# Patient Record
Sex: MALE | Race: OTHER | HISPANIC OR LATINO
[De-identification: names, ages, dates, MRNs, and addresses within clinical notes are randomized per-mention and may not be internally consistent; named-entity substitution may affect disease eponyms.]

---

## 2015-06-09 RX ORDER — LOSARTAN POTASSIUM 100 MG/1
2 TABLET, FILM COATED ORAL
Qty: 0 | Refills: 0 | COMMUNITY
Start: 2015-06-09

## 2017-12-08 ENCOUNTER — APPOINTMENT (OUTPATIENT)
Dept: UROLOGY | Facility: CLINIC | Age: 82
End: 2017-12-08
Payer: MEDICARE

## 2017-12-08 VITALS
HEIGHT: 66.5 IN | DIASTOLIC BLOOD PRESSURE: 84 MMHG | WEIGHT: 154 LBS | BODY MASS INDEX: 24.46 KG/M2 | SYSTOLIC BLOOD PRESSURE: 144 MMHG | HEART RATE: 80 BPM | RESPIRATION RATE: 16 BRPM

## 2017-12-08 DIAGNOSIS — Z86.79 PERSONAL HISTORY OF OTHER DISEASES OF THE CIRCULATORY SYSTEM: ICD-10-CM

## 2017-12-08 DIAGNOSIS — Z86.39 PERSONAL HISTORY OF OTHER ENDOCRINE, NUTRITIONAL AND METABOLIC DISEASE: ICD-10-CM

## 2017-12-08 DIAGNOSIS — Z63.4 DISAPPEARANCE AND DEATH OF FAMILY MEMBER: ICD-10-CM

## 2017-12-08 DIAGNOSIS — Z87.891 PERSONAL HISTORY OF NICOTINE DEPENDENCE: ICD-10-CM

## 2017-12-08 PROCEDURE — 51798 US URINE CAPACITY MEASURE: CPT

## 2017-12-08 PROCEDURE — 99213 OFFICE O/P EST LOW 20 MIN: CPT

## 2017-12-08 RX ORDER — TAMSULOSIN HYDROCHLORIDE 0.4 MG/1
0.4 CAPSULE ORAL
Qty: 90 | Refills: 3 | Status: ACTIVE | COMMUNITY
Start: 2017-12-08 | End: 1900-01-01

## 2017-12-08 SDOH — SOCIAL STABILITY - SOCIAL INSECURITY: DISSAPEARANCE AND DEATH OF FAMILY MEMBER: Z63.4

## 2017-12-11 PROBLEM — Z86.39 HISTORY OF HIGH CHOLESTEROL: Status: RESOLVED | Noted: 2017-12-08 | Resolved: 2017-12-11

## 2017-12-11 PROBLEM — Z63.4 WIDOWED: Status: ACTIVE | Noted: 2017-12-08

## 2017-12-11 PROBLEM — Z86.79 HISTORY OF HYPERTENSION: Status: RESOLVED | Noted: 2017-12-08 | Resolved: 2017-12-11

## 2017-12-11 PROBLEM — Z87.891 FORMER SMOKER: Status: ACTIVE | Noted: 2017-12-08

## 2018-06-06 ENCOUNTER — APPOINTMENT (OUTPATIENT)
Dept: UROLOGY | Facility: CLINIC | Age: 83
End: 2018-06-06

## 2018-06-25 ENCOUNTER — APPOINTMENT (OUTPATIENT)
Dept: ORTHOPEDIC SURGERY | Facility: CLINIC | Age: 83
End: 2018-06-25
Payer: MEDICARE

## 2018-06-25 VITALS
BODY MASS INDEX: 23.3 KG/M2 | HEART RATE: 74 BPM | HEIGHT: 66 IN | DIASTOLIC BLOOD PRESSURE: 100 MMHG | SYSTOLIC BLOOD PRESSURE: 176 MMHG | WEIGHT: 145 LBS

## 2018-06-25 DIAGNOSIS — Z95.0 PRESENCE OF CARDIAC PACEMAKER: ICD-10-CM

## 2018-06-25 DIAGNOSIS — Z86.79 PERSONAL HISTORY OF OTHER DISEASES OF THE CIRCULATORY SYSTEM: ICD-10-CM

## 2018-06-25 PROCEDURE — 99204 OFFICE O/P NEW MOD 45 MIN: CPT

## 2018-06-25 PROCEDURE — 73564 X-RAY EXAM KNEE 4 OR MORE: CPT | Mod: LT

## 2018-06-25 RX ORDER — LOSARTAN POTASSIUM 50 MG/1
50 TABLET, FILM COATED ORAL
Qty: 30 | Refills: 0 | Status: ACTIVE | COMMUNITY
Start: 2018-06-07

## 2018-06-25 RX ORDER — AZITHROMYCIN 250 MG/1
250 TABLET, FILM COATED ORAL
Qty: 6 | Refills: 0 | Status: ACTIVE | COMMUNITY
Start: 2018-02-08

## 2018-06-25 RX ORDER — AZELASTINE HYDROCHLORIDE 0.5 MG/ML
0.05 SOLUTION/ DROPS OPHTHALMIC
Qty: 6 | Refills: 0 | Status: ACTIVE | COMMUNITY
Start: 2018-03-16

## 2018-06-25 RX ORDER — DICLOFENAC SODIUM 20 MG/G
2 SOLUTION TOPICAL
Qty: 1 | Refills: 0 | Status: ACTIVE | COMMUNITY
Start: 2018-06-25 | End: 1900-01-01

## 2018-06-25 RX ORDER — AMOXICILLIN 500 MG/1
500 CAPSULE ORAL
Qty: 20 | Refills: 0 | Status: ACTIVE | COMMUNITY
Start: 2018-06-08

## 2018-06-25 RX ORDER — FUROSEMIDE 20 MG/1
20 TABLET ORAL
Qty: 30 | Refills: 0 | Status: ACTIVE | COMMUNITY
Start: 2018-06-07

## 2018-06-25 RX ORDER — ATORVASTATIN CALCIUM 20 MG/1
20 TABLET, FILM COATED ORAL
Qty: 90 | Refills: 0 | Status: ACTIVE | COMMUNITY
Start: 2018-01-02

## 2018-06-25 RX ORDER — BLOOD SUGAR DIAGNOSTIC
STRIP MISCELLANEOUS
Qty: 100 | Refills: 0 | Status: ACTIVE | COMMUNITY
Start: 2017-07-10

## 2018-06-25 RX ORDER — HYALURONATE SOD, CROSS-LINKED 30 MG/3 ML
30 SYRINGE (ML) INTRAARTICULAR
Qty: 2 | Refills: 0 | Status: ACTIVE | OUTPATIENT
Start: 2018-06-25

## 2018-06-25 RX ORDER — BENZONATATE 200 MG/1
200 CAPSULE ORAL
Qty: 20 | Refills: 0 | Status: ACTIVE | COMMUNITY
Start: 2018-06-08

## 2018-06-25 RX ORDER — FINASTERIDE 5 MG/1
5 TABLET, FILM COATED ORAL
Qty: 90 | Refills: 0 | Status: ACTIVE | COMMUNITY
Start: 2018-01-02

## 2018-06-25 RX ORDER — APIXABAN 5 MG/1
5 TABLET, FILM COATED ORAL
Qty: 60 | Refills: 0 | Status: ACTIVE | COMMUNITY
Start: 2018-04-06

## 2018-06-25 RX ORDER — LANCETS 28 GAUGE
EACH MISCELLANEOUS
Qty: 100 | Refills: 0 | Status: ACTIVE | COMMUNITY
Start: 2018-02-21

## 2018-06-25 RX ORDER — ERYTHROMYCIN 5 MG/G
5 OINTMENT OPHTHALMIC
Qty: 4 | Refills: 0 | Status: ACTIVE | COMMUNITY
Start: 2018-04-13

## 2018-06-25 RX ORDER — CARVEDILOL 12.5 MG/1
12.5 TABLET, FILM COATED ORAL
Qty: 180 | Refills: 0 | Status: ACTIVE | COMMUNITY
Start: 2018-05-09

## 2018-07-17 ENCOUNTER — APPOINTMENT (OUTPATIENT)
Dept: UROLOGY | Facility: CLINIC | Age: 83
End: 2018-07-17
Payer: MEDICARE

## 2018-07-17 ENCOUNTER — APPOINTMENT (OUTPATIENT)
Dept: UROLOGY | Facility: CLINIC | Age: 83
End: 2018-07-17

## 2018-07-17 ENCOUNTER — CLINICAL ADVICE (OUTPATIENT)
Age: 83
End: 2018-07-17

## 2018-07-17 DIAGNOSIS — R30.0 DYSURIA: ICD-10-CM

## 2018-07-17 DIAGNOSIS — N40.1 BENIGN PROSTATIC HYPERPLASIA WITH LOWER URINARY TRACT SYMPMS: ICD-10-CM

## 2018-07-17 DIAGNOSIS — N50.89 OTHER SPECIFIED DISORDERS OF THE MALE GENITAL ORGANS: ICD-10-CM

## 2018-07-17 DIAGNOSIS — N13.8 BENIGN PROSTATIC HYPERPLASIA WITH LOWER URINARY TRACT SYMPMS: ICD-10-CM

## 2018-07-17 DIAGNOSIS — R10.9 UNSPECIFIED ABDOMINAL PAIN: ICD-10-CM

## 2018-07-17 PROCEDURE — 99214 OFFICE O/P EST MOD 30 MIN: CPT

## 2018-07-19 LAB
APPEARANCE: CLEAR
BACTERIA UR CULT: NORMAL
BACTERIA: NEGATIVE
BILIRUBIN URINE: NEGATIVE
BLOOD URINE: NEGATIVE
CALCIUM OXALATE CRYSTALS: ABNORMAL
COLOR: YELLOW
GLUCOSE QUALITATIVE U: NEGATIVE MG/DL
KETONES URINE: NEGATIVE
LEUKOCYTE ESTERASE URINE: NEGATIVE
MICROSCOPIC-UA: NORMAL
NITRITE URINE: NEGATIVE
PH URINE: 6.5
PROTEIN URINE: ABNORMAL MG/DL
RED BLOOD CELLS URINE: 2 /HPF
SPECIFIC GRAVITY URINE: 1.01
SQUAMOUS EPITHELIAL CELLS: 0 /HPF
UROBILINOGEN URINE: NEGATIVE MG/DL
WHITE BLOOD CELLS URINE: 1 /HPF

## 2018-07-31 ENCOUNTER — INPATIENT (INPATIENT)
Facility: HOSPITAL | Age: 83
LOS: 4 days | Discharge: HOME | End: 2018-08-05
Attending: INTERNAL MEDICINE | Admitting: INTERNAL MEDICINE
Payer: MEDICARE

## 2018-07-31 VITALS
DIASTOLIC BLOOD PRESSURE: 82 MMHG | SYSTOLIC BLOOD PRESSURE: 150 MMHG | OXYGEN SATURATION: 98 % | HEART RATE: 59 BPM | RESPIRATION RATE: 16 BRPM | TEMPERATURE: 98 F

## 2018-07-31 DIAGNOSIS — Z95.0 PRESENCE OF CARDIAC PACEMAKER: Chronic | ICD-10-CM

## 2018-07-31 DIAGNOSIS — Z95.5 PRESENCE OF CORONARY ANGIOPLASTY IMPLANT AND GRAFT: Chronic | ICD-10-CM

## 2018-07-31 DIAGNOSIS — I63.9 CEREBRAL INFARCTION, UNSPECIFIED: ICD-10-CM

## 2018-07-31 DIAGNOSIS — Z95.0 PRESENCE OF CARDIAC PACEMAKER: ICD-10-CM

## 2018-07-31 DIAGNOSIS — E78.5 HYPERLIPIDEMIA, UNSPECIFIED: ICD-10-CM

## 2018-07-31 DIAGNOSIS — Z29.9 ENCOUNTER FOR PROPHYLACTIC MEASURES, UNSPECIFIED: ICD-10-CM

## 2018-07-31 DIAGNOSIS — I25.10 ATHEROSCLEROTIC HEART DISEASE OF NATIVE CORONARY ARTERY WITHOUT ANGINA PECTORIS: ICD-10-CM

## 2018-07-31 DIAGNOSIS — I10 ESSENTIAL (PRIMARY) HYPERTENSION: ICD-10-CM

## 2018-07-31 DIAGNOSIS — E87.70 FLUID OVERLOAD, UNSPECIFIED: ICD-10-CM

## 2018-07-31 DIAGNOSIS — I50.9 HEART FAILURE, UNSPECIFIED: ICD-10-CM

## 2018-07-31 DIAGNOSIS — I48.91 UNSPECIFIED ATRIAL FIBRILLATION: ICD-10-CM

## 2018-07-31 LAB
ALBUMIN SERPL ELPH-MCNC: 3 G/DL — LOW (ref 3.3–5)
ALP SERPL-CCNC: 110 U/L — SIGNIFICANT CHANGE UP (ref 40–120)
ALT FLD-CCNC: 17 U/L — SIGNIFICANT CHANGE UP (ref 4–41)
APTT BLD: 29.7 SEC — SIGNIFICANT CHANGE UP (ref 27.5–37.4)
AST SERPL-CCNC: 36 U/L — SIGNIFICANT CHANGE UP (ref 4–40)
BASOPHILS # BLD AUTO: 0.02 K/UL — SIGNIFICANT CHANGE UP (ref 0–0.2)
BASOPHILS NFR BLD AUTO: 0.4 % — SIGNIFICANT CHANGE UP (ref 0–2)
BILIRUB SERPL-MCNC: 0.6 MG/DL — SIGNIFICANT CHANGE UP (ref 0.2–1.2)
BUN SERPL-MCNC: 36 MG/DL — HIGH (ref 7–23)
CALCIUM SERPL-MCNC: 8.2 MG/DL — LOW (ref 8.4–10.5)
CHLORIDE SERPL-SCNC: 102 MMOL/L — SIGNIFICANT CHANGE UP (ref 98–107)
CK MB BLD-MCNC: 4.48 NG/ML — SIGNIFICANT CHANGE UP (ref 1–6.6)
CK MB BLD-MCNC: 4.79 NG/ML — SIGNIFICANT CHANGE UP (ref 1–6.6)
CK MB BLD-MCNC: SIGNIFICANT CHANGE UP (ref 0–2.5)
CK MB BLD-MCNC: SIGNIFICANT CHANGE UP (ref 0–2.5)
CK SERPL-CCNC: 103 U/L — SIGNIFICANT CHANGE UP (ref 30–200)
CK SERPL-CCNC: 104 U/L — SIGNIFICANT CHANGE UP (ref 30–200)
CO2 SERPL-SCNC: 25 MMOL/L — SIGNIFICANT CHANGE UP (ref 22–31)
CREAT SERPL-MCNC: 1.18 MG/DL — SIGNIFICANT CHANGE UP (ref 0.5–1.3)
EOSINOPHIL # BLD AUTO: 0.09 K/UL — SIGNIFICANT CHANGE UP (ref 0–0.5)
EOSINOPHIL NFR BLD AUTO: 1.6 % — SIGNIFICANT CHANGE UP (ref 0–6)
GLUCOSE SERPL-MCNC: 148 MG/DL — HIGH (ref 70–99)
HCT VFR BLD CALC: 39.1 % — SIGNIFICANT CHANGE UP (ref 39–50)
HGB BLD-MCNC: 12.6 G/DL — LOW (ref 13–17)
IMM GRANULOCYTES # BLD AUTO: 0.01 # — SIGNIFICANT CHANGE UP
IMM GRANULOCYTES NFR BLD AUTO: 0.2 % — SIGNIFICANT CHANGE UP (ref 0–1.5)
INR BLD: 1.44 — HIGH (ref 0.88–1.17)
LYMPHOCYTES # BLD AUTO: 0.67 K/UL — LOW (ref 1–3.3)
LYMPHOCYTES # BLD AUTO: 11.9 % — LOW (ref 13–44)
MCHC RBC-ENTMCNC: 26.4 PG — LOW (ref 27–34)
MCHC RBC-ENTMCNC: 32.2 % — SIGNIFICANT CHANGE UP (ref 32–36)
MCV RBC AUTO: 82 FL — SIGNIFICANT CHANGE UP (ref 80–100)
MONOCYTES # BLD AUTO: 0.64 K/UL — SIGNIFICANT CHANGE UP (ref 0–0.9)
MONOCYTES NFR BLD AUTO: 11.3 % — SIGNIFICANT CHANGE UP (ref 2–14)
NEUTROPHILS # BLD AUTO: 4.21 K/UL — SIGNIFICANT CHANGE UP (ref 1.8–7.4)
NEUTROPHILS NFR BLD AUTO: 74.6 % — SIGNIFICANT CHANGE UP (ref 43–77)
NRBC # FLD: 0 — SIGNIFICANT CHANGE UP
NT-PROBNP SERPL-SCNC: SIGNIFICANT CHANGE UP PG/ML
PLATELET # BLD AUTO: 140 K/UL — LOW (ref 150–400)
PMV BLD: SIGNIFICANT CHANGE UP FL (ref 7–13)
POTASSIUM SERPL-MCNC: 3.9 MMOL/L — SIGNIFICANT CHANGE UP (ref 3.5–5.3)
POTASSIUM SERPL-SCNC: 3.9 MMOL/L — SIGNIFICANT CHANGE UP (ref 3.5–5.3)
PROT SERPL-MCNC: 5.9 G/DL — LOW (ref 6–8.3)
PROTHROM AB SERPL-ACNC: 16.7 SEC — HIGH (ref 9.8–13.1)
RBC # BLD: 4.77 M/UL — SIGNIFICANT CHANGE UP (ref 4.2–5.8)
RBC # FLD: 18.1 % — HIGH (ref 10.3–14.5)
SODIUM SERPL-SCNC: 139 MMOL/L — SIGNIFICANT CHANGE UP (ref 135–145)
TROPONIN T, HIGH SENSITIVITY: 69 NG/L — CRITICAL HIGH (ref ?–14)
TROPONIN T, HIGH SENSITIVITY: 79 NG/L — CRITICAL HIGH (ref ?–14)
WBC # BLD: 5.64 K/UL — SIGNIFICANT CHANGE UP (ref 3.8–10.5)
WBC # FLD AUTO: 5.64 K/UL — SIGNIFICANT CHANGE UP (ref 3.8–10.5)

## 2018-07-31 PROCEDURE — 93970 EXTREMITY STUDY: CPT | Mod: 26

## 2018-07-31 PROCEDURE — 71046 X-RAY EXAM CHEST 2 VIEWS: CPT | Mod: 26

## 2018-07-31 RX ORDER — FUROSEMIDE 40 MG
40 TABLET ORAL ONCE
Qty: 0 | Refills: 0 | Status: COMPLETED | OUTPATIENT
Start: 2018-07-31 | End: 2018-07-31

## 2018-07-31 RX ORDER — CARVEDILOL PHOSPHATE 80 MG/1
25 CAPSULE, EXTENDED RELEASE ORAL EVERY 12 HOURS
Qty: 0 | Refills: 0 | Status: DISCONTINUED | OUTPATIENT
Start: 2018-07-31 | End: 2018-08-05

## 2018-07-31 RX ORDER — ATORVASTATIN CALCIUM 80 MG/1
20 TABLET, FILM COATED ORAL AT BEDTIME
Qty: 0 | Refills: 0 | Status: DISCONTINUED | OUTPATIENT
Start: 2018-07-31 | End: 2018-08-05

## 2018-07-31 RX ORDER — ASPIRIN/CALCIUM CARB/MAGNESIUM 324 MG
81 TABLET ORAL DAILY
Qty: 0 | Refills: 0 | Status: DISCONTINUED | OUTPATIENT
Start: 2018-07-31 | End: 2018-08-05

## 2018-07-31 RX ORDER — SODIUM CHLORIDE 9 MG/ML
3 INJECTION INTRAMUSCULAR; INTRAVENOUS; SUBCUTANEOUS EVERY 8 HOURS
Qty: 0 | Refills: 0 | Status: DISCONTINUED | OUTPATIENT
Start: 2018-07-31 | End: 2018-08-05

## 2018-07-31 RX ORDER — APIXABAN 2.5 MG/1
5 TABLET, FILM COATED ORAL EVERY 12 HOURS
Qty: 0 | Refills: 0 | Status: DISCONTINUED | OUTPATIENT
Start: 2018-07-31 | End: 2018-08-05

## 2018-07-31 RX ORDER — FINASTERIDE 5 MG/1
5 TABLET, FILM COATED ORAL DAILY
Qty: 0 | Refills: 0 | Status: DISCONTINUED | OUTPATIENT
Start: 2018-07-31 | End: 2018-08-05

## 2018-07-31 RX ORDER — LOSARTAN POTASSIUM 100 MG/1
50 TABLET, FILM COATED ORAL
Qty: 0 | Refills: 0 | Status: DISCONTINUED | OUTPATIENT
Start: 2018-07-31 | End: 2018-08-03

## 2018-07-31 RX ORDER — FUROSEMIDE 40 MG
40 TABLET ORAL DAILY
Qty: 0 | Refills: 0 | Status: DISCONTINUED | OUTPATIENT
Start: 2018-08-01 | End: 2018-08-05

## 2018-07-31 RX ADMIN — Medication 40 MILLIGRAM(S): at 13:08

## 2018-07-31 RX ADMIN — CARVEDILOL PHOSPHATE 25 MILLIGRAM(S): 80 CAPSULE, EXTENDED RELEASE ORAL at 21:17

## 2018-07-31 RX ADMIN — SODIUM CHLORIDE 3 MILLILITER(S): 9 INJECTION INTRAMUSCULAR; INTRAVENOUS; SUBCUTANEOUS at 21:16

## 2018-07-31 RX ADMIN — ATORVASTATIN CALCIUM 20 MILLIGRAM(S): 80 TABLET, FILM COATED ORAL at 21:17

## 2018-07-31 RX ADMIN — APIXABAN 5 MILLIGRAM(S): 2.5 TABLET, FILM COATED ORAL at 21:40

## 2018-07-31 NOTE — ED SUB INTERN NOTE - OBJECTIVE STATEMENT FT
88 y/o M c/o 3 mo worsening BL LE swelling. Patient endorses difficulty ambulating at baseline due to chronic OA, 1 mo worsening of ambulation due to discomfort in BL LE and scrotum. Patient PMH notable for AF, aMI w/ stenting, HTN, BPH, anemia. Patient denies SOB or CP, denies BROWN. Endorses that difficulty ambulating is not progressive and due to BL LE discomfort. Patient denies trauma to BL LE, denies falls, denies fevers or rash. Denies ROM limitation. Denies nocturia, dysuria, urinary urgency, hesitancy, or frequency. Endorses need to manually manipulate genitals to control urine stream. Patient accompanied by son who states patient non-compliant with doctors appointments, uncertain medication adherence.

## 2018-07-31 NOTE — H&P ADULT - PROBLEM SELECTOR PLAN 1
Admit to tele  check cbc, bmp, a1c, flp, tsh, trend CE  echo ordered   strict I&Os  Fluid restriction  daily weights  Lasix 40 IV daily  Discussed with Dr. Nani Denise MD note

## 2018-07-31 NOTE — H&P ADULT - ASSESSMENT
86 yo M with CAD s/p ROBERTO, Afib on Eliquis, HTN, HLD, PPM, VT, CVA, Velasquez's esophagus, s/p PPM/AICD presents with LE edema and scrotal edema x 1-2 weeks.  admitted for acute on chronic CHF

## 2018-07-31 NOTE — ED PROVIDER NOTE - OBJECTIVE STATEMENT
87M PMH AICD, CVA, HTN, afib p/w 1 month worsening b/l LE edema now with scrotal and penile edema. NO chest pain, SOB, orthopnea. +BROWN. No dysuria, but has to "reposition his genitalia" to urinate due to edema. No hematuria. Takes Lasix 20mg daily, took this morning.     Cards: [facility]

## 2018-07-31 NOTE — ED PROVIDER NOTE - MEDICAL DECISION MAKING DETAILS
ATTG NOTE DR. GRANT LE edema with now scrotal swelling, no improvement despite lasix 20mg daily - r/o pre renal vs volume overload vs worsening CHF (right sided HF)

## 2018-07-31 NOTE — PATIENT PROFILE ADULT. - PAIN CHRONIC, PROFILE
AUTHORIZATION FOR SURGICAL OPERATION OR OTHER PROCEDURE    1.  [facility] assigned to my case to perform the following operation and/or procedure at the [facility]:    _______________________ Patient Name:  _______VACCHIANO,CRISTINA______________________________________________  (please print)      Patient signature:  ___________________________________________________             Relationship to Patient:           []  Parent    Responsible yes/knee pain knee pain/no

## 2018-07-31 NOTE — H&P ADULT - HISTORY OF PRESENT ILLNESS
88 yo M with CAD s/p ROBERTO, Afib on Eliquis, HTN, HLD, PPM, VT, CVA, Velasquez's esophagus, s/p PPM/AICD presents with LE edema and scrotal edema x 1-2 weeks. Patient states he had a cough, and bronchitis symptoms few weeks ago. He was given 2 courses of antibiotics which resolved the symptoms. He then started to develop b/l LE edema and scrotal edema, which  improved slightly, but then worsened. Denies fever, chills, cough, falls, LOC, chest pain, abdominal pain, SOB, BROWN, orthopnea, nausea, vomiting, diarrhea, constipation, melena, hematochezia, calf tenderness, or dysuria. 88 yo M with CAD s/p ROBERTO, Afib on Eliquis, HTN, HLD, PPM, VT, CVA, Velasquez's esophagus, s/p PPM/AICD presents with LE edema and scrotal edema x 1-2 weeks. Patient states he had a cough, and bronchitis symptoms few weeks ago. He was given 2 courses of antibiotics which resolved the symptoms. He then started to develop b/l LE edema and scrotal edema, which  improved slightly, but then worsened. Denies fever, chills, cough, falls, LOC, chest pain, abdominal pain, SOB, BROWN, orthopnea, nausea, vomiting, diarrhea, constipation, melena, hematochezia, calf tenderness, or dysuria.     Patient had meds with him and states his coreg was recently increased from 12.5mg BID to 25mg BID

## 2018-07-31 NOTE — ED ADULT NURSE NOTE - OBJECTIVE STATEMENT
Received pt in bed A and O x 3 in NAD, pt reports  " My swelling is going up the leg into my groin and it is bothering me". pt reports scrotal swelling, denied pain or tenderness, denies fever or chills/ burning on urination. noted left leg selling no edema noted, lung sounds clear B/L. pt denies pain on extremity movement. fall precautions initiated call bell at bedside.

## 2018-07-31 NOTE — ED ADULT NURSE NOTE - NSIMPLEMENTINTERV_GEN_ALL_ED
Implemented All Fall with Harm Risk Interventions:  Ruby to call system. Call bell, personal items and telephone within reach. Instruct patient to call for assistance. Room bathroom lighting operational. Non-slip footwear when patient is off stretcher. Physically safe environment: no spills, clutter or unnecessary equipment. Stretcher in lowest position, wheels locked, appropriate side rails in place. Provide visual cue, wrist band, yellow gown, etc. Monitor gait and stability. Monitor for mental status changes and reorient to person, place, and time. Review medications for side effects contributing to fall risk. Reinforce activity limits and safety measures with patient and family. Provide visual clues: red socks.

## 2018-07-31 NOTE — PROVIDER CONTACT NOTE (CRITICAL VALUE NOTIFICATION) - BACKGROUND
86 yo M with CAD s/p ROBERTO, Afib on eliquis, HTN, HLD, PPM, VT, CVA, Velasquez's esophagus, s/p PPM/AICD presents with LE edema and scrotal edema. admitted for hypervolemia

## 2018-07-31 NOTE — ED PROVIDER NOTE - PHYSICAL EXAMINATION
ATTENDING PHYSICAL EXAM DR. GRANT ***GEN - NAD; well appearing; A+O x3 ***HEAD - NC/AT ***EYES/NOSE - PERRL, EOMI, mucous membranes moist, no discharge ***THROAT: Oral cavity and pharynx normal. No inflammation, swelling, exudate, or lesions.  ***NECK: Neck supple, non-tender without lymphadenopathy, no masses, no thyromegaly.   ***PULMONARY - CTA b/l, symmetric breath sounds. ***CARDIAC -s1s2, RRR, no M,G,R  ***ABDOMEN - +BS, ND, NT, soft, no guarding, no rebound, no masses SCROTAL swelling and no testicular tend, circumcized  ***BACK - no CVA tenderness, Normal  spine ***EXTREMITIES - symmetric pulses, 2+ dp, capillary refill < 2 seconds, no clubbing, no cyanosis, RLE > LLE edema ***SKIN - no rash or bruising   ***NEUROLOGIC - alert ATTENDING PHYSICAL EXAM DR. COCHRAN ***GEN - NAD; well appearing; A+O x3 ***HEAD - NC/AT ***EYES/NOSE - PERRL, EOMI, mucous membranes moist, no discharge ***THROAT: Oral cavity and pharynx normal. No inflammation, swelling, exudate, or lesions.  ***NECK: Neck supple, non-tender without lymphadenopathy, no masses, no thyromegaly.   ***PULMONARY - CTA b/l, symmetric breath sounds. ***CARDIAC -s1s2, RRR, no M,G,R  ***ABDOMEN - +BS, ND, NT, soft, no guarding, no rebound, no masses SCROTAL swelling and no testicular tend, circumcized  exam performed by Dr. PHIL Robb, chaperoned by Dr. Cochran.   ***BACK - no CVA tenderness, Normal  spine ***EXTREMITIES - symmetric pulses, 2+ dp, capillary refill < 2 seconds, no clubbing, no cyanosis, LLE > RLE edema ***SKIN - no rash or bruising   ***NEUROLOGIC - alert

## 2018-07-31 NOTE — H&P ADULT - ATTENDING COMMENTS
EKG - Afib V paced     Echo - 2015 - mild segmental LV dysfunction    a/p     1) Acute on chronic diastolic CHF - cont IV lasix 40mg daily , cont coreg and losartan    2) Afib - cont eliquis     3) s/p PPM - get interrogated

## 2018-07-31 NOTE — ED ADULT TRIAGE NOTE - CHIEF COMPLAINT QUOTE
c/o SOB, CP "from time to time", lower leg extremity swelling x 2 months with worsening recently with swelling now affecting thighs and scrotum.  PMH: PPM, anemia, BPH, afib, HTN, CVA, cardiac stents

## 2018-07-31 NOTE — ED PROVIDER NOTE - PROGRESS NOTE DETAILS
Discussed case with patient's cardiologist (Dr. Vanegas) would like pt admitted for workup as had never had abnormal echo (last year not recent) and feels this is new onset CHF, concerned cannot safely diurese in outpatient setting. Accepted for admission to tele doc. Given pt's cardiologist is [facility], d/w hospitalist who says admission to tele doc is appropriate given admission for CHF only. Tele PA textpage sent.

## 2018-07-31 NOTE — ED PROVIDER NOTE - PMH
Anemia    Atrial fibrillation    BPH (benign prostatic hypertrophy)    CVA (Cerebral Infarction)  x2 in the past with residual Rt handed numbness and a little wprd finding trouble  Hypertension    Other and Unspecified Hyperlipidemia    Pacemaker

## 2018-08-01 PROCEDURE — 93284 PRGRMG EVAL IMPLANTABLE DFB: CPT | Mod: 26

## 2018-08-01 RX ORDER — HYDRALAZINE HCL 50 MG
5 TABLET ORAL ONCE
Qty: 0 | Refills: 0 | Status: COMPLETED | OUTPATIENT
Start: 2018-08-01 | End: 2018-08-01

## 2018-08-01 RX ADMIN — CARVEDILOL PHOSPHATE 25 MILLIGRAM(S): 80 CAPSULE, EXTENDED RELEASE ORAL at 17:43

## 2018-08-01 RX ADMIN — SODIUM CHLORIDE 3 MILLILITER(S): 9 INJECTION INTRAMUSCULAR; INTRAVENOUS; SUBCUTANEOUS at 13:06

## 2018-08-01 RX ADMIN — LOSARTAN POTASSIUM 50 MILLIGRAM(S): 100 TABLET, FILM COATED ORAL at 13:08

## 2018-08-01 RX ADMIN — FINASTERIDE 5 MILLIGRAM(S): 5 TABLET, FILM COATED ORAL at 13:08

## 2018-08-01 RX ADMIN — Medication 5 MILLIGRAM(S): at 05:32

## 2018-08-01 RX ADMIN — ATORVASTATIN CALCIUM 20 MILLIGRAM(S): 80 TABLET, FILM COATED ORAL at 21:09

## 2018-08-01 RX ADMIN — SODIUM CHLORIDE 3 MILLILITER(S): 9 INJECTION INTRAMUSCULAR; INTRAVENOUS; SUBCUTANEOUS at 21:08

## 2018-08-01 RX ADMIN — APIXABAN 5 MILLIGRAM(S): 2.5 TABLET, FILM COATED ORAL at 17:43

## 2018-08-01 RX ADMIN — CARVEDILOL PHOSPHATE 25 MILLIGRAM(S): 80 CAPSULE, EXTENDED RELEASE ORAL at 05:23

## 2018-08-01 RX ADMIN — APIXABAN 5 MILLIGRAM(S): 2.5 TABLET, FILM COATED ORAL at 05:23

## 2018-08-01 RX ADMIN — Medication 81 MILLIGRAM(S): at 13:08

## 2018-08-01 RX ADMIN — Medication 40 MILLIGRAM(S): at 05:23

## 2018-08-01 RX ADMIN — SODIUM CHLORIDE 3 MILLILITER(S): 9 INJECTION INTRAMUSCULAR; INTRAVENOUS; SUBCUTANEOUS at 05:24

## 2018-08-01 NOTE — CONSULT NOTE ADULT - SUBJECTIVE AND OBJECTIVE BOX
Reason for Admission: LE edema	  History of Present Illness: 	  86 yo M with CAD s/p ROBERTO, Afib on Eliquis, HTN, HLD, PPM, VT, CVA, Velasquez's esophagus, s/p PPM/AICD presents with LE edema and scrotal edema x 1-2 weeks. Patient states he had a cough, and bronchitis symptoms few weeks ago. He was given 2 courses of antibiotics which resolved the symptoms. He then started to develop b/l LE edema and scrotal edema, which  improved slightly, but then worsened. Denies fever, chills, cough, falls, LOC, chest pain, abdominal pain, SOB, BROWN, orthopnea, nausea, vomiting, diarrhea, constipation, melena, hematochezia, calf tenderness, or dysuria.     Patient had meds with him and states his coreg was recently increased from 12.5mg BID to 25mg BID    Review of Systems:  · Negative General Symptoms	no fever; no chills; no sweating	  · Negative Skin Symptoms	no rash; no itching; no dryness	  · Negative Ophthalmologic Symptoms	no diplopia; no photophobia; no lacrimation L; no lacrimation R	  · Negative ENMT Symptoms	no hearing difficulty; no ear pain; no tinnitus	  · Negative Respiratory and Thorax Symptoms	no wheezing; no dyspnea; no cough; no hemoptysis	  · Negative Cardiovascular Symptoms	no chest pain; no palpitations; no dyspnea on exertion	  · Cardiovascular Symptoms	peripheral edema	  · Negative Gastrointestinal Symptoms	no nausea; no vomiting; no diarrhea; no constipation	  · Negative General Genitourinary Symptoms	no hematuria; no renal colic; no flank pain L; no flank pain R	  · Negative Musculoskeletal Symptoms	no arthralgia; no arthritis; no joint swelling	  · Negative Neurological Symptoms	no transient paralysis; no weakness; no paresthesias	  · Negative Psychiatric Symptoms	no suicidal ideation; no depression	  · Negative Hematology Symptoms	no gum bleeding; no nose bleeding	      Allergies and Intolerances:        Allergies:  	No Known Allergies:     Home Medications:   * Patient Currently Takes Medications as of 31-Jul-2018 20:52 documented in Structured Notes  · 	carvedilol 25 mg oral tablet: 1 tab(s) orally every 12 hours, Last Dose Taken:    · 	losartan 25 mg oral tablet: 2 tab(s) orally once a day, Last Dose Taken:    · 	finasteride 5 mg oral tablet: 1 tab(s) orally once a day, Last Dose Taken:    · 	aspirin 81 mg oral delayed release tablet: 1 tab(s) orally once a day, Last Dose Taken:    · 	Eliquis 5 mg oral tablet: 1 tab(s) orally 2 times a day, Last Dose Taken:    · 	Lipitor 20 mg oral tablet: 1 tab(s) orally once a day  · 	Lasix 20 mg oral tablet: 1 tab(s) orally once a day    Patient History:   Past Medical History:  Anemia    Atrial fibrillation    BPH (benign prostatic hypertrophy)    CVA (Cerebral Infarction)  x2 in the past with residual Rt handed numbness and a little wprd finding trouble  Hypertension    Other and Unspecified Hyperlipidemia    Pacemaker.    Past Surgical History:  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    Pacemaker    S/P coronary artery stent placement.    Family History:  Father  Still living? Unknown  Family history of heart disease, Age at diagnosis: Age Unknown.    Social History:  Social History (marital status, living situation, occupation, tobacco use, alcohol and drug use, and sexual history): Denies smoking, alcohol or drug use  Lives with son	    Tobacco Screening:  · Core Measure Site	No	    Risk Assessment:   Present on Admission:  Deep Venous Thrombosis	no	  Pulmonary Embolus	no	    Heart Failure:  Does this patient have a history of or has been diagnosed with heart failure? yes.     LV Function Assessment (LVS function was evaluated before arrival and/or during hospitalization) unknown.      Physical Exam:  · Constitutional	detailed exam	  · Constitutional Details	well-developed; well-groomed; well-nourished; no distress	  · Eyes	detailed exam	  · Eyes Details	EOMI; conjunctiva clear	  · ENMT	detailed exam	  · ENMT Details	mouth	  · Mouth	moist	  · Neck	detailed exam	  · Neck Details	supple; no JVD	  · Breasts	detailed exam	  · Respiratory	detailed exam	  · Respiratory Details	airway patent; breath sounds equal; good air movement; respirations non-labored	  · Cardiovascular	detailed exam	  · Cardiovascular Details	regular rate and rhythm  no rub	  · Gastrointestinal	detailed exam	  · GI Normal	soft; nontender	  · Extremities	detailed exam	  · Extremities Details	no clubbing; no cyanosis	  · Extremities Comments	2+ pitting edema on b/l LE	  · Vascular	detailed exam	  · Carotid Pulse	right normal; left normal	  · Radial Pulse	right normal; left normal	  · DP Pulse	right normal; left normal	  · PT Pulse	right normal; left normal	  · Neurological	detailed exam	  · Neurological Details	alert and oriented x 3; responds to verbal commands; sensation intact; cranial nerves intact	  · Skin	detailed exam	  · Skin Details	color normal	  · Musculoskeletal	detailed exam	  · Musculoskeletal Details	ROM intact	      Labs and Results:  Labs, Radiology, Cardiology, and Other Results: EKG: paced rhythm at 60 BPM                         12.6   5.64  )-----------( 140      ( 31 Jul 2018 11:30 )             39.1    07-31   139  |  102  |  36<H>  ----------------------------<  148<H>  3.9   |  25  |  1.18   Ca    8.2<L>      31 Jul 2018 11:30   TPro  5.9<L>  /  Alb  3.0<L>  /  TBili  0.6  /  DBili  x   /  AST  36  /  ALT  17  /  AlkPhos  110  07-31   < from: US Duplex Venous Lower Ext Complete, Bilateral (07.31.18 @ 12:43) >   IMPRESSION:    No evidence of bilateral lower extremity deep venous thrombosis.   < from: Xray Chest 2 Views PA/Lat (07.31.18 @ 13:30) >    IMPRESSION:  Bilateral small pleural effusions.    < end of copied text >	    Assessment and Plan:   Assessment:  · Assessment		  86 yo M with CAD s/p ROBERTO, Afib on Eliquis, HTN, HLD, PPM, VT, CVA, Velasquez's esophagus, s/p PPM/AICD presents with LE edema and scrotal edema x 1-2 weeks.  admitted for acute on chronic CHF    Problem/Plan - 1:  ·  Problem: Acute on chronic congestive heart failure.   diuresis, daily weighs, cards f/u , echo   Problem/Plan - 2:  ·  Problem: Pacemaker.  Plan: Ep in AM for device interrogation.     Problem/Plan - 3:  ·  Problem: Atrial fibrillation.  Plan: cont eliquis and coreg  [id number] score 7.     Problem/Plan - 4:  ·  Problem: Hypertension.  Plan: cont losartan, lasix and coreg.     Problem/Plan - 5:  ·  Problem: CAD (coronary artery disease).  Plan: cont asa, statin.     Problem/Plan - 6:  Problem: Other and Unspecified Hyperlipidemia. Plan: cont statin.    Problem/Plan - 7:  ·  Problem: Cerebrovascular accident (CVA).  Plan: asa, statin.

## 2018-08-01 NOTE — CHART NOTE - NSCHARTNOTEFT_GEN_A_CORE
ELECTROPHYSIOLOGY  Device Interrogation Performed                                  Date/Time:        :   [employer]                     Model:       Evia HF-T                             Mode:      VVI 60        Atrial Lead:  P wave amplitude:                          mv          Impedence:                   Ohms      Threshold:                V@             ms          Ventricular Lead(s):  RV Lead: R wave amplitude:                          mv          Impedence:    312               Ohms      Threshold:        0.9        V@   0.4          ms   LV Lead:  R wave amplitude:                          mv          Impedence:       507           Ohms      Threshold:      1.0          V@     0.4       ms         Battery Status:          x           Good                BRINDA                     EOL          Underlying Rhythm:   AF with completely dependent         Events/Observation: Last NSVT detected 3/8/17 @ 160 bpm          Impression/Plan:  Normal BiVICD function. Normal sensing and BIVpacing via iterative testing. Good battery status. Excellent threshold capture.  No reprogramming.  NSVT as described above.   NO events detected corelating to current admission.

## 2018-08-02 LAB
BUN SERPL-MCNC: 24 MG/DL — HIGH (ref 7–23)
CALCIUM SERPL-MCNC: 8.7 MG/DL — SIGNIFICANT CHANGE UP (ref 8.4–10.5)
CHLORIDE SERPL-SCNC: 103 MMOL/L — SIGNIFICANT CHANGE UP (ref 98–107)
CO2 SERPL-SCNC: 27 MMOL/L — SIGNIFICANT CHANGE UP (ref 22–31)
CREAT SERPL-MCNC: 0.98 MG/DL — SIGNIFICANT CHANGE UP (ref 0.5–1.3)
GLUCOSE SERPL-MCNC: 117 MG/DL — HIGH (ref 70–99)
HCT VFR BLD CALC: 47.7 % — SIGNIFICANT CHANGE UP (ref 39–50)
HGB BLD-MCNC: 15.1 G/DL — SIGNIFICANT CHANGE UP (ref 13–17)
MCHC RBC-ENTMCNC: 25.7 PG — LOW (ref 27–34)
MCHC RBC-ENTMCNC: 31.7 % — LOW (ref 32–36)
MCV RBC AUTO: 81.3 FL — SIGNIFICANT CHANGE UP (ref 80–100)
NRBC # FLD: 0 — SIGNIFICANT CHANGE UP
NT-PROBNP SERPL-SCNC: SIGNIFICANT CHANGE UP PG/ML
PLATELET # BLD AUTO: 133 K/UL — LOW (ref 150–400)
PMV BLD: SIGNIFICANT CHANGE UP FL (ref 7–13)
POTASSIUM SERPL-MCNC: 3.3 MMOL/L — LOW (ref 3.5–5.3)
POTASSIUM SERPL-SCNC: 3.3 MMOL/L — LOW (ref 3.5–5.3)
RBC # BLD: 5.87 M/UL — HIGH (ref 4.2–5.8)
RBC # FLD: 19.1 % — HIGH (ref 10.3–14.5)
SODIUM SERPL-SCNC: 142 MMOL/L — SIGNIFICANT CHANGE UP (ref 135–145)
WBC # BLD: 4.68 K/UL — SIGNIFICANT CHANGE UP (ref 3.8–10.5)
WBC # FLD AUTO: 4.68 K/UL — SIGNIFICANT CHANGE UP (ref 3.8–10.5)

## 2018-08-02 PROCEDURE — 93306 TTE W/DOPPLER COMPLETE: CPT | Mod: 26

## 2018-08-02 RX ORDER — POTASSIUM CHLORIDE 20 MEQ
40 PACKET (EA) ORAL EVERY 4 HOURS
Qty: 0 | Refills: 0 | Status: COMPLETED | OUTPATIENT
Start: 2018-08-02 | End: 2018-08-02

## 2018-08-02 RX ADMIN — FINASTERIDE 5 MILLIGRAM(S): 5 TABLET, FILM COATED ORAL at 11:18

## 2018-08-02 RX ADMIN — ATORVASTATIN CALCIUM 20 MILLIGRAM(S): 80 TABLET, FILM COATED ORAL at 22:01

## 2018-08-02 RX ADMIN — SODIUM CHLORIDE 3 MILLILITER(S): 9 INJECTION INTRAMUSCULAR; INTRAVENOUS; SUBCUTANEOUS at 06:05

## 2018-08-02 RX ADMIN — Medication 40 MILLIGRAM(S): at 06:04

## 2018-08-02 RX ADMIN — APIXABAN 5 MILLIGRAM(S): 2.5 TABLET, FILM COATED ORAL at 06:04

## 2018-08-02 RX ADMIN — APIXABAN 5 MILLIGRAM(S): 2.5 TABLET, FILM COATED ORAL at 17:48

## 2018-08-02 RX ADMIN — LOSARTAN POTASSIUM 50 MILLIGRAM(S): 100 TABLET, FILM COATED ORAL at 11:19

## 2018-08-02 RX ADMIN — SODIUM CHLORIDE 3 MILLILITER(S): 9 INJECTION INTRAMUSCULAR; INTRAVENOUS; SUBCUTANEOUS at 17:21

## 2018-08-02 RX ADMIN — Medication 40 MILLIEQUIVALENT(S): at 17:48

## 2018-08-02 RX ADMIN — SODIUM CHLORIDE 3 MILLILITER(S): 9 INJECTION INTRAMUSCULAR; INTRAVENOUS; SUBCUTANEOUS at 22:00

## 2018-08-02 RX ADMIN — Medication 40 MILLIEQUIVALENT(S): at 10:33

## 2018-08-02 RX ADMIN — CARVEDILOL PHOSPHATE 25 MILLIGRAM(S): 80 CAPSULE, EXTENDED RELEASE ORAL at 17:48

## 2018-08-02 RX ADMIN — CARVEDILOL PHOSPHATE 25 MILLIGRAM(S): 80 CAPSULE, EXTENDED RELEASE ORAL at 06:04

## 2018-08-02 RX ADMIN — Medication 81 MILLIGRAM(S): at 11:18

## 2018-08-02 NOTE — DIETITIAN INITIAL EVALUATION ADULT. - ORAL INTAKE PTA
Pt eats a diet high in vegetable and consumes a lot of spinach, squash, pumpkin, cassava, plantains, broccoli, cauliflower, sweet potato.  Pt also eats lean meats ( chicken, fish and turkey) several times per week.  Diet prior to admission is also low in sodium./good

## 2018-08-02 NOTE — DIETITIAN INITIAL EVALUATION ADULT. - PERTINENT LABORATORY DATA
08-02 Na142 mmol/L Glu 117 mg/dL<H> K+ 3.3 mmol/L<L> Cr  0.98 mg/dL BUN 24 mg/dL<H> 07-31 Alb 3.0 g/dL<L>

## 2018-08-02 NOTE — DIETITIAN INITIAL EVALUATION ADULT. - NS AS NUTRI INTERV STRATEGIES
1- Continue current diet order, which remains appropriate at this time. 2- Monitor weights, labs, BM's, skin integrity, p.o. intake. 3- .Encouraged 4- RD remains available, re-consult as needed. Keshawn Shea RD Pager #[phone]

## 2018-08-02 NOTE — DIETITIAN INITIAL EVALUATION ADULT. - NS AS NUTRI INTERV ED CONTENT
Priority modifications/Survival information/Recommended modifications/Nutrition relationship to health/disease/Purpose of the nutrition education

## 2018-08-02 NOTE — DIETITIAN INITIAL EVALUATION ADULT. - PT NOT SOURCE
other (specify)/Pt is Faroese-speaking, this RDN able to communicate with Pt in his native language.

## 2018-08-02 NOTE — DIETITIAN INITIAL EVALUATION ADULT. - OTHER INFO
Nutrition consult received for RD-heart failure linked order set. Pt is a 87 year old male with a past medical history inclusive of CAD, ROBERTO, Afib, HTN, HLD, PPM, CVA, Velasquez's esophagus, admitted with LE and scrotal edema. Patient reports excellent PO intake at present and prior to admission.  No GI distress (nausea/vomiting/diarrhea/constipation.) No reports of chewing or swallowing difficulties.  Additional review of the recommended diet modifications reviewed with the patient.

## 2018-08-02 NOTE — DIETITIAN INITIAL EVALUATION ADULT. - PHYSICAL APPEARANCE
1+ edema of the left and right ankles, + of the left and right foot, 3+ scrotal edema./well nourished

## 2018-08-03 LAB
BUN SERPL-MCNC: 28 MG/DL — HIGH (ref 7–23)
CALCIUM SERPL-MCNC: 8.6 MG/DL — SIGNIFICANT CHANGE UP (ref 8.4–10.5)
CHLORIDE SERPL-SCNC: 104 MMOL/L — SIGNIFICANT CHANGE UP (ref 98–107)
CO2 SERPL-SCNC: 26 MMOL/L — SIGNIFICANT CHANGE UP (ref 22–31)
CREAT SERPL-MCNC: 1.11 MG/DL — SIGNIFICANT CHANGE UP (ref 0.5–1.3)
GLUCOSE SERPL-MCNC: 145 MG/DL — HIGH (ref 70–99)
HCT VFR BLD CALC: 42.3 % — SIGNIFICANT CHANGE UP (ref 39–50)
HGB BLD-MCNC: 13.4 G/DL — SIGNIFICANT CHANGE UP (ref 13–17)
MCHC RBC-ENTMCNC: 25.8 PG — LOW (ref 27–34)
MCHC RBC-ENTMCNC: 31.7 % — LOW (ref 32–36)
MCV RBC AUTO: 81.5 FL — SIGNIFICANT CHANGE UP (ref 80–100)
NRBC # FLD: 0 — SIGNIFICANT CHANGE UP
PLATELET # BLD AUTO: 149 K/UL — LOW (ref 150–400)
PMV BLD: SIGNIFICANT CHANGE UP FL (ref 7–13)
POTASSIUM SERPL-MCNC: 3.5 MMOL/L — SIGNIFICANT CHANGE UP (ref 3.5–5.3)
POTASSIUM SERPL-SCNC: 3.5 MMOL/L — SIGNIFICANT CHANGE UP (ref 3.5–5.3)
RBC # BLD: 5.19 M/UL — SIGNIFICANT CHANGE UP (ref 4.2–5.8)
RBC # FLD: 18.2 % — HIGH (ref 10.3–14.5)
SODIUM SERPL-SCNC: 143 MMOL/L — SIGNIFICANT CHANGE UP (ref 135–145)
WBC # BLD: 4.7 K/UL — SIGNIFICANT CHANGE UP (ref 3.8–10.5)
WBC # FLD AUTO: 4.7 K/UL — SIGNIFICANT CHANGE UP (ref 3.8–10.5)

## 2018-08-03 RX ORDER — LOSARTAN POTASSIUM 100 MG/1
100 TABLET, FILM COATED ORAL DAILY
Qty: 0 | Refills: 0 | Status: DISCONTINUED | OUTPATIENT
Start: 2018-08-03 | End: 2018-08-05

## 2018-08-03 RX ORDER — FUROSEMIDE 40 MG
20 TABLET ORAL
Qty: 0 | Refills: 0 | Status: DISCONTINUED | OUTPATIENT
Start: 2018-08-03 | End: 2018-08-05

## 2018-08-03 RX ORDER — LOSARTAN POTASSIUM 100 MG/1
50 TABLET, FILM COATED ORAL ONCE
Qty: 0 | Refills: 0 | Status: COMPLETED | OUTPATIENT
Start: 2018-08-03 | End: 2018-08-03

## 2018-08-03 RX ADMIN — CARVEDILOL PHOSPHATE 25 MILLIGRAM(S): 80 CAPSULE, EXTENDED RELEASE ORAL at 17:10

## 2018-08-03 RX ADMIN — SODIUM CHLORIDE 3 MILLILITER(S): 9 INJECTION INTRAMUSCULAR; INTRAVENOUS; SUBCUTANEOUS at 05:47

## 2018-08-03 RX ADMIN — CARVEDILOL PHOSPHATE 25 MILLIGRAM(S): 80 CAPSULE, EXTENDED RELEASE ORAL at 05:42

## 2018-08-03 RX ADMIN — SODIUM CHLORIDE 3 MILLILITER(S): 9 INJECTION INTRAMUSCULAR; INTRAVENOUS; SUBCUTANEOUS at 12:39

## 2018-08-03 RX ADMIN — ATORVASTATIN CALCIUM 20 MILLIGRAM(S): 80 TABLET, FILM COATED ORAL at 22:01

## 2018-08-03 RX ADMIN — FINASTERIDE 5 MILLIGRAM(S): 5 TABLET, FILM COATED ORAL at 12:39

## 2018-08-03 RX ADMIN — Medication 81 MILLIGRAM(S): at 12:39

## 2018-08-03 RX ADMIN — SODIUM CHLORIDE 3 MILLILITER(S): 9 INJECTION INTRAMUSCULAR; INTRAVENOUS; SUBCUTANEOUS at 22:00

## 2018-08-03 RX ADMIN — Medication 20 MILLIGRAM(S): at 20:10

## 2018-08-03 RX ADMIN — LOSARTAN POTASSIUM 50 MILLIGRAM(S): 100 TABLET, FILM COATED ORAL at 17:10

## 2018-08-03 RX ADMIN — Medication 40 MILLIGRAM(S): at 05:42

## 2018-08-03 RX ADMIN — APIXABAN 5 MILLIGRAM(S): 2.5 TABLET, FILM COATED ORAL at 05:42

## 2018-08-03 RX ADMIN — LOSARTAN POTASSIUM 50 MILLIGRAM(S): 100 TABLET, FILM COATED ORAL at 12:39

## 2018-08-03 RX ADMIN — APIXABAN 5 MILLIGRAM(S): 2.5 TABLET, FILM COATED ORAL at 18:13

## 2018-08-03 NOTE — PROGRESS NOTE ADULT - PROBLEM SELECTOR PLAN 1
improving, cont IV lasix 40mg daily , cont coreg and losartan  echo shows mod LV dysfunction, cath in past shows non obstructive CAD,

## 2018-08-04 LAB
BUN SERPL-MCNC: 35 MG/DL — HIGH (ref 7–23)
CALCIUM SERPL-MCNC: 9 MG/DL — SIGNIFICANT CHANGE UP (ref 8.4–10.5)
CHLORIDE SERPL-SCNC: 102 MMOL/L — SIGNIFICANT CHANGE UP (ref 98–107)
CO2 SERPL-SCNC: 29 MMOL/L — SIGNIFICANT CHANGE UP (ref 22–31)
CREAT SERPL-MCNC: 1.23 MG/DL — SIGNIFICANT CHANGE UP (ref 0.5–1.3)
GLUCOSE SERPL-MCNC: 139 MG/DL — HIGH (ref 70–99)
HCT VFR BLD CALC: 43.1 % — SIGNIFICANT CHANGE UP (ref 39–50)
HGB BLD-MCNC: 13.9 G/DL — SIGNIFICANT CHANGE UP (ref 13–17)
MAGNESIUM SERPL-MCNC: 2.1 MG/DL — SIGNIFICANT CHANGE UP (ref 1.6–2.6)
MCHC RBC-ENTMCNC: 25.9 PG — LOW (ref 27–34)
MCHC RBC-ENTMCNC: 32.3 % — SIGNIFICANT CHANGE UP (ref 32–36)
MCV RBC AUTO: 80.3 FL — SIGNIFICANT CHANGE UP (ref 80–100)
NRBC # FLD: 0 — SIGNIFICANT CHANGE UP
PLATELET # BLD AUTO: 143 K/UL — LOW (ref 150–400)
PMV BLD: SIGNIFICANT CHANGE UP FL (ref 7–13)
POTASSIUM SERPL-MCNC: 3.2 MMOL/L — LOW (ref 3.5–5.3)
POTASSIUM SERPL-SCNC: 3.2 MMOL/L — LOW (ref 3.5–5.3)
RBC # BLD: 5.37 M/UL — SIGNIFICANT CHANGE UP (ref 4.2–5.8)
RBC # FLD: 18.9 % — HIGH (ref 10.3–14.5)
SODIUM SERPL-SCNC: 142 MMOL/L — SIGNIFICANT CHANGE UP (ref 135–145)
WBC # BLD: 4.89 K/UL — SIGNIFICANT CHANGE UP (ref 3.8–10.5)
WBC # FLD AUTO: 4.89 K/UL — SIGNIFICANT CHANGE UP (ref 3.8–10.5)

## 2018-08-04 RX ORDER — POTASSIUM CHLORIDE 20 MEQ
40 PACKET (EA) ORAL ONCE
Qty: 0 | Refills: 0 | Status: COMPLETED | OUTPATIENT
Start: 2018-08-04 | End: 2018-08-04

## 2018-08-04 RX ADMIN — SODIUM CHLORIDE 3 MILLILITER(S): 9 INJECTION INTRAMUSCULAR; INTRAVENOUS; SUBCUTANEOUS at 11:15

## 2018-08-04 RX ADMIN — Medication 20 MILLIGRAM(S): at 20:13

## 2018-08-04 RX ADMIN — Medication 40 MILLIGRAM(S): at 05:25

## 2018-08-04 RX ADMIN — LOSARTAN POTASSIUM 100 MILLIGRAM(S): 100 TABLET, FILM COATED ORAL at 05:25

## 2018-08-04 RX ADMIN — FINASTERIDE 5 MILLIGRAM(S): 5 TABLET, FILM COATED ORAL at 11:15

## 2018-08-04 RX ADMIN — APIXABAN 5 MILLIGRAM(S): 2.5 TABLET, FILM COATED ORAL at 19:08

## 2018-08-04 RX ADMIN — Medication 81 MILLIGRAM(S): at 11:15

## 2018-08-04 RX ADMIN — ATORVASTATIN CALCIUM 20 MILLIGRAM(S): 80 TABLET, FILM COATED ORAL at 21:35

## 2018-08-04 RX ADMIN — Medication 40 MILLIEQUIVALENT(S): at 11:15

## 2018-08-04 RX ADMIN — CARVEDILOL PHOSPHATE 25 MILLIGRAM(S): 80 CAPSULE, EXTENDED RELEASE ORAL at 19:08

## 2018-08-04 RX ADMIN — SODIUM CHLORIDE 3 MILLILITER(S): 9 INJECTION INTRAMUSCULAR; INTRAVENOUS; SUBCUTANEOUS at 05:24

## 2018-08-04 RX ADMIN — CARVEDILOL PHOSPHATE 25 MILLIGRAM(S): 80 CAPSULE, EXTENDED RELEASE ORAL at 05:25

## 2018-08-04 RX ADMIN — APIXABAN 5 MILLIGRAM(S): 2.5 TABLET, FILM COATED ORAL at 05:25

## 2018-08-05 ENCOUNTER — TRANSCRIPTION ENCOUNTER (OUTPATIENT)
Age: 83
End: 2018-08-05

## 2018-08-05 VITALS — DIASTOLIC BLOOD PRESSURE: 88 MMHG | SYSTOLIC BLOOD PRESSURE: 153 MMHG | HEART RATE: 78 BPM

## 2018-08-05 LAB
BUN SERPL-MCNC: 29 MG/DL — HIGH (ref 7–23)
CALCIUM SERPL-MCNC: 8.9 MG/DL — SIGNIFICANT CHANGE UP (ref 8.4–10.5)
CHLORIDE SERPL-SCNC: 102 MMOL/L — SIGNIFICANT CHANGE UP (ref 98–107)
CO2 SERPL-SCNC: 28 MMOL/L — SIGNIFICANT CHANGE UP (ref 22–31)
CREAT SERPL-MCNC: 1.01 MG/DL — SIGNIFICANT CHANGE UP (ref 0.5–1.3)
GLUCOSE SERPL-MCNC: 131 MG/DL — HIGH (ref 70–99)
HCT VFR BLD CALC: 45.4 % — SIGNIFICANT CHANGE UP (ref 39–50)
HGB BLD-MCNC: 14.4 G/DL — SIGNIFICANT CHANGE UP (ref 13–17)
MCHC RBC-ENTMCNC: 25.6 PG — LOW (ref 27–34)
MCHC RBC-ENTMCNC: 31.7 % — LOW (ref 32–36)
MCV RBC AUTO: 80.8 FL — SIGNIFICANT CHANGE UP (ref 80–100)
NRBC # FLD: 0 — SIGNIFICANT CHANGE UP
PLATELET # BLD AUTO: 143 K/UL — LOW (ref 150–400)
PMV BLD: SIGNIFICANT CHANGE UP FL (ref 7–13)
POTASSIUM SERPL-MCNC: 3.6 MMOL/L — SIGNIFICANT CHANGE UP (ref 3.5–5.3)
POTASSIUM SERPL-SCNC: 3.6 MMOL/L — SIGNIFICANT CHANGE UP (ref 3.5–5.3)
RBC # BLD: 5.62 M/UL — SIGNIFICANT CHANGE UP (ref 4.2–5.8)
RBC # FLD: 18.9 % — HIGH (ref 10.3–14.5)
SODIUM SERPL-SCNC: 143 MMOL/L — SIGNIFICANT CHANGE UP (ref 135–145)
WBC # BLD: 5.26 K/UL — SIGNIFICANT CHANGE UP (ref 3.8–10.5)
WBC # FLD AUTO: 5.26 K/UL — SIGNIFICANT CHANGE UP (ref 3.8–10.5)

## 2018-08-05 RX ORDER — LOSARTAN POTASSIUM 100 MG/1
1 TABLET, FILM COATED ORAL
Qty: 30 | Refills: 0
Start: 2018-08-05 | End: 2018-09-03

## 2018-08-05 RX ORDER — FUROSEMIDE 40 MG
40 TABLET ORAL DAILY
Qty: 0 | Refills: 0 | Status: DISCONTINUED | OUTPATIENT
Start: 2018-08-05 | End: 2018-08-05

## 2018-08-05 RX ORDER — POTASSIUM CHLORIDE 20 MEQ
40 PACKET (EA) ORAL ONCE
Qty: 0 | Refills: 0 | Status: COMPLETED | OUTPATIENT
Start: 2018-08-05 | End: 2018-08-05

## 2018-08-05 RX ORDER — AMLODIPINE BESYLATE 2.5 MG/1
5 TABLET ORAL DAILY
Qty: 0 | Refills: 0 | Status: DISCONTINUED | OUTPATIENT
Start: 2018-08-05 | End: 2018-08-05

## 2018-08-05 RX ORDER — FUROSEMIDE 40 MG
1 TABLET ORAL
Qty: 0 | Refills: 0 | COMMUNITY

## 2018-08-05 RX ORDER — AMLODIPINE BESYLATE 2.5 MG/1
1 TABLET ORAL
Qty: 30 | Refills: 0
Start: 2018-08-05 | End: 2018-09-03

## 2018-08-05 RX ADMIN — CARVEDILOL PHOSPHATE 25 MILLIGRAM(S): 80 CAPSULE, EXTENDED RELEASE ORAL at 17:03

## 2018-08-05 RX ADMIN — Medication 40 MILLIEQUIVALENT(S): at 12:03

## 2018-08-05 RX ADMIN — Medication 81 MILLIGRAM(S): at 12:03

## 2018-08-05 RX ADMIN — APIXABAN 5 MILLIGRAM(S): 2.5 TABLET, FILM COATED ORAL at 17:03

## 2018-08-05 RX ADMIN — SODIUM CHLORIDE 3 MILLILITER(S): 9 INJECTION INTRAMUSCULAR; INTRAVENOUS; SUBCUTANEOUS at 12:59

## 2018-08-05 RX ADMIN — SODIUM CHLORIDE 3 MILLILITER(S): 9 INJECTION INTRAMUSCULAR; INTRAVENOUS; SUBCUTANEOUS at 06:00

## 2018-08-05 RX ADMIN — FINASTERIDE 5 MILLIGRAM(S): 5 TABLET, FILM COATED ORAL at 12:03

## 2018-08-05 RX ADMIN — LOSARTAN POTASSIUM 100 MILLIGRAM(S): 100 TABLET, FILM COATED ORAL at 05:52

## 2018-08-05 RX ADMIN — CARVEDILOL PHOSPHATE 25 MILLIGRAM(S): 80 CAPSULE, EXTENDED RELEASE ORAL at 05:52

## 2018-08-05 RX ADMIN — Medication 40 MILLIGRAM(S): at 05:52

## 2018-08-05 RX ADMIN — APIXABAN 5 MILLIGRAM(S): 2.5 TABLET, FILM COATED ORAL at 05:52

## 2018-08-05 RX ADMIN — AMLODIPINE BESYLATE 5 MILLIGRAM(S): 2.5 TABLET ORAL at 12:03

## 2018-08-05 NOTE — DISCHARGE NOTE ADULT - CARE PLAN
Principal Discharge DX:	Acute on chronic congestive heart failure  Goal:	To relieve and prevent worsening symptoms associated with congestive heart failure, to improve quality of life, and to treat underlying conditions such as coronary heart disease, high blood pressure, or diabetes, and to maintain euvolemia.  Assessment and plan of treatment:	Low salt diet, fluid restriction to 1500 ml daily, monitor your fluid intake and weight daily, exercise as tolerated 30 minutes daily, and follow up with your physician/cardiologist within 1 to 2 weeks.  Secondary Diagnosis:	Hypertension  Goal:	To maintain a normal blood pressure to prevent heart attack, stroke and renal failure.  Assessment and plan of treatment:	Low sodium and fat diet, continue anti-hypertensive medications, and follow up with primary care physician.  Secondary Diagnosis:	Atrial fibrillation  Goal:	To restore or maintain a normal heart rate and rhythm, to prevent blood clots, and decrease the risks of stroke CVA/TIA.  Assessment and plan of treatment:	Please take your medications as prescribed.  Continue to take your blood thinner as prescribed and follow with your physician.  Low fat diet, reduce caffeine intake, and exercise at least 30 minutes daily.  Secondary Diagnosis:	CAD (coronary artery disease)  Goal:	To be asymptomatic, to reduce risks factors such as hypertension, diabetes and hyperlipidemia to lower the risk of blood clots formation; and to prevent complications of coronary artery disease such as worsening chest pain, heart attack and death.  Assessment and plan of treatment:	Continue aspirin, do not stop unless instructed by your physician.  Continue low salt, fat, cholesterol and carbohydrate diet. Follow up with cardiologist and primary care physician's routine appointment.

## 2018-08-05 NOTE — DISCHARGE NOTE ADULT - HOSPITAL COURSE
88 yo M with CAD s/p ROBERTO, Afib on Eliquis, HTN, HLD, PPM, VT, CVA, Velasquez's esophagus, s/p PPM/AICD presents with LE edema and scrotal edema x 1-2 weeks. Patient states he had a cough, and bronchitis symptoms few weeks ago. He was given 2 courses of antibiotics which resolved the symptoms. He then started to develop b/l LE edema and scrotal edema, which  improved slightly, but then worsened. Denies fever, chills, cough, falls, LOC, chest pain, abdominal pain, SOB, BROWN, orthopnea, nausea, vomiting, diarrhea, constipation, melena, hematochezia, calf tenderness, or dysuria.   Patient had meds with him and states his coreg was recently increased from 12.5mg BID to 25mg BID    Hospital course:  EKG: Paced rhythm @ 60 BPM  hsTrop: 69-->79                BNP: 12,552                Glucose: 148  7/31 CXR - Bilateral small pleural effusions.  7/31 B/L LE US - No evidence of bilateral lower extremity deep venous thrombosis.  8/1  AICD Interrogation ([employer]) - Normal BiVICD function. Normal sensing and BIVpacing via iterative testing. Good battery status. Excellent threshold capture.  No reprogramming. NO events detected corelating to current admission.  8/2 TTE: Normal left ventricular internal dimensions and wall thicknesses. Moderate segmental left ventricular systolic dysfunction. The inferior, inferolateral and lateral walls are hypokinetic. Normal right ventricular size and function. Normal tricuspid valve. Mild tricuspid regurgitation. Estimated pulmonary artery systolic pressure equals 35mm Hg, assuming right atrial pressure equals 10  mm Hg, 88 yo M with CAD s/p ROBERTO, Afib on Eliquis, HTN, HLD, PPM, VT, CVA, Velasquez's esophagus, s/p PPM/AICD presents with LE edema and scrotal edema x 1-2 weeks. Patient states he had a cough, and bronchitis symptoms few weeks ago. He was given 2 courses of antibiotics which resolved the symptoms. He then started to develop b/l LE edema and scrotal edema, which  improved slightly, but then worsened. Denies fever, chills, cough, falls, LOC, chest pain, abdominal pain, SOB, BROWN, orthopnea, nausea, vomiting, diarrhea, constipation, melena, hematochezia, calf tenderness, or dysuria.   Patient had meds with him and states his coreg was recently increased from 12.5mg BID to 25mg BID    Hospital course:  EKG: Paced rhythm @ 60 BPM  hsTrop: 69-->79                BNP: 12,552                Glucose: 148  7/31 CXR - Bilateral small pleural effusions.  7/31 B/L LE US - No evidence of bilateral lower extremity deep venous thrombosis.  8/1  AICD Interrogation ([employer]) - Normal BiVICD function. Normal sensing and BIVpacing via iterative testing. Good battery status. Excellent threshold capture.  No reprogramming. NO events detected corelating to current admission.  8/2 TTE: Normal left ventricular internal dimensions and wall thicknesses. Moderate segmental left ventricular systolic dysfunction. The inferior, inferolateral and lateral walls are hypokinetic. Normal right ventricular size and function. Normal tricuspid valve. Mild tricuspid regurgitation. Estimated pulmonary artery systolic pressure equals 35mm Hg, assuming right atrial pressure equals 10  mm Hg,    Pt with acute systolic CHF exacerbation started on IV lasix with improvement. ICD interrogated with no events. IV lasix transitioned to PO. As per Dr Cardoza pt cleared for discharge on 8/5

## 2018-08-05 NOTE — PROGRESS NOTE ADULT - SUBJECTIVE AND OBJECTIVE BOX
Pradeep Cardoza MD  Interventional Cardiology / Advance Heart Failure and Cardiac Transplant Specialist  [place] Office : [address] [place] [address]  Tel:   [place] Office : [address] [facility] [address] [address]  Tel: [phone]  Cell : [phone]    Subjective : Pt lying in bed comfortable, not in distress, denies any chest pain or SOB  	  MEDICATIONS:  apixaban 5 milliGRAM(s) Oral every 12 hours  aspirin enteric coated 81 milliGRAM(s) Oral daily  carvedilol 25 milliGRAM(s) Oral every 12 hours  furosemide   Injectable 40 milliGRAM(s) IV Push daily  losartan 50 milliGRAM(s) Oral <User Schedule>  atorvastatin 20 milliGRAM(s) Oral at bedtime  finasteride 5 milliGRAM(s) Oral daily  sodium chloride 0.9% lock flush 3 milliLiter(s) IV Push every 8 hours    PHYSICAL EXAM:  T(C): 36.4 (08-01-18 @ 21:07), Max: 36.6 (08-01-18 @ 05:16)  HR: 89 (08-01-18 @ 21:07) (59 - 89)  BP: 160/96 (08-01-18 @ 21:07) (138/76 - 180/99)  RR: 18 (08-01-18 @ 21:07) (18 - 18)  SpO2: 97% (08-01-18 @ 21:07) (97% - 100%)  Wt(kg): --  I&O's Summary    31 Jul 2018 07:01  -  01 Aug 2018 07:00  --------------------------------------------------------  IN: 600 mL / OUT: 1200 mL / NET: -600 mL          Appearance: Normal	  HEENT:   Normal oral mucosa, PERRL, EOMI	  Cardiovascular: Normal S1 S2, No JVD, No murmurs, No edema  Respiratory: Lungs clear to auscultation	  Gastrointestinal:  Soft, Non-tender, + BS	  Extremities: 2+ edema        CKMB: 4.79 ng/mL (07-31 @ 21:37)    CKMB Relative Index: Test not performed (07-31 @ 21:37)                            12.6   5.64  )-----------( 140      ( 31 Jul 2018 11:30 )             39.1     07-31    139  |  102  |  36<H>  ----------------------------<  148<H>  3.9   |  25  |  1.18    Ca    8.2<L>      31 Jul 2018 11:30    TPro  5.9<L>  /  Alb  3.0<L>  /  TBili  0.6  /  DBili  x   /  AST  36  /  ALT  17  /  AlkPhos  110  07-31    proBNP:   Lipid Profile:   HgA1c:   TSH:
Pradeep Cardoza MD  Interventional Cardiology / Advance Heart Failure and Cardiac Transplant Specialist  [place] Office : [address] [place] [address] [address]. [address]  Tel:   [place] Office : [address] [facility] [place] [address]  Tel: [phone]  Cell : [phone]    Subjective : Pt lying in bed comfortable, not in distress, denies any chest pain or SOB  	  MEDICATIONS:  amLODIPine   Tablet 5 milliGRAM(s) Oral daily  apixaban 5 milliGRAM(s) Oral every 12 hours  aspirin enteric coated 81 milliGRAM(s) Oral daily  carvedilol 25 milliGRAM(s) Oral every 12 hours  furosemide    Tablet 40 milliGRAM(s) Oral daily  losartan 100 milliGRAM(s) Oral daily            atorvastatin 20 milliGRAM(s) Oral at bedtime  finasteride 5 milliGRAM(s) Oral daily    sodium chloride 0.9% lock flush 3 milliLiter(s) IV Push every 8 hours      PHYSICAL EXAM:  T(C): 36.4 (08-05-18 @ 12:01), Max: 36.7 (08-05-18 @ 05:50)  HR: 78 (08-05-18 @ 17:02) (73 - 90)  BP: 153/88 (08-05-18 @ 17:02) (132/78 - 153/88)  RR: 18 (08-05-18 @ 12:01) (16 - 18)  SpO2: 97% (08-05-18 @ 12:01) (96% - 97%)  Wt(kg): --  I&O's Summary    04 Aug 2018 07:01  -  05 Aug 2018 07:00  --------------------------------------------------------  IN: 1020 mL / OUT: 2090 mL / NET: -1070 mL    05 Aug 2018 07:01  -  05 Aug 2018 18:39  --------------------------------------------------------  IN: 0 mL / OUT: 900 mL / NET: -900 mL          Appearance: Normal	  HEENT:   Normal oral mucosa, PERRL, EOMI	  Cardiovascular: Normal S1 S2, No JVD, holosystolic murmur, No edema  Respiratory: Lungs clear to auscultation	  Gastrointestinal:  Soft, Non-tender, + BS	  Extremities: Normal range of motion, No clubbing, cyanosis or edema                                    14.4   5.26  )-----------( 143      ( 05 Aug 2018 06:30 )             45.4     08-05    143  |  102  |  29<H>  ----------------------------<  131<H>  3.6   |  28  |  1.01    Ca    8.9      05 Aug 2018 06:30  Mg     2.1     08-04      proBNP:   Lipid Profile:   HgA1c:   TSH:
Pradeep Cardoza MD  Interventional Cardiology / Advance Heart Failure and Cardiac Transplant Specialist  [place] Office : [address] [place] [address] [address]  Tel:   [place] Office : [address] [facility] [place] [address]  Tel: [phone]  Cell : [phone]    Subjective : Pt lying in bed comfortable, not in distress, denies any chest pain or SOB  	  MEDICATIONS:  apixaban 5 milliGRAM(s) Oral every 12 hours  aspirin enteric coated 81 milliGRAM(s) Oral daily  carvedilol 25 milliGRAM(s) Oral every 12 hours  furosemide   Injectable 20 milliGRAM(s) IV Push <User Schedule>  furosemide   Injectable 40 milliGRAM(s) IV Push daily  losartan 100 milliGRAM(s) Oral daily  atorvastatin 20 milliGRAM(s) Oral at bedtime  finasteride 5 milliGRAM(s) Oral daily  sodium chloride 0.9% lock flush 3 milliLiter(s) IV Push every 8 hours    PHYSICAL EXAM:  T(C): 36.4 (08-04-18 @ 20:04), Max: 36.6 (08-04-18 @ 11:28)  HR: 84 (08-04-18 @ 20:04) (72 - 84)  BP: 132/78 (08-04-18 @ 20:04) (132/78 - 159/87)  RR: 16 (08-04-18 @ 20:04) (16 - 18)  SpO2: 96% (08-04-18 @ 20:04) (96% - 98%)  Wt(kg): --  I&O's Summary    03 Aug 2018 07:01  -  04 Aug 2018 07:00  --------------------------------------------------------  IN: 660 mL / OUT: 3440 mL / NET: -2780 mL    04 Aug 2018 07:01  -  04 Aug 2018 22:53  --------------------------------------------------------  IN: 1000 mL / OUT: 1150 mL / NET: -150 mL          Appearance: Normal	  HEENT:   Normal oral mucosa, PERRL, EOMI	  Cardiovascular: Normal S1 S2, No JVD, No murmurs, No edema  Respiratory: Lungs clear to auscultation	  Gastrointestinal:  Soft, Non-tender, + BS	  Extremities: Normal range of motion, No clubbing, cyanosis or edema                                    13.9   4.89  )-----------( 143      ( 04 Aug 2018 06:50 )             43.1     08-04    142  |  102  |  35<H>  ----------------------------<  139<H>  3.2<L>   |  29  |  1.23    Ca    9.0      04 Aug 2018 06:50  Mg     2.1     08-04      proBNP:   Lipid Profile:   HgA1c:   TSH:
Pradeep Cardoza MD  Interventional Cardiology / Advance Heart Failure and Cardiac Transplant Specialist  [place] Office : [address] [place] [address] [address] [address]  Tel:   [place] Office : [address] [facility] [place] [address]  Tel: [phone]  Cell : [phone]    Subjective : Pt lying in bed comfortable, not in distress, denies any chest pain or SOB  	  MEDICATIONS:  apixaban 5 milliGRAM(s) Oral every 12 hours  aspirin enteric coated 81 milliGRAM(s) Oral daily  carvedilol 25 milliGRAM(s) Oral every 12 hours  furosemide   Injectable 20 milliGRAM(s) IV Push <User Schedule>  furosemide   Injectable 40 milliGRAM(s) IV Push daily  losartan 50 milliGRAM(s) Oral <User Schedule>  atorvastatin 20 milliGRAM(s) Oral at bedtime  finasteride 5 milliGRAM(s) Oral daily    sodium chloride 0.9% lock flush 3 milliLiter(s) IV Push every 8 hours      PHYSICAL EXAM:  T(C): 37.1 (08-03-18 @ 12:39), Max: 37.1 (08-03-18 @ 12:39)  HR: 82 (08-03-18 @ 12:39) (75 - 82)  BP: 158/104 (08-03-18 @ 12:39) (157/98 - 163/96)  RR: 18 (08-03-18 @ 12:39) (18 - 18)  SpO2: 100% (08-03-18 @ 12:39) (98% - 100%)  Wt(kg): --  I&O's Summary    02 Aug 2018 07:01  -  03 Aug 2018 07:00  --------------------------------------------------------  IN: 760 mL / OUT: 1550 mL / NET: -790 mL    03 Aug 2018 07:01  -  03 Aug 2018 15:23  --------------------------------------------------------  IN: 0 mL / OUT: 1500 mL / NET: -1500 mL          Appearance: Normal	  HEENT:   Normal oral mucosa, PERRL, EOMI	  Cardiovascular: Normal S1 S2, No JVD, No murmurs, No edema  Respiratory: Lungs clear to auscultation	  Gastrointestinal:  Soft, Non-tender, + BS	  Extremities: Normal range of motion, No clubbing, cyanosis or edema                                    13.4   4.70  )-----------( 149      ( 03 Aug 2018 06:47 )             42.3     08-03    143  |  104  |  28<H>  ----------------------------<  145<H>  3.5   |  26  |  1.11    Ca    8.6      03 Aug 2018 06:47      proBNP:   Lipid Profile:   HgA1c:   TSH:
chief complaint : shortness of breath       SUBJECTIVE / OVERNIGHT EVENTS: pt denies chest pain, shortness of breath, nv,       MEDICATIONS  (STANDING):  apixaban 5 milliGRAM(s) Oral every 12 hours  aspirin enteric coated 81 milliGRAM(s) Oral daily  atorvastatin 20 milliGRAM(s) Oral at bedtime  carvedilol 25 milliGRAM(s) Oral every 12 hours  finasteride 5 milliGRAM(s) Oral daily  furosemide   Injectable 40 milliGRAM(s) IV Push daily  losartan 50 milliGRAM(s) Oral <User Schedule>  sodium chloride 0.9% lock flush 3 milliLiter(s) IV Push every 8 hours    MEDICATIONS  (PRN):    Vital Signs Last 24 Hrs  T(C): 36.5 (02 Aug 2018 21:11), Max: 36.8 (02 Aug 2018 11:17)  T(F): 97.7 (02 Aug 2018 21:11), Max: 98.2 (02 Aug 2018 11:17)  HR: 78 (02 Aug 2018 21:11) (70 - 87)  BP: 158/94 (02 Aug 2018 21:11) (158/94 - 178/102)  BP(mean): --  RR: 18 (02 Aug 2018 21:11) (16 - 18)  SpO2: 98% (02 Aug 2018 21:11) (98% - 100%)    CAPILLARY BLOOD GLUCOSE        I&O's Summary    01 Aug 2018 07:01  -  02 Aug 2018 07:00  --------------------------------------------------------  IN: 100 mL / OUT: 900 mL / NET: -800 mL    02 Aug 2018 07:01  -  02 Aug 2018 22:50  --------------------------------------------------------  IN: 640 mL / OUT: 700 mL / NET: -60 mL        Constitutional: No fever, fatigue  Skin: No rash.  Eyes: No recent vision problems or eye pain.  ENT: No congestion, ear pain, or sore throat.  Cardiovascular: No chest pain or palpation.  Respiratory: No cough, shortness of breath, congestion, or wheezing.  Gastrointestinal: No abdominal pain, nausea, vomiting, or diarrhea.  Genitourinary: No dysuria.  Musculoskeletal: No joint swelling.  Neurologic: No headache.    PHYSICAL EXAM:  GENERAL: NAD  EYES: EOMI, PERRLA  NECK: Supple, No JVD  CHEST/LUNG: dec breath sounds at bases   HEART:  S1 , S2 +  ABDOMEN: sof,t bs+  EXTREMITIES:  edema+  NEUROLOGY: alert awake calm cooperative      LABS:                        15.1   4.68  )-----------( 133      ( 02 Aug 2018 06:25 )             47.7     08-02    142  |  103  |  24<H>  ----------------------------<  117<H>  3.3<L>   |  27  |  0.98    Ca    8.7      02 Aug 2018 06:25                RADIOLOGY & ADDITIONAL TESTS:    Imaging Personally Reviewed:    Consultant(s) Notes Reviewed:      Care Discussed with Consultants/Other Providers:
chief complaint : shortness of breath       SUBJECTIVE / OVERNIGHT EVENTS: pt denies chest pain, shortness of breath, nv,     MEDICATIONS  (STANDING):  amLODIPine   Tablet 5 milliGRAM(s) Oral daily  apixaban 5 milliGRAM(s) Oral every 12 hours  aspirin enteric coated 81 milliGRAM(s) Oral daily  atorvastatin 20 milliGRAM(s) Oral at bedtime  carvedilol 25 milliGRAM(s) Oral every 12 hours  finasteride 5 milliGRAM(s) Oral daily  furosemide    Tablet 40 milliGRAM(s) Oral daily  losartan 100 milliGRAM(s) Oral daily  sodium chloride 0.9% lock flush 3 milliLiter(s) IV Push every 8 hours    MEDICATIONS  (PRN):    Vital Signs Last 24 Hrs  T(C): 36.4 (05 Aug 2018 12:01), Max: 36.7 (05 Aug 2018 05:50)  T(F): 97.6 (05 Aug 2018 12:01), Max: 98 (05 Aug 2018 05:50)  HR: 78 (05 Aug 2018 17:02) (73 - 90)  BP: 153/88 (05 Aug 2018 17:02) (136/78 - 153/88)  BP(mean): --  RR: 18 (05 Aug 2018 12:01) (16 - 18)  SpO2: 97% (05 Aug 2018 12:01) (97% - 97%)    Constitutional: No fever, fatigue  Skin: No rash.  Eyes: No recent vision problems or eye pain.  ENT: No congestion, ear pain, or sore throat.  Cardiovascular: No chest pain or palpation.  Respiratory: No cough, shortness of breath, congestion, or wheezing.  Gastrointestinal: No abdominal pain, nausea, vomiting, or diarrhea.  Genitourinary: No dysuria.  Musculoskeletal: No joint swelling.  Neurologic: No headache.    PHYSICAL EXAM:  GENERAL: NAD  EYES: EOMI, PERRLA  NECK: Supple, No JVD  CHEST/LUNG: dec breath sounds at bases   HEART:  S1 , S2 +  ABDOMEN: sof,t bs+  EXTREMITIES:  edema+  NEUROLOGY: alert awake calm cooperative    LABS:  08-05    143  |  102  |  29<H>  ----------------------------<  131<H>  3.6   |  28  |  1.01    Ca    8.9      05 Aug 2018 06:30  Mg     2.1     08-04      Creatinine Trend: 1.01 <--, 1.23 <--, 1.11 <--, 0.98 <--, 1.18 <--                        14.4   5.26  )-----------( 143      ( 05 Aug 2018 06:30 )             45.4     Urine Studies:
chief complaint : shortness of breath       SUBJECTIVE / OVERNIGHT EVENTS: pt denies chest pain, shortness of breath, nv,     MEDICATIONS  (STANDING):  apixaban 5 milliGRAM(s) Oral every 12 hours  aspirin enteric coated 81 milliGRAM(s) Oral daily  atorvastatin 20 milliGRAM(s) Oral at bedtime  carvedilol 25 milliGRAM(s) Oral every 12 hours  finasteride 5 milliGRAM(s) Oral daily  furosemide   Injectable 20 milliGRAM(s) IV Push <User Schedule>  furosemide   Injectable 40 milliGRAM(s) IV Push daily  losartan 100 milliGRAM(s) Oral daily  sodium chloride 0.9% lock flush 3 milliLiter(s) IV Push every 8 hours    MEDICATIONS  (PRN):    Vital Signs Last 24 Hrs  T(C): 36.4 (03 Aug 2018 21:57), Max: 37.1 (03 Aug 2018 12:39)  T(F): 97.5 (03 Aug 2018 21:57), Max: 98.7 (03 Aug 2018 12:39)  HR: 84 (03 Aug 2018 21:57) (77 - 90)  BP: 153/84 (03 Aug 2018 21:57) (148/88 - 160/96)  BP(mean): --  RR: 18 (03 Aug 2018 21:57) (18 - 18)  SpO2: 98% (03 Aug 2018 21:57) (97% - 100%)      Constitutional: No fever, fatigue  Skin: No rash.  Eyes: No recent vision problems or eye pain.  ENT: No congestion, ear pain, or sore throat.  Cardiovascular: No chest pain or palpation.  Respiratory: No cough, shortness of breath, congestion, or wheezing.  Gastrointestinal: No abdominal pain, nausea, vomiting, or diarrhea.  Genitourinary: No dysuria.  Musculoskeletal: No joint swelling.  Neurologic: No headache.    PHYSICAL EXAM:  GENERAL: NAD  EYES: EOMI, PERRLA  NECK: Supple, No JVD  CHEST/LUNG: dec breath sounds at bases   HEART:  S1 , S2 +  ABDOMEN: sof,t bs+  EXTREMITIES:  edema+  NEUROLOGY: alert awake calm cooperative    LABS:  08-03    143  |  104  |  28<H>  ----------------------------<  145<H>  3.5   |  26  |  1.11    Ca    8.6      03 Aug 2018 06:47      Creatinine Trend: 1.11 <--, 0.98 <--, 1.18 <--                        13.4   4.70  )-----------( 149      ( 03 Aug 2018 06:47 )             42.3     Urine Studies:
chief complaint : shortness of breath       SUBJECTIVE / OVERNIGHT EVENTS: pt denies chest pain, shortness of breath, nv,     MEDICATIONS  (STANDING):  apixaban 5 milliGRAM(s) Oral every 12 hours  aspirin enteric coated 81 milliGRAM(s) Oral daily  atorvastatin 20 milliGRAM(s) Oral at bedtime  carvedilol 25 milliGRAM(s) Oral every 12 hours  finasteride 5 milliGRAM(s) Oral daily  furosemide   Injectable 20 milliGRAM(s) IV Push <User Schedule>  furosemide   Injectable 40 milliGRAM(s) IV Push daily  losartan 100 milliGRAM(s) Oral daily  sodium chloride 0.9% lock flush 3 milliLiter(s) IV Push every 8 hours    MEDICATIONS  (PRN):    Vital Signs Last 24 Hrs  T(C): 36.4 (04 Aug 2018 20:04), Max: 36.6 (04 Aug 2018 11:28)  T(F): 97.5 (04 Aug 2018 20:04), Max: 97.8 (04 Aug 2018 11:28)  HR: 84 (04 Aug 2018 20:04) (72 - 84)  BP: 132/78 (04 Aug 2018 20:04) (132/78 - 159/87)  BP(mean): --  RR: 16 (04 Aug 2018 20:04) (16 - 18)  SpO2: 96% (04 Aug 2018 20:04) (96% - 98%)    Constitutional: No fever, fatigue  Skin: No rash.  Eyes: No recent vision problems or eye pain.  ENT: No congestion, ear pain, or sore throat.  Cardiovascular: No chest pain or palpation.  Respiratory: No cough, shortness of breath, congestion, or wheezing.  Gastrointestinal: No abdominal pain, nausea, vomiting, or diarrhea.  Genitourinary: No dysuria.  Musculoskeletal: No joint swelling.  Neurologic: No headache.    PHYSICAL EXAM:  GENERAL: NAD  EYES: EOMI, PERRLA  NECK: Supple, No JVD  CHEST/LUNG: dec breath sounds at bases   HEART:  S1 , S2 +  ABDOMEN: sof,t bs+  EXTREMITIES:  edema+  NEUROLOGY: alert awake calm cooperative    LABS:  08-04    142  |  102  |  35<H>  ----------------------------<  139<H>  3.2<L>   |  29  |  1.23    Ca    9.0      04 Aug 2018 06:50  Mg     2.1     08-04      Creatinine Trend: 1.23 <--, 1.11 <--, 0.98 <--, 1.18 <--                        13.9   4.89  )-----------( 143      ( 04 Aug 2018 06:50 )             43.1     Urine Studies:
Pradeep Cardoza MD  Interventional Cardiology / Advance Heart Failure and Cardiac Transplant Specialist  [place] Office : [address] [place] [address] [address]. [address]  Tel:   [place] Office : [address] [facility] [place] [address]  Tel: [phone]  Cell : [phone]    Subjective : Pt lying in bed comfortable, not in distress, denies any chest pain or SOB, LE ext and groin edema improving  	  MEDICATIONS:  apixaban 5 milliGRAM(s) Oral every 12 hours  aspirin enteric coated 81 milliGRAM(s) Oral daily  carvedilol 25 milliGRAM(s) Oral every 12 hours  furosemide   Injectable 40 milliGRAM(s) IV Push daily  losartan 50 milliGRAM(s) Oral <User Schedule>  atorvastatin 20 milliGRAM(s) Oral at bedtime  finasteride 5 milliGRAM(s) Oral daily  sodium chloride 0.9% lock flush 3 milliLiter(s) IV Push every 8 hours    PHYSICAL EXAM:  T(C): 36.8 (08-02-18 @ 11:17), Max: 36.8 (08-02-18 @ 11:17)  HR: 75 (08-02-18 @ 17:47) (70 - 89)  BP: 163/96 (08-02-18 @ 17:47) (160/96 - 178/102)  RR: 16 (08-02-18 @ 11:17) (16 - 18)  SpO2: 100% (08-02-18 @ 11:17) (97% - 100%)  Wt(kg): --  I&O's Summary    01 Aug 2018 07:01  -  02 Aug 2018 07:00  --------------------------------------------------------  IN: 100 mL / OUT: 900 mL / NET: -800 mL    02 Aug 2018 07:01  -  02 Aug 2018 17:50  --------------------------------------------------------  IN: 640 mL / OUT: 700 mL / NET: -60 mL          Appearance: Normal	  HEENT:   Normal oral mucosa, PERRL, EOMI	  Cardiovascular: Normal S1 S2, No JVD, No murmurs, No edema  Respiratory: Lungs clear to auscultation	  Gastrointestinal:  Soft, Non-tender, + BS	  Extremities: 1+ edema                                    15.1   4.68  )-----------( 133      ( 02 Aug 2018 06:25 )             47.7     08-02    142  |  103  |  24<H>  ----------------------------<  117<H>  3.3<L>   |  27  |  0.98    Ca    8.7      02 Aug 2018 06:25      proBNP: Serum Pro-Brain Natriuretic Peptide: 14812 pg/mL (08-02 @ 06:25)    Lipid Profile:   HgA1c:   TSH:

## 2018-08-05 NOTE — DISCHARGE NOTE ADULT - PLAN OF CARE
To be asymptomatic, to reduce risks factors such as hypertension, diabetes and hyperlipidemia to lower the risk of blood clots formation; and to prevent complications of coronary artery disease such as worsening chest pain, heart attack and death. Continue aspirin, do not stop unless instructed by your physician.  Continue low salt, fat, cholesterol and carbohydrate diet. Follow up with cardiologist and primary care physician's routine appointment. To relieve and prevent worsening symptoms associated with congestive heart failure, to improve quality of life, and to treat underlying conditions such as coronary heart disease, high blood pressure, or diabetes, and to maintain euvolemia. Low salt diet, fluid restriction to 1500 ml daily, monitor your fluid intake and weight daily, exercise as tolerated 30 minutes daily, and follow up with your physician/cardiologist within 1 to 2 weeks. To maintain a normal blood pressure to prevent heart attack, stroke and renal failure. Low sodium and fat diet, continue anti-hypertensive medications, and follow up with primary care physician. To restore or maintain a normal heart rate and rhythm, to prevent blood clots, and decrease the risks of stroke CVA/TIA. Please take your medications as prescribed.  Continue to take your blood thinner as prescribed and follow with your physician.  Low fat diet, reduce caffeine intake, and exercise at least 30 minutes daily.

## 2018-08-05 NOTE — DISCHARGE NOTE ADULT - MEDICATION SUMMARY - MEDICATIONS TO CHANGE
I will SWITCH the dose or number of times a day I take the medications listed below when I get home from the hospital:    losartan 25 mg oral tablet  -- 2 tab(s) by mouth once a day    Lasix 20 mg oral tablet  -- 1 tab(s) by mouth once a day

## 2018-08-05 NOTE — DISCHARGE NOTE ADULT - MEDICATION SUMMARY - MEDICATIONS TO TAKE
I will START or STAY ON the medications listed below when I get home from the hospital:    outpatient physical therapy  -- Outpatient physical therapy 3 times a week as needed  -- Indication: For Outpt PT    finasteride 5 mg oral tablet  -- 1 tab(s) by mouth once a day  -- Indication: For BPH    aspirin 81 mg oral delayed release tablet  -- 1 tab(s) by mouth once a day  -- Indication: For CAD (coronary artery disease)    losartan 100 mg oral tablet  -- 1 tab(s) by mouth once a day  -- Indication: For Htn    Eliquis 5 mg oral tablet  -- 1 tab(s) by mouth 2 times a day  -- Indication: For Afib    Lipitor 20 mg oral tablet  -- 1 tab(s) by mouth once a day  -- Indication: For Hld    carvedilol 25 mg oral tablet  -- 1 tab(s) by mouth every 12 hours  -- Indication: For Htn    amLODIPine 5 mg oral tablet  -- 1 tab(s) by mouth once a day  -- Indication: For Htn    furosemide 40 mg oral tablet  -- 1 tab(s) by mouth once a day  -- Indication: For Heart failure

## 2018-08-05 NOTE — PROGRESS NOTE ADULT - PROVIDER SPECIALTY LIST ADULT
Cardiology
Internal Medicine
Cardiology
Cardiology

## 2018-08-05 NOTE — PROGRESS NOTE ADULT - ASSESSMENT
EKG - Afib V paced     Echo - 2015 - mild segmental LV dysfunction    a/p     1) Acute on chronic diastolic CHF - improving, cont IV lasix 40mg daily , cont coreg and losartan    2) Afib - cont eliquis     3) s/p PPM - shows episode of NSVT, underlying afib completely dependent
EKG - Afib V paced     Echo - 2015 - mild segmental LV dysfunction    a/p     1) Acute on chronic diastolic CHF - cont IV lasix 40mg daily , cont coreg and losartan    2) Afib - cont eliquis     3) s/p PPM - shows episode of NSVT, underlying afib completely dependent
EKG - Afib V paced     Echo - 2018- mod segmental LV dysfunction    a/p     1) Acute on chronic diastolic CHF - currently compensated lasix 40mg po daily, cont coreg and losartan, echo shows mod LV dysfunction, cath in past shows non obstructive CAD, will do medical management as pt is 87, discussed plan with pt    2) Afib - cont eliquis     3) s/p PPM - shows episode of NSVT, underlying afib completely dependent    4) Afib - on eliquis    of for D/C f/u as outpatient
EKG - Afib V paced     Echo - 2018- mod segmental LV dysfunction    a/p     1) Acute on chronic diastolic CHF - improving, cont IV lasix 40mg , cont coreg and losartan increase to 100mg daily, echo shows mod LV dysfunction, cath in past shows non obstructive CAD, will do medical management as pt is 87, discussed plan with pt    2) Afib - cont eliquis     3) s/p PPM - shows episode of NSVT, underlying afib completely dependent    4) Afib - on eliquis
EKG - Afib V paced     Echo - 2018- mod segmental LV dysfunction    a/p     1) Acute on chronic diastolic CHF - improving, cont IV lasix 40mg daily add 20mg IV at night , cont coreg and losartan increase to 100mg daily, echo shows mod LV dysfunction, cath in past shows non obstructive CAD, will do medical management as pt is 87, discussed plan with pt    2) Afib - cont eliquis     3) s/p PPM - shows episode of NSVT, underlying afib completely dependent    4) Afib - on eliquis

## 2018-08-05 NOTE — DISCHARGE NOTE ADULT - CARE PROVIDER_API CALL
Pradeep Cardoza), Cardiovascular Disease; Internal Medicine; Nuclear Cardiology  [address] [place] [address]  Phone: [phone]  Fax: [phone]

## 2018-08-08 ENCOUNTER — APPOINTMENT (OUTPATIENT)
Dept: UROLOGY | Facility: CLINIC | Age: 83
End: 2018-08-08

## 2018-08-22 ENCOUNTER — APPOINTMENT (OUTPATIENT)
Dept: ORTHOPEDIC SURGERY | Facility: CLINIC | Age: 83
End: 2018-08-22
Payer: MEDICARE

## 2018-08-22 PROCEDURE — 20611 DRAIN/INJ JOINT/BURSA W/US: CPT | Mod: 50

## 2019-06-26 ENCOUNTER — APPOINTMENT (OUTPATIENT)
Dept: ORTHOPEDIC SURGERY | Facility: CLINIC | Age: 84
End: 2019-06-26
Payer: MEDICARE

## 2019-06-26 VITALS
HEART RATE: 75 BPM | BODY MASS INDEX: 23.89 KG/M2 | DIASTOLIC BLOOD PRESSURE: 75 MMHG | WEIGHT: 148 LBS | SYSTOLIC BLOOD PRESSURE: 164 MMHG

## 2019-06-26 PROCEDURE — 99214 OFFICE O/P EST MOD 30 MIN: CPT

## 2019-06-26 PROCEDURE — 73564 X-RAY EXAM KNEE 4 OR MORE: CPT | Mod: 50

## 2019-06-26 RX ORDER — HYALURONATE SOD, CROSS-LINKED 30 MG/3 ML
30 SYRINGE (ML) INTRAARTICULAR
Qty: 2 | Refills: 0 | Status: ACTIVE | COMMUNITY
Start: 2019-06-26 | End: 1900-01-01

## 2019-06-26 NOTE — PHYSICAL EXAM
[de-identified] : Physical Examination\par General: well nourished, in no acute distress, alert and oriented to person, place and time\par Psychiatric: normal mood and affect, no abnormal movements or speech patterns\par Eyes: vision intact Without glasses\par Throat: no thyromegaly\par Lymph: no enlarged nodes, no lymphedema in extremity\par Respiratory: no wheezing, no shortness of breath with ambulation\par Cardiac: no cardiac leg swelling, 2+ peripheral pulses\par Neurology: normal gross sensation in extremities to light touch\par Abdomen: soft, non-tender, tympanic, no masses\par \par Musculoskeletal Examination\par Ambulation	+ antalgic gait, - assistive devices\par \par Knee			Right			Left\par General\par      Swelling/Deformity	normal			normal	\par      Skin			normal			normal\par      Erythema		-			-\par      Standing Alignment	neutral			varus\par      Effusion		none			trace\par Range of Motion\par      Hip			full painless ROM		full painless ROM\par      Knee Flexion		100			100\par      Knee Extension	10			0\par Patella\par      J Sign		-			-\par      Quad Medial/Lateral	1/1 1/1\par      Apprehension		-			-\par      Vitaly's		-			+\par      Grind Sign		-			+\par      Crepitus		-			+\par Palpation\par      Medial Joint Line	+			+\par      Medial Fem Condyle	-			-\par      Lateral Joint Line	-			+\par      Quad Tendon		-			-\par      Patella Tendon	-			-\par      Medial Patella		-			-\par      Lateral Patella 	-			+\par      Posterior Knee	+			+\par Ligamentous\par      Varus @ 0° / 30°	-/-			-/-\par      Valgus @ 0° / 30°	-/-			-/-\par      Lachman		-			-\par      Pivot Shift		-			-\par      Anterior Drawer	-			-\par      Posterior Drawer	-			-\par Meniscus\par      Troy		-			-\par      Flexion Pinch		-			-\par Strength Examination/Atrophy\par      Hip Flexors 		5+			5+\par      Quadriceps		5+			5+\par      Hamstring		5+			5+\par      Tibialis Anterior	5+			5+\par      Achilles/Soleus	5+			5+\par Sensation\par      Deep Peroneal	normal			normal\par      Superficial Peroneal 	normal			normal\par      Sural  		normal			normal\par      Posterior Tibial 	normal			normal\par      Saphneous 		normal			normal\par Pulses\par      DP			2+			2+\par  [de-identified] : 4 views of the affected bilateral knee (standing AP, flexing standing AP, 30degree flexed lateral, 0degree lateral, sunrise view)\par 6-2018\par demonstrate:\par \par RIGHT\par There is moderate to severe medial weightbearing asymmetric narrowing\par Small osteophytic lipping\par Trace suprapatellar effusion\par Moderate medial patellofemoral joint space loss without evidence of tilt or subluxation on sunrise view\par Normal soft tissue density\par Otherwise normal osseous bone structure without fracture or dislocation\par \par LEFT\par There is severe medial flexed knee asymmetric narrowing\par Small to moderate osteophytic lipping\par Trace suprapatellar effusion\par Moderate to severe medial patellofemoral joint space loss without evidence of tilt or subluxation on sunrise view\par Normal soft tissue density\par Otherwise normal osseous bone structure without fracture or dislocation\par \par \par 4 views of the affected bilateral knee (standing AP, flexing standing AP, 30degree flexed lateral, 0degree lateral, sunrise view) \par were ordered, obtained and evaluated by myself today and \par demonstrate:\par \par RIGHT\par There is moderate to severe medial weightbearing asymmetric narrowing\par Small osteophytic lipping\par Trace suprapatellar effusion\par Moderate medial patellofemoral joint space loss without evidence of tilt or subluxation on sunrise view\par Normal soft tissue density\par Otherwise normal osseous bone structure without fracture or dislocation\par \par LEFT\par There is severe medial flexed knee asymmetric narrowing\par Small to moderate osteophytic lipping\par Trace suprapatellar effusion\par Moderate to severe medial patellofemoral joint space loss without evidence of tilt or subluxation on sunrise view\par Normal soft tissue density\par Otherwise normal osseous bone structure without fracture or dislocation

## 2019-06-26 NOTE — HISTORY OF PRESENT ILLNESS
[de-identified] : CC bilateral knee\par Consult by Dr. Richelle Martinez\par HPI 88 yo female right HD presents for visco FU of chronic onset of 5 years of activity-related pain in the left greater than right medial knee without injury. The pain is worse, and rated a 8 out of 10, described as grinding and aching, with radiation up the leg to the thigh. Sitting down makes the pain better and walking standing stair makes the pain worse. The patient reports associated symptoms of pop and click locking grinding swelling numbness. The patient and denies pain at night affecting sleep, and denies similar pain previously.\par \par The patient has tried the following treatments:\par Activity modification	+\par Ice/Compression  	+\par Braces    		-\par Nsaids    		+\par Physical Therapy 	+\par Cortisone Injection	no improvement\par Visco                      + aug 2018 with 7 months improvement\par Arthroscopy		-\par \par Review of Systems is positive for the above musculoskeletal symptoms and is otherwise non-contributory for general, constitutional, psychiatric, neurologic, HEENT, cardiac, respiratory, gastrointestinal, reproductive, lymphatic, and dermatologic complaints.\par \par \par

## 2019-06-26 NOTE — DISCUSSION/SUMMARY
[de-identified] : Bilateral knee osteoarthritis left greater than right\par \par The patient and I discussed the causes and progression of degenerative joint disease of the knee. Models, diagrams and drawings were used in the discussion. Treatment can include conservative non-operative management and surgical options. Conservative management includes weight loss, activity modification, physical therapy to improve motion and strength in the muscles around the knee and the body's core, PO and topical NSAIDs, corticosteroid and/or viscosupplementation intra-articular injections. If the patient fails to improve with non-operative management, surgical management is possible. Depending upon the patient's age, BMI, activity level, degree and location of arthrosis different surgical options are possible including arthroscopic debridement with chondroplasty, high-tibial osteotomy, unicondylar/partial arthroplasty, and total joint arthroplasty.\par \par Physical therapy was prescribed for knee ROM exercises, strengthening exercises, deep tissue massage, core strengthening, hip abductor strengthening, VMO strengthening, modalities PRN, and home exercises\par \par \par Given diabetic status a did not recommend corticosteroid injection and recommend proceeding to physical supplementation injection.\par We'll order bilateral knee gel one\par \par Follow up\par PRN\par \par

## 2019-07-29 ENCOUNTER — APPOINTMENT (OUTPATIENT)
Dept: ORTHOPEDIC SURGERY | Facility: CLINIC | Age: 84
End: 2019-07-29
Payer: MEDICARE

## 2019-07-29 DIAGNOSIS — M17.0 BILATERAL PRIMARY OSTEOARTHRITIS OF KNEE: ICD-10-CM

## 2019-07-29 PROCEDURE — 20611 DRAIN/INJ JOINT/BURSA W/US: CPT | Mod: RT

## 2019-07-29 NOTE — PROCEDURE
[de-identified] : Chief complaint:     Bilateral knee pain\par \par Diagnosis:              Bilateral knee osteoarthritis\par \par \par Injection:\par Gel one x2\par 19C21G. x-rays and 5-26-20\par \par \par Procedure Note:\par \par Risks and benefits of an arthrocentesis and intraarticular hyaluronic injection of the LEFT and RIGHT knee were discussed with the patient. Potential adverse effects were discussed including but not limited to bleeding, skin/ joint infection, local skin reactions including bleaching, bruising, stiffness, soreness, vasovagal episodes, transient hyperglycemia, avascular necrosis, pseudo-septic type reactions, post injection joint pain, allergic reaction to product or anesthetic and other rare but potential adverse effects along with benefits including decreased pain and improved stability prior to obtaining verbal informed consent. It was also discussed that for some patients the treatment is ineffective and there are no guarantees that the patient will experience improvement as the result of the injection. In rare occasions the injection can cause worsening of pain.\par \par The use of a Sonosite 15-6 MHz linear transducer with live ultrasound guidance of the knee was necessary given the patient's BMI and local body habitus overlying and obscuring the accurate identification of normal body bony anatomy used to identify the injection site and the depth of soft tissue envelope necessitating a longer than normal needle to reach the joint space, and to confirm the location of the needle tip and intra-articular delivery of the medication. Without the use of live ultrasound guidance the injection would have been more difficult and place the patient's neurovascular structures at risk from the longer needle needed to traverse the soft tissue envelope.\par \par A 6 inch bolster was placed underneath the knee to place the LEFT knee in a slightly flexed position. The superolateral injection site was identified using the ultrasound probe to identify the suprapatellar space and superior boarder of the patella first longitudinally and then transversely. The injection site was marked and prepped with a ChloraPrep swab and anesthetized with ethylchloride skin anesthesia. Under sterile technique a 20g 3 1/2 in spinal needle with a preloaded viscosupplementation syringe was passed through the injection site towards the suprapatellar space under live ultrasound guidance and noted to penetrate the joint capsule. The medication was injected without resistance under live ultrasound visualization and noted to flow into the suprapatellar joint space. The injection site was sterilely dressed, there was minimal blood loss.\par \par A 6 inch bolster was placed underneath the knee to place the RIGHT knee in a slightly flexed position. The superolateral injection site was identified using the ultrasound probe to identify the suprapatellar space and superior boarder of the patella first longitudinally and then transversely. The injection site was marked and prepped with a ChloraPrep swab and anesthetized with ethylchloride skin anesthesia. Under sterile technique a 20g 3 1/2 in spinal needle with a preloaded viscosupplementation syringe was passed through the injection site towards the suprapatellar space under live ultrasound guidance and noted to penetrate the joint capsule. The medication was injected without resistance under live ultrasound visualization and noted to flow into the suprapatellar joint space. The injection site was sterilely dressed, there was minimal blood loss.\par \par The patient tolerated this procedure without any complications done by myself. Images were recorded and saved.\par \par The patient has been advised that if they notice any worsening of symptoms or any problems to contact me and seek care from a qualified medical professional. The patient was instructed to ice the knee and take NSAID medication on an as needed basis if the patient feels discomfort.\par \par Followup injection [6 months]

## 2020-10-05 NOTE — ED SUB INTERN NOTE - NS MSEMN COMPLAINT FT
Addended by: ABRAHAM WEI on: 10/5/2020 08:10 AM     Modules accepted: Orders     Leg Swelling, Scrotal/Penile Swelling

## 2021-01-01 ENCOUNTER — NON-APPOINTMENT (OUTPATIENT)
Age: 86
End: 2021-01-01

## 2021-01-01 ENCOUNTER — TRANSCRIPTION ENCOUNTER (OUTPATIENT)
Age: 86
End: 2021-01-01

## 2021-01-01 ENCOUNTER — INPATIENT (INPATIENT)
Facility: HOSPITAL | Age: 86
LOS: 12 days | Discharge: HOME | DRG: 291 | End: 2021-10-26
Attending: INTERNAL MEDICINE | Admitting: INTERNAL MEDICINE
Payer: COMMERCIAL

## 2021-01-01 ENCOUNTER — INPATIENT (INPATIENT)
Facility: HOSPITAL | Age: 86
LOS: 1 days | Discharge: HOME | End: 2021-08-11
Attending: INTERNAL MEDICINE | Admitting: INTERNAL MEDICINE
Payer: MEDICARE

## 2021-01-01 ENCOUNTER — INPATIENT (INPATIENT)
Facility: HOSPITAL | Age: 86
LOS: 0 days | Discharge: HOME | DRG: 227 | End: 2021-09-01
Attending: INTERNAL MEDICINE | Admitting: INTERNAL MEDICINE
Payer: COMMERCIAL

## 2021-01-01 ENCOUNTER — INPATIENT (INPATIENT)
Facility: HOSPITAL | Age: 86
LOS: 16 days | Discharge: ANOTHER HOSPITAL | End: 2021-08-31
Attending: INTERNAL MEDICINE | Admitting: INTERNAL MEDICINE
Payer: MEDICARE

## 2021-01-01 VITALS
OXYGEN SATURATION: 100 % | DIASTOLIC BLOOD PRESSURE: 61 MMHG | RESPIRATION RATE: 18 BRPM | SYSTOLIC BLOOD PRESSURE: 107 MMHG | HEART RATE: 60 BPM | TEMPERATURE: 99 F

## 2021-01-01 VITALS
OXYGEN SATURATION: 100 % | RESPIRATION RATE: 18 BRPM | SYSTOLIC BLOOD PRESSURE: 129 MMHG | HEART RATE: 69 BPM | DIASTOLIC BLOOD PRESSURE: 65 MMHG | TEMPERATURE: 98 F

## 2021-01-01 VITALS
OXYGEN SATURATION: 100 % | HEART RATE: 68 BPM | HEIGHT: 66 IN | SYSTOLIC BLOOD PRESSURE: 95 MMHG | DIASTOLIC BLOOD PRESSURE: 50 MMHG | RESPIRATION RATE: 16 BRPM | TEMPERATURE: 97 F

## 2021-01-01 VITALS
HEIGHT: 64 IN | SYSTOLIC BLOOD PRESSURE: 146 MMHG | TEMPERATURE: 98 F | RESPIRATION RATE: 20 BRPM | DIASTOLIC BLOOD PRESSURE: 85 MMHG | WEIGHT: 160.06 LBS | OXYGEN SATURATION: 97 % | HEART RATE: 89 BPM

## 2021-01-01 VITALS
HEART RATE: 60 BPM | SYSTOLIC BLOOD PRESSURE: 118 MMHG | OXYGEN SATURATION: 93 % | RESPIRATION RATE: 18 BRPM | TEMPERATURE: 98 F | DIASTOLIC BLOOD PRESSURE: 52 MMHG

## 2021-01-01 VITALS
DIASTOLIC BLOOD PRESSURE: 76 MMHG | RESPIRATION RATE: 18 BRPM | OXYGEN SATURATION: 95 % | SYSTOLIC BLOOD PRESSURE: 142 MMHG | HEIGHT: 64 IN | TEMPERATURE: 98 F | HEART RATE: 75 BPM | WEIGHT: 134.04 LBS

## 2021-01-01 VITALS
TEMPERATURE: 98 F | RESPIRATION RATE: 18 BRPM | HEART RATE: 62 BPM | DIASTOLIC BLOOD PRESSURE: 61 MMHG | SYSTOLIC BLOOD PRESSURE: 111 MMHG | OXYGEN SATURATION: 100 %

## 2021-01-01 VITALS
RESPIRATION RATE: 20 BRPM | HEART RATE: 170 BPM | SYSTOLIC BLOOD PRESSURE: 68 MMHG | HEIGHT: 66 IN | DIASTOLIC BLOOD PRESSURE: 40 MMHG

## 2021-01-01 DIAGNOSIS — I47.2 VENTRICULAR TACHYCARDIA: ICD-10-CM

## 2021-01-01 DIAGNOSIS — I48.20 CHRONIC ATRIAL FIBRILLATION, UNSPECIFIED: ICD-10-CM

## 2021-01-01 DIAGNOSIS — I50.9 HEART FAILURE, UNSPECIFIED: ICD-10-CM

## 2021-01-01 DIAGNOSIS — Z95.0 PRESENCE OF CARDIAC PACEMAKER: Chronic | ICD-10-CM

## 2021-01-01 DIAGNOSIS — Z95.5 PRESENCE OF CORONARY ANGIOPLASTY IMPLANT AND GRAFT: Chronic | ICD-10-CM

## 2021-01-01 DIAGNOSIS — I21.4 NON-ST ELEVATION (NSTEMI) MYOCARDIAL INFARCTION: ICD-10-CM

## 2021-01-01 DIAGNOSIS — I25.10 ATHEROSCLEROTIC HEART DISEASE OF NATIVE CORONARY ARTERY WITHOUT ANGINA PECTORIS: ICD-10-CM

## 2021-01-01 DIAGNOSIS — I49.01 VENTRICULAR FIBRILLATION: ICD-10-CM

## 2021-01-01 DIAGNOSIS — R07.9 CHEST PAIN, UNSPECIFIED: ICD-10-CM

## 2021-01-01 DIAGNOSIS — I50.32 CHRONIC DIASTOLIC (CONGESTIVE) HEART FAILURE: ICD-10-CM

## 2021-01-01 DIAGNOSIS — N18.9 CHRONIC KIDNEY DISEASE, UNSPECIFIED: ICD-10-CM

## 2021-01-01 DIAGNOSIS — I48.0 PAROXYSMAL ATRIAL FIBRILLATION: ICD-10-CM

## 2021-01-01 DIAGNOSIS — Z87.438 PERSONAL HISTORY OF OTHER DISEASES OF MALE GENITAL ORGANS: ICD-10-CM

## 2021-01-01 DIAGNOSIS — I10 ESSENTIAL (PRIMARY) HYPERTENSION: ICD-10-CM

## 2021-01-01 DIAGNOSIS — Z86.79 PERSONAL HISTORY OF OTHER DISEASES OF THE CIRCULATORY SYSTEM: ICD-10-CM

## 2021-01-01 DIAGNOSIS — N18.30 CHRONIC KIDNEY DISEASE, STAGE 3 UNSPECIFIED: ICD-10-CM

## 2021-01-01 DIAGNOSIS — N40.0 BENIGN PROSTATIC HYPERPLASIA WITHOUT LOWER URINARY TRACT SYMPTOMS: ICD-10-CM

## 2021-01-01 DIAGNOSIS — N17.9 ACUTE KIDNEY FAILURE, UNSPECIFIED: ICD-10-CM

## 2021-01-01 DIAGNOSIS — Z29.9 ENCOUNTER FOR PROPHYLACTIC MEASURES, UNSPECIFIED: ICD-10-CM

## 2021-01-01 LAB
% ALBUMIN: 52.4 % — SIGNIFICANT CHANGE UP
% ALPHA 1: 4.4 % — SIGNIFICANT CHANGE UP
% ALPHA 2: 14.1 % — SIGNIFICANT CHANGE UP
% BETA: 12.1 % — SIGNIFICANT CHANGE UP
% GAMMA, URINE: 14.4 % — SIGNIFICANT CHANGE UP
% GAMMA: 17 % — SIGNIFICANT CHANGE UP
A1C WITH ESTIMATED AVERAGE GLUCOSE RESULT: 7.7 % — HIGH (ref 4–5.6)
ALBUMIN 24H MFR UR ELPH: 14.9 % — SIGNIFICANT CHANGE UP
ALBUMIN SERPL ELPH-MCNC: 2.7 G/DL — LOW (ref 3.3–5)
ALBUMIN SERPL ELPH-MCNC: 2.7 G/DL — LOW (ref 3.3–5)
ALBUMIN SERPL ELPH-MCNC: 2.8 G/DL — LOW (ref 3.3–5)
ALBUMIN SERPL ELPH-MCNC: 2.83 G/DL — LOW (ref 3.3–4.4)
ALBUMIN SERPL ELPH-MCNC: 2.9 G/DL — LOW (ref 3.3–5)
ALBUMIN SERPL ELPH-MCNC: 3 G/DL — LOW (ref 3.3–5)
ALBUMIN SERPL ELPH-MCNC: 3.1 G/DL — LOW (ref 3.3–5)
ALBUMIN SERPL ELPH-MCNC: 3.2 G/DL — LOW (ref 3.3–5)
ALBUMIN SERPL ELPH-MCNC: 3.2 G/DL — LOW (ref 3.3–5)
ALBUMIN SERPL ELPH-MCNC: 3.3 G/DL — SIGNIFICANT CHANGE UP (ref 3.3–5)
ALBUMIN SERPL ELPH-MCNC: 3.3 G/DL — SIGNIFICANT CHANGE UP (ref 3.3–5)
ALBUMIN SERPL ELPH-MCNC: 3.5 G/DL — SIGNIFICANT CHANGE UP (ref 3.3–5)
ALBUMIN SERPL ELPH-MCNC: 3.5 G/DL — SIGNIFICANT CHANGE UP (ref 3.3–5)
ALBUMIN SERPL ELPH-MCNC: 3.6 G/DL — SIGNIFICANT CHANGE UP (ref 3.3–5)
ALBUMIN SERPL ELPH-MCNC: 3.7 G/DL — SIGNIFICANT CHANGE UP (ref 3.3–5)
ALBUMIN/GLOB SERPL ELPH: 1.1 RATIO — SIGNIFICANT CHANGE UP
ALP SERPL-CCNC: 100 U/L — SIGNIFICANT CHANGE UP (ref 40–120)
ALP SERPL-CCNC: 108 U/L — SIGNIFICANT CHANGE UP (ref 40–120)
ALP SERPL-CCNC: 116 U/L — SIGNIFICANT CHANGE UP (ref 40–120)
ALP SERPL-CCNC: 127 U/L — HIGH (ref 40–120)
ALP SERPL-CCNC: 56 U/L — SIGNIFICANT CHANGE UP (ref 40–120)
ALP SERPL-CCNC: 62 U/L — SIGNIFICANT CHANGE UP (ref 40–120)
ALP SERPL-CCNC: 69 U/L — SIGNIFICANT CHANGE UP (ref 40–120)
ALP SERPL-CCNC: 71 U/L — SIGNIFICANT CHANGE UP (ref 40–120)
ALP SERPL-CCNC: 73 U/L — SIGNIFICANT CHANGE UP (ref 40–120)
ALP SERPL-CCNC: 76 U/L — SIGNIFICANT CHANGE UP (ref 40–120)
ALP SERPL-CCNC: 76 U/L — SIGNIFICANT CHANGE UP (ref 40–120)
ALP SERPL-CCNC: 79 U/L — SIGNIFICANT CHANGE UP (ref 40–120)
ALP SERPL-CCNC: 81 U/L — SIGNIFICANT CHANGE UP (ref 40–120)
ALP SERPL-CCNC: 86 U/L — SIGNIFICANT CHANGE UP (ref 40–120)
ALP SERPL-CCNC: 90 U/L — SIGNIFICANT CHANGE UP (ref 40–120)
ALP SERPL-CCNC: 93 U/L — SIGNIFICANT CHANGE UP (ref 40–120)
ALP SERPL-CCNC: 95 U/L — SIGNIFICANT CHANGE UP (ref 40–120)
ALP SERPL-CCNC: 96 U/L — SIGNIFICANT CHANGE UP (ref 40–120)
ALP SERPL-CCNC: 97 U/L — SIGNIFICANT CHANGE UP (ref 40–120)
ALPHA1 GLOB 24H MFR UR ELPH: 33.7 % — SIGNIFICANT CHANGE UP
ALPHA1 GLOB SERPL ELPH-MCNC: 0.24 G/DL — SIGNIFICANT CHANGE UP (ref 0.1–0.3)
ALPHA2 GLOB 24H MFR UR ELPH: 16.9 % — SIGNIFICANT CHANGE UP
ALPHA2 GLOB SERPL ELPH-MCNC: 0.8 G/DL — SIGNIFICANT CHANGE UP (ref 0.6–1)
ALT FLD-CCNC: 12 U/L — SIGNIFICANT CHANGE UP (ref 4–41)
ALT FLD-CCNC: 13 U/L — SIGNIFICANT CHANGE UP (ref 4–41)
ALT FLD-CCNC: 14 U/L — SIGNIFICANT CHANGE UP (ref 4–41)
ALT FLD-CCNC: 17 U/L — SIGNIFICANT CHANGE UP (ref 4–41)
ALT FLD-CCNC: 18 U/L — SIGNIFICANT CHANGE UP (ref 4–41)
ALT FLD-CCNC: 22 U/L — SIGNIFICANT CHANGE UP (ref 4–41)
ALT FLD-CCNC: 24 U/L — SIGNIFICANT CHANGE UP (ref 4–41)
ALT FLD-CCNC: 32 U/L — SIGNIFICANT CHANGE UP (ref 4–41)
ALT FLD-CCNC: 44 U/L — HIGH (ref 4–41)
ALT FLD-CCNC: 44 U/L — SIGNIFICANT CHANGE UP (ref 10–45)
ALT FLD-CCNC: 47 U/L — HIGH (ref 10–45)
ALT FLD-CCNC: 52 U/L — HIGH (ref 4–41)
ALT FLD-CCNC: 54 U/L — HIGH (ref 10–45)
ALT FLD-CCNC: 56 U/L — HIGH (ref 10–45)
ALT FLD-CCNC: 56 U/L — HIGH (ref 10–45)
ALT FLD-CCNC: 58 U/L — HIGH (ref 4–41)
ALT FLD-CCNC: 9 U/L — SIGNIFICANT CHANGE UP (ref 4–41)
ANION GAP SERPL CALC-SCNC: 10 MMOL/L — SIGNIFICANT CHANGE UP (ref 7–14)
ANION GAP SERPL CALC-SCNC: 11 MMOL/L — SIGNIFICANT CHANGE UP (ref 5–17)
ANION GAP SERPL CALC-SCNC: 11 MMOL/L — SIGNIFICANT CHANGE UP (ref 7–14)
ANION GAP SERPL CALC-SCNC: 11 MMOL/L — SIGNIFICANT CHANGE UP (ref 7–14)
ANION GAP SERPL CALC-SCNC: 12 MMOL/L — SIGNIFICANT CHANGE UP (ref 5–17)
ANION GAP SERPL CALC-SCNC: 12 MMOL/L — SIGNIFICANT CHANGE UP (ref 7–14)
ANION GAP SERPL CALC-SCNC: 13 MMOL/L — SIGNIFICANT CHANGE UP (ref 5–17)
ANION GAP SERPL CALC-SCNC: 13 MMOL/L — SIGNIFICANT CHANGE UP (ref 7–14)
ANION GAP SERPL CALC-SCNC: 14 MMOL/L — SIGNIFICANT CHANGE UP (ref 7–14)
ANION GAP SERPL CALC-SCNC: 15 MMOL/L — SIGNIFICANT CHANGE UP (ref 5–17)
ANION GAP SERPL CALC-SCNC: 15 MMOL/L — SIGNIFICANT CHANGE UP (ref 5–17)
ANION GAP SERPL CALC-SCNC: 22 MMOL/L — HIGH (ref 7–14)
ANION GAP SERPL CALC-SCNC: 5 MMOL/L — LOW (ref 7–14)
ANION GAP SERPL CALC-SCNC: 8 MMOL/L — SIGNIFICANT CHANGE UP (ref 7–14)
ANION GAP SERPL CALC-SCNC: 9 MMOL/L — SIGNIFICANT CHANGE UP (ref 7–14)
ANION GAP SERPL CALC-SCNC: 9 MMOL/L — SIGNIFICANT CHANGE UP (ref 7–14)
APPEARANCE UR: ABNORMAL
APPEARANCE UR: ABNORMAL
APPEARANCE UR: CLEAR — SIGNIFICANT CHANGE UP
APTT BLD: 106.1 SEC — HIGH (ref 27–36.3)
APTT BLD: 131.1 SEC — CRITICAL HIGH (ref 27–36.3)
APTT BLD: 134.9 SEC — CRITICAL HIGH (ref 27–36.3)
APTT BLD: 139 SEC — CRITICAL HIGH (ref 27–36.3)
APTT BLD: 192.2 SEC — CRITICAL HIGH (ref 27–36.3)
APTT BLD: 27 SEC — SIGNIFICANT CHANGE UP (ref 27–36.3)
APTT BLD: 27.7 SEC — SIGNIFICANT CHANGE UP (ref 27–36.3)
APTT BLD: 28.7 SEC — SIGNIFICANT CHANGE UP (ref 27–36.3)
APTT BLD: 30.2 SEC — SIGNIFICANT CHANGE UP (ref 27–36.3)
APTT BLD: 33.9 SEC — SIGNIFICANT CHANGE UP (ref 27.5–35.5)
APTT BLD: 59.6 SEC — HIGH (ref 27–36.3)
APTT BLD: 59.8 SEC — HIGH (ref 27–36.3)
APTT BLD: 64.2 SEC — HIGH (ref 27–36.3)
APTT BLD: 66.9 SEC — HIGH (ref 27–36.3)
APTT BLD: 71.9 SEC — HIGH (ref 27–36.3)
APTT BLD: 74.2 SEC — HIGH (ref 27–36.3)
APTT BLD: 75.2 SEC — HIGH (ref 27–36.3)
APTT BLD: 78.8 SEC — HIGH (ref 27–36.3)
APTT BLD: 82.1 SEC — HIGH (ref 27–36.3)
APTT BLD: 82.7 SEC — HIGH (ref 27–36.3)
APTT BLD: 89.4 SEC — HIGH (ref 27–36.3)
AST SERPL-CCNC: 17 U/L — SIGNIFICANT CHANGE UP (ref 4–40)
AST SERPL-CCNC: 18 U/L — SIGNIFICANT CHANGE UP (ref 4–40)
AST SERPL-CCNC: 20 U/L — SIGNIFICANT CHANGE UP (ref 4–40)
AST SERPL-CCNC: 20 U/L — SIGNIFICANT CHANGE UP (ref 4–40)
AST SERPL-CCNC: 21 U/L — SIGNIFICANT CHANGE UP (ref 4–40)
AST SERPL-CCNC: 21 U/L — SIGNIFICANT CHANGE UP (ref 4–40)
AST SERPL-CCNC: 22 U/L — SIGNIFICANT CHANGE UP (ref 4–40)
AST SERPL-CCNC: 22 U/L — SIGNIFICANT CHANGE UP (ref 4–40)
AST SERPL-CCNC: 23 U/L — SIGNIFICANT CHANGE UP (ref 4–40)
AST SERPL-CCNC: 23 U/L — SIGNIFICANT CHANGE UP (ref 4–40)
AST SERPL-CCNC: 32 U/L — SIGNIFICANT CHANGE UP (ref 4–40)
AST SERPL-CCNC: 44 U/L — HIGH (ref 4–40)
AST SERPL-CCNC: 57 U/L — HIGH (ref 10–40)
AST SERPL-CCNC: 62 U/L — HIGH (ref 10–40)
AST SERPL-CCNC: 69 U/L — HIGH (ref 10–40)
AST SERPL-CCNC: 70 U/L — HIGH (ref 4–40)
AST SERPL-CCNC: 73 U/L — HIGH (ref 10–40)
AST SERPL-CCNC: 73 U/L — HIGH (ref 10–40)
AST SERPL-CCNC: 73 U/L — HIGH (ref 4–40)
B PERT DNA SPEC QL NAA+PROBE: SIGNIFICANT CHANGE UP
B PERT DNA SPEC QL NAA+PROBE: SIGNIFICANT CHANGE UP
B PERT+PARAPERT DNA PNL SPEC NAA+PROBE: SIGNIFICANT CHANGE UP
B PERT+PARAPERT DNA PNL SPEC NAA+PROBE: SIGNIFICANT CHANGE UP
B-GLOBULIN 24H MFR UR ELPH: 20.1 % — SIGNIFICANT CHANGE UP
B-GLOBULIN SERPL ELPH-MCNC: 0.65 G/DL — SIGNIFICANT CHANGE UP (ref 0.6–1.1)
BACTERIA # UR AUTO: ABNORMAL
BACTERIA # UR AUTO: NEGATIVE — SIGNIFICANT CHANGE UP
BACTERIA # UR AUTO: NEGATIVE — SIGNIFICANT CHANGE UP
BASE EXCESS BLDV CALC-SCNC: -5.9 MMOL/L — LOW (ref -2–3)
BASOPHILS # BLD AUTO: 0.01 K/UL — SIGNIFICANT CHANGE UP (ref 0–0.2)
BASOPHILS # BLD AUTO: 0.02 K/UL — SIGNIFICANT CHANGE UP (ref 0–0.2)
BASOPHILS # BLD AUTO: 0.03 K/UL — SIGNIFICANT CHANGE UP (ref 0–0.2)
BASOPHILS # BLD AUTO: 0.04 K/UL — SIGNIFICANT CHANGE UP (ref 0–0.2)
BASOPHILS # BLD AUTO: 0.04 K/UL — SIGNIFICANT CHANGE UP (ref 0–0.2)
BASOPHILS NFR BLD AUTO: 0.1 % — SIGNIFICANT CHANGE UP (ref 0–2)
BASOPHILS NFR BLD AUTO: 0.3 % — SIGNIFICANT CHANGE UP (ref 0–2)
BASOPHILS NFR BLD AUTO: 0.4 % — SIGNIFICANT CHANGE UP (ref 0–2)
BASOPHILS NFR BLD AUTO: 0.5 % — SIGNIFICANT CHANGE UP (ref 0–2)
BASOPHILS NFR BLD AUTO: 0.7 % — SIGNIFICANT CHANGE UP (ref 0–2)
BILIRUB SERPL-MCNC: 0.3 MG/DL — SIGNIFICANT CHANGE UP (ref 0.2–1.2)
BILIRUB SERPL-MCNC: 0.4 MG/DL — SIGNIFICANT CHANGE UP (ref 0.2–1.2)
BILIRUB SERPL-MCNC: 0.5 MG/DL — SIGNIFICANT CHANGE UP (ref 0.2–1.2)
BILIRUB SERPL-MCNC: 0.7 MG/DL — SIGNIFICANT CHANGE UP (ref 0.2–1.2)
BILIRUB SERPL-MCNC: 0.7 MG/DL — SIGNIFICANT CHANGE UP (ref 0.2–1.2)
BILIRUB SERPL-MCNC: 0.8 MG/DL — SIGNIFICANT CHANGE UP (ref 0.2–1.2)
BILIRUB SERPL-MCNC: 0.9 MG/DL — SIGNIFICANT CHANGE UP (ref 0.2–1.2)
BILIRUB SERPL-MCNC: 0.9 MG/DL — SIGNIFICANT CHANGE UP (ref 0.2–1.2)
BILIRUB SERPL-MCNC: 1 MG/DL — SIGNIFICANT CHANGE UP (ref 0.2–1.2)
BILIRUB SERPL-MCNC: 1.1 MG/DL — SIGNIFICANT CHANGE UP (ref 0.2–1.2)
BILIRUB SERPL-MCNC: 1.2 MG/DL — SIGNIFICANT CHANGE UP (ref 0.2–1.2)
BILIRUB UR-MCNC: NEGATIVE — SIGNIFICANT CHANGE UP
BLD GP AB SCN SERPL QL: NEGATIVE — SIGNIFICANT CHANGE UP
BLOOD GAS VENOUS COMPREHENSIVE RESULT: SIGNIFICANT CHANGE UP
BORDETELLA PARAPERTUSSIS (RAPRVP): SIGNIFICANT CHANGE UP
BORDETELLA PARAPERTUSSIS (RAPRVP): SIGNIFICANT CHANGE UP
BUN SERPL-MCNC: 25 MG/DL — HIGH (ref 7–23)
BUN SERPL-MCNC: 27 MG/DL — HIGH (ref 7–23)
BUN SERPL-MCNC: 27 MG/DL — HIGH (ref 7–23)
BUN SERPL-MCNC: 28 MG/DL — HIGH (ref 7–23)
BUN SERPL-MCNC: 30 MG/DL — HIGH (ref 7–23)
BUN SERPL-MCNC: 32 MG/DL — HIGH (ref 7–23)
BUN SERPL-MCNC: 41 MG/DL — HIGH (ref 7–23)
BUN SERPL-MCNC: 43 MG/DL — HIGH (ref 7–23)
BUN SERPL-MCNC: 46 MG/DL — HIGH (ref 7–23)
BUN SERPL-MCNC: 46 MG/DL — HIGH (ref 7–23)
BUN SERPL-MCNC: 48 MG/DL — HIGH (ref 7–23)
BUN SERPL-MCNC: 48 MG/DL — HIGH (ref 7–23)
BUN SERPL-MCNC: 51 MG/DL — HIGH (ref 7–23)
BUN SERPL-MCNC: 52 MG/DL — HIGH (ref 7–23)
BUN SERPL-MCNC: 52 MG/DL — HIGH (ref 7–23)
BUN SERPL-MCNC: 56 MG/DL — HIGH (ref 7–23)
BUN SERPL-MCNC: 57 MG/DL — HIGH (ref 7–23)
BUN SERPL-MCNC: 58 MG/DL — HIGH (ref 7–23)
BUN SERPL-MCNC: 60 MG/DL — HIGH (ref 7–23)
BUN SERPL-MCNC: 60 MG/DL — HIGH (ref 7–23)
BUN SERPL-MCNC: 61 MG/DL — HIGH (ref 7–23)
BUN SERPL-MCNC: 62 MG/DL — HIGH (ref 7–23)
BUN SERPL-MCNC: 64 MG/DL — HIGH (ref 7–23)
BUN SERPL-MCNC: 65 MG/DL — HIGH (ref 7–23)
BUN SERPL-MCNC: 70 MG/DL — HIGH (ref 7–23)
BUN SERPL-MCNC: 74 MG/DL — HIGH (ref 7–23)
BUN SERPL-MCNC: 78 MG/DL — HIGH (ref 7–23)
BUN SERPL-MCNC: 84 MG/DL — HIGH (ref 7–23)
BUN SERPL-MCNC: 85 MG/DL — HIGH (ref 7–23)
BUN SERPL-MCNC: 86 MG/DL — HIGH (ref 7–23)
BUN SERPL-MCNC: 90 MG/DL — HIGH (ref 7–23)
C PNEUM DNA SPEC QL NAA+PROBE: SIGNIFICANT CHANGE UP
C PNEUM DNA SPEC QL NAA+PROBE: SIGNIFICANT CHANGE UP
CALCIUM SERPL-MCNC: 8.2 MG/DL — LOW (ref 8.4–10.5)
CALCIUM SERPL-MCNC: 8.3 MG/DL — LOW (ref 8.4–10.5)
CALCIUM SERPL-MCNC: 8.3 MG/DL — LOW (ref 8.4–10.5)
CALCIUM SERPL-MCNC: 8.4 MG/DL — SIGNIFICANT CHANGE UP (ref 8.4–10.5)
CALCIUM SERPL-MCNC: 8.4 MG/DL — SIGNIFICANT CHANGE UP (ref 8.4–10.5)
CALCIUM SERPL-MCNC: 8.5 MG/DL — SIGNIFICANT CHANGE UP (ref 8.4–10.5)
CALCIUM SERPL-MCNC: 8.6 MG/DL — SIGNIFICANT CHANGE UP (ref 8.4–10.5)
CALCIUM SERPL-MCNC: 8.7 MG/DL — SIGNIFICANT CHANGE UP (ref 8.4–10.5)
CALCIUM SERPL-MCNC: 8.8 MG/DL — SIGNIFICANT CHANGE UP (ref 8.4–10.5)
CALCIUM SERPL-MCNC: 8.9 MG/DL — SIGNIFICANT CHANGE UP (ref 8.4–10.5)
CALCIUM SERPL-MCNC: 9 MG/DL — SIGNIFICANT CHANGE UP (ref 8.4–10.5)
CALCIUM SERPL-MCNC: 9 MG/DL — SIGNIFICANT CHANGE UP (ref 8.4–10.5)
CALCIUM SERPL-MCNC: 9.1 MG/DL — SIGNIFICANT CHANGE UP (ref 8.4–10.5)
CALCIUM SERPL-MCNC: 9.2 MG/DL — SIGNIFICANT CHANGE UP (ref 8.4–10.5)
CALCIUM SERPL-MCNC: 9.4 MG/DL — SIGNIFICANT CHANGE UP (ref 8.4–10.5)
CHLORIDE BLDV-SCNC: 101 MMOL/L — SIGNIFICANT CHANGE UP (ref 96–108)
CHLORIDE SERPL-SCNC: 100 MMOL/L — SIGNIFICANT CHANGE UP (ref 96–108)
CHLORIDE SERPL-SCNC: 100 MMOL/L — SIGNIFICANT CHANGE UP (ref 98–107)
CHLORIDE SERPL-SCNC: 101 MMOL/L — SIGNIFICANT CHANGE UP (ref 96–108)
CHLORIDE SERPL-SCNC: 102 MMOL/L — SIGNIFICANT CHANGE UP (ref 96–108)
CHLORIDE SERPL-SCNC: 102 MMOL/L — SIGNIFICANT CHANGE UP (ref 98–107)
CHLORIDE SERPL-SCNC: 102 MMOL/L — SIGNIFICANT CHANGE UP (ref 98–107)
CHLORIDE SERPL-SCNC: 104 MMOL/L — SIGNIFICANT CHANGE UP (ref 98–107)
CHLORIDE SERPL-SCNC: 104 MMOL/L — SIGNIFICANT CHANGE UP (ref 98–107)
CHLORIDE SERPL-SCNC: 105 MMOL/L — SIGNIFICANT CHANGE UP (ref 98–107)
CHLORIDE SERPL-SCNC: 106 MMOL/L — SIGNIFICANT CHANGE UP (ref 98–107)
CHLORIDE SERPL-SCNC: 107 MMOL/L — SIGNIFICANT CHANGE UP (ref 98–107)
CHLORIDE SERPL-SCNC: 108 MMOL/L — HIGH (ref 98–107)
CHLORIDE SERPL-SCNC: 108 MMOL/L — SIGNIFICANT CHANGE UP (ref 96–108)
CHLORIDE SERPL-SCNC: 109 MMOL/L — HIGH (ref 98–107)
CHLORIDE SERPL-SCNC: 109 MMOL/L — HIGH (ref 98–107)
CHLORIDE SERPL-SCNC: 92 MMOL/L — LOW (ref 96–108)
CHLORIDE SERPL-SCNC: 94 MMOL/L — LOW (ref 96–108)
CHLORIDE SERPL-SCNC: 95 MMOL/L — LOW (ref 96–108)
CHLORIDE SERPL-SCNC: 95 MMOL/L — LOW (ref 96–108)
CHLORIDE SERPL-SCNC: 96 MMOL/L — SIGNIFICANT CHANGE UP (ref 96–108)
CHLORIDE SERPL-SCNC: 97 MMOL/L — LOW (ref 98–107)
CHLORIDE SERPL-SCNC: 98 MMOL/L — SIGNIFICANT CHANGE UP (ref 96–108)
CHLORIDE SERPL-SCNC: 98 MMOL/L — SIGNIFICANT CHANGE UP (ref 96–108)
CHLORIDE SERPL-SCNC: 98 MMOL/L — SIGNIFICANT CHANGE UP (ref 98–107)
CHLORIDE SERPL-SCNC: 98 MMOL/L — SIGNIFICANT CHANGE UP (ref 98–107)
CHLORIDE UR-SCNC: 31 MMOL/L — SIGNIFICANT CHANGE UP
CHOLEST SERPL-MCNC: 106 MG/DL — SIGNIFICANT CHANGE UP
CK MB BLD-MCNC: 6.6 % — HIGH (ref 0–2.5)
CK MB BLD-MCNC: 7.4 % — HIGH (ref 0–2.5)
CK MB BLD-MCNC: 7.4 % — HIGH (ref 0–2.5)
CK MB BLD-MCNC: 7.8 % — HIGH (ref 0–2.5)
CK MB CFR SERPL CALC: 10.1 NG/ML — HIGH
CK MB CFR SERPL CALC: 4.1 NG/ML — SIGNIFICANT CHANGE UP
CK MB CFR SERPL CALC: 7.6 NG/ML — HIGH
CK MB CFR SERPL CALC: 8.9 NG/ML — HIGH
CK SERPL-CCNC: 120 U/L — SIGNIFICANT CHANGE UP (ref 30–200)
CK SERPL-CCNC: 137 U/L — SIGNIFICANT CHANGE UP (ref 30–200)
CK SERPL-CCNC: 60 U/L — SIGNIFICANT CHANGE UP (ref 30–200)
CK SERPL-CCNC: 62 U/L — SIGNIFICANT CHANGE UP (ref 30–200)
CK SERPL-CCNC: 98 U/L — SIGNIFICANT CHANGE UP (ref 30–200)
CO2 BLDV-SCNC: 18.9 MMOL/L — LOW (ref 22–26)
CO2 SERPL-SCNC: 14 MMOL/L — LOW (ref 22–31)
CO2 SERPL-SCNC: 17 MMOL/L — LOW (ref 22–31)
CO2 SERPL-SCNC: 17 MMOL/L — LOW (ref 22–31)
CO2 SERPL-SCNC: 18 MMOL/L — LOW (ref 22–31)
CO2 SERPL-SCNC: 19 MMOL/L — LOW (ref 22–31)
CO2 SERPL-SCNC: 20 MMOL/L — LOW (ref 22–31)
CO2 SERPL-SCNC: 21 MMOL/L — LOW (ref 22–31)
CO2 SERPL-SCNC: 23 MMOL/L — SIGNIFICANT CHANGE UP (ref 22–31)
CO2 SERPL-SCNC: 23 MMOL/L — SIGNIFICANT CHANGE UP (ref 22–31)
CO2 SERPL-SCNC: 24 MMOL/L — SIGNIFICANT CHANGE UP (ref 22–31)
CO2 SERPL-SCNC: 24 MMOL/L — SIGNIFICANT CHANGE UP (ref 22–31)
CO2 SERPL-SCNC: 26 MMOL/L — SIGNIFICANT CHANGE UP (ref 22–31)
CO2 SERPL-SCNC: 26 MMOL/L — SIGNIFICANT CHANGE UP (ref 22–31)
CO2 SERPL-SCNC: 27 MMOL/L — SIGNIFICANT CHANGE UP (ref 22–31)
CO2 SERPL-SCNC: 28 MMOL/L — SIGNIFICANT CHANGE UP (ref 22–31)
CO2 SERPL-SCNC: 28 MMOL/L — SIGNIFICANT CHANGE UP (ref 22–31)
CO2 SERPL-SCNC: 31 MMOL/L — SIGNIFICANT CHANGE UP (ref 22–31)
CO2 SERPL-SCNC: 32 MMOL/L — HIGH (ref 22–31)
CO2 SERPL-SCNC: 33 MMOL/L — HIGH (ref 22–31)
COLLECT DURATION TIME UR: 24 HR — SIGNIFICANT CHANGE UP
COLOR SPEC: ABNORMAL
COLOR SPEC: SIGNIFICANT CHANGE UP
COLOR SPEC: SIGNIFICANT CHANGE UP
COVID-19 SPIKE DOMAIN AB INTERP: POSITIVE
COVID-19 SPIKE DOMAIN ANTIBODY RESULT: 192 U/ML — HIGH
COVID-19 SPIKE DOMAIN ANTIBODY RESULT: 223 U/ML — HIGH
COVID-19 SPIKE DOMAIN ANTIBODY RESULT: 238 U/ML — HIGH
COVID-19 SPIKE DOMAIN ANTIBODY RESULT: 249 U/ML — HIGH
CREAT ?TM UR-MCNC: 50 MG/DL — SIGNIFICANT CHANGE UP
CREAT SERPL-MCNC: 1.55 MG/DL — HIGH (ref 0.5–1.3)
CREAT SERPL-MCNC: 1.58 MG/DL — HIGH (ref 0.5–1.3)
CREAT SERPL-MCNC: 1.61 MG/DL — HIGH (ref 0.5–1.3)
CREAT SERPL-MCNC: 1.62 MG/DL — HIGH (ref 0.5–1.3)
CREAT SERPL-MCNC: 1.63 MG/DL — HIGH (ref 0.5–1.3)
CREAT SERPL-MCNC: 1.72 MG/DL — HIGH (ref 0.5–1.3)
CREAT SERPL-MCNC: 1.75 MG/DL — HIGH (ref 0.5–1.3)
CREAT SERPL-MCNC: 1.78 MG/DL — HIGH (ref 0.5–1.3)
CREAT SERPL-MCNC: 1.78 MG/DL — HIGH (ref 0.5–1.3)
CREAT SERPL-MCNC: 1.82 MG/DL — HIGH (ref 0.5–1.3)
CREAT SERPL-MCNC: 1.86 MG/DL — HIGH (ref 0.5–1.3)
CREAT SERPL-MCNC: 1.87 MG/DL — HIGH (ref 0.5–1.3)
CREAT SERPL-MCNC: 1.87 MG/DL — HIGH (ref 0.5–1.3)
CREAT SERPL-MCNC: 1.88 MG/DL — HIGH (ref 0.5–1.3)
CREAT SERPL-MCNC: 1.89 MG/DL — HIGH (ref 0.5–1.3)
CREAT SERPL-MCNC: 1.91 MG/DL — HIGH (ref 0.5–1.3)
CREAT SERPL-MCNC: 1.96 MG/DL — HIGH (ref 0.5–1.3)
CREAT SERPL-MCNC: 1.97 MG/DL — HIGH (ref 0.5–1.3)
CREAT SERPL-MCNC: 1.97 MG/DL — HIGH (ref 0.5–1.3)
CREAT SERPL-MCNC: 2.04 MG/DL — HIGH (ref 0.5–1.3)
CREAT SERPL-MCNC: 2.07 MG/DL — HIGH (ref 0.5–1.3)
CREAT SERPL-MCNC: 2.08 MG/DL — HIGH (ref 0.5–1.3)
CREAT SERPL-MCNC: 2.22 MG/DL — HIGH (ref 0.5–1.3)
CREAT SERPL-MCNC: 2.28 MG/DL — HIGH (ref 0.5–1.3)
CREAT SERPL-MCNC: 2.29 MG/DL — HIGH (ref 0.5–1.3)
CREAT SERPL-MCNC: 2.47 MG/DL — HIGH (ref 0.5–1.3)
CREAT SERPL-MCNC: 2.52 MG/DL — HIGH (ref 0.5–1.3)
CREAT SERPL-MCNC: 2.58 MG/DL — HIGH (ref 0.5–1.3)
CREAT SERPL-MCNC: 2.69 MG/DL — HIGH (ref 0.5–1.3)
CREAT SERPL-MCNC: 2.73 MG/DL — HIGH (ref 0.5–1.3)
CREAT SERPL-MCNC: 2.85 MG/DL — HIGH (ref 0.5–1.3)
CREAT SERPL-MCNC: 3.09 MG/DL — HIGH (ref 0.5–1.3)
CREAT SERPL-MCNC: 3.49 MG/DL — HIGH (ref 0.5–1.3)
CREATININE, URINE RESULT: 44 MG/DL — SIGNIFICANT CHANGE UP
CULTURE RESULTS: SIGNIFICANT CHANGE UP
DIFF PNL FLD: ABNORMAL
DIFF PNL FLD: ABNORMAL
DIFF PNL FLD: NEGATIVE — SIGNIFICANT CHANGE UP
EOSINOPHIL # BLD AUTO: 0.04 K/UL — SIGNIFICANT CHANGE UP (ref 0–0.5)
EOSINOPHIL # BLD AUTO: 0.11 K/UL — SIGNIFICANT CHANGE UP (ref 0–0.5)
EOSINOPHIL # BLD AUTO: 0.11 K/UL — SIGNIFICANT CHANGE UP (ref 0–0.5)
EOSINOPHIL # BLD AUTO: 0.16 K/UL — SIGNIFICANT CHANGE UP (ref 0–0.5)
EOSINOPHIL # BLD AUTO: 0.18 K/UL — SIGNIFICANT CHANGE UP (ref 0–0.5)
EOSINOPHIL # BLD AUTO: 0.22 K/UL — SIGNIFICANT CHANGE UP (ref 0–0.5)
EOSINOPHIL # BLD AUTO: 0.29 K/UL — SIGNIFICANT CHANGE UP (ref 0–0.5)
EOSINOPHIL NFR BLD AUTO: 0.4 % — SIGNIFICANT CHANGE UP (ref 0–6)
EOSINOPHIL NFR BLD AUTO: 1.3 % — SIGNIFICANT CHANGE UP (ref 0–6)
EOSINOPHIL NFR BLD AUTO: 1.5 % — SIGNIFICANT CHANGE UP (ref 0–6)
EOSINOPHIL NFR BLD AUTO: 2.8 % — SIGNIFICANT CHANGE UP (ref 0–6)
EOSINOPHIL NFR BLD AUTO: 3.3 % — SIGNIFICANT CHANGE UP (ref 0–6)
EOSINOPHIL NFR BLD AUTO: 3.9 % — SIGNIFICANT CHANGE UP (ref 0–6)
EOSINOPHIL NFR BLD AUTO: 4.7 % — SIGNIFICANT CHANGE UP (ref 0–6)
EPI CELLS # UR: 0 /HPF — SIGNIFICANT CHANGE UP
EPI CELLS # UR: 1 /HPF — SIGNIFICANT CHANGE UP (ref 0–5)
ESTIMATED AVERAGE GLUCOSE: 174 — SIGNIFICANT CHANGE UP
FERRITIN SERPL-MCNC: 277 NG/ML — SIGNIFICANT CHANGE UP (ref 30–400)
FLUAV SUBTYP SPEC NAA+PROBE: SIGNIFICANT CHANGE UP
FLUAV SUBTYP SPEC NAA+PROBE: SIGNIFICANT CHANGE UP
FLUBV RNA SPEC QL NAA+PROBE: SIGNIFICANT CHANGE UP
FLUBV RNA SPEC QL NAA+PROBE: SIGNIFICANT CHANGE UP
FOLATE SERPL-MCNC: 7.6 NG/ML — SIGNIFICANT CHANGE UP (ref 3.1–17.5)
GAMMA GLOBULIN: 0.92 G/DL — SIGNIFICANT CHANGE UP (ref 0.7–1.7)
GAS PNL BLDV: 130 MMOL/L — LOW (ref 136–145)
GLUCOSE BLDC GLUCOMTR-MCNC: 101 MG/DL — HIGH (ref 70–99)
GLUCOSE BLDC GLUCOMTR-MCNC: 107 MG/DL — HIGH (ref 70–99)
GLUCOSE BLDC GLUCOMTR-MCNC: 110 MG/DL — HIGH (ref 70–99)
GLUCOSE BLDC GLUCOMTR-MCNC: 111 MG/DL — HIGH (ref 70–99)
GLUCOSE BLDC GLUCOMTR-MCNC: 116 MG/DL — HIGH (ref 70–99)
GLUCOSE BLDC GLUCOMTR-MCNC: 117 MG/DL — HIGH (ref 70–99)
GLUCOSE BLDC GLUCOMTR-MCNC: 118 MG/DL — HIGH (ref 70–99)
GLUCOSE BLDC GLUCOMTR-MCNC: 119 MG/DL — HIGH (ref 70–99)
GLUCOSE BLDC GLUCOMTR-MCNC: 121 MG/DL — HIGH (ref 70–99)
GLUCOSE BLDC GLUCOMTR-MCNC: 123 MG/DL — HIGH (ref 70–99)
GLUCOSE BLDC GLUCOMTR-MCNC: 124 MG/DL — HIGH (ref 70–99)
GLUCOSE BLDC GLUCOMTR-MCNC: 124 MG/DL — HIGH (ref 70–99)
GLUCOSE BLDC GLUCOMTR-MCNC: 126 MG/DL — HIGH (ref 70–99)
GLUCOSE BLDC GLUCOMTR-MCNC: 127 MG/DL — HIGH (ref 70–99)
GLUCOSE BLDC GLUCOMTR-MCNC: 131 MG/DL — HIGH (ref 70–99)
GLUCOSE BLDC GLUCOMTR-MCNC: 132 MG/DL — HIGH (ref 70–99)
GLUCOSE BLDC GLUCOMTR-MCNC: 133 MG/DL — HIGH (ref 70–99)
GLUCOSE BLDC GLUCOMTR-MCNC: 133 MG/DL — HIGH (ref 70–99)
GLUCOSE BLDC GLUCOMTR-MCNC: 134 MG/DL — HIGH (ref 70–99)
GLUCOSE BLDC GLUCOMTR-MCNC: 135 MG/DL — HIGH (ref 70–99)
GLUCOSE BLDC GLUCOMTR-MCNC: 137 MG/DL — HIGH (ref 70–99)
GLUCOSE BLDC GLUCOMTR-MCNC: 138 MG/DL — HIGH (ref 70–99)
GLUCOSE BLDC GLUCOMTR-MCNC: 139 MG/DL — HIGH (ref 70–99)
GLUCOSE BLDC GLUCOMTR-MCNC: 141 MG/DL — HIGH (ref 70–99)
GLUCOSE BLDC GLUCOMTR-MCNC: 142 MG/DL — HIGH (ref 70–99)
GLUCOSE BLDC GLUCOMTR-MCNC: 142 MG/DL — HIGH (ref 70–99)
GLUCOSE BLDC GLUCOMTR-MCNC: 143 MG/DL — HIGH (ref 70–99)
GLUCOSE BLDC GLUCOMTR-MCNC: 146 MG/DL — HIGH (ref 70–99)
GLUCOSE BLDC GLUCOMTR-MCNC: 150 MG/DL — HIGH (ref 70–99)
GLUCOSE BLDC GLUCOMTR-MCNC: 151 MG/DL — HIGH (ref 70–99)
GLUCOSE BLDC GLUCOMTR-MCNC: 152 MG/DL — HIGH (ref 70–99)
GLUCOSE BLDC GLUCOMTR-MCNC: 153 MG/DL — HIGH (ref 70–99)
GLUCOSE BLDC GLUCOMTR-MCNC: 153 MG/DL — HIGH (ref 70–99)
GLUCOSE BLDC GLUCOMTR-MCNC: 157 MG/DL — HIGH (ref 70–99)
GLUCOSE BLDC GLUCOMTR-MCNC: 158 MG/DL — HIGH (ref 70–99)
GLUCOSE BLDC GLUCOMTR-MCNC: 159 MG/DL — HIGH (ref 70–99)
GLUCOSE BLDC GLUCOMTR-MCNC: 161 MG/DL — HIGH (ref 70–99)
GLUCOSE BLDC GLUCOMTR-MCNC: 164 MG/DL — HIGH (ref 70–99)
GLUCOSE BLDC GLUCOMTR-MCNC: 165 MG/DL — HIGH (ref 70–99)
GLUCOSE BLDC GLUCOMTR-MCNC: 166 MG/DL — HIGH (ref 70–99)
GLUCOSE BLDC GLUCOMTR-MCNC: 166 MG/DL — HIGH (ref 70–99)
GLUCOSE BLDC GLUCOMTR-MCNC: 167 MG/DL — HIGH (ref 70–99)
GLUCOSE BLDC GLUCOMTR-MCNC: 167 MG/DL — HIGH (ref 70–99)
GLUCOSE BLDC GLUCOMTR-MCNC: 168 MG/DL — HIGH (ref 70–99)
GLUCOSE BLDC GLUCOMTR-MCNC: 169 MG/DL — HIGH (ref 70–99)
GLUCOSE BLDC GLUCOMTR-MCNC: 173 MG/DL — HIGH (ref 70–99)
GLUCOSE BLDC GLUCOMTR-MCNC: 173 MG/DL — HIGH (ref 70–99)
GLUCOSE BLDC GLUCOMTR-MCNC: 174 MG/DL — HIGH (ref 70–99)
GLUCOSE BLDC GLUCOMTR-MCNC: 175 MG/DL — HIGH (ref 70–99)
GLUCOSE BLDC GLUCOMTR-MCNC: 176 MG/DL — HIGH (ref 70–99)
GLUCOSE BLDC GLUCOMTR-MCNC: 176 MG/DL — HIGH (ref 70–99)
GLUCOSE BLDC GLUCOMTR-MCNC: 177 MG/DL — HIGH (ref 70–99)
GLUCOSE BLDC GLUCOMTR-MCNC: 177 MG/DL — HIGH (ref 70–99)
GLUCOSE BLDC GLUCOMTR-MCNC: 181 MG/DL — HIGH (ref 70–99)
GLUCOSE BLDC GLUCOMTR-MCNC: 182 MG/DL — HIGH (ref 70–99)
GLUCOSE BLDC GLUCOMTR-MCNC: 182 MG/DL — HIGH (ref 70–99)
GLUCOSE BLDC GLUCOMTR-MCNC: 184 MG/DL — HIGH (ref 70–99)
GLUCOSE BLDC GLUCOMTR-MCNC: 185 MG/DL — HIGH (ref 70–99)
GLUCOSE BLDC GLUCOMTR-MCNC: 186 MG/DL — HIGH (ref 70–99)
GLUCOSE BLDC GLUCOMTR-MCNC: 187 MG/DL — HIGH (ref 70–99)
GLUCOSE BLDC GLUCOMTR-MCNC: 187 MG/DL — HIGH (ref 70–99)
GLUCOSE BLDC GLUCOMTR-MCNC: 188 MG/DL — HIGH (ref 70–99)
GLUCOSE BLDC GLUCOMTR-MCNC: 188 MG/DL — HIGH (ref 70–99)
GLUCOSE BLDC GLUCOMTR-MCNC: 189 MG/DL — HIGH (ref 70–99)
GLUCOSE BLDC GLUCOMTR-MCNC: 190 MG/DL — HIGH (ref 70–99)
GLUCOSE BLDC GLUCOMTR-MCNC: 191 MG/DL — HIGH (ref 70–99)
GLUCOSE BLDC GLUCOMTR-MCNC: 193 MG/DL — HIGH (ref 70–99)
GLUCOSE BLDC GLUCOMTR-MCNC: 194 MG/DL — HIGH (ref 70–99)
GLUCOSE BLDC GLUCOMTR-MCNC: 197 MG/DL — HIGH (ref 70–99)
GLUCOSE BLDC GLUCOMTR-MCNC: 197 MG/DL — HIGH (ref 70–99)
GLUCOSE BLDC GLUCOMTR-MCNC: 198 MG/DL — HIGH (ref 70–99)
GLUCOSE BLDC GLUCOMTR-MCNC: 199 MG/DL — HIGH (ref 70–99)
GLUCOSE BLDC GLUCOMTR-MCNC: 200 MG/DL — HIGH (ref 70–99)
GLUCOSE BLDC GLUCOMTR-MCNC: 200 MG/DL — HIGH (ref 70–99)
GLUCOSE BLDC GLUCOMTR-MCNC: 202 MG/DL — HIGH (ref 70–99)
GLUCOSE BLDC GLUCOMTR-MCNC: 202 MG/DL — HIGH (ref 70–99)
GLUCOSE BLDC GLUCOMTR-MCNC: 203 MG/DL — HIGH (ref 70–99)
GLUCOSE BLDC GLUCOMTR-MCNC: 204 MG/DL — HIGH (ref 70–99)
GLUCOSE BLDC GLUCOMTR-MCNC: 205 MG/DL — HIGH (ref 70–99)
GLUCOSE BLDC GLUCOMTR-MCNC: 206 MG/DL — HIGH (ref 70–99)
GLUCOSE BLDC GLUCOMTR-MCNC: 206 MG/DL — HIGH (ref 70–99)
GLUCOSE BLDC GLUCOMTR-MCNC: 207 MG/DL — HIGH (ref 70–99)
GLUCOSE BLDC GLUCOMTR-MCNC: 209 MG/DL — HIGH (ref 70–99)
GLUCOSE BLDC GLUCOMTR-MCNC: 210 MG/DL — HIGH (ref 70–99)
GLUCOSE BLDC GLUCOMTR-MCNC: 215 MG/DL — HIGH (ref 70–99)
GLUCOSE BLDC GLUCOMTR-MCNC: 215 MG/DL — HIGH (ref 70–99)
GLUCOSE BLDC GLUCOMTR-MCNC: 216 MG/DL — HIGH (ref 70–99)
GLUCOSE BLDC GLUCOMTR-MCNC: 224 MG/DL — HIGH (ref 70–99)
GLUCOSE BLDC GLUCOMTR-MCNC: 225 MG/DL — HIGH (ref 70–99)
GLUCOSE BLDC GLUCOMTR-MCNC: 226 MG/DL — HIGH (ref 70–99)
GLUCOSE BLDC GLUCOMTR-MCNC: 227 MG/DL — HIGH (ref 70–99)
GLUCOSE BLDC GLUCOMTR-MCNC: 228 MG/DL — HIGH (ref 70–99)
GLUCOSE BLDC GLUCOMTR-MCNC: 230 MG/DL — HIGH (ref 70–99)
GLUCOSE BLDC GLUCOMTR-MCNC: 230 MG/DL — HIGH (ref 70–99)
GLUCOSE BLDC GLUCOMTR-MCNC: 236 MG/DL — HIGH (ref 70–99)
GLUCOSE BLDC GLUCOMTR-MCNC: 247 MG/DL — HIGH (ref 70–99)
GLUCOSE BLDC GLUCOMTR-MCNC: 251 MG/DL — HIGH (ref 70–99)
GLUCOSE BLDC GLUCOMTR-MCNC: 252 MG/DL — HIGH (ref 70–99)
GLUCOSE BLDC GLUCOMTR-MCNC: 264 MG/DL — HIGH (ref 70–99)
GLUCOSE BLDC GLUCOMTR-MCNC: 98 MG/DL — SIGNIFICANT CHANGE UP (ref 70–99)
GLUCOSE BLDC GLUCOMTR-MCNC: 99 MG/DL — SIGNIFICANT CHANGE UP (ref 70–99)
GLUCOSE BLDV-MCNC: 230 MG/DL — HIGH (ref 70–99)
GLUCOSE SERPL-MCNC: 100 MG/DL — HIGH (ref 70–99)
GLUCOSE SERPL-MCNC: 106 MG/DL — HIGH (ref 70–99)
GLUCOSE SERPL-MCNC: 120 MG/DL — HIGH (ref 70–99)
GLUCOSE SERPL-MCNC: 121 MG/DL — HIGH (ref 70–99)
GLUCOSE SERPL-MCNC: 124 MG/DL — HIGH (ref 70–99)
GLUCOSE SERPL-MCNC: 129 MG/DL — HIGH (ref 70–99)
GLUCOSE SERPL-MCNC: 130 MG/DL — HIGH (ref 70–99)
GLUCOSE SERPL-MCNC: 134 MG/DL — HIGH (ref 70–99)
GLUCOSE SERPL-MCNC: 134 MG/DL — HIGH (ref 70–99)
GLUCOSE SERPL-MCNC: 136 MG/DL — HIGH (ref 70–99)
GLUCOSE SERPL-MCNC: 136 MG/DL — HIGH (ref 70–99)
GLUCOSE SERPL-MCNC: 139 MG/DL — HIGH (ref 70–99)
GLUCOSE SERPL-MCNC: 141 MG/DL — HIGH (ref 70–99)
GLUCOSE SERPL-MCNC: 144 MG/DL — HIGH (ref 70–99)
GLUCOSE SERPL-MCNC: 146 MG/DL — HIGH (ref 70–99)
GLUCOSE SERPL-MCNC: 146 MG/DL — HIGH (ref 70–99)
GLUCOSE SERPL-MCNC: 161 MG/DL — HIGH (ref 70–99)
GLUCOSE SERPL-MCNC: 162 MG/DL — HIGH (ref 70–99)
GLUCOSE SERPL-MCNC: 164 MG/DL — HIGH (ref 70–99)
GLUCOSE SERPL-MCNC: 164 MG/DL — HIGH (ref 70–99)
GLUCOSE SERPL-MCNC: 165 MG/DL — HIGH (ref 70–99)
GLUCOSE SERPL-MCNC: 171 MG/DL — HIGH (ref 70–99)
GLUCOSE SERPL-MCNC: 177 MG/DL — HIGH (ref 70–99)
GLUCOSE SERPL-MCNC: 184 MG/DL — HIGH (ref 70–99)
GLUCOSE SERPL-MCNC: 187 MG/DL — HIGH (ref 70–99)
GLUCOSE SERPL-MCNC: 196 MG/DL — HIGH (ref 70–99)
GLUCOSE SERPL-MCNC: 199 MG/DL — HIGH (ref 70–99)
GLUCOSE SERPL-MCNC: 243 MG/DL — HIGH (ref 70–99)
GLUCOSE SERPL-MCNC: 245 MG/DL — HIGH (ref 70–99)
GLUCOSE SERPL-MCNC: 82 MG/DL — SIGNIFICANT CHANGE UP (ref 70–99)
GLUCOSE SERPL-MCNC: 88 MG/DL — SIGNIFICANT CHANGE UP (ref 70–99)
GLUCOSE SERPL-MCNC: 89 MG/DL — SIGNIFICANT CHANGE UP (ref 70–99)
GLUCOSE SERPL-MCNC: 97 MG/DL — SIGNIFICANT CHANGE UP (ref 70–99)
GLUCOSE UR QL: NEGATIVE — SIGNIFICANT CHANGE UP
HADV DNA SPEC QL NAA+PROBE: SIGNIFICANT CHANGE UP
HADV DNA SPEC QL NAA+PROBE: SIGNIFICANT CHANGE UP
HAPTOGLOB SERPL-MCNC: <20 MG/DL — LOW (ref 34–200)
HCO3 BLDV-SCNC: 18 MMOL/L — LOW (ref 22–29)
HCOV 229E RNA SPEC QL NAA+PROBE: SIGNIFICANT CHANGE UP
HCOV 229E RNA SPEC QL NAA+PROBE: SIGNIFICANT CHANGE UP
HCOV HKU1 RNA SPEC QL NAA+PROBE: SIGNIFICANT CHANGE UP
HCOV HKU1 RNA SPEC QL NAA+PROBE: SIGNIFICANT CHANGE UP
HCOV NL63 RNA SPEC QL NAA+PROBE: SIGNIFICANT CHANGE UP
HCOV NL63 RNA SPEC QL NAA+PROBE: SIGNIFICANT CHANGE UP
HCOV OC43 RNA SPEC QL NAA+PROBE: SIGNIFICANT CHANGE UP
HCOV OC43 RNA SPEC QL NAA+PROBE: SIGNIFICANT CHANGE UP
HCT VFR BLD CALC: 20.9 % — CRITICAL LOW (ref 39–50)
HCT VFR BLD CALC: 21.5 % — LOW (ref 39–50)
HCT VFR BLD CALC: 22.9 % — LOW (ref 39–50)
HCT VFR BLD CALC: 23.1 % — LOW (ref 39–50)
HCT VFR BLD CALC: 23.4 % — LOW (ref 39–50)
HCT VFR BLD CALC: 23.5 % — LOW (ref 39–50)
HCT VFR BLD CALC: 23.6 % — LOW (ref 39–50)
HCT VFR BLD CALC: 23.8 % — LOW (ref 39–50)
HCT VFR BLD CALC: 23.9 % — LOW (ref 39–50)
HCT VFR BLD CALC: 24 % — LOW (ref 39–50)
HCT VFR BLD CALC: 24.5 % — LOW (ref 39–50)
HCT VFR BLD CALC: 24.7 % — LOW (ref 39–50)
HCT VFR BLD CALC: 24.7 % — LOW (ref 39–50)
HCT VFR BLD CALC: 24.8 % — LOW (ref 39–50)
HCT VFR BLD CALC: 25 % — LOW (ref 39–50)
HCT VFR BLD CALC: 25.1 % — LOW (ref 39–50)
HCT VFR BLD CALC: 25.6 % — LOW (ref 39–50)
HCT VFR BLD CALC: 25.7 % — LOW (ref 39–50)
HCT VFR BLD CALC: 25.9 % — LOW (ref 39–50)
HCT VFR BLD CALC: 26.1 % — LOW (ref 39–50)
HCT VFR BLD CALC: 26.1 % — LOW (ref 39–50)
HCT VFR BLD CALC: 26.2 % — LOW (ref 39–50)
HCT VFR BLD CALC: 26.3 % — LOW (ref 39–50)
HCT VFR BLD CALC: 26.6 % — LOW (ref 39–50)
HCT VFR BLD CALC: 26.6 % — LOW (ref 39–50)
HCT VFR BLD CALC: 26.7 % — LOW (ref 39–50)
HCT VFR BLD CALC: 27 % — LOW (ref 39–50)
HCT VFR BLD CALC: 27.7 % — LOW (ref 39–50)
HCT VFR BLD CALC: 27.8 % — LOW (ref 39–50)
HCT VFR BLD CALC: 27.8 % — LOW (ref 39–50)
HCT VFR BLD CALC: 28.2 % — LOW (ref 39–50)
HCT VFR BLD CALC: 28.3 % — LOW (ref 39–50)
HCT VFR BLD CALC: 28.4 % — LOW (ref 39–50)
HCT VFR BLD CALC: 28.8 % — LOW (ref 39–50)
HCT VFR BLD CALC: 28.9 % — LOW (ref 39–50)
HCT VFR BLD CALC: 29.6 % — LOW (ref 39–50)
HCT VFR BLD CALC: 30.4 % — LOW (ref 39–50)
HCT VFR BLD CALC: 31 % — LOW (ref 39–50)
HCT VFR BLDA CALC: 28 % — LOW (ref 39–51)
HDLC SERPL-MCNC: 48 MG/DL — SIGNIFICANT CHANGE UP
HGB BLD CALC-MCNC: 9.4 G/DL — LOW (ref 13–17)
HGB BLD-MCNC: 6.6 G/DL — CRITICAL LOW (ref 13–17)
HGB BLD-MCNC: 6.8 G/DL — CRITICAL LOW (ref 13–17)
HGB BLD-MCNC: 7.3 G/DL — LOW (ref 13–17)
HGB BLD-MCNC: 7.3 G/DL — LOW (ref 13–17)
HGB BLD-MCNC: 7.4 G/DL — LOW (ref 13–17)
HGB BLD-MCNC: 7.5 G/DL — LOW (ref 13–17)
HGB BLD-MCNC: 7.5 G/DL — LOW (ref 13–17)
HGB BLD-MCNC: 7.6 G/DL — LOW (ref 13–17)
HGB BLD-MCNC: 7.6 G/DL — LOW (ref 13–17)
HGB BLD-MCNC: 7.7 G/DL — LOW (ref 13–17)
HGB BLD-MCNC: 7.8 G/DL — LOW (ref 13–17)
HGB BLD-MCNC: 7.8 G/DL — LOW (ref 13–17)
HGB BLD-MCNC: 7.9 G/DL — LOW (ref 13–17)
HGB BLD-MCNC: 8 G/DL — LOW (ref 13–17)
HGB BLD-MCNC: 8.1 G/DL — LOW (ref 13–17)
HGB BLD-MCNC: 8.1 G/DL — LOW (ref 13–17)
HGB BLD-MCNC: 8.2 G/DL — LOW (ref 13–17)
HGB BLD-MCNC: 8.3 G/DL — LOW (ref 13–17)
HGB BLD-MCNC: 8.3 G/DL — LOW (ref 13–17)
HGB BLD-MCNC: 8.4 G/DL — LOW (ref 13–17)
HGB BLD-MCNC: 8.4 G/DL — LOW (ref 13–17)
HGB BLD-MCNC: 8.5 G/DL — LOW (ref 13–17)
HGB BLD-MCNC: 8.7 G/DL — LOW (ref 13–17)
HGB BLD-MCNC: 8.7 G/DL — LOW (ref 13–17)
HGB BLD-MCNC: 8.9 G/DL — LOW (ref 13–17)
HGB BLD-MCNC: 9 G/DL — LOW (ref 13–17)
HGB BLD-MCNC: 9.1 G/DL — LOW (ref 13–17)
HGB BLD-MCNC: 9.4 G/DL — LOW (ref 13–17)
HGB BLD-MCNC: 9.6 G/DL — LOW (ref 13–17)
HGB BLD-MCNC: 9.8 G/DL — LOW (ref 13–17)
HMPV RNA SPEC QL NAA+PROBE: SIGNIFICANT CHANGE UP
HMPV RNA SPEC QL NAA+PROBE: SIGNIFICANT CHANGE UP
HPIV1 RNA SPEC QL NAA+PROBE: SIGNIFICANT CHANGE UP
HPIV1 RNA SPEC QL NAA+PROBE: SIGNIFICANT CHANGE UP
HPIV2 RNA SPEC QL NAA+PROBE: SIGNIFICANT CHANGE UP
HPIV2 RNA SPEC QL NAA+PROBE: SIGNIFICANT CHANGE UP
HPIV3 RNA SPEC QL NAA+PROBE: SIGNIFICANT CHANGE UP
HPIV3 RNA SPEC QL NAA+PROBE: SIGNIFICANT CHANGE UP
HPIV4 RNA SPEC QL NAA+PROBE: SIGNIFICANT CHANGE UP
HPIV4 RNA SPEC QL NAA+PROBE: SIGNIFICANT CHANGE UP
HYALINE CASTS # UR AUTO: 1 /LPF — SIGNIFICANT CHANGE UP (ref 0–7)
HYALINE CASTS # UR AUTO: 1 /LPF — SIGNIFICANT CHANGE UP (ref 0–7)
IANC: 3.68 K/UL — SIGNIFICANT CHANGE UP (ref 1.5–8.5)
IANC: 3.76 K/UL — SIGNIFICANT CHANGE UP (ref 1.5–8.5)
IANC: 4.09 K/UL — SIGNIFICANT CHANGE UP (ref 1.5–8.5)
IANC: 4.11 K/UL — SIGNIFICANT CHANGE UP (ref 1.5–8.5)
IANC: 5.3 K/UL — SIGNIFICANT CHANGE UP (ref 1.5–8.5)
IANC: 6.49 K/UL — SIGNIFICANT CHANGE UP (ref 1.5–8.5)
IMM GRANULOCYTES NFR BLD AUTO: 0.5 % — SIGNIFICANT CHANGE UP (ref 0–1.5)
IMM GRANULOCYTES NFR BLD AUTO: 0.8 % — SIGNIFICANT CHANGE UP (ref 0–1.5)
IMM GRANULOCYTES NFR BLD AUTO: 0.9 % — SIGNIFICANT CHANGE UP (ref 0–1.5)
IMM GRANULOCYTES NFR BLD AUTO: 1 % — SIGNIFICANT CHANGE UP (ref 0–1.5)
IMM GRANULOCYTES NFR BLD AUTO: 1.1 % — SIGNIFICANT CHANGE UP (ref 0–1.5)
IMM GRANULOCYTES NFR BLD AUTO: 1.1 % — SIGNIFICANT CHANGE UP (ref 0–1.5)
IMM GRANULOCYTES NFR BLD AUTO: 1.3 % — SIGNIFICANT CHANGE UP (ref 0–1.5)
INR BLD: 1.35 RATIO — HIGH (ref 0.88–1.16)
INR BLD: 2.2 RATIO — HIGH (ref 0.88–1.16)
INR BLD: 3.19 RATIO — HIGH (ref 0.88–1.16)
INTERPRETATION 24H UR IFE-IMP: SIGNIFICANT CHANGE UP
INTERPRETATION 24H UR IFE-IMP: SIGNIFICANT CHANGE UP
INTERPRETATION SERPL IFE-IMP: SIGNIFICANT CHANGE UP
IRON SATN MFR SERPL: 22 % — SIGNIFICANT CHANGE UP (ref 14–50)
IRON SATN MFR SERPL: 42 UG/DL — LOW (ref 45–165)
KAPPA LC SER QL IFE: 5 MG/DL — HIGH (ref 0.33–1.94)
KAPPA/LAMBDA FREE LIGHT CHAIN RATIO, SERUM: 2.08 RATIO — HIGH (ref 0.26–1.65)
KETONES UR-MCNC: NEGATIVE — SIGNIFICANT CHANGE UP
LACTATE BLDV-MCNC: 1.9 MMOL/L — SIGNIFICANT CHANGE UP (ref 0.5–2)
LAMBDA LC SER QL IFE: 2.4 MG/DL — SIGNIFICANT CHANGE UP (ref 0.57–2.63)
LDH SERPL L TO P-CCNC: 183 U/L — SIGNIFICANT CHANGE UP (ref 135–225)
LEUKOCYTE ESTERASE UR-ACNC: ABNORMAL
LEUKOCYTE ESTERASE UR-ACNC: NEGATIVE — SIGNIFICANT CHANGE UP
LEUKOCYTE ESTERASE UR-ACNC: NEGATIVE — SIGNIFICANT CHANGE UP
LIPID PNL WITH DIRECT LDL SERPL: 45 MG/DL — SIGNIFICANT CHANGE UP
LYMPHOCYTES # BLD AUTO: 0.45 K/UL — LOW (ref 1–3.3)
LYMPHOCYTES # BLD AUTO: 0.73 K/UL — LOW (ref 1–3.3)
LYMPHOCYTES # BLD AUTO: 0.76 K/UL — LOW (ref 1–3.3)
LYMPHOCYTES # BLD AUTO: 0.77 K/UL — LOW (ref 1–3.3)
LYMPHOCYTES # BLD AUTO: 0.78 K/UL — LOW (ref 1–3.3)
LYMPHOCYTES # BLD AUTO: 0.92 K/UL — LOW (ref 1–3.3)
LYMPHOCYTES # BLD AUTO: 1.13 K/UL — SIGNIFICANT CHANGE UP (ref 1–3.3)
LYMPHOCYTES # BLD AUTO: 12.4 % — LOW (ref 13–44)
LYMPHOCYTES # BLD AUTO: 12.7 % — LOW (ref 13–44)
LYMPHOCYTES # BLD AUTO: 12.8 % — LOW (ref 13–44)
LYMPHOCYTES # BLD AUTO: 13.2 % — SIGNIFICANT CHANGE UP (ref 13–44)
LYMPHOCYTES # BLD AUTO: 13.7 % — SIGNIFICANT CHANGE UP (ref 13–44)
LYMPHOCYTES # BLD AUTO: 14.1 % — SIGNIFICANT CHANGE UP (ref 13–44)
LYMPHOCYTES # BLD AUTO: 4.1 % — LOW (ref 13–44)
M PNEUMO DNA SPEC QL NAA+PROBE: SIGNIFICANT CHANGE UP
M PNEUMO DNA SPEC QL NAA+PROBE: SIGNIFICANT CHANGE UP
M PROTEIN 24H UR ELPH-MRATE: 0 MG/24HR — SIGNIFICANT CHANGE UP (ref 0–0)
M PROTEIN 24H UR ELPH-MRATE: 0 MG/DL — SIGNIFICANT CHANGE UP
MAGNESIUM SERPL-MCNC: 2.1 MG/DL — SIGNIFICANT CHANGE UP (ref 1.6–2.6)
MAGNESIUM SERPL-MCNC: 2.1 MG/DL — SIGNIFICANT CHANGE UP (ref 1.6–2.6)
MAGNESIUM SERPL-MCNC: 2.2 MG/DL — SIGNIFICANT CHANGE UP (ref 1.6–2.6)
MAGNESIUM SERPL-MCNC: 2.2 MG/DL — SIGNIFICANT CHANGE UP (ref 1.6–2.6)
MAGNESIUM SERPL-MCNC: 2.3 MG/DL — SIGNIFICANT CHANGE UP (ref 1.6–2.6)
MAGNESIUM SERPL-MCNC: 2.4 MG/DL — SIGNIFICANT CHANGE UP (ref 1.6–2.6)
MAGNESIUM SERPL-MCNC: 2.5 MG/DL — SIGNIFICANT CHANGE UP (ref 1.6–2.6)
MAGNESIUM SERPL-MCNC: 2.6 MG/DL — SIGNIFICANT CHANGE UP (ref 1.6–2.6)
MAGNESIUM SERPL-MCNC: 2.6 MG/DL — SIGNIFICANT CHANGE UP (ref 1.6–2.6)
MAGNESIUM SERPL-MCNC: 2.7 MG/DL — HIGH (ref 1.6–2.6)
MAGNESIUM SERPL-MCNC: 2.7 MG/DL — HIGH (ref 1.6–2.6)
MCHC RBC-ENTMCNC: 25 PG — LOW (ref 27–34)
MCHC RBC-ENTMCNC: 25.1 PG — LOW (ref 27–34)
MCHC RBC-ENTMCNC: 25.3 PG — LOW (ref 27–34)
MCHC RBC-ENTMCNC: 25.7 PG — LOW (ref 27–34)
MCHC RBC-ENTMCNC: 25.8 PG — LOW (ref 27–34)
MCHC RBC-ENTMCNC: 26 PG — LOW (ref 27–34)
MCHC RBC-ENTMCNC: 26.1 PG — LOW (ref 27–34)
MCHC RBC-ENTMCNC: 26.2 PG — LOW (ref 27–34)
MCHC RBC-ENTMCNC: 26.6 PG — LOW (ref 27–34)
MCHC RBC-ENTMCNC: 27.7 PG — SIGNIFICANT CHANGE UP (ref 27–34)
MCHC RBC-ENTMCNC: 27.8 PG — SIGNIFICANT CHANGE UP (ref 27–34)
MCHC RBC-ENTMCNC: 28 PG — SIGNIFICANT CHANGE UP (ref 27–34)
MCHC RBC-ENTMCNC: 28 PG — SIGNIFICANT CHANGE UP (ref 27–34)
MCHC RBC-ENTMCNC: 28.1 PG — SIGNIFICANT CHANGE UP (ref 27–34)
MCHC RBC-ENTMCNC: 28.2 PG — SIGNIFICANT CHANGE UP (ref 27–34)
MCHC RBC-ENTMCNC: 28.2 PG — SIGNIFICANT CHANGE UP (ref 27–34)
MCHC RBC-ENTMCNC: 28.3 PG — SIGNIFICANT CHANGE UP (ref 27–34)
MCHC RBC-ENTMCNC: 28.4 PG — SIGNIFICANT CHANGE UP (ref 27–34)
MCHC RBC-ENTMCNC: 28.5 PG — SIGNIFICANT CHANGE UP (ref 27–34)
MCHC RBC-ENTMCNC: 28.5 PG — SIGNIFICANT CHANGE UP (ref 27–34)
MCHC RBC-ENTMCNC: 28.6 PG — SIGNIFICANT CHANGE UP (ref 27–34)
MCHC RBC-ENTMCNC: 28.6 PG — SIGNIFICANT CHANGE UP (ref 27–34)
MCHC RBC-ENTMCNC: 28.7 PG — SIGNIFICANT CHANGE UP (ref 27–34)
MCHC RBC-ENTMCNC: 28.8 PG — SIGNIFICANT CHANGE UP (ref 27–34)
MCHC RBC-ENTMCNC: 28.9 PG — SIGNIFICANT CHANGE UP (ref 27–34)
MCHC RBC-ENTMCNC: 29 PG — SIGNIFICANT CHANGE UP (ref 27–34)
MCHC RBC-ENTMCNC: 29.1 PG — SIGNIFICANT CHANGE UP (ref 27–34)
MCHC RBC-ENTMCNC: 29.2 PG — SIGNIFICANT CHANGE UP (ref 27–34)
MCHC RBC-ENTMCNC: 29.3 PG — SIGNIFICANT CHANGE UP (ref 27–34)
MCHC RBC-ENTMCNC: 30.3 GM/DL — LOW (ref 32–36)
MCHC RBC-ENTMCNC: 30.3 GM/DL — LOW (ref 32–36)
MCHC RBC-ENTMCNC: 30.4 GM/DL — LOW (ref 32–36)
MCHC RBC-ENTMCNC: 30.8 GM/DL — LOW (ref 32–36)
MCHC RBC-ENTMCNC: 30.9 GM/DL — LOW (ref 32–36)
MCHC RBC-ENTMCNC: 31.2 GM/DL — LOW (ref 32–36)
MCHC RBC-ENTMCNC: 31.2 GM/DL — LOW (ref 32–36)
MCHC RBC-ENTMCNC: 31.3 GM/DL — LOW (ref 32–36)
MCHC RBC-ENTMCNC: 31.4 GM/DL — LOW (ref 32–36)
MCHC RBC-ENTMCNC: 31.5 GM/DL — LOW (ref 32–36)
MCHC RBC-ENTMCNC: 31.5 GM/DL — LOW (ref 32–36)
MCHC RBC-ENTMCNC: 31.6 GM/DL — LOW (ref 32–36)
MCHC RBC-ENTMCNC: 31.8 GM/DL — LOW (ref 32–36)
MCHC RBC-ENTMCNC: 31.9 GM/DL — LOW (ref 32–36)
MCHC RBC-ENTMCNC: 31.9 GM/DL — LOW (ref 32–36)
MCHC RBC-ENTMCNC: 32 GM/DL — SIGNIFICANT CHANGE UP (ref 32–36)
MCHC RBC-ENTMCNC: 32.2 GM/DL — SIGNIFICANT CHANGE UP (ref 32–36)
MCHC RBC-ENTMCNC: 32.2 GM/DL — SIGNIFICANT CHANGE UP (ref 32–36)
MCHC RBC-ENTMCNC: 32.3 GM/DL — SIGNIFICANT CHANGE UP (ref 32–36)
MCHC RBC-ENTMCNC: 32.3 GM/DL — SIGNIFICANT CHANGE UP (ref 32–36)
MCHC RBC-ENTMCNC: 32.4 GM/DL — SIGNIFICANT CHANGE UP (ref 32–36)
MCHC RBC-ENTMCNC: 32.5 GM/DL — SIGNIFICANT CHANGE UP (ref 32–36)
MCHC RBC-ENTMCNC: 32.6 GM/DL — SIGNIFICANT CHANGE UP (ref 32–36)
MCHC RBC-ENTMCNC: 32.7 GM/DL — SIGNIFICANT CHANGE UP (ref 32–36)
MCHC RBC-ENTMCNC: 32.9 GM/DL — SIGNIFICANT CHANGE UP (ref 32–36)
MCHC RBC-ENTMCNC: 32.9 GM/DL — SIGNIFICANT CHANGE UP (ref 32–36)
MCHC RBC-ENTMCNC: 33.1 GM/DL — SIGNIFICANT CHANGE UP (ref 32–36)
MCHC RBC-ENTMCNC: 33.2 GM/DL — SIGNIFICANT CHANGE UP (ref 32–36)
MCV RBC AUTO: 80.1 FL — SIGNIFICANT CHANGE UP (ref 80–100)
MCV RBC AUTO: 80.5 FL — SIGNIFICANT CHANGE UP (ref 80–100)
MCV RBC AUTO: 80.9 FL — SIGNIFICANT CHANGE UP (ref 80–100)
MCV RBC AUTO: 81 FL — SIGNIFICANT CHANGE UP (ref 80–100)
MCV RBC AUTO: 81.3 FL — SIGNIFICANT CHANGE UP (ref 80–100)
MCV RBC AUTO: 82.1 FL — SIGNIFICANT CHANGE UP (ref 80–100)
MCV RBC AUTO: 82.3 FL — SIGNIFICANT CHANGE UP (ref 80–100)
MCV RBC AUTO: 82.4 FL — SIGNIFICANT CHANGE UP (ref 80–100)
MCV RBC AUTO: 82.9 FL — SIGNIFICANT CHANGE UP (ref 80–100)
MCV RBC AUTO: 84.7 FL — SIGNIFICANT CHANGE UP (ref 80–100)
MCV RBC AUTO: 84.8 FL — SIGNIFICANT CHANGE UP (ref 80–100)
MCV RBC AUTO: 85.8 FL — SIGNIFICANT CHANGE UP (ref 80–100)
MCV RBC AUTO: 86.7 FL — SIGNIFICANT CHANGE UP (ref 80–100)
MCV RBC AUTO: 87.3 FL — SIGNIFICANT CHANGE UP (ref 80–100)
MCV RBC AUTO: 87.8 FL — SIGNIFICANT CHANGE UP (ref 80–100)
MCV RBC AUTO: 87.8 FL — SIGNIFICANT CHANGE UP (ref 80–100)
MCV RBC AUTO: 88.1 FL — SIGNIFICANT CHANGE UP (ref 80–100)
MCV RBC AUTO: 88.1 FL — SIGNIFICANT CHANGE UP (ref 80–100)
MCV RBC AUTO: 89 FL — SIGNIFICANT CHANGE UP (ref 80–100)
MCV RBC AUTO: 89.1 FL — SIGNIFICANT CHANGE UP (ref 80–100)
MCV RBC AUTO: 89.3 FL — SIGNIFICANT CHANGE UP (ref 80–100)
MCV RBC AUTO: 89.4 FL — SIGNIFICANT CHANGE UP (ref 80–100)
MCV RBC AUTO: 89.4 FL — SIGNIFICANT CHANGE UP (ref 80–100)
MCV RBC AUTO: 89.5 FL — SIGNIFICANT CHANGE UP (ref 80–100)
MCV RBC AUTO: 89.5 FL — SIGNIFICANT CHANGE UP (ref 80–100)
MCV RBC AUTO: 89.7 FL — SIGNIFICANT CHANGE UP (ref 80–100)
MCV RBC AUTO: 90 FL — SIGNIFICANT CHANGE UP (ref 80–100)
MCV RBC AUTO: 90 FL — SIGNIFICANT CHANGE UP (ref 80–100)
MCV RBC AUTO: 90.2 FL — SIGNIFICANT CHANGE UP (ref 80–100)
MCV RBC AUTO: 90.5 FL — SIGNIFICANT CHANGE UP (ref 80–100)
MCV RBC AUTO: 90.5 FL — SIGNIFICANT CHANGE UP (ref 80–100)
MCV RBC AUTO: 90.9 FL — SIGNIFICANT CHANGE UP (ref 80–100)
MCV RBC AUTO: 91 FL — SIGNIFICANT CHANGE UP (ref 80–100)
MCV RBC AUTO: 91 FL — SIGNIFICANT CHANGE UP (ref 80–100)
MCV RBC AUTO: 91.1 FL — SIGNIFICANT CHANGE UP (ref 80–100)
MCV RBC AUTO: 92.2 FL — SIGNIFICANT CHANGE UP (ref 80–100)
MCV RBC AUTO: 92.5 FL — SIGNIFICANT CHANGE UP (ref 80–100)
MCV RBC AUTO: 93 FL — SIGNIFICANT CHANGE UP (ref 80–100)
MCV RBC AUTO: 93.2 FL — SIGNIFICANT CHANGE UP (ref 80–100)
MCV RBC AUTO: 94.4 FL — SIGNIFICANT CHANGE UP (ref 80–100)
MONOCYTES # BLD AUTO: 0.68 K/UL — SIGNIFICANT CHANGE UP (ref 0–0.9)
MONOCYTES # BLD AUTO: 0.7 K/UL — SIGNIFICANT CHANGE UP (ref 0–0.9)
MONOCYTES # BLD AUTO: 0.76 K/UL — SIGNIFICANT CHANGE UP (ref 0–0.9)
MONOCYTES # BLD AUTO: 0.8 K/UL — SIGNIFICANT CHANGE UP (ref 0–0.9)
MONOCYTES # BLD AUTO: 0.81 K/UL — SIGNIFICANT CHANGE UP (ref 0–0.9)
MONOCYTES # BLD AUTO: 0.89 K/UL — SIGNIFICANT CHANGE UP (ref 0–0.9)
MONOCYTES # BLD AUTO: 1.14 K/UL — HIGH (ref 0–0.9)
MONOCYTES NFR BLD AUTO: 10.3 % — SIGNIFICANT CHANGE UP (ref 2–14)
MONOCYTES NFR BLD AUTO: 10.6 % — SIGNIFICANT CHANGE UP (ref 2–14)
MONOCYTES NFR BLD AUTO: 11.9 % — SIGNIFICANT CHANGE UP (ref 2–14)
MONOCYTES NFR BLD AUTO: 14.4 % — HIGH (ref 2–14)
MONOCYTES NFR BLD AUTO: 14.5 % — HIGH (ref 2–14)
MONOCYTES NFR BLD AUTO: 14.5 % — HIGH (ref 2–14)
MONOCYTES NFR BLD AUTO: 8.2 % — SIGNIFICANT CHANGE UP (ref 2–14)
NEUTROPHILS # BLD AUTO: 3.68 K/UL — SIGNIFICANT CHANGE UP (ref 1.8–7.4)
NEUTROPHILS # BLD AUTO: 3.76 K/UL — SIGNIFICANT CHANGE UP (ref 1.8–7.4)
NEUTROPHILS # BLD AUTO: 4.09 K/UL — SIGNIFICANT CHANGE UP (ref 1.8–7.4)
NEUTROPHILS # BLD AUTO: 4.11 K/UL — SIGNIFICANT CHANGE UP (ref 1.8–7.4)
NEUTROPHILS # BLD AUTO: 5.3 K/UL — SIGNIFICANT CHANGE UP (ref 1.8–7.4)
NEUTROPHILS # BLD AUTO: 6.49 K/UL — SIGNIFICANT CHANGE UP (ref 1.8–7.4)
NEUTROPHILS # BLD AUTO: 9.29 K/UL — HIGH (ref 1.8–7.4)
NEUTROPHILS NFR BLD AUTO: 66.4 % — SIGNIFICANT CHANGE UP (ref 43–77)
NEUTROPHILS NFR BLD AUTO: 66.5 % — SIGNIFICANT CHANGE UP (ref 43–77)
NEUTROPHILS NFR BLD AUTO: 66.6 % — SIGNIFICANT CHANGE UP (ref 43–77)
NEUTROPHILS NFR BLD AUTO: 71.8 % — SIGNIFICANT CHANGE UP (ref 43–77)
NEUTROPHILS NFR BLD AUTO: 73.9 % — SIGNIFICANT CHANGE UP (ref 43–77)
NEUTROPHILS NFR BLD AUTO: 75.9 % — SIGNIFICANT CHANGE UP (ref 43–77)
NEUTROPHILS NFR BLD AUTO: 84.1 % — HIGH (ref 43–77)
NITRITE UR-MCNC: NEGATIVE — SIGNIFICANT CHANGE UP
NON HDL CHOLESTEROL: 58 MG/DL — SIGNIFICANT CHANGE UP
NRBC # BLD: 0 /100 WBCS — SIGNIFICANT CHANGE UP
NRBC # BLD: 0 /100 WBCS — SIGNIFICANT CHANGE UP (ref 0–0)
NRBC # FLD: 0 K/UL — SIGNIFICANT CHANGE UP
NRBC # FLD: 0.02 K/UL — HIGH
NRBC # FLD: 0.03 K/UL — HIGH
NT-PROBNP SERPL-SCNC: 6899 PG/ML — HIGH
NT-PROBNP SERPL-SCNC: HIGH PG/ML (ref 0–300)
OB PNL STL: NEGATIVE — SIGNIFICANT CHANGE UP
OB PNL STL: POSITIVE
OSMOLALITY UR: 348 MOS/KG — SIGNIFICANT CHANGE UP (ref 300–900)
PCO2 BLDV: 29 MMHG — LOW (ref 42–55)
PH BLDV: 7.4 — SIGNIFICANT CHANGE UP (ref 7.32–7.43)
PH UR: 6 — SIGNIFICANT CHANGE UP (ref 5–8)
PH UR: 6 — SIGNIFICANT CHANGE UP (ref 5–8)
PH UR: 6.5 — SIGNIFICANT CHANGE UP (ref 5–8)
PHOSPHATE SERPL-MCNC: 2.4 MG/DL — LOW (ref 2.5–4.5)
PHOSPHATE SERPL-MCNC: 2.7 MG/DL — SIGNIFICANT CHANGE UP (ref 2.5–4.5)
PHOSPHATE SERPL-MCNC: 2.8 MG/DL — SIGNIFICANT CHANGE UP (ref 2.5–4.5)
PHOSPHATE SERPL-MCNC: 3 MG/DL — SIGNIFICANT CHANGE UP (ref 2.5–4.5)
PHOSPHATE SERPL-MCNC: 3.1 MG/DL — SIGNIFICANT CHANGE UP (ref 2.5–4.5)
PHOSPHATE SERPL-MCNC: 3.1 MG/DL — SIGNIFICANT CHANGE UP (ref 2.5–4.5)
PHOSPHATE SERPL-MCNC: 3.2 MG/DL — SIGNIFICANT CHANGE UP (ref 2.5–4.5)
PHOSPHATE SERPL-MCNC: 3.2 MG/DL — SIGNIFICANT CHANGE UP (ref 2.5–4.5)
PHOSPHATE SERPL-MCNC: 3.3 MG/DL — SIGNIFICANT CHANGE UP (ref 2.5–4.5)
PHOSPHATE SERPL-MCNC: 3.3 MG/DL — SIGNIFICANT CHANGE UP (ref 2.5–4.5)
PHOSPHATE SERPL-MCNC: 3.4 MG/DL — SIGNIFICANT CHANGE UP (ref 2.5–4.5)
PHOSPHATE SERPL-MCNC: 4.3 MG/DL — SIGNIFICANT CHANGE UP (ref 2.5–4.5)
PHOSPHATE SERPL-MCNC: 4.4 MG/DL — SIGNIFICANT CHANGE UP (ref 2.5–4.5)
PHOSPHATE SERPL-MCNC: 4.5 MG/DL — SIGNIFICANT CHANGE UP (ref 2.5–4.5)
PHOSPHATE SERPL-MCNC: 4.8 MG/DL — HIGH (ref 2.5–4.5)
PLATELET # BLD AUTO: 120 K/UL — LOW (ref 150–400)
PLATELET # BLD AUTO: 121 K/UL — LOW (ref 150–400)
PLATELET # BLD AUTO: 121 K/UL — LOW (ref 150–400)
PLATELET # BLD AUTO: 123 K/UL — LOW (ref 150–400)
PLATELET # BLD AUTO: 124 K/UL — LOW (ref 150–400)
PLATELET # BLD AUTO: 125 K/UL — LOW (ref 150–400)
PLATELET # BLD AUTO: 126 K/UL — LOW (ref 150–400)
PLATELET # BLD AUTO: 126 K/UL — LOW (ref 150–400)
PLATELET # BLD AUTO: 127 K/UL — LOW (ref 150–400)
PLATELET # BLD AUTO: 129 K/UL — LOW (ref 150–400)
PLATELET # BLD AUTO: 132 K/UL — LOW (ref 150–400)
PLATELET # BLD AUTO: 135 K/UL — LOW (ref 150–400)
PLATELET # BLD AUTO: 140 K/UL — LOW (ref 150–400)
PLATELET # BLD AUTO: 141 K/UL — LOW (ref 150–400)
PLATELET # BLD AUTO: 141 K/UL — LOW (ref 150–400)
PLATELET # BLD AUTO: 142 K/UL — LOW (ref 150–400)
PLATELET # BLD AUTO: 145 K/UL — LOW (ref 150–400)
PLATELET # BLD AUTO: 147 K/UL — LOW (ref 150–400)
PLATELET # BLD AUTO: 167 K/UL — SIGNIFICANT CHANGE UP (ref 150–400)
PLATELET # BLD AUTO: 170 K/UL — SIGNIFICANT CHANGE UP (ref 150–400)
PLATELET # BLD AUTO: 184 K/UL — SIGNIFICANT CHANGE UP (ref 150–400)
PLATELET # BLD AUTO: 185 K/UL — SIGNIFICANT CHANGE UP (ref 150–400)
PLATELET # BLD AUTO: 192 K/UL — SIGNIFICANT CHANGE UP (ref 150–400)
PLATELET # BLD AUTO: 194 K/UL — SIGNIFICANT CHANGE UP (ref 150–400)
PLATELET # BLD AUTO: 194 K/UL — SIGNIFICANT CHANGE UP (ref 150–400)
PLATELET # BLD AUTO: 196 K/UL — SIGNIFICANT CHANGE UP (ref 150–400)
PLATELET # BLD AUTO: 205 K/UL — SIGNIFICANT CHANGE UP (ref 150–400)
PLATELET # BLD AUTO: 211 K/UL — SIGNIFICANT CHANGE UP (ref 150–400)
PLATELET # BLD AUTO: 212 K/UL — SIGNIFICANT CHANGE UP (ref 150–400)
PLATELET # BLD AUTO: 213 K/UL — SIGNIFICANT CHANGE UP (ref 150–400)
PLATELET # BLD AUTO: 219 K/UL — SIGNIFICANT CHANGE UP (ref 150–400)
PLATELET # BLD AUTO: 249 K/UL — SIGNIFICANT CHANGE UP (ref 150–400)
PLATELET # BLD AUTO: 267 K/UL — SIGNIFICANT CHANGE UP (ref 150–400)
PLATELET # BLD AUTO: 269 K/UL — SIGNIFICANT CHANGE UP (ref 150–400)
PLATELET # BLD AUTO: 271 K/UL — SIGNIFICANT CHANGE UP (ref 150–400)
PLATELET # BLD AUTO: 283 K/UL — SIGNIFICANT CHANGE UP (ref 150–400)
PLATELET # BLD AUTO: 303 K/UL — SIGNIFICANT CHANGE UP (ref 150–400)
PLATELET # BLD AUTO: 307 K/UL — SIGNIFICANT CHANGE UP (ref 150–400)
PLATELET # BLD AUTO: 314 K/UL — SIGNIFICANT CHANGE UP (ref 150–400)
PLATELET # BLD AUTO: 314 K/UL — SIGNIFICANT CHANGE UP (ref 150–400)
PO2 BLDV: 44 MMHG — SIGNIFICANT CHANGE UP
POTASSIUM BLDV-SCNC: 4.9 MMOL/L — SIGNIFICANT CHANGE UP (ref 3.5–5.1)
POTASSIUM SERPL-MCNC: 3.1 MMOL/L — LOW (ref 3.5–5.3)
POTASSIUM SERPL-MCNC: 3.1 MMOL/L — LOW (ref 3.5–5.3)
POTASSIUM SERPL-MCNC: 3.3 MMOL/L — LOW (ref 3.5–5.3)
POTASSIUM SERPL-MCNC: 3.4 MMOL/L — LOW (ref 3.5–5.3)
POTASSIUM SERPL-MCNC: 3.5 MMOL/L — SIGNIFICANT CHANGE UP (ref 3.5–5.3)
POTASSIUM SERPL-MCNC: 3.5 MMOL/L — SIGNIFICANT CHANGE UP (ref 3.5–5.3)
POTASSIUM SERPL-MCNC: 3.6 MMOL/L — SIGNIFICANT CHANGE UP (ref 3.5–5.3)
POTASSIUM SERPL-MCNC: 3.7 MMOL/L — SIGNIFICANT CHANGE UP (ref 3.5–5.3)
POTASSIUM SERPL-MCNC: 3.7 MMOL/L — SIGNIFICANT CHANGE UP (ref 3.5–5.3)
POTASSIUM SERPL-MCNC: 3.8 MMOL/L — SIGNIFICANT CHANGE UP (ref 3.5–5.3)
POTASSIUM SERPL-MCNC: 3.8 MMOL/L — SIGNIFICANT CHANGE UP (ref 3.5–5.3)
POTASSIUM SERPL-MCNC: 4 MMOL/L — SIGNIFICANT CHANGE UP (ref 3.5–5.3)
POTASSIUM SERPL-MCNC: 4 MMOL/L — SIGNIFICANT CHANGE UP (ref 3.5–5.3)
POTASSIUM SERPL-MCNC: 4.2 MMOL/L — SIGNIFICANT CHANGE UP (ref 3.5–5.3)
POTASSIUM SERPL-MCNC: 4.3 MMOL/L — SIGNIFICANT CHANGE UP (ref 3.5–5.3)
POTASSIUM SERPL-MCNC: 4.3 MMOL/L — SIGNIFICANT CHANGE UP (ref 3.5–5.3)
POTASSIUM SERPL-MCNC: 4.4 MMOL/L — SIGNIFICANT CHANGE UP (ref 3.5–5.3)
POTASSIUM SERPL-MCNC: 4.6 MMOL/L — SIGNIFICANT CHANGE UP (ref 3.5–5.3)
POTASSIUM SERPL-MCNC: 4.7 MMOL/L — SIGNIFICANT CHANGE UP (ref 3.5–5.3)
POTASSIUM SERPL-MCNC: 5 MMOL/L — SIGNIFICANT CHANGE UP (ref 3.5–5.3)
POTASSIUM SERPL-MCNC: 5.1 MMOL/L — SIGNIFICANT CHANGE UP (ref 3.5–5.3)
POTASSIUM SERPL-MCNC: 5.2 MMOL/L — SIGNIFICANT CHANGE UP (ref 3.5–5.3)
POTASSIUM SERPL-MCNC: 5.3 MMOL/L — SIGNIFICANT CHANGE UP (ref 3.5–5.3)
POTASSIUM SERPL-SCNC: 3.1 MMOL/L — LOW (ref 3.5–5.3)
POTASSIUM SERPL-SCNC: 3.1 MMOL/L — LOW (ref 3.5–5.3)
POTASSIUM SERPL-SCNC: 3.3 MMOL/L — LOW (ref 3.5–5.3)
POTASSIUM SERPL-SCNC: 3.4 MMOL/L — LOW (ref 3.5–5.3)
POTASSIUM SERPL-SCNC: 3.5 MMOL/L — SIGNIFICANT CHANGE UP (ref 3.5–5.3)
POTASSIUM SERPL-SCNC: 3.5 MMOL/L — SIGNIFICANT CHANGE UP (ref 3.5–5.3)
POTASSIUM SERPL-SCNC: 3.6 MMOL/L — SIGNIFICANT CHANGE UP (ref 3.5–5.3)
POTASSIUM SERPL-SCNC: 3.7 MMOL/L — SIGNIFICANT CHANGE UP (ref 3.5–5.3)
POTASSIUM SERPL-SCNC: 3.7 MMOL/L — SIGNIFICANT CHANGE UP (ref 3.5–5.3)
POTASSIUM SERPL-SCNC: 3.8 MMOL/L — SIGNIFICANT CHANGE UP (ref 3.5–5.3)
POTASSIUM SERPL-SCNC: 3.8 MMOL/L — SIGNIFICANT CHANGE UP (ref 3.5–5.3)
POTASSIUM SERPL-SCNC: 4 MMOL/L — SIGNIFICANT CHANGE UP (ref 3.5–5.3)
POTASSIUM SERPL-SCNC: 4 MMOL/L — SIGNIFICANT CHANGE UP (ref 3.5–5.3)
POTASSIUM SERPL-SCNC: 4.2 MMOL/L — SIGNIFICANT CHANGE UP (ref 3.5–5.3)
POTASSIUM SERPL-SCNC: 4.3 MMOL/L — SIGNIFICANT CHANGE UP (ref 3.5–5.3)
POTASSIUM SERPL-SCNC: 4.3 MMOL/L — SIGNIFICANT CHANGE UP (ref 3.5–5.3)
POTASSIUM SERPL-SCNC: 4.4 MMOL/L — SIGNIFICANT CHANGE UP (ref 3.5–5.3)
POTASSIUM SERPL-SCNC: 4.6 MMOL/L — SIGNIFICANT CHANGE UP (ref 3.5–5.3)
POTASSIUM SERPL-SCNC: 4.7 MMOL/L — SIGNIFICANT CHANGE UP (ref 3.5–5.3)
POTASSIUM SERPL-SCNC: 5 MMOL/L — SIGNIFICANT CHANGE UP (ref 3.5–5.3)
POTASSIUM SERPL-SCNC: 5.1 MMOL/L — SIGNIFICANT CHANGE UP (ref 3.5–5.3)
POTASSIUM SERPL-SCNC: 5.2 MMOL/L — SIGNIFICANT CHANGE UP (ref 3.5–5.3)
POTASSIUM SERPL-SCNC: 5.3 MMOL/L — SIGNIFICANT CHANGE UP (ref 3.5–5.3)
POTASSIUM UR-SCNC: 49 MMOL/L — SIGNIFICANT CHANGE UP
PROT ?TM UR-MCNC: 8 MG/DL — SIGNIFICANT CHANGE UP (ref 0–12)
PROT ?TM UR-MCNC: 8 MG/DL — SIGNIFICANT CHANGE UP (ref 0–12)
PROT PATTERN 24H UR ELPH-IMP: SIGNIFICANT CHANGE UP
PROT PATTERN SERPL ELPH-IMP: SIGNIFICANT CHANGE UP
PROT PATTERN SERPL ELPH-IMP: SIGNIFICANT CHANGE UP
PROT SERPL-MCNC: 5 G/DL — LOW (ref 6–8.3)
PROT SERPL-MCNC: 5.2 G/DL — LOW (ref 6–8.3)
PROT SERPL-MCNC: 5.3 G/DL — LOW (ref 6–8.3)
PROT SERPL-MCNC: 5.4 G/DL — LOW (ref 6–8.3)
PROT SERPL-MCNC: 5.4 G/DL — SIGNIFICANT CHANGE UP
PROT SERPL-MCNC: 5.5 G/DL — LOW (ref 6–8.3)
PROT SERPL-MCNC: 5.6 G/DL — LOW (ref 6–8.3)
PROT SERPL-MCNC: 5.6 G/DL — LOW (ref 6–8.3)
PROT SERPL-MCNC: 5.7 G/DL — LOW (ref 6–8.3)
PROT SERPL-MCNC: 5.8 G/DL — LOW (ref 6–8.3)
PROT SERPL-MCNC: 5.8 G/DL — LOW (ref 6–8.3)
PROT SERPL-MCNC: 5.9 G/DL — LOW (ref 6–8.3)
PROT SERPL-MCNC: 6.2 G/DL — SIGNIFICANT CHANGE UP (ref 6–8.3)
PROT SERPL-MCNC: 6.6 G/DL — SIGNIFICANT CHANGE UP (ref 6–8.3)
PROT UR-MCNC: ABNORMAL
PROT UR-MCNC: ABNORMAL
PROT UR-MCNC: NEGATIVE — SIGNIFICANT CHANGE UP
PROTEIN QUANT CALC, URINE: 68 MG/24 H — SIGNIFICANT CHANGE UP (ref 50–100)
PROTHROM AB SERPL-ACNC: 15.3 SEC — HIGH (ref 10.6–13.6)
PROTHROM AB SERPL-ACNC: 24.2 SEC — HIGH (ref 10.6–13.6)
PROTHROM AB SERPL-ACNC: 36.2 SEC — HIGH (ref 10.6–13.6)
RAPID RVP RESULT: SIGNIFICANT CHANGE UP
RBC # BLD: 2.34 M/UL — LOW (ref 4.2–5.8)
RBC # BLD: 2.34 M/UL — LOW (ref 4.2–5.8)
RBC # BLD: 2.39 M/UL — LOW (ref 4.2–5.8)
RBC # BLD: 2.53 M/UL — LOW (ref 4.2–5.8)
RBC # BLD: 2.54 M/UL — LOW (ref 4.2–5.8)
RBC # BLD: 2.56 M/UL — LOW (ref 4.2–5.8)
RBC # BLD: 2.58 M/UL — LOW (ref 4.2–5.8)
RBC # BLD: 2.58 M/UL — LOW (ref 4.2–5.8)
RBC # BLD: 2.63 M/UL — LOW (ref 4.2–5.8)
RBC # BLD: 2.63 M/UL — LOW (ref 4.2–5.8)
RBC # BLD: 2.64 M/UL — LOW (ref 4.2–5.8)
RBC # BLD: 2.64 M/UL — LOW (ref 4.2–5.8)
RBC # BLD: 2.65 M/UL — LOW (ref 4.2–5.8)
RBC # BLD: 2.66 M/UL — LOW (ref 4.2–5.8)
RBC # BLD: 2.74 M/UL — LOW (ref 4.2–5.8)
RBC # BLD: 2.89 M/UL — LOW (ref 4.2–5.8)
RBC # BLD: 2.89 M/UL — LOW (ref 4.2–5.8)
RBC # BLD: 2.92 M/UL — LOW (ref 4.2–5.8)
RBC # BLD: 2.94 M/UL — LOW (ref 4.2–5.8)
RBC # BLD: 2.94 M/UL — LOW (ref 4.2–5.8)
RBC # BLD: 3 M/UL — LOW (ref 4.2–5.8)
RBC # BLD: 3 M/UL — LOW (ref 4.2–5.8)
RBC # BLD: 3.07 M/UL — LOW (ref 4.2–5.8)
RBC # BLD: 3.07 M/UL — LOW (ref 4.2–5.8)
RBC # BLD: 3.08 M/UL — LOW (ref 4.2–5.8)
RBC # BLD: 3.09 M/UL — LOW (ref 4.2–5.8)
RBC # BLD: 3.1 M/UL — LOW (ref 4.2–5.8)
RBC # BLD: 3.1 M/UL — LOW (ref 4.2–5.8)
RBC # BLD: 3.12 M/UL — LOW (ref 4.2–5.8)
RBC # BLD: 3.12 M/UL — LOW (ref 4.2–5.8)
RBC # BLD: 3.15 M/UL — LOW (ref 4.2–5.8)
RBC # BLD: 3.15 M/UL — LOW (ref 4.2–5.8)
RBC # BLD: 3.18 M/UL — LOW (ref 4.2–5.8)
RBC # BLD: 3.2 M/UL — LOW (ref 4.2–5.8)
RBC # BLD: 3.27 M/UL — LOW (ref 4.2–5.8)
RBC # BLD: 3.31 M/UL — LOW (ref 4.2–5.8)
RBC # BLD: 3.35 M/UL — LOW (ref 4.2–5.8)
RBC # BLD: 3.36 M/UL — LOW (ref 4.2–5.8)
RBC # BLD: 3.43 M/UL — LOW (ref 4.2–5.8)
RBC # BLD: 3.45 M/UL — LOW (ref 4.2–5.8)
RBC # BLD: 3.53 M/UL — LOW (ref 4.2–5.8)
RBC # FLD: 13.9 % — SIGNIFICANT CHANGE UP (ref 10.3–14.5)
RBC # FLD: 14 % — SIGNIFICANT CHANGE UP (ref 10.3–14.5)
RBC # FLD: 14.1 % — SIGNIFICANT CHANGE UP (ref 10.3–14.5)
RBC # FLD: 14.1 % — SIGNIFICANT CHANGE UP (ref 10.3–14.5)
RBC # FLD: 14.2 % — SIGNIFICANT CHANGE UP (ref 10.3–14.5)
RBC # FLD: 14.2 % — SIGNIFICANT CHANGE UP (ref 10.3–14.5)
RBC # FLD: 14.3 % — SIGNIFICANT CHANGE UP (ref 10.3–14.5)
RBC # FLD: 14.5 % — SIGNIFICANT CHANGE UP (ref 10.3–14.5)
RBC # FLD: 14.6 % — HIGH (ref 10.3–14.5)
RBC # FLD: 14.6 % — HIGH (ref 10.3–14.5)
RBC # FLD: 15 % — HIGH (ref 10.3–14.5)
RBC # FLD: 15.1 % — HIGH (ref 10.3–14.5)
RBC # FLD: 15.3 % — HIGH (ref 10.3–14.5)
RBC # FLD: 15.4 % — HIGH (ref 10.3–14.5)
RBC # FLD: 15.6 % — HIGH (ref 10.3–14.5)
RBC # FLD: 15.7 % — HIGH (ref 10.3–14.5)
RBC # FLD: 15.8 % — HIGH (ref 10.3–14.5)
RBC # FLD: 16 % — HIGH (ref 10.3–14.5)
RBC # FLD: 16.1 % — HIGH (ref 10.3–14.5)
RBC # FLD: 16.2 % — HIGH (ref 10.3–14.5)
RBC # FLD: 16.2 % — HIGH (ref 10.3–14.5)
RBC # FLD: 16.3 % — HIGH (ref 10.3–14.5)
RBC # FLD: 16.4 % — HIGH (ref 10.3–14.5)
RBC # FLD: 16.5 % — HIGH (ref 10.3–14.5)
RBC # FLD: 17.2 % — HIGH (ref 10.3–14.5)
RBC # FLD: 17.2 % — HIGH (ref 10.3–14.5)
RBC # FLD: 17.3 % — HIGH (ref 10.3–14.5)
RBC # FLD: 17.3 % — HIGH (ref 10.3–14.5)
RBC # FLD: 17.4 % — HIGH (ref 10.3–14.5)
RBC # FLD: 17.6 % — HIGH (ref 10.3–14.5)
RBC # FLD: 17.7 % — HIGH (ref 10.3–14.5)
RBC # FLD: 18.5 % — HIGH (ref 10.3–14.5)
RBC # FLD: 18.7 % — HIGH (ref 10.3–14.5)
RBC CASTS # UR COMP ASSIST: 1 /HPF — SIGNIFICANT CHANGE UP (ref 0–4)
RBC CASTS # UR COMP ASSIST: 1815 /HPF — HIGH (ref 0–4)
RBC CASTS # UR COMP ASSIST: 74 /HPF — HIGH (ref 0–4)
RETICS #: 73.7 K/UL — SIGNIFICANT CHANGE UP (ref 25–125)
RETICS #: 79.5 K/UL — SIGNIFICANT CHANGE UP (ref 25–125)
RETICS/RBC NFR: 3.2 % — HIGH (ref 0.5–2.5)
RETICS/RBC NFR: 3.3 % — HIGH (ref 0.5–2.5)
RH IG SCN BLD-IMP: POSITIVE — SIGNIFICANT CHANGE UP
RSV RNA SPEC QL NAA+PROBE: SIGNIFICANT CHANGE UP
RSV RNA SPEC QL NAA+PROBE: SIGNIFICANT CHANGE UP
RV+EV RNA SPEC QL NAA+PROBE: SIGNIFICANT CHANGE UP
RV+EV RNA SPEC QL NAA+PROBE: SIGNIFICANT CHANGE UP
SAO2 % BLDV: 77.7 % — SIGNIFICANT CHANGE UP
SARS-COV-2 IGG+IGM SERPL QL IA: 192 U/ML — HIGH
SARS-COV-2 IGG+IGM SERPL QL IA: 223 U/ML — HIGH
SARS-COV-2 IGG+IGM SERPL QL IA: 238 U/ML — HIGH
SARS-COV-2 IGG+IGM SERPL QL IA: 249 U/ML — HIGH
SARS-COV-2 IGG+IGM SERPL QL IA: POSITIVE
SARS-COV-2 RNA SPEC QL NAA+PROBE: SIGNIFICANT CHANGE UP
SODIUM SERPL-SCNC: 129 MMOL/L — LOW (ref 135–145)
SODIUM SERPL-SCNC: 129 MMOL/L — LOW (ref 135–145)
SODIUM SERPL-SCNC: 130 MMOL/L — LOW (ref 135–145)
SODIUM SERPL-SCNC: 131 MMOL/L — LOW (ref 135–145)
SODIUM SERPL-SCNC: 132 MMOL/L — LOW (ref 135–145)
SODIUM SERPL-SCNC: 133 MMOL/L — LOW (ref 135–145)
SODIUM SERPL-SCNC: 135 MMOL/L — SIGNIFICANT CHANGE UP (ref 135–145)
SODIUM SERPL-SCNC: 136 MMOL/L — SIGNIFICANT CHANGE UP (ref 135–145)
SODIUM SERPL-SCNC: 137 MMOL/L — SIGNIFICANT CHANGE UP (ref 135–145)
SODIUM SERPL-SCNC: 138 MMOL/L — SIGNIFICANT CHANGE UP (ref 135–145)
SODIUM SERPL-SCNC: 139 MMOL/L — SIGNIFICANT CHANGE UP (ref 135–145)
SODIUM SERPL-SCNC: 140 MMOL/L — SIGNIFICANT CHANGE UP (ref 135–145)
SODIUM SERPL-SCNC: 141 MMOL/L — SIGNIFICANT CHANGE UP (ref 135–145)
SODIUM SERPL-SCNC: 141 MMOL/L — SIGNIFICANT CHANGE UP (ref 135–145)
SODIUM UR-SCNC: 17 MMOL/L — SIGNIFICANT CHANGE UP
SP GR SPEC: 1.01 — SIGNIFICANT CHANGE UP (ref 1.01–1.02)
SPECIMEN SOURCE: SIGNIFICANT CHANGE UP
T3 SERPL-MCNC: 50 NG/DL — LOW (ref 80–200)
T4 AB SER-ACNC: 5.4 UG/DL — SIGNIFICANT CHANGE UP (ref 5.1–13)
TIBC SERPL-MCNC: 187 UG/DL — LOW (ref 220–430)
TOTAL VOLUME - 24 HOUR: 850 ML — SIGNIFICANT CHANGE UP
TRIGL SERPL-MCNC: 67 MG/DL — SIGNIFICANT CHANGE UP
TROPONIN T, HIGH SENSITIVITY RESULT: 1003 NG/L — CRITICAL HIGH
TROPONIN T, HIGH SENSITIVITY RESULT: 163 NG/L — HIGH (ref 0–51)
TROPONIN T, HIGH SENSITIVITY RESULT: 187 NG/L — HIGH (ref 0–51)
TROPONIN T, HIGH SENSITIVITY RESULT: 196 NG/L — CRITICAL HIGH
TROPONIN T, HIGH SENSITIVITY RESULT: 557 NG/L — CRITICAL HIGH
TROPONIN T, HIGH SENSITIVITY RESULT: 819 NG/L — CRITICAL HIGH
TROPONIN T, HIGH SENSITIVITY RESULT: 872 NG/L — CRITICAL HIGH
TSH SERPL-MCNC: 0.74 UIU/ML — SIGNIFICANT CHANGE UP (ref 0.27–4.2)
UIBC SERPL-MCNC: 145 UG/DL — SIGNIFICANT CHANGE UP (ref 110–370)
URINE CREATININE CALCULATION: 0.4 G/24 H — LOW (ref 1–2)
UROBILINOGEN FLD QL: NEGATIVE — SIGNIFICANT CHANGE UP
UROBILINOGEN FLD QL: SIGNIFICANT CHANGE UP
UROBILINOGEN FLD QL: SIGNIFICANT CHANGE UP
UUN UR-MCNC: 526 MG/DL — SIGNIFICANT CHANGE UP
VIT B12 SERPL-MCNC: 1222 PG/ML — HIGH (ref 200–900)
WBC # BLD: 10.24 K/UL — SIGNIFICANT CHANGE UP (ref 3.8–10.5)
WBC # BLD: 11.04 K/UL — HIGH (ref 3.8–10.5)
WBC # BLD: 4.74 K/UL — SIGNIFICANT CHANGE UP (ref 3.8–10.5)
WBC # BLD: 4.95 K/UL — SIGNIFICANT CHANGE UP (ref 3.8–10.5)
WBC # BLD: 5 K/UL — SIGNIFICANT CHANGE UP (ref 3.8–10.5)
WBC # BLD: 5.09 K/UL — SIGNIFICANT CHANGE UP (ref 3.8–10.5)
WBC # BLD: 5.33 K/UL — SIGNIFICANT CHANGE UP (ref 3.8–10.5)
WBC # BLD: 5.37 K/UL — SIGNIFICANT CHANGE UP (ref 3.8–10.5)
WBC # BLD: 5.53 K/UL — SIGNIFICANT CHANGE UP (ref 3.8–10.5)
WBC # BLD: 5.64 K/UL — SIGNIFICANT CHANGE UP (ref 3.8–10.5)
WBC # BLD: 5.73 K/UL — SIGNIFICANT CHANGE UP (ref 3.8–10.5)
WBC # BLD: 5.73 K/UL — SIGNIFICANT CHANGE UP (ref 3.8–10.5)
WBC # BLD: 5.77 K/UL — SIGNIFICANT CHANGE UP (ref 3.8–10.5)
WBC # BLD: 5.88 K/UL — SIGNIFICANT CHANGE UP (ref 3.8–10.5)
WBC # BLD: 5.93 K/UL — SIGNIFICANT CHANGE UP (ref 3.8–10.5)
WBC # BLD: 5.93 K/UL — SIGNIFICANT CHANGE UP (ref 3.8–10.5)
WBC # BLD: 5.94 K/UL — SIGNIFICANT CHANGE UP (ref 3.8–10.5)
WBC # BLD: 6.02 K/UL — SIGNIFICANT CHANGE UP (ref 3.8–10.5)
WBC # BLD: 6.14 K/UL — SIGNIFICANT CHANGE UP (ref 3.8–10.5)
WBC # BLD: 6.28 K/UL — SIGNIFICANT CHANGE UP (ref 3.8–10.5)
WBC # BLD: 6.39 K/UL — SIGNIFICANT CHANGE UP (ref 3.8–10.5)
WBC # BLD: 6.44 K/UL — SIGNIFICANT CHANGE UP (ref 3.8–10.5)
WBC # BLD: 6.52 K/UL — SIGNIFICANT CHANGE UP (ref 3.8–10.5)
WBC # BLD: 6.61 K/UL — SIGNIFICANT CHANGE UP (ref 3.8–10.5)
WBC # BLD: 6.7 K/UL — SIGNIFICANT CHANGE UP (ref 3.8–10.5)
WBC # BLD: 6.72 K/UL — SIGNIFICANT CHANGE UP (ref 3.8–10.5)
WBC # BLD: 6.74 K/UL — SIGNIFICANT CHANGE UP (ref 3.8–10.5)
WBC # BLD: 6.89 K/UL — SIGNIFICANT CHANGE UP (ref 3.8–10.5)
WBC # BLD: 7.02 K/UL — SIGNIFICANT CHANGE UP (ref 3.8–10.5)
WBC # BLD: 7.14 K/UL — SIGNIFICANT CHANGE UP (ref 3.8–10.5)
WBC # BLD: 7.18 K/UL — SIGNIFICANT CHANGE UP (ref 3.8–10.5)
WBC # BLD: 7.34 K/UL — SIGNIFICANT CHANGE UP (ref 3.8–10.5)
WBC # BLD: 7.47 K/UL — SIGNIFICANT CHANGE UP (ref 3.8–10.5)
WBC # BLD: 8.13 K/UL — SIGNIFICANT CHANGE UP (ref 3.8–10.5)
WBC # BLD: 8.16 K/UL — SIGNIFICANT CHANGE UP (ref 3.8–10.5)
WBC # BLD: 8.2 K/UL — SIGNIFICANT CHANGE UP (ref 3.8–10.5)
WBC # BLD: 8.55 K/UL — SIGNIFICANT CHANGE UP (ref 3.8–10.5)
WBC # BLD: 8.68 K/UL — SIGNIFICANT CHANGE UP (ref 3.8–10.5)
WBC # BLD: 9.5 K/UL — SIGNIFICANT CHANGE UP (ref 3.8–10.5)
WBC # BLD: 9.64 K/UL — SIGNIFICANT CHANGE UP (ref 3.8–10.5)
WBC # FLD AUTO: 10.24 K/UL — SIGNIFICANT CHANGE UP (ref 3.8–10.5)
WBC # FLD AUTO: 11.04 K/UL — HIGH (ref 3.8–10.5)
WBC # FLD AUTO: 4.74 K/UL — SIGNIFICANT CHANGE UP (ref 3.8–10.5)
WBC # FLD AUTO: 4.95 K/UL — SIGNIFICANT CHANGE UP (ref 3.8–10.5)
WBC # FLD AUTO: 5 K/UL — SIGNIFICANT CHANGE UP (ref 3.8–10.5)
WBC # FLD AUTO: 5.09 K/UL — SIGNIFICANT CHANGE UP (ref 3.8–10.5)
WBC # FLD AUTO: 5.33 K/UL — SIGNIFICANT CHANGE UP (ref 3.8–10.5)
WBC # FLD AUTO: 5.37 K/UL — SIGNIFICANT CHANGE UP (ref 3.8–10.5)
WBC # FLD AUTO: 5.53 K/UL — SIGNIFICANT CHANGE UP (ref 3.8–10.5)
WBC # FLD AUTO: 5.64 K/UL — SIGNIFICANT CHANGE UP (ref 3.8–10.5)
WBC # FLD AUTO: 5.73 K/UL — SIGNIFICANT CHANGE UP (ref 3.8–10.5)
WBC # FLD AUTO: 5.73 K/UL — SIGNIFICANT CHANGE UP (ref 3.8–10.5)
WBC # FLD AUTO: 5.77 K/UL — SIGNIFICANT CHANGE UP (ref 3.8–10.5)
WBC # FLD AUTO: 5.88 K/UL — SIGNIFICANT CHANGE UP (ref 3.8–10.5)
WBC # FLD AUTO: 5.93 K/UL — SIGNIFICANT CHANGE UP (ref 3.8–10.5)
WBC # FLD AUTO: 5.93 K/UL — SIGNIFICANT CHANGE UP (ref 3.8–10.5)
WBC # FLD AUTO: 5.94 K/UL — SIGNIFICANT CHANGE UP (ref 3.8–10.5)
WBC # FLD AUTO: 6.02 K/UL — SIGNIFICANT CHANGE UP (ref 3.8–10.5)
WBC # FLD AUTO: 6.14 K/UL — SIGNIFICANT CHANGE UP (ref 3.8–10.5)
WBC # FLD AUTO: 6.28 K/UL — SIGNIFICANT CHANGE UP (ref 3.8–10.5)
WBC # FLD AUTO: 6.39 K/UL — SIGNIFICANT CHANGE UP (ref 3.8–10.5)
WBC # FLD AUTO: 6.44 K/UL — SIGNIFICANT CHANGE UP (ref 3.8–10.5)
WBC # FLD AUTO: 6.52 K/UL — SIGNIFICANT CHANGE UP (ref 3.8–10.5)
WBC # FLD AUTO: 6.61 K/UL — SIGNIFICANT CHANGE UP (ref 3.8–10.5)
WBC # FLD AUTO: 6.7 K/UL — SIGNIFICANT CHANGE UP (ref 3.8–10.5)
WBC # FLD AUTO: 6.72 K/UL — SIGNIFICANT CHANGE UP (ref 3.8–10.5)
WBC # FLD AUTO: 6.74 K/UL — SIGNIFICANT CHANGE UP (ref 3.8–10.5)
WBC # FLD AUTO: 6.89 K/UL — SIGNIFICANT CHANGE UP (ref 3.8–10.5)
WBC # FLD AUTO: 7.02 K/UL — SIGNIFICANT CHANGE UP (ref 3.8–10.5)
WBC # FLD AUTO: 7.14 K/UL — SIGNIFICANT CHANGE UP (ref 3.8–10.5)
WBC # FLD AUTO: 7.18 K/UL — SIGNIFICANT CHANGE UP (ref 3.8–10.5)
WBC # FLD AUTO: 7.34 K/UL — SIGNIFICANT CHANGE UP (ref 3.8–10.5)
WBC # FLD AUTO: 7.47 K/UL — SIGNIFICANT CHANGE UP (ref 3.8–10.5)
WBC # FLD AUTO: 8.13 K/UL — SIGNIFICANT CHANGE UP (ref 3.8–10.5)
WBC # FLD AUTO: 8.16 K/UL — SIGNIFICANT CHANGE UP (ref 3.8–10.5)
WBC # FLD AUTO: 8.2 K/UL — SIGNIFICANT CHANGE UP (ref 3.8–10.5)
WBC # FLD AUTO: 8.55 K/UL — SIGNIFICANT CHANGE UP (ref 3.8–10.5)
WBC # FLD AUTO: 8.68 K/UL — SIGNIFICANT CHANGE UP (ref 3.8–10.5)
WBC # FLD AUTO: 9.5 K/UL — SIGNIFICANT CHANGE UP (ref 3.8–10.5)
WBC # FLD AUTO: 9.64 K/UL — SIGNIFICANT CHANGE UP (ref 3.8–10.5)
WBC UR QL: 1 /HPF — SIGNIFICANT CHANGE UP (ref 0–5)
WBC UR QL: 7 /HPF — HIGH (ref 0–5)
WBC UR QL: 86 /HPF — HIGH (ref 0–5)

## 2021-01-01 PROCEDURE — 86901 BLOOD TYPING SEROLOGIC RH(D): CPT

## 2021-01-01 PROCEDURE — 71045 X-RAY EXAM CHEST 1 VIEW: CPT | Mod: 26

## 2021-01-01 PROCEDURE — 93306 TTE W/DOPPLER COMPLETE: CPT | Mod: 26

## 2021-01-01 PROCEDURE — 93010 ELECTROCARDIOGRAM REPORT: CPT | Mod: 76

## 2021-01-01 PROCEDURE — 85025 COMPLETE CBC W/AUTO DIFF WBC: CPT

## 2021-01-01 PROCEDURE — 71045 X-RAY EXAM CHEST 1 VIEW: CPT

## 2021-01-01 PROCEDURE — 84133 ASSAY OF URINE POTASSIUM: CPT

## 2021-01-01 PROCEDURE — 93010 ELECTROCARDIOGRAM REPORT: CPT

## 2021-01-01 PROCEDURE — 97116 GAIT TRAINING THERAPY: CPT

## 2021-01-01 PROCEDURE — 83880 ASSAY OF NATRIURETIC PEPTIDE: CPT

## 2021-01-01 PROCEDURE — 74018 RADEX ABDOMEN 1 VIEW: CPT | Mod: 26

## 2021-01-01 PROCEDURE — 99291 CRITICAL CARE FIRST HOUR: CPT | Mod: 25

## 2021-01-01 PROCEDURE — 93308 TTE F-UP OR LMTD: CPT | Mod: 26

## 2021-01-01 PROCEDURE — 85027 COMPLETE CBC AUTOMATED: CPT

## 2021-01-01 PROCEDURE — 80048 BASIC METABOLIC PNL TOTAL CA: CPT

## 2021-01-01 PROCEDURE — 93971 EXTREMITY STUDY: CPT | Mod: 26

## 2021-01-01 PROCEDURE — 93306 TTE W/DOPPLER COMPLETE: CPT

## 2021-01-01 PROCEDURE — 82962 GLUCOSE BLOOD TEST: CPT

## 2021-01-01 PROCEDURE — 97162 PT EVAL MOD COMPLEX 30 MIN: CPT

## 2021-01-01 PROCEDURE — 86900 BLOOD TYPING SEROLOGIC ABO: CPT

## 2021-01-01 PROCEDURE — 86769 SARS-COV-2 COVID-19 ANTIBODY: CPT

## 2021-01-01 PROCEDURE — U0003: CPT

## 2021-01-01 PROCEDURE — 0225U NFCT DS DNA&RNA 21 SARSCOV2: CPT

## 2021-01-01 PROCEDURE — U0005: CPT

## 2021-01-01 PROCEDURE — 97110 THERAPEUTIC EXERCISES: CPT

## 2021-01-01 PROCEDURE — 82570 ASSAY OF URINE CREATININE: CPT

## 2021-01-01 PROCEDURE — 84540 ASSAY OF URINE/UREA-N: CPT

## 2021-01-01 PROCEDURE — 76770 US EXAM ABDO BACK WALL COMP: CPT

## 2021-01-01 PROCEDURE — 36415 COLL VENOUS BLD VENIPUNCTURE: CPT

## 2021-01-01 PROCEDURE — 99222 1ST HOSP IP/OBS MODERATE 55: CPT

## 2021-01-01 PROCEDURE — 96374 THER/PROPH/DIAG INJ IV PUSH: CPT

## 2021-01-01 PROCEDURE — 99285 EMERGENCY DEPT VISIT HI MDM: CPT | Mod: 25

## 2021-01-01 PROCEDURE — 71046 X-RAY EXAM CHEST 2 VIEWS: CPT | Mod: 26

## 2021-01-01 PROCEDURE — 99223 1ST HOSP IP/OBS HIGH 75: CPT

## 2021-01-01 PROCEDURE — 83935 ASSAY OF URINE OSMOLALITY: CPT

## 2021-01-01 PROCEDURE — 81001 URINALYSIS AUTO W/SCOPE: CPT

## 2021-01-01 PROCEDURE — 93005 ELECTROCARDIOGRAM TRACING: CPT

## 2021-01-01 PROCEDURE — 85730 THROMBOPLASTIN TIME PARTIAL: CPT

## 2021-01-01 PROCEDURE — 99284 EMERGENCY DEPT VISIT MOD MDM: CPT | Mod: 25

## 2021-01-01 PROCEDURE — 86334 IMMUNOFIX E-PHORESIS SERUM: CPT | Mod: 26

## 2021-01-01 PROCEDURE — 76770 US EXAM ABDO BACK WALL COMP: CPT | Mod: 26

## 2021-01-01 PROCEDURE — 86850 RBC ANTIBODY SCREEN: CPT

## 2021-01-01 PROCEDURE — 84300 ASSAY OF URINE SODIUM: CPT

## 2021-01-01 PROCEDURE — 85610 PROTHROMBIN TIME: CPT

## 2021-01-01 PROCEDURE — 76376 3D RENDER W/INTRP POSTPROCES: CPT

## 2021-01-01 PROCEDURE — 74018 RADEX ABDOMEN 1 VIEW: CPT

## 2021-01-01 PROCEDURE — 80053 COMPREHEN METABOLIC PANEL: CPT

## 2021-01-01 PROCEDURE — 83735 ASSAY OF MAGNESIUM: CPT

## 2021-01-01 PROCEDURE — 84166 PROTEIN E-PHORESIS/URINE/CSF: CPT

## 2021-01-01 PROCEDURE — 76376 3D RENDER W/INTRP POSTPROCES: CPT | Mod: 26

## 2021-01-01 PROCEDURE — 93931 UPPER EXTREMITY STUDY: CPT | Mod: 26,LT

## 2021-01-01 PROCEDURE — 82436 ASSAY OF URINE CHLORIDE: CPT

## 2021-01-01 PROCEDURE — 73200 CT UPPER EXTREMITY W/O DYE: CPT | Mod: 26,LT

## 2021-01-01 PROCEDURE — 99285 EMERGENCY DEPT VISIT HI MDM: CPT

## 2021-01-01 PROCEDURE — 84165 PROTEIN E-PHORESIS SERUM: CPT | Mod: 26

## 2021-01-01 PROCEDURE — 84484 ASSAY OF TROPONIN QUANT: CPT

## 2021-01-01 RX ORDER — POLYETHYLENE GLYCOL 3350 17 G/17G
17 POWDER, FOR SOLUTION ORAL
Qty: 0 | Refills: 0 | DISCHARGE
Start: 2021-01-01

## 2021-01-01 RX ORDER — POTASSIUM CHLORIDE 20 MEQ
40 PACKET (EA) ORAL ONCE
Refills: 0 | Status: COMPLETED | OUTPATIENT
Start: 2021-01-01 | End: 2021-01-01

## 2021-01-01 RX ORDER — CLOPIDOGREL BISULFATE 75 MG/1
600 TABLET, FILM COATED ORAL ONCE
Refills: 0 | Status: COMPLETED | OUTPATIENT
Start: 2021-01-01 | End: 2021-01-01

## 2021-01-01 RX ORDER — FINASTERIDE 5 MG/1
5 TABLET, FILM COATED ORAL DAILY
Refills: 0 | Status: DISCONTINUED | OUTPATIENT
Start: 2021-01-01 | End: 2021-01-01

## 2021-01-01 RX ORDER — POTASSIUM CHLORIDE 20 MEQ
40 PACKET (EA) ORAL ONCE
Refills: 0 | Status: DISCONTINUED | OUTPATIENT
Start: 2021-01-01 | End: 2021-01-01

## 2021-01-01 RX ORDER — FUROSEMIDE 40 MG
80 TABLET ORAL ONCE
Refills: 0 | Status: COMPLETED | OUTPATIENT
Start: 2021-01-01 | End: 2021-01-01

## 2021-01-01 RX ORDER — SODIUM CHLORIDE 9 MG/ML
1000 INJECTION INTRAMUSCULAR; INTRAVENOUS; SUBCUTANEOUS ONCE
Refills: 0 | Status: DISCONTINUED | OUTPATIENT
Start: 2021-01-01 | End: 2021-01-01

## 2021-01-01 RX ORDER — AMLODIPINE BESYLATE 2.5 MG/1
1 TABLET ORAL
Qty: 30 | Refills: 0
Start: 2021-01-01 | End: 2021-01-01

## 2021-01-01 RX ORDER — SODIUM BICARBONATE 1 MEQ/ML
650 SYRINGE (ML) INTRAVENOUS
Refills: 0 | Status: DISCONTINUED | OUTPATIENT
Start: 2021-01-01 | End: 2021-01-01

## 2021-01-01 RX ORDER — MIDAZOLAM HYDROCHLORIDE 1 MG/ML
2 INJECTION, SOLUTION INTRAMUSCULAR; INTRAVENOUS ONCE
Refills: 0 | Status: DISCONTINUED | OUTPATIENT
Start: 2021-01-01 | End: 2021-01-01

## 2021-01-01 RX ORDER — AMIODARONE HYDROCHLORIDE 400 MG/1
200 TABLET ORAL
Qty: 0 | Refills: 0 | DISCHARGE
Start: 2021-01-01

## 2021-01-01 RX ORDER — POLYETHYLENE GLYCOL 3350 17 G/17G
17 POWDER, FOR SOLUTION ORAL DAILY
Refills: 0 | Status: DISCONTINUED | OUTPATIENT
Start: 2021-01-01 | End: 2021-01-01

## 2021-01-01 RX ORDER — CEFAZOLIN SODIUM 1 G
1000 VIAL (EA) INJECTION EVERY 8 HOURS
Refills: 0 | Status: COMPLETED | OUTPATIENT
Start: 2021-01-01 | End: 2021-01-01

## 2021-01-01 RX ORDER — ATORVASTATIN CALCIUM 80 MG/1
1 TABLET, FILM COATED ORAL
Qty: 0 | Refills: 0 | DISCHARGE
Start: 2021-01-01

## 2021-01-01 RX ORDER — AMIODARONE HYDROCHLORIDE 400 MG/1
1 TABLET ORAL
Qty: 450 | Refills: 0 | Status: DISCONTINUED | OUTPATIENT
Start: 2021-01-01 | End: 2021-01-01

## 2021-01-01 RX ORDER — MORPHINE SULFATE 50 MG/1
2 CAPSULE, EXTENDED RELEASE ORAL ONCE
Refills: 0 | Status: DISCONTINUED | OUTPATIENT
Start: 2021-01-01 | End: 2021-01-01

## 2021-01-01 RX ORDER — PANTOPRAZOLE SODIUM 20 MG/1
1 TABLET, DELAYED RELEASE ORAL
Qty: 30 | Refills: 0
Start: 2021-01-01 | End: 2021-01-01

## 2021-01-01 RX ORDER — AMLODIPINE BESYLATE 2.5 MG/1
5 TABLET ORAL DAILY
Refills: 0 | Status: DISCONTINUED | OUTPATIENT
Start: 2021-01-01 | End: 2021-01-01

## 2021-01-01 RX ORDER — AZELASTINE HCL 0.05 %
1 DROPS OPHTHALMIC (EYE)
Qty: 0 | Refills: 0 | DISCHARGE

## 2021-01-01 RX ORDER — DEXTROSE 50 % IN WATER 50 %
15 SYRINGE (ML) INTRAVENOUS ONCE
Refills: 0 | Status: DISCONTINUED | OUTPATIENT
Start: 2021-01-01 | End: 2021-01-01

## 2021-01-01 RX ORDER — DEXTROSE 50 % IN WATER 50 %
25 SYRINGE (ML) INTRAVENOUS ONCE
Refills: 0 | Status: DISCONTINUED | OUTPATIENT
Start: 2021-01-01 | End: 2021-01-01

## 2021-01-01 RX ORDER — SENNA PLUS 8.6 MG/1
2 TABLET ORAL
Qty: 0 | Refills: 0 | DISCHARGE
Start: 2021-01-01

## 2021-01-01 RX ORDER — ACETAMINOPHEN 500 MG
650 TABLET ORAL EVERY 6 HOURS
Refills: 0 | Status: DISCONTINUED | OUTPATIENT
Start: 2021-01-01 | End: 2021-01-01

## 2021-01-01 RX ORDER — SPIRONOLACTONE 25 MG/1
25 TABLET, FILM COATED ORAL DAILY
Refills: 0 | Status: DISCONTINUED | OUTPATIENT
Start: 2021-01-01 | End: 2021-01-01

## 2021-01-01 RX ORDER — SODIUM BICARBONATE 1 MEQ/ML
1 SYRINGE (ML) INTRAVENOUS
Qty: 0 | Refills: 0 | DISCHARGE
Start: 2021-01-01

## 2021-01-01 RX ORDER — INSULIN LISPRO 100/ML
VIAL (ML) SUBCUTANEOUS
Refills: 0 | Status: DISCONTINUED | OUTPATIENT
Start: 2021-01-01 | End: 2021-01-01

## 2021-01-01 RX ORDER — PANTOPRAZOLE SODIUM 20 MG/1
1 TABLET, DELAYED RELEASE ORAL
Qty: 0 | Refills: 0 | DISCHARGE
Start: 2021-01-01

## 2021-01-01 RX ORDER — POLYETHYLENE GLYCOL 3350 17 G/17G
17 POWDER, FOR SOLUTION ORAL ONCE
Refills: 0 | Status: COMPLETED | OUTPATIENT
Start: 2021-01-01 | End: 2021-01-01

## 2021-01-01 RX ORDER — GLUCAGON INJECTION, SOLUTION 0.5 MG/.1ML
1 INJECTION, SOLUTION SUBCUTANEOUS ONCE
Refills: 0 | Status: DISCONTINUED | OUTPATIENT
Start: 2021-01-01 | End: 2021-01-01

## 2021-01-01 RX ORDER — FUROSEMIDE 40 MG
40 TABLET ORAL
Refills: 0 | Status: DISCONTINUED | OUTPATIENT
Start: 2021-01-01 | End: 2021-01-01

## 2021-01-01 RX ORDER — LIDOCAINE 4 G/100G
1 CREAM TOPICAL ONCE
Refills: 0 | Status: DISCONTINUED | OUTPATIENT
Start: 2021-01-01 | End: 2021-01-01

## 2021-01-01 RX ORDER — SODIUM CHLORIDE 9 MG/ML
1000 INJECTION, SOLUTION INTRAVENOUS
Refills: 0 | Status: DISCONTINUED | OUTPATIENT
Start: 2021-01-01 | End: 2021-01-01

## 2021-01-01 RX ORDER — SUCRALFATE 1 G
1 TABLET ORAL
Qty: 0 | Refills: 0 | DISCHARGE

## 2021-01-01 RX ORDER — FUROSEMIDE 40 MG
1 TABLET ORAL
Qty: 30 | Refills: 0

## 2021-01-01 RX ORDER — SPIRONOLACTONE 25 MG/1
1 TABLET, FILM COATED ORAL
Qty: 0 | Refills: 0 | DISCHARGE

## 2021-01-01 RX ORDER — APIXABAN 2.5 MG/1
2.5 TABLET, FILM COATED ORAL
Refills: 0 | Status: DISCONTINUED | OUTPATIENT
Start: 2021-01-01 | End: 2021-01-01

## 2021-01-01 RX ORDER — AMIODARONE HYDROCHLORIDE 400 MG/1
400 TABLET ORAL DAILY
Refills: 0 | Status: DISCONTINUED | OUTPATIENT
Start: 2021-01-01 | End: 2021-01-01

## 2021-01-01 RX ORDER — HEPARIN SODIUM 5000 [USP'U]/ML
4500 INJECTION INTRAVENOUS; SUBCUTANEOUS ONCE
Refills: 0 | Status: COMPLETED | OUTPATIENT
Start: 2021-01-01 | End: 2021-01-01

## 2021-01-01 RX ORDER — AMIODARONE HYDROCHLORIDE 400 MG/1
200 TABLET ORAL DAILY
Refills: 0 | Status: DISCONTINUED | OUTPATIENT
Start: 2021-01-01 | End: 2021-01-01

## 2021-01-01 RX ORDER — SENNA PLUS 8.6 MG/1
2 TABLET ORAL AT BEDTIME
Refills: 0 | Status: DISCONTINUED | OUTPATIENT
Start: 2021-01-01 | End: 2021-01-01

## 2021-01-01 RX ORDER — AMIODARONE HYDROCHLORIDE 400 MG/1
TABLET ORAL
Refills: 0 | Status: DISCONTINUED | OUTPATIENT
Start: 2021-01-01 | End: 2021-01-01

## 2021-01-01 RX ORDER — SODIUM CHLORIDE 9 MG/ML
500 INJECTION INTRAMUSCULAR; INTRAVENOUS; SUBCUTANEOUS ONCE
Refills: 0 | Status: COMPLETED | OUTPATIENT
Start: 2021-01-01 | End: 2021-01-01

## 2021-01-01 RX ORDER — ATORVASTATIN CALCIUM 80 MG/1
40 TABLET, FILM COATED ORAL AT BEDTIME
Refills: 0 | Status: DISCONTINUED | OUTPATIENT
Start: 2021-01-01 | End: 2021-01-01

## 2021-01-01 RX ORDER — LOSARTAN POTASSIUM 100 MG/1
1 TABLET, FILM COATED ORAL
Qty: 0 | Refills: 0 | DISCHARGE
Start: 2021-01-01

## 2021-01-01 RX ORDER — ASPIRIN/CALCIUM CARB/MAGNESIUM 324 MG
325 TABLET ORAL ONCE
Refills: 0 | Status: COMPLETED | OUTPATIENT
Start: 2021-01-01 | End: 2021-01-01

## 2021-01-01 RX ORDER — ASPIRIN/CALCIUM CARB/MAGNESIUM 324 MG
81 TABLET ORAL DAILY
Refills: 0 | Status: DISCONTINUED | OUTPATIENT
Start: 2021-01-01 | End: 2021-01-01

## 2021-01-01 RX ORDER — FUROSEMIDE 40 MG
60 TABLET ORAL
Refills: 0 | Status: DISCONTINUED | OUTPATIENT
Start: 2021-01-01 | End: 2021-01-01

## 2021-01-01 RX ORDER — DEXTROSE 50 % IN WATER 50 %
12.5 SYRINGE (ML) INTRAVENOUS ONCE
Refills: 0 | Status: DISCONTINUED | OUTPATIENT
Start: 2021-01-01 | End: 2021-01-01

## 2021-01-01 RX ORDER — LOSARTAN POTASSIUM 100 MG/1
100 TABLET, FILM COATED ORAL DAILY
Refills: 0 | Status: DISCONTINUED | OUTPATIENT
Start: 2021-01-01 | End: 2021-01-01

## 2021-01-01 RX ORDER — HEPARIN SODIUM 5000 [USP'U]/ML
INJECTION INTRAVENOUS; SUBCUTANEOUS
Qty: 25000 | Refills: 0 | Status: DISCONTINUED | OUTPATIENT
Start: 2021-01-01 | End: 2021-01-01

## 2021-01-01 RX ORDER — INSULIN LISPRO 100/ML
VIAL (ML) SUBCUTANEOUS AT BEDTIME
Refills: 0 | Status: DISCONTINUED | OUTPATIENT
Start: 2021-01-01 | End: 2021-01-01

## 2021-01-01 RX ORDER — TAMSULOSIN HYDROCHLORIDE 0.4 MG/1
0.4 CAPSULE ORAL AT BEDTIME
Refills: 0 | Status: DISCONTINUED | OUTPATIENT
Start: 2021-01-01 | End: 2021-01-01

## 2021-01-01 RX ORDER — FUROSEMIDE 40 MG
1 TABLET ORAL
Qty: 0 | Refills: 0 | DISCHARGE
Start: 2021-01-01

## 2021-01-01 RX ORDER — FINASTERIDE 5 MG/1
1 TABLET, FILM COATED ORAL
Qty: 0 | Refills: 0 | DISCHARGE
Start: 2021-01-01

## 2021-01-01 RX ORDER — PANTOPRAZOLE SODIUM 20 MG/1
40 TABLET, DELAYED RELEASE ORAL
Refills: 0 | Status: DISCONTINUED | OUTPATIENT
Start: 2021-01-01 | End: 2021-01-01

## 2021-01-01 RX ORDER — HYDRALAZINE HCL 50 MG
1 TABLET ORAL
Qty: 0 | Refills: 0 | DISCHARGE

## 2021-01-01 RX ORDER — LIDOCAINE 4 G/100G
2 CREAM TOPICAL ONCE
Refills: 0 | Status: COMPLETED | OUTPATIENT
Start: 2021-01-01 | End: 2021-01-01

## 2021-01-01 RX ORDER — CARVEDILOL PHOSPHATE 80 MG/1
25 CAPSULE, EXTENDED RELEASE ORAL EVERY 12 HOURS
Refills: 0 | Status: DISCONTINUED | OUTPATIENT
Start: 2021-01-01 | End: 2021-01-01

## 2021-01-01 RX ORDER — APIXABAN 2.5 MG/1
1 TABLET, FILM COATED ORAL
Qty: 0 | Refills: 0 | DISCHARGE
Start: 2021-01-01

## 2021-01-01 RX ORDER — CARVEDILOL PHOSPHATE 80 MG/1
6.25 CAPSULE, EXTENDED RELEASE ORAL EVERY 12 HOURS
Refills: 0 | Status: DISCONTINUED | OUTPATIENT
Start: 2021-01-01 | End: 2021-01-01

## 2021-01-01 RX ORDER — HEPARIN SODIUM 5000 [USP'U]/ML
5000 INJECTION INTRAVENOUS; SUBCUTANEOUS ONCE
Refills: 0 | Status: DISCONTINUED | OUTPATIENT
Start: 2021-01-01 | End: 2021-01-01

## 2021-01-01 RX ORDER — APIXABAN 2.5 MG/1
1 TABLET, FILM COATED ORAL
Qty: 0 | Refills: 0 | DISCHARGE

## 2021-01-01 RX ORDER — TAMSULOSIN HYDROCHLORIDE 0.4 MG/1
1 CAPSULE ORAL
Qty: 0 | Refills: 0 | DISCHARGE

## 2021-01-01 RX ORDER — POLYETHYLENE GLYCOL 3350 17 G/17G
17 POWDER, FOR SOLUTION ORAL
Qty: 510 | Refills: 0
Start: 2021-01-01 | End: 2021-01-01

## 2021-01-01 RX ORDER — FUROSEMIDE 40 MG
1 TABLET ORAL
Qty: 0 | Refills: 0 | DISCHARGE

## 2021-01-01 RX ORDER — HEPARIN SODIUM 5000 [USP'U]/ML
2000 INJECTION INTRAVENOUS; SUBCUTANEOUS EVERY 6 HOURS
Refills: 0 | Status: DISCONTINUED | OUTPATIENT
Start: 2021-01-01 | End: 2021-01-01

## 2021-01-01 RX ORDER — FUROSEMIDE 40 MG
40 TABLET ORAL DAILY
Refills: 0 | Status: DISCONTINUED | OUTPATIENT
Start: 2021-01-01 | End: 2021-01-01

## 2021-01-01 RX ORDER — INSULIN LISPRO 100/ML
1 VIAL (ML) SUBCUTANEOUS
Qty: 0 | Refills: 0 | DISCHARGE
Start: 2021-01-01

## 2021-01-01 RX ORDER — POTASSIUM CHLORIDE 20 MEQ
20 PACKET (EA) ORAL ONCE
Refills: 0 | Status: COMPLETED | OUTPATIENT
Start: 2021-01-01 | End: 2021-01-01

## 2021-01-01 RX ORDER — ATORVASTATIN CALCIUM 80 MG/1
1 TABLET, FILM COATED ORAL
Qty: 0 | Refills: 0 | DISCHARGE

## 2021-01-01 RX ORDER — CARVEDILOL PHOSPHATE 80 MG/1
1 CAPSULE, EXTENDED RELEASE ORAL
Qty: 0 | Refills: 0 | DISCHARGE

## 2021-01-01 RX ORDER — APIXABAN 2.5 MG/1
5 TABLET, FILM COATED ORAL
Refills: 0 | Status: DISCONTINUED | OUTPATIENT
Start: 2021-01-01 | End: 2021-01-01

## 2021-01-01 RX ORDER — HEPARIN SODIUM 5000 [USP'U]/ML
700 INJECTION INTRAVENOUS; SUBCUTANEOUS
Qty: 25000 | Refills: 0 | Status: DISCONTINUED | OUTPATIENT
Start: 2021-01-01 | End: 2021-01-01

## 2021-01-01 RX ORDER — ASPIRIN/CALCIUM CARB/MAGNESIUM 324 MG
1 TABLET ORAL
Qty: 0 | Refills: 0 | DISCHARGE
Start: 2021-01-01

## 2021-01-01 RX ORDER — APIXABAN 2.5 MG/1
0.5 TABLET, FILM COATED ORAL
Qty: 0 | Refills: 0 | DISCHARGE
Start: 2021-01-01

## 2021-01-01 RX ORDER — ASPIRIN/CALCIUM CARB/MAGNESIUM 324 MG
1 TABLET ORAL
Qty: 30 | Refills: 0
Start: 2021-01-01 | End: 2021-01-01

## 2021-01-01 RX ORDER — CLOPIDOGREL BISULFATE 75 MG/1
75 TABLET, FILM COATED ORAL DAILY
Refills: 0 | Status: DISCONTINUED | OUTPATIENT
Start: 2021-01-01 | End: 2021-01-01

## 2021-01-01 RX ORDER — CEFTRIAXONE 500 MG/1
1000 INJECTION, POWDER, FOR SOLUTION INTRAMUSCULAR; INTRAVENOUS EVERY 24 HOURS
Refills: 0 | Status: COMPLETED | OUTPATIENT
Start: 2021-01-01 | End: 2021-01-01

## 2021-01-01 RX ORDER — ATORVASTATIN CALCIUM 80 MG/1
20 TABLET, FILM COATED ORAL AT BEDTIME
Refills: 0 | Status: DISCONTINUED | OUTPATIENT
Start: 2021-01-01 | End: 2021-01-01

## 2021-01-01 RX ORDER — POTASSIUM CHLORIDE 20 MEQ
20 PACKET (EA) ORAL DAILY
Refills: 0 | Status: DISCONTINUED | OUTPATIENT
Start: 2021-01-01 | End: 2021-01-01

## 2021-01-01 RX ORDER — LORATADINE 10 MG/1
1 TABLET ORAL
Qty: 0 | Refills: 0 | DISCHARGE
Start: 2021-01-01

## 2021-01-01 RX ORDER — AMIODARONE HYDROCHLORIDE 400 MG/1
2 TABLET ORAL
Qty: 0 | Refills: 0 | DISCHARGE
Start: 2021-01-01

## 2021-01-01 RX ORDER — CARVEDILOL PHOSPHATE 80 MG/1
1 CAPSULE, EXTENDED RELEASE ORAL
Qty: 60 | Refills: 0
Start: 2021-01-01 | End: 2021-01-01

## 2021-01-01 RX ORDER — AMLODIPINE BESYLATE 2.5 MG/1
1 TABLET ORAL
Qty: 0 | Refills: 0 | DISCHARGE

## 2021-01-01 RX ORDER — ACETAMINOPHEN 500 MG
2 TABLET ORAL
Qty: 0 | Refills: 0 | DISCHARGE
Start: 2021-01-01

## 2021-01-01 RX ORDER — AMIODARONE HYDROCHLORIDE 400 MG/1
1 TABLET ORAL
Qty: 30 | Refills: 0
Start: 2021-01-01 | End: 2021-01-01

## 2021-01-01 RX ORDER — FUROSEMIDE 40 MG
80 TABLET ORAL
Refills: 0 | Status: DISCONTINUED | OUTPATIENT
Start: 2021-01-01 | End: 2021-01-01

## 2021-01-01 RX ORDER — INFLUENZA VIRUS VACCINE 15; 15; 15; 15 UG/.5ML; UG/.5ML; UG/.5ML; UG/.5ML
0.5 SUSPENSION INTRAMUSCULAR ONCE
Refills: 0 | Status: DISCONTINUED | OUTPATIENT
Start: 2021-01-01 | End: 2021-01-01

## 2021-01-01 RX ORDER — AMIODARONE HYDROCHLORIDE 400 MG/1
0.5 TABLET ORAL
Qty: 450 | Refills: 0 | Status: DISCONTINUED | OUTPATIENT
Start: 2021-01-01 | End: 2021-01-01

## 2021-01-01 RX ORDER — AMIODARONE HYDROCHLORIDE 400 MG/1
150 TABLET ORAL ONCE
Refills: 0 | Status: COMPLETED | OUTPATIENT
Start: 2021-01-01 | End: 2021-01-01

## 2021-01-01 RX ORDER — HEPARIN SODIUM 5000 [USP'U]/ML
5000 INJECTION INTRAVENOUS; SUBCUTANEOUS EVERY 12 HOURS
Refills: 0 | Status: DISCONTINUED | OUTPATIENT
Start: 2021-01-01 | End: 2021-01-01

## 2021-01-01 RX ORDER — ERYTHROPOIETIN 10000 [IU]/ML
10000 INJECTION, SOLUTION INTRAVENOUS; SUBCUTANEOUS ONCE
Refills: 0 | Status: DISCONTINUED | OUTPATIENT
Start: 2021-01-01 | End: 2021-01-01

## 2021-01-01 RX ORDER — CARVEDILOL PHOSPHATE 80 MG/1
12.5 CAPSULE, EXTENDED RELEASE ORAL EVERY 12 HOURS
Refills: 0 | Status: DISCONTINUED | OUTPATIENT
Start: 2021-01-01 | End: 2021-01-01

## 2021-01-01 RX ORDER — LOSARTAN POTASSIUM 100 MG/1
1 TABLET, FILM COATED ORAL
Qty: 0 | Refills: 0 | DISCHARGE

## 2021-01-01 RX ORDER — HEPARIN SODIUM 5000 [USP'U]/ML
1100 INJECTION INTRAVENOUS; SUBCUTANEOUS
Qty: 25000 | Refills: 0 | Status: DISCONTINUED | OUTPATIENT
Start: 2021-01-01 | End: 2021-01-01

## 2021-01-01 RX ORDER — AMIODARONE HYDROCHLORIDE 400 MG/1
400 TABLET ORAL EVERY 8 HOURS
Refills: 0 | Status: COMPLETED | OUTPATIENT
Start: 2021-01-01 | End: 2021-01-01

## 2021-01-01 RX ORDER — POTASSIUM CHLORIDE 20 MEQ
40 PACKET (EA) ORAL EVERY 4 HOURS
Refills: 0 | Status: DISCONTINUED | OUTPATIENT
Start: 2021-01-01 | End: 2021-01-01

## 2021-01-01 RX ORDER — FUROSEMIDE 40 MG
20 TABLET ORAL ONCE
Refills: 0 | Status: COMPLETED | OUTPATIENT
Start: 2021-01-01 | End: 2021-01-01

## 2021-01-01 RX ORDER — AMIODARONE HYDROCHLORIDE 400 MG/1
1 TABLET ORAL
Qty: 900 | Refills: 0 | Status: DISCONTINUED | OUTPATIENT
Start: 2021-01-01 | End: 2021-01-01

## 2021-01-01 RX ORDER — KETOTIFEN FUMARATE 0.34 MG/ML
1 SOLUTION OPHTHALMIC
Refills: 0 | Status: DISCONTINUED | OUTPATIENT
Start: 2021-01-01 | End: 2021-01-01

## 2021-01-01 RX ORDER — POLYETHYLENE GLYCOL 3350 17 G/17G
17 POWDER, FOR SOLUTION ORAL
Refills: 0 | Status: DISCONTINUED | OUTPATIENT
Start: 2021-01-01 | End: 2021-01-01

## 2021-01-01 RX ORDER — SODIUM CHLORIDE 0.65 %
1 AEROSOL, SPRAY (ML) NASAL ONCE
Refills: 0 | Status: DISCONTINUED | OUTPATIENT
Start: 2021-01-01 | End: 2021-01-01

## 2021-01-01 RX ORDER — LIDOCAINE HCL 20 MG/ML
1 VIAL (ML) INJECTION
Qty: 2 | Refills: 0 | Status: DISCONTINUED | OUTPATIENT
Start: 2021-01-01 | End: 2021-01-01

## 2021-01-01 RX ORDER — CHLORHEXIDINE GLUCONATE 213 G/1000ML
1 SOLUTION TOPICAL
Refills: 0 | Status: DISCONTINUED | OUTPATIENT
Start: 2021-01-01 | End: 2021-01-01

## 2021-01-01 RX ORDER — LORATADINE 10 MG/1
10 TABLET ORAL DAILY
Refills: 0 | Status: DISCONTINUED | OUTPATIENT
Start: 2021-01-01 | End: 2021-01-01

## 2021-01-01 RX ORDER — HEPARIN SODIUM 5000 [USP'U]/ML
4500 INJECTION INTRAVENOUS; SUBCUTANEOUS EVERY 6 HOURS
Refills: 0 | Status: DISCONTINUED | OUTPATIENT
Start: 2021-01-01 | End: 2021-01-01

## 2021-01-01 RX ORDER — CALCIUM GLUCONATE 100 MG/ML
2 VIAL (ML) INTRAVENOUS ONCE
Refills: 0 | Status: COMPLETED | OUTPATIENT
Start: 2021-01-01 | End: 2021-01-01

## 2021-01-01 RX ORDER — TAMSULOSIN HYDROCHLORIDE 0.4 MG/1
1 CAPSULE ORAL
Qty: 0 | Refills: 0 | DISCHARGE
Start: 2021-01-01

## 2021-01-01 RX ADMIN — AMIODARONE HYDROCHLORIDE 400 MILLIGRAM(S): 400 TABLET ORAL at 05:17

## 2021-01-01 RX ADMIN — FINASTERIDE 5 MILLIGRAM(S): 5 TABLET, FILM COATED ORAL at 14:23

## 2021-01-01 RX ADMIN — Medication 20 MILLIEQUIVALENT(S): at 12:39

## 2021-01-01 RX ADMIN — HEPARIN SODIUM 5 UNIT(S)/HR: 5000 INJECTION INTRAVENOUS; SUBCUTANEOUS at 21:51

## 2021-01-01 RX ADMIN — Medication 10 MILLIGRAM(S): at 22:01

## 2021-01-01 RX ADMIN — AMIODARONE HYDROCHLORIDE 200 MILLIGRAM(S): 400 TABLET ORAL at 07:06

## 2021-01-01 RX ADMIN — Medication 10 MILLIGRAM(S): at 06:03

## 2021-01-01 RX ADMIN — PANTOPRAZOLE SODIUM 40 MILLIGRAM(S): 20 TABLET, DELAYED RELEASE ORAL at 17:45

## 2021-01-01 RX ADMIN — Medication 1: at 12:34

## 2021-01-01 RX ADMIN — Medication 3: at 12:46

## 2021-01-01 RX ADMIN — SENNA PLUS 2 TABLET(S): 8.6 TABLET ORAL at 22:42

## 2021-01-01 RX ADMIN — ATORVASTATIN CALCIUM 40 MILLIGRAM(S): 80 TABLET, FILM COATED ORAL at 22:42

## 2021-01-01 RX ADMIN — Medication 81 MILLIGRAM(S): at 12:35

## 2021-01-01 RX ADMIN — SODIUM CHLORIDE 500 MILLILITER(S): 9 INJECTION INTRAMUSCULAR; INTRAVENOUS; SUBCUTANEOUS at 14:54

## 2021-01-01 RX ADMIN — PANTOPRAZOLE SODIUM 40 MILLIGRAM(S): 20 TABLET, DELAYED RELEASE ORAL at 18:38

## 2021-01-01 RX ADMIN — Medication 80 MILLIGRAM(S): at 18:25

## 2021-01-01 RX ADMIN — APIXABAN 2.5 MILLIGRAM(S): 2.5 TABLET, FILM COATED ORAL at 05:23

## 2021-01-01 RX ADMIN — Medication 40 MILLIGRAM(S): at 05:50

## 2021-01-01 RX ADMIN — Medication 1: at 09:47

## 2021-01-01 RX ADMIN — Medication 650 MILLIGRAM(S): at 18:38

## 2021-01-01 RX ADMIN — Medication 1: at 07:43

## 2021-01-01 RX ADMIN — TAMSULOSIN HYDROCHLORIDE 0.4 MILLIGRAM(S): 0.4 CAPSULE ORAL at 21:12

## 2021-01-01 RX ADMIN — FINASTERIDE 5 MILLIGRAM(S): 5 TABLET, FILM COATED ORAL at 12:39

## 2021-01-01 RX ADMIN — Medication 2: at 12:24

## 2021-01-01 RX ADMIN — Medication 650 MILLIGRAM(S): at 17:56

## 2021-01-01 RX ADMIN — PANTOPRAZOLE SODIUM 40 MILLIGRAM(S): 20 TABLET, DELAYED RELEASE ORAL at 05:38

## 2021-01-01 RX ADMIN — Medication 2: at 18:09

## 2021-01-01 RX ADMIN — Medication 1: at 12:39

## 2021-01-01 RX ADMIN — APIXABAN 2.5 MILLIGRAM(S): 2.5 TABLET, FILM COATED ORAL at 18:07

## 2021-01-01 RX ADMIN — ATORVASTATIN CALCIUM 40 MILLIGRAM(S): 80 TABLET, FILM COATED ORAL at 21:29

## 2021-01-01 RX ADMIN — FINASTERIDE 5 MILLIGRAM(S): 5 TABLET, FILM COATED ORAL at 12:34

## 2021-01-01 RX ADMIN — TAMSULOSIN HYDROCHLORIDE 0.4 MILLIGRAM(S): 0.4 CAPSULE ORAL at 23:23

## 2021-01-01 RX ADMIN — Medication 81 MILLIGRAM(S): at 11:52

## 2021-01-01 RX ADMIN — HEPARIN SODIUM 7 UNIT(S)/HR: 5000 INJECTION INTRAVENOUS; SUBCUTANEOUS at 22:19

## 2021-01-01 RX ADMIN — Medication 650 MILLIGRAM(S): at 18:35

## 2021-01-01 RX ADMIN — HEPARIN SODIUM 700 UNIT(S)/HR: 5000 INJECTION INTRAVENOUS; SUBCUTANEOUS at 15:28

## 2021-01-01 RX ADMIN — Medication 2: at 16:42

## 2021-01-01 RX ADMIN — POLYETHYLENE GLYCOL 3350 17 GRAM(S): 17 POWDER, FOR SOLUTION ORAL at 18:38

## 2021-01-01 RX ADMIN — Medication 650 MILLIGRAM(S): at 18:17

## 2021-01-01 RX ADMIN — CHLORHEXIDINE GLUCONATE 1 APPLICATION(S): 213 SOLUTION TOPICAL at 12:05

## 2021-01-01 RX ADMIN — Medication 650 MILLIGRAM(S): at 17:14

## 2021-01-01 RX ADMIN — TAMSULOSIN HYDROCHLORIDE 0.4 MILLIGRAM(S): 0.4 CAPSULE ORAL at 22:03

## 2021-01-01 RX ADMIN — AMIODARONE HYDROCHLORIDE 400 MILLIGRAM(S): 400 TABLET ORAL at 21:42

## 2021-01-01 RX ADMIN — Medication 81 MILLIGRAM(S): at 12:05

## 2021-01-01 RX ADMIN — FINASTERIDE 5 MILLIGRAM(S): 5 TABLET, FILM COATED ORAL at 12:29

## 2021-01-01 RX ADMIN — APIXABAN 2.5 MILLIGRAM(S): 2.5 TABLET, FILM COATED ORAL at 18:01

## 2021-01-01 RX ADMIN — FINASTERIDE 5 MILLIGRAM(S): 5 TABLET, FILM COATED ORAL at 11:36

## 2021-01-01 RX ADMIN — HEPARIN SODIUM 5 UNIT(S)/HR: 5000 INJECTION INTRAVENOUS; SUBCUTANEOUS at 05:54

## 2021-01-01 RX ADMIN — Medication 40 MILLIGRAM(S): at 17:54

## 2021-01-01 RX ADMIN — Medication 81 MILLIGRAM(S): at 12:29

## 2021-01-01 RX ADMIN — HEPARIN SODIUM 900 UNIT(S)/HR: 5000 INJECTION INTRAVENOUS; SUBCUTANEOUS at 00:34

## 2021-01-01 RX ADMIN — Medication 81 MILLIGRAM(S): at 12:19

## 2021-01-01 RX ADMIN — Medication 81 MILLIGRAM(S): at 12:20

## 2021-01-01 RX ADMIN — Medication 81 MILLIGRAM(S): at 11:36

## 2021-01-01 RX ADMIN — CARVEDILOL PHOSPHATE 12.5 MILLIGRAM(S): 80 CAPSULE, EXTENDED RELEASE ORAL at 05:49

## 2021-01-01 RX ADMIN — AMIODARONE HYDROCHLORIDE 400 MILLIGRAM(S): 400 TABLET ORAL at 05:52

## 2021-01-01 RX ADMIN — Medication 80 MILLIGRAM(S): at 19:12

## 2021-01-01 RX ADMIN — ATORVASTATIN CALCIUM 40 MILLIGRAM(S): 80 TABLET, FILM COATED ORAL at 22:41

## 2021-01-01 RX ADMIN — CLOPIDOGREL BISULFATE 75 MILLIGRAM(S): 75 TABLET, FILM COATED ORAL at 11:57

## 2021-01-01 RX ADMIN — POLYETHYLENE GLYCOL 3350 17 GRAM(S): 17 POWDER, FOR SOLUTION ORAL at 06:04

## 2021-01-01 RX ADMIN — Medication 81 MILLIGRAM(S): at 11:57

## 2021-01-01 RX ADMIN — Medication 81 MILLIGRAM(S): at 11:17

## 2021-01-01 RX ADMIN — FINASTERIDE 5 MILLIGRAM(S): 5 TABLET, FILM COATED ORAL at 11:04

## 2021-01-01 RX ADMIN — KETOTIFEN FUMARATE 1 DROP(S): 0.34 SOLUTION OPHTHALMIC at 18:04

## 2021-01-01 RX ADMIN — Medication 40 MILLIGRAM(S): at 05:24

## 2021-01-01 RX ADMIN — Medication 81 MILLIGRAM(S): at 12:48

## 2021-01-01 RX ADMIN — FINASTERIDE 5 MILLIGRAM(S): 5 TABLET, FILM COATED ORAL at 11:52

## 2021-01-01 RX ADMIN — Medication 650 MILLIGRAM(S): at 05:08

## 2021-01-01 RX ADMIN — Medication 650 MILLIGRAM(S): at 07:44

## 2021-01-01 RX ADMIN — TAMSULOSIN HYDROCHLORIDE 0.4 MILLIGRAM(S): 0.4 CAPSULE ORAL at 22:02

## 2021-01-01 RX ADMIN — APIXABAN 2.5 MILLIGRAM(S): 2.5 TABLET, FILM COATED ORAL at 18:23

## 2021-01-01 RX ADMIN — APIXABAN 2.5 MILLIGRAM(S): 2.5 TABLET, FILM COATED ORAL at 18:21

## 2021-01-01 RX ADMIN — Medication 1: at 13:05

## 2021-01-01 RX ADMIN — APIXABAN 2.5 MILLIGRAM(S): 2.5 TABLET, FILM COATED ORAL at 23:00

## 2021-01-01 RX ADMIN — Medication 60 MILLIGRAM(S): at 06:03

## 2021-01-01 RX ADMIN — AMIODARONE HYDROCHLORIDE 33.3 MG/MIN: 400 TABLET ORAL at 08:50

## 2021-01-01 RX ADMIN — ATORVASTATIN CALCIUM 40 MILLIGRAM(S): 80 TABLET, FILM COATED ORAL at 22:19

## 2021-01-01 RX ADMIN — Medication 81 MILLIGRAM(S): at 11:50

## 2021-01-01 RX ADMIN — SENNA PLUS 2 TABLET(S): 8.6 TABLET ORAL at 21:55

## 2021-01-01 RX ADMIN — AMIODARONE HYDROCHLORIDE 16.7 MG/MIN: 400 TABLET ORAL at 07:42

## 2021-01-01 RX ADMIN — TAMSULOSIN HYDROCHLORIDE 0.4 MILLIGRAM(S): 0.4 CAPSULE ORAL at 21:31

## 2021-01-01 RX ADMIN — Medication 81 MILLIGRAM(S): at 11:08

## 2021-01-01 RX ADMIN — LOSARTAN POTASSIUM 100 MILLIGRAM(S): 100 TABLET, FILM COATED ORAL at 05:48

## 2021-01-01 RX ADMIN — POLYETHYLENE GLYCOL 3350 17 GRAM(S): 17 POWDER, FOR SOLUTION ORAL at 05:08

## 2021-01-01 RX ADMIN — LORATADINE 10 MILLIGRAM(S): 10 TABLET ORAL at 11:56

## 2021-01-01 RX ADMIN — PANTOPRAZOLE SODIUM 40 MILLIGRAM(S): 20 TABLET, DELAYED RELEASE ORAL at 06:57

## 2021-01-01 RX ADMIN — Medication 40 MILLIGRAM(S): at 05:48

## 2021-01-01 RX ADMIN — ATORVASTATIN CALCIUM 40 MILLIGRAM(S): 80 TABLET, FILM COATED ORAL at 21:33

## 2021-01-01 RX ADMIN — CEFTRIAXONE 100 MILLIGRAM(S): 500 INJECTION, POWDER, FOR SOLUTION INTRAMUSCULAR; INTRAVENOUS at 15:07

## 2021-01-01 RX ADMIN — PANTOPRAZOLE SODIUM 40 MILLIGRAM(S): 20 TABLET, DELAYED RELEASE ORAL at 05:36

## 2021-01-01 RX ADMIN — FINASTERIDE 5 MILLIGRAM(S): 5 TABLET, FILM COATED ORAL at 12:19

## 2021-01-01 RX ADMIN — HEPARIN SODIUM 5 UNIT(S)/HR: 5000 INJECTION INTRAVENOUS; SUBCUTANEOUS at 06:50

## 2021-01-01 RX ADMIN — Medication 81 MILLIGRAM(S): at 12:12

## 2021-01-01 RX ADMIN — ATORVASTATIN CALCIUM 40 MILLIGRAM(S): 80 TABLET, FILM COATED ORAL at 23:23

## 2021-01-01 RX ADMIN — KETOTIFEN FUMARATE 1 DROP(S): 0.34 SOLUTION OPHTHALMIC at 17:38

## 2021-01-01 RX ADMIN — HEPARIN SODIUM 0 UNIT(S)/HR: 5000 INJECTION INTRAVENOUS; SUBCUTANEOUS at 07:52

## 2021-01-01 RX ADMIN — FINASTERIDE 5 MILLIGRAM(S): 5 TABLET, FILM COATED ORAL at 11:56

## 2021-01-01 RX ADMIN — Medication 10 MILLIGRAM(S): at 09:40

## 2021-01-01 RX ADMIN — Medication 1: at 08:37

## 2021-01-01 RX ADMIN — POLYETHYLENE GLYCOL 3350 17 GRAM(S): 17 POWDER, FOR SOLUTION ORAL at 11:51

## 2021-01-01 RX ADMIN — Medication 40 MILLIGRAM(S): at 17:11

## 2021-01-01 RX ADMIN — LORATADINE 10 MILLIGRAM(S): 10 TABLET ORAL at 11:54

## 2021-01-01 RX ADMIN — Medication 1: at 19:26

## 2021-01-01 RX ADMIN — FINASTERIDE 5 MILLIGRAM(S): 5 TABLET, FILM COATED ORAL at 11:21

## 2021-01-01 RX ADMIN — APIXABAN 2.5 MILLIGRAM(S): 2.5 TABLET, FILM COATED ORAL at 05:51

## 2021-01-01 RX ADMIN — Medication 650 MILLIGRAM(S): at 17:16

## 2021-01-01 RX ADMIN — Medication 650 MILLIGRAM(S): at 17:10

## 2021-01-01 RX ADMIN — Medication 650 MILLIGRAM(S): at 06:03

## 2021-01-01 RX ADMIN — AMIODARONE HYDROCHLORIDE 400 MILLIGRAM(S): 400 TABLET ORAL at 05:54

## 2021-01-01 RX ADMIN — Medication 3: at 17:52

## 2021-01-01 RX ADMIN — HEPARIN SODIUM 0 UNIT(S)/HR: 5000 INJECTION INTRAVENOUS; SUBCUTANEOUS at 23:31

## 2021-01-01 RX ADMIN — HEPARIN SODIUM 700 UNIT(S)/HR: 5000 INJECTION INTRAVENOUS; SUBCUTANEOUS at 22:26

## 2021-01-01 RX ADMIN — PANTOPRAZOLE SODIUM 40 MILLIGRAM(S): 20 TABLET, DELAYED RELEASE ORAL at 06:03

## 2021-01-01 RX ADMIN — FINASTERIDE 5 MILLIGRAM(S): 5 TABLET, FILM COATED ORAL at 11:51

## 2021-01-01 RX ADMIN — PANTOPRAZOLE SODIUM 40 MILLIGRAM(S): 20 TABLET, DELAYED RELEASE ORAL at 17:10

## 2021-01-01 RX ADMIN — PANTOPRAZOLE SODIUM 40 MILLIGRAM(S): 20 TABLET, DELAYED RELEASE ORAL at 05:53

## 2021-01-01 RX ADMIN — APIXABAN 2.5 MILLIGRAM(S): 2.5 TABLET, FILM COATED ORAL at 17:38

## 2021-01-01 RX ADMIN — CLOPIDOGREL BISULFATE 75 MILLIGRAM(S): 75 TABLET, FILM COATED ORAL at 11:17

## 2021-01-01 RX ADMIN — ATORVASTATIN CALCIUM 20 MILLIGRAM(S): 80 TABLET, FILM COATED ORAL at 23:00

## 2021-01-01 RX ADMIN — Medication 60 MILLIGRAM(S): at 17:38

## 2021-01-01 RX ADMIN — AMIODARONE HYDROCHLORIDE 400 MILLIGRAM(S): 400 TABLET ORAL at 05:22

## 2021-01-01 RX ADMIN — Medication 650 MILLIGRAM(S): at 06:57

## 2021-01-01 RX ADMIN — FINASTERIDE 5 MILLIGRAM(S): 5 TABLET, FILM COATED ORAL at 12:36

## 2021-01-01 RX ADMIN — FINASTERIDE 5 MILLIGRAM(S): 5 TABLET, FILM COATED ORAL at 11:17

## 2021-01-01 RX ADMIN — HEPARIN SODIUM 7 UNIT(S)/HR: 5000 INJECTION INTRAVENOUS; SUBCUTANEOUS at 12:00

## 2021-01-01 RX ADMIN — AMIODARONE HYDROCHLORIDE 400 MILLIGRAM(S): 400 TABLET ORAL at 05:36

## 2021-01-01 RX ADMIN — Medication 2: at 12:40

## 2021-01-01 RX ADMIN — ATORVASTATIN CALCIUM 40 MILLIGRAM(S): 80 TABLET, FILM COATED ORAL at 21:53

## 2021-01-01 RX ADMIN — Medication 80 MILLIGRAM(S): at 05:19

## 2021-01-01 RX ADMIN — KETOTIFEN FUMARATE 1 DROP(S): 0.34 SOLUTION OPHTHALMIC at 18:07

## 2021-01-01 RX ADMIN — ATORVASTATIN CALCIUM 40 MILLIGRAM(S): 80 TABLET, FILM COATED ORAL at 21:48

## 2021-01-01 RX ADMIN — TAMSULOSIN HYDROCHLORIDE 0.4 MILLIGRAM(S): 0.4 CAPSULE ORAL at 21:55

## 2021-01-01 RX ADMIN — Medication 3: at 11:48

## 2021-01-01 RX ADMIN — AMIODARONE HYDROCHLORIDE 400 MILLIGRAM(S): 400 TABLET ORAL at 05:57

## 2021-01-01 RX ADMIN — Medication 10 MILLIGRAM(S): at 23:24

## 2021-01-01 RX ADMIN — Medication 20 MILLIGRAM(S): at 12:26

## 2021-01-01 RX ADMIN — ATORVASTATIN CALCIUM 40 MILLIGRAM(S): 80 TABLET, FILM COATED ORAL at 21:54

## 2021-01-01 RX ADMIN — AMIODARONE HYDROCHLORIDE 16.7 MG/MIN: 400 TABLET ORAL at 22:19

## 2021-01-01 RX ADMIN — CARVEDILOL PHOSPHATE 25 MILLIGRAM(S): 80 CAPSULE, EXTENDED RELEASE ORAL at 05:25

## 2021-01-01 RX ADMIN — TAMSULOSIN HYDROCHLORIDE 0.4 MILLIGRAM(S): 0.4 CAPSULE ORAL at 21:29

## 2021-01-01 RX ADMIN — KETOTIFEN FUMARATE 1 DROP(S): 0.34 SOLUTION OPHTHALMIC at 21:45

## 2021-01-01 RX ADMIN — FINASTERIDE 5 MILLIGRAM(S): 5 TABLET, FILM COATED ORAL at 12:06

## 2021-01-01 RX ADMIN — Medication 650 MILLIGRAM(S): at 05:12

## 2021-01-01 RX ADMIN — Medication 650 MILLIGRAM(S): at 05:34

## 2021-01-01 RX ADMIN — SENNA PLUS 2 TABLET(S): 8.6 TABLET ORAL at 22:17

## 2021-01-01 RX ADMIN — Medication 81 MILLIGRAM(S): at 11:21

## 2021-01-01 RX ADMIN — Medication 40 MILLIEQUIVALENT(S): at 16:59

## 2021-01-01 RX ADMIN — Medication 2: at 18:14

## 2021-01-01 RX ADMIN — APIXABAN 2.5 MILLIGRAM(S): 2.5 TABLET, FILM COATED ORAL at 19:11

## 2021-01-01 RX ADMIN — FINASTERIDE 5 MILLIGRAM(S): 5 TABLET, FILM COATED ORAL at 11:49

## 2021-01-01 RX ADMIN — KETOTIFEN FUMARATE 1 DROP(S): 0.34 SOLUTION OPHTHALMIC at 05:38

## 2021-01-01 RX ADMIN — HEPARIN SODIUM 5000 UNIT(S): 5000 INJECTION INTRAVENOUS; SUBCUTANEOUS at 17:15

## 2021-01-01 RX ADMIN — ATORVASTATIN CALCIUM 40 MILLIGRAM(S): 80 TABLET, FILM COATED ORAL at 21:38

## 2021-01-01 RX ADMIN — Medication 1: at 17:40

## 2021-01-01 RX ADMIN — APIXABAN 2.5 MILLIGRAM(S): 2.5 TABLET, FILM COATED ORAL at 05:37

## 2021-01-01 RX ADMIN — AMIODARONE HYDROCHLORIDE 400 MILLIGRAM(S): 400 TABLET ORAL at 06:02

## 2021-01-01 RX ADMIN — Medication 650 MILLIGRAM(S): at 05:07

## 2021-01-01 RX ADMIN — AMIODARONE HYDROCHLORIDE 400 MILLIGRAM(S): 400 TABLET ORAL at 05:50

## 2021-01-01 RX ADMIN — ATORVASTATIN CALCIUM 40 MILLIGRAM(S): 80 TABLET, FILM COATED ORAL at 21:12

## 2021-01-01 RX ADMIN — TAMSULOSIN HYDROCHLORIDE 0.4 MILLIGRAM(S): 0.4 CAPSULE ORAL at 22:11

## 2021-01-01 RX ADMIN — Medication 1: at 13:38

## 2021-01-01 RX ADMIN — PANTOPRAZOLE SODIUM 40 MILLIGRAM(S): 20 TABLET, DELAYED RELEASE ORAL at 05:42

## 2021-01-01 RX ADMIN — Medication 81 MILLIGRAM(S): at 11:04

## 2021-01-01 RX ADMIN — TAMSULOSIN HYDROCHLORIDE 0.4 MILLIGRAM(S): 0.4 CAPSULE ORAL at 21:53

## 2021-01-01 RX ADMIN — Medication 10 MILLIGRAM(S): at 10:40

## 2021-01-01 RX ADMIN — PANTOPRAZOLE SODIUM 40 MILLIGRAM(S): 20 TABLET, DELAYED RELEASE ORAL at 17:11

## 2021-01-01 RX ADMIN — Medication 40 MILLIGRAM(S): at 05:36

## 2021-01-01 RX ADMIN — AMIODARONE HYDROCHLORIDE 400 MILLIGRAM(S): 400 TABLET ORAL at 15:14

## 2021-01-01 RX ADMIN — POLYETHYLENE GLYCOL 3350 17 GRAM(S): 17 POWDER, FOR SOLUTION ORAL at 11:49

## 2021-01-01 RX ADMIN — ATORVASTATIN CALCIUM 40 MILLIGRAM(S): 80 TABLET, FILM COATED ORAL at 21:16

## 2021-01-01 RX ADMIN — AMIODARONE HYDROCHLORIDE 150 MILLIGRAM(S): 400 TABLET ORAL at 08:10

## 2021-01-01 RX ADMIN — POLYETHYLENE GLYCOL 3350 17 GRAM(S): 17 POWDER, FOR SOLUTION ORAL at 05:11

## 2021-01-01 RX ADMIN — Medication 40 MILLIGRAM(S): at 19:12

## 2021-01-01 RX ADMIN — Medication 81 MILLIGRAM(S): at 10:53

## 2021-01-01 RX ADMIN — SENNA PLUS 2 TABLET(S): 8.6 TABLET ORAL at 21:54

## 2021-01-01 RX ADMIN — Medication 20 MILLIEQUIVALENT(S): at 12:12

## 2021-01-01 RX ADMIN — KETOTIFEN FUMARATE 1 DROP(S): 0.34 SOLUTION OPHTHALMIC at 06:30

## 2021-01-01 RX ADMIN — FINASTERIDE 5 MILLIGRAM(S): 5 TABLET, FILM COATED ORAL at 11:37

## 2021-01-01 RX ADMIN — Medication 1: at 12:42

## 2021-01-01 RX ADMIN — Medication 1: at 18:14

## 2021-01-01 RX ADMIN — SENNA PLUS 2 TABLET(S): 8.6 TABLET ORAL at 21:31

## 2021-01-01 RX ADMIN — PANTOPRAZOLE SODIUM 40 MILLIGRAM(S): 20 TABLET, DELAYED RELEASE ORAL at 08:45

## 2021-01-01 RX ADMIN — APIXABAN 2.5 MILLIGRAM(S): 2.5 TABLET, FILM COATED ORAL at 05:24

## 2021-01-01 RX ADMIN — TAMSULOSIN HYDROCHLORIDE 0.4 MILLIGRAM(S): 0.4 CAPSULE ORAL at 21:38

## 2021-01-01 RX ADMIN — Medication 650 MILLIGRAM(S): at 17:24

## 2021-01-01 RX ADMIN — LORATADINE 10 MILLIGRAM(S): 10 TABLET ORAL at 11:51

## 2021-01-01 RX ADMIN — PANTOPRAZOLE SODIUM 40 MILLIGRAM(S): 20 TABLET, DELAYED RELEASE ORAL at 08:18

## 2021-01-01 RX ADMIN — ATORVASTATIN CALCIUM 40 MILLIGRAM(S): 80 TABLET, FILM COATED ORAL at 21:42

## 2021-01-01 RX ADMIN — CLOPIDOGREL BISULFATE 75 MILLIGRAM(S): 75 TABLET, FILM COATED ORAL at 11:04

## 2021-01-01 RX ADMIN — Medication 1: at 18:05

## 2021-01-01 RX ADMIN — Medication 40 MILLIGRAM(S): at 18:23

## 2021-01-01 RX ADMIN — APIXABAN 2.5 MILLIGRAM(S): 2.5 TABLET, FILM COATED ORAL at 05:49

## 2021-01-01 RX ADMIN — ATORVASTATIN CALCIUM 20 MILLIGRAM(S): 80 TABLET, FILM COATED ORAL at 21:07

## 2021-01-01 RX ADMIN — PANTOPRAZOLE SODIUM 40 MILLIGRAM(S): 20 TABLET, DELAYED RELEASE ORAL at 05:20

## 2021-01-01 RX ADMIN — Medication 10 MILLIGRAM(S): at 12:19

## 2021-01-01 RX ADMIN — FINASTERIDE 5 MILLIGRAM(S): 5 TABLET, FILM COATED ORAL at 12:57

## 2021-01-01 RX ADMIN — SENNA PLUS 2 TABLET(S): 8.6 TABLET ORAL at 22:41

## 2021-01-01 RX ADMIN — AMIODARONE HYDROCHLORIDE 400 MILLIGRAM(S): 400 TABLET ORAL at 21:48

## 2021-01-01 RX ADMIN — APIXABAN 2.5 MILLIGRAM(S): 2.5 TABLET, FILM COATED ORAL at 05:50

## 2021-01-01 RX ADMIN — HEPARIN SODIUM 1100 UNIT(S)/HR: 5000 INJECTION INTRAVENOUS; SUBCUTANEOUS at 16:10

## 2021-01-01 RX ADMIN — Medication 81 MILLIGRAM(S): at 12:11

## 2021-01-01 RX ADMIN — KETOTIFEN FUMARATE 1 DROP(S): 0.34 SOLUTION OPHTHALMIC at 06:04

## 2021-01-01 RX ADMIN — Medication 10 MILLIGRAM(S): at 10:49

## 2021-01-01 RX ADMIN — ATORVASTATIN CALCIUM 40 MILLIGRAM(S): 80 TABLET, FILM COATED ORAL at 21:49

## 2021-01-01 RX ADMIN — Medication 2: at 12:36

## 2021-01-01 RX ADMIN — HEPARIN SODIUM 5 UNIT(S)/HR: 5000 INJECTION INTRAVENOUS; SUBCUTANEOUS at 08:10

## 2021-01-01 RX ADMIN — TAMSULOSIN HYDROCHLORIDE 0.4 MILLIGRAM(S): 0.4 CAPSULE ORAL at 21:33

## 2021-01-01 RX ADMIN — APIXABAN 2.5 MILLIGRAM(S): 2.5 TABLET, FILM COATED ORAL at 18:16

## 2021-01-01 RX ADMIN — CLOPIDOGREL BISULFATE 75 MILLIGRAM(S): 75 TABLET, FILM COATED ORAL at 11:53

## 2021-01-01 RX ADMIN — FINASTERIDE 5 MILLIGRAM(S): 5 TABLET, FILM COATED ORAL at 12:33

## 2021-01-01 RX ADMIN — Medication 650 MILLIGRAM(S): at 17:52

## 2021-01-01 RX ADMIN — AMIODARONE HYDROCHLORIDE 400 MILLIGRAM(S): 400 TABLET ORAL at 05:23

## 2021-01-01 RX ADMIN — CHLORHEXIDINE GLUCONATE 1 APPLICATION(S): 213 SOLUTION TOPICAL at 07:57

## 2021-01-01 RX ADMIN — FINASTERIDE 5 MILLIGRAM(S): 5 TABLET, FILM COATED ORAL at 11:08

## 2021-01-01 RX ADMIN — CARVEDILOL PHOSPHATE 6.25 MILLIGRAM(S): 80 CAPSULE, EXTENDED RELEASE ORAL at 17:52

## 2021-01-01 RX ADMIN — Medication 650 MILLIGRAM(S): at 05:35

## 2021-01-01 RX ADMIN — SENNA PLUS 2 TABLET(S): 8.6 TABLET ORAL at 21:27

## 2021-01-01 RX ADMIN — Medication 2: at 15:06

## 2021-01-01 RX ADMIN — CLOPIDOGREL BISULFATE 75 MILLIGRAM(S): 75 TABLET, FILM COATED ORAL at 12:05

## 2021-01-01 RX ADMIN — Medication 1: at 17:54

## 2021-01-01 RX ADMIN — Medication 1: at 08:33

## 2021-01-01 RX ADMIN — Medication 650 MILLIGRAM(S): at 05:56

## 2021-01-01 RX ADMIN — Medication 81 MILLIGRAM(S): at 13:15

## 2021-01-01 RX ADMIN — LIDOCAINE 2 PATCH: 4 CREAM TOPICAL at 05:25

## 2021-01-01 RX ADMIN — AMIODARONE HYDROCHLORIDE 400 MILLIGRAM(S): 400 TABLET ORAL at 15:08

## 2021-01-01 RX ADMIN — AMIODARONE HYDROCHLORIDE 33.3 MG/MIN: 400 TABLET ORAL at 11:54

## 2021-01-01 RX ADMIN — CEFTRIAXONE 100 MILLIGRAM(S): 500 INJECTION, POWDER, FOR SOLUTION INTRAMUSCULAR; INTRAVENOUS at 15:20

## 2021-01-01 RX ADMIN — Medication 1: at 14:08

## 2021-01-01 RX ADMIN — KETOTIFEN FUMARATE 1 DROP(S): 0.34 SOLUTION OPHTHALMIC at 06:03

## 2021-01-01 RX ADMIN — HEPARIN SODIUM 700 UNIT(S)/HR: 5000 INJECTION INTRAVENOUS; SUBCUTANEOUS at 07:17

## 2021-01-01 RX ADMIN — FINASTERIDE 5 MILLIGRAM(S): 5 TABLET, FILM COATED ORAL at 11:57

## 2021-01-01 RX ADMIN — ATORVASTATIN CALCIUM 40 MILLIGRAM(S): 80 TABLET, FILM COATED ORAL at 22:11

## 2021-01-01 RX ADMIN — KETOTIFEN FUMARATE 1 DROP(S): 0.34 SOLUTION OPHTHALMIC at 06:00

## 2021-01-01 RX ADMIN — Medication 81 MILLIGRAM(S): at 17:56

## 2021-01-01 RX ADMIN — HEPARIN SODIUM 500 UNIT(S)/HR: 5000 INJECTION INTRAVENOUS; SUBCUTANEOUS at 19:22

## 2021-01-01 RX ADMIN — AMIODARONE HYDROCHLORIDE 200 MILLIGRAM(S): 400 TABLET ORAL at 06:03

## 2021-01-01 RX ADMIN — POLYETHYLENE GLYCOL 3350 17 GRAM(S): 17 POWDER, FOR SOLUTION ORAL at 07:51

## 2021-01-01 RX ADMIN — ATORVASTATIN CALCIUM 40 MILLIGRAM(S): 80 TABLET, FILM COATED ORAL at 22:03

## 2021-01-01 RX ADMIN — FINASTERIDE 5 MILLIGRAM(S): 5 TABLET, FILM COATED ORAL at 10:53

## 2021-01-01 RX ADMIN — LORATADINE 10 MILLIGRAM(S): 10 TABLET ORAL at 11:17

## 2021-01-01 RX ADMIN — ATORVASTATIN CALCIUM 40 MILLIGRAM(S): 80 TABLET, FILM COATED ORAL at 21:31

## 2021-01-01 RX ADMIN — Medication 10 MILLIGRAM(S): at 22:13

## 2021-01-01 RX ADMIN — Medication 1: at 08:18

## 2021-01-01 RX ADMIN — Medication 10 MILLIGRAM(S): at 17:01

## 2021-01-01 RX ADMIN — ATORVASTATIN CALCIUM 40 MILLIGRAM(S): 80 TABLET, FILM COATED ORAL at 22:18

## 2021-01-01 RX ADMIN — CLOPIDOGREL BISULFATE 75 MILLIGRAM(S): 75 TABLET, FILM COATED ORAL at 11:56

## 2021-01-01 RX ADMIN — TAMSULOSIN HYDROCHLORIDE 0.4 MILLIGRAM(S): 0.4 CAPSULE ORAL at 22:41

## 2021-01-01 RX ADMIN — AMIODARONE HYDROCHLORIDE 400 MILLIGRAM(S): 400 TABLET ORAL at 14:22

## 2021-01-01 RX ADMIN — Medication 650 MILLIGRAM(S): at 05:42

## 2021-01-01 RX ADMIN — Medication 40 MILLIGRAM(S): at 05:49

## 2021-01-01 RX ADMIN — APIXABAN 2.5 MILLIGRAM(S): 2.5 TABLET, FILM COATED ORAL at 06:00

## 2021-01-01 RX ADMIN — FINASTERIDE 5 MILLIGRAM(S): 5 TABLET, FILM COATED ORAL at 11:45

## 2021-01-01 RX ADMIN — Medication 40 MILLIEQUIVALENT(S): at 09:39

## 2021-01-01 RX ADMIN — KETOTIFEN FUMARATE 1 DROP(S): 0.34 SOLUTION OPHTHALMIC at 18:25

## 2021-01-01 RX ADMIN — Medication 80 MILLIGRAM(S): at 03:12

## 2021-01-01 RX ADMIN — CHLORHEXIDINE GLUCONATE 1 APPLICATION(S): 213 SOLUTION TOPICAL at 08:10

## 2021-01-01 RX ADMIN — ATORVASTATIN CALCIUM 40 MILLIGRAM(S): 80 TABLET, FILM COATED ORAL at 21:27

## 2021-01-01 RX ADMIN — Medication 1: at 18:18

## 2021-01-01 RX ADMIN — Medication 1: at 17:37

## 2021-01-01 RX ADMIN — Medication 1: at 08:02

## 2021-01-01 RX ADMIN — Medication 1: at 18:12

## 2021-01-01 RX ADMIN — Medication 650 MILLIGRAM(S): at 18:26

## 2021-01-01 RX ADMIN — Medication 2: at 11:52

## 2021-01-01 RX ADMIN — CARVEDILOL PHOSPHATE 12.5 MILLIGRAM(S): 80 CAPSULE, EXTENDED RELEASE ORAL at 18:03

## 2021-01-01 RX ADMIN — POLYETHYLENE GLYCOL 3350 17 GRAM(S): 17 POWDER, FOR SOLUTION ORAL at 11:56

## 2021-01-01 RX ADMIN — AMIODARONE HYDROCHLORIDE 400 MILLIGRAM(S): 400 TABLET ORAL at 21:38

## 2021-01-01 RX ADMIN — Medication 650 MILLIGRAM(S): at 17:03

## 2021-01-01 RX ADMIN — APIXABAN 2.5 MILLIGRAM(S): 2.5 TABLET, FILM COATED ORAL at 05:36

## 2021-01-01 RX ADMIN — Medication 100 MILLIGRAM(S): at 17:52

## 2021-01-01 RX ADMIN — Medication 81 MILLIGRAM(S): at 11:49

## 2021-01-01 RX ADMIN — Medication 1: at 07:57

## 2021-01-01 RX ADMIN — HEPARIN SODIUM 5 UNIT(S)/HR: 5000 INJECTION INTRAVENOUS; SUBCUTANEOUS at 07:42

## 2021-01-01 RX ADMIN — CARVEDILOL PHOSPHATE 6.25 MILLIGRAM(S): 80 CAPSULE, EXTENDED RELEASE ORAL at 05:52

## 2021-01-01 RX ADMIN — SENNA PLUS 2 TABLET(S): 8.6 TABLET ORAL at 21:15

## 2021-01-01 RX ADMIN — Medication 2: at 13:13

## 2021-01-01 RX ADMIN — Medication 100 MILLIGRAM(S): at 10:31

## 2021-01-01 RX ADMIN — Medication 20 MILLIEQUIVALENT(S): at 11:36

## 2021-01-01 RX ADMIN — PANTOPRAZOLE SODIUM 40 MILLIGRAM(S): 20 TABLET, DELAYED RELEASE ORAL at 07:51

## 2021-01-01 RX ADMIN — TAMSULOSIN HYDROCHLORIDE 0.4 MILLIGRAM(S): 0.4 CAPSULE ORAL at 21:56

## 2021-01-01 RX ADMIN — Medication 81 MILLIGRAM(S): at 12:39

## 2021-01-01 RX ADMIN — TAMSULOSIN HYDROCHLORIDE 0.4 MILLIGRAM(S): 0.4 CAPSULE ORAL at 21:42

## 2021-01-01 RX ADMIN — Medication 40 MILLIGRAM(S): at 05:55

## 2021-01-01 RX ADMIN — Medication 650 MILLIGRAM(S): at 17:51

## 2021-01-01 RX ADMIN — Medication 2: at 13:42

## 2021-01-01 RX ADMIN — APIXABAN 2.5 MILLIGRAM(S): 2.5 TABLET, FILM COATED ORAL at 17:53

## 2021-01-01 RX ADMIN — FINASTERIDE 5 MILLIGRAM(S): 5 TABLET, FILM COATED ORAL at 17:56

## 2021-01-01 RX ADMIN — AMIODARONE HYDROCHLORIDE 200 MILLIGRAM(S): 400 TABLET ORAL at 06:06

## 2021-01-01 RX ADMIN — CLOPIDOGREL BISULFATE 75 MILLIGRAM(S): 75 TABLET, FILM COATED ORAL at 11:51

## 2021-01-01 RX ADMIN — APIXABAN 2.5 MILLIGRAM(S): 2.5 TABLET, FILM COATED ORAL at 18:10

## 2021-01-01 RX ADMIN — Medication 81 MILLIGRAM(S): at 11:58

## 2021-01-01 RX ADMIN — APIXABAN 2.5 MILLIGRAM(S): 2.5 TABLET, FILM COATED ORAL at 05:55

## 2021-01-01 RX ADMIN — Medication 81 MILLIGRAM(S): at 11:37

## 2021-01-01 RX ADMIN — ATORVASTATIN CALCIUM 40 MILLIGRAM(S): 80 TABLET, FILM COATED ORAL at 23:33

## 2021-01-01 RX ADMIN — Medication 650 MILLIGRAM(S): at 17:32

## 2021-01-01 RX ADMIN — Medication 1: at 08:52

## 2021-01-01 RX ADMIN — KETOTIFEN FUMARATE 1 DROP(S): 0.34 SOLUTION OPHTHALMIC at 18:21

## 2021-01-01 RX ADMIN — AMIODARONE HYDROCHLORIDE 200 MILLIGRAM(S): 400 TABLET ORAL at 05:42

## 2021-01-01 RX ADMIN — Medication 80 MILLIGRAM(S): at 06:42

## 2021-01-01 RX ADMIN — PANTOPRAZOLE SODIUM 40 MILLIGRAM(S): 20 TABLET, DELAYED RELEASE ORAL at 17:20

## 2021-01-01 RX ADMIN — ATORVASTATIN CALCIUM 40 MILLIGRAM(S): 80 TABLET, FILM COATED ORAL at 21:23

## 2021-01-01 RX ADMIN — ATORVASTATIN CALCIUM 40 MILLIGRAM(S): 80 TABLET, FILM COATED ORAL at 21:32

## 2021-01-01 RX ADMIN — Medication 1: at 12:59

## 2021-01-01 RX ADMIN — ATORVASTATIN CALCIUM 40 MILLIGRAM(S): 80 TABLET, FILM COATED ORAL at 21:55

## 2021-01-01 RX ADMIN — CLOPIDOGREL BISULFATE 600 MILLIGRAM(S): 75 TABLET, FILM COATED ORAL at 10:59

## 2021-01-01 RX ADMIN — TAMSULOSIN HYDROCHLORIDE 0.4 MILLIGRAM(S): 0.4 CAPSULE ORAL at 21:15

## 2021-01-01 RX ADMIN — PANTOPRAZOLE SODIUM 40 MILLIGRAM(S): 20 TABLET, DELAYED RELEASE ORAL at 17:24

## 2021-01-01 RX ADMIN — LORATADINE 10 MILLIGRAM(S): 10 TABLET ORAL at 11:57

## 2021-01-01 RX ADMIN — AMIODARONE HYDROCHLORIDE 200 MILLIGRAM(S): 400 TABLET ORAL at 05:07

## 2021-01-01 RX ADMIN — Medication 81 MILLIGRAM(S): at 11:56

## 2021-01-01 RX ADMIN — FINASTERIDE 5 MILLIGRAM(S): 5 TABLET, FILM COATED ORAL at 11:58

## 2021-01-01 RX ADMIN — AMIODARONE HYDROCHLORIDE 400 MILLIGRAM(S): 400 TABLET ORAL at 05:43

## 2021-01-01 RX ADMIN — KETOTIFEN FUMARATE 1 DROP(S): 0.34 SOLUTION OPHTHALMIC at 05:17

## 2021-01-01 RX ADMIN — KETOTIFEN FUMARATE 1 DROP(S): 0.34 SOLUTION OPHTHALMIC at 06:46

## 2021-01-01 RX ADMIN — AMIODARONE HYDROCHLORIDE 400 MILLIGRAM(S): 400 TABLET ORAL at 05:49

## 2021-01-01 RX ADMIN — Medication 1: at 08:59

## 2021-01-01 RX ADMIN — KETOTIFEN FUMARATE 1 DROP(S): 0.34 SOLUTION OPHTHALMIC at 17:45

## 2021-01-01 RX ADMIN — KETOTIFEN FUMARATE 1 DROP(S): 0.34 SOLUTION OPHTHALMIC at 05:53

## 2021-01-01 RX ADMIN — Medication 10 MILLIGRAM(S): at 23:01

## 2021-01-01 RX ADMIN — AMIODARONE HYDROCHLORIDE 16.7 MG/MIN: 400 TABLET ORAL at 17:31

## 2021-01-01 RX ADMIN — APIXABAN 2.5 MILLIGRAM(S): 2.5 TABLET, FILM COATED ORAL at 17:39

## 2021-01-01 RX ADMIN — KETOTIFEN FUMARATE 1 DROP(S): 0.34 SOLUTION OPHTHALMIC at 06:08

## 2021-01-01 RX ADMIN — Medication 2: at 17:32

## 2021-01-01 RX ADMIN — Medication 1: at 17:22

## 2021-01-01 RX ADMIN — Medication 1: at 17:18

## 2021-01-01 RX ADMIN — SENNA PLUS 2 TABLET(S): 8.6 TABLET ORAL at 21:48

## 2021-01-01 RX ADMIN — PANTOPRAZOLE SODIUM 40 MILLIGRAM(S): 20 TABLET, DELAYED RELEASE ORAL at 05:17

## 2021-01-01 RX ADMIN — ATORVASTATIN CALCIUM 40 MILLIGRAM(S): 80 TABLET, FILM COATED ORAL at 21:25

## 2021-01-01 RX ADMIN — Medication 650 MILLIGRAM(S): at 05:06

## 2021-01-01 RX ADMIN — Medication 1: at 08:49

## 2021-01-01 RX ADMIN — PANTOPRAZOLE SODIUM 40 MILLIGRAM(S): 20 TABLET, DELAYED RELEASE ORAL at 05:50

## 2021-01-01 RX ADMIN — Medication 325 MILLIGRAM(S): at 10:57

## 2021-01-01 RX ADMIN — CEFTRIAXONE 100 MILLIGRAM(S): 500 INJECTION, POWDER, FOR SOLUTION INTRAMUSCULAR; INTRAVENOUS at 15:02

## 2021-01-01 RX ADMIN — POLYETHYLENE GLYCOL 3350 17 GRAM(S): 17 POWDER, FOR SOLUTION ORAL at 17:10

## 2021-01-01 RX ADMIN — Medication 20 MILLIEQUIVALENT(S): at 12:33

## 2021-01-01 RX ADMIN — Medication 80 MILLIGRAM(S): at 07:05

## 2021-01-01 RX ADMIN — AMIODARONE HYDROCHLORIDE 400 MILLIGRAM(S): 400 TABLET ORAL at 14:09

## 2021-01-01 RX ADMIN — Medication 650 MILLIGRAM(S): at 17:19

## 2021-01-01 RX ADMIN — LORATADINE 10 MILLIGRAM(S): 10 TABLET ORAL at 12:05

## 2021-01-01 RX ADMIN — FINASTERIDE 5 MILLIGRAM(S): 5 TABLET, FILM COATED ORAL at 13:15

## 2021-01-01 RX ADMIN — AMIODARONE HYDROCHLORIDE 200 MILLIGRAM(S): 400 TABLET ORAL at 05:09

## 2021-01-01 RX ADMIN — FINASTERIDE 5 MILLIGRAM(S): 5 TABLET, FILM COATED ORAL at 12:20

## 2021-01-01 RX ADMIN — Medication 80 MILLIGRAM(S): at 18:07

## 2021-01-01 RX ADMIN — Medication 1: at 07:45

## 2021-01-01 RX ADMIN — TAMSULOSIN HYDROCHLORIDE 0.4 MILLIGRAM(S): 0.4 CAPSULE ORAL at 21:48

## 2021-01-01 RX ADMIN — TAMSULOSIN HYDROCHLORIDE 0.4 MILLIGRAM(S): 0.4 CAPSULE ORAL at 21:32

## 2021-01-01 RX ADMIN — Medication 40 MILLIGRAM(S): at 18:26

## 2021-01-01 RX ADMIN — Medication 1: at 11:50

## 2021-01-01 RX ADMIN — Medication 2: at 18:22

## 2021-01-01 RX ADMIN — TAMSULOSIN HYDROCHLORIDE 0.4 MILLIGRAM(S): 0.4 CAPSULE ORAL at 22:19

## 2021-01-01 RX ADMIN — KETOTIFEN FUMARATE 1 DROP(S): 0.34 SOLUTION OPHTHALMIC at 05:47

## 2021-01-01 RX ADMIN — KETOTIFEN FUMARATE 1 DROP(S): 0.34 SOLUTION OPHTHALMIC at 18:17

## 2021-01-01 RX ADMIN — TAMSULOSIN HYDROCHLORIDE 0.4 MILLIGRAM(S): 0.4 CAPSULE ORAL at 21:25

## 2021-01-01 RX ADMIN — POLYETHYLENE GLYCOL 3350 17 GRAM(S): 17 POWDER, FOR SOLUTION ORAL at 12:39

## 2021-01-01 RX ADMIN — Medication 1: at 11:43

## 2021-01-01 RX ADMIN — AMIODARONE HYDROCHLORIDE 400 MILLIGRAM(S): 400 TABLET ORAL at 22:03

## 2021-01-01 RX ADMIN — Medication 40 MILLIGRAM(S): at 05:17

## 2021-01-01 RX ADMIN — APIXABAN 2.5 MILLIGRAM(S): 2.5 TABLET, FILM COATED ORAL at 05:53

## 2021-01-01 RX ADMIN — FINASTERIDE 5 MILLIGRAM(S): 5 TABLET, FILM COATED ORAL at 12:12

## 2021-01-01 RX ADMIN — POLYETHYLENE GLYCOL 3350 17 GRAM(S): 17 POWDER, FOR SOLUTION ORAL at 11:04

## 2021-01-01 RX ADMIN — POLYETHYLENE GLYCOL 3350 17 GRAM(S): 17 POWDER, FOR SOLUTION ORAL at 17:23

## 2021-01-01 RX ADMIN — Medication 80 MILLIGRAM(S): at 18:11

## 2021-01-01 RX ADMIN — Medication 2: at 11:56

## 2021-01-01 RX ADMIN — TAMSULOSIN HYDROCHLORIDE 0.4 MILLIGRAM(S): 0.4 CAPSULE ORAL at 22:44

## 2021-01-01 RX ADMIN — LORATADINE 10 MILLIGRAM(S): 10 TABLET ORAL at 11:04

## 2021-01-01 RX ADMIN — CARVEDILOL PHOSPHATE 12.5 MILLIGRAM(S): 80 CAPSULE, EXTENDED RELEASE ORAL at 18:16

## 2021-01-01 RX ADMIN — APIXABAN 2.5 MILLIGRAM(S): 2.5 TABLET, FILM COATED ORAL at 06:03

## 2021-01-01 RX ADMIN — Medication 81 MILLIGRAM(S): at 11:51

## 2021-01-01 RX ADMIN — PANTOPRAZOLE SODIUM 40 MILLIGRAM(S): 20 TABLET, DELAYED RELEASE ORAL at 05:49

## 2021-01-01 RX ADMIN — AMIODARONE HYDROCHLORIDE 200 MILLIGRAM(S): 400 TABLET ORAL at 05:54

## 2021-01-01 RX ADMIN — Medication 1: at 17:14

## 2021-01-01 RX ADMIN — Medication 81 MILLIGRAM(S): at 11:45

## 2021-01-01 RX ADMIN — Medication 10 MILLIGRAM(S): at 21:25

## 2021-01-01 RX ADMIN — CARVEDILOL PHOSPHATE 25 MILLIGRAM(S): 80 CAPSULE, EXTENDED RELEASE ORAL at 17:56

## 2021-01-01 RX ADMIN — Medication 10 MILLIGRAM(S): at 10:53

## 2021-01-01 RX ADMIN — HEPARIN SODIUM 700 UNIT(S)/HR: 5000 INJECTION INTRAVENOUS; SUBCUTANEOUS at 08:59

## 2021-01-01 RX ADMIN — Medication 1: at 08:42

## 2021-01-01 RX ADMIN — POLYETHYLENE GLYCOL 3350 17 GRAM(S): 17 POWDER, FOR SOLUTION ORAL at 11:52

## 2021-01-01 RX ADMIN — Medication 20 MILLIEQUIVALENT(S): at 14:27

## 2021-01-01 RX ADMIN — POLYETHYLENE GLYCOL 3350 17 GRAM(S): 17 POWDER, FOR SOLUTION ORAL at 17:03

## 2021-01-01 RX ADMIN — AMIODARONE HYDROCHLORIDE 200 MILLIGRAM(S): 400 TABLET ORAL at 05:34

## 2021-01-01 RX ADMIN — KETOTIFEN FUMARATE 1 DROP(S): 0.34 SOLUTION OPHTHALMIC at 06:01

## 2021-01-01 RX ADMIN — APIXABAN 2.5 MILLIGRAM(S): 2.5 TABLET, FILM COATED ORAL at 05:38

## 2021-01-01 RX ADMIN — CLOPIDOGREL BISULFATE 75 MILLIGRAM(S): 75 TABLET, FILM COATED ORAL at 11:49

## 2021-01-01 RX ADMIN — Medication 100 MILLIGRAM(S): at 01:57

## 2021-01-01 RX ADMIN — Medication 1: at 16:15

## 2021-01-01 RX ADMIN — Medication 650 MILLIGRAM(S): at 05:51

## 2021-01-01 RX ADMIN — TAMSULOSIN HYDROCHLORIDE 0.4 MILLIGRAM(S): 0.4 CAPSULE ORAL at 21:41

## 2021-01-01 RX ADMIN — CHLORHEXIDINE GLUCONATE 1 APPLICATION(S): 213 SOLUTION TOPICAL at 11:50

## 2021-01-01 RX ADMIN — Medication 1: at 08:10

## 2021-01-01 RX ADMIN — MIDAZOLAM HYDROCHLORIDE 2 MILLIGRAM(S): 1 INJECTION, SOLUTION INTRAMUSCULAR; INTRAVENOUS at 08:15

## 2021-01-01 RX ADMIN — AMLODIPINE BESYLATE 5 MILLIGRAM(S): 2.5 TABLET ORAL at 05:49

## 2021-01-01 RX ADMIN — Medication 650 MILLIGRAM(S): at 07:53

## 2021-01-01 RX ADMIN — PANTOPRAZOLE SODIUM 40 MILLIGRAM(S): 20 TABLET, DELAYED RELEASE ORAL at 05:52

## 2021-01-01 RX ADMIN — ATORVASTATIN CALCIUM 40 MILLIGRAM(S): 80 TABLET, FILM COATED ORAL at 21:45

## 2021-01-01 RX ADMIN — PANTOPRAZOLE SODIUM 40 MILLIGRAM(S): 20 TABLET, DELAYED RELEASE ORAL at 05:55

## 2021-01-01 RX ADMIN — KETOTIFEN FUMARATE 1 DROP(S): 0.34 SOLUTION OPHTHALMIC at 18:13

## 2021-01-01 RX ADMIN — Medication 1: at 12:04

## 2021-01-01 RX ADMIN — AMIODARONE HYDROCHLORIDE 200 MILLIGRAM(S): 400 TABLET ORAL at 06:57

## 2021-01-01 RX ADMIN — Medication 81 MILLIGRAM(S): at 05:51

## 2021-01-01 RX ADMIN — FINASTERIDE 5 MILLIGRAM(S): 5 TABLET, FILM COATED ORAL at 12:48

## 2021-01-01 RX ADMIN — CHLORHEXIDINE GLUCONATE 1 APPLICATION(S): 213 SOLUTION TOPICAL at 08:01

## 2021-01-01 RX ADMIN — HEPARIN SODIUM 500 UNIT(S)/HR: 5000 INJECTION INTRAVENOUS; SUBCUTANEOUS at 02:32

## 2021-01-01 RX ADMIN — FINASTERIDE 5 MILLIGRAM(S): 5 TABLET, FILM COATED ORAL at 13:44

## 2021-01-01 RX ADMIN — SENNA PLUS 2 TABLET(S): 8.6 TABLET ORAL at 23:22

## 2021-01-01 RX ADMIN — Medication 1: at 17:52

## 2021-01-01 RX ADMIN — Medication 1: at 11:35

## 2021-01-01 RX ADMIN — APIXABAN 2.5 MILLIGRAM(S): 2.5 TABLET, FILM COATED ORAL at 17:11

## 2021-01-01 RX ADMIN — KETOTIFEN FUMARATE 1 DROP(S): 0.34 SOLUTION OPHTHALMIC at 05:50

## 2021-01-01 RX ADMIN — Medication 650 MILLIGRAM(S): at 06:06

## 2021-01-01 RX ADMIN — Medication 200 GRAM(S): at 08:37

## 2021-01-01 RX ADMIN — TAMSULOSIN HYDROCHLORIDE 0.4 MILLIGRAM(S): 0.4 CAPSULE ORAL at 21:23

## 2021-01-01 RX ADMIN — PANTOPRAZOLE SODIUM 40 MILLIGRAM(S): 20 TABLET, DELAYED RELEASE ORAL at 05:24

## 2021-01-01 RX ADMIN — SODIUM CHLORIDE 500 MILLILITER(S): 9 INJECTION INTRAMUSCULAR; INTRAVENOUS; SUBCUTANEOUS at 08:26

## 2021-01-01 RX ADMIN — Medication 650 MILLIGRAM(S): at 17:44

## 2021-01-01 RX ADMIN — Medication 60 MILLIGRAM(S): at 05:35

## 2021-01-01 RX ADMIN — POLYETHYLENE GLYCOL 3350 17 GRAM(S): 17 POWDER, FOR SOLUTION ORAL at 17:11

## 2021-01-01 RX ADMIN — PANTOPRAZOLE SODIUM 40 MILLIGRAM(S): 20 TABLET, DELAYED RELEASE ORAL at 05:54

## 2021-01-01 RX ADMIN — Medication 2: at 12:55

## 2021-01-01 RX ADMIN — AMLODIPINE BESYLATE 5 MILLIGRAM(S): 2.5 TABLET ORAL at 12:21

## 2021-01-01 RX ADMIN — Medication 2: at 12:19

## 2021-01-01 RX ADMIN — AMIODARONE HYDROCHLORIDE 400 MILLIGRAM(S): 400 TABLET ORAL at 05:38

## 2021-01-01 RX ADMIN — HEPARIN SODIUM 0 UNIT(S)/HR: 5000 INJECTION INTRAVENOUS; SUBCUTANEOUS at 10:42

## 2021-01-01 RX ADMIN — Medication 81 MILLIGRAM(S): at 12:36

## 2021-01-01 RX ADMIN — AMIODARONE HYDROCHLORIDE 200 MILLIGRAM(S): 400 TABLET ORAL at 05:11

## 2021-01-01 RX ADMIN — Medication 650 MILLIGRAM(S): at 17:15

## 2021-01-01 RX ADMIN — TAMSULOSIN HYDROCHLORIDE 0.4 MILLIGRAM(S): 0.4 CAPSULE ORAL at 21:27

## 2021-01-01 RX ADMIN — HEPARIN SODIUM 4500 UNIT(S): 5000 INJECTION INTRAVENOUS; SUBCUTANEOUS at 15:57

## 2021-01-01 RX ADMIN — CARVEDILOL PHOSPHATE 25 MILLIGRAM(S): 80 CAPSULE, EXTENDED RELEASE ORAL at 05:48

## 2021-01-01 RX ADMIN — Medication 81 MILLIGRAM(S): at 12:34

## 2021-01-01 RX ADMIN — APIXABAN 2.5 MILLIGRAM(S): 2.5 TABLET, FILM COATED ORAL at 05:17

## 2021-01-01 RX ADMIN — APIXABAN 2.5 MILLIGRAM(S): 2.5 TABLET, FILM COATED ORAL at 18:26

## 2021-01-01 RX ADMIN — KETOTIFEN FUMARATE 1 DROP(S): 0.34 SOLUTION OPHTHALMIC at 18:11

## 2021-01-01 RX ADMIN — APIXABAN 2.5 MILLIGRAM(S): 2.5 TABLET, FILM COATED ORAL at 17:56

## 2021-01-01 RX ADMIN — Medication 20 MILLIEQUIVALENT(S): at 12:35

## 2021-01-01 RX ADMIN — Medication 81 MILLIGRAM(S): at 12:57

## 2021-01-01 RX ADMIN — LORATADINE 10 MILLIGRAM(S): 10 TABLET ORAL at 12:33

## 2021-01-01 RX ADMIN — Medication 650 MILLIGRAM(S): at 17:23

## 2021-01-01 RX ADMIN — TAMSULOSIN HYDROCHLORIDE 0.4 MILLIGRAM(S): 0.4 CAPSULE ORAL at 21:45

## 2021-01-01 RX ADMIN — KETOTIFEN FUMARATE 1 DROP(S): 0.34 SOLUTION OPHTHALMIC at 17:43

## 2021-01-01 RX ADMIN — APIXABAN 2.5 MILLIGRAM(S): 2.5 TABLET, FILM COATED ORAL at 17:21

## 2021-01-01 RX ADMIN — TAMSULOSIN HYDROCHLORIDE 0.4 MILLIGRAM(S): 0.4 CAPSULE ORAL at 22:42

## 2021-01-01 RX ADMIN — HEPARIN SODIUM 5 UNIT(S)/HR: 5000 INJECTION INTRAVENOUS; SUBCUTANEOUS at 06:27

## 2021-01-01 RX ADMIN — HEPARIN SODIUM 5000 UNIT(S): 5000 INJECTION INTRAVENOUS; SUBCUTANEOUS at 05:42

## 2021-01-01 RX ADMIN — Medication 40 MILLIGRAM(S): at 17:23

## 2021-01-01 RX ADMIN — FINASTERIDE 5 MILLIGRAM(S): 5 TABLET, FILM COATED ORAL at 11:50

## 2021-01-01 RX ADMIN — Medication 1: at 09:10

## 2021-01-01 RX ADMIN — HEPARIN SODIUM 500 UNIT(S)/HR: 5000 INJECTION INTRAVENOUS; SUBCUTANEOUS at 11:46

## 2021-01-01 RX ADMIN — TAMSULOSIN HYDROCHLORIDE 0.4 MILLIGRAM(S): 0.4 CAPSULE ORAL at 22:18

## 2021-01-01 RX ADMIN — Medication 2: at 11:57

## 2021-01-01 RX ADMIN — LORATADINE 10 MILLIGRAM(S): 10 TABLET ORAL at 11:49

## 2021-01-01 RX ADMIN — Medication 60 MILLIGRAM(S): at 17:39

## 2021-01-01 RX ADMIN — Medication 1: at 12:35

## 2021-01-01 RX ADMIN — Medication 40 MILLIGRAM(S): at 18:01

## 2021-01-01 RX ADMIN — Medication 650 MILLIGRAM(S): at 05:54

## 2021-06-15 NOTE — PHYSICAL THERAPY INITIAL EVALUATION ADULT - PLANNED THERAPY INTERVENTIONS, PT EVAL
Called [employer] none emegency line: [phone]. Made a report with officer bryant Shultz num [id number] star number [id number] report number [id number]. Per officer Lexii an  [place] [facility] [place].    transfer training/Stair Negotiation/gait training/bed mobility training

## 2021-08-09 NOTE — H&P ADULT - NSICDXPASTSURGICALHX_GEN_ALL_CORE_FT
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

## 2021-08-09 NOTE — CONSULT NOTE ADULT - SUBJECTIVE AND OBJECTIVE BOX
[facility] - S López / Aroldo S /D Jose/ S Amina/ JUAN DAVID Smith/ Adrian Vega / M Artieu/ O Alejandra  service -[phone], office [phone]  ---------------------------------------------------------------------------------------------------------------    Patient is a 89y Male whom presented to the hospital with   Denied recent NSAID use/Abx use/iv contrast studies.    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  GASTROINTESTINAL: No abdominal or epigastric pain. No nausea, vomiting, or hematemesis; No diarrhea or constipation. No melena or hematochezia.  GENITOURINARY: No dysuria, frequency, foamy urine, urinary urgency, incontinence or hematuria  NEUROLOGICAL: No numbness or weakness  SKIN: No itching, burning, rashes, or lesions   VASCULAR: No bilateral lower extremity edema.   All other review of systems is negative unless indicated above.    VITALS:  T(F): 97 (08-09-21 @ 12:44), Max: 97 (08-09-21 @ 12:44)  HR: 69 (08-09-21 @ 14:24)  BP: 118/59 (08-09-21 @ 14:24)  RR: 17 (08-09-21 @ 14:24)  SpO2: 100% (08-09-21 @ 14:24)  Wt(kg): --    Height (cm): 167.6 (08-09 @ 12:44)    PHYSICAL EXAM:  Constitutional: NAD  HEENT: anicteric sclera, oropharynx clear, MMM  Neck: No JVD  Respiratory: CTAB, no wheezes, rales or rhonchi  Cardiovascular: S1, S2, RRR  Gastrointestinal: BS+, soft, NT/ND  Extremities: No cyanosis or clubbing. No peripheral edema  Neurological: A/O x 3, no focal deficits  Psychiatric: Normal mood, normal affect  : No CVA tenderness. No river.   Skin: No rashes  Vascular Access:    LABS:  08-09    135  |  102  |  61<H>  ----------------------------<  177<H>  5.3   |  20<L>  |  2.69<H>    Ca    9.1      09 Aug 2021 14:37  Phos  3.2     08-09  Mg     2.70     08-09    TPro  6.2  /  Alb  3.7  /  TBili  0.4  /  DBili      /  AST  17  /  ALT  9   /  AlkPhos  71  08-09    Creatinine Trend: 2.69 <--                        9.4    5.73  )-----------( 127      ( 09 Aug 2021 14:37 )             28.9     Urine Studies:        RADIOLOGY & ADDITIONAL STUDIES:                 [facility] - S López / Aroldo S /GARCIA Garcia/ JUAN DAVID Huynh/ JUAN DAVID Smith/ Adrian Vega / LUBA Alvau/ O Roberts  service -[phone], office [phone]  ---------------------------------------------------------------------------------------------------------------  Patient seen and examined bedside in ER    89 y.o male with a PMhx of HTN, HLD, DM, CKD3 presented to the ED complaining of having elevated BUN and creatinine. pt was sent in by his primary Nephrologist/my partner Dr Garcia for creatinine elevation 3.1 and sr potassium level 6, drawn week before by PCP, patient was rx Kayexelate 15gm, took last Monday. Patient was seen by Dr Garcia earlier today in office and was referred to ER for evaluation. Pt has been feeling fatigue and weakness over the past several weeks as well. patient denies having any CP, SOB, BROWN, abdominal pain, nausea, vomiting, diarrhea. has h/o BPH, on meds, denied any urinary sxs. reports for past 1 month he has been taking lasix 20mg 3days a week, rest 4 days 40mg (was on 40mg po daily, has pill bottle for it) b'se he got a letter from pharmacist to reduce dose. son reports pt been eating high K diet. Denied recent NSAID use/Abx use/iv contrast studies.    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    S/P coronary artery stent placement    Pacemaker      Allergies: No Known Allergies    Home Medications Reviewed  Home Medications:  aspirin 81 mg oral delayed release tablet: 1 tab(s) orally once a day (31 Jul 2018 20:52)  carvedilol 25 mg oral tablet: 1 tab(s) orally every 12 hours (31 Jul 2018 20:52)  Eliquis 5 mg oral tablet: 1 tab(s) orally 2 times a day (31 Jul 2018 20:52)  finasteride 5 mg oral tablet: 1 tab(s) orally once a day (31 Jul 2018 20:52)  Lipitor 20 mg oral tablet: 1 tab(s) orally once a day (31 Jul 2018 20:52)  lasix 40mg qd  Losartan 100mg daily  Spironolactone    Hospital Medications:   MEDICATIONS  (STANDING):    SOCIAL HISTORY:  Denies ETOh,Smoking, illicit drug use  FAMILY HISTORY:  Family history of heart disease (Father)    REVIEW OF SYSTEMS:  CONSTITUTIONAL: No weakness, fevers or chills. +body pains  EYES/ENT: No visual changes;  No vertigo or throat pain   NECK: No pain or stiffness  RESPIRATORY: No cough, wheezing, hemoptysis; No shortness of breath  CARDIOVASCULAR: No chest pain or palpitations.  GASTROINTESTINAL: No abdominal or epigastric pain. No nausea, vomiting, or hematemesis; No diarrhea or constipation. No melena or hematochezia.  GENITOURINARY: No dysuria, frequency, foamy urine, urinary urgency, incontinence or hematuria  NEUROLOGICAL: No numbness or weakness  SKIN: No itching, burning, rashes, or lesions   VASCULAR: No bilateral lower extremity edema.   All other review of systems is negative unless indicated above.    VITALS:  T(F): 97 (08-09-21 @ 12:44), Max: 97 (08-09-21 @ 12:44)  HR: 69 (08-09-21 @ 14:24)  BP: 118/59 (08-09-21 @ 14:24)  RR: 17 (08-09-21 @ 14:24)  SpO2: 100% (08-09-21 @ 14:24)  Wt(kg): --    Height (cm): 167.6 (08-09 @ 12:44)    PHYSICAL EXAM:  Constitutional: NAD  HEENT: anicteric sclera  Neck: No JVD  Respiratory: CTAB, no wheezes, rales or rhonchi  Cardiovascular: S1, S2, RRR  Gastrointestinal: BS+, soft, NT,   Extremities:  No peripheral edema b/l   Neurological: A/O x 3  Psychiatric: Normal mood, normal affect  : No river.     LABS:  08-09    135  |  102  |  61<H>  ----------------------------<  177<H>  5.3   |  20<L>  |  2.69<H>    Ca    9.1      09 Aug 2021 14:37  Phos  3.2     08-09  Mg     2.70     08-09    TPro  6.2  /  Alb  3.7  /  TBili  0.4  /  DBili      /  AST  17  /  ALT  9   /  AlkPhos  71  08-09    Creatinine Trend: 2.69 <--                        9.4    5.73  )-----------( 127      ( 09 Aug 2021 14:37 )             28.9     Urine Studies:        RADIOLOGY & ADDITIONAL STUDIES:

## 2021-08-09 NOTE — ED PROVIDER NOTE - ATTENDING CONTRIBUTION TO CARE
Dr Bloch, ATTENDING MD-  I performed a face to face bedside interview with patient regarding history of present illness, review of symptoms and past medical history. I completed an independent physical exam.  I have discussed patient's plan of care with PA.   Documentation as above in the note.  Patient well appearing NAD oriented, ( via ) neck no JVD, mucous membranes moist, heart s1s2, lungs clear, abd soft no hepatosplenomegaly, no CVA tenderness, extrem no edema, pulses intact, skin, no rashes, neuro nonfocal

## 2021-08-09 NOTE — ED ADULT NURSE NOTE - CHIEF COMPLAINT QUOTE
pt sent in by nephrologist MD Escobedo for elevated creatinine and BUN. pt endorsing generalized weakness and fatigue. Hypotensive in triage. PMH HTN, DM, CKD not on dialysis. Ronal Vázquez [phone]

## 2021-08-09 NOTE — ED ADULT TRIAGE NOTE - CHIEF COMPLAINT QUOTE
pt sent in by nephrologist MD Escobedo for elevated creatinine and BUN. pt endorsing generalized weakness and fatigue. Hypotensive in triage. PMH HTN, DM, CKD not on dialysis. Ronal Vázquez [phone]

## 2021-08-09 NOTE — ED ADULT NURSE REASSESSMENT NOTE - NS ED NURSE REASSESS COMMENT FT1
Report given to MADI Umanzor in [facility]. Pt resting comfortably, resp. even and unlabored. Denies any complaints. VS as noted. Paced rhythm on the CM. NAD. Transported to [facility] 2.

## 2021-08-09 NOTE — H&P ADULT - NSICDXPASTMEDICALHX_GEN_ALL_CORE_FT
PAST MEDICAL HISTORY:  Anemia     Atrial fibrillation     BPH (benign prostatic hypertrophy)     CVA (Cerebral Infarction) x2 in the past with residual Rt handed numbness and a little wprd finding trouble    Hypertension     Other and Unspecified Hyperlipidemia     Pacemaker

## 2021-08-09 NOTE — H&P ADULT - ASSESSMENT
The patient is a 89y Male was sent by a nephrologist because of abnormal BUN/Cr.    FLOYD on CKD III:    Nephro eval appreciated  Trend BMP    HTN:    Cont with current BP meds

## 2021-08-09 NOTE — CONSULT NOTE ADULT - ASSESSMENT
89 y.o male with a PMhx of HTN, HLD, DM, CKD3 presented to the ED complaining of having elevated BUN and creatinine. pt was sent in by his primary Nephrologist/my partner Dr Garcia for creatinine elevation 3.1 and sr potassium level 6. Renal consulted for FLOYD on CKD Mx.    FLOYD on CKD3 (chronic kidney disease).   baseline Cr 1.2 2019   FLOYD   clincially euvolemic  Cr 3.1 outpt > now 2.69   Hyperkalemia -K 6 outpt >now 5.3 better  bp stable.   avoid nephrotoxins/NSAIDs  monitor daily BMP  unlikely Urinary retention. check bladder scan for PVR  hold diuretics -lasix, aldactone and ARB Losartan for now  s/p 500ml NS bolus in ER. no indication for further ivf. would avoid given h/o CMP/low EF  will f/u UA  low Na/low K in diet.   dose all meds for eGFR<15ml/min   no indication for RRT/dialysis at this time  Essential hypertension. bp controlled. c/w BB   CAD s/p CABG, s/p PPM. h/o CMP/CHF EF 40% 8/2018. consider cardiology eval   DM- Mx per medicine    labs, chart reviewed  poc d/w ER team, pt, son bedside  Thanks for consulting. will closely follow up   For any questions, please call:  Cell # [phone]  service# [phone]  office# [phone]

## 2021-08-09 NOTE — ED ADULT NURSE NOTE - OBJECTIVE STATEMENT
Break coverage RN- Received Pt in room 25. pt A&OX3, ambulatory with cane, son at bedside. pt with hx of anemia, pacemaker, Afib, HTN, CVA. pt sent in by nephrologist for elevated creatinine and BUN. Pt c/o generalized weakness/ body pain. pt appears to be in no distress. vitals stable as noted. respirations are equal and nonlabored, no respiratory distress noted.  NSR on monitor. denies any chest pain, sob, cough, fever/chills, headache, dizziness, n/v. 20 gauge iv placed in the left ac, labs sent. pt stable, safety maintained. awaiting further plan, will reassess and monitor.

## 2021-08-09 NOTE — H&P ADULT - HISTORY OF PRESENT ILLNESS
89 y.o male with a PMhx of HTN, HLD, DM, CKD presented to the ED complaining of having elevated BUN and creatinine, pt was sent in by his nephrologist after having BUN and creatinine elevation, patient was also found to have a potassium of 6 for which he was given a laxative for. Patient also has been feeling fatigue and weakness over the past several weeks as well, as per son patient hasn't been acting like himself, patient denies having any CP, SOB, BROWN, abdominal pain, headaches, dizziness, nausea, vomiting, diarrhea constipation.

## 2021-08-09 NOTE — ED ADULT NURSE REASSESSMENT NOTE - NS ED NURSE REASSESS COMMENT FT1
Received report from MADI Rebollar. Pt resting comfortably, resp. even and unlabored. Denies any complaints at this time. VS as noted. NAD.

## 2021-08-09 NOTE — H&P ADULT - NSICDXFAMILYHX_GEN_ALL_CORE_FT
FAMILY HISTORY:  Father  Still living? Unknown  Family history of heart disease, Age at diagnosis: Age Unknown

## 2021-08-09 NOTE — CHART NOTE - NSCHARTNOTEFT_GEN_A_CORE
Eliquis dosing decreased as advised by Pharmacist.  Pt on Eliquis for Non-valvular Afib with elevated creatinine at 2.69 and is 89yo. After confirmation with Dr. Amador, Eliquis dosing changed to 2.5mg BID to start now.

## 2021-08-09 NOTE — ED PROVIDER NOTE - CLINICAL SUMMARY MEDICAL DECISION MAKING FREE TEXT BOX
his is a 89 y.o male with a PMhx of HTN, HLD, DM, CKD presented to the ED complaining of having elevated BUN and creatinine, Fatigue and weakness-labs, fluids, admit

## 2021-08-09 NOTE — ED PROVIDER NOTE - OBJECTIVE STATEMENT
this is a 89 y.o male with a PMhx of HTN, HLD, DM, CKD presented to the ED complaining of having elevated BUN and creatinine, pt was sent in by his nephrologist after having BUN and creatinine elevation, patient was also found to have a potassium of 6 for which he was given a laxative for. Patient also has been feeling fatigue and weakness over the past several weeks as well, as per son patient hasn't been acting like himself, patient denies having any CP, SOB, BROWN, abdominal pain, headaches, dizziness, nausea, vomiting, diarrhea constipation.

## 2021-08-10 NOTE — PROGRESS NOTE ADULT - SUBJECTIVE AND OBJECTIVE BOX
Patient is a 89y old  Male who presents with a chief complaint of The patient is a 89y Male was sent by a nephrologist because of abnormal BUN/Cr. (10 Aug 2021 10:43)      SUBJECTIVE / OVERNIGHT EVENTS:    Events noted.  Awake  No N/V    MEDICATIONS  (STANDING):  amLODIPine   Tablet 5 milliGRAM(s) Oral daily  apixaban 2.5 milliGRAM(s) Oral two times a day  aspirin enteric coated 81 milliGRAM(s) Oral daily  atorvastatin 20 milliGRAM(s) Oral at bedtime  carvedilol 25 milliGRAM(s) Oral every 12 hours  finasteride 5 milliGRAM(s) Oral daily  furosemide    Tablet 40 milliGRAM(s) Oral daily  lidocaine   5% Patch 2 Patch Transdermal once    MEDICATIONS  (PRN):  acetaminophen   Tablet .. 650 milliGRAM(s) Oral every 6 hours PRN Temp greater or equal to 38C (100.4F), Mild Pain (1 - 3), Moderate Pain (4 - 6)        CAPILLARY BLOOD GLUCOSE        I&O's Summary    10 Aug 2021 07:01  -  11 Aug 2021 01:00  --------------------------------------------------------  IN: 0 mL / OUT: 250 mL / NET: -250 mL        T(C): 36.7 (08-10-21 @ 20:55), Max: 36.9 (08-10-21 @ 17:20)  HR: 66 (08-10-21 @ 20:55) (66 - 77)  BP: 100/60 (08-10-21 @ 20:55) (100/60 - 123/66)  RR: 18 (08-10-21 @ 20:55) (18 - 20)  SpO2: 98% (08-10-21 @ 20:55) (97% - 100%)    PHYSICAL EXAM:  GENERAL: NAD  NECK: Supple, No JVD  CHEST/LUNG: Clear to auscultation bilaterally; No wheezing.  HEART: Regular rate and rhythm; No murmurs, rubs, or gallops  ABDOMEN: Soft, Nontender, Nondistended; Bowel sounds present  EXTREMITIES:   No edema  NEUROLOGY: AA      LABS:                        9.6    4.74  )-----------( 121      ( 10 Aug 2021 09:30 )             30.4     08-10    139  |  108<H>  |  48<H>  ----------------------------<  129<H>  5.0   |  19<L>  |  1.96<H>    Ca    9.1      10 Aug 2021 09:30  Phos  3.1     08-10  Mg     2.40     08-10    TPro  6.2  /  Alb  3.7  /  TBili  0.4  /  DBili  x   /  AST  17  /  ALT  9   /  AlkPhos  71  08-09          Urinalysis Basic - ( 09 Aug 2021 17:27 )    Color: Light Yellow / Appearance: Clear / S.012 / pH: x  Gluc: x / Ketone: Negative  / Bili: Negative / Urobili: <2 mg/dL   Blood: x / Protein: Negative / Nitrite: Negative   Leuk Esterase: Negative / RBC: 1 /HPF / WBC 1 /HPF   Sq Epi: x / Non Sq Epi: x / Bacteria: Negative      CAPILLARY BLOOD GLUCOSE            RADIOLOGY & ADDITIONAL TESTS:    Imaging Personally Reviewed:    Consultant(s) Notes Reviewed:      Care Discussed with Consultants/Other Providers:    Dileep Amador MD, CMD, FACP    [id number] [id number]  Office Tel: [phone]  Cell: [phone]  
[facility] : [facility] [phone], Office [phone]  Dr Garcia/Dr Dawn  /Dr David harris /Dr JUAN DAVID Huynh/Dr Mike Smith/Dr Adrian Vega /Dr LUBA Herman  _______________________________________________________________________________________________    seen and examined today for renal failure and hyperkalemia  Interval : serum cr level improving  VITALS:  T(F): 97.7 (08-10-21 @ 05:31), Max: 98 (08-09-21 @ 23:19)  HR: 73 (08-10-21 @ 05:31)  BP: 123/66 (08-10-21 @ 05:31)  RR: 18 (08-10-21 @ 05:31)  SpO2: 100% (08-10-21 @ 05:31)    Physical Exam :-  Constitutional: NAD  Neck: Supple.  Respiratory: Bilateral equal breath sounds, few basal Crackles present.  Cardiovascular: S1, S2 normal, positive Murmur  Gastrointestinal: Bowel Sounds present, soft, non tender.  Extremities: no Edema Feet  Neurological: Alert and Oriented x 3  Psychiatric: Normal mood, normal affect  Data:-  Allergies :   No Known Allergies    Hospital Medications:   MEDICATIONS  (STANDING):  amLODIPine   Tablet 5 milliGRAM(s) Oral daily  apixaban 2.5 milliGRAM(s) Oral two times a day  aspirin enteric coated 81 milliGRAM(s) Oral daily  atorvastatin 20 milliGRAM(s) Oral at bedtime  carvedilol 25 milliGRAM(s) Oral every 12 hours  finasteride 5 milliGRAM(s) Oral daily  furosemide    Tablet 40 milliGRAM(s) Oral daily  losartan 100 milliGRAM(s) Oral daily    08-10    139  |  108<H>  |  48<H>  ----------------------------<  129<H>  5.0   |  19<L>  |  1.96<H>    Ca    9.1      10 Aug 2021 09:30  Phos  3.1     08-10  Mg     2.40     08-10    TPro  6.2  /  Alb  3.7  /  TBili  0.4  /  DBili      /  AST  17  /  ALT  9   /  AlkPhos  71  08-09    Creatinine Trend: 1.96 <--, 2.69 <--                        9.6    4.74  )-----------( 121      ( 10 Aug 2021 09:30 )             30.4

## 2021-08-10 NOTE — PHARMACOTHERAPY INTERVENTION NOTE - COMMENTS
M[facility] verified with [facility]  *per [facility], Eliquis 5mg -- take one tablet 2 times per day (last filled 8/17/2020 about a year ago)  pharmacy unable to confirm directions for furosemide 40mg though recently filled

## 2021-08-10 NOTE — PROGRESS NOTE ADULT - ASSESSMENT
89 y.o male with a PMhx of HTN, HLD, DM, CKD3 presented to the ED complaining of having elevated BUN and creatinine. pt was sent in by his primary Nephrologist/my partner Dr Garcia for creatinine elevation 3.1 and sr potassium level 6. Renal consulted for FLOYD on CKD Mx.    FLOYD on CKD3 (chronic kidney disease).   baseline Cr 1.2 2019   FLOYD   clincially euvolemic  Cr 3.1 outpt > now 2.69 -->1.9  Hyperkalemia -K 6 outpt >now 5.3 better-->improving  bp stable.     Plan : continue monitor Serum Creatinine daily  avoid nephrotoxins/NSAIDs  monitor daily BMP  renal us ordered for rule out hydro  hold diuretics -lasix, aldactone and ARB Losartan for now      Essential hypertension. bp controlled. c/w BB -- low blood pressure improved  CAD s/p CABG, s/p PPM. h/o CMP/CHF EF 40% 8/2018. consider cardiology eval   DM- Mx per medicine    dc plan if renal us negative and Serum Creatinine improving with k stable, could be followed up outpatient,     
The patient is a 89y Male was sent by a nephrologist because of abnormal BUN/Cr.    FLOYD on CKD III:    Nephro f/up appreciated  Trend BMP    HTN:    Cont with current BP meds

## 2021-08-10 NOTE — PROGRESS NOTE ADULT - REASON FOR ADMISSION
The patient is a 89y Male was sent by a nephrologist because of abnormal BUN/Cr.
The patient is a 89y Male was sent by a nephrologist because of abnormal BUN/Cr.

## 2021-08-11 NOTE — DISCHARGE NOTE PROVIDER - HOSPITAL COURSE
89 y.o male with a PMhx of HTN, HLD, DM, CKD presented to the ED complaining of having elevated BUN and creatinine, pt was sent in by his nephrologist after having BUN and creatinine elevation, patient was also found to have a potassium of 6 for which he was given a laxative for. Patient also has been feeling fatigue and weakness over the past several weeks as well, as per son patient hasn't been acting like himself, patient denies having any CP, SOB, BROWN, abdominal pain, headaches, dizziness, nausea, vomiting, diarrhea constipation.    FLOYD on CKD3 (chronic kidney disease).  -baseline Cr 1.2 2019   -clinically euvolemic  -Cr 3.1 outpatient, now 1.9  -Renal US negative for hydronephrosis  -Hold diuretics- lasix, aldactone, and losartan.    Hyperkalemia  -K 6 outpatient, now improving 4.7    Essential hypertension  -BP controlled  -Continue current medications.    CABG  -s/p CABG, s/p PPM, EF 40% in 8/2018    DM  -Monitor FS    Patient is medically stable for discharge per attending Dr. Amador.         89 y.o male with a PMhx of HTN, HLD, DM, CKD presented to the ED complaining of having elevated BUN and creatinine, pt was sent in by his nephrologist after having BUN and creatinine elevation, patient was also found to have a potassium of 6 for which he was given a laxative for. Patient also has been feeling fatigue and weakness over the past several weeks as well, as per son patient hasn't been acting like himself, patient denies having any CP, SOB, BROWN, abdominal pain, headaches, dizziness, nausea, vomiting, diarrhea constipation.    FLOYD on CKD3 (chronic kidney disease).  -baseline Cr 1.2 2019   -clinically euvolemic  -Cr 3.1 outpatient, now 1.9  -Renal US negative for hydronephrosis  -Hold diuretics- lasix, aldactone, and losartan.    Hyperkalemia  -K 6 outpatient, now improving 4.7    Essential hypertension  -BP controlled  -Continue current medications.    CABG  -s/p CABG, s/p PPM, EF 40% in 8/2018    DM  -Monitor FS    Patient is medically stable for discharge on 8/11/2021 per attending Dr. Amador.         89 y.o male with a PMhx of HTN, HLD, DM, CKD presented to the ED complaining of having elevated BUN and creatinine, pt was sent in by his nephrologist after having BUN and creatinine elevation, patient was also found to have a potassium of 6 for which he was given a laxative for. Patient also has been feeling fatigue and weakness over the past several weeks as well, as per son patient hasn't been acting like himself, patient denies having any CP, SOB, BROWN, abdominal pain, headaches, dizziness, nausea, vomiting, diarrhea constipation.    FLOYD on CKD3 (chronic kidney disease).  -baseline Cr 1.2 2019   -clinically euvolemic  -Cr 3.1 outpatient, now 1.9  -Renal US negative for hydronephrosis  -Hold diuretics- aldactone    Hyperkalemia  -K 6 outpatient, now improving 4.7    Essential hypertension  -BP controlled  -Continue current medications.    CABG  -s/p CABG, s/p PPM, EF 40% in 8/2018    DM  -Monitor FS    Patient is medically stable for discharge on 8/11/2021 per attending Dr. Amador.

## 2021-08-11 NOTE — DISCHARGE NOTE NURSING/CASE MANAGEMENT/SOCIAL WORK - PATIENT PORTAL LINK FT
You can access the FollowMyHealth Patient Portal offered by [facility] by registering at the following website: [email or web address]. By joining [facility]’s FollowMyHealth portal, you will also be able to view your health information using other applications (apps) compatible with our system.

## 2021-08-11 NOTE — DISCHARGE NOTE PROVIDER - NSDCMRMEDTOKEN_GEN_ALL_CORE_FT
carvedilol 25 mg oral tablet: 1 tab(s) orally every 12 hours  finasteride 5 mg oral tablet: 1 tab(s) orally once a day  furosemide 40 mg oral tablet:   hydrALAZINE 100 mg oral tablet: 1 tab(s) orally 3 times a day  Lipitor 20 mg oral tablet: 1 tab(s) orally once a day  losartan 100 mg oral tablet: 1 tab(s) orally once a day  spironolactone 25 mg oral tablet: 1 tab(s) orally 2 times a day  tamsulosin 0.4 mg oral capsule: 1 cap(s) orally once a day   amLODIPine 5 mg oral tablet: 1 tab(s) orally once a day  apixaban 2.5 mg oral tablet: 1 tab(s) orally 2 times a day  aspirin 81 mg oral delayed release tablet: 1 tab(s) orally once a day  carvedilol 25 mg oral tablet: 1 tab(s) orally every 12 hours  finasteride 5 mg oral tablet: 1 tab(s) orally once a day  furosemide 40 mg oral tablet: 1 tab(s) orally once a day  Lipitor 20 mg oral tablet: 1 tab(s) orally once a day  losartan 100 mg oral tablet: 1 tab(s) orally once a day  tamsulosin 0.4 mg oral capsule: 1 cap(s) orally once a day   amLODIPine 5 mg oral tablet: 1 tab(s) orally once a day  apixaban 2.5 mg oral tablet: 1 tab(s) orally 2 times a day  aspirin 81 mg oral delayed release tablet: 1 tab(s) orally once a day  carvedilol 25 mg oral tablet: 1 tab(s) orally every 12 hours  finasteride 5 mg oral tablet: 1 tab(s) orally once a day  furosemide 40 mg oral tablet: 1 tab(s) orally once a day  Lipitor 20 mg oral tablet: 1 tab(s) orally once a day  losartan 100 mg oral tablet: 1 tab(s) orally once a day  MiraLax oral powder for reconstitution: 17 gram(s) orally once a day, As Needed -for constipation   tamsulosin 0.4 mg oral capsule: 1 cap(s) orally once a day

## 2021-08-11 NOTE — DISCHARGE NOTE PROVIDER - NSDCCPCAREPLAN_GEN_ALL_CORE_FT
PRINCIPAL DISCHARGE DIAGNOSIS  Diagnosis: Chronic kidney disease, unspecified CKD stage  Assessment and Plan of Treatment: You were seen by a nephrologist during your inpatient stay. Your kidney ultrasound was normal. Your kidney function has improved upon discharge. Follow up with your primary care physician outpatient. Continue taking furosemide and losartan. Stop taking spironolactone.         SECONDARY DISCHARGE DIAGNOSES  Diagnosis: Hypertension  Assessment and Plan of Treatment: Low sodium and fat diet, continue anti-hypertensive medications, and follow up with primary care physician. Continue taking losartan. Stop taking hydralazine and follow up with your primary care physician outpatient within one week to possibly restart medication. Start taking amlodipine upon discharge.    Diagnosis: Hyperlipidemia  Assessment and Plan of Treatment: Low fat diet, exercise daily and continue current medications. Follow up with primary care physician and cardiologist for management.    Diagnosis: Diabetes  Assessment and Plan of Treatment: Continue current medications as instructed. Follow up with your primary care physician and/or endocrinologist outpatient.

## 2021-08-14 NOTE — DISCHARGE NOTE PROVIDER - HOSPITAL COURSE
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 Sync cardioverted X 1 to paced rhythm and started on amiodarone drip. Patient pain free.    CCU course:  Patient pain free but underlying rhythm/EKG with pacer off concerning for new TWI in inferior leads and V5V6 not noted on previous intrinsic EKGs from 2014 (other EKGs are paced). Troponin also mildly elevated but in setting of defibrillation and elevated Scr. Lactate initially 4.4 which trended down. ACS protocol initiated with ASA and Plavix load and Heparin drip, trending cardiac enzymes. Awaiting ECHO. Continuing amiodarone drip at present and will complete at 1030 on 8/15/21. 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 Sync cardioverted X 1 to paced rhythm and started on amiodarone drip. Patient pain free.    CCU course:  Patient pain free but underlying rhythm/EKG with pacer off concerning for new TWI in inferior leads and V5V6 not noted on previous intrinsic EKGs from 2014 (other EKGs are paced). Troponin also mildly elevated but in setting of defibrillation and elevated Scr. Lactate initially 4.4 which trended down. ACS protocol initiated with ASA and Plavix load and Heparin drip, trending cardiac enzymes. Awaiting ECHO. Continuing amiodarone drip at present and will complete at 1030 on 8/15/21.     Plan as noted with addition of transfer to [facility] on Sunday for eventual LHC and upgrade to BiV ICD per fellow and private cardiology. 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 Sync cardioverted X 1 to paced rhythm and started on amiodarone drip. Patient pain free.    CCU course:  Patient pain free but underlying rhythm/EKG with pacer off concerning for new TWI in inferior leads and V5V6 not noted on previous intrinsic EKGs from 2014 (other EKGs are paced). Troponin also mildly elevated but in setting of defibrillation and elevated Scr. Lactate initially 4.4 which trended down. ACS protocol initiated with ASA and Plavix load and Heparin drip, trending cardiac enzymes. Awaiting ECHO. Continuing amiodarone drip at present and will complete at 1030 on 8/15/21.     Plan as noted with addition of transfer to [facility] on Sunday for eventual LHC and upgrade to BiV ICD per fellow and private cardiology. 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 Sync cardioverted X 1 to paced rhythm and started on amiodarone drip. Patient pain free.    CCU course:  Patient pain free but underlying rhythm/EKG with pacer off concerning for new TWI in inferior leads and V5V6 not noted on previous intrinsic EKGs from 2014 (other EKGs are paced). Troponin also mildly elevated but in setting of defibrillation and elevated Scr. Lactate initially 4.4 which trended down to 1.9.  ACS protocol initiated with ASA and Plavix load and Heparin drip, trending cardiac enzymes. Awaiting ECHO. Continuing amiodarone drip at present and will complete at 1030 on 8/15/21.     Plan as noted with addition of transfer to [facility] on Sunday for eventual LHC and upgrade to BiV ICD per fellow and private cardiology. 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 Sync cardioverted X 1 to paced rhythm and started on amiodarone drip. Patient pain free.    CCU course:  Patient pain free but underlying rhythm/EKG with pacer off concerning for new TWI in inferior leads and V5V6 not noted on previous intrinsic EKGs from 2014 (other EKGs are paced). Troponin also mildly elevated but in setting of defibrillation and elevated Scr. Lactate initially 4.4 which trended down to 1.9.  ACS protocol initiated with ASA and Plavix load and Heparin drip, trending cardiac enzymes. Awaiting ECHO. Continuing amiodarone drip at present and will complete at 1030 on 8/15/21.     8/15-8/16: Patient was switched to PO Amiodarone load. On ECHO, EF=60%, severely dilated LA, mild concenctric LV hypertrophy, normal LV systolic function, and no segmental wall motion abnormalities.  Troponin continues to trend down. FLOYD Cr trends down to 2.2 from 2.7. Pending LHC per FLOYD improval.     Plan as noted with addition of transfer to [facility] this week for LHC and upgrade to BiV ICD per fellow and private cardiology. 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 Sync cardioverted X 1 to paced rhythm and started on amiodarone drip. Patient pain free.    CCU course:  Patient pain free but underlying rhythm/EKG with pacer off concerning for new TWI in inferior leads and V5V6 not noted on previous intrinsic EKGs from 2014 (other EKGs are paced). Troponin also mildly elevated but in setting of defibrillation and elevated Scr. Lactate initially 4.4 which trended down to 1.9.  ACS protocol initiated with ASA and Plavix load and Heparin drip, trending cardiac enzymes. Awaiting ECHO. Continuing amiodarone drip at present and will complete at 1030 on 8/15/21.     8/15-8/16: Patient was switched to PO Amiodarone load. On ECHO, EF=60%, severely dilated LA, mild concenctric LV hypertrophy, normal LV systolic function, and no segmental wall motion abnormalities.  Troponin continues to trend down. FLOYD Cr trends down to 2.2 from 2.7. Pending LHC per FLOYD improval.     8-17:  Heparin drip was D/C'd.    Plan as noted with addition of transfer to [facility] this week for LHC and upgrade to BiV ICD per fellow and private cardiology. 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 Sync cardioverted X 1 to paced rhythm and started on amiodarone drip. Patient pain free.    CCU course  Patient pain free but underlying rhythm/EKG with pacer off concerning for new TWI in inferior leads and V5V6 not noted on previous intrinsic EKGs from 2014 (other EKGs are paced). Troponin also mildly elevated but in setting of defibrillation and elevated Scr. Lactate initially 4.4 which trended down to 1.9.  ACS protocol initiated with ASA and Plavix load and Heparin drip, trending cardiac enzymes. Awaiting ECHO. Continuing amiodarone drip at present and will complete at 1030 on 8/15/21.     8/15-8/16: Patient was switched to PO Amiodarone load. On ECHO, EF=60%, severely dilated LA, mild concenctric LV hypertrophy, normal LV systolic function, and no segmental wall motion abnormalities.  Troponin continues to trend down. FLOYD Cr trends down to 2.2 from 2.7. Pending [facility] per FLOYD improval. (8-17) Heparin drip was D/C'd due to left arm Triceps hematoma, arterial duplex neg. No indication for vascular surgical intervention at this time. Hemoglobin remains stable. Might consider resuming Apixaban 2.5mg BID in the office.     Plan as noted with addition of transfer to [facility] tomorrow morning 8/31. NPO tonight after midnight for venogram + PM to ICD upgrade tomorrow, to be done at [place].    - Patient requests consideration of right-side tunneled ICD lead to left pocket before extraction referral.  He may or may not require referral to Dr Jaimes for RV pacing lead removal.     As per EP, patient is hemodynamically stable to be transferred to [facility].

## 2021-08-14 NOTE — H&P ADULT - PROBLEM SELECTOR PLAN 4
h/o pAfib on eliquis  CHAD2 score: 8  currently on heparin drip for ACS-will hold eliquis till pt on heparin drip  Continuous tele monitoring  Rate control: will introduce low dose coreg if Bp tolerates    Follow PT/INR/PTT.   Monitor lytes and replete as needed. h/o pAfib on Eliquis  CHAD2 score: 8  Currently on heparin drip for ACS-will hold Eliquis till pt on heparin drip  Continuous tele monitoring  Rate control: will introduce low dose coreg if Bp tolerates   Follow PT/INR/PTT.   Monitor lytes and replete as needed.

## 2021-08-14 NOTE — H&P ADULT - PROBLEM SELECTOR PLAN 1
Patient with p/w chest pain and ventricular tachycardia s/p shockedx1  EKG showed  Sinus perla with STD in inferior and lateral lead  Load with  mg now, Plavix 600 mg PO and start heparin gtt as per ACS protocol  Assess for resolution of chest pain and recurrence in chest pain. Serial EKG PRN chest pain to assess for ST changes  Continuous cardiac monitoring to monitor for arrhythmias  Admission labs include serial cardiac enzymes, risk factor profile to include TSH, HGBA1c, Lipid profile, CMP, Mag, Phos, CBC, pBNP. Trend cardiac enzymes  Aspirin 81 PO daily, Clopidogrel 75 PO daily, Lipitor 40 mg PO daily  hold LHC given elevated creatinine and currently chest pain free  Low fat DASH diet  ECHO: 2D ECHO to evaluate LV function and wall motion abnormalities Patient with p/w chest pain and ventricular tachycardia s/p shockedx1  EKG showed  Sinus perla with STD in inferior and lateral lead  Load with  mg now, Plavix 600 mg PO and start heparin gtt as per ACS protocol  Assess for resolution of chest pain and recurrence in chest pain. Serial EKG PRN chest pain to assess for ST changes  Continuous cardiac monitoring to monitor for arrhythmias  Admission labs include serial cardiac enzymes, risk factor profile to include TSH, HGBA1c, Lipid profile, CMP, Mag, Phos, CBC, pBNP. Trend cardiac enzymes  Aspirin 81 PO daily, Clopidogrel 75 PO daily, Lipitor 40 mg PO daily  hold  urgent LHC given elevated creatinine and currently chest pain free  Low fat DASH diet  ECHO: 2D ECHO to evaluate LV function and wall motion abnormalities Patient with p/w chest pain and ventricular tachycardia s/p shockedx1  EKG showed  Sinus perla with STD in inferior and lateral lead  Load with  mg now, Plavix 600 mg PO and start heparin gtt as per ACS protocol  Assess for resolution of chest pain and recurrence in chest pain. Serial EKG PRN chest pain to assess for ST changes  Continuous cardiac monitoring to monitor for arrhythmias  Admission labs include serial cardiac enzymes, risk factor profile to include TSH, HGBA1c, Lipid profile, CMP, Mag, Phos, CBC. Trend cardiac enzymes  Aspirin 81 PO daily, Clopidogrel 75 PO daily, Lipitor 40 mg PO daily  Hold  urgent LHC given elevated creatinine and currently chest pain free  Low fat DASH diet  ECHO: 2D ECHO to evaluate LV function and wall motion abnormalities

## 2021-08-14 NOTE — H&P ADULT - NSHPPHYSICALEXAM_GEN_ALL_CORE
Appearance: Normal	  HEENT:   Normal oral mucosa, PERRL, EOMI	  Lymphatic: No lymphadenopathy  Cardiovascular: Normal S1 S2, No JVD, No murmurs, No edema  Respiratory: Lungs clear to auscultation	  Psychiatry: A & O x 3, Mood & affect appropriate  Gastrointestinal:  Soft, Non-tender, + BS	  Skin: No rashes, No ecchymoses, No cyanosis	  Neurologic: Non-focal  Extremities: Normal range of motion, No clubbing, cyanosis or edema  Vascular: Peripheral pulses palpable 2+ bilaterally

## 2021-08-14 NOTE — ED ADULT NURSE REASSESSMENT NOTE - NS ED NURSE REASSESS COMMENT FT1
received report from MADI Rich. Pt post shock from Vtach back to ventricular paced with HR 66 on cardiac monitor and on zoll. Pt verbalizing full relief in chest pain. Pt AxOx4, resting comfortably with RR even and unlabored, moving all extremities. Palor/diaphoresis not noted. BP soft with MD Chacko aware. Pt receiving fluids as per MD orders. Temp 96.8- warm blankets applied with no further intervention as per MD. Skin noted to be dry and intact. Pt admitted at this time, awaiting bed assignment.

## 2021-08-14 NOTE — H&P ADULT - PROBLEM SELECTOR PLAN 6
Recent admission for CRI with hyperkalemia   S. creat on discharge on 8/12/21 was 1.89,currently 3.49  -Renal US negative for hydronephrosis  FLOYD likely due to NSTEMI/VT  Trend BUN/Cr.  Strict I/O   Hold ACE-I in setting of FLOYD . Avoid Nephrotoxic Meds/ Agents such as (NSAIDs, IV contrast, Aminoglycosides such as gentamicin, -Gadolinium contrast, Phosphate containing enemas, etc..)  Adjust Medications according to eGFR  Renal consult called Recent admission for CRI with hyperkalemia   S. creat on discharge on 8/12/21 was 1.89,currently 3.49  Renal US negative for hydronephrosis  FLOYD likely due to NSTEMI/VT  Trend BUN/Cr.  Strict I/O  Avoid Nephrotoxic Meds/ Agents such as (NSAIDs, IV contrast, Aminoglycosides such as gentamicin, -Gadolinium contrast, Phosphate containing enemas, etc..)  Adjust Medications according to eGFR  Renal consult called

## 2021-08-14 NOTE — H&P ADULT - NSICDXPASTMEDICALHX_GEN_ALL_CORE_FT
PAST MEDICAL HISTORY:  Anemia     Atrial fibrillation     BPH (benign prostatic hypertrophy)     CVA (Cerebral Infarction) x2 in the past with residual Rt handed numbness and a little wprd finding trouble    Hypertension     Other and Unspecified Hyperlipidemia     Pacemaker      PAST MEDICAL HISTORY:  Anemia     Atrial fibrillation     BPH (benign prostatic hypertrophy)     CAD (coronary artery disease)     CVA (Cerebral Infarction) x2 in the past with residual Rt handed numbness and a little word finding trouble    History of ischemic cardiomyopathy     Hypertension     Other and Unspecified Hyperlipidemia

## 2021-08-14 NOTE — CHART NOTE - NSCHARTNOTEFT_GEN_A_CORE
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 Villatoro, and CKD who presented to the ED with intermittent anginal symptoms at rest since midnight.    In the ED, with regular wide complex QRS tachycardia concerning for VT with hypotension to SBP 70s, s/p shock x 1. Was given amio IVP bolus in the ED.    Was brought to the CICU. At the bedside, ROS negative including CP. HR 60 V-paced, /70, RR 18, and SpO2 100% on room air. Unremarkable exam. Labs concerning for FLOYD on CKD w/ Cr 3.5 from baseline 1.9, lactic acidosis, and elevated hsTrop to 196.     Patient's BiV PPM was interrogated, revealing VT x 2. Native rhythm was revealed, with V-pacing temporarily disabeled, with the 12 lead EKG capturing showing sinus bradycardia with STD w/ TWI on inferior leads and V5-6.     Discussed the case with Dr. Rodriguez, Dr. Ayala, and Dr. Montague    Plan:  - NSTEMI: give ASA 324mg x 1, plavix 300mg x 1, and start heparin gtt. 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 Villatoro, and CKD who presented to the ED with intermittent anginal symptoms at rest since midnight.    In the ED, with regular wide complex QRS tachycardia concerning for VT with hypotension to SBP 70s, s/p shock x 1. Was given amio IVP bolus in the ED.    Was brought to the CICU. At the bedside, ROS negative including CP. HR 60 V-paced, /70, RR 18, and SpO2 100% on room air. Unremarkable exam. Labs concerning for FLOYD on CKD w/ Cr 3.5 from baseline 1.9, lactic acidosis, and elevated hsTrop to 196.     Patient's BiV PPM was interrogated, revealing VT x 2. Native rhythm was revealed, with V-pacing temporarily disabled, with the 12 lead EKG showing sinus bradycardia with STD w/ TWI on inferior leads and V5-6.     Discussed the case with Dr. Rodriguez, Dr. Ayala, and Dr. Montague    Plan:  - NSTEMI: give ASA 324mg x 1, plavix 300mg x 1, and start heparin gtt. Given the absence of CP & hemodynamic stability with no further unstable rhythm, will defer emergent LHC for now  - Low threshold to activate the cath lab with any further CP or VT  - Will obtain TTE  - Will obtain serial cardiac biomarkers and trend lactate  - Will continue amio gtt     Signed by  Sanjuana Stafford MD  PGY5 Cardiology

## 2021-08-14 NOTE — DISCHARGE NOTE PROVIDER - CARE PROVIDER_API CALL
Estelle Ayala (MD)  Cardiovascular Disease; Internal Medicine; Interventional Cardiology  [address] [place] [id number]  Phone: [phone]  Fax: [phone]  Follow Up Time:

## 2021-08-14 NOTE — DISCHARGE NOTE PROVIDER - NSDCQMPLAVIXOTHER_CARD_A_CORE_FT
as recommended by Card EP - Plan for Venogram + PM to ICD upgrade tomorrow morning at [facility]

## 2021-08-14 NOTE — H&P ADULT - PROBLEM SELECTOR PLAN 2
Patient presented to ER with c/o  chest pain found in ventricular tachycardia s/p defibrillatorx1  Amiodarone gtt 1 mg/min x 6 hours and 0.5 mg/min for 18 hours.   Monitor for further VT episodes  Zoll pads at bedside with pacer pads on patient.   Keep K > 4.2 and magnesium > 2.2  Cardioversion/defibrillation if unstable.   BIV PPM interrogation showed ventricular tachycardia at 150-160  EP consulated         setting pressors and inotrope, BB would be counterproductive and therefore not effective at suppressing arrythmia Patient presented to ER with c/o  chest pain found in ventricular tachycardia s/p defibrillatorx1  Amiodarone gtt 1 mg/min x 6 hours and 0.5 mg/min for 18 hours followed PO load    Monitor for further VT episodes  Zoll pads at bedside with pacer pads on patient.   Keep K > 4.2 and magnesium > 2.2  Cardioversion/defibrillation if unstable.   BIV PPM interrogation showed  monomorphic ventricular tachycardia at 150-160  EP consulted Patient presented to ER with c/o  chest pain found in ventricular tachycardia s/p defibrillatorx1  Amiodarone gtt 1 mg/min x 6 hours and 0.5 mg/min for 18 hours followed PO load    Monitor for further VT episodes  Zoll pads at bedside with pacer pads on patient.   Keep K > 4.2 and magnesium > 2.2  Cardioversion/defibrillation if unstable.   BIV PPM interrogation showed  monomorphic ventricular tachycardia at 150-160  EP consulted  plan to transfer to [facility] for upgrade BiVICD

## 2021-08-14 NOTE — ED PROVIDER NOTE - PHYSICAL EXAMINATION
GEN - pale, diaphoretic  HEAD - NC/AT   EYES- PERRL, EOMI  ENT: Airway patent, mmm  NECK: Neck supple  PULMONARY - grossly clear b/l  CARDIAC -s1s2, RRR, no M,G,R  ABDOMEN - +BS, ND, NT, soft, no guarding, no rebound, no masses   BACK - no CVA tenderness, Normal  spine   EXTREMITIES - FROM, symmetric pulses  SKIN - no rash or bruising   NEUROLOGIC - alert, speech clear, moving all four extremities spontaneously   PSYCH -nl mood/affect, nl insight.

## 2021-08-14 NOTE — H&P ADULT - HISTORY OF PRESENT ILLNESS
89 y.o Frisian  male with a PMhx of HTN, HLD, DM, CAD s/p CABG, diastolic HF EF 40% in 2018 with BiV PPM( Biotronik) CKD, afib on Eliquis, CVA, Velasquez's esophagus recently hospitalized here for CRI and hyperkalemia and was  discharge  on 8/12 presents with  compliant of chest burning nausea, dizziness, diaphoresis lightheadedness with unsteady gait and bladder and bowel incontinence started early morning. Patient also compliant of b/l knee pain  and weakness started last night. In ED he was found in ventricular tachycardia and and hypotensive to 70s/50s He received  150mg push amiodarone with no improvement.Pateint  became less responsive and hypotensive so he was defibrillated x1 with good response. EKG post-defib showed paced rhythm with improved Blood Pressure and mental status. He was started on Amiodarone drip and admitted to CCU.    EKG:    Labs: Na 133, Screat 3.49,glucose 245,Alk 122, AST 70,ALT 52,proBNP 6899,trop 196,lactate 4.4  89 y.o Welsh  male with a PMhx of HTN, HLD, DM, CAD s/p stent to D1 in 2014, Cx 60%(no stent) in 2015, diastolic HF EF 40% in 2018 with BiV PPM( Biotronik) CKD, afib on Eliquis, CVA, Velasquez's esophagus recently hospitalized here for CRI and hyperkalemia and was  discharge  on 8/12 presents with  compliant of chest burning nausea, dizziness, diaphoresis lightheadedness with unsteady gait and bladder and bowel incontinence started early morning. Patient also compliant of b/l knee pain  and weakness started last night. In ED he was found in ventricular tachycardia and and hypotensive to 70s/50s He received  150mg push amiodarone with no improvement. Patient  became less responsive and hypotensive so he was defibrillated x1 with good response. EKG post- defib showed paced rhythm with improved Blood Pressure and mental status. He was started on Amiodarone drip and admitted to CCU.    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 Patient also compliant of b/l knee pain  and weakness started last night. In ED he was found in ventricular tachycardia and hypotensive to 70s/50s. He received  150mg push amiodarone with no improvement. Patient  became less responsive and hypotensive so he was defibrillated x1 with good response. EKG post- defib showed paced rhythm with improved Blood Pressure and mental status. He was started on Amiodarone drip and admitted to CCU.    EKG: NSB with STD in II,III,aVF,V4-V6    Labs: Na 133, S creat 3.49,glucose 245,Alk 122, AST 70,ALT 52,proBNP 6899,trop 196,lactate 4.4

## 2021-08-14 NOTE — ED PROVIDER NOTE - OBJECTIVE STATEMENT
89M h/o afib on eliquis, CAD with PPM, CKD, CHF, HTN, DM p/w chest pain. On ED arrival, pt found to be in v-tach and hypotensive to 70s/50s though mentating. Pt complaining of chest pain, no sob, f/c/s, abd pain, n/v. Pt recently admitted for worsening CKD, improved on admission and discharged home. Per son, pt has not been eating much at home and then this morning had chest pain which prompted ED visit.

## 2021-08-14 NOTE — ED PROVIDER NOTE - ATTENDING CONTRIBUTION TO CARE
Upon my evaluation, this patient had a high probability of imminent or life-threatening deterioration due to concern for v-tach, which required my direct attention, intervention, and personal management.  The patient has a  medical condition that impairs one or more vital organ systems.  Frequent personal assessment and adjustment of medical interventions was performed.       I have personally provided 45 minutes of critical care time exclusive of time spent on separately billable procedures. Time includes review of laboratory data, radiology results, discussion with consultants, patient and family; monitoring for potential decompensation, as well as time spent retrieving data and reviewing the chart and documenting the visit. Interventions were performed as documented above.    *I wrote H&P, MDM

## 2021-08-14 NOTE — ED ADULT NURSE NOTE - CHIEF COMPLAINT QUOTE
"Progress Note  [facility] Medicine - [facility]      Admit Date: 12/14/2017    SUBJECTIVE:     Follow-up For:  Acute on chronic systolic heart failure, severe AS    HPI:   Ms. Quick is a 64yo lady with HTN, DM-2 (controlled), h/o recently diagnosed CHF, Aortic stenosis, presenting with shortness of breath x 3 weeks, progressive, worse with exertion, now exercise limitation to < 1 block without exercise limitations previously. She has orthopnea, PND.  She denies chest pain and LE swelling. Reports has been increasing fluid intake recently. Had recent admission to [facility], at which time she underwent cardiac cath (no reported blockages) was told had aortic valve stenosis that would require surgery. She presented here for evaluation because she had previously been told that she would need to go to [facility] for further care. She denies recent illness, no F/C/N/V. Reports dyspnea is mild currently.      Hospital Course:   Pt was admitted to [facility] for treatment of acute on chronic systolic heart failure.  Records were obtained from , and Interventional Cardiology (Juliet) and CTS (Nikkie) both consulted for further recs.  She feels better, but feels shortness of breath "comes and goes".  O2 sat wnl on RA. On 12/19, doing well in the morning, went to TAVR CTA, and upon arrival back to her room she complained of abrupt onset of severe SOB and atypical chest pain (sharp, L substernal), charge nurse called a rapid response.  VS were stable throughout, and O2 sat was wnl (>92% on RA) albeit difficult to achieve appropriate reading so checked ABG.  EKG/CXR/trop/BNP done, trop decreased from prior (0.511 <-- 2.935), BNP decreased from prior (25402 <--2414).  discussed with Interventional Cardiology and reviewed CT results, revealing for large B pleural effusions and pulmonary edema, so re-started Lasix IV. Her subjective SOB was out of proportion to hypoxemia, consistent with panic attack, so gave low dose ativan 0.25 IV.  After both Lasix " Pt arrives with Son, Non English/Armenian speaking. Asst from Car  As per Son " c/o Chest pain since this am. Pt Pale, diaphoretic. Hypotensive, Sbp 68,  , Pt to TB . HX of Pacemaker/Renal,  Placed on cm= VT and Ativan, she was much improved clinically. Move to CSU 3rd floor (stepdown). Much improved the next day, net -2L, anxiety resolved,  at bedside reports that she had some gurgling noises while sleeping and he felt that he had to monitor closely. Much improved on 12/21, SOB resolved, no anxiety, continue current Lasix IV and possible switch to PO tomorrow.  Doing well, continue current IV diuretic, possible transition to PO tomorrow then d/c home Sunday. Seen by JOVITA Perez and scheduled for clinic with Dr Lundy for further discussion of SAVR. Mild nausea around 4pm - no abd pain, constipation, chest pain, other - resolved after zofran and did not recur. She attributes the nausea to fish that she ate for lunch. The next day, 12/23, only net -720 so increase Lasix to 40 IV TID (from BID), possible transition to oral lasix tomorrow then d/c home early Monday morning. On 12/24, net -1760, weight 160 (admit weight), change to lasix PO and monitor closely     Interval History: Net -2130 on Lasix PO    Review of Systems:  Pain Scale: 0 /10   Constitutional: no fever or chills  Respiratory: no cough or shortness of breath  Cardiovascular: no chest pain or palpitations  Gastrointestinal: no nausea or vomiting, no abdominal pain or change in bowel habits  Genitourinary: no hematuria or dysuria  Integument/Breast: no rash or pruritis  Hematologic/Lymphatic: no easy bruising or lymphadenopathy  Musculoskeletal: no arthralgias or myalgias  Neurological: no seizures or tremors  Behavioral/Psych: no depression or anxiety    OBJECTIVE:     Vital Signs Range (Last 24H):  Temp:  [96.2 °F (35.7 °C)-98.2 °F (36.8 °C)]   Pulse:  [67-95]   Resp:  [18-19]   BP: (101-122)/(59-71)   SpO2:  [92 %-98 %]     I & O (Last 24H):    Intake/Output Summary (Last 24 hours) at 12/25/17 0901  Last data filed at 12/25/17 0400   Gross per 24 hour   Intake             1320 ml   Output             3450 ml   Net            -2130 ml        Physical Exam:  General appearance: no distress  Mental status: Alert and oriented x 3  HEENT:  conjunctivae/corneas clear, PERRL  Neck: supple, thyroid not enlarged  Pulm:   normal respiratory effort, no crackles  Card: RRR, S1, S2 normal, systolic murmur  Abd: soft, NT, ND, BS present; no masses, no organomegaly  Ext: no c/c/e  Pulses: 2+, symmetric  Skin: color, texture, turgor normal. No rashes or lesions  Neuro: CN II-XII grossly intact, no focal numbness or weakness, normal strength and tone     Diagnostic Results:  Labs reviewed    Recent Results (from the past 24 hour(s))   POCT glucose    Collection Time: 12/24/17 12:25 PM   Result Value Ref Range    POCT Glucose 115 (H) 70 - 110 mg/dL   POCT glucose    Collection Time: 12/24/17  5:57 PM   Result Value Ref Range    POCT Glucose 99 70 - 110 mg/dL   POCT glucose    Collection Time: 12/24/17  8:23 PM   Result Value Ref Range    POCT Glucose 125 (H) 70 - 110 mg/dL   CBC auto differential    Collection Time: 12/25/17  4:50 AM   Result Value Ref Range    WBC 5.52 3.90 - 12.70 K/uL    RBC 3.78 (L) 4.00 - 5.40 M/uL    Hemoglobin 11.4 (L) 12.0 - 16.0 g/dL    Hematocrit 36.2 (L) 37.0 - 48.5 %    MCV 96 82 - 98 fL    MCH 30.2 27.0 - 31.0 pg    MCHC 31.5 (L) 32.0 - 36.0 g/dL    RDW 15.1 (H) 11.5 - 14.5 %    Platelets 492 (H) 150 - 350 K/uL    MPV 9.9 9.2 - 12.9 fL    Immature Granulocytes 0.2 0.0 - 0.5 %    Gran # 1.8 1.8 - 7.7 K/uL    Immature Grans (Abs) 0.01 0.00 - 0.04 K/uL    Lymph # 2.8 1.0 - 4.8 K/uL    Mono # 0.5 0.3 - 1.0 K/uL    Eos # 0.3 0.0 - 0.5 K/uL    Baso # 0.08 0.00 - 0.20 K/uL    nRBC 0 0 /100 WBC    Gran% 32.3 (L) 38.0 - 73.0 %    Lymph% 51.4 (H) 18.0 - 48.0 %    Mono% 9.6 4.0 - 15.0 %    Eosinophil% 5.1 0.0 - 8.0 %    Basophil% 1.4 0.0 - 1.9 %    Differential Method Automated    Basic metabolic panel    Collection Time: 12/25/17  4:50 AM   Result Value Ref Range    Sodium 142 136 - 145 mmol/L    Potassium 3.3 (L) 3.5 - 5.1 mmol/L    Chloride  104 95 - 110 mmol/L    CO2 27 23 - 29 mmol/L    Glucose 108 70 - 110 mg/dL    BUN, Bld 37 (H) 8 - 23 mg/dL    Creatinine 1.6 (H) 0.5 - 1.4 mg/dL    Calcium 9.7 8.7 - 10.5 mg/dL    Anion Gap 11 8 - 16 mmol/L    eGFR if African American 39.3 (A) >60 mL/min/1.73 m^2    eGFR if non  34.0 (A) >60 mL/min/1.73 m^2   POCT glucose    Collection Time: 12/25/17  8:25 AM   Result Value Ref Range    POCT Glucose 97 70 - 110 mg/dL           ASSESSMENT/PLAN:     Acute on Chronic Systolic Heart Failure and severe AS  -ECHO 12/15:   1 - Moderately depressed left ventricular systolic function (EF 30-35%). Akinetic LV apex. There is no thrombus in LV apex.    2 - Indeterminate LV diastolic function.     3 - Mildly to moderately depressed right ventricular systolic function     4 - Pulmonary hypertension. The estimated PA systolic pressure is 63 mmHg.     5 - Severe low flow aortic valve stenosis (JAMES 0.49 cm2, AVAi 0.27 cm2/m2, peak aortic jet velocity 4.0 m/s,MG 48 mmHg).     6 - Mild to moderate mitral regurgitation.     7 - Mild tricuspid regurgitation.     8 - Severe left atrial enlargement.   -volume status complicated by low-flow severe AS  - diuresed with Lasix 40 IV BID 12/14 - 12/16  - held home Lasix and Lisinopril on 12/17-12/18 for HARISH  - increased Lasix to 40 IV TID (from BID)   - transitioned to oral lasix 40 PO BID on 12/24, and she did great  - pt instructions (printed):  Lasix sliding scale:  Weigh yourself daily  If weight gain 2-3 lbs over 2-5 days, increase Lasix to 80mg by mouth 2x/day temporarily. If no improvement, call MD     Severe aortic valve stenosis  - JAMES 0.49 cm2, AVAi 0.27 cm2/m2, peak aortic jet velocity 4.0 m/s,MG 48 mmHg  - TAVR eval in process, CT chest done 12/19  - Appreciate Interventional Cardiology and CTS recs  - plan for outpatient SAVR by Dr Lundy, to be scheduled  - unclear why the outside hospital told her that she needed to go to [place]    HARISH - likely cardiorenal 2/2  "volume overload  - diuresis as above  - hold ACE-I Lisinopril while renal fxn dynamic    Nausea  - see HPI    NSTEMI- unsure if acute Type I or II 2/2 CHF and AS. Per [facility] at , "noncritical CAD". ECHO results as above  - medical management  - ASA  - Ticagrelor 90 BID - will d/w cardiology re: switch to plavix  - Metoprolol  - statin  - nitro PRN   - home metop 50 BID  - hold Lisinopril while renal fxn dynamic    Atrial fibrillation. CHADS 3. At , was started on Amiodarone and Xarelto  - Xarelto resumed here (dose decreased for renal function)  - home Amio  - home Metoprolol  - note severe atrial enlargement, unclear if DCCV would be beneficial     Essential hypertension  - controlled  - continue Metoprolol  - hold Lisinopril while renal fxn dynamic     DM-2, controlled. A1C 5.4. Home med: metformin 500 daily   - low-dose SSI  - hold home metformin while inpt     HLD   - home fenofibrate 145  - Home atorva 80     Neuropathy, chronic  - home gabapentin 100 TID    Hypok - replace PO     Prophylaxis- already on home xarelto    Advance directives - full code    Post-acute care- to home with  and lasix sliding scale today, close f/u in [facility] and CTS clinic with Dr Lundy (scheduled)    Time spent in care of patient: 35 mins    Poonam Ybarra MD  Hospital Medicine Staff      "

## 2021-08-14 NOTE — CONSULT NOTE ADULT - SUBJECTIVE AND OBJECTIVE BOX
[facility]   MD ANTONY BAUM MD RUORU WONG, PA    TEL:  OFFICE: [phone]  DR HALEY CELL: [phone]  ANDREW WONG CELL: [phone]  DR. LAZCANO CELL: [phone]      FROM 5 PM- 7 AM PLEASE CALL ANSWERING SERVICE AT [phone]    -- INITIAL RENAL CONSULT NOTE --- Date Of service 08-14-21 @ 10:05  --------------------------------------------------------------------------------  HPI:  HPI:        PAST HISTORY  --------------------------------------------------------------------------------  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    S/P coronary artery stent placement    Pacemaker      FAMILY HISTORY:  Family history of heart disease (Father)      PAST SOCIAL HISTORY:    ALLERGIES & MEDICATIONS  --------------------------------------------------------------------------------  Allergies    No Known Allergies    Intolerances      Standing Inpatient Medications  chlorhexidine 4% Liquid 1 Application(s) Topical <User Schedule>  lidocaine   Infusion 1 mG/Min IV Continuous <Continuous>    PRN Inpatient Medications      REVIEW OF SYSTEMS  --------------------------------------------------------------------------------  Gen: No fevers/chills  Skin: No rashes  Head/Eyes/Ears: Normal hearing,  Normal vision   Respiratory: No dyspnea, cough  CV: No chest pain  GI: No abdominal pain, diarrhea, constipation, nausea, vomiting  : No dysuria, hematuria  MSK: No  edema  Heme: No easy bruising or bleeding  Psych: No significant depression    All other systems were reviewed and are negative, except as noted.    VITALS/PHYSICAL EXAM  --------------------------------------------------------------------------------  T(C): 36 (08-14-21 @ 08:30), Max: 36 (08-14-21 @ 08:30)  HR: 68 (08-14-21 @ 08:59) (66 - 170)  BP: 91/57 (08-14-21 @ 08:59) (68/40 - 91/57)  RR: 14 (08-14-21 @ 08:59) (12 - 20)  SpO2: 100% (08-14-21 @ 08:59) (100% - 100%)  Wt(kg): --  Height (cm): 167.6 (08-14-21 @ 08:08)      Physical Exam:  	Gen: NAD  	HEENT: MMM  	Pulm: CTA B/L  	CV: S1S2  	Abd: Soft, +BS   	Ext: No LE edema B/L  	Neuro: Awake, alert  	Skin: Warm and dry  	Vascular access: No HD catheter           : perico  LABS/STUDIES  --------------------------------------------------------------------------------              9.4    8.55  >-----------<  140      [08-14-21 @ 08:26]              29.6     133  |  97  |  90  ----------------------------<  245      [08-14-21 @ 08:26]  5.1   |  14  |  3.49        Ca     9.2     [08-14-21 @ 08:26]      Mg     2.50     [08-14-21 @ 08:26]      Phos  4.4     [08-14-21 @ 08:26]    TPro  6.2  /  Alb  3.6  /  TBili  0.7  /  DBili  x   /  AST  70  /  ALT  52  /  AlkPhos  127  [08-14-21 @ 08:26]    PT/INR: PT 24.2 , INR 2.20       [08-14-21 @ 08:26]  PTT: 28.7       [08-14-21 @ 08:26]      Creatinine Trend:  SCr 3.49 [08-14 @ 08:26]  SCr 1.89 [08-11 @ 07:54]  SCr 1.96 [08-10 @ 09:30]  SCr 2.69 [08-09 @ 14:37]    Urinalysis - [08-09-21 @ 17:27]      Color Light Yellow / Appearance Clear / SG 1.012 / pH 6.5      Gluc Negative / Ketone Negative  / Bili Negative / Urobili <2 mg/dL       Blood Negative / Protein Negative / Leuk Est Negative / Nitrite Negative      RBC 1 / WBC 1 / Hyaline  / Gran  / Sq Epi  / Non Sq Epi  / Bacteria Negative

## 2021-08-14 NOTE — H&P ADULT - NSHPREVIEWOFSYSTEMS_GEN_ALL_CORE
REVIEW OF SYSTEMS    General: +generalized weakness.  Skin: no rashes.  Ophthalmologic: no blurred vision, no loss of vision. 	  ENT: no sore throat, rhinorrhea, sinus congestion.  Cardiovascular:+ chest pain ,no palpitation + dizziness, + diaphoresis, no edema  Respiratory: no SOB, cough or wheeze.  Gastrointestinal:  + nausea, no V/D, no melena/hematemesis/hematochezia.  Genitourinary: no dysuria/hesitancy or hematuria.  Musculoskeletal: no myalgias,+b/l knee pain.  Neurological: no changes in vision or hearing, no lightheadedness/dizziness, no syncope/near syncope	  Psychiatric: no unusual stress/anxiety

## 2021-08-14 NOTE — ED PROVIDER NOTE - CLINICAL SUMMARY MEDICAL DECISION MAKING FREE TEXT BOX
Pt with multiple comorbidities as outlined above p/w chest pain in vtach. Initially hypotensive to 70s/50s but mentating appropriately, given 150mg push amiodarone with no improvement, pt became less responsive and hypotensive so was defibrillated x1 with good response. EKG post-defib showing paced rhythm, BP improved, pt feeling better. Labs sent, pt on amio drip, also given calcium gluconate empirically as pt had recent admission with worsening CKD and hyperK. Plan for CCU admission, case discussed with Dr. Cardoza.

## 2021-08-14 NOTE — DISCHARGE NOTE PROVIDER - NSDCMRMEDTOKEN_GEN_ALL_CORE_FT
amLODIPine 5 mg oral tablet: 1 tab(s) orally once a day  apixaban 2.5 mg oral tablet: 1 tab(s) orally 2 times a day  aspirin 81 mg oral delayed release tablet: 1 tab(s) orally once a day  carvedilol 25 mg oral tablet: 1 tab(s) orally every 12 hours  finasteride 5 mg oral tablet: 1 tab(s) orally once a day  furosemide 40 mg oral tablet: 1 tab(s) orally once a day  Lipitor 20 mg oral tablet: 1 tab(s) orally once a day  losartan 100 mg oral tablet: 1 tab(s) orally once a day  MiraLax oral powder for reconstitution: 17 gram(s) orally once a day, As Needed -for constipation   tamsulosin 0.4 mg oral capsule: 1 cap(s) orally once a day   amLODIPine 5 mg oral tablet: 1 tab(s) orally once a day  aspirin 81 mg oral delayed release tablet: 1 tab(s) orally once a day  atorvastatin 40 mg oral tablet: 1 tab(s) orally once a day (at bedtime)  bisacodyl 10 mg rectal suppository: 1 suppository(ies) rectal every 24 hours  carvedilol 25 mg oral tablet: 1 tab(s) orally every 12 hours  finasteride 5 mg oral tablet: 1 tab(s) orally once a day  furosemide 40 mg oral tablet: 1 tab(s) orally once a day  insulin lispro 100 units/mL injectable solution: sliding scale 3 times a day (before meals) and at bedtime   loratadine 10 mg oral tablet: 1 tab(s) orally once a day  pantoprazole 40 mg oral delayed release tablet: 1 tab(s) orally 2 times a day  polyethylene glycol 3350 oral powder for reconstitution: 17 gram(s) orally 2 times a day  senna oral tablet: 2 tab(s) orally once a day (at bedtime)  sodium bicarbonate 650 mg oral tablet: 1 tab(s) orally 2 times a day  tamsulosin 0.4 mg oral capsule: 1 cap(s) orally once a day   amiodarone 200 mg oral tablet: 200 milligram(s) orally once a day  aspirin 81 mg oral delayed release tablet: 1 tab(s) orally once a day  atorvastatin 40 mg oral tablet: 1 tab(s) orally once a day (at bedtime)  bisacodyl 10 mg rectal suppository: 1 suppository(ies) rectal every 24 hours  finasteride 5 mg oral tablet: 1 tab(s) orally once a day  insulin lispro 100 units/mL injectable solution: sliding scale 3 times a day (before meals) and at bedtime   loratadine 10 mg oral tablet: 1 tab(s) orally once a day  pantoprazole 40 mg oral delayed release tablet: 1 tab(s) orally 2 times a day  polyethylene glycol 3350 oral powder for reconstitution: 17 gram(s) orally 2 times a day  senna oral tablet: 2 tab(s) orally once a day (at bedtime)  sodium bicarbonate 650 mg oral tablet: 1 tab(s) orally 2 times a day  tamsulosin 0.4 mg oral capsule: 1 cap(s) orally once a day

## 2021-08-14 NOTE — H&P ADULT - ASSESSMENT
89 y.o Greek  male with a PMhx of HTN, HLD, DM, CAD s/p CABG, diastolic HF EF 40% in 2018 with BiV PPM( Biotronik) CKD, afib on Eliquis, CVA, Velasquez's esophagus  presents with chest burning, dizziness/ lighheadedness, nausea, weakness with unsteady gait found in ventricular tachycardia and hypotension which did not improved with amiodarone 150mg IVP x1 received shocked x1 by external defibrillator with improve in Blood Pressure and mental status. Started on amiodarone drip.  EKG showed He was admitted to CCU 89 y.o Telugu  male with a PMhx of HTN, HLD, DM, CAD s/p stent to D1 in 2014 , diastolic HF EF 40% in 2018 with BiV PPM( Biotronik) CKD, afib on Eliquis, CVA, Velasquez's esophagus  presents with chest burning, dizziness/ lighheadedness, nausea, weakness with unsteady gait found in ventricular tachycardia and hypotension which did not improved with amiodarone 150mg IVP x1 received shocked x1 by external defibrillator with improve in Blood Pressure and mental status. Started on amiodarone drip.  EKG showed He was admitted to CCU. Labs remarkable for FLOYD on CKD and elevated lactate  post ventricular tachycardia and shock. EKG showed STD in inferior lateral leads. 89 y.o Persian  male with a PMhx of HTN, HLD, DM, CAD s/p stent to 80% D1 in 2014 , ICMrEF 40% in 2018,Afib on eliquis c/b tachy/perla with BiV PPM( Biotronik) CKD, CVA, Velasquez's esophagus  presents with chest burning, dizziness/ lightheadedness, nausea, weakness with unsteady gait found in ventricular tachycardia and hypotension which did not improved with amiodarone 150mg IVP x1 received shocked x1 by external defibrillator with improve in Blood Pressure and mental status. Started on amiodarone drip.  EKG showed He was admitted to CCU. Labs remarkable for FLOYD on CKD and elevated lactate  post ventricular tachycardia and shock. EKG showed STD in inferior lateral leads.

## 2021-08-14 NOTE — ED ADULT NURSE NOTE - OBJECTIVE STATEMENT
Pt received in trauma B, had to be pulled from car in triage. Pt pale, diaphoretic, c/o chest pain, SOB.  Pt tachy to 160's in triage, hypotensive.  Pt brought directly to trauma B, placed on CM, noted to be VTACH.  18g IVL placed to L wrist and 18g IVL placed to R AC.  Pt received meds as per MD orders, placed on zoll.  Received one shock, rhythm back to paed rhythm in the 80's. States feels better after interventions.  Labs sent.  Pt recently admitted here for acute renal failure, discharged two days ago.

## 2021-08-14 NOTE — H&P ADULT - PROBLEM SELECTOR PLAN 3
h/od CHF    CXR showed; clear  Continuous cardiac monitoring to r/o arrhythmias.   Check pro BNP 6899   hold  home dose lasix and losartan  2/2 FLOYD on CKD and hypotension  Daily weight monitoring, Strict I/O, Low sodium DASH diet  Check ECHO to evaluate LV/RV function and valvular abnormalities h/o CHF    CXR showed; clear-appear euvolemic  Continuous cardiac monitoring to r/o arrhythmias.   Check pro BNP 6899   Hold  home dose lasix and losartan  2/2 FLOYD on CKD and hypotension  Daily weight monitoring, Strict I/O, Low sodium DASH diet  Check ECHO to evaluate LV/RV function and valvular abnormalities

## 2021-08-14 NOTE — CONSULT NOTE ADULT - ASSESSMENT
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 Started on amiodarone drip.  EKG showed He was admitted to CCU. Labs remarkable for FLOYD on CKD and elevated lactate  post ventricular tachycardia and shock. EKG showed STD in inferior lateral leads. Renal consulted for FLOYD/CKD Mx.    FLOYD on CKD3:   Recent admission for CRI with hyperkalemia   S. creat on discharge on 8/12/21 was 1.89, currently 3.49  FLOYD likely 2/2 hemodynamic changes/NSTEMI/arrhythmia  K ok   UA bland  M acidosis 2/2 FLOYD/ckd- add po Na bicarb 650mg bid and watch for s/o chf due to Na load  monitor BMP daily   dose all meds for eGFR<15ml/min.   avoid ACEi/ARB/NSAIDs/Nephrotoxics if able.   Intermediate to high risk for GRACE, explained to pt and son bedside in detail including the possibility of worsening RFT post cath and if no recovery of RF, may need RRT/dialysis. Both verbalized understanding. no plan for urgent LHC given elevated creatinine and currently chest pain free. recommend mucomyst 1200mg po bid -2doses pre and 2 doses post cath and slow bicarb drip if no s/o chf    NSTEMI (non-ST elevation myocardial infarction).  Patient with p/w chest pain and ventricular tachycardia s/p shockedx1  on heparin gtt   cardiac monitoring to monitor for arrhythmias  serial cardiac enzymes  Aspirin 81 PO daily, Clopidogrel 75 PO daily, Lipitor 40 mg PO daily  Low fat DASH diet  agree w/getting ECHO  Ventricular tachycardia.  Patient presented to ER with c/o  chest pain found in ventricular tachycardia s/p defibrillatorx1  on Amiodarone gtt   Monitor for further VT episodes  Keep K > 4.2 and magnesium > 2.2  Cardioversion/defibrillation if unstable.   BIV PPM interrogation showed  monomorphic ventricular tachycardia at 150-160 as per chart  f/u EP consult  plan to transfer to [facility] for upgrade BiVICD   Chronic diastolic congestive heart failure. h/o CHF    CXR; clear-appear euvolemic  Check pro BNP 6899   Holding home dose lasix and losartan 2/2 FLOYD on CKD and hypotension  Daily weight monitoring, Strict I/O, Low sodium DASH diet  Check ECHO to evaluate LV/RV function and valvular abnormalities.  Paroxysmal atrial fibrillation.   Currently on heparin drip for ACS-holding Eliquis till pt on heparin drip  Continuous tele monitoring  Rate control per ccu team  Thanks for consulting. will closely follow up.  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 Started on amiodarone drip.  EKG showed He was admitted to CCU. Labs remarkable for FLOYD on CKD and elevated lactate  post ventricular tachycardia and shock. EKG showed STD in inferior lateral leads. Renal consulted for FLOYD/CKD Mx.    FLOYD on CKD3:   Recent admission for FLOYD on CKD with hyperkalemia S. creat 3.1 > on discharge on 8/12/21 was 1.89,   Cr currently 3.49  FLOYD likely 2/2 hemodynamic changes/NSTEMI/arrhythmia  K, vol- acceptable  UA bland  M acidosis 2/2 FLOYD/ckd- add po Na bicarb 650mg bid and watch for s/o chf due to Na load  monitor BMP daily   dose all meds for eGFR<15ml/min.   avoid ACEi/ARB/NSAIDs/Nephrotoxics if able.   Intermediate to high risk for GRACE, explained to pt and son bedside in detail including the possibility of worsening RFT post cath and if no recovery of RF, may need RRT/dialysis. Both verbalized understanding. no plan for urgent LHC given elevated creatinine and currently chest pain free. recommend mucomyst 1200mg po bid -2doses pre and 2 doses post cath and slow bicarb drip if no s/o chf    NSTEMI (non-ST elevation myocardial infarction).  Patient with p/w chest pain and ventricular tachycardia s/p shockedx1  on heparin gtt   cardiac monitoring to monitor for arrhythmias  serial cardiac enzymes  Aspirin 81 PO daily, Clopidogrel 75 PO daily, Lipitor 40 mg PO daily  Low fat DASH diet  agree w/getting ECHO  Ventricular tachycardia.  Patient presented to ER with c/o  chest pain found in ventricular tachycardia s/p defibrillatorx1  on Amiodarone gtt   Monitor for further VT episodes  Keep K > 4.2 and magnesium > 2.2  Cardioversion/defibrillation if unstable.   BIV PPM interrogation showed  monomorphic ventricular tachycardia at 150-160 as per chart  f/u EP consult  plan to transfer to [facility] for upgrade BiVICD   Chronic diastolic congestive heart failure. h/o CHF    CXR; clear-appear euvolemic  Check pro BNP 6899   Holding home dose lasix and losartan 2/2 FLOYD on CKD and hypotension  Daily weight monitoring, Strict I/O, Low sodium DASH diet  Check ECHO to evaluate LV/RV function and valvular abnormalities.  Paroxysmal atrial fibrillation.   Currently on heparin drip for ACS-holding Eliquis till pt on heparin drip  Continuous tele monitoring  Rate control per ccu team    Thanks for consulting. will closely follow up.  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 Started on amiodarone drip.  EKG showed He was admitted to CCU. Labs remarkable for FLOYD on CKD and elevated lactate  post ventricular tachycardia and shock. EKG showed STD in inferior lateral leads. Renal consulted for FLOYD/CKD Mx.    FLOYD on CKD3:   Recent admission for FLOYD on CKD with hyperkalemia S. creat 3.1 > on discharge on 8/12/21 was 1.89,   Cr currently 3.49  FLOYD likely 2/2 hemodynamic changes/NSTEMI/arrhythmia  K, vol- acceptable  UA bland  no obstructive uropathy on recent renal sono   lasix and losartan on hold-agree. yeimy use prn lasix for s/o chf  M acidosis 2/2 FLOYD/ckd- add po Na bicarb 650mg bid and watch for s/o chf due to Na load  monitor BMP daily   dose all meds for eGFR<15ml/min.   avoid ACEi/ARB/NSAIDs/Nephrotoxics if able.   Intermediate to high risk for GRACE, explained to pt and son bedside in detail including the possibility of worsening RFT post cath and if no recovery of RF, may need RRT/dialysis. Both verbalized understanding. no plan for urgent LHC given elevated creatinine and currently chest pain free. recommend mucomyst 1200mg po bid -2doses pre and 2 doses post cath and slow bicarb drip if no s/o chf    NSTEMI (non-ST elevation myocardial infarction).  Patient p/w chest pain and ventricular tachycardia s/p shockedx1  on heparin gtt   cardiac monitoring to monitor for arrhythmias  serial cardiac enzymes  c/w Aspirin 81 PO daily, Clopidogrel 75 PO daily, Lipitor 40 mg PO daily  Low fat DASH diet  agree w/getting ECHO    Ventricular tachycardia s/p defibrillatorx1  on Amiodarone gtt   Monitor for further VT episodes  Keep K > 4.2 and magnesium > 2.2  Cardioversion/defibrillation if unstable.   BIV PPM interrogation showed  monomorphic ventricular tachycardia at 150-160 as per chart  f/u EP consult. plan to transfer to [facility] for upgrade BiVICD     Chronic diastolic congestive heart failure. h/o CHF    CXR; clear-appear euvolemic  pro BNP 6899   Holding home dose lasix and losartan 2/2 FLOYD on CKD and hypotension  Daily weight monitoring, Strict I/O, Low sodium DASH diet  f/u ECHO to evaluate LV/RV function and valvular abnormalities.    Paroxysmal atrial fibrillation.   Currently on heparin drip for ACS-holding Eliquis till pt on heparin drip  Continuous tele monitoring  Rate control per ccu team    Thanks for consulting. will closely follow up.  poc d/w pt, son bedside and CCU team  labs, rad, chart reviewed  For any question, call:  Cell # [phone]  service# [phone]  office# [phone]

## 2021-08-14 NOTE — CONSULT NOTE ADULT - SUBJECTIVE AND OBJECTIVE BOX
[facility] - S López / Aroldo S /D Jose/ S Amina/ JUAN DAVID Smith/ Adrian Vega / LUBA Alvau/ O Alejandra  service -[phone], office [phone]  ---------------------------------------------------------------------------------------------------------------    Patient is a 89y Male whom presented to the hospital with   Denied recent NSAID use/Abx use/iv contrast studies.    Patient seen and examined    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    S/P coronary artery stent placement  D1    Pacemaker  BiV PPM        Allergies: No Known Allergies    Home Medications Reviewed  Hospital Medications:   MEDICATIONS  (STANDING):  aMIOdarone Infusion 1 mG/Min (33.3 mL/Hr) IV Continuous <Continuous>  aMIOdarone Infusion 0.5 mG/Min (16.7 mL/Hr) IV Continuous <Continuous>  atorvastatin 40 milliGRAM(s) Oral at bedtime  chlorhexidine 4% Liquid 1 Application(s) Topical <User Schedule>  dextrose 40% Gel 15 Gram(s) Oral once  dextrose 5%. 1000 milliLiter(s) (50 mL/Hr) IV Continuous <Continuous>  dextrose 5%. 1000 milliLiter(s) (100 mL/Hr) IV Continuous <Continuous>  dextrose 50% Injectable 25 Gram(s) IV Push once  dextrose 50% Injectable 12.5 Gram(s) IV Push once  dextrose 50% Injectable 25 Gram(s) IV Push once  finasteride 5 milliGRAM(s) Oral daily  glucagon  Injectable 1 milliGRAM(s) IntraMuscular once  heparin  Infusion 700 Unit(s)/Hr (7 mL/Hr) IV Continuous <Continuous>  insulin lispro (ADMELOG) corrective regimen sliding scale   SubCutaneous three times a day before meals  insulin lispro (ADMELOG) corrective regimen sliding scale   SubCutaneous at bedtime  polyethylene glycol 3350 17 Gram(s) Oral daily  tamsulosin 0.4 milliGRAM(s) Oral at bedtime    SOCIAL HISTORY:  Denies ETOh,Smoking, illicit drug use  FAMILY HISTORY:  Family history of heart disease (Father)        REVIEW OF SYSTEMS:  CONSTITUTIONAL: No weakness, fevers or chills  EYES/ENT: No visual changes;  No vertigo or throat pain   NECK: No pain or stiffness  RESPIRATORY: No cough, wheezing, hemoptysis; No shortness of breath  CARDIOVASCULAR: No chest pain or palpitations.  GASTROINTESTINAL: No abdominal or epigastric pain. No nausea, vomiting, or hematemesis; No diarrhea or constipation. No melena or hematochezia.  GENITOURINARY: No dysuria, frequency, foamy urine, urinary urgency, incontinence or hematuria  NEUROLOGICAL: No numbness or weakness  SKIN: No itching, burning, rashes, or lesions   VASCULAR: No bilateral lower extremity edema.   All other review of systems is negative unless indicated above.    VITALS:  T(F): 97.5 (21 @ 12:00), Max: 97.5 (21 @ 10:00)  HR: 66 (21 @ 14:00)  BP: 105/60 (21 @ 14:00)  RR: 18 (21 @ 14:00)  SpO2: 99% (21 @ 14:00)  Wt(kg): --     @ 07:01  -   @ 15:16  --------------------------------------------------------  IN: 487.9 mL / OUT: 0 mL / NET: 487.9 mL      Height (cm): 167.6 ( @ 10:00)  Weight (kg): 58.7 ( @ 10:00)  BMI (kg/m2): 20.9 ( 10:00)  BSA (m2): 1.66 (08-14 @ 10:00)    PHYSICAL EXAM:  Constitutional: NAD  HEENT: anicteric sclera, oropharynx clear, MMM  Neck: No JVD  Respiratory: CTAB, no wheezes, rales or rhonchi  Cardiovascular: S1, S2, RRR  Gastrointestinal: BS+, soft, NT/ND  Extremities: No cyanosis or clubbing. No peripheral edema  Neurological: A/O x 3, no focal deficits  Psychiatric: Normal mood, normal affect  : No CVA tenderness. No river.   Skin: No rashes  Vascular Access:    LABS:      133<L>  |  97<L>  |  90<H>  ----------------------------<  245<H>  5.1   |  14<L>  |  3.49<H>    Ca    9.2      14 Aug 2021 08:26  Phos  4.4       Mg     2.50         TPro  6.2  /  Alb  3.6  /  TBili  0.7  /  DBili      /  AST  70<H>  /  ALT  52<H>  /  AlkPhos  127<H>      Creatinine Trend: 3.49 <--, 1.89 <--, 1.96 <--, 2.69 <--                        9.4    8.55  )-----------( 140      ( 14 Aug 2021 08:26 )             29.6     Urine Studies:  Urinalysis Basic - ( 09 Aug 2021 17:27 )    Color: Light Yellow / Appearance: Clear / S.012 / pH:   Gluc:  / Ketone: Negative  / Bili: Negative / Urobili: <2 mg/dL   Blood:  / Protein: Negative / Nitrite: Negative   Leuk Esterase: Negative / RBC: 1 /HPF / WBC 1 /HPF   Sq Epi:  / Non Sq Epi:  / Bacteria: Negative          RADIOLOGY & ADDITIONAL STUDIES:                 [facility] - S López / Aroldo S /GARCIA Garcia/ JUAN DAVID Huynh/ JUAN DAVID Smith/ Adrian Vega / LUBA Alvau/ O Roberts  service -[phone], office [phone]  ---------------------------------------------------------------------------------------------------------------  Patient seen and examined bedside in CCU    89 y.o Yi  male with a PMhx of HTN, HLD, DM, CAD s/p stent to  80% D1 in , Cx 60%(no stent) in , 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 Patient also compliant of b/l knee pain  and weakness started last night. In ED he was found in ventricular tachycardia and hypotensive to 70s/50s. He received  150mg push amiodarone with no improvement. Patient  became less responsive and hypotensive so he was defibrillated x1 with good response. EKG post- defib showed paced rhythm with improved Blood Pressure and mental status. He was started on Amiodarone drip and admitted to CCU. Renal consulted for FLOYD/CKD Mx. Pt is my partner Dr Garcia's office pt, was seen 5days ago in office. Pt currently feeling better. son bedside. Denied recent NSAID use/Abx use/iv contrast studies.    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    S/P coronary artery stent placement  D1    Pacemaker  BiV PPM    Allergies: No Known Allergies    Home Medications Reviewed  Hospital Medications:   MEDICATIONS  (STANDING):  aMIOdarone Infusion 1 mG/Min (33.3 mL/Hr) IV Continuous <Continuous>  aMIOdarone Infusion 0.5 mG/Min (16.7 mL/Hr) IV Continuous <Continuous>  atorvastatin 40 milliGRAM(s) Oral at bedtime  chlorhexidine 4% Liquid 1 Application(s) Topical <User Schedule>  dextrose 40% Gel 15 Gram(s) Oral once  dextrose 5%. 1000 milliLiter(s) (50 mL/Hr) IV Continuous <Continuous>  dextrose 5%. 1000 milliLiter(s) (100 mL/Hr) IV Continuous <Continuous>  dextrose 50% Injectable 25 Gram(s) IV Push once  dextrose 50% Injectable 12.5 Gram(s) IV Push once  dextrose 50% Injectable 25 Gram(s) IV Push once  finasteride 5 milliGRAM(s) Oral daily  glucagon  Injectable 1 milliGRAM(s) IntraMuscular once  heparin  Infusion 700 Unit(s)/Hr (7 mL/Hr) IV Continuous <Continuous>  insulin lispro (ADMELOG) corrective regimen sliding scale   SubCutaneous three times a day before meals  insulin lispro (ADMELOG) corrective regimen sliding scale   SubCutaneous at bedtime  polyethylene glycol 3350 17 Gram(s) Oral daily  tamsulosin 0.4 milliGRAM(s) Oral at bedtime    SOCIAL HISTORY:  Denies ETOh,Smoking, illicit drug use  FAMILY HISTORY:  Family history of heart disease (Father)    REVIEW OF SYSTEMS:  CONSTITUTIONAL: No weakness, fevers or chills  EYES/ENT: No visual changes;  No vertigo or throat pain, +dizzy  NECK: No pain or stiffness  RESPIRATORY: No cough, wheezing, hemoptysis; No shortness of breath  CARDIOVASCULAR: No chest pain or palpitations. had "burning in chest" +diaphoresis  GASTROINTESTINAL: No abdominal or epigastric pain. + nausea, no vomiting, or hematemesis; No diarrhea or constipation. No melena or hematochezia.  GENITOURINARY: No dysuria, frequency, foamy urine, urinary urgency, incontinence or hematuria  NEUROLOGICAL: No numbness or weakness  SKIN: No itching, burning, rashes, or lesions   VASCULAR: No bilateral lower extremity edema.   All other review of systems is negative unless indicated above.    VITALS:  T(F): 97.5 (21 @ 12:00), Max: 97.5 (21 @ 10:00)  HR: 66 (21 @ 14:00)  BP: 105/60 (21 @ 14:00)  RR: 18 (21 @ 14:00)  SpO2: 99% (21 @ 14:00)  Wt(kg): --     @ 07:01  -   @ 15:16  --------------------------------------------------------  IN: 487.9 mL / OUT: 0 mL / NET: 487.9 mL      Height (cm): 167.6 ( @ 10:00)  Weight (kg): 58.7 ( @ 10:00)  BMI (kg/m2): 20.9 ( @ 10:00)  BSA (m2): 1.66 ( @ 10:00)    PHYSICAL EXAM:  Constitutional: NAD, frail   HEENT: anicteric sclera  Neck: No JVD  Respiratory: CTAB, no wheezes, rales or rhonchi  Cardiovascular: S1, S2, RRR  Gastrointestinal: BS+, soft, NT/ND  Extremities: trace pedal edema b/l  Neurological: A/O x 3, no focal deficits  Psychiatric: Normal mood, normal affect  : No river.     LABS:      133<L>  |  97<L>  |  90<H>  ----------------------------<  245<H>  5.1   |  14<L>  |  3.49<H>    Ca    9.2      14 Aug 2021 08:26  Phos  4.4       Mg     2.50         TPro  6.2  /  Alb  3.6  /  TBili  0.7  /  DBili      /  AST  70<H>  /  ALT  52<H>  /  AlkPhos  127<H>      Creatinine Trend: 3.49 <--, 1.89 <--, 1.96 <--, 2.69 <--                        9.4    8.55  )-----------( 140      ( 14 Aug 2021 08:26 )             29.6     Urine Studies:  Urinalysis Basic - ( 09 Aug 2021 17:27 )    Color: Light Yellow / Appearance: Clear / S.012 / pH:   Gluc:  / Ketone: Negative  / Bili: Negative / Urobili: <2 mg/dL   Blood:  / Protein: Negative / Nitrite: Negative   Leuk Esterase: Negative / RBC: 1 /HPF / WBC 1 /HPF   Sq Epi:  / Non Sq Epi:  / Bacteria: Negative      RADIOLOGY & ADDITIONAL STUDIES:    < from: Xray Chest 1 View-PORTABLE IMMEDIATE (Xray Chest 1 View-PORTABLE IMMEDIATE .) (21 @ 08:30) >  IMPRESSION:  Clear lungs.    < end of copied text >        < from: US Kidney and Bladder (08.10.21 @ 11:07) >  FINDINGS:    Right kidney: 9.4 cm. No renal mass, hydronephrosis or calculi.    Left kidney: 9.5 cm. No renal mass, hydronephrosis or calculi.    Urinary bladder: Within normal limits.    Prostate: Enlarged heterogeneous prostate.    IMPRESSION:    1.  No hydronephrosis.  2.  Enlarged prostate.    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 Patient also compliant of b/l knee pain and weakness started last night. In ED he was found in ventricular tachycardia and hypotensive to 70s/50s. He received 150mg push amiodarone with no improvement. Patient became less responsive and hypotensive so he was defibrillated x1 with good response. EKG post- defib showed paced rhythm with improved Blood Pressure and mental status. He was started on Amiodarone drip and admitted to CCU. Renal consulted for FLOYD/CKD Mx. Pt known to me from recent hospitalization, is my partner Dr Garcia's office pt. Pt currently feeling better. son bedside. Denied recent NSAID use/Abx use/iv contrast studies. son reports poor appetite and poor po intake of solids since recent hospital d/c.     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    S/P coronary artery stent placement  D1    Pacemaker  BiV PPM    Allergies: No Known Allergies    Home Medications Reviewed  Hospital Medications:   MEDICATIONS  (STANDING):  aMIOdarone Infusion 1 mG/Min (33.3 mL/Hr) IV Continuous <Continuous>  aMIOdarone Infusion 0.5 mG/Min (16.7 mL/Hr) IV Continuous <Continuous>  atorvastatin 40 milliGRAM(s) Oral at bedtime  chlorhexidine 4% Liquid 1 Application(s) Topical <User Schedule>  dextrose 40% Gel 15 Gram(s) Oral once  dextrose 5%. 1000 milliLiter(s) (50 mL/Hr) IV Continuous <Continuous>  dextrose 5%. 1000 milliLiter(s) (100 mL/Hr) IV Continuous <Continuous>  dextrose 50% Injectable 25 Gram(s) IV Push once  dextrose 50% Injectable 12.5 Gram(s) IV Push once  dextrose 50% Injectable 25 Gram(s) IV Push once  finasteride 5 milliGRAM(s) Oral daily  glucagon  Injectable 1 milliGRAM(s) IntraMuscular once  heparin  Infusion 700 Unit(s)/Hr (7 mL/Hr) IV Continuous <Continuous>  insulin lispro (ADMELOG) corrective regimen sliding scale   SubCutaneous three times a day before meals  insulin lispro (ADMELOG) corrective regimen sliding scale   SubCutaneous at bedtime  polyethylene glycol 3350 17 Gram(s) Oral daily  tamsulosin 0.4 milliGRAM(s) Oral at bedtime    SOCIAL HISTORY:  Denies ETOh,Smoking, illicit drug use  FAMILY HISTORY:  Family history of heart disease (Father)    REVIEW OF SYSTEMS:  CONSTITUTIONAL: No weakness, fevers or chills  EYES/ENT: No visual changes;  No vertigo or throat pain, +dizzy  NECK: No pain or stiffness  RESPIRATORY: No cough, wheezing, hemoptysis; No shortness of breath  CARDIOVASCULAR: No chest pain or palpitations. had "burning in chest" +diaphoresis  GASTROINTESTINAL: No abdominal or epigastric pain. + nausea, no vomiting, or hematemesis; No diarrhea or constipation. No melena or hematochezia.  GENITOURINARY: No dysuria, frequency, foamy urine, urinary urgency, incontinence or hematuria  NEUROLOGICAL: No numbness or weakness  SKIN: No itching, burning, rashes, or lesions   VASCULAR: No bilateral lower extremity edema.   All other review of systems is negative unless indicated above.    VITALS:  T(F): 97.5 (21 @ 12:00), Max: 97.5 (21 @ 10:00)  HR: 66 (21 @ 14:00)  BP: 105/60 (21 @ 14:00)  RR: 18 (21 @ 14:00)  SpO2: 99% (21 @ 14:00)  Wt(kg): --     @ 07:01  -   @ 15:16  --------------------------------------------------------  IN: 487.9 mL / OUT: 0 mL / NET: 487.9 mL      Height (cm): 167.6 ( @ 10:00)  Weight (kg): 58.7 ( @ 10:00)  BMI (kg/m2): 20.9 ( @ 10:00)  BSA (m2): 1.66 ( 10:00)    PHYSICAL EXAM:  Constitutional: NAD, frail   HEENT: anicteric sclera  Neck: No JVD  Respiratory: CTAB, no wheezes, rales or rhonchi  Cardiovascular: S1, S2, RRR  Gastrointestinal: BS+, soft, NT/ND  Extremities: trace pedal edema b/l  Neurological: A/O x 3, no focal deficits  Psychiatric: Normal mood, normal affect  : No river.     LABS:      133<L>  |  97<L>  |  90<H>  ----------------------------<  245<H>  5.1   |  14<L>  |  3.49<H>    Ca    9.2      14 Aug 2021 08:26  Phos  4.4       Mg     2.50         TPro  6.2  /  Alb  3.6  /  TBili  0.7  /  DBili      /  AST  70<H>  /  ALT  52<H>  /  AlkPhos  127<H>      Creatinine Trend: 3.49 <--, 1.89 <--, 1.96 <--, 2.69 <--                        9.4    8.55  )-----------( 140      ( 14 Aug 2021 08:26 )             29.6     Urine Studies:  Urinalysis Basic - ( 09 Aug 2021 17:27 )    Color: Light Yellow / Appearance: Clear / S.012 / pH:   Gluc:  / Ketone: Negative  / Bili: Negative / Urobili: <2 mg/dL   Blood:  / Protein: Negative / Nitrite: Negative   Leuk Esterase: Negative / RBC: 1 /HPF / WBC 1 /HPF   Sq Epi:  / Non Sq Epi:  / Bacteria: Negative      RADIOLOGY & ADDITIONAL STUDIES:    < from: Xray Chest 1 View-PORTABLE IMMEDIATE (Xray Chest 1 View-PORTABLE IMMEDIATE .) (21 @ 08:30) >  IMPRESSION:  Clear lungs.    < end of copied text >        < from: US Kidney and Bladder (08.10.21 @ 11:07) >  FINDINGS:    Right kidney: 9.4 cm. No renal mass, hydronephrosis or calculi.    Left kidney: 9.5 cm. No renal mass, hydronephrosis or calculi.    Urinary bladder: Within normal limits.    Prostate: Enlarged heterogeneous prostate.    IMPRESSION:    1.  No hydronephrosis.  2.  Enlarged prostate.    < end of copied text >

## 2021-08-14 NOTE — CONSULT NOTE ADULT - SUBJECTIVE AND OBJECTIVE BOX
Requesting Physician : Dr. 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  He was evaluated in the ED and found to have HD unstable VT requiring external defibrillation.  He was subsequently admitted to the CCU for further care.  The pt. was recently hospitalized here for CRI and hyperkalemia and was discharge  on 8/12.  The patient was chest pain free upon evaluation.            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    S/P coronary artery stent placement  D1    Pacemaker  BiV PPM            MEDICATIONS:  MEDICATIONS  (STANDING):  aMIOdarone Infusion 1 mG/Min (33.3 mL/Hr) IV Continuous <Continuous>  aMIOdarone Infusion 0.5 mG/Min (16.7 mL/Hr) IV Continuous <Continuous>  atorvastatin 40 milliGRAM(s) Oral at bedtime  chlorhexidine 4% Liquid 1 Application(s) Topical <User Schedule>  dextrose 40% Gel 15 Gram(s) Oral once  dextrose 5%. 1000 milliLiter(s) (50 mL/Hr) IV Continuous <Continuous>  dextrose 5%. 1000 milliLiter(s) (100 mL/Hr) IV Continuous <Continuous>  dextrose 50% Injectable 25 Gram(s) IV Push once  dextrose 50% Injectable 12.5 Gram(s) IV Push once  dextrose 50% Injectable 25 Gram(s) IV Push once  finasteride 5 milliGRAM(s) Oral daily  glucagon  Injectable 1 milliGRAM(s) IntraMuscular once  heparin  Infusion 700 Unit(s)/Hr (7 mL/Hr) IV Continuous <Continuous>  insulin lispro (ADMELOG) corrective regimen sliding scale   SubCutaneous three times a day before meals  insulin lispro (ADMELOG) corrective regimen sliding scale   SubCutaneous at bedtime  polyethylene glycol 3350 17 Gram(s) Oral daily  sodium bicarbonate 650 milliGRAM(s) Oral two times a day  tamsulosin 0.4 milliGRAM(s) Oral at bedtime      Allergies    No Known Allergies    Intolerances        FAMILY HISTORY:  Family history of heart disease (Father)      Non-contributary for premature coronary disease or sudden cardiac death    SOCIAL HISTORY:    [x ] Non-smoker  [ ] Smoker  [ ] Alcohol      REVIEW OF SYSTEMS:  [x ]chest pain  [  ]shortness of breath  [  ]palpitations  [  ]syncope  [ ]near syncope [ ]upper extremity weakness   [ ] lower extremity weakness  [  ]diplopia  [  ]altered mental status   [  ]fevers  [ ]chills [ ]nausea  [ ]vomitting  [  ]dysphagia    [ ]abdominal pain  [ ]melena  [ ]BRBPR    [  ]epistaxis  [  ]rash    [ ]lower extremity edema        [x ] All others negative	  [ ] Unable to obtain    PHYSICAL EXAM:  T(C): 36.4 (08-14-21 @ 16:00), Max: 36.4 (08-14-21 @ 10:00)  HR: 73 (08-14-21 @ 19:00) (63 - 170)  BP: 108/64 (08-14-21 @ 19:00) (68/40 - 118/66)  RR: 20 (08-14-21 @ 19:00) (12 - 34)  SpO2: 98% (08-14-21 @ 19:00) (97% - 100%)  Wt(kg): --  I&O's Summary    14 Aug 2021 07:01  -  14 Aug 2021 19:39  --------------------------------------------------------  IN: 872.6 mL / OUT: 0 mL / NET: 872.6 mL          HEENT:   Normal oral mucosa, PERRL, EOMI	  Lymphatic: No lymphadenopathy , no edema  Cardiovascular: Normal S1 S2, No JVD, No murmurs , Peripheral pulses palpable 2+ bilaterally  Respiratory: Lungs clear to auscultation, normal effort 	  Gastrointestinal:  Soft, Non-tender, + BS	  Skin: No rashes, No ecchymoses, No cyanosis, warm to touch  Musculoskeletal: Normal range of motion, normal strength  Psychiatry:  Mood & affect appropriate      TELEMETRY: 	    ECG: BiV paced 	  RADIOLOGY:  OTHER:     DIAGNOSTIC TESTING:  [ ] Echocardiogram: < from: Transthoracic Echocardiogram (08.14.21 @ 14:06) >  Normal left ventricular systolic function. No segmental  wall motion abnormalities.  Mild-moderate pulmonary hypertension.  A device wire is noted in the right heart.    < end of copied text >    [ ]  Catheterization: < from: Cardiac Cath Lab - Adult (06.03.15 @ 13:16) >  CORONARY VESSELS: The coronary circulation is right dominant.  LM:   --  LM: Angiography showed mild atherosclerosis with no flow limiting  lesions.  LAD:   --  LAD: Angiography showed mild atherosclerosis with no flow  limiting lesions.  --  D1: There was no significant restenosis in D1 stent.  CX:   --  Circumflex: The vessel was small sized.  --  Proximal circumflex: Angiography showed minor luminal irregularities  with no flow limiting lesions.  --  Mid circumflex: Angiography showed minor luminal irregularities with no  flow limiting lesions.  --  Distal circumflex: There was a discrete 60 % stenosis in a small sized  vessel.  RI:   --  Ramus intermedius: Angiography showed minor luminal  irregularities with no flow limiting lesions.  RCA:   --  Proximal RCA: Angiography showed minor luminal irregularities  with no flow limiting lesions.  --  Mid RCA: There was a discrete 20 % stenosis.  --  Distal RCA: Angiography showed mild atherosclerosis with no flow  limiting lesions.  COMPLICATIONS: No complications occurred during the cath lab visit.  DIAGNOSTIC RECOMMENDATIONS: Medical management of nonobstructive CAD and  aggressive risk factor modification is recommended.  Prepared and signed by  Estelle Ayala M.D    < end of copied text >    [ ] Stress Test:    	  	  LABS:	 	    CARDIAC MARKERS:  CARDIAC MARKERS ( 14 Aug 2021 16:53 )  x     / x     / 137 U/L / x     / 10.1 ng/mL  CARDIAC MARKERS ( 14 Aug 2021 08:32 )  x     / x     / 95 U/L / x     / 3.8 ng/mL                              9.1    5.73  )-----------( 121      ( 14 Aug 2021 16:53 )             27.7     08-14    129<L>  |  98  |  85<H>  ----------------------------<  243<H>  4.6   |  17<L>  |  3.09<H>    Ca    9.4      14 Aug 2021 16:53  Phos  4.5     08-14  Mg     2.40     08-14    TPro  6.2  /  Alb  3.5  /  TBili  0.5  /  DBili  x   /  AST  73<H>  /  ALT  58<H>  /  AlkPhos  116  08-14    proBNP: Serum Pro-Brain Natriuretic Peptide: 6899 pg/mL (08-14 @ 08:26)    Lipid Profile:   HgA1c:   TSH:     ASSESSMENT/PLAN:  89 y.o male with a PMhx of HTN, HLD, DM, CAD s/p stent to  80% D1 in 2014, with cath in 2015 demonstrating patent stents with mild to moderate non-obstructive cad, 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     -admit to CCU  -continue with amiodarone for VT  -keep K > 4, Mg > 2  -TTE with normal LV function   -EP eval with Dr. Villatoro for AICD upgrade  -will eventually plan for cardiac cath when renal function allows  -renal eval for FLOYD  -further workup pending above    Estelle Ayala MD

## 2021-08-14 NOTE — H&P ADULT - NSICDXPASTSURGICALHX_GEN_ALL_CORE_FT
PAST SURGICAL HISTORY:  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     Pacemaker     S/P coronary artery stent placement      PAST SURGICAL HISTORY:  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     Pacemaker BiV PPM    S/P coronary artery stent placement D1

## 2021-08-14 NOTE — ED ADULT TRIAGE NOTE - CHIEF COMPLAINT QUOTE
Pt arrives with Son, Non English/Kazakh speaking. Asst from [employer]  As per Son " c/o Chest pain since this am. Pt Pale, diaphoretic. Hypotensive, Sbp 68,  , Pt to TB . HX of Pacemaker/Renal,  Placed on cm= VT

## 2021-08-14 NOTE — DISCHARGE NOTE PROVIDER - NSDCCPCAREPLAN_GEN_ALL_CORE_FT
PRINCIPAL DISCHARGE DIAGNOSIS  Diagnosis: NSTEMI (non-ST elevation myocardial infarction)  Assessment and Plan of Treatment: You were admitted at [facility] for chest pain associated with nausea, dizziness. You were found to be in an abnormal heart rhtym, and was admitted in the Cardiac Care Unit where you were medically managed. You have been followed by the Electrophysiology doctor, and has been stable. Plan is to be transferred to [facility] for further management of your care.  - Please remain NPO tonight, for an early transfer tomorrow morning to [facility].  - Please continue with your medications as prescribed on your current discharge documentations.      SECONDARY DISCHARGE DIAGNOSES  Diagnosis: Stage 3 chronic kidney disease  Assessment and Plan of Treatment: You were seen and evaluated by [facility]. They have made some arrangements in your medications.   - Please take your medications as prescribed on your discharge paper.    Diagnosis: History of BPH  Assessment and Plan of Treatment: - Please continue with Tamsulosin as prescribed.

## 2021-08-14 NOTE — DISCHARGE NOTE PROVIDER - NSDCQMBETAOTHER_CARD_A_CORE_FT
as recommended by Card - Plan for Venogram + PM to ICD upgrade tomorrow morning at [facility]

## 2021-08-15 NOTE — PROGRESS NOTE ADULT - SUBJECTIVE AND OBJECTIVE BOX
Subjective/Objective: compliant of sneezing, denies cough and chest pain    Overnight event: no issue  Tele event: V paced with PVC    MEDICATIONS    aMIOdarone Infusion 1 mG/Min IV Continuous <Continuous>  aMIOdarone Infusion 0.5 mG/Min IV Continuous <Continuous>  aspirin enteric coated 81 milliGRAM(s) Oral daily  clopidogrel Tablet 75 milliGRAM(s) Oral daily  heparin  Infusion 700 Unit(s)/Hr IV Continuous <Continuous>  tamsulosin 0.4 milliGRAM(s) Oral at bedtime  polyethylene glycol 3350 17 Gram(s) Oral daily  atorvastatin 40 milliGRAM(s) Oral at bedtime  finasteride 5 milliGRAM(s) Oral daily  glucagon  Injectable 1 milliGRAM(s) IntraMuscular once  insulin lispro (ADMELOG) corrective regimen sliding scale   SubCutaneous three times a day before meals  insulin lispro (ADMELOG) corrective regimen sliding scale   SubCutaneous at bedtime  chlorhexidine 4% Liquid 1 Application(s) Topical <User Schedule>  sodium bicarbonate 650 milliGRAM(s) Oral two times a day          REVIEW OF SYSTEMS    General: no fatigue/malaise, weight loss/gain.  Skin: no rashes.  Ophthalmologic: no blurred vision, no loss of vision. 	  ENT: no sore throat, rhinorrhea, sinus congestion.  Cardiovascular:no chest pain ,no palpitation,no dizziness,no diaphoresis,no edema  Respiratory: no SOB, cough or wheeze.  Gastrointestinal:  no N/V/D, no melena/hematemesis/hematochezia.  Genitourinary: no dysuria/hesitancy or hematuria.  Musculoskeletal: no myalgias or arthralgias.  Neurological: no changes in vision or hearing, no lightheadedness/dizziness, no syncope/near syncope	  Psychiatric: no unusual stress/anxiety      	    ICU Vital Signs Last 24 Hrs  T(C): 36.7 (15 Aug 2021 04:00), Max: 36.8 (15 Aug 2021 00:00)  T(F): 98.1 (15 Aug 2021 04:00), Max: 98.2 (15 Aug 2021 00:00)  HR: 66 (15 Aug 2021 06:00) (63 - 170)  BP: 112/61 (15 Aug 2021 06:00) (68/40 - 118/66)  BP(mean): 78 (15 Aug 2021 06:00) (55 - 89)  RR: 15 (15 Aug 2021 06:00) (12 - 34)  SpO2: 100% (15 Aug 2021 06:00) (97% - 100%)      PHYSICAL EXAMINATION  Appearance: NAD, no distress  HEENT: Moist Mucous Membranes, Anicteric, PERRL, EOMI  Cardiovascular: Regular rate and rhythm, Normal S1 S2, No JVD, No murmurs  Respiratory: Lungs clear to auscultation. No rales, No rhonchi, No wheezing. No tenderness to palpation  Gastrointestinal:  Soft, Non-tender, + BS  Neurologic: Non-focal, A&Ox3  Skin: Warm and dry, No rashes, No ecchymosis, No cyanosis  Musculoskeletal: No clubbing, No cyanosis, No edema  Vascular: Peripheral pulses palpable 2+ bilaterally  Psychiatry: Mood & affect appropriate      	    		      I&O's Summary    14 Aug 2021 07:01  -  15 Aug 2021 07:00  --------------------------------------------------------  IN: 1343 mL / OUT: 350 mL / NET: 993 mL    	 	  LABORATORY VALUES	 	                          8.7    6.52  )-----------( 123      ( 15 Aug 2021 06:26 )             26.7       08-15    131<L>  |  100  |  84<H>  ----------------------------<  164<H>  4.2   |  18<L>  |  2.73<H>  08-14    129<L>  |  98  |  86<H>  ----------------------------<  184<H>  4.3   |  17<L>  |  2.85<H>    Ca    9.2      15 Aug 2021 06:26  Ca    9.2      14 Aug 2021 23:27  Phos  4.3     08-15  Phos  4.8     08-14  Mg     2.60     08-15  Mg     2.50     08-14    TPro  5.8<L>  /  Alb  3.5  /  TBili  0.4  /  DBili  x   /  AST  44<H>  /  ALT  44<H>  /  AlkPhos  100  08-15  TPro  6.2  /  Alb  3.5  /  TBili  0.5  /  DBili  x   /  AST  73<H>  /  ALT  58<H>  /  AlkPhos  116      LIVER FUNCTIONS - ( 15 Aug 2021 06:26 )  Alb: 3.5 g/dL / Pro: 5.8 g/dL / ALK PHOS: 100 U/L / ALT: 44 U/L / AST: 44 U/L / GGT: x           Activated Partial Thromboplastin Time: 78.8 sec (08-15 @ 06:26)      CARDIAC MARKERS:  Creatine Kinase, Serum: 98 U/L (08-15 @ 06:26)  Creatine Kinase, Serum: 120 U/L ( @ 23:27)  Creatine Kinase, Serum: 137 U/L ( @ 16:53)          Serum Pro-Brain Natriuretic Peptide: 6899 pg/mL ( @ 08:26)    Blood Gas Venous - Lactate: 1.9 mmol/L ( @ 10:52)  Blood Gas Venous - Lactate: 4.4 mmol/L ( @ 08:48)    08-15 @ 06:26  Cholesterol, Serum - 106  Direct LDL- --  HDL Cholesterol, Serum- 48  Triglycerides, Serum- 67      T4, Serum: 5.40 ug/dL (08-15 @ 06:26)        CAPILLARY BLOOD GLUCOSE      POCT Blood Glucose.: 158 mg/dL (15 Aug 2021 07:38)       @ 09:05  229E Corona Virus --  Adenovirus NotDetec  Bordetella pertussis NotDete  Chlamydia pneumoniae NotDete  Entero/Rhino Virus NotDete  HKU1 Coronavirus --  hMPV NotDete  Influenza A NotDete  Influenza AH1 --  Influenza AH1 2009 --  Influenza AH3 --  Influenza B NotDete  Mycoplasma pneumoniae NotDete  NL63 Coronavirus --  OC43 Corornavirus --  Parainfluenza 1 NotDete  Parainfluenza 2 NotDete            EKG:  Diagnostic testing:  cath:< from: Cardiac Cath Lab - Adult (06.03.15 @ 13:16) >  CORONARY VESSELS: The coronary circulation is right dominant.  LM:   --  LM: Angiography showed mild atherosclerosis with no flow limitinglesions.  LAD:   --  LAD: Angiography showed mild atherosclerosis with no flowlimiting lesions.  --  D1: There was no significant restenosis in D1 stent.  CX:   --  Circumflex: The vessel was small sized.  --  Proximal circumflex: Angiography showed minor luminal irregularitieswith no flow limiting lesions.  --  Mid circumflex: Angiography showed minor luminal irregularities with noflow limiting lesions.  --  Distal circumflex: There was a discrete 60 % stenosis in a small sizedvessel.  RI:   --  Ramus intermedius: Angiography showed minor luminalirregularities with no flow limiting lesions.  RCA:   --  Proximal RCA: Angiography showed minor luminal irregularitieswith no flow limiting lesions.  --  Mid RCA: There was a discrete 20 % stenosis.  --  Distal RCA: Angiography showed mild atherosclerosis with no flowlimiting lesions.  COMPLICATIONS: No complications occurred during the cath lab visit.  DIAGNOSTIC RECOMMENDATIONS: Medical management of nonobstructive CAD andaggressive risk factor modification is recommended.  Prepared and signed by  Estelle Ayala M.D.  Signed 2015 14:20:04  HEMODYNAMIC TABLES  Pressures:  Baseline  Pressures:  - HR: 72  Pressures:  - Rhythm:  Pressures:  -- Left Ventricle (s/edp): 171/12/--  Pressures:  Post Angio  Pressures:  - HR: 71  Pressures:  - Rhythm:  Pressures:  -- Aortic Pressure (S/D/M): 166/81/114  Pressures:  -- Left Ventricle (s/edp): 173/13/--  Outputs:  Baseline  Outputs:  -- CALCULATIONS[age]  Outputs:  -- CALCULATIONS: Body Surface Area: 1.74  Outputs:  -- CALCULATIONS: Height in cm: 167.00  Outputs:  -- CALCULATIONS: Sex: Male  Outputs:  -- CALCULATIONS: Weight in k.80  Outputs:  -- OUTPUTS: O2 consumption: 217.52  Outputs:  -- OUTPUTS: Vo2 Indexed: 125.00  Outputs:  Post Angio  Outputs:  -- OUTPUTS: O2 consumption: 217.52  Outputs:  -- OUTPUTS: Vo2 Indexed: 125.00    < end of copied text >    echo:< from: Transthoracic Echocardiogram (21 @ 14:06) >  Ejection Fraction (Visual Estimate): 60 %    Mitral Valve: Mitral annular and leaflet calcification.Minimal mitral regurgitation.  Aortic Root: Normal aortic root.  Aortic Valve: Calcified aortic valve with normal opening.Left Atrium: Severely dilated left atrium.  Left Ventricle: Normal left ventricular internaldimensions. Mild concentric left ventricular hypertrophy.  Normal left ventricular systolic function. No segmentalwall motion abnormalities. Mild diastolic dysfunction(Stage I).  Right Heart: Mild right atrial enlargement.  Normal rightventricular size and function.A device wire is noted in the right heart.  Normal appearing tricuspid valve leaflets.  Moderatetricuspid regurgitation.  Normal pulmonic valve. Minimal pulmonic regurgitation.  Pericardium/PleuraNormal pericardium with no pericardialeffusion.  Hemodynamic: Estimated right atrial pressure is moderately  elevated.Mild-moderate pulmonary hypertension. Estimated PASP 45mmHg.          Assessment and Plan:89 y.o [place]  male with a PMhx of HTN, HLD, DM, CAD s/p stent to 80% D1 in  ,dHF EF 60% in 2018,Afib on eliquis c/b tachy/perla with BiV PPM( Biotronik) CKD, CVA, Velasquez's esophagus  presents with chest burning, dizziness/ lightheadedness, nausea, weakness with unsteady gait found in ventricular tachycardia and hypotension which did not improved with amiodarone 150mg IVP x1 received shocked x1 by external defibrillator with improve in Blood Pressure and mental status. Started on amiodarone drip.  EKG showed He was admitted to CCU. Labs remarkable for FLOYD on CKD and elevated lactate  post ventricular tachycardia and shock. EKG showed STD in inferior lateral leads.      Plan  Neuro  AxOx4  no active issues    Resp  lungs clear  pulse Ox 100% on RA  +sneezing     #NSTEMI (non-ST elevation myocardial infarction).     Patient with p/w chest pain and ventricular tachycardia s/p shockedx1  EKG showed  Sinus perla with STD in inferior and lateral lead  Load with  mg now, Plavix 600 mg PO and start heparin gtt as per ACS protocol  Assess for resolution of chest pain and recurrence in chest pain. Serial EKG PRN chest pain to assess for ST changes  Continuous cardiac monitoring to monitor for arrhythmias  Aspirin 81 PO daily, Clopidogrel 75 PO daily, Lipitor 40 mg PO daily  Hold  urgent LHC given elevated creatinine and currently chest pain free  Low fat DASH diet  ECHO: 2D ECHO showed EF 60%, severely dilated left atrium. Mild concentric left ventricular hypertrophy. Normal left ventricular systolic function. No segmental wall motion abnormalities. Mild diastolic dysfunction(Stage I)    # Ventricular tachycardia.    Patient presented to ER with c/o  chest pain found in ventricular tachycardia s/p defibrillatorx1  Amiodarone gtt 1 mg/min x 6 hours and 0.5 mg/min for 18 hours followed PO load    Monitor for further VT episodes  Zoll pads at bedside with pacer pads on patient.   Keep K > 4.2 and magnesium > 2.2  Cardioversion/defibrillation if unstable.   BIV PPM interrogation showed  monomorphic ventricular tachycardia at 150-160  EP consulted  plan to transfer to [facility] for upgrade BiVICD.     #Chronic diastolic congestive heart failure.   CXR showed; clear-appear euvolemic  Continuous cardiac monitoring to r/o arrhythmias.   Check pro BNP 6899   Hold  home dose lasix and losartan  2/2 FLOYD on CKD and hypotension  Daily weight monitoring, Strict I/O, Low sodium DASH diet  echo showed Ef 60% with normal LV,stageI diastolic dysfunction    # Paroxysmal atrial fibrillation.   pAfib on Eliquis  CHAD2 score: 8  Currently on heparin drip for ACS-will hold Eliquis till pt on heparin drip  Continuous tele monitoring  Rate control: will introduce low dose coreg if Bp tolerates   Follow PT/INR/PTT.   Monitor lytes and replete as needed.       GI  #poor appetite  on glucerna  nutrition consult        # BPH    no issue  c/w flomax 0.4mg Po daily.     # Stage 3 chronic kidney disease.   Recent admission for CRI with hyperkalemia   S. creat on discharge on 21 was 1.89,currently 3.49  Renal US negative for hydronephrosis  FLOYD likely due to NSTEMI/VT  Trend BUN/Cr.  Strict I/O  Avoid Nephrotoxic Meds/ Agents such as (NSAIDs, IV contrast, Aminoglycosides such as gentamicin, -Gadolinium contrast, Phosphate containing enemas, etc..)  Adjust Medications according to eGFR  Renal consult called.    Endo  # diabetes mellitus  A1c  ISS         Subjective/Objective: compliant of sneezing, denies cough and chest pain    Overnight event: no issue  Tele event: V paced with PVC    MEDICATIONS    aMIOdarone Infusion 1 mG/Min IV Continuous <Continuous>  aMIOdarone Infusion 0.5 mG/Min IV Continuous <Continuous>  aspirin enteric coated 81 milliGRAM(s) Oral daily  clopidogrel Tablet 75 milliGRAM(s) Oral daily  heparin  Infusion 700 Unit(s)/Hr IV Continuous <Continuous>  tamsulosin 0.4 milliGRAM(s) Oral at bedtime  polyethylene glycol 3350 17 Gram(s) Oral daily  atorvastatin 40 milliGRAM(s) Oral at bedtime  finasteride 5 milliGRAM(s) Oral daily  glucagon  Injectable 1 milliGRAM(s) IntraMuscular once  insulin lispro (ADMELOG) corrective regimen sliding scale   SubCutaneous three times a day before meals  insulin lispro (ADMELOG) corrective regimen sliding scale   SubCutaneous at bedtime  chlorhexidine 4% Liquid 1 Application(s) Topical <User Schedule>  sodium bicarbonate 650 milliGRAM(s) Oral two times a day          REVIEW OF SYSTEMS    General: no fatigue/malaise, weight loss/gain.  Skin: no rashes.  Ophthalmologic: no blurred vision, no loss of vision. 	  ENT: no sore throat, rhinorrhea, sinus congestion.  Cardiovascular:no chest pain ,no palpitation,no dizziness,no diaphoresis,no edema  Respiratory: no SOB, cough or wheeze.  Gastrointestinal:  no N/V/D, no melena/hematemesis/hematochezia.  Genitourinary: no dysuria/hesitancy or hematuria.  Musculoskeletal: no myalgias or arthralgias.  Neurological: no changes in vision or hearing, no lightheadedness/dizziness, no syncope/near syncope	  Psychiatric: no unusual stress/anxiety      	    ICU Vital Signs Last 24 Hrs  T(C): 36.7 (15 Aug 2021 04:00), Max: 36.8 (15 Aug 2021 00:00)  T(F): 98.1 (15 Aug 2021 04:00), Max: 98.2 (15 Aug 2021 00:00)  HR: 66 (15 Aug 2021 06:00) (63 - 170)  BP: 112/61 (15 Aug 2021 06:00) (68/40 - 118/66)  BP(mean): 78 (15 Aug 2021 06:00) (55 - 89)  RR: 15 (15 Aug 2021 06:00) (12 - 34)  SpO2: 100% (15 Aug 2021 06:00) (97% - 100%)      PHYSICAL EXAMINATION  Appearance: NAD, no distress  HEENT: Moist Mucous Membranes, Anicteric, PERRL, EOMI  Cardiovascular: Regular rate and rhythm, Normal S1 S2, No JVD, No murmurs  Respiratory: Lungs clear to auscultation. No rales, No rhonchi, No wheezing. No tenderness to palpation  Gastrointestinal:  Soft, Non-tender, + BS  Neurologic: Non-focal, A&Ox3  Skin: Warm and dry, No rashes, No ecchymosis, No cyanosis  Musculoskeletal: No clubbing, No cyanosis, No edema  Vascular: Peripheral pulses palpable 2+ bilaterally  Psychiatry: Mood & affect appropriate      	    		      I&O's Summary    14 Aug 2021 07:01  -  15 Aug 2021 07:00  --------------------------------------------------------  IN: 1343 mL / OUT: 350 mL / NET: 993 mL    	 	  LABORATORY VALUES	 	                          8.7    6.52  )-----------( 123      ( 15 Aug 2021 06:26 )             26.7       08-15    131<L>  |  100  |  84<H>  ----------------------------<  164<H>  4.2   |  18<L>  |  2.73<H>  08-14    129<L>  |  98  |  86<H>  ----------------------------<  184<H>  4.3   |  17<L>  |  2.85<H>    Ca    9.2      15 Aug 2021 06:26  Ca    9.2      14 Aug 2021 23:27  Phos  4.3     08-15  Phos  4.8     08-14  Mg     2.60     08-15  Mg     2.50     08-14    TPro  5.8<L>  /  Alb  3.5  /  TBili  0.4  /  DBili  x   /  AST  44<H>  /  ALT  44<H>  /  AlkPhos  100  08-15  TPro  6.2  /  Alb  3.5  /  TBili  0.5  /  DBili  x   /  AST  73<H>  /  ALT  58<H>  /  AlkPhos  116      LIVER FUNCTIONS - ( 15 Aug 2021 06:26 )  Alb: 3.5 g/dL / Pro: 5.8 g/dL / ALK PHOS: 100 U/L / ALT: 44 U/L / AST: 44 U/L / GGT: x           Activated Partial Thromboplastin Time: 78.8 sec (08-15 @ 06:26)      CARDIAC MARKERS:  Creatine Kinase, Serum: 98 U/L (08-15 @ 06:26)  Creatine Kinase, Serum: 120 U/L ( @ 23:27)  Creatine Kinase, Serum: 137 U/L ( @ 16:53)          Serum Pro-Brain Natriuretic Peptide: 6899 pg/mL ( @ 08:26)    Blood Gas Venous - Lactate: 1.9 mmol/L ( @ 10:52)  Blood Gas Venous - Lactate: 4.4 mmol/L ( @ 08:48)    08-15 @ 06:26  Cholesterol, Serum - 106  Direct LDL- --  HDL Cholesterol, Serum- 48  Triglycerides, Serum- 67      T4, Serum: 5.40 ug/dL (08-15 @ 06:26)        CAPILLARY BLOOD GLUCOSE      POCT Blood Glucose.: 158 mg/dL (15 Aug 2021 07:38)       @ 09:05  229E Corona Virus --  Adenovirus NotDetec  Bordetella pertussis NotDete  Chlamydia pneumoniae NotDete  Entero/Rhino Virus NotDete  HKU1 Coronavirus --  hMPV NotDete  Influenza A NotDete  Influenza AH1 --  Influenza AH1 2009 --  Influenza AH3 --  Influenza B NotDete  Mycoplasma pneumoniae NotDete  NL63 Coronavirus --  OC43 Corornavirus --  Parainfluenza 1 NotDete  Parainfluenza 2 NotDete            EKG:  Diagnostic testing:  cath:< from: Cardiac Cath Lab - Adult (06.03.15 @ 13:16) >  CORONARY VESSELS: The coronary circulation is right dominant.  LM:   --  LM: Angiography showed mild atherosclerosis with no flow limitinglesions.  LAD:   --  LAD: Angiography showed mild atherosclerosis with no flowlimiting lesions.  --  D1: There was no significant restenosis in D1 stent.  CX:   --  Circumflex: The vessel was small sized.  --  Proximal circumflex: Angiography showed minor luminal irregularitieswith no flow limiting lesions.  --  Mid circumflex: Angiography showed minor luminal irregularities with noflow limiting lesions.  --  Distal circumflex: There was a discrete 60 % stenosis in a small sizedvessel.  RI:   --  Ramus intermedius: Angiography showed minor luminalirregularities with no flow limiting lesions.  RCA:   --  Proximal RCA: Angiography showed minor luminal irregularitieswith no flow limiting lesions.  --  Mid RCA: There was a discrete 20 % stenosis.  --  Distal RCA: Angiography showed mild atherosclerosis with no flowlimiting lesions.  COMPLICATIONS: No complications occurred during the cath lab visit.  DIAGNOSTIC RECOMMENDATIONS: Medical management of nonobstructive CAD andaggressive risk factor modification is recommended.  Prepared and signed by  Estelle Ayala M.D.  Signed 2015 14:20:04  HEMODYNAMIC TABLES  Pressures:  Baseline  Pressures:  - HR: 72  Pressures:  - Rhythm:  Pressures:  -- Left Ventricle (s/edp): 171/12/--  Pressures:  Post Angio  Pressures:  - HR: 71  Pressures:  - Rhythm:  Pressures:  -- Aortic Pressure (S/D/M): 166/81/114  Pressures:  -- Left Ventricle (s/edp): 173/13/--  Outputs:  Baseline  Outputs:  -- CALCULATIONS[age]  Outputs:  -- CALCULATIONS: Body Surface Area: 1.74  Outputs:  -- CALCULATIONS: Height in cm: 167.00  Outputs:  -- CALCULATIONS: Sex: Male  Outputs:  -- CALCULATIONS: Weight in k.80  Outputs:  -- OUTPUTS: O2 consumption: 217.52  Outputs:  -- OUTPUTS: Vo2 Indexed: 125.00  Outputs:  Post Angio  Outputs:  -- OUTPUTS: O2 consumption: 217.52  Outputs:  -- OUTPUTS: Vo2 Indexed: 125.00    < end of copied text >    echo:< from: Transthoracic Echocardiogram (21 @ 14:06) >  Ejection Fraction (Visual Estimate): 60 %    Mitral Valve: Mitral annular and leaflet calcification.Minimal mitral regurgitation.  Aortic Root: Normal aortic root.  Aortic Valve: Calcified aortic valve with normal opening.Left Atrium: Severely dilated left atrium.  Left Ventricle: Normal left ventricular internaldimensions. Mild concentric left ventricular hypertrophy.  Normal left ventricular systolic function. No segmentalwall motion abnormalities. Mild diastolic dysfunction(Stage I).  Right Heart: Mild right atrial enlargement.  Normal rightventricular size and function.A device wire is noted in the right heart.  Normal appearing tricuspid valve leaflets.  Moderatetricuspid regurgitation.  Normal pulmonic valve. Minimal pulmonic regurgitation.  Pericardium/PleuraNormal pericardium with no pericardialeffusion.  Hemodynamic: Estimated right atrial pressure is moderately  elevated.Mild-moderate pulmonary hypertension. Estimated PASP 45mmHg.          Assessment and Plan:89 y.o [place]  male with a PMhx of HTN, HLD, DM, CAD s/p stent to 80% D1 in  ,dHF EF 60% in 2018,Afib on eliquis c/b tachy/perla with BiV PPM( Biotronik) CKD, CVA, Velasquez's esophagus  presents with chest burning, dizziness/ lightheadedness, nausea, weakness with unsteady gait found in ventricular tachycardia and hypotension which did not improved with amiodarone 150mg IVP x1 received shocked x1 by external defibrillator with improve in Blood Pressure and mental status. Started on amiodarone drip.  EKG showed He was admitted to CCU. Labs remarkable for FLOYD on CKD and elevated lactate  post ventricular tachycardia and shock. EKG showed STD in inferior lateral leads.      Plan  Neuro  AxOx4  no active issues    Resp  lungs clear  pulse Ox 100% on RA  +sneezing     #NSTEMI (non-ST elevation myocardial infarction).    Patient with p/w chest pain and ventricular tachycardia s/p shockedx1  EKG showed  Sinus perla with STD in inferior and lateral lead  Load with  mg now, Plavix 600 mg PO and start heparin gtt as per ACS protocol  Assess for resolution of chest pain and recurrence in chest pain. Serial EKG PRN chest pain to assess for ST changes  Continuous cardiac monitoring to monitor for arrhythmias  Aspirin 81 PO daily, Clopidogrel 75 PO daily, Lipitor 40 mg PO daily  Hold  urgent LHC given elevated creatinine and currently chest pain free  Low fat DASH diet  ECHO: 2D ECHO showed EF 60%, severely dilated left atrium. Mild concentric left ventricular hypertrophy. Normal left ventricular systolic function. No segmental wall motion abnormalities. Mild diastolic dysfunction(Stage I)  trops down trending [phone]    # Ventricular tachycardia.    Patient presented to ER with c/o  chest pain found in ventricular tachycardia s/p defibrillatorx1  Amiodarone gtt 1 mg/min x 6 hours and 0.5 mg/min for 18 hours followed PO load    Monitor for further VT episodes  Zoll pads at bedside with pacer pads on patient.   Keep K > 4.2 and magnesium > 2.2  Cardioversion/defibrillation if unstable.   BIV PPM interrogation showed  monomorphic ventricular tachycardia at 150-160  EP consulted  plan to transfer to [facility] for upgrade BiVICD.     #Chronic diastolic congestive heart failure.   CXR showed; clear-appear euvolemic  Continuous cardiac monitoring to r/o arrhythmias.   Check pro BNP 6899   Hold  home dose lasix and losartan  2/2 FLOYD on CKD and hypotension  Daily weight monitoring, Strict I/O, Low sodium DASH diet  echo showed Ef 60% with normal LV,stageI diastolic dysfunction    # Paroxysmal atrial fibrillation.   pAfib on Eliquis  CHAD2 score: 8  Currently on heparin drip for ACS-will hold Eliquis till pt on heparin drip  Continuous tele monitoring  Rate control: will introduce low dose coreg if Bp tolerates   Follow PT/INR/PTT.   Monitor lytes and replete as needed.       GI  #poor appetite  on glucerna  nutrition consult        # BPH   no issue  c/w flomax 0.4mg Po daily.     # Stage 3 chronic kidney disease.   Recent admission for CRI with hyperkalemia   S. creat on discharge on 21 was 1.89,onadmission 3.49,today 2.7  Renal US negative for hydronephrosis  FLOYD likely due to NSTEMI/VT  Trend BUN/Cr.  Strict I/O  Avoid Nephrotoxic Meds/ Agents such as (NSAIDs, IV contrast, Aminoglycosides such as gentamicin, -Gadolinium contrast, Phosphate containing enemas, etc..)  Adjust Medications according to eGFR  Renal consult called.    Endo  # diabetes mellitus  A1c  ISS         Subjective/Objective: compliant of sneezing, denies cough and chest pain    Overnight event: no issue  Tele event: V paced with PVC    MEDICATIONS    aMIOdarone Infusion 1 mG/Min IV Continuous <Continuous>  aMIOdarone Infusion 0.5 mG/Min IV Continuous <Continuous>  aspirin enteric coated 81 milliGRAM(s) Oral daily  clopidogrel Tablet 75 milliGRAM(s) Oral daily  heparin  Infusion 700 Unit(s)/Hr IV Continuous <Continuous>  tamsulosin 0.4 milliGRAM(s) Oral at bedtime  polyethylene glycol 3350 17 Gram(s) Oral daily  atorvastatin 40 milliGRAM(s) Oral at bedtime  finasteride 5 milliGRAM(s) Oral daily  glucagon  Injectable 1 milliGRAM(s) IntraMuscular once  insulin lispro (ADMELOG) corrective regimen sliding scale   SubCutaneous three times a day before meals  insulin lispro (ADMELOG) corrective regimen sliding scale   SubCutaneous at bedtime  chlorhexidine 4% Liquid 1 Application(s) Topical <User Schedule>  sodium bicarbonate 650 milliGRAM(s) Oral two times a day          REVIEW OF SYSTEMS    General: no fatigue/malaise, weight loss/gain.  Skin: no rashes.  Ophthalmologic: no blurred vision, no loss of vision. 	  ENT: no sore throat, rhinorrhea, sinus congestion.  Cardiovascular:no chest pain ,no palpitation,no dizziness,no diaphoresis,no edema  Respiratory: no SOB, cough or wheeze.  Gastrointestinal:  no N/V/D, no melena/hematemesis/hematochezia.  Genitourinary: no dysuria/hesitancy or hematuria.  Musculoskeletal: no myalgias or arthralgias.  Neurological: no changes in vision or hearing, no lightheadedness/dizziness, no syncope/near syncope	  Psychiatric: no unusual stress/anxiety      	    ICU Vital Signs Last 24 Hrs  T(C): 36.7 (15 Aug 2021 04:00), Max: 36.8 (15 Aug 2021 00:00)  T(F): 98.1 (15 Aug 2021 04:00), Max: 98.2 (15 Aug 2021 00:00)  HR: 66 (15 Aug 2021 06:00) (63 - 170)  BP: 112/61 (15 Aug 2021 06:00) (68/40 - 118/66)  BP(mean): 78 (15 Aug 2021 06:00) (55 - 89)  RR: 15 (15 Aug 2021 06:00) (12 - 34)  SpO2: 100% (15 Aug 2021 06:00) (97% - 100%)      PHYSICAL EXAMINATION  Appearance: NAD, no distress  HEENT: Moist Mucous Membranes, Anicteric, PERRL, EOMI  Cardiovascular: Regular rate and rhythm, Normal S1 S2, No JVD, No murmurs  Respiratory: Lungs clear to auscultation. No rales, No rhonchi, No wheezing. No tenderness to palpation  Gastrointestinal:  Soft, Non-tender, + BS  Neurologic: Non-focal, A&Ox3  Skin: Warm and dry, No rashes, No ecchymosis, No cyanosis  Musculoskeletal: No clubbing, No cyanosis, No edema  Vascular: Peripheral pulses palpable 2+ bilaterally  Psychiatry: Mood & affect appropriate      	    		      I&O's Summary    14 Aug 2021 07:01  -  15 Aug 2021 07:00  --------------------------------------------------------  IN: 1343 mL / OUT: 350 mL / NET: 993 mL    	 	  LABORATORY VALUES	 	                          8.7    6.52  )-----------( 123      ( 15 Aug 2021 06:26 )             26.7       08-15    131<L>  |  100  |  84<H>  ----------------------------<  164<H>  4.2   |  18<L>  |  2.73<H>  08-14    129<L>  |  98  |  86<H>  ----------------------------<  184<H>  4.3   |  17<L>  |  2.85<H>    Ca    9.2      15 Aug 2021 06:26  Ca    9.2      14 Aug 2021 23:27  Phos  4.3     08-15  Phos  4.8     08-14  Mg     2.60     08-15  Mg     2.50     08-14    TPro  5.8<L>  /  Alb  3.5  /  TBili  0.4  /  DBili  x   /  AST  44<H>  /  ALT  44<H>  /  AlkPhos  100  08-15  TPro  6.2  /  Alb  3.5  /  TBili  0.5  /  DBili  x   /  AST  73<H>  /  ALT  58<H>  /  AlkPhos  116      LIVER FUNCTIONS - ( 15 Aug 2021 06:26 )  Alb: 3.5 g/dL / Pro: 5.8 g/dL / ALK PHOS: 100 U/L / ALT: 44 U/L / AST: 44 U/L / GGT: x           Activated Partial Thromboplastin Time: 78.8 sec (08-15 @ 06:26)      CARDIAC MARKERS:  Creatine Kinase, Serum: 98 U/L (08-15 @ 06:26)  Creatine Kinase, Serum: 120 U/L ( @ 23:27)  Creatine Kinase, Serum: 137 U/L ( @ 16:53)          Serum Pro-Brain Natriuretic Peptide: 6899 pg/mL ( @ 08:26)    Blood Gas Venous - Lactate: 1.9 mmol/L ( @ 10:52)  Blood Gas Venous - Lactate: 4.4 mmol/L ( @ 08:48)    08-15 @ 06:26  Cholesterol, Serum - 106  Direct LDL- --  HDL Cholesterol, Serum- 48  Triglycerides, Serum- 67      T4, Serum: 5.40 ug/dL (08-15 @ 06:26)        CAPILLARY BLOOD GLUCOSE      POCT Blood Glucose.: 158 mg/dL (15 Aug 2021 07:38)       @ 09:05  229E Corona Virus --  Adenovirus NotDetec  Bordetella pertussis NotDete  Chlamydia pneumoniae NotDete  Entero/Rhino Virus NotDete  HKU1 Coronavirus --  hMPV NotDete  Influenza A NotDete  Influenza AH1 --  Influenza AH1 2009 --  Influenza AH3 --  Influenza B NotDete  Mycoplasma pneumoniae NotDete  NL63 Coronavirus --  OC43 Corornavirus --  Parainfluenza 1 NotDete  Parainfluenza 2 NotDete            EKG:  Diagnostic testing:  cath:< from: Cardiac Cath Lab - Adult (06.03.15 @ 13:16) >  CORONARY VESSELS: The coronary circulation is right dominant.  LM:   --  LM: Angiography showed mild atherosclerosis with no flow limitinglesions.  LAD:   --  LAD: Angiography showed mild atherosclerosis with no flowlimiting lesions.  --  D1: There was no significant restenosis in D1 stent.  CX:   --  Circumflex: The vessel was small sized.  --  Proximal circumflex: Angiography showed minor luminal irregularitieswith no flow limiting lesions.  --  Mid circumflex: Angiography showed minor luminal irregularities with noflow limiting lesions.  --  Distal circumflex: There was a discrete 60 % stenosis in a small sizedvessel.  RI:   --  Ramus intermedius: Angiography showed minor luminalirregularities with no flow limiting lesions.  RCA:   --  Proximal RCA: Angiography showed minor luminal irregularitieswith no flow limiting lesions.  --  Mid RCA: There was a discrete 20 % stenosis.  --  Distal RCA: Angiography showed mild atherosclerosis with no flowlimiting lesions.  COMPLICATIONS: No complications occurred during the cath lab visit.  DIAGNOSTIC RECOMMENDATIONS: Medical management of nonobstructive CAD andaggressive risk factor modification is recommended.  Prepared and signed by  Estelle Ayala M.D.  Signed 2015 14:20:04  HEMODYNAMIC TABLES  Pressures:  Baseline  Pressures:  - HR: 72  Pressures:  - Rhythm:  Pressures:  -- Left Ventricle (s/edp): 171/12/--  Pressures:  Post Angio  Pressures:  - HR: 71  Pressures:  - Rhythm:  Pressures:  -- Aortic Pressure (S/D/M): 166/81/114  Pressures:  -- Left Ventricle (s/edp): 173/13/--  Outputs:  Baseline  Outputs:  -- CALCULATIONS[age]  Outputs:  -- CALCULATIONS: Body Surface Area: 1.74  Outputs:  -- CALCULATIONS: Height in cm: 167.00  Outputs:  -- CALCULATIONS: Sex: Male  Outputs:  -- CALCULATIONS: Weight in k.80  Outputs:  -- OUTPUTS: O2 consumption: 217.52  Outputs:  -- OUTPUTS: Vo2 Indexed: 125.00  Outputs:  Post Angio  Outputs:  -- OUTPUTS: O2 consumption: 217.52  Outputs:  -- OUTPUTS: Vo2 Indexed: 125.00    < end of copied text >    echo:< from: Transthoracic Echocardiogram (21 @ 14:06) >  Ejection Fraction (Visual Estimate): 60 %    Mitral Valve: Mitral annular and leaflet calcification.Minimal mitral regurgitation.  Aortic Root: Normal aortic root.  Aortic Valve: Calcified aortic valve with normal opening.Left Atrium: Severely dilated left atrium.  Left Ventricle: Normal left ventricular internaldimensions. Mild concentric left ventricular hypertrophy.  Normal left ventricular systolic function. No segmentalwall motion abnormalities. Mild diastolic dysfunction(Stage I).  Right Heart: Mild right atrial enlargement.  Normal rightventricular size and function.A device wire is noted in the right heart.  Normal appearing tricuspid valve leaflets.  Moderatetricuspid regurgitation.  Normal pulmonic valve. Minimal pulmonic regurgitation.  Pericardium/PleuraNormal pericardium with no pericardialeffusion.  Hemodynamic: Estimated right atrial pressure is moderately  elevated.Mild-moderate pulmonary hypertension. Estimated PASP 45mmHg.          Assessment and Plan:89 y.o [place]  male with a PMhx of HTN, HLD, DM, CAD s/p stent to 80% D1 in  ,dHF EF 60% in 2018,Afib on eliquis c/b tachy/perla with BiV PPM( Biotronik) CKD, CVA, Velasquez's esophagus  presents with chest burning, dizziness/ lightheadedness, nausea, weakness with unsteady gait found in ventricular tachycardia and hypotension which did not improved with amiodarone 150mg IVP x1 received shocked x1 by external defibrillator with improve in Blood Pressure and mental status. Started on amiodarone drip.  EKG showed He was admitted to CCU. Labs remarkable for FLOYD on CKD and elevated lactate  post ventricular tachycardia and shock. EKG showed STD in inferior lateral leads.      Plan  Neuro  AxOx4  no active issues    Resp  lungs clear  pulse Ox 100% on RA  +sneezing     #NSTEMI (non-ST elevation myocardial infarction).    Patient with p/w chest pain and ventricular tachycardia s/p shockedx1  EKG showed  Sinus perla with STD in inferior and lateral lead  Load with  mg now, Plavix 600 mg PO and start heparin gtt as per ACS protocol  Assess for resolution of chest pain and recurrence in chest pain. Serial EKG PRN chest pain to assess for ST changes  Continuous cardiac monitoring to monitor for arrhythmias  Aspirin 81 PO daily, Clopidogrel 75 PO daily, Lipitor 40 mg PO daily  Hold  urgent LHC given elevated creatinine and currently chest pain free  Low fat DASH diet  ECHO: 2D ECHO showed EF 60%, severely dilated left atrium. Mild concentric left ventricular hypertrophy. Normal left ventricular systolic function. No segmental wall motion abnormalities. Mild diastolic dysfunction(Stage I)  trops down trending [phone]    # Ventricular tachycardia.    Patient presented to ER with c/o  chest pain found in ventricular tachycardia s/p defibrillatorx1  Amiodarone gtt 1 mg/min x 6 hours and 0.5 mg/min for 18 hours followed PO load    Monitor for further VT episodes  Zoll pads at bedside with pacer pads on patient.   Keep K > 4.2 and magnesium > 2.2  Cardioversion/defibrillation if unstable.   BIV PPM interrogation showed  monomorphic ventricular tachycardia at 150-160  EP consulted  plan to transfer to [facility] for upgrade BiVICD.     #Chronic diastolic congestive heart failure.   CXR showed; clear-appear euvolemic  Continuous cardiac monitoring to r/o arrhythmias.   Check pro BNP 6899   Hold  home dose lasix and losartan  2/2 FLOYD on CKD and hypotension  Daily weight monitoring, Strict I/O, Low sodium DASH diet  echo showed Ef 60% with normal LV,stageI diastolic dysfunction    # Paroxysmal atrial fibrillation.   pAfib on Eliquis  CHAD2 score: 8  Currently on heparin drip for ACS-will hold Eliquis till pt on heparin drip  Continuous tele monitoring  Rate control: will introduce low dose coreg if Bp tolerates   Follow PT/INR/PTT.   Monitor lytes and replete as needed.       GI  #transaminitis  elevated LFT on admission, now improving       #poor appetite  on glucerna  nutrition consult      # BPH   no issue  c/w flomax 0.4mg Po daily.     # Stage 3 chronic kidney disease.   Recent admission for CRI with hyperkalemia   S. creat on discharge on 21 was 1.89,onadmission 3.49,today 2.7  Renal US negative for hydronephrosis  FLOYD likely due to NSTEMI/VT  Trend BUN/Cr.  Strict I/O  Avoid Nephrotoxic Meds/ Agents such as (NSAIDs, IV contrast, Aminoglycosides such as gentamicin, -Gadolinium contrast, Phosphate containing enemas, etc..)  Adjust Medications according to eGFR  Renal consult called.    Endo  # diabetes mellitus  A1c  ISS

## 2021-08-15 NOTE — PROGRESS NOTE ADULT - SUBJECTIVE AND OBJECTIVE BOX
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      S: no chest pain or sob; ros otherwise negative.     Review of Systems:   Constitutional: [ ] fevers, [ ] chills.   Skin: [ ] dry skin. [ ] rashes.  Psychiatric: [ ] depression, [ ] anxiety.   Gastrointestinal: [ ] BRBPR, [ ] melena.   Neurological: [ ] confusion. [ ] seizures. [ ] shuffling gait.   Ears,Nose,Mouth and Throat: [ ] ear pain [ ] sore throat.   Eyes: [ ] diplopia.   Respiratory: [ ] hemoptysis. [ ] shortness of breath  Cardiovascular: See HPI above  Hematologic/Lymphatic: [ ] anemia. [ ] painful nodes. [ ] prolonged bleeding.   Genitourinary: [ ] hematuria. [ ] flank pain.   Endocrine: [ ] significant change in weight. [ ] intolerance to heat and cold.     Review of systems [x ] otherwise negative, [ ] otherwise unable to obtain    FH: no family history of sudden cardiac death in first degree relatives    SH: [ ] tobacco, [ ] alcohol, [ ] drugs    aMIOdarone    Tablet   Oral   aMIOdarone    Tablet 400 milliGRAM(s) Oral every 8 hours  aspirin enteric coated 81 milliGRAM(s) Oral daily  atorvastatin 40 milliGRAM(s) Oral at bedtime  chlorhexidine 4% Liquid 1 Application(s) Topical <User Schedule>  clopidogrel Tablet 75 milliGRAM(s) Oral daily  finasteride 5 milliGRAM(s) Oral daily  glucagon  Injectable 1 milliGRAM(s) IntraMuscular once  heparin  Infusion 700 Unit(s)/Hr IV Continuous <Continuous>  insulin lispro (ADMELOG) corrective regimen sliding scale   SubCutaneous three times a day before meals  insulin lispro (ADMELOG) corrective regimen sliding scale   SubCutaneous at bedtime  loratadine 10 milliGRAM(s) Oral daily  polyethylene glycol 3350 17 Gram(s) Oral daily  sodium bicarbonate 650 milliGRAM(s) Oral two times a day  tamsulosin 0.4 milliGRAM(s) Oral at bedtime                            9.4    7.18  )-----------( 145      ( 15 Aug 2021 13:00 )             28.4       08-15    131<L>  |  100  |  84<H>  ----------------------------<  164<H>  4.2   |  18<L>  |  2.73<H>    Ca    9.2      15 Aug 2021 06:26  Phos  4.3     08-15  Mg     2.60     08-15    TPro  5.8<L>  /  Alb  3.5  /  TBili  0.4  /  DBili  x   /  AST  44<H>  /  ALT  44<H>  /  AlkPhos  100  08-15      CARDIAC MARKERS ( 15 Aug 2021 06:26 )  x     / x     / 98 U/L / x     / 7.6 ng/mL  CARDIAC MARKERS ( 14 Aug 2021 23:27 )  x     / x     / 120 U/L / x     / 8.9 ng/mL  CARDIAC MARKERS ( 14 Aug 2021 16:53 )  x     / x     / 137 U/L / x     / 10.1 ng/mL  CARDIAC MARKERS ( 14 Aug 2021 08:32 )  x     / x     / 95 U/L / x     / 3.8 ng/mL        T(C): 36.6 (08-15-21 @ 08:00), Max: 36.8 (08-15-21 @ 00:00)  HR: 76 (08-15-21 @ 14:00) (63 - 80)  BP: 105/63 (08-15-21 @ 14:00) (99/60 - 125/72)  RR: 12 (08-15-21 @ 14:00) (12 - 25)  SpO2: 99% (08-15-21 @ 14:00) (97% - 100%)  Wt(kg): --    I&O's Summary    14 Aug 2021 07:01  -  15 Aug 2021 07:00  --------------------------------------------------------  IN: 1343 mL / OUT: 350 mL / NET: 993 mL    15 Aug 2021 07:01  -  15 Aug 2021 14:36  --------------------------------------------------------  IN: 150 mL / OUT: 700 mL / NET: -550 mL        General: Well nourished in no acute distress. Alert and Oriented * 3.   Head: Normocephalic and atraumatic.   Neck: No JVD. No bruits. Supple. Does not appear to be enlarged.   Cardiovascular: + S1,S2 ; RRR Soft systolic murmur at the left lower sternal border. No rubs noted.    Lungs: CTA b/l. No rhonchi, rales or wheezes.   Abdomen: + BS, soft. Non tender. Non distended. No rebound. No guarding.   Extremities: No clubbing/cyanosis/edema.   Neurologic: Moves all four extremities. Full range of motion.   Skin: Warm and moist. The patient's skin has normal elasticity and good skin turgor.   Psychiatric: Appropriate mood and affect.  Musculoskeletal: Normal range of motion, normal strength    Tele:     TTE: < from: Transthoracic Echocardiogram (08.14.21 @ 14:06) >  Normal left ventricular systolic function. No segmental  wall motion abnormalities.  Mild-moderate pulmonary hypertension.  A device wire is noted in the right heart.    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     -continue with amiodarone for VT - no further episodes of VT   -keep K > 4, Mg > 2  -TTE with normal LV function   -EP eval with Dr. Villatoro for AICD upgrade  -will eventually plan for cardiac cath when renal function allows  -renal eval for FLOYD  -further workup pending above  -supportive care per CCU    Estelle Ayala MD

## 2021-08-15 NOTE — CONSULT NOTE ADULT - SUBJECTIVE AND OBJECTIVE BOX
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 Patient also compliant of b/l knee pain  and weakness started last night. In ED he was found in ventricular tachycardia and hypotensive to 70s/50s. He received  150mg push amiodarone with no improvement. Patient  became less responsive and hypotensive so he was defibrillated x1 with good response. EKG post- defib showed paced rhythm with improved Blood Pressure and mental status. He was started on Amiodarone drip and admitted to CCU.    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 No nausea, vomiting.  NEUROLOGICAL: No headaches,           Allergies    No Known Allergies    Intolerances        Social History:     FAMILY HISTORY:  Family history of heart disease (Father)        MEDICATIONS  (STANDING):  aMIOdarone    Tablet   Oral   aMIOdarone    Tablet 400 milliGRAM(s) Oral every 8 hours  aspirin enteric coated 81 milliGRAM(s) Oral daily  atorvastatin 40 milliGRAM(s) Oral at bedtime  chlorhexidine 4% Liquid 1 Application(s) Topical <User Schedule>  clopidogrel Tablet 75 milliGRAM(s) Oral daily  finasteride 5 milliGRAM(s) Oral daily  glucagon  Injectable 1 milliGRAM(s) IntraMuscular once  heparin  Infusion 700 Unit(s)/Hr (5 mL/Hr) IV Continuous <Continuous>  insulin lispro (ADMELOG) corrective regimen sliding scale   SubCutaneous three times a day before meals  insulin lispro (ADMELOG) corrective regimen sliding scale   SubCutaneous at bedtime  loratadine 10 milliGRAM(s) Oral daily  polyethylene glycol 3350 17 Gram(s) Oral daily  sodium bicarbonate 650 milliGRAM(s) Oral two times a day  tamsulosin 0.4 milliGRAM(s) Oral at bedtime    MEDICATIONS  (PRN):      CAPILLARY BLOOD GLUCOSE      POCT Blood Glucose.: 176 mg/dL (15 Aug 2021 16:04)  POCT Blood Glucose.: 161 mg/dL (15 Aug 2021 11:43)  POCT Blood Glucose.: 158 mg/dL (15 Aug 2021 07:38)  POCT Blood Glucose.: 206 mg/dL (14 Aug 2021 21:21)  POCT Blood Glucose.: 227 mg/dL (14 Aug 2021 17:27)    I&O's Summary    14 Aug 2021 07:01  -  15 Aug 2021 07:00  --------------------------------------------------------  IN: 1343 mL / OUT: 350 mL / NET: 993 mL    15 Aug 2021 07:  -  15 Aug 2021 17:17  --------------------------------------------------------  IN: 150 mL / OUT: 700 mL / NET: -550 mL        T(C): 36.7 (08-15-21 @ 16:00), Max: 36.8 (08-15-21 @ 00:00)  HR: 69 (08-15-21 @ 16:00) (63 - 80)  BP: 111/68 (08-15-21 @ 16:00) (99/60 - 125/72)  RR: 15 (08-15-21 @ 16:00) (12 - 25)  SpO2: 100% (08-15-21 @ 16:00) (97% - 100%)    PHYSICAL EXAM:    GENERAL: NAD  NECK: Supple, No JVD  CHEST/LUNG: Clear to auscultation bilaterally; No wheezing.  HEART: Regular rate and rhythm; No murmurs, rubs, or gallops  ABDOMEN: Soft, Nontender, Nondistended; Bowel sounds present  EXTREMITIES:  2+ Peripheral Pulses, No edema  NEUROLOGY: AAOx 3      LABS:                        9.4    7.18  )-----------( 145      ( 15 Aug 2021 13:00 )             28.4     08-15    131<L>  |  100  |  84<H>  ----------------------------<  164<H>  4.2   |  18<L>  |  2.73<H>    Ca    9.2      15 Aug 2021 06:26  Phos  4.3     08-15  Mg     2.60     08-15    TPro  5.8<L>  /  Alb  3.5  /  TBili  0.4  /  DBili  x   /  AST  44<H>  /  ALT  44<H>  /  AlkPhos  100  08-15    PT/INR - ( 14 Aug 2021 08:26 )   PT: 24.2 sec;   INR: 2.20 ratio         PTT - ( 15 Aug 2021 13:00 )  PTT:66.9 sec  CARDIAC MARKERS ( 15 Aug 2021 06:26 )  x     / x     / 98 U/L / x     / 7.6 ng/mL  CARDIAC MARKERS ( 14 Aug 2021 23:27 )  x     / x     / 120 U/L / x     / 8.9 ng/mL  CARDIAC MARKERS ( 14 Aug 2021 16:53 )  x     / x     / 137 U/L / x     / 10.1 ng/mL  CARDIAC MARKERS ( 14 Aug 2021 08:32 )  x     / x     / 95 U/L / x     / 3.8 ng/mL      Urinalysis Basic - ( 15 Aug 2021 13:00 )    Color: Brown / Appearance: Turbid / S.013 / pH: x  Gluc: x / Ketone: Negative  / Bili: Negative / Urobili: <2 mg/dL   Blood: x / Protein: 100 mg/dL / Nitrite: Negative   Leuk Esterase: Negative / RBC: 74 /HPF / WBC 7 /HPF   Sq Epi: x / Non Sq Epi: 1 /HPF / Bacteria: Negative      CAPILLARY BLOOD GLUCOSE      POCT Blood Glucose.: 176 mg/dL (15 Aug 2021 16:04)  POCT Blood Glucose.: 161 mg/dL (15 Aug 2021 11:43)  POCT Blood Glucose.: 158 mg/dL (15 Aug 2021 07:38)  POCT Blood Glucose.: 206 mg/dL (14 Aug 2021 21:21)  POCT Blood Glucose.: 227 mg/dL (14 Aug 2021 17:27)        RADIOLOGY & ADDITIONAL TESTS:    Imaging Personally Reviewed:    Consultant(s) Notes Reviewed:      Care Discussed with Consultants/Other Providers:    Thanks for consult. Will follow.

## 2021-08-15 NOTE — CONSULT NOTE ADULT - ASSESSMENT
· Assessment	  89 y.o [place]  male with a PMhx of HTN, HLD, DM, CAD s/p stent to 80% D1 in 2014 , ICMrEF 40% in 2018,Afib on eliquis c/b tachy/perla with BiV PPM( Biotronik) CKD, CVA, Velasquez's esophagus  presents with chest burning, dizziness/ lightheadedness, nausea, weakness with unsteady gait found in ventricular tachycardia and hypotension which did not improved with amiodarone 150mg IVP x1 received shocked x1 by external defibrillator with improve in Blood Pressure and mental status. Started on amiodarone drip.  EKG showed He was admitted to CCU. Labs remarkable for FLOYD on CKD and elevated lactate  post ventricular tachycardia and shock. EKG showed STD in inferior lateral leads.    VT:    Cont mx as per CCU  Amio po  EP f/up for AICD upgrade    NSTEMI:  For C. Cath  IV Heparin    FLOYD on CKD III:    BMP  Nephro eval noted    Dw pt's son

## 2021-08-15 NOTE — PROGRESS NOTE ADULT - ASSESSMENT
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 Started on amiodarone drip.  EKG showed He was admitted to CCU. Labs remarkable for FLOYD on CKD and elevated lactate  post ventricular tachycardia and shock. EKG showed STD in inferior lateral leads. Renal following for FLOYD/CKD Mx.    FLOYD on CKD3: improving  Recent admission for FLOYD on CKD with hyperkalemia S. creat 3.1 > on discharge on 8/12/21 was 1.89,   Cr trending down 3.49 >3 > 2.85 >2.7   FLOYD likely 2/2 hemodynamic changes/NSTEMI/arrhythmia  K, vol- acceptable  UA bland  no obstructive uropathy on recent renal sono   lasix and losartan on hold-agree. may use prn lasix for s/o chf  M acidosis 2/2 FLOYD/ckd- added po Na bicarb 650mg bid 8/14 pm. watch for s/o chf due to Na load  monitor BMP daily   dose all meds for eGFR<15ml/min.   avoid ACEi/ARB/NSAIDs/Nephrotoxics if able.   Intermediate risk for GRACE, explained to pt and son bedside in detail including the possibility of worsening RFT post cath and if no recovery of RF, may need RRT/dialysis. Both verbalized understanding. no plan for urgent LHC given elevated creatinine and currently chest pain free. would recommend mucomyst 1200mg po bid -2doses pre and 2 doses post cath and slow bicarb drip if no s/o chf  eventual plan for LHC when Cr better and stable    NSTEMI (non-ST elevation myocardial infarction).  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 plan to transfer to [facility] for upgrade BiVICD per cardio    Chronic diastolic congestive heart failure. h/o CHF    CXR; clear-appear euvolemic  pro BNP 6899   Holding home dose lasix and losartan 2/2 FLOYD on CKD and hypotension  Daily weight monitoring, Strict I/O, Low sodium DASH diet  f/u ECHO to evaluate LV/RV function and valvular abnormalities.    Paroxysmal atrial fibrillation.   Currently on heparin drip for ACS-holding Eliquis till pt on heparin drip  Continuous tele monitoring  Rate control per ccu team    will closely follow up.  poc d/w pt, son bedside   labs, rad, chart reviewed  For any question, call:  Cell # [phone]  service# [phone]  office# [phone]

## 2021-08-15 NOTE — PROGRESS NOTE ADULT - SUBJECTIVE AND OBJECTIVE BOX
[facility] - S López / Aroldo S /D Jose/ S Amina/ JUAN DAVID Smith/ Adrian Vega / LUBA Alvau/ O Alejandra  service -[phone], office [phone]  ---------------------------------------------------------------------------------------------------------------    Patient seen and examined bedside in CCU    Subjective and Objective: No overnight events, sob better. cp resolved. No complaints today. feeling better  son bedside    Allergies: No Known Allergies      Hospital Medications:   MEDICATIONS  (STANDING):  aMIOdarone    Tablet   Oral   aMIOdarone    Tablet 400 milliGRAM(s) Oral every 8 hours  aspirin enteric coated 81 milliGRAM(s) Oral daily  atorvastatin 40 milliGRAM(s) Oral at bedtime  chlorhexidine 4% Liquid 1 Application(s) Topical <User Schedule>  clopidogrel Tablet 75 milliGRAM(s) Oral daily  finasteride 5 milliGRAM(s) Oral daily  glucagon  Injectable 1 milliGRAM(s) IntraMuscular once  heparin  Infusion 700 Unit(s)/Hr (5 mL/Hr) IV Continuous <Continuous>  insulin lispro (ADMELOG) corrective regimen sliding scale   SubCutaneous three times a day before meals  insulin lispro (ADMELOG) corrective regimen sliding scale   SubCutaneous at bedtime  loratadine 10 milliGRAM(s) Oral daily  polyethylene glycol 3350 17 Gram(s) Oral daily  sodium bicarbonate 650 milliGRAM(s) Oral two times a day  tamsulosin 0.4 milliGRAM(s) Oral at bedtime    VITALS:  T(F): 98 (08-15-21 @ 16:00), Max: 98.2 (08-15-21 @ 00:00)  HR: 69 (08-15-21 @ 16:00)  BP: 111/68 (08-15-21 @ 16:00)  RR: 15 (08-15-21 @ 16:00)  SpO2: 100% (08-15-21 @ 16:00)  Wt(kg): --     @ 07:01  -  08-15 @ 07:00  --------------------------------------------------------  IN: 1343 mL / OUT: 350 mL / NET: 993 mL    08-15 @ 07:01  -  08-15 @ 18:24  --------------------------------------------------------  IN: 150 mL / OUT: 700 mL / NET: -550 mL      PHYSICAL EXAM:  Constitutional: NAD, frail   HEENT: anicteric sclera  Neck: No JVD  Respiratory: CTAB, no wheezes, rales or rhonchi  Cardiovascular: S1, S2, RRR  Gastrointestinal: BS+, soft, NT/ND  Extremities: trace pedal edema b/l  Neurological: A/O x 3, no focal deficits  Psychiatric: Normal mood, normal affect  : No river.     LABS:  08-15    131<L>  |  100  |  84<H>  ----------------------------<  164<H>  4.2   |  18<L>  |  2.73<H>    Ca    9.2      15 Aug 2021 06:26  Phos  4.3     08-15  Mg     2.60     08-15    TPro  5.8<L>  /  Alb  3.5  /  TBili  0.4  /  DBili      /  AST  44<H>  /  ALT  44<H>  /  AlkPhos  100  08-15    Creatinine Trend: 2.73 <--, 2.85 <--, 3.09 <--, 3.49 <--, 1.89 <--, 1.96 <--, 2.69 <--                        9.4    7.18  )-----------( 145      ( 15 Aug 2021 13:00 )             28.4     Urine Studies:  Urinalysis Basic - ( 15 Aug 2021 13:00 )    Color: Brown / Appearance: Turbid / S.013 / pH:   Gluc:  / Ketone: Negative  / Bili: Negative / Urobili: <2 mg/dL   Blood:  / Protein: 100 mg/dL / Nitrite: Negative   Leuk Esterase: Negative / RBC: 74 /HPF / WBC 7 /HPF   Sq Epi:  / Non Sq Epi: 1 /HPF / Bacteria: Negative          RADIOLOGY & ADDITIONAL STUDIES:

## 2021-08-16 NOTE — PROGRESS NOTE ADULT - SUBJECTIVE AND OBJECTIVE BOX
[facility] - S López / Aroldo S /D Jose/ JUAN DAVID Huynh/ JUAN DAVID Smith/ Adrian Vega / LUBA Alvau/ O Alejandra  service -[phone], office [phone]  ---------------------------------------------------------------------------------------------------------------    Patient seen and examined bedside    Subjective and Objective: No overnight events, sob resolved. No complaints today. feeling better    Allergies: No Known Allergies      Hospital Medications:   MEDICATIONS  (STANDING):  aMIOdarone    Tablet   Oral   aMIOdarone    Tablet 400 milliGRAM(s) Oral every 8 hours  aspirin enteric coated 81 milliGRAM(s) Oral daily  atorvastatin 40 milliGRAM(s) Oral at bedtime  chlorhexidine 4% Liquid 1 Application(s) Topical <User Schedule>  clopidogrel Tablet 75 milliGRAM(s) Oral daily  finasteride 5 milliGRAM(s) Oral daily  glucagon  Injectable 1 milliGRAM(s) IntraMuscular once  heparin  Infusion 700 Unit(s)/Hr (5 mL/Hr) IV Continuous <Continuous>  insulin lispro (ADMELOG) corrective regimen sliding scale   SubCutaneous three times a day before meals  insulin lispro (ADMELOG) corrective regimen sliding scale   SubCutaneous at bedtime  loratadine 10 milliGRAM(s) Oral daily  polyethylene glycol 3350 17 Gram(s) Oral daily  sodium bicarbonate 650 milliGRAM(s) Oral two times a day  tamsulosin 0.4 milliGRAM(s) Oral at bedtime      REVIEW OF SYSTEMS:  CONSTITUTIONAL: No weakness, fevers or chills  EYES/ENT: No visual changes;  No vertigo or throat pain   NECK: No pain or stiffness  RESPIRATORY: No cough, wheezing, hemoptysis; No shortness of breath  CARDIOVASCULAR: No chest pain or palpitations.  GASTROINTESTINAL: No abdominal or epigastric pain. No nausea, vomiting, or hematemesis; No diarrhea or constipation. No melena or hematochezia.  GENITOURINARY: No dysuria, frequency, foamy urine, urinary urgency, incontinence or hematuria  NEUROLOGICAL: No numbness or weakness  SKIN: No itching, burning, rashes, or lesions   VASCULAR: No bilateral lower extremity edema.   All other review of systems is negative unless indicated above.    VITALS:  T(F): 98.2 (21 @ 16:00), Max: 99 (21 @ 00:00)  HR: 72 (21 @ 12:00)  BP: 90/53 (21 @ 12:00)  RR: 20 (21 @ 12:00)  SpO2: 95% (21 @ 12:00)  Wt(kg): --    08-15 @ 07:01  -   @ 07:00  --------------------------------------------------------  IN: 380 mL / OUT: 1800 mL / NET: -1420 mL     @ 07:01  -   @ 18:36  --------------------------------------------------------  IN: 215 mL / OUT: 300 mL / NET: -85 mL          PHYSICAL EXAM:  Constitutional: NAD  HEENT: anicteric sclera, oropharynx clear  Neck: No JVD  Respiratory: CTAB, no wheezes, rales or rhonchi  Cardiovascular: S1, S2, RRR  Gastrointestinal: BS+, soft, NT/ND  Extremities: No cyanosis or clubbing. No peripheral edema  Neurological: A/O x 3, no focal deficits  Psychiatric: Normal mood, normal affect  : No CVA tenderness. No river.   Skin: No rashes  Vascular Access:    LABS:      133<L>  |  102  |  74<H>  ----------------------------<  136<H>  4.7   |  19<L>  |  2.22<H>    Ca    9.1      16 Aug 2021 04:31  Phos  3.4     08-16  Mg     2.50     08-16    TPro  5.6<L>  /  Alb  3.3  /  TBili  0.4  /  DBili      /  AST  32  /  ALT  32  /  AlkPhos  90  08-16    Creatinine Trend: 2.22 <--, 2.73 <--, 2.85 <--, 3.09 <--, 3.49 <--, 1.89 <--, 1.96 <--                        8.0    5.93  )-----------( 126      ( 16 Aug 2021 04:31 )             24.8     Urine Studies:  Urinalysis Basic - ( 15 Aug 2021 13:00 )    Color: Brown / Appearance: Turbid / S.013 / pH:   Gluc:  / Ketone: Negative  / Bili: Negative / Urobili: <2 mg/dL   Blood:  / Protein: 100 mg/dL / Nitrite: Negative   Leuk Esterase: Negative / RBC: 74 /HPF / WBC 7 /HPF   Sq Epi:  / Non Sq Epi: 1 /HPF / Bacteria: Negative          RADIOLOGY & ADDITIONAL STUDIES:   [facility] - S López / Aroldo S /D Jose/ JUAN DAVID Huynh/ JUAN DAVID Smith/ Adrian Vega / LUBA Herman/ O Alejandra  service -[phone], office [phone]  ---------------------------------------------------------------------------------------------------------------    Patient seen and examined bedside in CCU    Subjective and Objective: No overnight events, denied cp/sob. No complaints today. feeling better    Allergies: No Known Allergies      Hospital Medications:   MEDICATIONS  (STANDING):  aMIOdarone    Tablet   Oral   aMIOdarone    Tablet 400 milliGRAM(s) Oral every 8 hours  aspirin enteric coated 81 milliGRAM(s) Oral daily  atorvastatin 40 milliGRAM(s) Oral at bedtime  chlorhexidine 4% Liquid 1 Application(s) Topical <User Schedule>  clopidogrel Tablet 75 milliGRAM(s) Oral daily  finasteride 5 milliGRAM(s) Oral daily  glucagon  Injectable 1 milliGRAM(s) IntraMuscular once  heparin  Infusion 700 Unit(s)/Hr (5 mL/Hr) IV Continuous <Continuous>  insulin lispro (ADMELOG) corrective regimen sliding scale   SubCutaneous three times a day before meals  insulin lispro (ADMELOG) corrective regimen sliding scale   SubCutaneous at bedtime  loratadine 10 milliGRAM(s) Oral daily  polyethylene glycol 3350 17 Gram(s) Oral daily  sodium bicarbonate 650 milliGRAM(s) Oral two times a day  tamsulosin 0.4 milliGRAM(s) Oral at bedtime      VITALS:  T(F): 98.2 (21 @ 16:00), Max: 99 (21 @ 00:00)  HR: 72 (21 @ 12:00)  BP: 90/53 (21 @ 12:00)  RR: 20 (21 @ 12:00)  SpO2: 95% (21 @ 12:00)  Wt(kg): --    08-15 @ 07:01  -   @ 07:00  --------------------------------------------------------  IN: 380 mL / OUT: 1800 mL / NET: -1420 mL     @ 07:01  -   @ 18:36  --------------------------------------------------------  IN: 215 mL / OUT: 300 mL / NET: -85 mL      PHYSICAL EXAM:  Constitutional: NAD, frail   HEENT: anicteric sclera  Neck: No JVD  Respiratory: CTAB, no wheezes, rales or rhonchi  Cardiovascular: S1, S2, RRR  Gastrointestinal: BS+, soft, NT/ND  Extremities: trace pedal edema b/l  Neurological: A/O x 3, no focal deficits  Psychiatric: Normal mood, normal affect  : No river.     LABS:      133<L>  |  102  |  74<H>  ----------------------------<  136<H>  4.7   |  19<L>  |  2.22<H>    Ca    9.1      16 Aug 2021 04:31  Phos  3.4       Mg     2.50         TPro  5.6<L>  /  Alb  3.3  /  TBili  0.4  /  DBili      /  AST  32  /  ALT  32  /  AlkPhos  90      Creatinine Trend: 2.22 <--, 2.73 <--, 2.85 <--, 3.09 <--, 3.49 <--, 1.89 <--, 1.96 <--                        8.0    5.93  )-----------( 126      ( 16 Aug 2021 04:31 )             24.8     Urine Studies:  Urinalysis Basic - ( 15 Aug 2021 13:00 )    Color: Brown / Appearance: Turbid / S.013 / pH:   Gluc:  / Ketone: Negative  / Bili: Negative / Urobili: <2 mg/dL   Blood:  / Protein: 100 mg/dL / Nitrite: Negative   Leuk Esterase: Negative / RBC: 74 /HPF / WBC 7 /HPF   Sq Epi:  / Non Sq Epi: 1 /HPF / Bacteria: Negative          RADIOLOGY & ADDITIONAL STUDIES:

## 2021-08-16 NOTE — PROGRESS NOTE ADULT - ASSESSMENT
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 Started on amiodarone drip.  EKG showed He was admitted to CCU. Labs remarkable for FLOYD on CKD and elevated lactate  post ventricular tachycardia and shock. EKG showed STD in inferior lateral leads. Renal following for FLOYD/CKD Mx.    FLOYD on CKD3: improving  Recent admission for FLOYD on CKD with hyperkalemia S. creat 3.1 > on discharge on 8/12/21 was 1.89,   Cr trending down 3.49 >3 > 2.85 >2.7  >2.2  FLOYD likely 2/2 hemodynamic changes/NSTEMI/arrhythmia  K, vol- acceptable  UA bland  no obstructive uropathy on recent renal sono   lasix and losartan on hold-agree. may use prn lasix for s/o chf  M acidosis 2/2 FLOYD/ckd- added po Na bicarb 650mg bid 8/14 pm. watch for s/o chf due to Na load  monitor BMP daily   dose all meds for eGFR<15ml/min.   avoid ACEi/ARB/NSAIDs/Nephrotoxics if able.   Intermediate risk for GRACE, explained to pt and son bedside in detail including the possibility of worsening RFT post cath and if no recovery of RF, may need RRT/dialysis. Both verbalized understanding. no plan for urgent LHC given elevated creatinine and currently chest pain free. would recommend mucomyst 1200mg po bid -2doses pre and 2 doses post cath and slow bicarb drip if no s/o chf  eventual plan for LHC when Cr better and stable    NSTEMI (non-ST elevation myocardial infarction).  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 plan to transfer to [facility] for upgrade BiVICD per cardio    Chronic diastolic congestive heart failure. h/o CHF    CXR; clear-appear euvolemic  pro BNP 6899   Holding home dose lasix and losartan 2/2 FLOYD on CKD and hypotension  Daily weight monitoring, Strict I/O, Low sodium DASH diet  f/u ECHO to evaluate LV/RV function and valvular abnormalities.    Paroxysmal atrial fibrillation.   Currently on heparin drip for ACS-holding Eliquis till pt on heparin drip  Continuous tele monitoring  Rate control per ccu team    will closely follow up.  poc d/w pt, RN bedside   labs, chart reviewed  For any question, call:  Cell # [phone]  service# [phone]  office# [phone]

## 2021-08-16 NOTE — PROGRESS NOTE ADULT - SUBJECTIVE AND OBJECTIVE BOX
Date of service: 08/16/21    chief complaint: chest pain     extended hpi: 89 y.o male with a PMhx of HTN, HLD, DM, CAD s/p stent to  80% D1 in 2014, with cath in 2015 demonstrating patent stents with mild to moderate non-obstructive cad, Isch CMrEF 40% in 2018, atrial fibrillation on eliquis 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      S: no chest pain or sob; ros otherwise negative.     Review of Systems:   Constitutional: [ ] fevers, [ ] chills.   Skin: [ ] dry skin. [ ] rashes.  Psychiatric: [ ] depression, [ ] anxiety.   Gastrointestinal: [ ] BRBPR, [ ] melena.   Neurological: [ ] confusion. [ ] seizures. [ ] shuffling gait.   Ears,Nose,Mouth and Throat: [ ] ear pain [ ] sore throat.   Eyes: [ ] diplopia.   Respiratory: [ ] hemoptysis. [ ] shortness of breath  Cardiovascular: See HPI above  Hematologic/Lymphatic: [ ] anemia. [ ] painful nodes. [ ] prolonged bleeding.   Genitourinary: [ ] hematuria. [ ] flank pain.   Endocrine: [ ] significant change in weight. [ ] intolerance to heat and cold.     Review of systems [x ] otherwise negative, [ ] otherwise unable to obtain    FH: no family history of sudden cardiac death in first degree relatives    SH: [ ] tobacco, [ ] alcohol, [ ] drugs      aMIOdarone    Tablet   Oral   aMIOdarone    Tablet 400 milliGRAM(s) Oral every 8 hours  aspirin enteric coated 81 milliGRAM(s) Oral daily  atorvastatin 40 milliGRAM(s) Oral at bedtime  chlorhexidine 4% Liquid 1 Application(s) Topical <User Schedule>  clopidogrel Tablet 75 milliGRAM(s) Oral daily  finasteride 5 milliGRAM(s) Oral daily  glucagon  Injectable 1 milliGRAM(s) IntraMuscular once  heparin  Infusion 700 Unit(s)/Hr IV Continuous <Continuous>  insulin lispro (ADMELOG) corrective regimen sliding scale   SubCutaneous three times a day before meals  insulin lispro (ADMELOG) corrective regimen sliding scale   SubCutaneous at bedtime  loratadine 10 milliGRAM(s) Oral daily  polyethylene glycol 3350 17 Gram(s) Oral daily  sodium bicarbonate 650 milliGRAM(s) Oral two times a day  tamsulosin 0.4 milliGRAM(s) Oral at bedtime                            8.0    5.93  )-----------( 126      ( 16 Aug 2021 04:31 )             24.8       08-16    133<L>  |  102  |  74<H>  ----------------------------<  136<H>  4.7   |  19<L>  |  2.22<H>    Ca    9.1      16 Aug 2021 04:31  Phos  3.4     08-16  Mg     2.50     08-16    TPro  5.6<L>  /  Alb  3.3  /  TBili  0.4  /  DBili  x   /  AST  32  /  ALT  32  /  AlkPhos  90  08-16      CARDIAC MARKERS ( 16 Aug 2021 04:31 )  x     / x     / 62 U/L / x     / 4.1 ng/mL  CARDIAC MARKERS ( 15 Aug 2021 06:26 )  x     / x     / 98 U/L / x     / 7.6 ng/mL  CARDIAC MARKERS ( 14 Aug 2021 23:27 )  x     / x     / 120 U/L / x     / 8.9 ng/mL  CARDIAC MARKERS ( 14 Aug 2021 16:53 )  x     / x     / 137 U/L / x     / 10.1 ng/mL  CARDIAC MARKERS ( 14 Aug 2021 08:32 )  x     / x     / 95 U/L / x     / 3.8 ng/mL        T(C): 37.1 (08-16-21 @ 04:00), Max: 37.2 (08-16-21 @ 00:00)  HR: 72 (08-16-21 @ 12:00) (67 - 79)  BP: 90/53 (08-16-21 @ 12:00) (90/31 - 115/68)  RR: 20 (08-16-21 @ 12:00) (10 - 35)  SpO2: 95% (08-16-21 @ 12:00) (85% - 100%)  Wt(kg): --    I&O's Summary    15 Aug 2021 07:01  -  16 Aug 2021 07:00  --------------------------------------------------------  IN: 380 mL / OUT: 1800 mL / NET: -1420 mL    16 Aug 2021 07:01  -  16 Aug 2021 13:18  --------------------------------------------------------  IN: 215 mL / OUT: 300 mL / NET: -85 mL          General: Well nourished in no acute distress. Alert and Oriented * 3.   Head: Normocephalic and atraumatic.   Neck: No JVD. No bruits. Supple. Does not appear to be enlarged.   Cardiovascular: + S1,S2 ; RRR Soft systolic murmur at the left lower sternal border. No rubs noted.    Lungs: CTA b/l. No rhonchi, rales or wheezes.   Abdomen: + BS, soft. Non tender. Non distended. No rebound. No guarding.   Extremities: No clubbing/cyanosis/edema.   Neurologic: Moves all four extremities. Full range of motion.   Skin: Warm and moist. The patient's skin has normal elasticity and good skin turgor.   Psychiatric: Appropriate mood and affect.  Musculoskeletal: Normal range of motion, normal strength    Tele:     TTE: < from: Transthoracic Echocardiogram (08.14.21 @ 14:06) >  Normal left ventricular systolic function. No segmental  wall motion abnormalities.  Mild-moderate pulmonary hypertension.  A device wire is noted in the right heart.    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     -continue with amiodarone for VT - no further episodes of VT   -keep K > 4, Mg > 2  -TTE with normal LV function   -EP eval with Dr. Villatoro/Arielle for AICD upgrade  -will eventually plan for cardiac cath when renal function allows - cr improving   -heme eval called for anemia and thrombocytopenia   -renal follow up   -further workup pending above  -supportive care per CCU    Estelle Ayala MD

## 2021-08-16 NOTE — PROGRESS NOTE ADULT - SUBJECTIVE AND OBJECTIVE BOX
PATIENT:  DARRELL YI  [id number]    CHIEF COMPLAINT:  Patient is a 89y old  Male who presents with a chief complaint of chest pain (14 Aug 2021 11:59)      INTERVAL HISTORY/OVERNIGHT EVENTS:      MEDICATIONS:  MEDICATIONS  (STANDING):  aMIOdarone    Tablet   Oral   aMIOdarone    Tablet 400 milliGRAM(s) Oral every 8 hours  aspirin enteric coated 81 milliGRAM(s) Oral daily  atorvastatin 40 milliGRAM(s) Oral at bedtime  chlorhexidine 4% Liquid 1 Application(s) Topical <User Schedule>  clopidogrel Tablet 75 milliGRAM(s) Oral daily  finasteride 5 milliGRAM(s) Oral daily  glucagon  Injectable 1 milliGRAM(s) IntraMuscular once  heparin  Infusion 700 Unit(s)/Hr (5 mL/Hr) IV Continuous <Continuous>  insulin lispro (ADMELOG) corrective regimen sliding scale   SubCutaneous three times a day before meals  insulin lispro (ADMELOG) corrective regimen sliding scale   SubCutaneous at bedtime  loratadine 10 milliGRAM(s) Oral daily  polyethylene glycol 3350 17 Gram(s) Oral daily  sodium bicarbonate 650 milliGRAM(s) Oral two times a day  tamsulosin 0.4 milliGRAM(s) Oral at bedtime    MEDICATIONS  (PRN):      ALLERGIES:  Allergies    No Known Allergies    Intolerances        OBJECTIVE:  ICU Vital Signs Last 24 Hrs  T(C): 37.1 (16 Aug 2021 04:00), Max: 37.2 (16 Aug 2021 00:00)  T(F): 98.8 (16 Aug 2021 04:00), Max: 99 (16 Aug 2021 00:00)  HR: 76 (16 Aug 2021 06:00) (65 - 80)  BP: 105/59 (16 Aug 2021 06:00) (93/62 - 125/72)  BP(mean): 73 (16 Aug 2021 06:00) (70 - 89)  ABP: --  ABP(mean): --  RR: 10 (16 Aug 2021 06:00) (10 - 23)  SpO2: 100% (16 Aug 2021 06:00) (98% - 100%)      Adult Advanced Hemodynamics Last 24 Hrs  CVP(mm Hg): --  CVP(cm H2O): --  CO: --  CI: --  PA: --  PA(mean): --  PCWP: --  SVR: --  SVRI: --  PVR: --  PVRI: --  CAPILLARY BLOOD GLUCOSE      POCT Blood Glucose.: 161 mg/dL (15 Aug 2021 22:13)  POCT Blood Glucose.: 176 mg/dL (15 Aug 2021 16:04)  POCT Blood Glucose.: 161 mg/dL (15 Aug 2021 11:43)    CAPILLARY BLOOD GLUCOSE      POCT Blood Glucose.: 161 mg/dL (15 Aug 2021 22:13)    I&O's Summary    15 Aug 2021 07:01  -  16 Aug 2021 07:00  --------------------------------------------------------  IN: 375 mL / OUT: 1800 mL / NET: -1425 mL      Daily     Daily Weight in k.6 (16 Aug 2021 04:00)    PHYSICAL EXAMINATION:    LABS:                          8.0    5.93  )-----------( 126      ( 16 Aug 2021 04:31 )             24.8     08    133<L>  |  102  |  74<H>  ----------------------------<  136<H>  4.7   |  19<L>  |  2.22<H>    Ca    9.1      16 Aug 2021 04:31  Phos  3.4       Mg     2.50         TPro  5.6<L>  /  Alb  3.3  /  TBili  0.4  /  DBili  x   /  AST  32  /  ALT  32  /  AlkPhos  90      LIVER FUNCTIONS - ( 16 Aug 2021 04:31 )  Alb: 3.3 g/dL / Pro: 5.6 g/dL / ALK PHOS: 90 U/L / ALT: 32 U/L / AST: 32 U/L / GGT: x           PT/INR - ( 14 Aug 2021 08:26 )   PT: 24.2 sec;   INR: 2.20 ratio         PTT - ( 16 Aug 2021 04:31 )  PTT:59.6 sec  Creatine Kinase, Serum: 62 U/L ( @ 04:31)  CKMB Units: 4.1 ng/mL (08-16 @ 04:31)    CARDIAC MARKERS ( 16 Aug 2021 04:31 )  x     / x     / 62 U/L / x     / 4.1 ng/mL  CARDIAC MARKERS ( 15 Aug 2021 06:26 )  x     / x     / 98 U/L / x     / 7.6 ng/mL  CARDIAC MARKERS ( 14 Aug 2021 23:27 )  x     / x     / 120 U/L / x     / 8.9 ng/mL  CARDIAC MARKERS ( 14 Aug 2021 16:53 )  x     / x     / 137 U/L / x     / 10.1 ng/mL  CARDIAC MARKERS ( 14 Aug 2021 08:32 )  x     / x     / 95 U/L / x     / 3.8 ng/mL      Urinalysis Basic - ( 15 Aug 2021 13:00 )    Color: Brown / Appearance: Turbid / S.013 / pH: x  Gluc: x / Ketone: Negative  / Bili: Negative / Urobili: <2 mg/dL   Blood: x / Protein: 100 mg/dL / Nitrite: Negative   Leuk Esterase: Negative / RBC: 74 /HPF / WBC 7 /HPF   Sq Epi: x / Non Sq Epi: 1 /HPF / Bacteria: Negative        TELEMETRY:     EKG:     IMAGING:       PATIENT:  DARRELL YI  [id number]    CHIEF COMPLAINT:  Patient is a 89y old  Male who presents with a chief complaint of chest pain (14 Aug 2021 11:59)      INTERVAL HISTORY/OVERNIGHT EVENTS: No events overnight. Currently no complaints of chest pain, sob, palpitations. Tolerating Glucerna. No BM in 2 days.    Tele event:       MEDICATIONS:  MEDICATIONS  (STANDING):  aMIOdarone    Tablet   Oral   aMIOdarone    Tablet 400 milliGRAM(s) Oral every 8 hours  aspirin enteric coated 81 milliGRAM(s) Oral daily  atorvastatin 40 milliGRAM(s) Oral at bedtime  chlorhexidine 4% Liquid 1 Application(s) Topical <User Schedule>  clopidogrel Tablet 75 milliGRAM(s) Oral daily  finasteride 5 milliGRAM(s) Oral daily  glucagon  Injectable 1 milliGRAM(s) IntraMuscular once  heparin  Infusion 700 Unit(s)/Hr (5 mL/Hr) IV Continuous <Continuous>  insulin lispro (ADMELOG) corrective regimen sliding scale   SubCutaneous three times a day before meals  insulin lispro (ADMELOG) corrective regimen sliding scale   SubCutaneous at bedtime  loratadine 10 milliGRAM(s) Oral daily  polyethylene glycol 3350 17 Gram(s) Oral daily  sodium bicarbonate 650 milliGRAM(s) Oral two times a day  tamsulosin 0.4 milliGRAM(s) Oral at bedtime    MEDICATIONS  (PRN):      ALLERGIES:  Allergies    No Known Allergies    Intolerances  OBJECTIVE:  ICU Vital Signs Last 24 Hrs  T(C): 37.1 (16 Aug 2021 04:00), Max: 37.2 (16 Aug 2021 00:00)  T(F): 98.8 (16 Aug 2021 04:00), Max: 99 (16 Aug 2021 00:00)  HR: 76 (16 Aug 2021 06:00) (65 - 80)  BP: 105/59 (16 Aug 2021 06:00) (93/62 - 125/72)  BP(mean): 73 (16 Aug 2021 06:00) (70 - 89)  ABP: --  ABP(mean): --  RR: 10 (16 Aug 2021 06:00) (10 - 23)  SpO2: 100% (16 Aug 2021 06:00) (98% - 100%)        POCT Blood Glucose.: 161 mg/dL (15 Aug 2021 22:13)  POCT Blood Glucose.: 176 mg/dL (15 Aug 2021 16:04)  POCT Blood Glucose.: 161 mg/dL (15 Aug 2021 11:43)    CAPILLARY BLOOD GLUCOSE      POCT Blood Glucose.: 161 mg/dL (15 Aug 2021 22:13)    I&O's Summary    15 Aug 2021 07:01  -  16 Aug 2021 07:00  --------------------------------------------------------  IN: 375 mL / OUT: 1800 mL / NET: -1425 mL      Daily     Daily Weight in k.6 (16 Aug 2021 04:00)    PHYSICAL EXAMINATION:    LABS:                          8.0    5.93  )-----------( 126      ( 16 Aug 2021 04:31 )             24.8     08    133<L>  |  102  |  74<H>  ----------------------------<  136<H>  4.7   |  19<L>  |  2.22<H>    Ca    9.1      16 Aug 2021 04:31  Phos  3.4       Mg     2.50         TPro  5.6<L>  /  Alb  3.3  /  TBili  0.4  /  DBili  x   /  AST  32  /  ALT  32  /  AlkPhos  90  08-16    LIVER FUNCTIONS - ( 16 Aug 2021 04:31 )  Alb: 3.3 g/dL / Pro: 5.6 g/dL / ALK PHOS: 90 U/L / ALT: 32 U/L / AST: 32 U/L / GGT: x           PT/INR - ( 14 Aug 2021 08:26 )   PT: 24.2 sec;   INR: 2.20 ratio         PTT - ( 16 Aug 2021 04:31 )  PTT:59.6 sec  Creatine Kinase, Serum: 62 U/L ( @ 04:31)  CKMB Units: 4.1 ng/mL ( @ 04:31)    CARDIAC MARKERS ( 16 Aug 2021 04:31 )  x     / x     / 62 U/L / x     / 4.1 ng/mL  CARDIAC MARKERS ( 15 Aug 2021 06:26 )  x     / x     / 98 U/L / x     / 7.6 ng/mL  CARDIAC MARKERS ( 14 Aug 2021 23:27 )  x     / x     / 120 U/L / x     / 8.9 ng/mL  CARDIAC MARKERS ( 14 Aug 2021 16:53 )  x     / x     / 137 U/L / x     / 10.1 ng/mL  CARDIAC MARKERS ( 14 Aug 2021 08:32 )  x     / x     / 95 U/L / x     / 3.8 ng/mL      Urinalysis Basic - ( 15 Aug 2021 13:00 )    Color: Brown / Appearance: Turbid / S.013 / pH: x  Gluc: x / Ketone: Negative  / Bili: Negative / Urobili: <2 mg/dL   Blood: x / Protein: 100 mg/dL / Nitrite: Negative   Leuk Esterase: Negative / RBC: 74 /HPF / WBC 7 /HPF   Sq Epi: x / Non Sq Epi: 1 /HPF / Bacteria: Negative        TELEMETRY:     EKG:     IMAGING:       PATIENT:  DARRELL YI  [id number]    CHIEF COMPLAINT:  Patient is a 89y old  Male who presents with a chief complaint of chest pain (14 Aug 2021 11:59)      INTERVAL HISTORY/OVERNIGHT EVENTS: No events overnight. Currently no complaints of chest pain, sob, palpitations. Tolerating Glucerna. No BM in 2 days.    Tele event: No tele events overnight.      MEDICATIONS:  MEDICATIONS  (STANDING):  aMIOdarone    Tablet   Oral   aMIOdarone    Tablet 400 milliGRAM(s) Oral every 8 hours  aspirin enteric coated 81 milliGRAM(s) Oral daily  atorvastatin 40 milliGRAM(s) Oral at bedtime  chlorhexidine 4% Liquid 1 Application(s) Topical <User Schedule>  clopidogrel Tablet 75 milliGRAM(s) Oral daily  finasteride 5 milliGRAM(s) Oral daily  glucagon  Injectable 1 milliGRAM(s) IntraMuscular once  heparin  Infusion 700 Unit(s)/Hr (5 mL/Hr) IV Continuous <Continuous>  insulin lispro (ADMELOG) corrective regimen sliding scale   SubCutaneous three times a day before meals  insulin lispro (ADMELOG) corrective regimen sliding scale   SubCutaneous at bedtime  loratadine 10 milliGRAM(s) Oral daily  polyethylene glycol 3350 17 Gram(s) Oral daily  sodium bicarbonate 650 milliGRAM(s) Oral two times a day  tamsulosin 0.4 milliGRAM(s) Oral at bedtime    MEDICATIONS  (PRN):    ALLERGIES:  Allergies    No Known Allergies:    Intolerances  OBJECTIVE:  ICU Vital Signs Last 24 Hrs  T(C): 37.1 (16 Aug 2021 04:00), Max: 37.2 (16 Aug 2021 00:00)  T(F): 98.8 (16 Aug 2021 04:00), Max: 99 (16 Aug 2021 00:00)  HR: 76 (16 Aug 2021 06:00) (65 - 80)  BP: 105/59 (16 Aug 2021 06:00) (93/62 - 125/72)  BP(mean): 73 (16 Aug 2021 06:00) (70 - 89)  ABP: --  ABP(mean): --  RR: 10 (16 Aug 2021 06:00) (10 - 23)  SpO2: 100% (16 Aug 2021 06:00) (98% - 100%)    POCT Blood Glucose.: 161 mg/dL (15 Aug 2021 22:13)  POCT Blood Glucose.: 176 mg/dL (15 Aug 2021 16:04)  POCT Blood Glucose.: 161 mg/dL (15 Aug 2021 11:43)    CAPILLARY BLOOD GLUCOSE    POCT Blood Glucose.: 161 mg/dL (15 Aug 2021 22:13)    I&O's Summary    15 Aug 2021 07:01  -  16 Aug 2021 07:00  --------------------------------------------------------  IN: 375 mL / OUT: 1800 mL / NET: -1425 mL      Daily     Daily Weight in k.6 (16 Aug 2021 04:00)    PHYSICAL EXAMINATION:     GENERAL: NAD, lying in bed comfortably  HEAD:  Atraumatic, Normocephalic  EYES: EOMI, PERRLA, conjunctiva and sclera clear  ENT: Moist mucous membranes  NECK: Supple, No JVD  CHEST/LUNG: Clear to auscultation bilaterally; No rales, rhonchi, wheezing, or rubs. Unlabored respirations  HEART: Regular rate and rhythm; No murmurs, rubs, or gallops  ABDOMEN: BSx4; Soft, nontender, nondistended  EXTREMITIES:  2+ Peripheral Pulses, brisk capillary refill. No clubbing, cyanosis, or edema  NERVOUS SYSTEM:  A&Ox3, no focal deficits   SKIN: senile purpuric lesions noted on purpura.      LABS:                        8.0    5.93  )-----------( 126      ( 16 Aug 2021 04:31 )             24.8     08-16    133<L>  |  102  |  74<H>  ----------------------------<  136<H>  4.7   |  19<L>  |  2.22<H>    Ca    9.1      16 Aug 2021 04:31  Phos  3.4     08-16  Mg     2.50     08-16    TPro  5.6<L>  /  Alb  3.3  /  TBili  0.4  /  DBili  x   /  AST  32  /  ALT  32  /  AlkPhos  90  08-16    LIVER FUNCTIONS - ( 16 Aug 2021 04:31 )  Alb: 3.3 g/dL / Pro: 5.6 g/dL / ALK PHOS: 90 U/L / ALT: 32 U/L / AST: 32 U/L / GGT: x           PT/INR - ( 14 Aug 2021 08:26 )   PT: 24.2 sec;   INR: 2.20 ratio      PTT - ( 16 Aug 2021 04:31 )  PTT:59.6 sec  Creatine Kinase, Serum: 62 U/L ( @ 04:31)  CKMB Units: 4.1 ng/mL ( @ 04:31)    CARDIAC MARKERS ( 16 Aug 2021 04:31 )  x     / x     / 62 U/L / x     / 4.1 ng/mL  CARDIAC MARKERS ( 15 Aug 2021 06:26 )  x     / x     / 98 U/L / x     / 7.6 ng/mL  CARDIAC MARKERS ( 14 Aug 2021 23:27 )  x     / x     / 120 U/L / x     / 8.9 ng/mL  CARDIAC MARKERS ( 14 Aug 2021 16:53 )  x     / x     / 137 U/L / x     / 10.1 ng/mL  CARDIAC MARKERS ( 14 Aug 2021 08:32 )  x     / x     / 95 U/L / x     / 3.8 ng/mL      Urinalysis Basic - ( 15 Aug 2021 13:00 )    Color: Brown / Appearance: Turbid / S.013 / pH: x  Gluc: x / Ketone: Negative  / Bili: Negative / Urobili: <2 mg/dL   Blood: x / Protein: 100 mg/dL / Nitrite: Negative   Leuk Esterase: Negative / RBC: 74 /HPF / WBC 7 /HPF   Sq Epi: x / Non Sq Epi: 1 /HPF / Bacteria: Negative

## 2021-08-16 NOTE — PROGRESS NOTE ADULT - ASSESSMENT
Assessment and Plan:89 y.o [place]  male with a PMhx of HTN, HLD, DM, CAD s/p stent to 80% D1 in 2014 ,dHF EF 60% in 2018,Afib on eliquis c/b tachy/perla with BiV PPM( Biotronik) CKD, CVA, Velasquez's esophagus  presents with chest burning, dizziness/ lightheadedness, nausea, weakness with unsteady gait found in ventricular tachycardia and hypotension which did not improved with amiodarone 150mg IVP x1 received shocked x1 by external defibrillator with improve in Blood Pressure and mental status. Started on amiodarone drip.  EKG showed He was admitted to CCU. Labs remarkable for FLOYD on CKD and elevated lactate  post ventricular tachycardia and shock. EKG showed STD in inferior lateral leads.      Plan  Neuro  AxOx4  no active issues    Resp  lungs clear  pulse Ox 100% on RA  +sneezing     #NSTEMI (non-ST elevation myocardial infarction).    Patient with p/w chest pain and ventricular tachycardia s/p shockedx1  EKG showed  Sinus perla with STD in inferior and lateral lead  Load with  mg now, Plavix 600 mg PO and start heparin gtt as per ACS protocol  Assess for resolution of chest pain and recurrence in chest pain. Serial EKG PRN chest pain to assess for ST changes  Continuous cardiac monitoring to monitor for arrhythmias  Aspirin 81 PO daily, Clopidogrel 75 PO daily, Lipitor 40 mg PO daily  Hold  urgent LHC given elevated creatinine and currently chest pain free  Low fat DASH diet  ECHO: 2D ECHO showed EF 60%, severely dilated left atrium. Mild concentric left ventricular hypertrophy. Normal left ventricular systolic function. No segmental wall motion abnormalities. Mild diastolic dysfunction(Stage I)  trops down trending [phone]    # Ventricular tachycardia.    Patient presented to ER with c/o  chest pain found in ventricular tachycardia s/p defibrillatorx1  Amiodarone gtt 1 mg/min x 6 hours and 0.5 mg/min for 18 hours followed PO load    Monitor for further VT episodes  Zoll pads at bedside with pacer pads on patient.   Keep K > 4.2 and magnesium > 2.2  Cardioversion/defibrillation if unstable.   BIV PPM interrogation showed  monomorphic ventricular tachycardia at 150-160  EP consulted  plan to transfer to [facility] for upgrade BiVICD.     #Chronic diastolic congestive heart failure.   CXR showed; clear-appear euvolemic  Continuous cardiac monitoring to r/o arrhythmias.   Check pro BNP 6899   Hold  home dose lasix and losartan  2/2 FLOYD on CKD and hypotension  Daily weight monitoring, Strict I/O, Low sodium DASH diet  echo showed Ef 60% with normal LV,stageI diastolic dysfunction    # Paroxysmal atrial fibrillation.   pAfib on Eliquis  CHAD2 score: 8  Currently on heparin drip for ACS-will hold Eliquis till pt on heparin drip  Continuous tele monitoring  Rate control: will introduce low dose coreg if Bp tolerates   Follow PT/INR/PTT.   Monitor lytes and replete as needed.       GI  #transaminitis  elevated LFT on admission, now improving       #poor appetite  on glucerna  nutrition consult      # BPH   no issue  c/w flomax 0.4mg Po daily.     # Stage 3 chronic kidney disease.   Recent admission for CRI with hyperkalemia   S. creat on discharge on 8/12/21 was 1.89,onadmission 3.49,today 2.7  Renal US negative for hydronephrosis  FLOYD likely due to NSTEMI/VT  Trend BUN/Cr.  Strict I/O  Avoid Nephrotoxic Meds/ Agents such as (NSAIDs, IV contrast, Aminoglycosides such as gentamicin, -Gadolinium contrast, Phosphate containing enemas, etc..)  Adjust Medications according to eGFR  Renal consult called.    Endo  # diabetes mellitus  A1c  ISS   Assessment and Plan:89 y.o [place]  male with a PMhx of HTN, HLD, DM, CAD s/p stent to 80% D1 in 2014 ,dHF EF 60% in 2018,Afib on eliquis c/b tachy/perla with BiV PPM( Biotronik) CKD, CVA, Velasquez's esophagus  presents with chest burning, dizziness/ lightheadedness, nausea, weakness with unsteady gait found in ventricular tachycardia and hypotension which did not improved with amiodarone 150mg IVP x1 received shocked x1 by external defibrillator with improve in Blood Pressure and mental status. Started on amiodarone drip.  EKG showed He was admitted to CCU. Labs remarkable for FLOYD on CKD and elevated lactate  post ventricular tachycardia and shock. EKG showed STD in inferior lateral leads.      Plan  Neuro  AxOx4  no active issues    Resp  lungs clear  pulse Ox 100% on RA  +sneezing    Cardiovascular:   # 1) NSTEMI (non-ST elevation myocardial infarction).   -Patient with p/w chest pain and ventricular tachycardia s/p shockedx1  -EKG showed  Sinus perla with STD in inferior and lateral lead  -On  mg now, Plavix 600 mg PO and start heparin gtt as per ACS protocol  -Assess for resolution of chest pain and recurrence in chest pain. Serial EKG PRN chest pain to assess for ST changes  -Continuous cardiac monitoring to monitor for arrhythmias  -Aspirin 81 PO daily, Clopidogrel 75 PO daily, Lipitor 40 mg PO daily  -Hold  urgent LHC given elevated creatinine and currently chest pain free  -Low fat DASH diet  -ECHO: 2D ECHO showed EF 60%, severely dilated left atrium. Mild concentric left ventricular hypertrophy. Normal left ventricular systolic function. No segmental wall   motion abnormalities. Mild diastolic dysfunction(Stage I)  -Troponin today: 557. Trending down from 872 prior.    2) Ventricular tachycardia.   -Patient presented to ER with c/o chest pain found in ventricular tachycardia s/p defibrillatorx1  -On PO Amiodarone load- to complete full 10 grams- will require (400MG q8 for 22 doses), then maintenance 200 mg    -Will monitor for further VT episodes  -Zoll pads at bedside with pacer pads on patient.   -Keep K > 4.2 and magnesium > 2.2  -Cardioversion/defibrillation if unstable.   -BIV PPM interrogation showed  monomorphic ventricular tachycardia at 150-160  -EP following - will speak with EP for starting metoprolol   -plan to transfer to [facility] for upgrade BiVICD.     3) Chronic diastolic congestive heart failure.   -CXR showed; clear-appear euvolemic  -Continuous cardiac monitoring to r/o arrhythmias.   -Check pro BNP 6899   -Hold  home dose lasix and losartan - as patient has FLOYD on CKD and hypotension  -Daily weight monitoring, Strict I/O, Low sodium DASH diet  -echo showed Ef 60% with normal LV,stageI diastolic dysfunction    4) Paroxysmal atrial fibrillation.   -pAfib on Eliquis  -CHAD2 score: 8  -Currently on heparin drip for ACS-will hold Eliquis till pt on heparin drip  -Continuous tele monitoring  -Rate control: will introduce low dose coreg if Bp tolerates   -Follow PT/INR/PTT.   -Monitor lytes and replete as needed.       GI  1)transaminitis  -On admission, elevated LFT - has now resolved      2) poor appetite  on glucerna  nutrition consult      # BPH   no issue  c/w flomax 0.4mg Po daily.     # Stage 3 chronic kidney disease.  -Recent admission for CRI with hyperkalemia   -Creatinine 2.2, trending down from 2.7  -Renal US negative for hydronephrosis  -FLOYD likely due to NSTEMI/VT  -Trend BUN/Cr.  -Strict I/O  -Avoid Nephrotoxic Meds/ Agents such as (NSAIDs, IV contrast, Aminoglycosides such as gentamicin, -Gadolinium contrast, Phosphate containing enemas, etc..)  -Adjust Medications according to eGFR  -Renal following - will F/U recs    Endo  # diabetes mellitus  A1c  ISS   89 y.o [place]  male with a PMhx of HTN, HLD, DM, CAD s/p stent to 80% D1 in 2014 ,dHF EF 60% in 2018,Afib on eliquis c/b tachy/perla with BiV PPM( Biotronik) CKD, CVA, Velasquez's esophagus  presents with chest burning, dizziness/ lightheadedness, nausea, weakness with unsteady gait found in ventricular tachycardia and hypotension which did not improved with amiodarone 150mg IVP x1 received shocked x1 by external defibrillator with improve in Blood Pressure and mental status. Started on amiodarone drip.  EKG showed He was admitted to CCU. Labs remarkable for FLOYD on CKD and elevated lactate  post ventricular tachycardia and shock. EKG showed STD in inferior lateral leads.    Neuro:  AxOx4  no active issues    Respiratory:  lungs clear  pulse Ox 100% on RA  +sneezing    Cardiovascular:   1) NSTEMI (non-ST elevation myocardial infarction).   -Patient with p/w chest pain and ventricular tachycardia s/p shockedx1  -EKG showed  Sinus perla with STD in inferior and lateral lead  -On  mg now, Plavix 600 mg PO and start heparin gtt as per ACS protocol  -Assess for resolution of chest pain and recurrence in chest pain. Serial EKG PRN chest pain to assess for ST changes  -Continuous cardiac monitoring to monitor for arrhythmias  -Aspirin 81 PO daily, Clopidogrel 75 PO daily, Lipitor 40 mg PO daily  -Hold  urgent LHC given elevated creatinine and currently chest pain free  -Low fat DASH diet  -ECHO: 2D ECHO showed EF 60%, severely dilated left atrium. Mild concentric left ventricular hypertrophy. Normal left ventricular systolic function. No segmental wall   motion abnormalities. Mild diastolic dysfunction(Stage I)  -Troponin today: 557. Trending down from 872 prior.    2) Ventricular tachycardia.   -Patient presented to ER with c/o chest pain found in ventricular tachycardia s/p defibrillatorx1  -On PO Amiodarone load- to complete full 10 grams- will require (400MG q8 for 22 doses), then maintenance 200 mg    -Will monitor for further VT episodes  -Zoll pads at bedside with pacer pads on patient.   -Keep K > 4.2 and magnesium > 2.2  -Cardioversion/defibrillation if unstable.   -BIV PPM interrogation showed  monomorphic ventricular tachycardia at 150-160  -EP following - will speak with EP for starting metoprolol   -plan to transfer to [facility] for upgrade BiVICD.     3)Chronic diastolic congestive heart failure.   -CXR showed; clear-appear euvolemic  -Continuous cardiac monitoring to r/o arrhythmias.   -Check pro BNP 6899   -Hold  home dose lasix and losartan - as patient has FLOYD on CKD and hypotension  -Daily weight monitoring, Strict I/O, Low sodium DASH diet  -echo showed Ef 60% with normal LV,stageI diastolic dysfunction    4) Paroxysmal atrial fibrillation.   -pAfib on Eliquis  -CHAD2 score: 8  -Currently on heparin drip for ACS-will hold Eliquis till pt on heparin drip  -Continuous tele monitoring  -Rate control: will introduce low dose coreg if Bp tolerates   -Follow PT/INR/PTT.   -Monitor lytes and replete as needed.       GI  1)Transaminitis  -On admission, elevated LFT - has now resolved      2)Poor appetite  -on glucerna  -nutrition consult      1) BPH   -no issue  -c/w flomax 0.4mg Po daily.     2) Stage 3 chronic kidney disease.  -Recent admission for CRI with hyperkalemia   -Creatinine 2.2, trending down from 2.7  -Renal US negative for hydronephrosis  -FLOYD likely due to NSTEMI/VT  -Strict I/O  -Avoid Nephrotoxic Meds/ Agents such as (NSAIDs, IV contrast, Aminoglycosides such as gentamicin, -Gadolinium contrast, Phosphate containing enemas, etc..)  -Adjust Medications according to eGFR  -Renal following - will F/U recs    Endo  1) Diabetes mellitus  -ISS

## 2021-08-16 NOTE — PROGRESS NOTE ADULT - SUBJECTIVE AND OBJECTIVE BOX
Patient is a 89y old  Male who presents with a chief complaint of chest pain (14 Aug 2021 11:59)      SUBJECTIVE / OVERNIGHT EVENTS:    Events noted.  CONSTITUTIONAL: Lt arm swelling  RESPIRATORY: No cough, wheezing,  No shortness of breath  CARDIOVASCULAR: No chest pain, palpitations, dizziness, or leg swelling  GASTROINTESTINAL: No abdominal or epigastric pain.   NEUROLOGICAL: No headaches,     MEDICATIONS  (STANDING):  aMIOdarone    Tablet   Oral   aMIOdarone    Tablet 400 milliGRAM(s) Oral every 8 hours  aspirin enteric coated 81 milliGRAM(s) Oral daily  atorvastatin 40 milliGRAM(s) Oral at bedtime  chlorhexidine 4% Liquid 1 Application(s) Topical <User Schedule>  clopidogrel Tablet 75 milliGRAM(s) Oral daily  finasteride 5 milliGRAM(s) Oral daily  glucagon  Injectable 1 milliGRAM(s) IntraMuscular once  heparin  Infusion 700 Unit(s)/Hr (5 mL/Hr) IV Continuous <Continuous>  insulin lispro (ADMELOG) corrective regimen sliding scale   SubCutaneous three times a day before meals  insulin lispro (ADMELOG) corrective regimen sliding scale   SubCutaneous at bedtime  loratadine 10 milliGRAM(s) Oral daily  polyethylene glycol 3350 17 Gram(s) Oral daily  sodium bicarbonate 650 milliGRAM(s) Oral two times a day  tamsulosin 0.4 milliGRAM(s) Oral at bedtime    MEDICATIONS  (PRN):        CAPILLARY BLOOD GLUCOSE      POCT Blood Glucose.: 139 mg/dL (16 Aug 2021 21:53)  POCT Blood Glucose.: 203 mg/dL (16 Aug 2021 17:20)  POCT Blood Glucose.: 198 mg/dL (16 Aug 2021 12:00)  POCT Blood Glucose.: 143 mg/dL (16 Aug 2021 08:38)    I&O's Summary    15 Aug 2021 07:  -  16 Aug 2021 07:00  --------------------------------------------------------  IN: 380 mL / OUT: 1800 mL / NET: -1420 mL    16 Aug 2021 07:01  -  16 Aug 2021 23:08  --------------------------------------------------------  IN: 270 mL / OUT: 600 mL / NET: -330 mL        T(C): 36.9 (21 @ 20:00), Max: 37.2 (21 @ 00:00)  HR: 77 (21 @ 22:00) (66 - 79)  BP: 116/63 (21 @ 22:00) (90/31 - 116/63)  RR: 7 (21 @ 22:00) (7 - 35)  SpO2: 100% (21 @ 22:00) (85% - 100%)    PHYSICAL EXAM:  GENERAL: NAD  NECK: Supple, No JVD  CHEST/LUNG: Clear to auscultation bilaterally; No wheezing.  HEART: Regular rate and rhythm; No murmurs, rubs, or gallops  ABDOMEN: Soft, Nontender, Nondistended; Bowel sounds present  EXTREMITIES:   Lt arm swelling  NEUROLOGY: AAO X 3      LABS:                        8.0    5.93  )-----------( 126      ( 16 Aug 2021 04:31 )             24.8     08-16    133<L>  |  102  |  74<H>  ----------------------------<  136<H>  4.7   |  19<L>  |  2.22<H>    Ca    9.1      16 Aug 2021 04:31  Phos  3.4     08-16  Mg     2.50     08-16    TPro  5.6<L>  /  Alb  3.3  /  TBili  0.4  /  DBili  x   /  AST  32  /  ALT  32  /  AlkPhos  90  08-16    PTT - ( 16 Aug 2021 04:31 )  PTT:59.6 sec  CARDIAC MARKERS ( 16 Aug 2021 04:31 )  x     / x     / 62 U/L / x     / 4.1 ng/mL  CARDIAC MARKERS ( 15 Aug 2021 06:26 )  x     / x     / 98 U/L / x     / 7.6 ng/mL  CARDIAC MARKERS ( 14 Aug 2021 23:27 )  x     / x     / 120 U/L / x     / 8.9 ng/mL      Urinalysis Basic - ( 15 Aug 2021 13:00 )    Color: Brown / Appearance: Turbid / S.013 / pH: x  Gluc: x / Ketone: Negative  / Bili: Negative / Urobili: <2 mg/dL   Blood: x / Protein: 100 mg/dL / Nitrite: Negative   Leuk Esterase: Negative / RBC: 74 /HPF / WBC 7 /HPF   Sq Epi: x / Non Sq Epi: 1 /HPF / Bacteria: Negative      CAPILLARY BLOOD GLUCOSE      POCT Blood Glucose.: 139 mg/dL (16 Aug 2021 21:53)  POCT Blood Glucose.: 203 mg/dL (16 Aug 2021 17:20)  POCT Blood Glucose.: 198 mg/dL (16 Aug 2021 12:00)  POCT Blood Glucose.: 143 mg/dL (16 Aug 2021 08:38)        RADIOLOGY & ADDITIONAL TESTS:    Imaging Personally Reviewed:    Consultant(s) Notes Reviewed:      Care Discussed with Consultants/Other Providers:    Dileep Amador MD, CMD, FACP    [address] [place] [address]  Office Tel: [phone]  Cell: [phone]

## 2021-08-16 NOTE — CHART NOTE - NSCHARTNOTEFT_GEN_A_CORE
Patient developed swelling of Left UE this morning. Non-erythematous, non tender, warm and well perfused. Patient complaining of discomfort. As per discussion, UE Duplex was ordered. I was unable to reach vascular cardiology to f/u when will it be done. Will sign out to night team.    Pradeep Owusu  PGY-1 Internal Medicine

## 2021-08-16 NOTE — CONSULT NOTE ADULT - SUBJECTIVE AND OBJECTIVE BOX
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 Patient also compliant of b/l knee pain  and weakness started last night. In ED he was found in ventricular tachycardia and hypotensive to 70s/50s. He received  150mg push amiodarone with no improvement. Patient  became less responsive and hypotensive so he was defibrillated x1 with good response. EKG post- defib showed paced rhythm with improved Blood Pressure and mental status. He was started on Amiodarone drip and admitted to CCU.    EKG: (Magnet applied) SB with STD in II,III,aVF,V4-V6.         (Paced rhythm) : AS/BiV paced.    Labs: Na 133, S creat 3.49,glucose 245,Alk 122, AST 70,ALT 52,proBNP 6899,trop 196,lactate 4.4  (14 Aug 2021 09:51)    Patient feeling better now, s/p cardioversion in the ED.  No prior history of ventricular arrhythmia.  Has history of paroxysmal AFib but was not previously on amiodarone.  At rest, no angina or palpitations, no lightheadedness. Easily fatigued, lives in NYHA II-III. A 10 pt ROS is otherwise negative.  Discussed with his sons (Camilo) by telephone- patient is aware he is heading towards ICD upgrade on this admission.    He wants to live longer, and is willing to endure ICD shocks to reach this goal, as his quality of life is otherwise fairly good.    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    S/P coronary artery stent placement  D1    Pacemaker  BiV PPM      MEDICATIONS  (STANDING):  aMIOdarone    Tablet   Oral   aMIOdarone    Tablet 400 milliGRAM(s) Oral every 8 hours  aspirin enteric coated 81 milliGRAM(s) Oral daily  atorvastatin 40 milliGRAM(s) Oral at bedtime  chlorhexidine 4% Liquid 1 Application(s) Topical <User Schedule>  clopidogrel Tablet 75 milliGRAM(s) Oral daily  finasteride 5 milliGRAM(s) Oral daily  glucagon  Injectable 1 milliGRAM(s) IntraMuscular once  heparin  Infusion 700 Unit(s)/Hr (5 mL/Hr) IV Continuous <Continuous>  insulin lispro (ADMELOG) corrective regimen sliding scale   SubCutaneous three times a day before meals  insulin lispro (ADMELOG) corrective regimen sliding scale   SubCutaneous at bedtime  loratadine 10 milliGRAM(s) Oral daily  polyethylene glycol 3350 17 Gram(s) Oral daily  sodium bicarbonate 650 milliGRAM(s) Oral two times a day  tamsulosin 0.4 milliGRAM(s) Oral at bedtime    Allergies    No Known Allergies    Intolerances    FAMILY HISTORY:  Family history of heart disease (Father)    Non-contributary for premature coronary disease or sudden cardiac death    SOCIAL HISTORY:    [x ] Non-smoker  [ ] Smoker  [ ] Alcohol    PHYSICAL EXAM:  T(C): 37.1 (08-16-21 @ 04:00), Max: 37.2 (08-16-21 @ 00:00)  HR: 72 (08-16-21 @ 12:00) (67 - 79)  BP: 90/53 (08-16-21 @ 12:00) (90/31 - 115/68)  RR: 20 (08-16-21 @ 12:00) (10 - 35)  SpO2: 95% (08-16-21 @ 12:00) (85% - 100%)  Wt(kg): --    Appearance: Thin elderly male in no acute distress, awake and alert, in good spirits.	  HEENT:   Normal oral mucosa, PERRL, EOMI	  Lymphatic: No lymphadenopathy , no edema  Cardiovascular: Normal S1 S2, No JVD, No murmurs , Peripheral pulses palpable 2+ bilaterally.  Pacemaker left upper chest.  Respiratory: Lungs clear to auscultation, normal effort 	  Gastrointestinal:  Soft, Non-tender, + BS	  Skin: No rashes, No ecchymoses, No cyanosis, warm to touch  Musculoskeletal: Normal range of motion, normal strength  Psychiatry:  Mood & affect appropriate    TELEMETRY: NSR, BiV paced	    ECG: NSR, BiV paced.  Underlying is sinus perla with very-long KS (300+ms).  Echo: pending  Cath: pending	  	  LABS:	 	                          8.0    5.93  )-----------( 126      ( 16 Aug 2021 04:31 )             24.8     08-16    133<L>  |  102  |  74<H>  ----------------------------<  136<H>  4.7   |  19<L>  |  2.22<H>    Ca    9.1      16 Aug 2021 04:31  Phos  3.4     08-16  Mg     2.50     08-16    TPro  5.6<L>  /  Alb  3.3  /  TBili  0.4  /  DBili  x   /  AST  32  /  ALT  32  /  AlkPhos  90  08-16    ASSESSMENT/PLAN: 	89y Male with symptomatic VT requiring ICD shock. Hx reduced EF >35%, tachy-perla syndrome, paroxysmal AFib.  He underwent Biotronik BiV PM implant ~5yrs ago.  He was cardioverted in the ED.    Started oral amiodarone load, with goal of 10 grams (400mg x 24 doses), followed by 400mg daily.  He would like to avoid VT ablation at this time.  Agreeable to PM-->ICD upgrade with addition of an RV ICD lead.  Would like to avoid lead extraction if possible.    Maintain K>4, Mg 2.  Anticipate patient xfer to [facility], will schedule once he is there, and consent can be obtained using Venezuelan language iPad video chat.    Marcial Guzmán M.D.  Cardiac Electrophysiology    office [phone]  pager [phone]

## 2021-08-16 NOTE — PROGRESS NOTE ADULT - ASSESSMENT
· Assessment	  89 y.o Sinhala  male with a PMhx of HTN, HLD, DM, CAD s/p stent to 80% D1 in 2014 , ICMrEF 40% in 2018,Afib on eliquis c/b tachy/perla with BiV PPM( Biotronik) CKD, CVA, Velasquez's esophagus  presents with chest burning, dizziness/ lightheadedness, nausea, weakness with unsteady gait found in ventricular tachycardia and hypotension which did not improved with amiodarone 150mg IVP x1 received shocked x1 by external defibrillator with improve in Blood Pressure and mental status. Started on amiodarone drip.  EKG showed He was admitted to CCU. Labs remarkable for FLOYD on CKD and elevated lactate  post ventricular tachycardia and shock. EKG showed STD in inferior lateral leads.    VT:    Cont mx as per CCU  Amio po  EP f/up for AICD upgrade    NSTEMI:  For C. Cath  IV Heparin    FLOYD on CKD III:    BMP  Nephro f/up    Lt arm swelling:    Doppler JENNIFER

## 2021-08-17 NOTE — CONSULT NOTE ADULT - SUBJECTIVE AND OBJECTIVE BOX
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 Patient also compliant of b/l knee pain  and weakness started last night. In ED he was found in ventricular tachycardia and hypotensive to 70s/50s. He received  150mg push amiodarone with no improvement. Patient  became less responsive and hypotensive so he was defibrillated x1 with good response. EKG post- defib showed paced rhythm with improved Blood Pressure and mental status. He was started on Amiodarone drip and admitted to CCU.    EKG: NSB with STD in II,III,aVF,V4-V6    Labs: Na 133, S creat 3.49,glucose 245,Alk 122, AST 70,ALT 52,proBNP 6899,trop 196,lactate 4.4  (14 Aug 2021 09:51)    pt seen at bedside. feels well. comfortable.       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    S/P coronary artery stent placement  D1    Pacemaker  BiV PPM        FAMILY HISTORY:  Family history of heart disease (Father)        Alochol: Denied  Smoking: Nonsmoker  Drug Use: Denied  Marital Status:         Allergies    No Known Allergies    Intolerances        MEDICATIONS  (STANDING):  aMIOdarone    Tablet   Oral   aMIOdarone    Tablet 400 milliGRAM(s) Oral every 8 hours  aspirin enteric coated 81 milliGRAM(s) Oral daily  atorvastatin 40 milliGRAM(s) Oral at bedtime  chlorhexidine 4% Liquid 1 Application(s) Topical <User Schedule>  clopidogrel Tablet 75 milliGRAM(s) Oral daily  finasteride 5 milliGRAM(s) Oral daily  glucagon  Injectable 1 milliGRAM(s) IntraMuscular once  insulin lispro (ADMELOG) corrective regimen sliding scale   SubCutaneous three times a day before meals  insulin lispro (ADMELOG) corrective regimen sliding scale   SubCutaneous at bedtime  loratadine 10 milliGRAM(s) Oral daily  polyethylene glycol 3350 17 Gram(s) Oral daily  sodium bicarbonate 650 milliGRAM(s) Oral two times a day  tamsulosin 0.4 milliGRAM(s) Oral at bedtime    MEDICATIONS  (PRN):      ROS:     General:  No wt loss, fevers, chills, night sweats, fatigue,   Eyes:  Good vision, no reported pain  ENT:  No sore throat, pain, runny nose, dysphagia  CV:  + chest pain   Resp:  No dyspnea, cough, tachypnea, wheezing  :  No pain, bleeding, incontinence, nocturia  Muscle:  left upper ext swelling   Neuro:  No weakness, tingling, memory problems  Psych:  No fatigue, insomnia, mood problems, depression  Endocrine:  No polyuria, polydipsia, cold/heat intolerance  Heme:  No petechiae, ecchymosis, easy bruisability  Skin:  No rash, tattoos, scars, edema      PHYSICAL EXAM:     GENERAL:  Appears stated age, well-groomed  HEENT:  NC/AT,    CHEST:  CTA b/l   HEART:  s1s2+   ABDOMEN:  Soft, non-tender, non-distended  EXTEREMITIES:  Left upper ext swelling   SKIN:  No rash/erythema/ecchymoses                            7.3    5.88  )-----------( 125      ( 17 Aug 2021 11:14 )             23.1       08-17    135  |  106  |  65<H>  ----------------------------<  141<H>  4.7   |  21<L>  |  1.97<H>    Ca    8.9      17 Aug 2021 06:04  Phos  3.4     08-16  Mg     2.50     08-17    TPro  5.6<L>  /  Alb  3.3  /  TBili  0.4  /  DBili  x   /  AST  21  /  ALT  24  /  AlkPhos  86  08-17

## 2021-08-17 NOTE — DIETITIAN INITIAL EVALUATION ADULT. - PERTINENT LABORATORY DATA
08-17    135  |  106  |  65<H>  ----------------------------<  141<H>  4.7   |  21<L>  |  1.97<H>    Ca    8.9      17 Aug 2021 06:04  Phos  3.4     08-16  Mg     2.50     08-17    TPro  5.6<L>  /  Alb  3.3  /  TBili  0.4  /  DBili  x   /  AST  21  /  ALT  24  /  AlkPhos  86  08-17    HbA1c 7.7%    CAPILLARY BLOOD GLUCOSE      POCT Blood Glucose.: 182 mg/dL (17 Aug 2021 07:59)  POCT Blood Glucose.: 139 mg/dL (16 Aug 2021 21:53)  POCT Blood Glucose.: 203 mg/dL (16 Aug 2021 17:20)  POCT Blood Glucose.: 198 mg/dL (16 Aug 2021 12:00)

## 2021-08-17 NOTE — PROGRESS NOTE ADULT - SUBJECTIVE AND OBJECTIVE BOX
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      S: no chest pain or sob; ros otherwise negative.     Review of Systems:   Constitutional: [ ] fevers, [ ] chills.   Skin: [ ] dry skin. [ ] rashes.  Psychiatric: [ ] depression, [ ] anxiety.   Gastrointestinal: [ ] BRBPR, [ ] melena.   Neurological: [ ] confusion. [ ] seizures. [ ] shuffling gait.   Ears,Nose,Mouth and Throat: [ ] ear pain [ ] sore throat.   Eyes: [ ] diplopia.   Respiratory: [ ] hemoptysis. [ ] shortness of breath  Cardiovascular: See HPI above  Hematologic/Lymphatic: [ ] anemia. [ ] painful nodes. [ ] prolonged bleeding.   Genitourinary: [ ] hematuria. [ ] flank pain.   Endocrine: [ ] significant change in weight. [ ] intolerance to heat and cold.     Review of systems [x ] otherwise negative, [ ] otherwise unable to obtain    FH: no family history of sudden cardiac death in first degree relatives    SH: [ ] tobacco, [ ] alcohol, [ ] drugs      aMIOdarone    Tablet   Oral   aMIOdarone    Tablet 400 milliGRAM(s) Oral every 8 hours  aspirin enteric coated 81 milliGRAM(s) Oral daily  atorvastatin 40 milliGRAM(s) Oral at bedtime  chlorhexidine 4% Liquid 1 Application(s) Topical <User Schedule>  clopidogrel Tablet 75 milliGRAM(s) Oral daily  finasteride 5 milliGRAM(s) Oral daily  glucagon  Injectable 1 milliGRAM(s) IntraMuscular once  heparin   Injectable 5000 Unit(s) SubCutaneous every 12 hours  insulin lispro (ADMELOG) corrective regimen sliding scale   SubCutaneous three times a day before meals  insulin lispro (ADMELOG) corrective regimen sliding scale   SubCutaneous at bedtime  loratadine 10 milliGRAM(s) Oral daily  polyethylene glycol 3350 17 Gram(s) Oral daily  sodium bicarbonate 650 milliGRAM(s) Oral two times a day  tamsulosin 0.4 milliGRAM(s) Oral at bedtime                            7.3    5.88  )-----------( 125      ( 17 Aug 2021 11:14 )             23.1       08-17    135  |  106  |  65<H>  ----------------------------<  141<H>  4.7   |  21<L>  |  1.97<H>    Ca    8.9      17 Aug 2021 06:04  Phos  3.4     08-16  Mg     2.50     08-17    TPro  5.4<L>  /  Alb  x   /  TBili  x   /  DBili  x   /  AST  x   /  ALT  x   /  AlkPhos  x   08-17      CARDIAC MARKERS ( 17 Aug 2021 06:05 )  x     / x     / 60 U/L / x     / x      CARDIAC MARKERS ( 16 Aug 2021 04:31 )  x     / x     / 62 U/L / x     / 4.1 ng/mL  CARDIAC MARKERS ( 15 Aug 2021 06:26 )  x     / x     / 98 U/L / x     / 7.6 ng/mL  CARDIAC MARKERS ( 14 Aug 2021 23:27 )  x     / x     / 120 U/L / x     / 8.9 ng/mL        T(C): 36.9 (08-17-21 @ 16:00), Max: 36.9 (08-16-21 @ 20:00)  HR: 68 (08-17-21 @ 16:00) (66 - 78)  BP: 92/53 (08-17-21 @ 16:00) (92/53 - 126/79)  RR: 12 (08-17-21 @ 16:00) (7 - 29)  SpO2: 99% (08-17-21 @ 16:00) (94% - 100%)  Wt(kg): --    I&O's Summary    16 Aug 2021 07:01  -  17 Aug 2021 07:00  --------------------------------------------------------  IN: 310 mL / OUT: 1125 mL / NET: -815 mL    17 Aug 2021 07:01  -  17 Aug 2021 17:53  --------------------------------------------------------  IN: 375 mL / OUT: 200 mL / NET: 175 mL        General: Well nourished in no acute distress. Alert and Oriented * 3.   Head: Normocephalic and atraumatic.   Neck: No JVD. No bruits. Supple. Does not appear to be enlarged.   Cardiovascular: + S1,S2 ; RRR Soft systolic murmur at the left lower sternal border. No rubs noted.    Lungs: CTA b/l. No rhonchi, rales or wheezes.   Abdomen: + BS, soft. Non tender. Non distended. No rebound. No guarding.   Extremities: No clubbing/cyanosis/edema.   Neurologic: Moves all four extremities. Full range of motion.   Skin: Warm and moist. The patient's skin has normal elasticity and good skin turgor.   Psychiatric: Appropriate mood and affect.  Musculoskeletal: Normal range of motion, normal strength    Tele:     TTE: < from: Transthoracic Echocardiogram (08.14.21 @ 14:06) >  Normal left ventricular systolic function. No segmental  wall motion abnormalities.  Mild-moderate pulmonary hypertension.  A device wire is noted in the right heart.    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     -continue with amiodarone for VT - no further episodes of VT   -keep K > 4, Mg > 2  -TTE with normal LV function   -EP eval with Dr. Villatoro/Arielle for AICD upgrade  -will eventually plan for cardiac cath when renal function allows and H/H stable  -check LUE ultrasound   -heme eval called for anemia and thrombocytopenia   -renal follow up   -further workup pending above  -supportive care per CCU    Estelle Ayala MD

## 2021-08-17 NOTE — PROGRESS NOTE ADULT - SUBJECTIVE AND OBJECTIVE BOX
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 Patient also compliant of b/l knee pain  and weakness started last night. In ED he was found in ventricular tachycardia and hypotensive to 70s/50s. He received  150mg push amiodarone with no improvement. Patient  became less responsive and hypotensive so he was defibrillated x1 with good response. EKG post- defib showed paced rhythm with improved Blood Pressure and mental status. He was started on Amiodarone drip and admitted to CCU.    EKG: (Magnet applied) SB with STD in II,III,aVF,V4-V6.         (Paced rhythm) : AS/BiV paced.    Labs: Na 133, S creat 3.49,glucose 245,Alk 122, AST 70,ALT 52,proBNP 6899,trop 196,lactate 4.4  (14 Aug 2021 09:51)    Patient feeling better now, s/p cardioversion in the ED.  No prior history of ventricular arrhythmia.  Has history of paroxysmal AFib but was not previously on amiodarone.  At rest, no angina or palpitations, no lightheadedness. Easily fatigued, lives in [place] II-III. A 10 pt ROS is otherwise negative.  Discussed with his sons (Pako and Stan) by telephone- patient is aware he is heading towards ICD upgrade on this admission.    He wants to live longer, and is willing to endure ICD shocks to reach this goal, as his quality of life is otherwise fairly good.    8/17- awaiting result of left upper arm US re: hematoma and swelling.  coronary angiogram pending here. afterwards can schedule PM to ICD upgrade.    aMIOdarone    Tablet   Oral   aMIOdarone    Tablet 400 milliGRAM(s) Oral every 8 hours  aspirin enteric coated 81 milliGRAM(s) Oral daily  atorvastatin 40 milliGRAM(s) Oral at bedtime  chlorhexidine 4% Liquid 1 Application(s) Topical <User Schedule>  clopidogrel Tablet 75 milliGRAM(s) Oral daily  finasteride 5 milliGRAM(s) Oral daily  glucagon  Injectable 1 milliGRAM(s) IntraMuscular once  insulin lispro (ADMELOG) corrective regimen sliding scale   SubCutaneous three times a day before meals  insulin lispro (ADMELOG) corrective regimen sliding scale   SubCutaneous at bedtime  loratadine 10 milliGRAM(s) Oral daily  polyethylene glycol 3350 17 Gram(s) Oral daily  sodium bicarbonate 650 milliGRAM(s) Oral two times a day  tamsulosin 0.4 milliGRAM(s) Oral at bedtime                            7.3    5.88  )-----------( 125      ( 17 Aug 2021 11:14 )             23.1       08-17    135  |  106  |  65<H>  ----------------------------<  141<H>  4.7   |  21<L>  |  1.97<H>    Ca    8.9      17 Aug 2021 06:04  Phos  3.4     08-16  Mg     2.50     08-17    TPro  5.6<L>  /  Alb  3.3  /  TBili  0.4  /  DBili  x   /  AST  21  /  ALT  24  /  AlkPhos  86  08-17    CARDIAC MARKERS ( 17 Aug 2021 06:05 )  x     / x     / 60 U/L / x     / x      CARDIAC MARKERS ( 16 Aug 2021 04:31 )  x     / x     / 62 U/L / x     / 4.1 ng/mL  CARDIAC MARKERS ( 15 Aug 2021 06:26 )  x     / x     / 98 U/L / x     / 7.6 ng/mL  CARDIAC MARKERS ( 14 Aug 2021 23:27 )  x     / x     / 120 U/L / x     / 8.9 ng/mL  CARDIAC MARKERS ( 14 Aug 2021 16:53 )  x     / x     / 137 U/L / x     / 10.1 ng/mL      T(C): 36.9 (08-17-21 @ 16:00), Max: 36.9 (08-16-21 @ 20:00)  HR: 67 (08-17-21 @ 15:00) (66 - 79)  BP: 95/46 (08-17-21 @ 15:00) (93/49 - 126/79)  RR: 18 (08-17-21 @ 15:00) (7 - 29)  SpO2: 100% (08-17-21 @ 15:00) (94% - 100%)  Wt(kg): --    I&O's Summary    16 Aug 2021 07:01  -  17 Aug 2021 07:00  --------------------------------------------------------  IN: 310 mL / OUT: 1125 mL / NET: -815 mL    17 Aug 2021 07:01  -  17 Aug 2021 16:20  --------------------------------------------------------  IN: 375 mL / OUT: 0 mL / NET: 375 mL      Appearance: Thin elderly male in no acute distress, awake and alert, in good spirits.	  HEENT:   Normal oral mucosa, PERRL, EOMI	  Lymphatic: No lymphadenopathy , no edema  Cardiovascular: Normal S1 S2, No JVD, No murmurs , Peripheral pulses palpable 2+ bilaterally.  Pacemaker left upper chest.  Respiratory: Lungs clear to auscultation, normal effort 	  Gastrointestinal:  Soft, Non-tender, + BS	  Skin: No rashes, No ecchymoses, No cyanosis, warm to touch  Musculoskeletal: Normal range of motion, normal strength  Psychiatry:  Mood & affect appropriate    TELEMETRY: NSR, BiV paced	    ECG: NSR, BiV paced.  Underlying is sinus perla with very-long OK (300+ms).  Echo: pending  Cath: pending	  LUE US pending.    ASSESSMENT/PLAN: 	89y Male with symptomatic VT requiring ICD shock. Hx reduced EF >35%, tachy-perla syndrome, paroxysmal AFib.  He underwent Biotronik BiV PM implant ~5yrs ago.  He was cardioverted in the ED.    Started oral amiodarone load, with goal of 10 grams (400mg x 24 doses), followed by 400mg daily.  He would like to avoid VT ablation at this time.  Agreeable to PM-->ICD upgrade with addition of an RV ICD lead.  Would like to avoid lead extraction if possible.   Plan to abandon RV pacing lead.  To be scheduled after angiogram.  Maintain K>4, Mg 2.  Anticipate patient xfer to [facility], will schedule once he is there, and consent can be obtained using Nepali language iPad video chat.  Currently joined at bedside by son, Stan Arguelles.  # [phone]    Marcial Guzmán M.D.  Cardiac Electrophysiology    office [phone]  pager [phone]

## 2021-08-17 NOTE — PROGRESS NOTE ADULT - SUBJECTIVE AND OBJECTIVE BOX
[facility] - S López / Aroldo S /D Jose/ JUAN DAVID Huynh/ JUAN DAVID Smith/ Adrian Vega / LUBA Herman/ O Alejandra  service -[phone], office [phone]  ---------------------------------------------------------------------------------------------------------------    Patient seen and examined bedside in CCU    Subjective and Objective: No overnight events, denied cp/sob. No complaints today. feeling better    Allergies: No Known Allergies      Hospital Medications:   MEDICATIONS  (STANDING):  aMIOdarone    Tablet   Oral   aMIOdarone    Tablet 400 milliGRAM(s) Oral every 8 hours  aspirin enteric coated 81 milliGRAM(s) Oral daily  atorvastatin 40 milliGRAM(s) Oral at bedtime  chlorhexidine 4% Liquid 1 Application(s) Topical <User Schedule>  clopidogrel Tablet 75 milliGRAM(s) Oral daily  finasteride 5 milliGRAM(s) Oral daily  glucagon  Injectable 1 milliGRAM(s) IntraMuscular once  heparin   Injectable 5000 Unit(s) SubCutaneous every 12 hours  insulin lispro (ADMELOG) corrective regimen sliding scale   SubCutaneous three times a day before meals  insulin lispro (ADMELOG) corrective regimen sliding scale   SubCutaneous at bedtime  loratadine 10 milliGRAM(s) Oral daily  polyethylene glycol 3350 17 Gram(s) Oral daily  sodium bicarbonate 650 milliGRAM(s) Oral two times a day  tamsulosin 0.4 milliGRAM(s) Oral at bedtime    VITALS:  T(F): 98.4 (21 @ 16:00), Max: 98.4 (21 @ 20:00)  HR: 70 (21 @ 18:00)  BP: 102/53 (21 @ 18:00)  RR: 16 (21 @ 18:00)  SpO2: 100% (21 @ 18:00)  Wt(kg): --     @ 07:01  -   @ 07:00  --------------------------------------------------------  IN: 310 mL / OUT: 1125 mL / NET: -815 mL     @ 07:01  -   @ 18:54  --------------------------------------------------------  IN: 375 mL / OUT: 440 mL / NET: -65 mL      PHYSICAL EXAM:  Constitutional: NAD, frail   HEENT: anicteric sclera  Neck: No JVD  Respiratory: CTAB, no wheezes, rales or rhonchi  Cardiovascular: S1, S2, RRR  Gastrointestinal: BS+, soft, NT/ND  Extremities: trace pedal edema b/l  Neurological: A/O x 3, no focal deficits  Psychiatric: Normal mood, normal affect  : No river.     LABS:      135  |  106  |  65<H>  ----------------------------<  141<H>  4.7   |  21<L>  |  1.97<H>    Ca    8.9      17 Aug 2021 06:04  Phos  3.4       Mg     2.50         TPro  5.4<L>  /  Alb      /  TBili      /  DBili      /  AST      /  ALT      /  AlkPhos          Creatinine Trend: 1.97 <--, 2.22 <--, 2.73 <--, 2.85 <--, 3.09 <--, 3.49 <--, 1.89 <--                        7.3    5.88  )-----------( 125      ( 17 Aug 2021 11:14 )             23.1     Urine Studies:  Urinalysis Basic - ( 15 Aug 2021 13:00 )    Color: Brown / Appearance: Turbid / S.013 / pH:   Gluc:  / Ketone: Negative  / Bili: Negative / Urobili: <2 mg/dL   Blood:  / Protein: 100 mg/dL / Nitrite: Negative   Leuk Esterase: Negative / RBC: 74 /HPF / WBC 7 /HPF   Sq Epi:  / Non Sq Epi: 1 /HPF / Bacteria: Negative          RADIOLOGY & ADDITIONAL STUDIES:

## 2021-08-17 NOTE — DIETITIAN INITIAL EVALUATION ADULT. - PROBLEM SELECTOR PLAN 2
Patient presented to ER with c/o  chest pain found in ventricular tachycardia s/p defibrillatorx1  Amiodarone gtt 1 mg/min x 6 hours and 0.5 mg/min for 18 hours followed PO load    Monitor for further VT episodes  Zoll pads at bedside with pacer pads on patient.   Keep K > 4.2 and magnesium > 2.2  Cardioversion/defibrillation if unstable.   BIV PPM interrogation showed  monomorphic ventricular tachycardia at 150-160  EP consulted  plan to transfer to [facility] for upgrade BiVICD

## 2021-08-17 NOTE — CONSULT NOTE ADULT - ASSESSMENT
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

## 2021-08-17 NOTE — DIETITIAN INITIAL EVALUATION ADULT. - PROBLEM SELECTOR PLAN 6
Recent admission for CRI with hyperkalemia   S. creat on discharge on 8/12/21 was 1.89,currently 3.49  Renal US negative for hydronephrosis  FLOYD likely due to NSTEMI/VT  Trend BUN/Cr.  Strict I/O  Avoid Nephrotoxic Meds/ Agents such as (NSAIDs, IV contrast, Aminoglycosides such as gentamicin, -Gadolinium contrast, Phosphate containing enemas, etc..)  Adjust Medications according to eGFR  Renal consult called

## 2021-08-17 NOTE — DIETITIAN INITIAL EVALUATION ADULT. - CONTINUE CURRENT NUTRITION CARE PLAN
DASH/TLC (cholesterol & Na restricted), consistent carbohydrate, + Glucerna Shake 8oz PO 2x daily/yes

## 2021-08-17 NOTE — PROGRESS NOTE ADULT - ASSESSMENT
89 y.o Thai  male with a PMhx of HTN, HLD, DM, CAD s/p stent to 80% D1 in 2014 , ICMrEF 40% in 2018,Afib on eliquis c/b tachy/perla with BiV PPM( Biotronik) CKD, CVA, Velasquez's esophagus  presents with chest burning, dizziness/ lightheadedness, nausea, weakness with unsteady gait found in ventricular tachycardia and hypotension which did not improved with amiodarone 150mg IVP x1 received shocked x1 by external defibrillator with improve in Blood Pressure and mental status. Started on amiodarone drip.  EKG showed He was admitted to CCU. Labs remarkable for FLOYD on CKD and elevated lactate  post ventricular tachycardia and shock. EKG showed STD in inferior lateral leads. Renal following for FLOYD/CKD Mx.    FLOYD on CKD3: improving  Recent admission for FLOYD on CKD with hyperkalemia S. creat 3.1 > on discharge on 8/12/21 was 1.89,   Cr trending down 3.49 >3 > 2.85 >2.7  >2.2 >1.97  FLOYD likely 2/2 hemodynamic changes/NSTEMI/arrhythmia  K, vol- acceptable  UA bland  no obstructive uropathy on recent renal sono   lasix and losartan on hold-- no objection to resume lasix   M acidosis 2/2 FLOYD/ckd-better. c/w po Na bicarb 650mg bid 8/14 pm. watch for s/o chf due to Na load  monitor BMP daily   dose all meds for eGFR<15ml/min.   avoid ACEi/ARB/NSAIDs/Nephrotoxics if able.   Intermediate risk for GRACE, explained to pt and son bedside in detail including the possibility of worsening RFT post cath and if no recovery of RF, may need RRT/dialysis. Both verbalized understanding. no plan for urgent LHC given elevated creatinine and currently chest pain free. would recommend mucomyst 1200mg po bid -2doses pre and 2 doses post cath and slow bicarb drip if no s/o chf  eventual plan for LHC when Cr better and stable    NSTEMI (non-ST elevation myocardial infarction).  Patient p/w chest pain and ventricular tachycardia s/p shockedx1  on heparin gtt   cardiac monitoring to monitor for arrhythmias  c/w Aspirin 81 PO daily, Clopidogrel 75 PO daily, Lipitor 40 mg PO daily  Low fat DASH diet  -TTE with normal LV function     Ventricular tachycardia s/p defibrillatorx1  on Amiodarone gtt   Monitor for further VT episodes  Keep K > 4.2 and magnesium > 2.2  BIV PPM interrogation showed  monomorphic ventricular tachycardia at 150-160 as per chart  f/u EP. plan to transfer to [facility] for upgrade BiVICD per cardio    Chronic diastolic congestive heart failure. h/o CHF    CXR; clear-appear euvolemic  Holding home dose lasix and losartan 2/2 FLOYD on CKD and hypotension  no objection to resume lasix . as per cardiology  Daily weight monitoring, Strict I/O, Low sodium DASH diet  ECHO noted -Normal left ventricular systolic function. No segmental wall motion abnormalities. Mild-moderate pulmonary hypertension.    Paroxysmal atrial fibrillation.   Currently on heparin drip for ACS-holding Eliquis till pt on heparin drip  Continuous tele monitoring  Rate control per ccu team    will closely follow up.  poc d/w pt, RN bedside   labs, chart reviewed  For any question, call:  Cell # [phone]  service# [phone]  office# [phone]

## 2021-08-17 NOTE — DIETITIAN INITIAL EVALUATION ADULT. - PROBLEM SELECTOR PLAN 3
h/o CHF    CXR showed; clear-appear euvolemic  Continuous cardiac monitoring to r/o arrhythmias.   Check pro BNP 6899   Hold  home dose lasix and losartan  2/2 FLOYD on CKD and hypotension  Daily weight monitoring, Strict I/O, Low sodium DASH diet  Check ECHO to evaluate LV/RV function and valvular abnormalities

## 2021-08-17 NOTE — DIETITIAN INITIAL EVALUATION ADULT. - PERTINENT MEDS FT
MEDICATIONS  (STANDING):  aMIOdarone    Tablet   Oral   aMIOdarone    Tablet 400 milliGRAM(s) Oral every 8 hours  aspirin enteric coated 81 milliGRAM(s) Oral daily  atorvastatin 40 milliGRAM(s) Oral at bedtime  chlorhexidine 4% Liquid 1 Application(s) Topical <User Schedule>  clopidogrel Tablet 75 milliGRAM(s) Oral daily  finasteride 5 milliGRAM(s) Oral daily  glucagon  Injectable 1 milliGRAM(s) IntraMuscular once  heparin  Infusion 700 Unit(s)/Hr (5 mL/Hr) IV Continuous <Continuous>  insulin lispro (ADMELOG) corrective regimen sliding scale   SubCutaneous three times a day before meals  insulin lispro (ADMELOG) corrective regimen sliding scale   SubCutaneous at bedtime  loratadine 10 milliGRAM(s) Oral daily  polyethylene glycol 3350 17 Gram(s) Oral daily  sodium bicarbonate 650 milliGRAM(s) Oral two times a day  tamsulosin 0.4 milliGRAM(s) Oral at bedtime

## 2021-08-17 NOTE — DIETITIAN INITIAL EVALUATION ADULT. - PROBLEM SELECTOR PLAN 1
Patient with p/w chest pain and ventricular tachycardia s/p shockedx1  EKG showed  Sinus perla with STD in inferior and lateral lead  Load with  mg now, Plavix 600 mg PO and start heparin gtt as per ACS protocol  Assess for resolution of chest pain and recurrence in chest pain. Serial EKG PRN chest pain to assess for ST changes  Continuous cardiac monitoring to monitor for arrhythmias  Admission labs include serial cardiac enzymes, risk factor profile to include TSH, HGBA1c, Lipid profile, CMP, Mag, Phos, CBC. Trend cardiac enzymes  Aspirin 81 PO daily, Clopidogrel 75 PO daily, Lipitor 40 mg PO daily  Hold  urgent LHC given elevated creatinine and currently chest pain free  Low fat DASH diet  ECHO: 2D ECHO to evaluate LV function and wall motion abnormalities

## 2021-08-17 NOTE — DIETITIAN INITIAL EVALUATION ADULT. - PROBLEM SELECTOR PLAN 4
h/o pAfib on Eliquis  CHAD2 score: 8  Currently on heparin drip for ACS-will hold Eliquis till pt on heparin drip  Continuous tele monitoring  Rate control: will introduce low dose coreg if Bp tolerates   Follow PT/INR/PTT.   Monitor lytes and replete as needed.

## 2021-08-17 NOTE — DIETITIAN INITIAL EVALUATION ADULT. - OTHER INFO
89yo primarily Tunisian speaking M w PMH DM2, CAD, CVA, anemia, HTN, Stage III CKD, admitted with Afib and NSTEMI.      Pt. endorses fair appetite/PO intake since admission & denies nausea/vomiting/diarrhea/constipation, or issues with chewing/swallowing.    Last BM (8/14)    Discussed therapeutic diet modifications.  Obtained food preferences.

## 2021-08-17 NOTE — PROGRESS NOTE ADULT - SUBJECTIVE AND OBJECTIVE BOX
CHIEF COMPLAINT:  Patient is a 89y old  Male who presents with a chief complaint of chest pain (14 Aug 2021 11:59)      INTERVAL HISTORY/OVERNIGHT EVENTS: No acute events overnight. Endorses good sleep. Denies chest pain, sob, cough, n/v/d. Endorses improvement in Left arm discomfort. Producing urine. Tolerating PO.    No tele events overnight.    MEDICATIONS:  MEDICATIONS  (STANDING):  aMIOdarone    Tablet   Oral   aMIOdarone    Tablet 400 milliGRAM(s) Oral every 8 hours  aspirin enteric coated 81 milliGRAM(s) Oral daily  atorvastatin 40 milliGRAM(s) Oral at bedtime  chlorhexidine 4% Liquid 1 Application(s) Topical <User Schedule>  clopidogrel Tablet 75 milliGRAM(s) Oral daily  finasteride 5 milliGRAM(s) Oral daily  glucagon  Injectable 1 milliGRAM(s) IntraMuscular once  heparin  Infusion 700 Unit(s)/Hr (5 mL/Hr) IV Continuous <Continuous>  insulin lispro (ADMELOG) corrective regimen sliding scale   SubCutaneous three times a day before meals  insulin lispro (ADMELOG) corrective regimen sliding scale   SubCutaneous at bedtime  loratadine 10 milliGRAM(s) Oral daily  polyethylene glycol 3350 17 Gram(s) Oral daily  sodium bicarbonate 650 milliGRAM(s) Oral two times a day  tamsulosin 0.4 milliGRAM(s) Oral at bedtime    MEDICATIONS  (PRN):      ALLERGIES:  Allergies    No Known Allergies    Intolerances  OBJECTIVE:  ICU Vital Signs Last 24 Hrs  T(C): 36.4 (17 Aug 2021 05:00), Max: 36.9 (16 Aug 2021 20:00)  T(F): 97.6 (17 Aug 2021 05:00), Max: 98.4 (16 Aug 2021 20:00)  HR: 67 (17 Aug 2021 07:00) (66 - 79)  BP: 101/73 (17 Aug 2021 07:00) (90/31 - 126/79)  BP(mean): 83 (17 Aug 2021 07:00) (60 - 94)  ABP: --  ABP(mean): --  RR: 15 (17 Aug 2021 07:00) (7 - 35)  SpO2: 100% (17 Aug 2021 07:00) (85% - 100%)    PHYSICAL EXAMINATION:     GENERAL: NAD, lying in bed comfortably  HEAD:  Atraumatic, Normocephalic  EYES:  Conjunctiva and sclera clear  ENT: Moist mucous membranes  NECK: Supple, No JVD  CHEST/LUNG: Clear to auscultation bilaterally; No rales, rhonchi, wheezing, or rubs. Unlabored respirations  HEART: Regular rate and rhythm; No murmurs, rubs, or gallops  ABDOMEN: BSx4; Soft, nontender, nondistended  EXTREMITIES:  2+ Peripheral Pulses, brisk capillary refill. No clubbing, cyanosis, or edema  NERVOUS SYSTEM:  A&Ox3, no focal deficits   SKIN: senile purpuric lesions noted on purpura.      Adult Advanced Hemodynamics Last 24 Hrs  CVP(mm Hg): --  CVP(cm H2O): --  CO: --  CI: --  PA: --  PA(mean): --  PCWP: --  SVR: --  SVRI: --  PVR: --  PVRI: --  CAPILLARY BLOOD GLUCOSE      POCT Blood Glucose.: 182 mg/dL (17 Aug 2021 07:59)  POCT Blood Glucose.: 139 mg/dL (16 Aug 2021 21:53)  POCT Blood Glucose.: 203 mg/dL (16 Aug 2021 17:20)  POCT Blood Glucose.: 198 mg/dL (16 Aug 2021 12:00)  POCT Blood Glucose.: 143 mg/dL (16 Aug 2021 08:38)    CAPILLARY BLOOD GLUCOSE      POCT Blood Glucose.: 182 mg/dL (17 Aug 2021 07:59)    I&O's Summary    16 Aug 2021 07:01  -  17 Aug 2021 07:00  --------------------------------------------------------  IN: 310 mL / OUT: 1125 mL / NET: -815 mL      Daily     Daily Weight in k (17 Aug 2021 06:00)    PHYSICAL EXAMINATION:    LABS:                          7.5    6.28  )-----------( 135      ( 17 Aug 2021 06:04 )             23.6     08-17    135  |  106  |  65<H>  ----------------------------<  141<H>  4.7   |  21<L>  |  1.97<H>    Ca    8.9      17 Aug 2021 06:04  Phos  3.4     08-16  Mg     2.50     08-    TPro  5.6<L>  /  Alb  3.3  /  TBili  0.4  /  DBili  x   /  AST  21  /  ALT  24  /  AlkPhos  86  08-17    LIVER FUNCTIONS - ( 17 Aug 2021 06:04 )  Alb: 3.3 g/dL / Pro: 5.6 g/dL / ALK PHOS: 86 U/L / ALT: 24 U/L / AST: 21 U/L / GGT: x           PTT - ( 17 Aug 2021 06:04 )  PTT:59.8 sec    CARDIAC MARKERS ( 16 Aug 2021 04:31 )  x     / x     / 62 U/L / x     / 4.1 ng/mL      Urinalysis Basic - ( 15 Aug 2021 13:00 )    Color: Brown / Appearance: Turbid / S.013 / pH: x  Gluc: x / Ketone: Negative  / Bili: Negative / Urobili: <2 mg/dL   Blood: x / Protein: 100 mg/dL / Nitrite: Negative   Leuk Esterase: Negative / RBC: 74 /HPF / WBC 7 /HPF   Sq Epi: x / Non Sq Epi: 1 /HPF / Bacteria: Negative        TELEMETRY:     EKG:     IMAGING:       CHIEF COMPLAINT:  Patient is a 89y old  Male who presents with a chief complaint of chest pain (14 Aug 2021 11:59)      INTERVAL HISTORY/OVERNIGHT EVENTS: No acute events overnight. Endorses good sleep. Denies chest pain, sob, cough, n/v/d. Endorses improvement in Left arm discomfort. Producing urine. Tolerating PO.    No tele events overnight.    MEDICATIONS:  MEDICATIONS  (STANDING):  aMIOdarone    Tablet   Oral   aMIOdarone    Tablet 400 milliGRAM(s) Oral every 8 hours  aspirin enteric coated 81 milliGRAM(s) Oral daily  atorvastatin 40 milliGRAM(s) Oral at bedtime  chlorhexidine 4% Liquid 1 Application(s) Topical <User Schedule>  clopidogrel Tablet 75 milliGRAM(s) Oral daily  finasteride 5 milliGRAM(s) Oral daily  glucagon  Injectable 1 milliGRAM(s) IntraMuscular once  heparin  Infusion 700 Unit(s)/Hr (5 mL/Hr) IV Continuous <Continuous>  insulin lispro (ADMELOG) corrective regimen sliding scale   SubCutaneous three times a day before meals  insulin lispro (ADMELOG) corrective regimen sliding scale   SubCutaneous at bedtime  loratadine 10 milliGRAM(s) Oral daily  polyethylene glycol 3350 17 Gram(s) Oral daily  sodium bicarbonate 650 milliGRAM(s) Oral two times a day  tamsulosin 0.4 milliGRAM(s) Oral at bedtime    MEDICATIONS  (PRN):    ALLERGIES:  Allergies    No Known Allergies  Intolerances  OBJECTIVE:  ICU Vital Signs Last 24 Hrs  T(C): 36.4 (17 Aug 2021 05:00), Max: 36.9 (16 Aug 2021 20:00)  T(F): 97.6 (17 Aug 2021 05:00), Max: 98.4 (16 Aug 2021 20:00)  HR: 67 (17 Aug 2021 07:00) (66 - 79)  BP: 101/73 (17 Aug 2021 07:00) (90/31 - 126/79)  BP(mean): 83 (17 Aug 2021 07:00) (60 - 94)  ABP: --  ABP(mean): --  RR: 15 (17 Aug 2021 07:00) (7 - 35)  SpO2: 100% (17 Aug 2021 07:00) (85% - 100%)    PHYSICAL EXAMINATION:     GENERAL: NAD, lying in bed comfortably  HEAD:  Atraumatic, Normocephalic  EYES:  Conjunctiva and sclera clear  ENT: Moist mucous membranes  NECK: Supple, No JVD  CHEST/LUNG: Clear to auscultation bilaterally; No rales, rhonchi, wheezing, or rubs. Unlabored respirations  HEART: Regular rate and rhythm; No murmurs, rubs, or gallops  ABDOMEN: BSx4; Soft, nontender, nondistended  EXTREMITIES:  2+ Peripheral Pulses, brisk capillary refill. No clubbing, cyanosis, or edema  NERVOUS SYSTEM:  A&Ox3, no focal deficits   SKIN: senile purpuric lesions noted on purpura.      POCT Blood Glucose.: 182 mg/dL (17 Aug 2021 07:59)  POCT Blood Glucose.: 139 mg/dL (16 Aug 2021 21:53)  POCT Blood Glucose.: 203 mg/dL (16 Aug 2021 17:20)  POCT Blood Glucose.: 198 mg/dL (16 Aug 2021 12:00)  POCT Blood Glucose.: 143 mg/dL (16 Aug 2021 08:38)    CAPILLARY BLOOD GLUCOSE      POCT Blood Glucose.: 182 mg/dL (17 Aug 2021 07:59)    I&O's Summary    16 Aug 2021 07:01  -  17 Aug 2021 07:00  --------------------------------------------------------  IN: 310 mL / OUT: 1125 mL / NET: -815 mL      Daily     Daily Weight in k (17 Aug 2021 06:00)                            7.5    6.28  )-----------( 135      ( 17 Aug 2021 06:04 )             23.6     08-17    135  |  106  |  65<H>  ----------------------------<  141<H>  4.7   |  21<L>  |  1.97<H>    Ca    8.9      17 Aug 2021 06:04  Phos  3.4     08-16  Mg     2.50     08-17    TPro  5.6<L>  /  Alb  3.3  /  TBili  0.4  /  DBili  x   /  AST  21  /  ALT  24  /  AlkPhos  86  08-17    LIVER FUNCTIONS - ( 17 Aug 2021 06:04 )  Alb: 3.3 g/dL / Pro: 5.6 g/dL / ALK PHOS: 86 U/L / ALT: 24 U/L / AST: 21 U/L / GGT: x           PTT - ( 17 Aug 2021 06:04 )  PTT:59.8 sec    CARDIAC MARKERS ( 16 Aug 2021 04:31 )  x     / x     / 62 U/L / x     / 4.1 ng/mL      Urinalysis Basic - ( 15 Aug 2021 13:00 )    Color: Brown / Appearance: Turbid / S.013 / pH: x  Gluc: x / Ketone: Negative  / Bili: Negative / Urobili: <2 mg/dL   Blood: x / Protein: 100 mg/dL / Nitrite: Negative   Leuk Esterase: Negative / RBC: 74 /HPF / WBC 7 /HPF   Sq Epi: x / Non Sq Epi: 1 /HPF / Bacteria: Negative        TELEMETRY:     EKG:     IMAGING:

## 2021-08-17 NOTE — PROGRESS NOTE ADULT - ASSESSMENT
89 y.o Turkish  male with a PMhx of HTN, HLD, DM, CAD s/p stent to 80% D1 in 2014 ,dHF EF 60% in 2018,Afib on eliquis c/b tachy/perla with BiV PPM( Biotronik) CKD, CVA, Velasquez's esophagus  presents with chest burning, dizziness/ lightheadedness, nausea, weakness with unsteady gait found in ventricular tachycardia and hypotension which did not improved with amiodarone 150mg IVP x1 received shocked x1 by external defibrillator with improve in Blood Pressure and mental status. Started on amiodarone drip.  EKG showed He was admitted to CCU. Labs remarkable for FLOYD on CKD and elevated lactate  post ventricular tachycardia and shock. EKG showed STD in inferior lateral leads.    Neuro:  AxOx4  no active issues    Respiratory:  lungs clear  pulse Ox 100% on RA  +sneezing    Cardiovascular:   1) NSTEMI (non-ST elevation myocardial infarction).   -Patient with p/w chest pain and ventricular tachycardia s/p shockedx1  -EKG showed  Sinus perla with STD in inferior and lateral lead  -On  mg now, Plavix 600 mg PO and start heparin gtt as per ACS protocol  -Assess for resolution of chest pain and recurrence in chest pain. Serial EKG PRN chest pain to assess for ST changes  -Continuous cardiac monitoring to monitor for arrhythmias  -Aspirin 81 PO daily, Clopidogrel 75 PO daily, Lipitor 40 mg PO daily  -Hold  urgent LHC given elevated creatinine and currently chest pain free  -Low fat DASH diet  -ECHO: 2D ECHO showed EF 60%, severely dilated left atrium. Mild concentric left ventricular hypertrophy. Normal left ventricular systolic function. No segmental wall   motion abnormalities. Mild diastolic dysfunction(Stage I)  -Troponin today: 557. Trending down from 872 prior.    2) Ventricular tachycardia.   -Patient presented to ER with c/o chest pain found in ventricular tachycardia s/p defibrillatorx1  -On PO Amiodarone load- to complete full 10 grams- will require (400MG q8 for 22 doses), then maintenance 200 mg    -Will monitor for further VT episodes  -Zoll pads at bedside with pacer pads on patient.   -Keep K > 4.2 and magnesium > 2.2  -Cardioversion/defibrillation if unstable.   -BIV PPM interrogation showed  monomorphic ventricular tachycardia at 150-160  -EP following - will speak with EP for starting metoprolol   -plan to transfer [facility] for upgrade BiVICD.     3)Chronic diastolic congestive heart failure.   -CXR showed; clear-appear euvolemic  -Continuous cardiac monitoring to r/o arrhythmias.   -Check pro BNP 6899   -Hold  home dose lasix and losartan - as patient has FLOYD on CKD and hypotension  -Daily weight monitoring, Strict I/O, Low sodium DASH diet  -echo showed Ef 60% with normal LV,stageI diastolic dysfunction    4) Paroxysmal atrial fibrillation.   -pAfib on Eliquis  -CHAD2 score: 8  -Currently on heparin drip for ACS-will hold Eliquis till pt on heparin drip  -Continuous tele monitoring  -Rate control: will introduce low dose coreg if Bp tolerates   -Follow PT/INR/PTT.   -Monitor lytes and replete as needed.       GI  1)Transaminitis  -On admission, elevated LFT - has now resolved      2)Poor appetite  -on glucerna  -nutrition consult      1) BPH   -no issue  -c/w flomax 0.4mg Po daily.     2) Stage 3 chronic kidney disease.  -Recent admission for CRI with hyperkalemia   -Creatinine 2.2, trending down from 2.7  -Renal US negative for hydronephrosis  -FLOYD likely due to NSTEMI/VT  -Strict I/O  -Avoid Nephrotoxic Meds/ Agents such as (NSAIDs, IV contrast, Aminoglycosides such as gentamicin, -Gadolinium contrast, Phosphate containing enemas, etc..)  -Adjust Medications according to eGFR  -Renal following - will F/U recs    Endo  1) Diabetes mellitus  -ISS   89 y.o [place]  male with a PMhx of HTN, HLD, DM, CAD s/p stent to 80% D1 in 2014 ,dHF EF 60% in 2018,Afib on eliquis c/b tachy/perla with BiV PPM( Biotronik) CKD, CVA, Velasquez's esophagus  presents with chest burning, dizziness/ lightheadedness, nausea, weakness with unsteady gait found in ventricular tachycardia and hypotension which did not improved with amiodarone 150mg IVP x1 received shocked x1 by external defibrillator with improve in Blood Pressure and mental status. Started on amiodarone drip.  EKG showed He was admitted to CCU. Labs remarkable for FLOYD on CKD and elevated lactate  post ventricular tachycardia and shock. EKG showed STD in inferior lateral leads.    Neuro:  AxOx4  no active issues    Respiratory:  lungs clear  pulse Ox 100% on RA  +sneezing    Cardiovascular:   1) NSTEMI (non-ST elevation myocardial infarction).   -Patient p/w chest pain and ventricular tachycardia s/p shockedx1  -EKG showed  Sinus perla with STD in inferior and lateral lead  -On  mg now, Plavix 600 mg PO and start heparin gtt as per ACS protocol  -Assess for resolution of chest pain and recurrence in chest pain. Serial EKG PRN chest pain to assess for ST changes  -Continuous cardiac monitoring to monitor for arrhythmias  -Aspirin 81 PO daily, Clopidogrel 75 PO daily, Lipitor 40 mg PO daily  -Hold  urgent LHC given elevated creatinine and currently chest pain free  -Low fat DASH diet  -ECHO: 2D ECHO showed EF 60%, severely dilated left atrium. Mild concentric left ventricular hypertrophy. Normal left ventricular systolic function. No segmental wall   motion abnormalities. Mild diastolic dysfunction(Stage I)  -Troponin: 557. Trending down from 872 prior.    2) Ventricular tachycardia.   -Patient presented to ER with c/o chest pain found in ventricular tachycardia s/p defibrillatorx1  -On PO Amiodarone load- to complete full 10 grams- will require (400MG q8 for 22 doses), then maintenance 200 mg    -Will monitor for further VT episodes  -Zoll pads at bedside with pacer pads on patient.   -Keep K > 4.2 and magnesium > 2.2  -Cardioversion/defibrillation if unstable.   -BIV PPM interrogation showed  monomorphic ventricular tachycardia at 150-160  -EP following - will speak with EP for starting metoprolol   -plan to transfer to [facility] for upgrade BiVICD.     3)Chronic diastolic congestive heart failure.   -CXR showed; clear-appear euvolemic  -Continuous cardiac monitoring to r/o arrhythmias.   -Check pro BNP 6899   -Hold  home dose lasix and losartan - as patient has FLOYD on CKD and hypotension  -Daily weight monitoring, Strict I/O, Low sodium DASH diet  -echo showed Ef 60% with normal LV,stageI diastolic dysfunction    4) Paroxysmal atrial fibrillation.   -pAfib on Eliquis  -CHAD2 score: 8  -Currently on heparin drip for ACS-will hold Eliquis till pt on heparin drip  -Continuous tele monitoring  -Rate control: will introduce low dose coreg if Bp tolerates   -Follow PT/INR/PTT.   -Monitor lytes and replete as needed.       GI  1)Transaminitis  -On admission, elevated LFT - has now resolved      2)Poor appetite  -on glucerna  -nutrition consult      1) BPH   -no issue  -c/w flomax 0.4mg Po daily.     2) Stage 3 chronic kidney disease.  -Recent admission for CRI with hyperkalemia   -Creatinine 2.2, trending down from 2.7  -Renal US negative for hydronephrosis  -FLOYD likely due to NSTEMI/VT  -Strict I/O  -Avoid Nephrotoxic Meds/ Agents such as (NSAIDs, IV contrast, Aminoglycosides such as gentamicin, -Gadolinium contrast, Phosphate containing enemas, etc..)  -Adjust Medications according to eGFR  -Renal following - will F/U recs    Endo  1) Diabetes mellitus  -ISS   89 y.o Arabic  male with a PMhx of HTN, HLD, DM, CAD s/p stent to 80% D1 in 2014 ,dHF EF 60% in 2018,Afib on eliquis c/b tachy/perla with BiV PPM( Biotronik) CKD, CVA, Velasquez's esophagus  presents with chest burning, dizziness/ lightheadedness, nausea, weakness with unsteady gait found in ventricular tachycardia and hypotension which did not improved with amiodarone 150mg IVP x1 received shocked x1 by external defibrillator with improve in Blood Pressure and mental status. Started on amiodarone drip.  EKG showed He was admitted to CCU. Labs remarkable for FLOYD on CKD and elevated lactate  post ventricular tachycardia and shock. EKG showed STD in inferior lateral leads.    Neuro:  AxOx4  no active issues    Respiratory:  lungs clear  pulse Ox 100% on RA  +sneezing    Cardiovascular:   1) NSTEMI (non-ST elevation myocardial infarction).   -Patient p/w chest pain and ventricular tachycardia s/p shockedx1  -EKG showed  Sinus perla with STD in inferior and lateral lead  -On Aspirin and Plavix  -Assess for resolution of chest pain and recurrence in chest pain. Serial EKG PRN chest pain to assess for ST changes  -Continuous cardiac monitoring to monitor for arrhythmias  -Aspirin 81 PO daily, Clopidogrel 75 PO daily, Lipitor 40 mg PO daily  -Low fat DASH diet  -ECHO: 2D ECHO showed EF 60%, severely dilated left atrium. Mild concentric left ventricular hypertrophy. Normal left ventricular systolic function. No segmental wall   motion abnormalities. Mild diastolic dysfunction(Stage I)  -Troponin: 557. Trending down from 872 prior.  -Heparin drip was D/C'd    2) Ventricular tachycardia.   -Patient presented to ER with c/o chest pain found in ventricular tachycardia s/p defibrillatorx1  -On PO Amiodarone load- to complete full 10 grams- will require (400MG q8 for 22 doses), then maintenance 200 mg    -Will monitor for further VT episodes  -Zoll pads at bedside with pacer pads on patient.   -Keep K > 4.2 and magnesium > 2.2  -Cardioversion/defibrillation if unstable.   -BIV PPM interrogation showed  monomorphic ventricular tachycardia at 150-160  -EP following - will speak with EP for starting metoprolol   -plan to transfer to [facility] for upgrade BiVICD.     3)Chronic diastolic congestive heart failure.   -CXR showed; clear-appear euvolemic  -Continuous cardiac monitoring to r/o arrhythmias.   -Check pro BNP 6899   -Hold  home dose lasix and losartan - as patient has FLOYD on CKD and hypotension  -Daily weight monitoring, Strict I/O, Low sodium DASH diet  -echo showed Ef 60% with normal LV,stageI diastolic dysfunction    4) Paroxysmal atrial fibrillation.   -pAfib on Eliquis  -CHAD2 score: 8  -Currently on heparin drip for ACS-will hold Eliquis till pt on heparin drip  -Continuous tele monitoring  -Follow PT/INR/PTT.   -Monitor lytes and replete as needed.     GI  1)Transaminitis  -On admission, elevated LFT - has now resolved      2)Poor appetite  -on glucerna  -nutrition consult      1) BPH   -no issue  -c/w flomax 0.4mg Po daily.     2) Stage 3 chronic kidney disease.  -Recent admission for CRI with hyperkalemia   -Creatinine 2.2, trending down from 2.7  -Renal US negative for hydronephrosis  -FLOYD likely due to NSTEMI/VT  -Strict I/O  -Avoid Nephrotoxic Meds/ Agents such as (NSAIDs, IV contrast, Aminoglycosides such as gentamicin, -Gadolinium contrast, Phosphate containing enemas, etc..)  -Adjust Medications according to eGFR  -Renal following - will F/U recs    Endo  1) Diabetes mellitus  -ISS      Heme  1) Anemia   -HgB trending down  -Seen by Heme - will f/u recs

## 2021-08-18 NOTE — PROGRESS NOTE ADULT - SUBJECTIVE AND OBJECTIVE BOX
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 Patient also compliant of b/l knee pain  and weakness started last night. In ED he was found in ventricular tachycardia and hypotensive to 70s/50s. He received  150mg push amiodarone with no improvement. Patient  became less responsive and hypotensive so he was defibrillated x1 with good response. EKG post- defib showed paced rhythm with improved Blood Pressure and mental status. He was started on Amiodarone drip and admitted to CCU.    EKG: (Magnet applied) SB with STD in II,III,aVF,V4-V6.         (Paced rhythm) : AS/BiV paced.    Labs: Na 133, S creat 3.49,glucose 245,Alk 122, AST 70,ALT 52,proBNP 6899,trop 196,lactate 4.4  (14 Aug 2021 09:51)    Patient feeling better now, s/p cardioversion in the ED.  No prior history of ventricular arrhythmia.  Has history of paroxysmal AFib but was not previously on amiodarone.  At rest, no angina or palpitations, no lightheadedness. Easily fatigued, lives in NYHA II-III. A 10 pt ROS is otherwise negative.  Discussed with his sons (Pako and Stan) by telephone- patient is aware he is heading towards ICD upgrade on this admission.    He wants to live longer, and is willing to endure ICD shocks to reach this goal, as his quality of life is otherwise fairly good.    8/17- awaiting result of left upper arm US re: hematoma and swelling.  coronary angiogram pending here. afterwards can schedule PM to ICD upgrade.  8/18- hematoma in left arm.  hgb dropping. patient feels fatigue but no angina.  no VT on telemetry, tolerating oral amiodarone load well.  ICD upgrade deferred until after coronary angiogram, which remains on-hold until Hgb stabilizes.    aMIOdarone    Tablet   Oral   aMIOdarone    Tablet 400 milliGRAM(s) Oral every 8 hours  aspirin enteric coated 81 milliGRAM(s) Oral daily  atorvastatin 40 milliGRAM(s) Oral at bedtime  chlorhexidine 4% Liquid 1 Application(s) Topical <User Schedule>  clopidogrel Tablet 75 milliGRAM(s) Oral daily  finasteride 5 milliGRAM(s) Oral daily  glucagon  Injectable 1 milliGRAM(s) IntraMuscular once  insulin lispro (ADMELOG) corrective regimen sliding scale   SubCutaneous three times a day before meals  insulin lispro (ADMELOG) corrective regimen sliding scale   SubCutaneous at bedtime  loratadine 10 milliGRAM(s) Oral daily  polyethylene glycol 3350 17 Gram(s) Oral daily  sodium bicarbonate 650 milliGRAM(s) Oral two times a day  tamsulosin 0.4 milliGRAM(s) Oral at bedtime                            8.0    6.61  )-----------( 132      ( 18 Aug 2021 13:06 )             24.5       08-18    136  |  104  |  64<H>  ----------------------------<  171<H>  5.2   |  20<L>  |  2.08<H>    Ca    9.1      18 Aug 2021 03:30  Phos  3.2     08-18  Mg     2.60     08-18    TPro  5.7<L>  /  Alb  3.1<L>  /  TBili  0.3  /  DBili  x   /  AST  23  /  ALT  22  /  AlkPhos  93  08-18      CARDIAC MARKERS ( 17 Aug 2021 06:05 )  x     / x     / 60 U/L / x     / x      CARDIAC MARKERS ( 16 Aug 2021 04:31 )  x     / x     / 62 U/L / x     / 4.1 ng/mL    T(C): 36.8 (08-18-21 @ 12:00), Max: 36.8 (08-17-21 @ 20:00)  HR: 67 (08-18-21 @ 16:00) (63 - 72)  BP: 106/55 (08-18-21 @ 16:00) (88/45 - 111/58)  RR: 12 (08-18-21 @ 16:00) (7 - 25)  SpO2: 100% (08-18-21 @ 16:00) (95% - 100%)  Wt(kg): --    I&O's Summary    17 Aug 2021 07:01  -  18 Aug 2021 07:00  --------------------------------------------------------  IN: 375 mL / OUT: 740 mL / NET: -365 mL    18 Aug 2021 07:01  -  18 Aug 2021 16:09  --------------------------------------------------------  IN: 700 mL / OUT: 200 mL / NET: 500 mL    Appearance: Thin elderly male in no acute distress, awake and alert, in good spirits.	  HEENT:   Normal oral mucosa, PERRL, EOMI	  Lymphatic: No lymphadenopathy , no edema  Cardiovascular: Normal S1 S2, No JVD, No murmurs , Peripheral pulses palpable 2+ bilaterally.  Pacemaker left upper chest.  Respiratory: Lungs clear to auscultation, normal effort 	  Gastrointestinal:  Soft, Non-tender, + BS	  Skin: No rashes, No ecchymoses, No cyanosis, warm to touch  Musculoskeletal: Normal range of motion, normal strength  Psychiatry:  Mood & affect appropriate    TELEMETRY: NSR, BiV paced	    ECG: NSR, BiV paced.  Underlying is sinus perla with very-long NY (300+ms).  Echo: normal EF. mild-mod PHTN.  Cath: pending	  LUE US pending.    ASSESSMENT/PLAN: 	89y Male with symptomatic VT requiring ICD shock. Hx reduced EF >35%, tachy-perla syndrome, paroxysmal AFib.  He underwent Biotronik BiV PM implant ~5yrs ago.  He was cardioverted in the ED.    Started oral amiodarone load, with goal of 10 grams (400mg x 24 doses), followed by 400mg daily.  He would like to avoid VT ablation at this time.  Agreeable to PM-->ICD upgrade with addition of an RV ICD lead.  Would like to avoid lead extraction if possible.   Plan to abandon RV pacing lead.  To be scheduled after angiogram.  Maintain K>4, Mg 2.  Anticipate patient xfer to [facility], will schedule once he is there, and consent can be obtained using Pashto language iPad video chat.  Currently joined at bedside by sonStan.  # [phone]    Marcial Guzmán M.D.  Cardiac Electrophysiology    office [phone]  pager [phone]

## 2021-08-18 NOTE — PROGRESS NOTE ADULT - SUBJECTIVE AND OBJECTIVE BOX
PATIENT:  DARRELL YI  [id number]    CHIEF COMPLAINT:  Patient is a 89y old  Male who presents with a chief complaint of chest pain (17 Aug 2021 10:28)      INTERVAL HISTORY/OVERNIGHT EVENTS:      MEDICATIONS:  MEDICATIONS  (STANDING):  aMIOdarone    Tablet   Oral   aMIOdarone    Tablet 400 milliGRAM(s) Oral every 8 hours  aspirin enteric coated 81 milliGRAM(s) Oral daily  atorvastatin 40 milliGRAM(s) Oral at bedtime  chlorhexidine 4% Liquid 1 Application(s) Topical <User Schedule>  clopidogrel Tablet 75 milliGRAM(s) Oral daily  finasteride 5 milliGRAM(s) Oral daily  glucagon  Injectable 1 milliGRAM(s) IntraMuscular once  heparin   Injectable 5000 Unit(s) SubCutaneous every 12 hours  insulin lispro (ADMELOG) corrective regimen sliding scale   SubCutaneous three times a day before meals  insulin lispro (ADMELOG) corrective regimen sliding scale   SubCutaneous at bedtime  loratadine 10 milliGRAM(s) Oral daily  polyethylene glycol 3350 17 Gram(s) Oral daily  sodium bicarbonate 650 milliGRAM(s) Oral two times a day  tamsulosin 0.4 milliGRAM(s) Oral at bedtime    MEDICATIONS  (PRN):      ALLERGIES:  Allergies    No Known Allergies    Intolerances        OBJECTIVE:  ICU Vital Signs Last 24 Hrs  T(C): 36.4 (18 Aug 2021 07:23), Max: 36.9 (17 Aug 2021 16:00)  T(F): 97.6 (18 Aug 2021 07:23), Max: 98.4 (17 Aug 2021 16:00)  HR: 72 (18 Aug 2021 08:00) (63 - 77)  BP: 108/53 (18 Aug 2021 08:00) (88/45 - 111/58)  BP(mean): 68 (18 Aug 2021 08:00) (59 - 96)  ABP: --  ABP(mean): --  RR: 18 (18 Aug 2021 08:00) (7 - 23)  SpO2: 100% (18 Aug 2021 08:00) (94% - 100%)      Adult Advanced Hemodynamics Last 24 Hrs  CVP(mm Hg): --  CVP(cm H2O): --  CO: --  CI: --  PA: --  PA(mean): --  PCWP: --  SVR: --  SVRI: --  PVR: --  PVRI: --  CAPILLARY BLOOD GLUCOSE      POCT Blood Glucose.: 164 mg/dL (18 Aug 2021 07:43)  POCT Blood Glucose.: 151 mg/dL (17 Aug 2021 21:56)  POCT Blood Glucose.: 204 mg/dL (17 Aug 2021 15:47)  POCT Blood Glucose.: 224 mg/dL (17 Aug 2021 11:29)    CAPILLARY BLOOD GLUCOSE      POCT Blood Glucose.: 164 mg/dL (18 Aug 2021 07:43)    I&O's Summary    17 Aug 2021 07:01  -  18 Aug 2021 07:00  --------------------------------------------------------  IN: 375 mL / OUT: 740 mL / NET: -365 mL    18 Aug 2021 07:01  -  18 Aug 2021 08:41  --------------------------------------------------------  IN: 200 mL / OUT: 0 mL / NET: 200 mL      Daily     Daily Weight in k.1 (18 Aug 2021 06:00)    PHYSICAL EXAMINATION:    LABS:                          6.6    5.77  )-----------( 129      ( 18 Aug 2021 04:52 )             20.9     08-18    136  |  104  |  64<H>  ----------------------------<  171<H>  5.2   |  20<L>  |  2.08<H>    Ca    9.1      18 Aug 2021 03:30  Phos  3.2     08-18  Mg     2.60     08-18    TPro  5.7<L>  /  Alb  3.1<L>  /  TBili  0.3  /  DBili  x   /  AST  23  /  ALT  22  /  AlkPhos  93  08-18    LIVER FUNCTIONS - ( 18 Aug 2021 03:30 )  Alb: 3.1 g/dL / Pro: 5.7 g/dL / ALK PHOS: 93 U/L / ALT: 22 U/L / AST: 23 U/L / GGT: x           PTT - ( 18 Aug 2021 04:52 )  PTT:30.2 sec    CARDIAC MARKERS ( 17 Aug 2021 06:05 )  x     / x     / 60 U/L / x     / x              TELEMETRY:     EKG:     IMAGING:       CHIEF COMPLAINT:  Patient is a 89y old  Male who presents with a chief complaint of chest pain (17 Aug 2021 10:28)    INTERVAL HISTORY/OVERNIGHT EVENTS: No acute events overnight. Endorses improvement in right arm swelling and pain.    MEDICATIONS:  MEDICATIONS  (STANDING):  aMIOdarone    Tablet   Oral   aMIOdarone    Tablet 400 milliGRAM(s) Oral every 8 hours  aspirin enteric coated 81 milliGRAM(s) Oral daily  atorvastatin 40 milliGRAM(s) Oral at bedtime  chlorhexidine 4% Liquid 1 Application(s) Topical <User Schedule>  clopidogrel Tablet 75 milliGRAM(s) Oral daily  finasteride 5 milliGRAM(s) Oral daily  glucagon  Injectable 1 milliGRAM(s) IntraMuscular once  heparin   Injectable 5000 Unit(s) SubCutaneous every 12 hours  insulin lispro (ADMELOG) corrective regimen sliding scale   SubCutaneous three times a day before meals  insulin lispro (ADMELOG) corrective regimen sliding scale   SubCutaneous at bedtime  loratadine 10 milliGRAM(s) Oral daily  polyethylene glycol 3350 17 Gram(s) Oral daily  sodium bicarbonate 650 milliGRAM(s) Oral two times a day  tamsulosin 0.4 milliGRAM(s) Oral at bedtime    MEDICATIONS  (PRN):    ALLERGIES:  Allergies    No Known Allergies    Intolerances    OBJECTIVE:  ICU Vital Signs Last 24 Hrs  T(C): 36.4 (18 Aug 2021 07:23), Max: 36.9 (17 Aug 2021 16:00)  T(F): 97.6 (18 Aug 2021 07:23), Max: 98.4 (17 Aug 2021 16:00)  HR: 72 (18 Aug 2021 08:00) (63 - 77)  BP: 108/53 (18 Aug 2021 08:00) (88/45 - 111/58)  BP(mean): 68 (18 Aug 2021 08:00) (59 - 96)  ABP: --  ABP(mean): --  RR: 18 (18 Aug 2021 08:00) (7 - 23)  SpO2: 100% (18 Aug 2021 08:00) (94% - 100%)      POCT Blood Glucose.: 164 mg/dL (18 Aug 2021 07:43)  POCT Blood Glucose.: 151 mg/dL (17 Aug 2021 21:56)  POCT Blood Glucose.: 204 mg/dL (17 Aug 2021 15:47)  POCT Blood Glucose.: 224 mg/dL (17 Aug 2021 11:29)    CAPILLARY BLOOD GLUCOSE      POCT Blood Glucose.: 164 mg/dL (18 Aug 2021 07:43)    I&O's Summary    17 Aug 2021 07:01  -  18 Aug 2021 07:00  --------------------------------------------------------  IN: 375 mL / OUT: 740 mL / NET: -365 mL    18 Aug 2021 07:01  -  18 Aug 2021 08:41  --------------------------------------------------------  IN: 200 mL / OUT: 0 mL / NET: 200 mL      Daily     Daily Weight in k.1 (18 Aug 2021 06:00)    PHYSICAL EXAMINATION:    GENERAL: NAD, lying in bed comfortably  HEAD:  Atraumatic, Normocephalic  EYES:  Conjunctiva and sclera clear  ENT: Moist mucous membranes  NECK: Supple, No JVD  CHEST/LUNG: Clear to auscultation bilaterally; No rales, rhonchi, wheezing, or rubs. Unlabored respirations  HEART: Regular rate and rhythm; No murmurs, rubs, or gallops  ABDOMEN: BSx4; Soft, nontender, nondistended  EXTREMITIES:  2+ Peripheral Pulses, bruising on LUE  NERVOUS SYSTEM:  A&Ox3, no focal deficits   SKIN: senile purpuric lesions noted LE.      LABS:                          6.6    5.77  )-----------( 129      ( 18 Aug 2021 04:52 )             20.9     08-18    136  |  104  |  64<H>  ----------------------------<  171<H>  5.2   |  20<L>  |  2.08<H>    Ca    9.1      18 Aug 2021 03:30  Phos  3.2     08-18  Mg     2.60     08-18    TPro  5.7<L>  /  Alb  3.1<L>  /  TBili  0.3  /  DBili  x   /  AST  23  /  ALT  22  /  AlkPhos  93  08-18    LIVER FUNCTIONS - ( 18 Aug 2021 03:30 )  Alb: 3.1 g/dL / Pro: 5.7 g/dL / ALK PHOS: 93 U/L / ALT: 22 U/L / AST: 23 U/L / GGT: x           PTT - ( 18 Aug 2021 04:52 )  PTT:30.2 sec    CARDIAC MARKERS ( 17 Aug 2021 06:05 )  x     / x     / 60 U/L / x     / x

## 2021-08-18 NOTE — PROGRESS NOTE ADULT - SUBJECTIVE AND OBJECTIVE BOX
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      S: no chest pain or sob; ros otherwise negative.     Review of Systems:   Constitutional: [ ] fevers, [ ] chills.   Skin: [ ] dry skin. [ ] rashes.  Psychiatric: [ ] depression, [ ] anxiety.   Gastrointestinal: [ ] BRBPR, [ ] melena.   Neurological: [ ] confusion. [ ] seizures. [ ] shuffling gait.   Ears,Nose,Mouth and Throat: [ ] ear pain [ ] sore throat.   Eyes: [ ] diplopia.   Respiratory: [ ] hemoptysis. [ ] shortness of breath  Cardiovascular: See HPI above  Hematologic/Lymphatic: [ ] anemia. [ ] painful nodes. [ ] prolonged bleeding.   Genitourinary: [ ] hematuria. [ ] flank pain.   Endocrine: [ ] significant change in weight. [ ] intolerance to heat and cold.     Review of systems [x ] otherwise negative, [ ] otherwise unable to obtain    FH: no family history of sudden cardiac death in first degree relatives    SH: [ ] tobacco, [ ] alcohol, [ ] drugs    aMIOdarone    Tablet   Oral   aMIOdarone    Tablet 400 milliGRAM(s) Oral every 8 hours  aspirin enteric coated 81 milliGRAM(s) Oral daily  atorvastatin 40 milliGRAM(s) Oral at bedtime  chlorhexidine 4% Liquid 1 Application(s) Topical <User Schedule>  clopidogrel Tablet 75 milliGRAM(s) Oral daily  finasteride 5 milliGRAM(s) Oral daily  glucagon  Injectable 1 milliGRAM(s) IntraMuscular once  insulin lispro (ADMELOG) corrective regimen sliding scale   SubCutaneous three times a day before meals  insulin lispro (ADMELOG) corrective regimen sliding scale   SubCutaneous at bedtime  loratadine 10 milliGRAM(s) Oral daily  polyethylene glycol 3350 17 Gram(s) Oral daily  sodium bicarbonate 650 milliGRAM(s) Oral two times a day  tamsulosin 0.4 milliGRAM(s) Oral at bedtime                            8.0    6.61  )-----------( 132      ( 18 Aug 2021 13:06 )             24.5       08-18    136  |  104  |  64<H>  ----------------------------<  171<H>  5.2   |  20<L>  |  2.08<H>    Ca    9.1      18 Aug 2021 03:30  Phos  3.2     08-18  Mg     2.60     08-18    TPro  5.7<L>  /  Alb  3.1<L>  /  TBili  0.3  /  DBili  x   /  AST  23  /  ALT  22  /  AlkPhos  93  08-18      CARDIAC MARKERS ( 17 Aug 2021 06:05 )  x     / x     / 60 U/L / x     / x      CARDIAC MARKERS ( 16 Aug 2021 04:31 )  x     / x     / 62 U/L / x     / 4.1 ng/mL        T(C): 36.7 (08-18-21 @ 16:00), Max: 36.8 (08-17-21 @ 20:00)  HR: 67 (08-18-21 @ 16:00) (63 - 72)  BP: 106/55 (08-18-21 @ 16:00) (88/45 - 111/58)  RR: 12 (08-18-21 @ 16:00) (7 - 25)  SpO2: 100% (08-18-21 @ 16:00) (95% - 100%)  Wt(kg): --    I&O's Summary    17 Aug 2021 07:01  -  18 Aug 2021 07:00  --------------------------------------------------------  IN: 375 mL / OUT: 740 mL / NET: -365 mL    18 Aug 2021 07:01  -  18 Aug 2021 16:54  --------------------------------------------------------  IN: 700 mL / OUT: 200 mL / NET: 500 mL          General: Well nourished in no acute distress. Alert and Oriented * 3.   Head: Normocephalic and atraumatic.   Neck: No JVD. No bruits. Supple. Does not appear to be enlarged.   Cardiovascular: + S1,S2 ; RRR Soft systolic murmur at the left lower sternal border. No rubs noted.    Lungs: CTA b/l. No rhonchi, rales or wheezes.   Abdomen: + BS, soft. Non tender. Non distended. No rebound. No guarding.   Extremities: No clubbing/cyanosis/edema.   Neurologic: Moves all four extremities. Full range of motion.   Skin: Warm and moist. The patient's skin has normal elasticity and good skin turgor.   Psychiatric: Appropriate mood and affect.  Musculoskeletal: Normal range of motion, normal strength    Tele:     TTE: < from: Transthoracic Echocardiogram (08.14.21 @ 14:06) >  Normal left ventricular systolic function. No segmental  wall motion abnormalities.  Mild-moderate pulmonary hypertension.  A device wire is noted in the right heart.    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     -continue with amiodarone for VT per EP - no further episodes of VT   -keep K > 4, Mg > 2  -TTE with normal LV function   -EP eval with Dr. Villatoro/Arielle for AICD upgrade appreciated - will need cath and medical optimization prior to upgrade   -will eventually plan for cardiac cath when renal function allows and H/H stable  -LUE ultrasound noted with no dvt  -LUE remains swollen - vascular surgery consult obtained   -heme eval called for anemia and thrombocytopenia appreciated   -renal follow up   -heparin dc due to dropping h/h - h/h improved after transfusion - check stool guiac   -supportive care per CCU    Guruprasad Jamie, MD

## 2021-08-18 NOTE — CONSULT NOTE ADULT - SUBJECTIVE AND OBJECTIVE BOX
HPI: 89 YO M with PMH of HTN, HLD, DM, CAD s/p stent, afib on eliquis, CKD 3, CVA, Velasquez's esophagus presenting for Vtach, and need for cardiac work up and defibrillator placement. Vascular surgery consulted for LUE swelling and pain, and downtrending H/H. During encounter, patient and son states that BP cuff was left inflate on the arm and that he had blood drawn from that extremity. States that he feels tightness in the upper extremity. Denies any numbness or tingling. Able to move arm and fingers without difficulty. States that the arm became "black and blue" after placement of BP cuff. Denies any events like this before.     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    S/P coronary artery stent placement  D1    Pacemaker  BiV PPM        MEDICATIONS  (STANDING):  aMIOdarone    Tablet   Oral   aMIOdarone    Tablet 400 milliGRAM(s) Oral every 8 hours  aspirin enteric coated 81 milliGRAM(s) Oral daily  atorvastatin 40 milliGRAM(s) Oral at bedtime  chlorhexidine 4% Liquid 1 Application(s) Topical <User Schedule>  clopidogrel Tablet 75 milliGRAM(s) Oral daily  finasteride 5 milliGRAM(s) Oral daily  glucagon  Injectable 1 milliGRAM(s) IntraMuscular once  insulin lispro (ADMELOG) corrective regimen sliding scale   SubCutaneous three times a day before meals  insulin lispro (ADMELOG) corrective regimen sliding scale   SubCutaneous at bedtime  loratadine 10 milliGRAM(s) Oral daily  polyethylene glycol 3350 17 Gram(s) Oral daily  sodium bicarbonate 650 milliGRAM(s) Oral two times a day  tamsulosin 0.4 milliGRAM(s) Oral at bedtime    MEDICATIONS  (PRN):      Allergies    No Known Allergies    Intolerances        SOCIAL HISTORY:    FAMILY HISTORY:  Family history of heart disease (Father)      Physical Exam:  General: NAD, resting comfortably  HEENT: NC/AT, EOMI, normal hearing, no oral lesions, no LAD, neck supple  Pulmonary: normal resp effort, CTA-B  Cardiovascular: NSR, no murmurs  Abdominal: soft, ND/NT, no organomegaly  Extremities: WWP, normal strength, no clubbing/cyanosis/edema  Neuro: A/O x 3, CNs II-XII grossly intact, normal sensation, no focal deficits  Pulses: ulnar, radial and brachial pulses palpable b/l, forearm and upper arm soft, sensation intact, no pain to passive motion, slightly hard mass palpated in upper arm above bicep    Vital Signs Last 24 Hrs  T(C): 36.8 (18 Aug 2021 12:00), Max: 36.9 (17 Aug 2021 16:00)  T(F): 98.2 (18 Aug 2021 12:00), Max: 98.4 (17 Aug 2021 16:00)  HR: 72 (18 Aug 2021 15:00) (63 - 72)  BP: 105/59 (18 Aug 2021 15:00) (88/45 - 111/58)  BP(mean): 73 (18 Aug 2021 15:00) (59 - 96)  RR: 13 (18 Aug 2021 15:00) (7 - 25)  SpO2: 100% (18 Aug 2021 15:00) (95% - 100%)    I&O's Summary    17 Aug 2021 07:01  -  18 Aug 2021 07:00  --------------------------------------------------------  IN: 375 mL / OUT: 740 mL / NET: -365 mL    18 Aug 2021 07:01  -  18 Aug 2021 15:35  --------------------------------------------------------  IN: 700 mL / OUT: 200 mL / NET: 500 mL            LABS:                        8.0    6.61  )-----------( 132      ( 18 Aug 2021 13:06 )             24.5     08-18    136  |  104  |  64<H>  ----------------------------<  171<H>  5.2   |  20<L>  |  2.08<H>    Ca    9.1      18 Aug 2021 03:30  Phos  3.2     08-18  Mg     2.60     08-18    TPro  5.7<L>  /  Alb  3.1<L>  /  TBili  0.3  /  DBili  x   /  AST  23  /  ALT  22  /  AlkPhos  93  08-18    PTT - ( 18 Aug 2021 04:52 )  PTT:30.2 sec    CAPILLARY BLOOD GLUCOSE      POCT Blood Glucose.: 206 mg/dL (18 Aug 2021 11:34)  POCT Blood Glucose.: 164 mg/dL (18 Aug 2021 07:43)  POCT Blood Glucose.: 151 mg/dL (17 Aug 2021 21:56)  POCT Blood Glucose.: 204 mg/dL (17 Aug 2021 15:47)    LIVER FUNCTIONS - ( 18 Aug 2021 03:30 )  Alb: 3.1 g/dL / Pro: 5.7 g/dL / ALK PHOS: 93 U/L / ALT: 22 U/L / AST: 23 U/L / GGT: x             Cultures:      RADIOLOGY & ADDITIONAL STUDIES:    < from: VA Duplex Ext Veins Upper Comp, Left. (08.17.21 @ 12:43) >  Summary/Impressions:  No evidence of deep vein thrombosis in the left upper  extremity.    < end of copied text >        Plan: 89 YO M presenting with LUE swelling    - pain control  - recommend LUE arterial duplex  - care per primary team    discussed with vascular fellow    C Team Surgery, [id number]

## 2021-08-18 NOTE — PROGRESS NOTE ADULT - SUBJECTIVE AND OBJECTIVE BOX
[facility] - S López / Aroldo S /D Jose/ JUAN DAVID Huynh/ JUAN DAVID Smith/ Adrian Vega / LUBA Herman/ O Alejandra  service -[phone], office [phone]  ---------------------------------------------------------------------------------------------------------------    Patient seen and examined bedside in CCU    Subjective and Objective: No overnight events, denied cp/sob. No complaints today. feeling better    Allergies: No Known Allergies      Hospital Medications:   MEDICATIONS  (STANDING):  aMIOdarone    Tablet   Oral   aMIOdarone    Tablet 400 milliGRAM(s) Oral every 8 hours  aspirin enteric coated 81 milliGRAM(s) Oral daily  atorvastatin 40 milliGRAM(s) Oral at bedtime  chlorhexidine 4% Liquid 1 Application(s) Topical <User Schedule>  clopidogrel Tablet 75 milliGRAM(s) Oral daily  finasteride 5 milliGRAM(s) Oral daily  glucagon  Injectable 1 milliGRAM(s) IntraMuscular once  insulin lispro (ADMELOG) corrective regimen sliding scale   SubCutaneous three times a day before meals  insulin lispro (ADMELOG) corrective regimen sliding scale   SubCutaneous at bedtime  loratadine 10 milliGRAM(s) Oral daily  polyethylene glycol 3350 17 Gram(s) Oral daily  sodium bicarbonate 650 milliGRAM(s) Oral two times a day  tamsulosin 0.4 milliGRAM(s) Oral at bedtime    VITALS:  T(F): 98.1 (21 @ 16:00), Max: 98.3 (21 @ 20:00)  HR: 65 (21 @ 18:00)  BP: 104/56 (21 @ 18:00)  RR: 24 (21 @ 18:00)  SpO2: 100% (21 @ 18:00)  Wt(kg): --    08-17 @ 07:01  -   @ 07:00  --------------------------------------------------------  IN: 375 mL / OUT: 740 mL / NET: -365 mL     @ 07:01  -   @ 18:56  --------------------------------------------------------  IN: 820 mL / OUT: 200 mL / NET: 620 mL      PHYSICAL EXAM:  Constitutional: NAD, frail   HEENT: anicteric sclera  Neck: No JVD  Respiratory: CTAB, no wheezes, rales or rhonchi  Cardiovascular: S1, S2, RRR  Gastrointestinal: BS+, soft, NT/ND  Extremities: trace pedal edema b/l  Neurological: A/O x 3, no focal deficits  Psychiatric: Normal mood, normal affect  : No river.     LABS:      136  |  104  |  64<H>  ----------------------------<  171<H>  5.2   |  20<L>  |  2.08<H>    Ca    9.1      18 Aug 2021 03:30  Phos  3.2       Mg     2.60         TPro  5.7<L>  /  Alb  3.1<L>  /  TBili  0.3  /  DBili      /  AST  23  /  ALT  22  /  AlkPhos  93      Creatinine Trend: 2.08 <--, 1.97 <--, 2.22 <--, 2.73 <--, 2.85 <--, 3.09 <--, 3.49 <--                        8.0    6.61  )-----------( 132      ( 18 Aug 2021 13:06 )             24.5     Urine Studies:  Urinalysis Basic - ( 15 Aug 2021 13:00 )    Color: Brown / Appearance: Turbid / S.013 / pH:   Gluc:  / Ketone: Negative  / Bili: Negative / Urobili: <2 mg/dL   Blood:  / Protein: 100 mg/dL / Nitrite: Negative   Leuk Esterase: Negative / RBC: 74 /HPF / WBC 7 /HPF   Sq Epi:  / Non Sq Epi: 1 /HPF / Bacteria: Negative          RADIOLOGY & ADDITIONAL STUDIES:

## 2021-08-18 NOTE — CONSULT NOTE ADULT - ATTENDING COMMENTS
Left arm hematoma of unclear etiology, maybe related to blood pressure cuff vs blood draw?  - Recommend elevation of left upper extremity  - Obtain arterial duplex to rule out arterial injury  - Will follow

## 2021-08-18 NOTE — PROGRESS NOTE ADULT - ASSESSMENT
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 Started on amiodarone drip.  EKG showed He was admitted to CCU. Labs remarkable for FLOYD on CKD and elevated lactate  post ventricular tachycardia and shock. EKG showed STD in inferior lateral leads. Renal following for FLOYD/CKD Mx.    FLOYD on CKD3: improving  Recent admission for FLOYD on CKD with hyperkalemia S. creat 3.1 > on discharge on 8/12/21 was 1.89,   Cr trend 3.49 >3 > 2.85 >2.7  >2.2 >1.97 >2 improved and stable  FLOYD likely 2/2 hemodynamic changes/NSTEMI/arrhythmia  K, vol- acceptable  UA bland  no obstructive uropathy on recent renal sono   lasix and losartan on hold-- no objection to resume lasix   M acidosis 2/2 FLOYD/ckd-better. c/w po Na bicarb 650mg bid 8/14 pm. watch for s/o chf due to Na load  monitor BMP daily   dose all meds for eGFR<15ml/min.   avoid ACEi/ARB/NSAIDs/Nephrotoxics if able.   Intermediate risk for GRACE, explained to pt and son bedside in detail including the possibility of worsening RFT post cath and if no recovery of RF, may need RRT/dialysis. Both verbalized understanding. no plan for urgent LHC given elevated creatinine and currently chest pain free.   plan for LHC when Cr better and stable- pt optimized renal stnad point for cath. pt, family aware of risks of GRACE, may proceed w/informed consent  would recommend mucomyst 1200mg po bid -2doses pre and 2 doses post cath and ivf for 4-6hrs jordi cath if no s/o chf    NSTEMI (non-ST elevation myocardial infarction).  Patient p/w chest pain and ventricular tachycardia s/p shockedx1  on heparin gtt   cardiac monitoring to monitor for arrhythmias  c/w Aspirin 81 PO daily, Clopidogrel 75 PO daily, Lipitor 40 mg PO daily  Low fat DASH diet  -TTE with normal LV function     Ventricular tachycardia s/p defibrillatorx1  on Amiodarone gtt   Monitor for further VT episodes  Keep K > 4.2 and magnesium > 2.2  BIV PPM interrogation showed  monomorphic ventricular tachycardia at 150-160 as per chart  f/u EP. plan for upgrade BiVICD after cath and medical optimization  -LUE ultrasound noted with no dvt    Chronic diastolic congestive heart failure. h/o CHF    CXR; clear-appear euvolemic  Holding home dose lasix and losartan 2/2 FLOYD on CKD and hypotension  no objection to resume lasix . as per cardiology  Daily weight monitoring, Strict I/O, Low sodium DASH diet  ECHO noted -Normal left ventricular systolic function. No segmental wall motion abnormalities. Mild-moderate pulmonary hypertension.    Paroxysmal atrial fibrillation.   Currently on heparin drip for ACS-holding Eliquis till pt on heparin drip  Continuous tele monitoring  Rate control per ccu team    will closely follow up.  poc d/w pt, RN bedside   labs, chart reviewed  For any question, call:  Cell # [phone]  service# [phone]  office# [phone]

## 2021-08-18 NOTE — PROGRESS NOTE ADULT - ASSESSMENT
89 y.o Icelandic  male with a PMhx of HTN, HLD, DM, CAD s/p stent to 80% D1 in 2014 ,dHF EF 60% in 2018,Afib on eliquis c/b tachy/perla with BiV PPM( Biotronik) CKD, CVA, Velasquez's esophagus  presents with chest burning, dizziness/ lightheadedness, nausea, weakness with unsteady gait found in ventricular tachycardia and hypotension which did not improved with amiodarone 150mg IVP x1 received shocked x1 by external defibrillator with improve in Blood Pressure and mental status. Started on amiodarone drip.  EKG showed He was admitted to CCU. Labs remarkable for FLOYD on CKD and elevated lactate  post ventricular tachycardia and shock. EKG showed STD in inferior lateral leads.    Neuro:  AxOx4  no active issues    Respiratory:  lungs clear  pulse Ox 100% on RA  +sneezing    Cardiovascular:   1) NSTEMI (non-ST elevation myocardial infarction).   -Patient p/w chest pain and ventricular tachycardia s/p shockedx1  -EKG showed  Sinus perla with STD in inferior and lateral lead  -On Aspirin and Plavix  -Assess for resolution of chest pain and recurrence in chest pain. Serial EKG PRN chest pain to assess for ST changes  -Continuous cardiac monitoring to monitor for arrhythmias  -Aspirin 81 PO daily, Clopidogrel 75 PO daily, Lipitor 40 mg PO daily  -Low fat DASH diet  -ECHO: 2D ECHO showed EF 60%, severely dilated left atrium. Mild concentric left ventricular hypertrophy. Normal left ventricular systolic function. No segmental wall   motion abnormalities. Mild diastolic dysfunction(Stage I)  -Troponin: 557. Trending down from 872 prior.  -Heparin drip was D/C'd    2) Ventricular tachycardia.   -Patient presented to ER with c/o chest pain found in ventricular tachycardia s/p defibrillatorx1  -On PO Amiodarone load- to complete full 10 grams- will require (400MG q8 for 22 doses), then maintenance 200 mg    -Will monitor for further VT episodes  -Zoll pads at bedside with pacer pads on patient.   -Keep K > 4.2 and magnesium > 2.2  -Cardioversion/defibrillation if unstable.   -BIV PPM interrogation showed  monomorphic ventricular tachycardia at 150-160  -EP following - will speak with EP for starting metoprolol   -plan to transfer to [facility] for upgrade BiVICD.     3)Chronic diastolic congestive heart failure.   -CXR showed; clear-appear euvolemic  -Continuous cardiac monitoring to r/o arrhythmias.   -Check pro BNP 6899   -Hold  home dose lasix and losartan - as patient has FLOYD on CKD and hypotension  -Daily weight monitoring, Strict I/O, Low sodium DASH diet  -echo showed Ef 60% with normal LV,stageI diastolic dysfunction    4) Paroxysmal atrial fibrillation.   -pAfib on Eliquis  -CHAD2 score: 8  -Currently on heparin drip for ACS-will hold Eliquis till pt on heparin drip  -Continuous tele monitoring  -Follow PT/INR/PTT.   -Monitor lytes and replete as needed.     GI  1)Transaminitis  -On admission, elevated LFT - has now resolved      2)Poor appetite  -on glucerna  -nutrition consult      1) BPH   -no issue  -c/w flomax 0.4mg Po daily.     2) Stage 3 chronic kidney disease.  -Recent admission for CRI with hyperkalemia   -Creatinine 2.2, trending down from 2.7  -Renal US negative for hydronephrosis  -FLOYD likely due to NSTEMI/VT  -Strict I/O  -Avoid Nephrotoxic Meds/ Agents such as (NSAIDs, IV contrast, Aminoglycosides such as gentamicin, -Gadolinium contrast, Phosphate containing enemas, etc..)  -Adjust Medications according to eGFR  -Renal following - will F/U recs    Endo  1) Diabetes mellitus  -ISS      Heme  1) Anemia   -HgB trending down  -Seen by Heme - will f/u recs   89 y.o Japanese  male with a PMhx of HTN, HLD, DM, CAD s/p stent to 80% D1 in 2014 ,dHF EF 60% in 2018,Afib on eliquis c/b tachy/perla with BiV PPM( Biotronik) CKD, CVA, Velasquez's esophagus  presents with chest burning, dizziness/ lightheadedness, nausea, weakness with unsteady gait found in ventricular tachycardia and hypotension which did not improved with amiodarone 150mg IVP x1 received shocked x1 by external defibrillator with improve in Blood Pressure and mental status. Started on amiodarone drip.  EKG showed He was admitted to CCU. Labs remarkable for FLOYD on CKD and elevated lactate  post ventricular tachycardia and shock. EKG showed STD in inferior lateral leads.    Neuro:  AxOx4  no active issues    Respiratory:  lungs clear  pulse Ox 100% on RA  +sneezing    Cardiovascular:   1) NSTEMI (non-ST elevation myocardial infarction).   -Patient p/w chest pain and ventricular tachycardia s/p shockedx1  -EKG showed  Sinus perla with STD in inferior and lateral lead  -On Aspirin and Plavix  -Assess for resolution of chest pain and recurrence in chest pain. Serial EKG PRN chest pain to assess for ST changes  -Continuous cardiac monitoring to monitor for arrhythmias  -Aspirin 81 PO daily, Clopidogrel 75 PO daily, Lipitor 40 mg PO daily  -Low fat DASH diet  -ECHO: 2D ECHO showed EF 60%, severely dilated left atrium. Mild concentric left ventricular hypertrophy. Normal left ventricular systolic function. No segmental wall   motion abnormalities. Mild diastolic dysfunction(Stage I)  -Troponin: 557. Trending down from 872 prior.  -Heparin drip was D/C'd    2) Ventricular tachycardia.   -Patient presented to ER with c/o chest pain found in ventricular tachycardia s/p defibrillatorx1  -On PO Amiodarone load- to complete full 10 grams- will require (400MG q8 for 22 doses), then maintenance 200 mg    -Will monitor for further VT episodes  -Zoll pads at bedside with pacer pads on patient.   -Keep K > 4.2 and magnesium > 2.2  -Cardioversion/defibrillation if unstable.   -BIV PPM interrogation showed  monomorphic ventricular tachycardia at 150-160  -EP following - will speak with EP for starting metoprolol   -plan to transfer to [facility] for upgrade BiVICD.     3)Chronic diastolic congestive heart failure.   -CXR showed; clear-appear euvolemic  -Continuous cardiac monitoring to r/o arrhythmias.   -Check pro BNP 6899   -Hold  home dose lasix and losartan - as patient has FLOYD on CKD and hypotension  -Daily weight monitoring, Strict I/O, Low sodium DASH diet  -echo showed Ef 60% with normal LV,stageI diastolic dysfunction    4) Paroxysmal atrial fibrillation.   -pAfib on Eliquis  -CHAD2 score: 8  -Currently on heparin drip for ACS-will hold Eliquis till pt on heparin drip  -Continuous tele monitoring  -Follow PT/INR/PTT.   -Monitor lytes and replete as needed.     GI  1)Transaminitis  -On admission, elevated LFT - has now resolved      2)Poor appetite  -on glucerna  -nutrition consult      1) BPH   -no issue  -c/w flomax 0.4mg Po daily.     2) Stage 3 chronic kidney disease.  -Recent admission for CRI with hyperkalemia   -Creatinine 2.2, trending down from 2.7  -Renal US negative for hydronephrosis  -FLOYD likely due to NSTEMI/VT  -Strict I/O  -Avoid Nephrotoxic Meds/ Agents such as (NSAIDs, IV contrast, Aminoglycosides such as gentamicin, -Gadolinium contrast, Phosphate containing enemas, etc..)  -Adjust Medications according to eGFR  -Renal following - will F/U recs    Endo  1) Diabetes mellitus  -ISS      Heme  1) Anemia   -HgB: 6.6, Recieved 1 U PRBC, post-transufion CBC: 8.0  -Reticulocytosis  -LDH and ferritin WNL  -Decreased TIBC  -Heme following   89 y.o Danish  male with a PMhx of HTN, HLD, DM, CAD s/p stent to 80% D1 in 2014 ,dHF EF 60% in 2018,Afib on eliquis c/b tachy/perla with BiV PPM( Biotronik) CKD, CVA, Velasquez's esophagus  presents with chest burning, dizziness/ lightheadedness, nausea, weakness with unsteady gait found in ventricular tachycardia and hypotension which did not improved with amiodarone 150mg IVP x1 received shocked x1 by external defibrillator with improve in Blood Pressure and mental status. Started on amiodarone drip.  EKG showed He was admitted to CCU. Labs remarkable for FLOYD on CKD and elevated lactate  post ventricular tachycardia and shock. EKG showed STD in inferior lateral leads.    Neuro:  AxOx4  no active issues    Respiratory:  lungs clear  pulse Ox 100% on RA    Cardiovascular:   1) NSTEMI (non-ST elevation myocardial infarction).   -Patient p/w chest pain and ventricular tachycardia s/p shockedx1  -EKG showed  Sinus perla with STD in inferior and lateral lead  -On Aspirin and Plavix  -Continuous cardiac monitoring to monitor for arrhythmias  -Aspirin 81 PO daily, Clopidogrel 75 PO daily, Lipitor 40 mg PO daily  -Low fat DASH diet  -ECHO: 2D ECHO showed EF 60%, severely dilated left atrium. Mild concentric left ventricular hypertrophy. Normal left ventricular systolic function. No segmental wall   motion abnormalities. Mild diastolic dysfunction(Stage I)  -Heparin drip was D/C'd    2) Ventricular tachycardia.   -Patient presented to ER with c/o chest pain found in ventricular tachycardia s/p defibrillatorx1  -On PO Amiodarone load- to complete full 10 grams- will require (400MG q8 for 22 doses), then maintenance 200 mg    -Will monitor for further VT episodes  -Zoll pads at bedside with pacer pads on patient.   -Keep K > 4.2 and magnesium > 2.2  -Cardioversion/defibrillation if unstable.   -BIV PPM interrogation showed  monomorphic ventricular tachycardia at 150-160  -plan to transfer to [facility] for upgrade BiVICD.     3)Chronic diastolic congestive heart failure.   -CXR showed; clear-appear euvolemic  -Continuous cardiac monitoring to r/o arrhythmias.   -Check pro BNP 6899   -Hold  home dose lasix and losartan - as patient has FLOYD on CKD and hypotension  -Daily weight monitoring, Strict I/O, Low sodium DASH diet  -echo showed Ef 60% with normal LV,stageI diastolic dysfunction    4) Paroxysmal atrial fibrillation.   -pAfib on Eliquis  -CHAD2 score: 8  -Currently on heparin drip for ACS-will hold Eliquis till pt on heparin drip  -Continuous tele monitoring  -Follow PT/INR/PTT.   -Monitor lytes and replete as needed.     GI  1)Transaminitis  -On admission, elevated LFT - has now resolved      2)Poor appetite  -on glucerna        1) BPH   -no issue  -c/w flomax 0.4mg Po daily.     2) Stage 3 chronic kidney disease.  -Recent admission for CRI with hyperkalemia   -Creatinine 2.2, trending down from 2.7  -Renal US negative for hydronephrosis  -FLOYD likely due to NSTEMI/VT  -Strict I/O  -Avoid Nephrotoxic Meds/ Agents such as (NSAIDs, IV contrast, Aminoglycosides such as gentamicin, -Gadolinium contrast, Phosphate containing enemas, etc..)  -Adjust Medications according to eGFR  -Renal following - will F/U recs    Endo  1) Diabetes mellitus  -ISS      Heme  1) Anemia   -HgB: 6.6, Recieved 1 U PRBC, post-transufion CBC: 8.0  -Reticulocytosis  -LDH and ferritin WNL  -Decreased TIBC  -Heme following

## 2021-08-19 NOTE — PROGRESS NOTE ADULT - SUBJECTIVE AND OBJECTIVE BOX
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      S: no chest pain or sob; ros otherwise negative.     Review of Systems:   Constitutional: [ ] fevers, [ ] chills.   Skin: [ ] dry skin. [ ] rashes.  Psychiatric: [ ] depression, [ ] anxiety.   Gastrointestinal: [ ] BRBPR, [ ] melena.   Neurological: [ ] confusion. [ ] seizures. [ ] shuffling gait.   Ears,Nose,Mouth and Throat: [ ] ear pain [ ] sore throat.   Eyes: [ ] diplopia.   Respiratory: [ ] hemoptysis. [ ] shortness of breath  Cardiovascular: See HPI above  Hematologic/Lymphatic: [ ] anemia. [ ] painful nodes. [ ] prolonged bleeding.   Genitourinary: [ ] hematuria. [ ] flank pain.   Endocrine: [ ] significant change in weight. [ ] intolerance to heat and cold.     Review of systems [x ] otherwise negative, [ ] otherwise unable to obtain    FH: no family history of sudden cardiac death in first degree relatives    SH: [ ] tobacco, [ ] alcohol, [ ] drugs    aMIOdarone    Tablet   Oral   aMIOdarone    Tablet 200 milliGRAM(s) Oral daily  aspirin enteric coated 81 milliGRAM(s) Oral daily  atorvastatin 40 milliGRAM(s) Oral at bedtime  bisacodyl Suppository 10 milliGRAM(s) Rectal every 24 hours  chlorhexidine 4% Liquid 1 Application(s) Topical <User Schedule>  clopidogrel Tablet 75 milliGRAM(s) Oral daily  finasteride 5 milliGRAM(s) Oral daily  glucagon  Injectable 1 milliGRAM(s) IntraMuscular once  insulin lispro (ADMELOG) corrective regimen sliding scale   SubCutaneous three times a day before meals  insulin lispro (ADMELOG) corrective regimen sliding scale   SubCutaneous at bedtime  loratadine 10 milliGRAM(s) Oral daily  polyethylene glycol 3350 17 Gram(s) Oral daily  polyethylene glycol 3350 17 Gram(s) Oral two times a day  senna 2 Tablet(s) Oral at bedtime  sodium bicarbonate 650 milliGRAM(s) Oral two times a day  tamsulosin 0.4 milliGRAM(s) Oral at bedtime                            7.6    5.37  )-----------( 124      ( 19 Aug 2021 06:38 )             23.1       08-19    133<L>  |  109<H>  |  52<H>  ----------------------------<  130<H>  4.6   |  19<L>  |  1.87<H>    Ca    8.9      19 Aug 2021 06:38  Phos  2.8     08-19  Mg     2.30     08-19    TPro  5.4<L>  /  Alb  2.9<L>  /  TBili  0.7  /  DBili  x   /  AST  23  /  ALT  18  /  AlkPhos  76  08-19      CARDIAC MARKERS ( 17 Aug 2021 06:05 )  x     / x     / 60 U/L / x     / x            T(C): 36.4 (08-19-21 @ 11:53), Max: 36.9 (08-19-21 @ 08:00)  HR: 67 (08-19-21 @ 12:00) (63 - 81)  BP: 108/51 (08-19-21 @ 12:00) (93/47 - 117/69)  RR: 22 (08-19-21 @ 12:00) (10 - 28)  SpO2: 100% (08-19-21 @ 12:00) (96% - 100%)  Wt(kg): --    I&O's Summary    18 Aug 2021 07:01  -  19 Aug 2021 07:00  --------------------------------------------------------  IN: 1020 mL / OUT: 960 mL / NET: 60 mL    19 Aug 2021 07:01  -  19 Aug 2021 12:59  --------------------------------------------------------  IN: 200 mL / OUT: 0 mL / NET: 200 mL    General: Well nourished in no acute distress. Alert and Oriented * 3.   Head: Normocephalic and atraumatic.   Neck: No JVD. No bruits. Supple. Does not appear to be enlarged.   Cardiovascular: + S1,S2 ; RRR Soft systolic murmur at the left lower sternal border. No rubs noted.    Lungs: CTA b/l. No rhonchi, rales or wheezes.   Abdomen: + BS, soft. Non tender. Non distended. No rebound. No guarding.   Extremities: L radial pulse 2+, LUE hematoma  Neurologic: Moves all four extremities. Full range of motion.   Skin: Warm and moist. The patient's skin has normal elasticity and good skin turgor.   Psychiatric: Appropriate mood and affect.  Musculoskeletal: Normal range of motion, normal strength    Tele:     TTE: < from: Transthoracic Echocardiogram (08.14.21 @ 14:06) >  Normal left ventricular systolic function. No segmental  wall motion abnormalities.  Mild-moderate pulmonary hypertension.  A device wire is noted in the right heart.    < end of copied text >    < from: VA Duplex Ext Veins Upper Comp, Left. (08.17.21 @ 12:43) >  Summary/Impressions:  No evidence of deep vein thrombosis in the left upper  extremity.    < end of copied text >      < from: CT Upper Extremity No Cont, Left (08.19.21 @ 09:25) >  IMPRESSION:    1.  Mild enlargement of the triceps muscle suspicious for intramuscular hematoma.  2.  Amorphous appearance of the flexor musculature the forearm is nonspecific but can be seen with myositis or intramuscular hematoma.  3.  Moderate soft tissue swelling about the upper extremity, nonspecific but can be seen with cellulitis in the proper clinical setting.  4.  Nodular opacities within the left upper lobe. Small pleural effusion and atelectasis. Dedicated CT the chest can be performed for more detailed evaluation.    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     -continue with amiodarone for VT per EP - no further episodes of VT   -keep K > 4, Mg > 2  -TTE with normal LV function   -EP eval with Dr. Villatoro/Arielle for AICD upgrade appreciated - will need cath and medical optimization prior to upgrade   -will eventually plan for cardiac cath when renal function allows and H/H stable  -LUE ultrasound noted with no dvt  -LUE remains swollen - vascular surgery consult appreciated, CT noted  -heme eval called for anemia and thrombocytopenia appreciated   -renal follow up   -heparin dc due to dropping h/h - h/h improved after transfusion - check stool guiac   -check CBC Q12  -supportive care per CCU

## 2021-08-19 NOTE — PROGRESS NOTE ADULT - ASSESSMENT
Plan: 89 YO M presenting with LUE swelling    - pain control  - recommend LUE arterial duplex  - care per primary team    discussed with vascular fellow    C Team Surgery, [id number]

## 2021-08-19 NOTE — CHART NOTE - NSCHARTNOTEFT_GEN_A_CORE
89 y/o Georgian speaking male with afib on eliquis, CAD with stent to D1 in 2014, nonobstructive CAD of mLCX and RCA in 2015 (medical management), s/p BiV PPM (Biotronik), CKD, HF with mod LV dysfunction in 2018, HTN, DM2, and recent admit for renal insufficiency presents with chest pain, nausea, dizziness, lightheadedness, and diaphoresis. In ER was found to be in monomorphic VT at VR 150s unresponsive to IV amio 150 mg then sync cardioverted to paced rhythm. Amiodarone gtt was started and patient admitted to CCU.    CCU course:    1. CAD/NSTEMI: troponins elevated and then trended down, no further chest pain. Intrinsic EKG without pacer concerning for new TWI in inferior leads. ACS protocol initiated. Presently awaiting [facility] once FLOYD resolves.    2. VT: no further arrhythmia or VT. Continues on amiodarone load to full 10 grams. Pending [facility] results plan is for possible upgrade to BiV ICD.     3. h/o HF: appears euvolemic. + FLOYD on CKD, diuretics on hold. ECHO now with EF 60% and normal LV function without segmental wall abnormalities.     4. Afib: currently in paced rhythm with underlying rhythm of NSR/sinus bradycardia with 1st degree AVHB. Was on eliquis for A/C but discontinued while on Heparin. Currently on hold until after C.    5. L arm swelling: unknown etiology. A/C on hold. Drop in H & H noted to 6.6. Transfused 1U PRBCs and H/H has been stable. CT of arm reveals likely intramuscular hematoma. Doppler negative for DVT. Arterial U/S is pending.    6. FLOYD on CKD: Scr on admit 3.49, now improved to 1.87. Continue to monitor.    7. DM2: stable    To follow-up:  - eventual C once FLOYD resolves, renal recs for pre-medication  - A/C for afib - clarify when to resume  - L arm duplex scan  - pending [facility], possible transfer to [facility] for upgrade to BiV ICD.

## 2021-08-19 NOTE — PROGRESS NOTE ADULT - ASSESSMENT
89 y.o Kiswahili  male with a PMhx of HTN, HLD, DM, CAD s/p stent to 80% D1 in 2014 ,dHF EF 60% in 2018,Afib on eliquis c/b tachy/perla with BiV PPM( Biotronik) CKD, CVA, Velasquez's esophagus  presents with chest burning, dizziness/ lightheadedness, nausea, weakness with unsteady gait found in ventricular tachycardia and hypotension which did not improved with amiodarone 150mg IVP x1 received shocked x1 by external defibrillator with improve in Blood Pressure and mental status. Started on amiodarone drip.  EKG showed He was admitted to CCU. Labs remarkable for FLOYD on CKD and elevated lactate  post ventricular tachycardia and shock. EKG showed STD in inferior lateral leads.    Neuro:  AxOx4  no active issues    Respiratory:  lungs clear  pulse Ox 100% on RA    Cardiovascular:   1) NSTEMI (non-ST elevation myocardial infarction).   -Patient p/w chest pain and ventricular tachycardia s/p shockedx1  -EKG showed  Sinus perla with STD in inferior and lateral lead  -On Aspirin and Plavix  -Continuous cardiac monitoring to monitor for arrhythmias  -Aspirin 81 PO daily, Clopidogrel 75 PO daily, Lipitor 40 mg PO daily  -Low fat DASH diet  -ECHO: 2D ECHO showed EF 60%, severely dilated left atrium. Mild concentric left ventricular hypertrophy. Normal left ventricular systolic function. No segmental wall   motion abnormalities. Mild diastolic dysfunction(Stage I)  -Heparin drip was D/C'd    2) Ventricular tachycardia.   -Patient presented to ER with c/o chest pain found in ventricular tachycardia s/p defibrillatorx1  -On PO Amiodarone load- to complete full 10 grams- will require (400MG q8 for 22 doses), then maintenance 200 mg    -Will monitor for further VT episodes  -Zoll pads at bedside with pacer pads on patient.   -Keep K > 4.2 and magnesium > 2.2  -Cardioversion/defibrillation if unstable.   -BIV PPM interrogation showed  monomorphic ventricular tachycardia at 150-160  -plan to transfer to [facility] for upgrade BiVICD.     3)Chronic diastolic congestive heart failure.   -CXR showed; clear-appear euvolemic  -Continuous cardiac monitoring to r/o arrhythmias.   -Check pro BNP 6899   -Hold  home dose lasix and losartan - as patient has FLOYD on CKD and hypotension  -Daily weight monitoring, Strict I/O, Low sodium DASH diet  -echo showed Ef 60% with normal LV,stageI diastolic dysfunction    4) Paroxysmal atrial fibrillation.   -pAfib on Eliquis  -CHAD2 score: 8  -Currently on heparin drip for ACS-will hold Eliquis till pt on heparin drip  -Continuous tele monitoring  -Follow PT/INR/PTT.   -Monitor lytes and replete as needed.     GI  1)Transaminitis  -On admission, elevated LFT - has now resolved      2)Poor appetite  -on glucerna        1) BPH   -no issue  -c/w flomax 0.4mg Po daily.     2) Stage 3 chronic kidney disease.  -Recent admission for CRI with hyperkalemia   -Creatinine 2.2, trending down from 2.7  -Renal US negative for hydronephrosis  -FLOYD likely due to NSTEMI/VT  -Strict I/O  -Avoid Nephrotoxic Meds/ Agents such as (NSAIDs, IV contrast, Aminoglycosides such as gentamicin, -Gadolinium contrast, Phosphate containing enemas, etc..)  -Adjust Medications according to eGFR  -Renal following - will F/U recs    Endo  1) Diabetes mellitus  -ISS      Heme  1) Anemia   -HgB: 6.6, Recieved 1 U PRBC, post-transufion CBC: 8.0  -Reticulocytosis  -LDH and ferritin WNL  -Decreased TIBC  -Heme following   89 y.o Japanese  male with a PMhx of HTN, HLD, DM, CAD s/p stent to 80% D1 in 2014 ,dHF EF 60% in 2018,Afib on eliquis c/b tachy/perla with BiV PPM( Biotronik) CKD, CVA, Velasquez's esophagus  presents with chest burning, dizziness/ lightheadedness, nausea, weakness with unsteady gait found in ventricular tachycardia and hypotension which did not improved with amiodarone 150mg IVP x1 received shocked x1 by external defibrillator with improve in Blood Pressure and mental status. Started on amiodarone drip.  EKG showed He was admitted to CCU. Labs remarkable for FLOYD on CKD and elevated lactate  post ventricular tachycardia and shock. EKG showed STD in inferior lateral leads.    Neuro:  AxOx4  no active issues    Respiratory:  lungs clear  pulse Ox 100% on RA    Cardiovascular:   1) NSTEMI (non-ST elevation myocardial infarction).   -Patient p/w chest pain and ventricular tachycardia s/p shockedx1  -EKG showed  Sinus perla with STD in inferior and lateral lead  -On Aspirin and Plavix  -Continuous cardiac monitoring to monitor for arrhythmias  -Aspirin 81 PO daily, Clopidogrel 75 PO daily, Lipitor 40 mg PO daily  -Low fat DASH diet  -ECHO: 2D ECHO showed EF 60%, severely dilated left atrium. Mild concentric left ventricular hypertrophy. Normal left ventricular systolic function. No segmental wall   motion abnormalities. Mild diastolic dysfunction(Stage I)  -Heparin drip was D/C'd  -pending [facility] following resolution of FLOYD and LUE hematoma    2) Ventricular tachycardia.   -Patient presented to ER with c/o chest pain found in ventricular tachycardia s/p defibrillatorx1  -On PO Amiodarone load- to complete full 10 grams- will require (400MG q8 for 22 doses), then maintenance 200 mg    -Will monitor for further VT episodes  -Zoll pads at bedside with pacer pads on patient.   -Keep K > 4.2 and magnesium > 2.2  -Cardioversion/defibrillation if unstable.   -BIV PPM interrogation showed  monomorphic ventricular tachycardia at 150-160  -plan to transfer to [facility] for upgrade BiVICD.     3)Chronic diastolic congestive heart failure.   -CXR showed; clear-appear euvolemic  -Continuous cardiac monitoring to r/o arrhythmias.   -Check pro BNP 6899   -Hold  home dose lasix and losartan - as patient has FLOYD on CKD and hypotension  -Daily weight monitoring, Strict I/O, Low sodium DASH diet  -echo showed Ef 60% with normal LV,stageI diastolic dysfunction    4) Paroxysmal atrial fibrillation.   -pAfib on Eliquis  -CHAD2 score: 8  -Currently on heparin drip for ACS-will hold Eliquis till pt on heparin drip  -Continuous tele monitoring  -Follow PT/INR/PTT.   -Monitor lytes and replete as needed.     GI  1)Transaminitis  -On admission, elevated LFT - has now resolved    2)Poor appetite  -on glucerna    3) Consitpation  -last bowel movement on sunday - complaining of cramps  -started bowel regimen: dulcolax suppository, senna, and miralax    VASCULAR  1) Arm Hematoma- unclear etiology  -on PE, large ecchymotic lesions across the flexor areas of the left upper arm  -heparin D/C'd  -Hemoglobin today: 7.5, was 8.0 yesterday  -vascular surgery on board- appreciate recs  -venous duplex: negative for DVT  -non CT of left arm was done: will f/u results  -will obtain arterial duplex as per Vascular recs          1) BPH   -no issue  -c/w flomax 0.4mg Po daily.     2) Stage 3 chronic kidney disease.  -Recent admission for CRI with hyperkalemia   -Creatinine 2.2, trending down from 2.7  -Renal US negative for hydronephrosis  -FLOYD likely due to NSTEMI/VT  -Strict I/O  -Avoid Nephrotoxic Meds/ Agents such as (NSAIDs, IV contrast, Aminoglycosides such as gentamicin, -Gadolinium contrast, Phosphate containing enemas, etc..)  -Adjust Medications according to eGFR  -Renal following - will F/U recs    Endo  1) Diabetes mellitus  -ISS      Heme  1) Anemia   -HgB: 6.6, Recieved 1 U PRBC, post-transufion CBC: 8.0  -Reticulocytosis  -LDH and ferritin WNL  -Decreased TIBC  -Heme following

## 2021-08-19 NOTE — PROGRESS NOTE ADULT - SUBJECTIVE AND OBJECTIVE BOX
Vascular Surgery Progress Note     S: Patient resting comfortably on morning rounds. Pain well-controlled currently.    patient notes improvement in LUE sx    Physical Exam:    General: NAD  Resp: breathing comfortably  Ext: LUE edema soft in upper and lower compartments, nontender  ecchymoses in L forearm  no mass appreciated in LUE  bilateral radial 2+    T(C): 36.6 (08-19-21 @ 04:00), Max: 36.8 (08-18-21 @ 12:00)  HR: 68 (08-19-21 @ 06:00) (63 - 81)  BP: 107/51 (08-19-21 @ 06:00) (91/47 - 114/56)  RR: 17 (08-19-21 @ 06:00) (10 - 28)  SpO2: 100% (08-19-21 @ 06:00) (96% - 100%)    08-17-21 @ 07:01  -  08-18-21 @ 07:00  --------------------------------------------------------  IN: 375 mL / OUT: 740 mL / NET: -365 mL    08-18-21 @ 07:01  -  08-19-21 @ 06:49  --------------------------------------------------------  IN: 820 mL / OUT: 820 mL / NET: 0 mL        LABS:                        8.0    6.61  )-----------( 132      ( 18 Aug 2021 13:06 )             24.5     08-18    136  |  104  |  64<H>  ----------------------------<  171<H>  5.2   |  20<L>  |  2.08<H>    Ca    9.1      18 Aug 2021 03:30  Phos  3.2     08-18  Mg     2.60     08-18    TPro  5.7<L>  /  Alb  3.1<L>  /  TBili  0.3  /  DBili  x   /  AST  23  /  ALT  22  /  AlkPhos  93  08-18

## 2021-08-19 NOTE — PROGRESS NOTE ADULT - SUBJECTIVE AND OBJECTIVE BOX
EP Attending  HISTORY OF PRESENT ILLNESS: HPI:  89 y.o Lithuanian  male with a PMhx of HTN, HLD, DM, CAD s/p stent to  80% D1 in 2014, Cx 60%(no stent) in 2015, Isch CMrEF 40% in 2018, atrial fibrillation on eliquis c/b tachy/perla syndrome s/p BiV PPM( Biotronik) CKD 3, CVA, Velasquez's esophagus recently hospitalized here for CRI and hyperkalemia and was discharge  on 8/12 presents with  compliant of chest burning nausea, dizziness, diaphoresis lightheadedness with unsteady gait and bladder and bowel incontinence started early morning. Patient also compliant of b/l knee pain  and weakness started last night. In ED he was found in ventricular tachycardia and hypotensive to 70s/50s. He received  150mg push amiodarone with no improvement. Patient  became less responsive and hypotensive so he was defibrillated x1 with good response. EKG post- defib showed paced rhythm with improved Blood Pressure and mental status. He was started on Amiodarone drip and admitted to CCU.    EKG: (Magnet applied) SB with STD in II,III,aVF,V4-V6.         (Paced rhythm) : AS/BiV paced.    Labs: Na 133, S creat 3.49,glucose 245,Alk 122, AST 70,ALT 52,proBNP 6899,trop 196,lactate 4.4  (14 Aug 2021 09:51)    Patient feeling better now, s/p cardioversion in the ED.  No prior history of ventricular arrhythmia.  Has history of paroxysmal AFib but was not previously on amiodarone.  At rest, no angina or palpitations, no lightheadedness. Easily fatigued, lives in [place] II-III. A 10 pt ROS is otherwise negative.  Discussed with his sons (Pako and Stan) by telephone- patient is aware he is heading towards ICD upgrade on this admission.    He wants to live longer, and is willing to endure ICD shocks to reach this goal, as his quality of life is otherwise fairly good.    8/17- awaiting result of left upper arm US re: hematoma and swelling.  coronary angiogram pending here. afterwards can schedule PM to ICD upgrade.  8/18- hematoma in left arm.  hgb dropping. patient feels fatigue but no angina.  no VT on telemetry, tolerating oral amiodarone load well.  ICD upgrade deferred until after coronary angiogram, which remains on-hold until Hgb stabilizes.  8/19- uneventful overnight. awaiting arterial duplex and CT of left arm re: blood loss anemia and hematoma.    aMIOdarone    Tablet   Oral   aMIOdarone    Tablet 200 milliGRAM(s) Oral daily  aspirin enteric coated 81 milliGRAM(s) Oral daily  atorvastatin 40 milliGRAM(s) Oral at bedtime  chlorhexidine 4% Liquid 1 Application(s) Topical <User Schedule>  clopidogrel Tablet 75 milliGRAM(s) Oral daily  finasteride 5 milliGRAM(s) Oral daily  glucagon  Injectable 1 milliGRAM(s) IntraMuscular once  insulin lispro (ADMELOG) corrective regimen sliding scale   SubCutaneous three times a day before meals  insulin lispro (ADMELOG) corrective regimen sliding scale   SubCutaneous at bedtime  loratadine 10 milliGRAM(s) Oral daily  polyethylene glycol 3350 17 Gram(s) Oral daily  sodium bicarbonate 650 milliGRAM(s) Oral two times a day  tamsulosin 0.4 milliGRAM(s) Oral at bedtime                            7.6    5.37  )-----------( 124      ( 19 Aug 2021 06:38 )             23.1       08-19    133<L>  |  109<H>  |  52<H>  ----------------------------<  130<H>  4.6   |  19<L>  |  1.87<H>    Ca    8.9      19 Aug 2021 06:38  Phos  2.8     08-19  Mg     2.30     08-19    TPro  5.4<L>  /  Alb  2.9<L>  /  TBili  0.7  /  DBili  x   /  AST  23  /  ALT  18  /  AlkPhos  76  08-19      CARDIAC MARKERS ( 17 Aug 2021 06:05 )  x     / x     / 60 U/L / x     / x          T(C): 36.9 (08-19-21 @ 08:00), Max: 36.9 (08-19-21 @ 08:00)  HR: 72 (08-19-21 @ 08:00) (63 - 81)  BP: 109/51 (08-19-21 @ 08:00) (91/47 - 114/56)  RR: 20 (08-19-21 @ 08:00) (10 - 28)  SpO2: 100% (08-19-21 @ 08:00) (96% - 100%)  Wt(kg): --    I&O's Summary    18 Aug 2021 07:01  -  19 Aug 2021 07:00  --------------------------------------------------------  IN: 1020 mL / OUT: 960 mL / NET: 60 mL      Appearance: Thin elderly male in no acute distress, awake and alert, in good spirits.	  HEENT:   Normal oral mucosa, PERRL, EOMI	  Lymphatic: No lymphadenopathy , no edema  Cardiovascular: Normal S1 S2, No JVD, No murmurs , Peripheral pulses palpable 2+ bilaterally.  Pacemaker left upper chest.  Respiratory: Lungs clear to auscultation, normal effort 	  Gastrointestinal:  Soft, Non-tender, + BS	  Skin: No rashes, No ecchymoses, No cyanosis, warm to touch  Musculoskeletal: Normal range of motion, normal strength  Psychiatry:  Mood & affect appropriate    TELEMETRY: NSR, BiV paced	    ECG: NSR, BiV paced.  Underlying is sinus perla with very-long NV (300+ms).  Echo: normal EF. mild-mod PHTN.  Cath: pending	  LUE US pending.    ASSESSMENT/PLAN: 	89y Male with symptomatic VT requiring ICD shock. Hx reduced EF >35%, tachy-perla syndrome, paroxysmal AFib.  He underwent Biotronik BiV PM implant ~5yrs ago.  He was cardioverted in the ED.    Started oral amiodarone load, with goal of 10 grams (400mg x 25 doses), followed by 400mg daily.  He would like to avoid VT ablation at this time.  Agreeable to PM-->ICD upgrade with addition of an RV ICD lead.  Would like to avoid lead extraction if possible.   Plan to abandon RV pacing lead.  To be scheduled after angiogram.  Maintain K>4, Mg 2.  Anticipate patient xfer to [facility], will schedule once he is there, and consent can be obtained using Danish language iPad video chat.  When ready to consent, call sonStan.  # [phone]    Marcial Guzmán M.D.  Cardiac Electrophysiology    office [phone]  pager [phone]

## 2021-08-19 NOTE — PROGRESS NOTE ADULT - ASSESSMENT
· Assessment	  89 y.o Citizen of [place]  male with a PMhx of HTN, HLD, DM, CAD s/p stent to 80% D1 in 2014 ,dHF EF 60% in 2018,Afib on eliquis c/b tachy/perla with BiV PPM( Biotronik) CKD, CVA, Velasquez's esophagus  presents with chest burning, dizziness/ lightheadedness, nausea, weakness with unsteady gait found in ventricular tachycardia and hypotension which did not improved with amiodarone 150mg IVP x1 received shocked x1 by external defibrillator with improve in Blood Pressure and mental status. Started on amiodarone drip.  EKG showed He was admitted to CCU. Labs remarkable for FLOYD on CKD and elevated lactate  post ventricular tachycardia and shock. EKG showed STD in inferior lateral leads.    VT:    Cardio/EP f/up noted  Will need AICD upgrade    Acute blood loss anemia:  CBC  Cath is on hold

## 2021-08-19 NOTE — PROGRESS NOTE ADULT - SUBJECTIVE AND OBJECTIVE BOX
[facility] - S López / Aroldo S /D Jose/ S Amina/ JUAN DAVID Smith/ Adrian Vega / LUBA Herman/ O Alejandra  service -[phone], office [phone]  ---------------------------------------------------------------------------------------------------------------    Patient seen and examined bedside    Subjective and Objective: overnight events noted. LUE hematoma noted, off heparin now  denied cp/ sob.     Allergies: No Known Allergies      Hospital Medications:   MEDICATIONS  (STANDING):  aMIOdarone    Tablet   Oral   aMIOdarone    Tablet 200 milliGRAM(s) Oral daily  aspirin enteric coated 81 milliGRAM(s) Oral daily  atorvastatin 40 milliGRAM(s) Oral at bedtime  bisacodyl Suppository 10 milliGRAM(s) Rectal every 24 hours  chlorhexidine 4% Liquid 1 Application(s) Topical <User Schedule>  clopidogrel Tablet 75 milliGRAM(s) Oral daily  finasteride 5 milliGRAM(s) Oral daily  glucagon  Injectable 1 milliGRAM(s) IntraMuscular once  insulin lispro (ADMELOG) corrective regimen sliding scale   SubCutaneous three times a day before meals  insulin lispro (ADMELOG) corrective regimen sliding scale   SubCutaneous at bedtime  loratadine 10 milliGRAM(s) Oral daily  polyethylene glycol 3350 17 Gram(s) Oral daily  polyethylene glycol 3350 17 Gram(s) Oral two times a day  senna 2 Tablet(s) Oral at bedtime  sodium bicarbonate 650 milliGRAM(s) Oral two times a day  tamsulosin 0.4 milliGRAM(s) Oral at bedtime      VITALS:  T(F): 97.6 (21 @ 11:53), Max: 98.4 (21 @ 08:00)  HR: 74 (21 @ 16:00)  BP: 111/60 (21 @ 16:00)  RR: 17 (21 @ 16:00)  SpO2: 100% (21 @ 16:00)  Wt(kg): --     @ 07:01  -   @ 07:00  --------------------------------------------------------  IN: 1020 mL / OUT: 960 mL / NET: 60 mL     @ 07:01  -   @ 16:58  --------------------------------------------------------  IN: 200 mL / OUT: 0 mL / NET: 200 mL      PHYSICAL EXAM:  Constitutional: NAD, frail   HEENT: anicteric sclera  Neck: No JVD  Respiratory: CTAB, no wheezes, rales or rhonchi  Cardiovascular: S1, S2, RRR  Gastrointestinal: BS+, soft, NT/ND  Extremities: no pedal edema b/l  Neurological: A/O x 3, no focal deficits  Psychiatric: Normal mood, normal affect  : No river.     LABS:      133<L>  |  109<H>  |  52<H>  ----------------------------<  130<H>  4.6   |  19<L>  |  1.87<H>    Ca    8.9      19 Aug 2021 06:38  Phos  2.8       Mg     2.30         TPro  5.4<L>  /  Alb  2.9<L>  /  TBili  0.7  /  DBili      /  AST  23  /  ALT  18  /  AlkPhos  76      Creatinine Trend: 1.87 <--, 2.08 <--, 1.97 <--, 2.22 <--, 2.73 <--, 2.85 <--, 3.09 <--, 3.49 <--                        7.4    6.70  )-----------( 141      ( 19 Aug 2021 14:45 )             23.5     Urine Studies:  Urinalysis Basic - ( 15 Aug 2021 13:00 )    Color: Brown / Appearance: Turbid / S.013 / pH:   Gluc:  / Ketone: Negative  / Bili: Negative / Urobili: <2 mg/dL   Blood:  / Protein: 100 mg/dL / Nitrite: Negative   Leuk Esterase: Negative / RBC: 74 /HPF / WBC 7 /HPF   Sq Epi:  / Non Sq Epi: 1 /HPF / Bacteria: Negative          RADIOLOGY & ADDITIONAL STUDIES:

## 2021-08-19 NOTE — PROGRESS NOTE ADULT - SUBJECTIVE AND OBJECTIVE BOX
Patient is a 89y old  Male who presents with a chief complaint of chest pain (17 Aug 2021 10:28)      SUBJECTIVE / OVERNIGHT EVENTS:    Events noted.  CONSTITUTIONAL: No fever,  or fatigue  RESPIRATORY: No cough, wheezing,  No shortness of breath  CARDIOVASCULAR: No chest pain, palpitations, dizziness, or leg swelling  GASTROINTESTINAL: No abdominal or epigastric pain. No nausea, vomiting.  NEUROLOGICAL: No headaches,     MEDICATIONS  (STANDING):  aMIOdarone    Tablet   Oral   aMIOdarone    Tablet 200 milliGRAM(s) Oral daily  aspirin enteric coated 81 milliGRAM(s) Oral daily  atorvastatin 40 milliGRAM(s) Oral at bedtime  bisacodyl Suppository 10 milliGRAM(s) Rectal every 24 hours  clopidogrel Tablet 75 milliGRAM(s) Oral daily  epoetin lizbeth-epbx (RETACRIT) Injectable 07789 Unit(s) SubCutaneous once  finasteride 5 milliGRAM(s) Oral daily  glucagon  Injectable 1 milliGRAM(s) IntraMuscular once  insulin lispro (ADMELOG) corrective regimen sliding scale   SubCutaneous three times a day before meals  insulin lispro (ADMELOG) corrective regimen sliding scale   SubCutaneous at bedtime  loratadine 10 milliGRAM(s) Oral daily  polyethylene glycol 3350 17 Gram(s) Oral daily  polyethylene glycol 3350 17 Gram(s) Oral two times a day  senna 2 Tablet(s) Oral at bedtime  sodium bicarbonate 650 milliGRAM(s) Oral two times a day  tamsulosin 0.4 milliGRAM(s) Oral at bedtime    MEDICATIONS  (PRN):        CAPILLARY BLOOD GLUCOSE      POCT Blood Glucose.: 142 mg/dL (20 Aug 2021 00:56)  POCT Blood Glucose.: 181 mg/dL (19 Aug 2021 21:18)  POCT Blood Glucose.: 123 mg/dL (19 Aug 2021 16:40)  POCT Blood Glucose.: 252 mg/dL (19 Aug 2021 11:39)    I&O's Summary    18 Aug 2021 07:01  -  19 Aug 2021 07:00  --------------------------------------------------------  IN: 1020 mL / OUT: 960 mL / NET: 60 mL    19 Aug 2021 07:01  -  20 Aug 2021 02:20  --------------------------------------------------------  IN: 200 mL / OUT: 150 mL / NET: 50 mL        T(C): 37 (08-19-21 @ 21:00), Max: 37 (08-19-21 @ 21:00)  HR: 67 (08-19-21 @ 21:00) (65 - 81)  BP: 104/46 (08-19-21 @ 21:00) (93/47 - 117/69)  RR: 16 (08-19-21 @ 21:00) (10 - 22)  SpO2: 100% (08-19-21 @ 21:00) (96% - 100%)    PHYSICAL EXAM:  GENERAL: NAD  NECK: Supple, No JVD  CHEST/LUNG: Clear to auscultation bilaterally; No wheezing.  HEART: Regular rate and rhythm; No murmurs, rubs, or gallops  ABDOMEN: Soft, Nontender, Nondistended; Bowel sounds present  EXTREMITIES:   No edema  NEUROLOGY: AAO X 3      LABS:                        7.4    6.70  )-----------( 141      ( 19 Aug 2021 14:45 )             23.5     08-19    133<L>  |  109<H>  |  52<H>  ----------------------------<  130<H>  4.6   |  19<L>  |  1.87<H>    Ca    8.9      19 Aug 2021 06:38  Phos  2.8     08-19  Mg     2.30     08-19    TPro  5.4<L>  /  Alb  2.9<L>  /  TBili  0.7  /  DBili  x   /  AST  23  /  ALT  18  /  AlkPhos  76  08-19    PTT - ( 18 Aug 2021 04:52 )  PTT:30.2 sec        CAPILLARY BLOOD GLUCOSE      POCT Blood Glucose.: 142 mg/dL (20 Aug 2021 00:56)  POCT Blood Glucose.: 181 mg/dL (19 Aug 2021 21:18)  POCT Blood Glucose.: 123 mg/dL (19 Aug 2021 16:40)  POCT Blood Glucose.: 252 mg/dL (19 Aug 2021 11:39)        RADIOLOGY & ADDITIONAL TESTS:    Imaging Personally Reviewed:    Consultant(s) Notes Reviewed:      Care Discussed with Consultants/Other Providers:    Dileep Amador MD, CMD, FACP    [address] [place] [address]  Office Tel: [phone]  Cell: [phone]

## 2021-08-19 NOTE — PROGRESS NOTE ADULT - SUBJECTIVE AND OBJECTIVE BOX
PATIENT:  DARRELL YI  [id number]    CHIEF COMPLAINT:  Patient is a 89y old  Male who presents with a chief complaint of chest pain (17 Aug 2021 10:28)      INTERVAL HISTORY/OVERNIGHT EVENTS:      MEDICATIONS:  MEDICATIONS  (STANDING):  aMIOdarone    Tablet   Oral   aMIOdarone    Tablet 200 milliGRAM(s) Oral daily  aspirin enteric coated 81 milliGRAM(s) Oral daily  atorvastatin 40 milliGRAM(s) Oral at bedtime  chlorhexidine 4% Liquid 1 Application(s) Topical <User Schedule>  clopidogrel Tablet 75 milliGRAM(s) Oral daily  finasteride 5 milliGRAM(s) Oral daily  glucagon  Injectable 1 milliGRAM(s) IntraMuscular once  insulin lispro (ADMELOG) corrective regimen sliding scale   SubCutaneous three times a day before meals  insulin lispro (ADMELOG) corrective regimen sliding scale   SubCutaneous at bedtime  loratadine 10 milliGRAM(s) Oral daily  polyethylene glycol 3350 17 Gram(s) Oral daily  sodium bicarbonate 650 milliGRAM(s) Oral two times a day  tamsulosin 0.4 milliGRAM(s) Oral at bedtime    MEDICATIONS  (PRN):      ALLERGIES:  Allergies    No Known Allergies    Intolerances        OBJECTIVE:  ICU Vital Signs Last 24 Hrs  T(C): 36.9 (19 Aug 2021 08:00), Max: 36.9 (19 Aug 2021 08:00)  T(F): 98.4 (19 Aug 2021 08:00), Max: 98.4 (19 Aug 2021 08:00)  HR: 72 (19 Aug 2021 08:00) (63 - 81)  BP: 109/51 (19 Aug 2021 08:00) (91/47 - 114/56)  BP(mean): 69 (19 Aug 2021 08:00) (62 - 99)  ABP: --  ABP(mean): --  RR: 20 (19 Aug 2021 08:00) (10 - 28)  SpO2: 100% (19 Aug 2021 08:00) (96% - 100%)      Adult Advanced Hemodynamics Last 24 Hrs  CVP(mm Hg): --  CVP(cm H2O): --  CO: --  CI: --  PA: --  PA(mean): --  PCWP: --  SVR: --  SVRI: --  PVR: --  PVRI: --  CAPILLARY BLOOD GLUCOSE      POCT Blood Glucose.: 152 mg/dL (18 Aug 2021 21:36)  POCT Blood Glucose.: 177 mg/dL (18 Aug 2021 16:55)  POCT Blood Glucose.: 206 mg/dL (18 Aug 2021 11:34)    CAPILLARY BLOOD GLUCOSE      POCT Blood Glucose.: 152 mg/dL (18 Aug 2021 21:36)    I&O's Summary    18 Aug 2021 07:01  -  19 Aug 2021 07:00  --------------------------------------------------------  IN: 1020 mL / OUT: 960 mL / NET: 60 mL      Daily     Daily Weight in k.7 (19 Aug 2021 06:00)    PHYSICAL EXAMINATION:    LABS:                          7.6    5.37  )-----------( 124      ( 19 Aug 2021 06:38 )             23.1     08-19    133<L>  |  109<H>  |  52<H>  ----------------------------<  130<H>  4.6   |  19<L>  |  1.87<H>    Ca    8.9      19 Aug 2021 06:38  Phos  2.8     08-  Mg     2.30         TPro  5.4<L>  /  Alb  2.9<L>  /  TBili  0.7  /  DBili  x   /  AST  23  /  ALT  18  /  AlkPhos  76  08-19    LIVER FUNCTIONS - ( 19 Aug 2021 06:38 )  Alb: 2.9 g/dL / Pro: 5.4 g/dL / ALK PHOS: 76 U/L / ALT: 18 U/L / AST: 23 U/L / GGT: x           PTT - ( 18 Aug 2021 04:52 )  PTT:30.2 sec            TELEMETRY:     EKG:     IMAGING:       CHIEF COMPLAINT:  Patient is a 89y old  Male who presents with a chief complaint of chest pain (17 Aug 2021 10:28)    INTERVAL HISTORY/OVERNIGHT EVENTS: No acute events overnight. Patient complaining of constipation and mild abdominal cramps. Endorses improvements in arm swelling.     MEDICATIONS:  MEDICATIONS  (STANDING):  aMIOdarone    Tablet   Oral   aMIOdarone    Tablet 200 milliGRAM(s) Oral daily  aspirin enteric coated 81 milliGRAM(s) Oral daily  atorvastatin 40 milliGRAM(s) Oral at bedtime  chlorhexidine 4% Liquid 1 Application(s) Topical <User Schedule>  clopidogrel Tablet 75 milliGRAM(s) Oral daily  finasteride 5 milliGRAM(s) Oral daily  glucagon  Injectable 1 milliGRAM(s) IntraMuscular once  insulin lispro (ADMELOG) corrective regimen sliding scale   SubCutaneous three times a day before meals  insulin lispro (ADMELOG) corrective regimen sliding scale   SubCutaneous at bedtime  loratadine 10 milliGRAM(s) Oral daily  polyethylene glycol 3350 17 Gram(s) Oral daily  sodium bicarbonate 650 milliGRAM(s) Oral two times a day  tamsulosin 0.4 milliGRAM(s) Oral at bedtime    ALLERGIES:  Allergies    No Known Allergies    Intolerances    OBJECTIVE:  ICU Vital Signs Last 24 Hrs  T(C): 36.9 (19 Aug 2021 08:00), Max: 36.9 (19 Aug 2021 08:00)  T(F): 98.4 (19 Aug 2021 08:00), Max: 98.4 (19 Aug 2021 08:00)  HR: 72 (19 Aug 2021 08:00) (63 - 81)  BP: 109/51 (19 Aug 2021 08:00) (91/47 - 114/56)  BP(mean): 69 (19 Aug 2021 08:00) (62 - 99)  ABP: --  ABP(mean): --  RR: 20 (19 Aug 2021 08:00) (10 - 28)  SpO2: 100% (19 Aug 2021 08:00) (96% - 100%)    POCT Blood Glucose.: 152 mg/dL (18 Aug 2021 21:36)  POCT Blood Glucose.: 177 mg/dL (18 Aug 2021 16:55)  POCT Blood Glucose.: 206 mg/dL (18 Aug 2021 11:34)    CAPILLARY BLOOD GLUCOSE    POCT Blood Glucose.: 152 mg/dL (18 Aug 2021 21:36)    I&O's Summary    18 Aug 2021 07:01  -  19 Aug 2021 07:00  --------------------------------------------------------  IN: 1020 mL / OUT: 960 mL / NET: 60 mL      Daily     Daily Weight in k.7 (19 Aug 2021 06:00)    PHYSICAL EXAMINATION:    LABS:                          7.6    5.37  )-----------( 124      ( 19 Aug 2021 06:38 )             23.1     08-    133<L>  |  109<H>  |  52<H>  ----------------------------<  130<H>  4.6   |  19<L>  |  1.87<H>    Ca    8.9      19 Aug 2021 06:38  Phos  2.8     -  Mg     2.30         TPro  5.4<L>  /  Alb  2.9<L>  /  TBili  0.7  /  DBili  x   /  AST  23  /  ALT  18  /  AlkPhos  76  08-19    LIVER FUNCTIONS - ( 19 Aug 2021 06:38 )  Alb: 2.9 g/dL / Pro: 5.4 g/dL / ALK PHOS: 76 U/L / ALT: 18 U/L / AST: 23 U/L / GGT: x           PTT - ( 18 Aug 2021 04:52 )  PTT:30.2 sec

## 2021-08-19 NOTE — PROGRESS NOTE ADULT - ASSESSMENT
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 Started on amiodarone drip.  EKG showed He was admitted to CCU. Labs remarkable for FLOYD on CKD and elevated lactate  post ventricular tachycardia and shock. EKG showed STD in inferior lateral leads. Renal following for FLOYD/CKD Mx.    FLOYD on CKD3: improving  Recent admission for FLOYD on CKD with hyperkalemia S. creat 3.1 > on discharge on 8/12/21 was 1.89,   Cr trend 3.49 >3 > 2.85 >2.7  >2.2 >1.97 >2 >1.87 improved and stable  FLOYD likely 2/2 hemodynamic changes/NSTEMI/arrhythmia  K, vol- acceptable  UA bland  no obstructive uropathy on recent renal sono   lasix and losartan on hold-- no objection to resume lasix   M acidosis 2/2 FLOYD/ckd-better. c/w po Na bicarb 650mg bid 8/14 pm. watch for s/o chf due to Na load  monitor BMP daily   dose all meds for eGFR<15ml/min.   avoid ACEi/ARB/NSAIDs/Nephrotoxics if able.   Intermediate risk for GRACE, explained to pt and son bedside before in detail including the possibility of worsening RFT post cath and if no recovery of RF, may need RRT/dialysis. Both verbalized understanding. no plan for urgent LHC given elevated creatinine and currently chest pain free.   per cardiology held off on cath for now given dropping H/H requiring PRBC transfusion  plan for LHC when Hb stable- pt optimized renal stnad point for cath. pt, family aware of risks of GRACE, may proceed w/informed consent  would recommend mucomyst 1200mg po bid -2doses pre and 2 doses post cath and ivf for 4-6hrs jordi cath if no s/o chf    ANemia in ckd+acute blood loss/hematoma- Hb dropped to 6.6>7.4 s/p PRBC. will add epo 10k subqx1  NSTEMI (non-ST elevation myocardial infarction).  Patient p/w chest pain and ventricular tachycardia s/p shockedx1  off heparin gtt   cardiac monitoring to monitor for arrhythmias  Low fat DASH diet  -TTE with normal LV function     Ventricular tachycardia s/p defibrillatorx1  on Amiodarone gtt   Monitor for further VT episodes  Keep K > 4.0 and magnesium > 2.0  BIV PPM interrogation showed  monomorphic ventricular tachycardia at 150-160 as per chart  f/u EP. plan for upgrade BiVICD after cath and medical optimization  -LUE ultrasound noted with no dvt    Chronic diastolic congestive heart failure. h/o CHF    CXR; clear-appear euvolemic  Holding home dose lasix and losartan 2/2 FLOYD on CKD and hypotension  no objection to resume lasix . as per cardiology  Daily weight monitoring, Strict I/O, Low sodium DASH diet  ECHO noted -Normal left ventricular systolic function. No segmental wall motion abnormalities. Mild-moderate pulmonary hypertension.    Paroxysmal atrial fibrillation.   Currently on heparin drip for ACS-holding Eliquis till pt on heparin drip  Continuous tele monitoring  Rate control per ccu team    will closely follow up.  poc d/w pt, CCU team  labs, chart reviewed  For any question, call:  Cell # [phone]  service# [phone]  office# [phone]

## 2021-08-20 NOTE — PROGRESS NOTE ADULT - SUBJECTIVE AND OBJECTIVE BOX
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      S: no chest pain or sob; ros otherwise negative.     Review of Systems:   Constitutional: [ ] fevers, [ ] chills.   Skin: [ ] dry skin. [ ] rashes.  Psychiatric: [ ] depression, [ ] anxiety.   Gastrointestinal: [ ] BRBPR, [ ] melena.   Neurological: [ ] confusion. [ ] seizures. [ ] shuffling gait.   Ears,Nose,Mouth and Throat: [ ] ear pain [ ] sore throat.   Eyes: [ ] diplopia.   Respiratory: [ ] hemoptysis. [ ] shortness of breath  Cardiovascular: See HPI above  Hematologic/Lymphatic: [ ] anemia. [ ] painful nodes. [ ] prolonged bleeding.   Genitourinary: [ ] hematuria. [ ] flank pain.   Endocrine: [ ] significant change in weight. [ ] intolerance to heat and cold.     Review of systems [x ] otherwise negative, [ ] otherwise unable to obtain    FH: no family history of sudden cardiac death in first degree relatives    SH: [ ] tobacco, [ ] alcohol, [ ] drugs    aMIOdarone    Tablet   Oral   aMIOdarone    Tablet 200 milliGRAM(s) Oral daily  aspirin enteric coated 81 milliGRAM(s) Oral daily  atorvastatin 40 milliGRAM(s) Oral at bedtime  bisacodyl Suppository 10 milliGRAM(s) Rectal every 24 hours  clopidogrel Tablet 75 milliGRAM(s) Oral daily  epoetin lizbeth-epbx (RETACRIT) Injectable 85546 Unit(s) SubCutaneous once  finasteride 5 milliGRAM(s) Oral daily  glucagon  Injectable 1 milliGRAM(s) IntraMuscular once  insulin lispro (ADMELOG) corrective regimen sliding scale   SubCutaneous three times a day before meals  insulin lispro (ADMELOG) corrective regimen sliding scale   SubCutaneous at bedtime  loratadine 10 milliGRAM(s) Oral daily  polyethylene glycol 3350 17 Gram(s) Oral daily  polyethylene glycol 3350 17 Gram(s) Oral two times a day  senna 2 Tablet(s) Oral at bedtime  sodium bicarbonate 650 milliGRAM(s) Oral two times a day  tamsulosin 0.4 milliGRAM(s) Oral at bedtime                            7.4    5.00  )-----------( 141      ( 20 Aug 2021 07:56 )             23.1       08-20    138  |  108<H>  |  51<H>  ----------------------------<  120<H>  4.7   |  21<L>  |  1.88<H>    Ca    8.8      20 Aug 2021 07:56  Phos  2.8     08-20  Mg     2.40     08-20    TPro  5.4<L>  /  Alb  2.9<L>  /  TBili  0.7  /  DBili  x   /  AST  23  /  ALT  18  /  AlkPhos  76  08-19      T(C): 36.7 (08-20-21 @ 09:00), Max: 37 (08-19-21 @ 21:00)  HR: 69 (08-20-21 @ 09:00) (65 - 78)  BP: 109/58 (08-20-21 @ 09:00) (97/50 - 120/56)  RR: 16 (08-20-21 @ 09:00) (14 - 21)  SpO2: 99% (08-20-21 @ 09:00) (99% - 100%)    General: Well nourished in no acute distress. Alert and Oriented * 3.   Head: Normocephalic and atraumatic.   Neck: No JVD. No bruits. Supple. Does not appear to be enlarged.   Cardiovascular: + S1,S2 ; RRR Soft systolic murmur at the left lower sternal border. No rubs noted.    Lungs: CTA b/l. No rhonchi, rales or wheezes.   Abdomen: + BS, soft. Non tender. Non distended. No rebound. No guarding.   Extremities: L radial pulse 2+, LUE hematoma  Neurologic: Moves all four extremities. Full range of motion.   Skin: Warm and moist. The patient's skin has normal elasticity and good skin turgor.   Psychiatric: Appropriate mood and affect.  Musculoskeletal: Normal range of motion, normal strength    Tele:     TTE: < from: Transthoracic Echocardiogram (08.14.21 @ 14:06) >  Normal left ventricular systolic function. No segmental  wall motion abnormalities.  Mild-moderate pulmonary hypertension.  A device wire is noted in the right heart.    < end of copied text >    < from: [facility] Duplex Ext Veins Upper Comp, Left. (08.17.21 @ 12:43) >  Summary/Impressions:  No evidence of deep vein thrombosis in the left upper  extremity.    < end of copied text >      < from: CT Upper Extremity No Cont, Left (08.19.21 @ 09:25) >  IMPRESSION:    1.  Mild enlargement of the triceps muscle suspicious for intramuscular hematoma.  2.  Amorphous appearance of the flexor musculature the forearm is nonspecific but can be seen with myositis or intramuscular hematoma.  3.  Moderate soft tissue swelling about the upper extremity, nonspecific but can be seen with cellulitis in the proper clinical setting.  4.  Nodular opacities within the left upper lobe. Small pleural effusion and atelectasis. Dedicated CT the chest can be performed for more detailed evaluation.    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     -continue with amiodarone for VT per EP - no further episodes of VT   -keep K > 4, Mg > 2  -TTE with normal LV function   -EP eval with Dr. Villatoro/Arielle for AICD upgrade appreciated - will need cath and medical optimization prior to upgrade   -will eventually plan for cardiac cath when renal function allows and H/H stable  -LUE ultrasound noted with no dvt  -LUE remains swollen - vascular surgery consult appreciated, CT noted  -heme eval called for anemia and thrombocytopenia appreciated   -renal follow up   -heparin dc due to dropping h/h - h/h stable but low - check repeat stool guiac   -check CBC Q12  -supportive care per CCU       Date of service: 08/20/21    chief complaint: chest pain     extended hpi: 89 y.o male with a PMhx of HTN, HLD, DM, CAD s/p stent to  80% D1 in 2014, with cath in 2015 demonstrating patent stents with mild to moderate non-obstructive cad, Isch CMrEF 40% in 2018, atrial fibrillation on eliquis 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      S: no chest pain or sob; ros otherwise negative.     Review of Systems:   Constitutional: [ ] fevers, [ ] chills.   Skin: [ ] dry skin. [ ] rashes.  Psychiatric: [ ] depression, [ ] anxiety.   Gastrointestinal: [ ] BRBPR, [ ] melena.   Neurological: [ ] confusion. [ ] seizures. [ ] shuffling gait.   Ears,Nose,Mouth and Throat: [ ] ear pain [ ] sore throat.   Eyes: [ ] diplopia.   Respiratory: [ ] hemoptysis. [ ] shortness of breath  Cardiovascular: See HPI above  Hematologic/Lymphatic: [ ] anemia. [ ] painful nodes. [ ] prolonged bleeding.   Genitourinary: [ ] hematuria. [ ] flank pain.   Endocrine: [ ] significant change in weight. [ ] intolerance to heat and cold.     Review of systems [x ] otherwise negative, [ ] otherwise unable to obtain    FH: no family history of sudden cardiac death in first degree relatives    SH: [ ] tobacco, [ ] alcohol, [ ] drugs    aMIOdarone    Tablet   Oral   aMIOdarone    Tablet 200 milliGRAM(s) Oral daily  aspirin enteric coated 81 milliGRAM(s) Oral daily  atorvastatin 40 milliGRAM(s) Oral at bedtime  bisacodyl Suppository 10 milliGRAM(s) Rectal every 24 hours  clopidogrel Tablet 75 milliGRAM(s) Oral daily  epoetin lizbeth-epbx (RETACRIT) Injectable 93776 Unit(s) SubCutaneous once  finasteride 5 milliGRAM(s) Oral daily  glucagon  Injectable 1 milliGRAM(s) IntraMuscular once  insulin lispro (ADMELOG) corrective regimen sliding scale   SubCutaneous three times a day before meals  insulin lispro (ADMELOG) corrective regimen sliding scale   SubCutaneous at bedtime  loratadine 10 milliGRAM(s) Oral daily  polyethylene glycol 3350 17 Gram(s) Oral daily  polyethylene glycol 3350 17 Gram(s) Oral two times a day  senna 2 Tablet(s) Oral at bedtime  sodium bicarbonate 650 milliGRAM(s) Oral two times a day  tamsulosin 0.4 milliGRAM(s) Oral at bedtime                            7.4    5.00  )-----------( 141      ( 20 Aug 2021 07:56 )             23.1       08-20    138  |  108<H>  |  51<H>  ----------------------------<  120<H>  4.7   |  21<L>  |  1.88<H>    Ca    8.8      20 Aug 2021 07:56  Phos  2.8     08-20  Mg     2.40     08-20    TPro  5.4<L>  /  Alb  2.9<L>  /  TBili  0.7  /  DBili  x   /  AST  23  /  ALT  18  /  AlkPhos  76  08-19      T(C): 36.7 (08-20-21 @ 09:00), Max: 37 (08-19-21 @ 21:00)  HR: 69 (08-20-21 @ 09:00) (65 - 78)  BP: 109/58 (08-20-21 @ 09:00) (97/50 - 120/56)  RR: 16 (08-20-21 @ 09:00) (14 - 21)  SpO2: 99% (08-20-21 @ 09:00) (99% - 100%)    General: Well nourished in no acute distress. Alert and Oriented * 3.   Head: Normocephalic and atraumatic.   Neck: No JVD. No bruits. Supple. Does not appear to be enlarged.   Cardiovascular: + S1,S2 ; RRR Soft systolic murmur at the left lower sternal border. No rubs noted.    Lungs: CTA b/l. No rhonchi, rales or wheezes.   Abdomen: + BS, soft. Non tender. Non distended. No rebound. No guarding.   Extremities: L radial pulse 2+, LUE hematoma  Neurologic: Moves all four extremities. Full range of motion.   Skin: Warm and moist. The patient's skin has normal elasticity and good skin turgor.   Psychiatric: Appropriate mood and affect.  Musculoskeletal: Normal range of motion, normal strength    Tele:     TTE: < from: Transthoracic Echocardiogram (08.14.21 @ 14:06) >  Normal left ventricular systolic function. No segmental  wall motion abnormalities.  Mild-moderate pulmonary hypertension.  A device wire is noted in the right heart.    < end of copied text >    < from: VA Duplex Ext Veins Upper Comp, Left. (08.17.21 @ 12:43) >  Summary/Impressions:  No evidence of deep vein thrombosis in the left upper  extremity.    < end of copied text >      < from: CT Upper Extremity No Cont, Left (08.19.21 @ 09:25) >  IMPRESSION:    1.  Mild enlargement of the triceps muscle suspicious for intramuscular hematoma.  2.  Amorphous appearance of the flexor musculature the forearm is nonspecific but can be seen with myositis or intramuscular hematoma.  3.  Moderate soft tissue swelling about the upper extremity, nonspecific but can be seen with cellulitis in the proper clinical setting.  4.  Nodular opacities within the left upper lobe. Small pleural effusion and atelectasis. Dedicated CT the chest can be performed for more detailed evaluation.    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     -continue with amiodarone for VT per EP - no further episodes of VT   -keep K > 4, Mg > 2  -TTE with normal LV function   -EP eval with Dr. Villatoro/Arielle for AICD upgrade appreciated - will need cath and medical optimization prior to upgrade   -will eventually plan for cardiac cath when renal function allows and H/H stable  -LUE ultrasound noted with no dvt  -LUE remains swollen - vascular surgery consult appreciated, CT noted  -heme eval called for anemia and thrombocytopenia appreciated   -renal follow up   -heparin dc due to dropping h/h - h/h stable but low - check repeat stool guiac   -check CBC Q12  -supportive care per CCU

## 2021-08-20 NOTE — PROGRESS NOTE ADULT - SUBJECTIVE AND OBJECTIVE BOX
[facility] - S López / Aroldo S /D Jose/ JUAN DAVID Huynh/ JUAN DAVID Smith/ Adrian Vega / LUBA Alvau/ O Alejandra  service -[phone], office [phone]  ---------------------------------------------------------------------------------------------------------------    Patient seen and examined bedside    Subjective and Objective: No overnight events, denied cp/sob. No complaints today. feeling better  xfered out of CCU    Allergies: No Known Allergies      Hospital Medications:   MEDICATIONS  (STANDING):  aMIOdarone    Tablet   Oral   aMIOdarone    Tablet 200 milliGRAM(s) Oral daily  aspirin enteric coated 81 milliGRAM(s) Oral daily  atorvastatin 40 milliGRAM(s) Oral at bedtime  bisacodyl Suppository 10 milliGRAM(s) Rectal every 24 hours  clopidogrel Tablet 75 milliGRAM(s) Oral daily  epoetin lizbeth-epbx (RETACRIT) Injectable 13861 Unit(s) SubCutaneous once  finasteride 5 milliGRAM(s) Oral daily  glucagon  Injectable 1 milliGRAM(s) IntraMuscular once  insulin lispro (ADMELOG) corrective regimen sliding scale   SubCutaneous three times a day before meals  insulin lispro (ADMELOG) corrective regimen sliding scale   SubCutaneous at bedtime  loratadine 10 milliGRAM(s) Oral daily  polyethylene glycol 3350 17 Gram(s) Oral daily  polyethylene glycol 3350 17 Gram(s) Oral two times a day  senna 2 Tablet(s) Oral at bedtime  sodium bicarbonate 650 milliGRAM(s) Oral two times a day  tamsulosin 0.4 milliGRAM(s) Oral at bedtime    VITALS:  T(F): 98.3 (21 @ 13:00), Max: 98.6 (21 @ 21:00)  HR: 75 (21 @ 13:00)  BP: 114/57 (21 @ 13:00)  RR: 18 (21 @ 13:00)  SpO2: 100% (21 @ 13:00)  Wt(kg): --     @ 07:01  -   @ 07:00  --------------------------------------------------------  IN: 407 mL / OUT: 300 mL / NET: 107 mL      PHYSICAL EXAM:  Constitutional: NAD, frail   HEENT: anicteric sclera  Neck: No JVD  Respiratory: CTAB, no wheezes, rales or rhonchi  Cardiovascular: S1, S2, RRR  Gastrointestinal: BS+, soft, NT/ND  Extremities: no pedal edema b/l  Neurological: A/O x 3, no focal deficits  Psychiatric: Normal mood, normal affect  : No river.     LABS:      138  |  108<H>  |  51<H>  ----------------------------<  120<H>  4.7   |  21<L>  |  1.88<H>    Ca    8.8      20 Aug 2021 07:56  Phos  2.8       Mg     2.40         TPro  5.4<L>  /  Alb  2.9<L>  /  TBili  0.7  /  DBili      /  AST  23  /  ALT  18  /  AlkPhos  76      Creatinine Trend: 1.88 <--, 1.87 <--, 2.08 <--, 1.97 <--, 2.22 <--, 2.73 <--, 2.85 <--, 3.09 <--, 3.49 <--                        7.4    5.00  )-----------( 141      ( 20 Aug 2021 07:56 )             23.1     Urine Studies:  Urinalysis Basic - ( 15 Aug 2021 13:00 )    Color: Brown / Appearance: Turbid / S.013 / pH:   Gluc:  / Ketone: Negative  / Bili: Negative / Urobili: <2 mg/dL   Blood:  / Protein: 100 mg/dL / Nitrite: Negative   Leuk Esterase: Negative / RBC: 74 /HPF / WBC 7 /HPF   Sq Epi:  / Non Sq Epi: 1 /HPF / Bacteria: Negative          RADIOLOGY & ADDITIONAL STUDIES:

## 2021-08-20 NOTE — PROGRESS NOTE ADULT - ASSESSMENT
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 Started on amiodarone drip.  EKG showed He was admitted to CCU. Labs remarkable for FLOYD on CKD and elevated lactate  post ventricular tachycardia and shock. EKG showed STD in inferior lateral leads. Renal following for FLOYD/CKD Mx.    FLOYD on CKD3: improving  Recent admission for FLOYD on CKD with hyperkalemia S. creat 3.1 > on discharge on 8/12/21 was 1.89,   Cr trend 3.49 >3 > 2.85 >2.7  >2.2 >1.97 >2 >1.87 >1.88 improved and stable  FLOYD likely 2/2 hemodynamic changes/NSTEMI/arrhythmia  K, vol- acceptable  UA bland  no obstructive uropathy on recent renal sono   lasix and losartan on hold-- no objection to resume lasix   M acidosis 2/2 FLOYD/ckd-better. c/w po Na bicarb 650mg bid 8/14 pm. watch for s/o chf due to Na load  monitor BMP daily   dose all meds for eGFR<15ml/min.   avoid ACEi/ARB/NSAIDs/Nephrotoxics if able.   Intermediate risk for GRACE, explained to pt and son bedside before in detail including the possibility of worsening RFT post cath and if no recovery of RF, may need RRT/dialysis. Both verbalized understanding. no plan for urgent LHC given elevated creatinine and currently chest pain free.   per cardiology held off on cath for now given dropping H/H requiring PRBC transfusion  d/w cardiology Attending -plan for LHC for mid next week- pt optimized renal stand point for cath. pt, family aware of risks of GRACE  would recommend mucomyst 1200mg po bid -2doses pre and 2 doses post cath and ivf for 4-6hrs jordi cath if no s/o chf    ANemia in ckd+acute blood loss/hematoma- Hb dropped to 6.6>7.4 s/p PRBC. added epo 10k subqx1 8/19  NSTEMI (non-ST elevation myocardial infarction).  Patient p/w chest pain and ventricular tachycardia s/p shockedx1  off heparin gtt   cardiac monitoring to monitor for arrhythmias  Low fat DASH diet  -TTE with normal LV function     Ventricular tachycardia s/p defibrillatorx1  on Amiodarone gtt   Monitor for further VT episodes  Keep K > 4.0 and magnesium > 2.0  BIV PPM interrogation showed  monomorphic ventricular tachycardia at 150-160 as per chart  f/u EP. plan for upgrade BiVICD after cath and medical optimization  -LUE ultrasound noted with no dvt    Chronic diastolic congestive heart failure. h/o CHF    CXR; clear-appear euvolemic  Holding home dose lasix and losartan 2/2 FLOYD on CKD and hypotension  no objection to resume lasix . as per cardiology  Daily weight monitoring, Strict I/O, Low sodium DASH diet  ECHO noted -Normal left ventricular systolic function. No segmental wall motion abnormalities. Mild-moderate pulmonary hypertension.    Paroxysmal atrial fibrillation.   Currently on heparin drip for ACS-holding Eliquis till pt on heparin drip  Continuous tele monitoring  Rate control per ccu team    will closely follow up.  poc d/w pt, Dr Acosta  labs, chart reviewed  For any question, call:  Cell # [phone]  service# [phone]  office# [phone]

## 2021-08-20 NOTE — PROGRESS NOTE ADULT - SUBJECTIVE AND OBJECTIVE BOX
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 Patient also compliant of b/l knee pain  and weakness started last night. In ED he was found in ventricular tachycardia and hypotensive to 70s/50s. He received  150mg push amiodarone with no improvement. Patient  became less responsive and hypotensive so he was defibrillated x1 with good response. EKG post- defib showed paced rhythm with improved Blood Pressure and mental status. He was started on Amiodarone drip and admitted to CCU.    EKG: (Magnet applied) SB with STD in II,III,aVF,V4-V6.         (Paced rhythm) : AS/BiV paced.    Labs: Na 133, S creat 3.49,glucose 245,Alk 122, AST 70,ALT 52,proBNP 6899,trop 196,lactate 4.4  (14 Aug 2021 09:51)    Patient feeling better now, s/p cardioversion in the ED.  No prior history of ventricular arrhythmia.  Has history of paroxysmal AFib but was not previously on amiodarone.  At rest, no angina or palpitations, no lightheadedness. Easily fatigued, lives in [place] II-III. A 10 pt ROS is otherwise negative.  Discussed with his sons (Pako and Stan) by telephone- patient is aware he is heading towards ICD upgrade on this admission.    He wants to live longer, and is willing to endure ICD shocks to reach this goal, as his quality of life is otherwise fairly good.    8/17- awaiting result of left upper arm US re: hematoma and swelling.  coronary angiogram pending here. afterwards can schedule PM to ICD upgrade.  8/18- hematoma in left arm.  hgb dropping. patient feels fatigue but no angina.  no VT on telemetry, tolerating oral amiodarone load well.  ICD upgrade deferred until after coronary angiogram, which remains on-hold until Hgb stabilizes.  8/19- uneventful overnight. awaiting arterial duplex and CT of left arm re: blood loss anemia and hematoma.  8/20- uneventful overnight.  awaiting coronary angio, next week?  feels well.    aMIOdarone    Tablet   Oral   aMIOdarone    Tablet 200 milliGRAM(s) Oral daily  aspirin enteric coated 81 milliGRAM(s) Oral daily  atorvastatin 40 milliGRAM(s) Oral at bedtime  bisacodyl Suppository 10 milliGRAM(s) Rectal every 24 hours  clopidogrel Tablet 75 milliGRAM(s) Oral daily  epoetin lizbeth-epbx (RETACRIT) Injectable 27487 Unit(s) SubCutaneous once  finasteride 5 milliGRAM(s) Oral daily  glucagon  Injectable 1 milliGRAM(s) IntraMuscular once  insulin lispro (ADMELOG) corrective regimen sliding scale   SubCutaneous three times a day before meals  insulin lispro (ADMELOG) corrective regimen sliding scale   SubCutaneous at bedtime  loratadine 10 milliGRAM(s) Oral daily  polyethylene glycol 3350 17 Gram(s) Oral daily  polyethylene glycol 3350 17 Gram(s) Oral two times a day  senna 2 Tablet(s) Oral at bedtime  sodium bicarbonate 650 milliGRAM(s) Oral two times a day  tamsulosin 0.4 milliGRAM(s) Oral at bedtime                            7.4    5.00  )-----------( 141      ( 20 Aug 2021 07:56 )             23.1       08-20    138  |  108<H>  |  51<H>  ----------------------------<  120<H>  4.7   |  21<L>  |  1.88<H>    Ca    8.8      20 Aug 2021 07:56  Phos  2.8     08-20  Mg     2.40     08-20    TPro  5.4<L>  /  Alb  2.9<L>  /  TBili  0.7  /  DBili  x   /  AST  23  /  ALT  18  /  AlkPhos  76  08-19    T(C): 36.7 (08-20-21 @ 09:00), Max: 37 (08-19-21 @ 21:00)  HR: 69 (08-20-21 @ 09:00) (65 - 78)  BP: 109/58 (08-20-21 @ 09:00) (97/50 - 120/56)  RR: 16 (08-20-21 @ 09:00) (14 - 21)  SpO2: 99% (08-20-21 @ 09:00) (99% - 100%)  Wt(kg): --    I&O's Summary    19 Aug 2021 07:01  -  20 Aug 2021 07:00  --------------------------------------------------------  IN: 407 mL / OUT: 300 mL / NET: 107 mL    Appearance: Thin elderly male in no acute distress, awake and alert, in good spirits.	  HEENT:   Normal oral mucosa, PERRL, EOMI	  Lymphatic: No lymphadenopathy , no edema  Cardiovascular: Normal S1 S2, No JVD, No murmurs , Peripheral pulses palpable 2+ bilaterally.  Pacemaker left upper chest.  Respiratory: Lungs clear to auscultation, normal effort 	  Gastrointestinal:  Soft, Non-tender, + BS	  Skin: No rashes, No ecchymoses, No cyanosis, warm to touch  Musculoskeletal: Normal range of motion, normal strength  Psychiatry:  Mood & affect appropriate    TELEMETRY: NSR, BiV paced	    ECG: NSR, BiV paced.  Underlying is sinus perla with very-long IN (300+ms).  Echo: normal EF. mild-mod PHTN.  Cath: pending	  LUE US pending.    ASSESSMENT/PLAN: 	89y Male with symptomatic VT requiring ICD shock. Hx reduced EF >35%, tachy-perla syndrome, paroxysmal AFib.  He underwent Biotronik BiV PM implant ~5yrs ago.  He was cardioverted in the ED.    Started oral amiodarone load, with goal of 10 grams (400mg x 25 doses), followed by 400mg daily.  He would like to avoid VT ablation at this time.  Agreeable to PM-->ICD upgrade with addition of an RV ICD lead.  Would like to avoid lead extraction if possible.   Plan to abandon RV pacing lead.  To be scheduled after angiogram.  Maintain K>4, Mg 2.  Anticipate patient xfer to [facility], will schedule once he is there, and consent can be obtained using Citizen of [place] language iPad video chat.  When ready to consent, call son, Stan Arguelles.  # [phone]    Marcial Guzmán M.D.  Cardiac Electrophysiology    office [phone]  pager [phone]

## 2021-08-20 NOTE — PROGRESS NOTE ADULT - ATTENDING COMMENTS
Patient seen and examined.  Agree with above.   Will hold off cath for now given dropping H/H requiring PRBC transfusion  Non con ct with hematoma - follow up vascular   Continue with amiodarone  Supportive care per CCU    Estelle Ayala MD
Patient seen and examined.  Agree with above.   Stool guiac negative   Monitor H/H  Will hold off on cath until anemia workup complete  Follow up vascular    Estelle Ayala MD

## 2021-08-20 NOTE — PROGRESS NOTE ADULT - SUBJECTIVE AND OBJECTIVE BOX
Patient is a 89y old  Male who presents with a chief complaint of chest pain (17 Aug 2021 10:28)      SUBJECTIVE / OVERNIGHT EVENTS:    Events noted.  CONSTITUTIONAL: No fever,  or fatigue  RESPIRATORY: No cough, wheezing,  No shortness of breath  CARDIOVASCULAR: No chest pain, palpitations, dizziness, or leg swelling  GASTROINTESTINAL: No abdominal or epigastric pain. No nausea, vomiting.  NEUROLOGICAL: No headaches,     MEDICATIONS  (STANDING):  aMIOdarone    Tablet   Oral   aMIOdarone    Tablet 200 milliGRAM(s) Oral daily  aspirin enteric coated 81 milliGRAM(s) Oral daily  atorvastatin 40 milliGRAM(s) Oral at bedtime  bisacodyl Suppository 10 milliGRAM(s) Rectal every 24 hours  clopidogrel Tablet 75 milliGRAM(s) Oral daily  epoetin lizbeth-epbx (RETACRIT) Injectable 25294 Unit(s) SubCutaneous once  finasteride 5 milliGRAM(s) Oral daily  glucagon  Injectable 1 milliGRAM(s) IntraMuscular once  insulin lispro (ADMELOG) corrective regimen sliding scale   SubCutaneous three times a day before meals  insulin lispro (ADMELOG) corrective regimen sliding scale   SubCutaneous at bedtime  loratadine 10 milliGRAM(s) Oral daily  polyethylene glycol 3350 17 Gram(s) Oral two times a day  senna 2 Tablet(s) Oral at bedtime  sodium bicarbonate 650 milliGRAM(s) Oral two times a day  tamsulosin 0.4 milliGRAM(s) Oral at bedtime    MEDICATIONS  (PRN):        CAPILLARY BLOOD GLUCOSE      POCT Blood Glucose.: 151 mg/dL (20 Aug 2021 21:49)  POCT Blood Glucose.: 197 mg/dL (20 Aug 2021 17:43)  POCT Blood Glucose.: 173 mg/dL (20 Aug 2021 12:35)  POCT Blood Glucose.: 139 mg/dL (20 Aug 2021 08:40)  POCT Blood Glucose.: 142 mg/dL (20 Aug 2021 00:56)    I&O's Summary    19 Aug 2021 07:01  -  20 Aug 2021 07:00  --------------------------------------------------------  IN: 407 mL / OUT: 300 mL / NET: 107 mL    20 Aug 2021 07:01  -  21 Aug 2021 00:52  --------------------------------------------------------  IN: 536 mL / OUT: 1000 mL / NET: -464 mL        T(C): 36.8 (08-20-21 @ 22:40), Max: 36.8 (08-20-21 @ 01:00)  HR: 68 (08-20-21 @ 22:40) (68 - 78)  BP: 105/51 (08-20-21 @ 22:40) (105/51 - 120/56)  RR: 18 (08-20-21 @ 22:40) (16 - 18)  SpO2: 100% (08-20-21 @ 22:40) (99% - 100%)    PHYSICAL EXAM:  GENERAL: NAD  NECK: Supple, No JVD  CHEST/LUNG: Clear to auscultation bilaterally; No wheezing.  HEART: Regular rate and rhythm; No murmurs, rubs, or gallops  ABDOMEN: Soft, Nontender, Nondistended; Bowel sounds present  EXTREMITIES:   No edema  NEUROLOGY: AAO X 3      LABS:                        7.7    5.94  )-----------( 142      ( 20 Aug 2021 14:57 )             23.9     08-20    138  |  108<H>  |  51<H>  ----------------------------<  120<H>  4.7   |  21<L>  |  1.88<H>    Ca    8.8      20 Aug 2021 07:56  Phos  2.8     08-20  Mg     2.40     08-20    TPro  5.4<L>  /  Alb  2.9<L>  /  TBili  0.7  /  DBili  x   /  AST  23  /  ALT  18  /  AlkPhos  76  08-19            CAPILLARY BLOOD GLUCOSE      POCT Blood Glucose.: 151 mg/dL (20 Aug 2021 21:49)  POCT Blood Glucose.: 197 mg/dL (20 Aug 2021 17:43)  POCT Blood Glucose.: 173 mg/dL (20 Aug 2021 12:35)  POCT Blood Glucose.: 139 mg/dL (20 Aug 2021 08:40)  POCT Blood Glucose.: 142 mg/dL (20 Aug 2021 00:56)        RADIOLOGY & ADDITIONAL TESTS:    Imaging Personally Reviewed:    Consultant(s) Notes Reviewed:      Care Discussed with Consultants/Other Providers:    Dileep Amador MD, CMD, FACP    [address] [place] [address]  Office Tel: [phone]  Cell: [phone]

## 2021-08-20 NOTE — PROGRESS NOTE ADULT - ASSESSMENT
· Assessment	  89 y.o [place]  male with a PMhx of HTN, HLD, DM, CAD s/p stent to 80% D1 in 2014 ,dHF EF 60% in 2018,Afib on eliquis c/b tachy/perla with BiV PPM( Biotronik) CKD, CVA, Velasquez's esophagus  presents with chest burning, dizziness/ lightheadedness, nausea, weakness with unsteady gait found in ventricular tachycardia and hypotension which did not improved with amiodarone 150mg IVP x1 received shocked x1 by external defibrillator with improve in Blood Pressure and mental status. Started on amiodarone drip.  EKG showed He was admitted to CCU. Labs remarkable for FLOYD on CKD and elevated lactate  post ventricular tachycardia and shock. EKG showed STD in inferior lateral leads.    VT:    Cardio/EP f/up noted  Will need AICD upgrade    Acute blood loss anemia:  CBC  Cath at NS.

## 2021-08-21 NOTE — CONSULT NOTE ADULT - ASSESSMENT
89 year old male with multiple cardiac issues now with VT and FLOYD    Anemia   Appears stable   Keep on PPI BID   Monitor CBC   Keep hemoglobin > 8     CAD     ASA and Plavix as per primary cardiology team     I will follow closely          I was physically present for the key portions of the evaluation and management (E/M) service provided.  The patient was personally seen and examined at bedside.  I have edited the note as appropriate.   Thank you for your consultation and allowing  me to participate in the care of your patients. If you have further questions please contact me at [phone].     Davin Baptiste M.D.       _________________________________________________________________________________________________  [facility]  [address] [facility] [place] [address] [address]  Office: [phone]    Luis Enrique Lau PA-C    ___________________________________________________________________________________________________

## 2021-08-21 NOTE — PHYSICAL THERAPY INITIAL EVALUATION ADULT - PLANNED THERAPY INTERVENTIONS, PT EVAL
Patient left sitting in chair, in NAD, call bell in reach, all lines intact. Son and Nursing staff at bedside./balance training/bed mobility training/gait training/ROM/strengthening/transfer training

## 2021-08-21 NOTE — PROGRESS NOTE ADULT - SUBJECTIVE AND OBJECTIVE BOX
Patient is a 89y Male ADMITTED WITH FLOYD AND HIGH K   FEELS OK  THIS AM   ABLE TO AMBULATE AS WELL   HAD BM THIS AM  AND NOTED MIXED WITH BLOOD!!    No Known Allergies    Hospital Medications:   MEDICATIONS  (STANDING):  aMIOdarone    Tablet   Oral   aMIOdarone    Tablet 200 milliGRAM(s) Oral daily  aspirin enteric coated 81 milliGRAM(s) Oral daily  atorvastatin 40 milliGRAM(s) Oral at bedtime  bisacodyl Suppository 10 milliGRAM(s) Rectal every 24 hours  clopidogrel Tablet 75 milliGRAM(s) Oral daily  epoetin lizbeth-epbx (RETACRIT) Injectable 44025 Unit(s) SubCutaneous once  finasteride 5 milliGRAM(s) Oral daily  glucagon  Injectable 1 milliGRAM(s) IntraMuscular once  insulin lispro (ADMELOG) corrective regimen sliding scale   SubCutaneous three times a day before meals  insulin lispro (ADMELOG) corrective regimen sliding scale   SubCutaneous at bedtime  loratadine 10 milliGRAM(s) Oral daily  polyethylene glycol 3350 17 Gram(s) Oral two times a day  senna 2 Tablet(s) Oral at bedtime  sodium bicarbonate 650 milliGRAM(s) Oral two times a day  tamsulosin 0.4 milliGRAM(s) Oral at bedtime    REVIEW OF SYSTEMS:  HAS NO   FEVER  CHILLS   NO   SOB  OR  COUGH   NO CH  PAIN  / PALPITATIONS   APPETITE IS  GOOD, NO  N/V  OR  ABD  PAIN  VOIDING  WELL   NO LEG  SWELLING    VITALS:  T(F): 98.1 (21 @ 13:00), Max: 98.3 (21 @ 22:40)  HR: 73 (21 @ 13:00)  BP: 109/58 (21 @ 13:00)  RR: 17 (21 @ 13:00)  SpO2: 100% (21 @ 13:00)  Wt(kg): --     @ 07:01  -   @ 07:00  --------------------------------------------------------  IN: 536 mL / OUT: 1000 mL / NET: -464 mL        PHYSICAL EXAM:  Constitutional: NAD  HEENT: CONJ PALE  Neck: No JVD  Respiratory: CTAB, no wheezes, rales or rhonchi  Cardiovascular: S1, S2, RRR  Gastrointestinal: BS+, soft, NT/ND  Extremities:  No peripheral edema  Neurological: A/O x 3,   :  No river.         LABS:      137  |  108<H>  |  46<H>  ----------------------------<  136<H>  4.4   |  19<L>  |  1.78<H>    Ca    8.6      21 Aug 2021 06:41  Phos  2.8     08-  Mg     2.40     08-    TPro  5.3<L>  /  Alb  2.7<L>  /  TBili  0.8  /  DBili      /  AST  20  /  ALT  17  /  AlkPhos  73      Creatinine Trend: 1.78 <--, 1.88 <--, 1.87 <--, 2.08 <--, 1.97 <--, 2.22 <--, 2.73 <--, 2.85 <--, 3.09 <--                        7.4    5.53  )-----------( 147      ( 21 Aug 2021 06:41 )             22.9     Urine Studies:  Urinalysis Basic - ( 15 Aug 2021 13:00 )    Color: Brown / Appearance: Turbid / S.013 / pH:   Gluc:  / Ketone: Negative  / Bili: Negative / Urobili: <2 mg/dL   Blood:  / Protein: 100 mg/dL / Nitrite: Negative   Leuk Esterase: Negative / RBC: 74 /HPF / WBC 7 /HPF   Sq Epi:  / Non Sq Epi: 1 /HPF / Bacteria: Negative        RADIOLOGY & ADDITIONAL STUDIES:

## 2021-08-21 NOTE — PHYSICAL THERAPY INITIAL EVALUATION ADULT - ADDITIONAL COMMENTS
Pt owns a cane, wheelchair, grab bars in bathroom. Pt has 4 steps to enter his home with unilateral railings; 12 steps inside his home with unilateral railings to bedroom.

## 2021-08-21 NOTE — PROGRESS NOTE ADULT - SUBJECTIVE AND OBJECTIVE BOX
Patient is a 89y old  Male who presents with a chief complaint of chest pain (17 Aug 2021 10:28)      SUBJECTIVE / OVERNIGHT EVENTS:    Events noted.  CONSTITUTIONAL: No fever,  or fatigue  RESPIRATORY: No cough, wheezing,  No shortness of breath  CARDIOVASCULAR: No chest pain, palpitations, dizziness, or leg swelling  GASTROINTESTINAL: No abdominal or epigastric pain. No nausea, vomiting.  NEUROLOGICAL: No headaches,     MEDICATIONS  (STANDING):  aMIOdarone    Tablet   Oral   aMIOdarone    Tablet 200 milliGRAM(s) Oral daily  aspirin enteric coated 81 milliGRAM(s) Oral daily  atorvastatin 40 milliGRAM(s) Oral at bedtime  bisacodyl Suppository 10 milliGRAM(s) Rectal every 24 hours  clopidogrel Tablet 75 milliGRAM(s) Oral daily  epoetin lizbeth-epbx (RETACRIT) Injectable 95064 Unit(s) SubCutaneous once  finasteride 5 milliGRAM(s) Oral daily  glucagon  Injectable 1 milliGRAM(s) IntraMuscular once  insulin lispro (ADMELOG) corrective regimen sliding scale   SubCutaneous three times a day before meals  insulin lispro (ADMELOG) corrective regimen sliding scale   SubCutaneous at bedtime  loratadine 10 milliGRAM(s) Oral daily  polyethylene glycol 3350 17 Gram(s) Oral two times a day  senna 2 Tablet(s) Oral at bedtime  sodium bicarbonate 650 milliGRAM(s) Oral two times a day  tamsulosin 0.4 milliGRAM(s) Oral at bedtime    MEDICATIONS  (PRN):        CAPILLARY BLOOD GLUCOSE      POCT Blood Glucose.: 151 mg/dL (20 Aug 2021 21:49)  POCT Blood Glucose.: 197 mg/dL (20 Aug 2021 17:43)  POCT Blood Glucose.: 173 mg/dL (20 Aug 2021 12:35)  POCT Blood Glucose.: 139 mg/dL (20 Aug 2021 08:40)  POCT Blood Glucose.: 142 mg/dL (20 Aug 2021 00:56)    I&O's Summary    19 Aug 2021 07:01  -  20 Aug 2021 07:00  --------------------------------------------------------  IN: 407 mL / OUT: 300 mL / NET: 107 mL    20 Aug 2021 07:01  -  21 Aug 2021 00:52  --------------------------------------------------------  IN: 536 mL / OUT: 1000 mL / NET: -464 mL        T(C): 36.8 (08-20-21 @ 22:40), Max: 36.8 (08-20-21 @ 01:00)  HR: 68 (08-20-21 @ 22:40) (68 - 78)  BP: 105/51 (08-20-21 @ 22:40) (105/51 - 120/56)  RR: 18 (08-20-21 @ 22:40) (16 - 18)  SpO2: 100% (08-20-21 @ 22:40) (99% - 100%)    PHYSICAL EXAM:  GENERAL: NAD  NECK: Supple, No JVD  CHEST/LUNG: Clear to auscultation bilaterally; No wheezing.  HEART: Regular rate and rhythm; No murmurs, rubs, or gallops  ABDOMEN: Soft, Nontender, Nondistended; Bowel sounds present  EXTREMITIES:   No edema  NEUROLOGY: AAO X 3      LABS:                        7.7    5.94  )-----------( 142      ( 20 Aug 2021 14:57 )             23.9     08-20    138  |  108<H>  |  51<H>  ----------------------------<  120<H>  4.7   |  21<L>  |  1.88<H>    Ca    8.8      20 Aug 2021 07:56  Phos  2.8     08-20  Mg     2.40     08-20    TPro  5.4<L>  /  Alb  2.9<L>  /  TBili  0.7  /  DBili  x   /  AST  23  /  ALT  18  /  AlkPhos  76  08-19            CAPILLARY BLOOD GLUCOSE      POCT Blood Glucose.: 151 mg/dL (20 Aug 2021 21:49)  POCT Blood Glucose.: 197 mg/dL (20 Aug 2021 17:43)  POCT Blood Glucose.: 173 mg/dL (20 Aug 2021 12:35)  POCT Blood Glucose.: 139 mg/dL (20 Aug 2021 08:40)  POCT Blood Glucose.: 142 mg/dL (20 Aug 2021 00:56)        RADIOLOGY & ADDITIONAL TESTS:    Imaging Personally Reviewed:    Consultant(s) Notes Reviewed:      Care Discussed with Consultants/Other Providers:    Dileep Amador MD, CMD, FACP    [address] [place] [address]  Office Tel: [phone]  Cell: [phone]

## 2021-08-21 NOTE — CHART NOTE - NSCHARTNOTEFT_GEN_A_CORE
S: Pt denies any new complaints.     Vital Signs Last 24 Hrs  T(C): 36.6 (21 Aug 2021 21:15), Max: 36.8 (20 Aug 2021 22:40)  T(F): 97.8 (21 Aug 2021 21:15), Max: 98.3 (20 Aug 2021 22:40)  HR: 74 (21 Aug 2021 21:15) (68 - 79)  BP: 102/58 (21 Aug 2021 21:15) (94/50 - 109/58)  BP(mean): --  RR: 17 (21 Aug 2021 21:15) (16 - 18)  SpO2: 99% (21 Aug 2021 21:15) (98% - 100%)      I&O's Summary    20 Aug 2021 07:01  -  21 Aug 2021 07:00  --------------------------------------------------------  IN: 536 mL / OUT: 1000 mL / NET: -464 mL    21 Aug 2021 07:01  -  21 Aug 2021 22:25  --------------------------------------------------------  IN: 600 mL / OUT: 750 mL / NET: -150 mL      I&O's Detail    20 Aug 2021 07:01  -  21 Aug 2021 07:00  --------------------------------------------------------  IN:    Oral Fluid: 536 mL  Total IN: 536 mL    OUT:    Voided (mL): 1000 mL  Total OUT: 1000 mL    Total NET: -464 mL      21 Aug 2021 07:01  -  21 Aug 2021 22:25  --------------------------------------------------------  IN:    Oral Fluid: 600 mL  Total IN: 600 mL    OUT:    Voided (mL): 750 mL  Total OUT: 750 mL    Total NET: -150 mL    89 YO M presenting with LUE swelling. Venous duplex was negative for DVT. Arterial Duplex was negative for any occlusive disease.     -No acute vascular surgery intervention at this time  -Call with any further questions  -Care per primary team    C Team  [id number]    C Team Surgery, [id number]        .  .  .  .  .

## 2021-08-21 NOTE — PROGRESS NOTE ADULT - ASSESSMENT
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

## 2021-08-21 NOTE — PROGRESS NOTE ADULT - SUBJECTIVE AND OBJECTIVE BOX
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      S: no chest pain or sob; ros otherwise negative.     Review of Systems:   Constitutional: [ ] fevers, [ ] chills.   Skin: [ ] dry skin. [ ] rashes.  Psychiatric: [ ] depression, [ ] anxiety.   Gastrointestinal: [ ] BRBPR, [ ] melena.   Neurological: [ ] confusion. [ ] seizures. [ ] shuffling gait.   Ears,Nose,Mouth and Throat: [ ] ear pain [ ] sore throat.   Eyes: [ ] diplopia.   Respiratory: [ ] hemoptysis. [ ] shortness of breath  Cardiovascular: See HPI above  Hematologic/Lymphatic: [ ] anemia. [ ] painful nodes. [ ] prolonged bleeding.   Genitourinary: [ ] hematuria. [ ] flank pain.   Endocrine: [ ] significant change in weight. [ ] intolerance to heat and cold.     Review of systems [ x] otherwise negative, [ ] otherwise unable to obtain    FH: no family history of sudden cardiac death in first degree relatives    SH: [ ] tobacco, [ ] alcohol, [ ] drugs    aMIOdarone    Tablet   Oral   aMIOdarone    Tablet 200 milliGRAM(s) Oral daily  aspirin enteric coated 81 milliGRAM(s) Oral daily  atorvastatin 40 milliGRAM(s) Oral at bedtime  bisacodyl Suppository 10 milliGRAM(s) Rectal every 24 hours  clopidogrel Tablet 75 milliGRAM(s) Oral daily  epoetin lizbeth-epbx (RETACRIT) Injectable 65111 Unit(s) SubCutaneous once  finasteride 5 milliGRAM(s) Oral daily  glucagon  Injectable 1 milliGRAM(s) IntraMuscular once  insulin lispro (ADMELOG) corrective regimen sliding scale   SubCutaneous three times a day before meals  insulin lispro (ADMELOG) corrective regimen sliding scale   SubCutaneous at bedtime  loratadine 10 milliGRAM(s) Oral daily  polyethylene glycol 3350 17 Gram(s) Oral two times a day  senna 2 Tablet(s) Oral at bedtime  sodium bicarbonate 650 milliGRAM(s) Oral two times a day  tamsulosin 0.4 milliGRAM(s) Oral at bedtime                        8.3    6.44  )-----------( 185      ( 21 Aug 2021 14:27 )             26.6     137  |  108<H>  |  46<H>  ----------------------------<  136<H>  4.4   |  19<L>  |  1.78<H>    Ca    8.6      21 Aug 2021 06:41  Phos  2.8     08-20  Mg     2.40     08-20    TPro  5.3<L>  /  Alb  2.7<L>  /  TBili  0.8  /  DBili  x   /  AST  20  /  ALT  17  /  AlkPhos  73  08-21      T(C): 36.7 (08-21-21 @ 13:00), Max: 36.8 (08-20-21 @ 22:40)  HR: 73 (08-21-21 @ 13:00) (68 - 79)  BP: 109/58 (08-21-21 @ 13:00) (94/50 - 109/58)  RR: 17 (08-21-21 @ 13:00) (16 - 18)  SpO2: 100% (08-21-21 @ 13:00) (98% - 100%)    General: Well nourished in no acute distress. Alert and Oriented * 3.   Head: Normocephalic and atraumatic.   Neck: No JVD. No bruits. Supple. Does not appear to be enlarged.   Cardiovascular: + S1,S2 ; RRR Soft systolic murmur at the left lower sternal border. No rubs noted.    Lungs: CTA b/l. No rhonchi, rales or wheezes.   Abdomen: + BS, soft. Non tender. Non distended. No rebound. No guarding.   Extremities: L radial pulse 2+, LUE hematoma  Neurologic: Moves all four extremities. Full range of motion.   Skin: Warm and moist. The patient's skin has normal elasticity and good skin turgor.   Psychiatric: Appropriate mood and affect.  Musculoskeletal: Normal range of motion, normal strength    Tele:     TTE: < from: Transthoracic Echocardiogram (08.14.21 @ 14:06) >  Normal left ventricular systolic function. No segmental  wall motion abnormalities.  Mild-moderate pulmonary hypertension.  A device wire is noted in the right heart.    < end of copied text >    < from: VA Duplex Ext Veins Upper Comp, Left. (08.17.21 @ 12:43) >  Summary/Impressions:  No evidence of deep vein thrombosis in the left upper  extremity.    < end of copied text >      < from: CT Upper Extremity No Cont, Left (08.19.21 @ 09:25) >  IMPRESSION:    1.  Mild enlargement of the triceps muscle suspicious for intramuscular hematoma.  2.  Amorphous appearance of the flexor musculature the forearm is nonspecific but can be seen with myositis or intramuscular hematoma.  3.  Moderate soft tissue swelling about the upper extremity, nonspecific but can be seen with cellulitis in the proper clinical setting.  4.  Nodular opacities within the left upper lobe. Small pleural effusion and atelectasis. Dedicated CT the chest can be performed for more detailed evaluation.    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     -continue with amiodarone for VT per EP - no further episodes of VT   -keep K > 4, Mg > 2  -TTE with normal LV function   -EP eval with Dr. Villatoro/Arielle for AICD upgrade appreciated - will need cath and medical optimization prior to upgrade   -will eventually plan for cardiac cath when renal function allows and H/H stable  -Hosp course c/b FLOYD and Anemia.  Pt with LUE hematoma- now resolving  -LUE ultrasound noted with no dvt  -vasc eval appreciated  -heme eval called for anemia and thrombocytopenia appreciated   -renal follow up   -Heparin on Hold.  Now with Guiac + anemia  --GI consulted  -check CBC Q12/ add T&S today/ transfuse 1 unit PRBCs

## 2021-08-21 NOTE — PHYSICAL THERAPY INITIAL EVALUATION ADULT - PERTINENT HX OF CURRENT PROBLEM, REHAB EVAL
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

## 2021-08-21 NOTE — PROGRESS NOTE ADULT - ASSESSMENT
· Assessment	  89 y.o [place]ian  male with a PMhx of HTN, HLD, DM, CAD s/p stent to 80% D1 in 2014 ,dHF EF 60% in 2018,Afib on eliquis c/b tachy/perla with BiV PPM( Biotronik) CKD, CVA, Velasquez's esophagus  presents with chest burning, dizziness/ lightheadedness, nausea, weakness with unsteady gait found in ventricular tachycardia and hypotension which did not improved with amiodarone 150mg IVP x1 received shocked x1 by external defibrillator with improve in Blood Pressure and mental status. Started on amiodarone drip.  EKG showed He was admitted to CCU. Labs remarkable for FLOYD on CKD and elevated lactate  post ventricular tachycardia and shock. EKG showed STD in inferior lateral leads.    VT:    Cardio/EP f/up noted  Will need AICD upgrade    Acute blood loss anemia:  CBC  Cath at NS.  GI eval appreciated

## 2021-08-21 NOTE — PHYSICAL THERAPY INITIAL EVALUATION ADULT - MANUAL MUSCLE TESTING RESULTS, REHAB EVAL
bilateral UE and LE 3+/5 except left ankle df/pf 3-/5 within available range and left shoulder flexion 3-/5 within available range

## 2021-08-21 NOTE — PROGRESS NOTE ADULT - ASSESSMENT
A/P    HAD FLOYD,  CR  NOW BACK AT BASELINE   HAS NO  VOL  EXCESS    BP IS OK   HAS NO  VOL  ISSUE     K  IS NORMAL   LOW BICARB, CONT WITH BICARB  SUPPLEMENT PO     GOING FOR CARDIAC W/U  AS PER CARDIO   FOLLOWED BY GI  FOR GI  BLEED  DISCUSSED IN DETAIL WITH IS FAMILY BY HIS SIDE

## 2021-08-21 NOTE — PHYSICAL THERAPY INITIAL EVALUATION ADULT - GENERAL OBSERVATIONS, REHAB EVAL
Pt found in bed with head of bed elevated; +telemetry monitor; +IV. Kuwaiti speaking but able to make needs known, Son at bedside and able to translate.

## 2021-08-21 NOTE — CONSULT NOTE ADULT - PROVIDER SPECIALTY LIST ADULT
Internal Medicine
Nephrology
Vascular Surgery
Electrophysiology
Nephrology
Cardiology
Heme/Onc
Gastroenterology

## 2021-08-21 NOTE — CONSULT NOTE ADULT - CONSULT REQUESTED DATE/TIME
14-Aug-2021 10:05
14-Aug-2021 19:39
21-Aug-2021 22:25
18-Aug-2021 15:35
14-Aug-2021
16-Aug-2021 13:08
15-Aug-2021 14:23
17-Aug-2021 09:00

## 2021-08-21 NOTE — PHYSICAL THERAPY INITIAL EVALUATION ADULT - ACTIVE RANGE OF MOTION EXAMINATION, REHAB EVAL
except left ankle df/pf to neutral; left shoulder flexion 0-100 degrees/bilateral upper extremity Active ROM was WFL (within functional limits)/bilateral  lower extremity Active ROM was WFL (within functional limits)

## 2021-08-21 NOTE — PROGRESS NOTE ADULT - SUBJECTIVE AND OBJECTIVE BOX
Pt seen, feeling fine, no complaints.     MEDICATIONS  (STANDING):  aMIOdarone    Tablet   Oral   aMIOdarone    Tablet 200 milliGRAM(s) Oral daily  aspirin enteric coated 81 milliGRAM(s) Oral daily  atorvastatin 40 milliGRAM(s) Oral at bedtime  bisacodyl Suppository 10 milliGRAM(s) Rectal every 24 hours  clopidogrel Tablet 75 milliGRAM(s) Oral daily  epoetin lizbeth-epbx (RETACRIT) Injectable 07217 Unit(s) SubCutaneous once  finasteride 5 milliGRAM(s) Oral daily  glucagon  Injectable 1 milliGRAM(s) IntraMuscular once  insulin lispro (ADMELOG) corrective regimen sliding scale   SubCutaneous three times a day before meals  insulin lispro (ADMELOG) corrective regimen sliding scale   SubCutaneous at bedtime  loratadine 10 milliGRAM(s) Oral daily  polyethylene glycol 3350 17 Gram(s) Oral two times a day  senna 2 Tablet(s) Oral at bedtime  sodium bicarbonate 650 milliGRAM(s) Oral two times a day  tamsulosin 0.4 milliGRAM(s) Oral at bedtime    MEDICATIONS  (PRN):      ROS  No fever, sweats, chills  No epistaxis, HA, sore throat  No CP, SOB, cough, sputum  No n/v/d, abd pain, melena, hematochezia  No edema  No rash  No anxiety  No back pain, joint pain  No bleeding, bruising  No dysuria, hematuria    Vital Signs Last 24 Hrs  T(C): 36.8 (21 Aug 2021 17:20), Max: 36.8 (20 Aug 2021 22:40)  T(F): 98.2 (21 Aug 2021 17:20), Max: 98.3 (20 Aug 2021 22:40)  HR: 75 (21 Aug 2021 17:20) (68 - 79)  BP: 108/57 (21 Aug 2021 17:20) (94/50 - 109/58)  BP(mean): --  RR: 16 (21 Aug 2021 17:20) (16 - 18)  SpO2: 100% (21 Aug 2021 17:20) (98% - 100%)    PE  NAD  Awake, alert  Anicteric  remainder of exam limited 2/2 covid pandemic                          8.3    6.44  )-----------( 185      ( 21 Aug 2021 14:27 )             26.6       08-21    137  |  108<H>  |  46<H>  ----------------------------<  136<H>  4.4   |  19<L>  |  1.78<H>    Ca    8.6      21 Aug 2021 06:41  Phos  2.8     08-20  Mg     2.40     08-20    TPro  5.3<L>  /  Alb  2.7<L>  /  TBili  0.8  /  DBili  x   /  AST  20  /  ALT  17  /  AlkPhos  73  08-21

## 2021-08-21 NOTE — CONSULT NOTE ADULT - SUBJECTIVE AND OBJECTIVE BOX
Patient is a 89y old  Male who presents with a chief complaint of HIGH K (21 Aug 2021 13:50)     .     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 Patient also compliant of b/l knee pain  and weakness started last night. In ED he was found in ventricular tachycardia and hypotensive to 70s/50s. He received  150mg push amiodarone with no improvement. Patient  became less responsive and hypotensive so he was defibrillated x1 with good response. EKG post- defib showed paced rhythm with improved Blood Pressure and mental status. He was started on Amiodarone drip and admitted to CCU.    EKG: NSB with STD in II,III,aVF,V4-V6    Labs: Na 133, S creat 3.49,glucose 245,Alk 122, AST 70,ALT 52,proBNP 6899,trop 196,lactate 4.4  (14 Aug 2021 09:51)          REVIEW OF SYSTEMS  Constitutional:   No fever, no fatigue, no pallor, no night sweats, no weight loss.  HEENT:   No eye pain, no vision changes, no icterus, no mouth ulcers.  Respiratory:   No shortness of breath, no cough, no respiratory distress.   Cardiovascular:   No chest pain, no palpitations.   Gastrointestinal: No abdominal pain, no nausea, no vomiting , no diahrrea, no constipation, no hematochezia,no melena.  Skin:   No rashes, no jaundice, no eczema.   Musculoskeletal:   No joint pain, no swelling, no myalgia.   Neurologic:   No headache, no seizure, no weakness.   Genitourinary:   No dysuria, no decreased urine output.  Psychiatric:  No depression, no anxiety,   Endocrine:   No thyroid disease, no diabetes.  Heme/Lymphatic:   No anemia, no blood transfusions, no lymph node enlargement, no bleeding, no bruising.  ___________________________________________________________________________________________  Allergies    No Known Allergies    Intolerances      MEDICATIONS  (STANDING):  aMIOdarone    Tablet   Oral   aMIOdarone    Tablet 200 milliGRAM(s) Oral daily  aspirin enteric coated 81 milliGRAM(s) Oral daily  atorvastatin 40 milliGRAM(s) Oral at bedtime  bisacodyl Suppository 10 milliGRAM(s) Rectal every 24 hours  clopidogrel Tablet 75 milliGRAM(s) Oral daily  epoetin lizbeth-epbx (RETACRIT) Injectable 21002 Unit(s) SubCutaneous once  finasteride 5 milliGRAM(s) Oral daily  glucagon  Injectable 1 milliGRAM(s) IntraMuscular once  insulin lispro (ADMELOG) corrective regimen sliding scale   SubCutaneous three times a day before meals  insulin lispro (ADMELOG) corrective regimen sliding scale   SubCutaneous at bedtime  loratadine 10 milliGRAM(s) Oral daily  polyethylene glycol 3350 17 Gram(s) Oral two times a day  senna 2 Tablet(s) Oral at bedtime  sodium bicarbonate 650 milliGRAM(s) Oral two times a day  tamsulosin 0.4 milliGRAM(s) Oral at bedtime    MEDICATIONS  (PRN):      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    S/P coronary artery stent placement  D1    Pacemaker  BiV PPM      FAMILY HISTORY:  Family history of heart disease (Father)      Social History: No hsitory of : Tobacco use, IVDA, EToH  ______________________________________________________________________________________    PHYSICAL EXAM    Daily     Daily Weight in k.9 (21 Aug 2021 05:06)  BMI: 20.9 ( @ 10:00)  Change in Weight:  Vital Signs Last 24 Hrs  T(C): 36.6 (21 Aug 2021 21:15), Max: 36.8 (20 Aug 2021 22:40)  T(F): 97.8 (21 Aug 2021 21:15), Max: 98.3 (20 Aug 2021 22:40)  HR: 74 (21 Aug 2021 21:15) (68 - 79)  BP: 102/58 (21 Aug 2021 21:15) (94/50 - 109/58)  BP(mean): --  RR: 17 (21 Aug 2021 21:15) (16 - 18)  SpO2: 99% (21 Aug 2021 21:15) (98% - 100%)    General:  Well developed, well nourished, alert and active, no pallor, NAD.  HEENT:    Normal appearance of conjunctiva, ears, nose, lips, oropharynx, and oral mucosa, anicteric.  Neck:  No masses, no asymmetry.  Lymph Nodes:  No lymphadenopathy.   Cardiovascular:  RRR normal S1/S2, no murmur.  Respiratory:  CTA B/L, normal respiratory effort.   Abdominal:   soft, no masses or tenderness, normoactive BS, NT/ND, no HSM.  Extremities:   No clubbing or cyanosis, normal capillary refill, no edema.   Skin:   No rash, jaundice, lesions, eczema.   Musculoskeletal:  No joint swelling, erythema or tenderness.   Neuro: No focal deficits.   Other:   _______________________________________________________________________________________________  Lab Results:                          8.3    6.44  )-----------( 185      ( 21 Aug 2021 14:27 )             26.6         137  |  108<H>  |  46<H>  ----------------------------<  136<H>  4.4   |  19<L>  |  1.78<H>    Ca    8.6      21 Aug 2021 06:41  Phos  2.8     08-20  Mg     2.40     08-20    TPro  5.3<L>  /  Alb  2.7<L>  /  TBili  0.8  /  DBili  x   /  AST  20  /  ALT  17  /  AlkPhos  73  08-21    LIVER FUNCTIONS - ( 21 Aug 2021 06:41 )  Alb: 2.7 g/dL / Pro: 5.3 g/dL / ALK PHOS: 73 U/L / ALT: 17 U/L / AST: 20 U/L / GGT: x                   Stool Results:          RADIOLOGY RESULTS:    SURGICAL PATHOLOGY:

## 2021-08-22 NOTE — PROGRESS NOTE ADULT - ASSESSMENT
Attending Addendum:   Agree with above  Will hold off on cath until hematological and GI issues resolve and patient cleared for apt/ac    Estelle Ayala MD

## 2021-08-22 NOTE — PROGRESS NOTE ADULT - ASSESSMENT
· Assessment	  89 y.o Niuean  male with a PMhx of HTN, HLD, DM, CAD s/p stent to 80% D1 in 2014 ,dHF EF 60% in 2018,Afib on eliquis c/b tachy/perla with BiV PPM( Biotronik) CKD, CVA, Velasquez's esophagus  presents with chest burning, dizziness/ lightheadedness, nausea, weakness with unsteady gait found in ventricular tachycardia and hypotension which did not improved with amiodarone 150mg IVP x1 received shocked x1 by external defibrillator with improve in Blood Pressure and mental status. Started on amiodarone drip.  EKG showed He was admitted to CCU. Labs remarkable for FLOYD on CKD and elevated lactate  post ventricular tachycardia and shock. EKG showed STD in inferior lateral leads.    VT:    Cardio/EP f/up noted  Will need AICD upgrade    Acute blood loss anemia:  CBC  Cath at NS.  GI f/up appreciated

## 2021-08-22 NOTE — PROGRESS NOTE ADULT - SUBJECTIVE AND OBJECTIVE BOX
[facility] - PHIL Dawn / PHIL Kendrick / ANITA Garcia/ PHIL Huynh/ PHIL Smith/ DARLYN Vega / VIVIANE Herman / MADDISON Roberts  ---------------------------------------------------------------------------------------------------------------  seen and examined today for Praneeth on CKD  Interval : NAD  VITALS:  T(F): 98 (08-22-21 @ 10:00), Max: 98.2 (08-21-21 @ 17:20)  HR: 75 (08-22-21 @ 10:00)  BP: 110/53 (08-22-21 @ 10:00)  RR: 18 (08-22-21 @ 10:00)  SpO2: 100% (08-22-21 @ 10:00)  Wt(kg): --    08-21 @ 07:01  -  08-22 @ 07:00  --------------------------------------------------------  IN: 720 mL / OUT: 750 mL / NET: -30 mL    08-22 @ 07:01  -  08-22 @ 10:52  --------------------------------------------------------  IN: 0 mL / OUT: 300 mL / NET: -300 mL      Physical Exam :-  Constitutional: NAD  Neck: Supple.  Respiratory: Bilateral equal breath sounds,  Cardiovascular: S1, S2 normal,  Gastrointestinal: Bowel Sounds present, soft, non tender.  Extremities: No edema  Neurological: Alert and Oriented x 3, no focal deficits  Psychiatric: Normal mood, normal affect  Data:-  Allergies :   No Known Allergies    Hospital Medications:   MEDICATIONS  (STANDING):  aMIOdarone    Tablet   Oral   aMIOdarone    Tablet 200 milliGRAM(s) Oral daily  aspirin enteric coated 81 milliGRAM(s) Oral daily  atorvastatin 40 milliGRAM(s) Oral at bedtime  bisacodyl Suppository 10 milliGRAM(s) Rectal every 24 hours  clopidogrel Tablet 75 milliGRAM(s) Oral daily  epoetin lizbeth-epbx (RETACRIT) Injectable 03735 Unit(s) SubCutaneous once  finasteride 5 milliGRAM(s) Oral daily  glucagon  Injectable 1 milliGRAM(s) IntraMuscular once  insulin lispro (ADMELOG) corrective regimen sliding scale   SubCutaneous three times a day before meals  insulin lispro (ADMELOG) corrective regimen sliding scale   SubCutaneous at bedtime  loratadine 10 milliGRAM(s) Oral daily  polyethylene glycol 3350 17 Gram(s) Oral two times a day  senna 2 Tablet(s) Oral at bedtime  sodium bicarbonate 650 milliGRAM(s) Oral two times a day  tamsulosin 0.4 milliGRAM(s) Oral at bedtime    08-22    139  |  108<H>  |  48<H>  ----------------------------<  121<H>  4.7   |  21<L>  |  1.91<H>    Ca    8.5      22 Aug 2021 08:51  Phos  3.1     08-22  Mg     2.40     08-22    TPro  5.4<L>  /  Alb  3.0<L>  /  TBili  0.9  /  DBili      /  AST  21  /  ALT  13  /  AlkPhos  69  08-22    Creatinine Trend: 1.91 <--, 1.78 <--, 1.88 <--, 1.87 <--, 2.08 <--, 1.97 <--, 2.22 <--                        7.4    5.64  )-----------( 167      ( 22 Aug 2021 08:51 )             24.0

## 2021-08-22 NOTE — PROGRESS NOTE ADULT - SUBJECTIVE AND OBJECTIVE BOX
Patient is a 89y old  Male who presents with a chief complaint of HIGH K (21 Aug 2021 13:50)      SUBJECTIVE / OVERNIGHT EVENTS:    Events noted.  CONSTITUTIONAL: No fever,  or fatigue  RESPIRATORY: No cough, wheezing,  No shortness of breath  CARDIOVASCULAR: No chest pain, palpitations  GASTROINTESTINAL: No abdominal or epigastric pain.   NEUROLOGICAL: No headaches,     MEDICATIONS  (STANDING):  aMIOdarone    Tablet   Oral   aMIOdarone    Tablet 200 milliGRAM(s) Oral daily  aspirin enteric coated 81 milliGRAM(s) Oral daily  atorvastatin 40 milliGRAM(s) Oral at bedtime  bisacodyl Suppository 10 milliGRAM(s) Rectal every 24 hours  epoetin lizbeth-epbx (RETACRIT) Injectable 75467 Unit(s) SubCutaneous once  finasteride 5 milliGRAM(s) Oral daily  glucagon  Injectable 1 milliGRAM(s) IntraMuscular once  heparin  Infusion.  Unit(s)/Hr (11 mL/Hr) IV Continuous <Continuous>  insulin lispro (ADMELOG) corrective regimen sliding scale   SubCutaneous three times a day before meals  insulin lispro (ADMELOG) corrective regimen sliding scale   SubCutaneous at bedtime  loratadine 10 milliGRAM(s) Oral daily  polyethylene glycol 3350 17 Gram(s) Oral two times a day  senna 2 Tablet(s) Oral at bedtime  sodium bicarbonate 650 milliGRAM(s) Oral two times a day  tamsulosin 0.4 milliGRAM(s) Oral at bedtime    MEDICATIONS  (PRN):  heparin   Injectable 4500 Unit(s) IV Push every 6 hours PRN For aPTT less than 40  heparin   Injectable 2000 Unit(s) IV Push every 6 hours PRN For aPTT between 40 - 57        CAPILLARY BLOOD GLUCOSE      POCT Blood Glucose.: 143 mg/dL (22 Aug 2021 22:28)  POCT Blood Glucose.: 132 mg/dL (22 Aug 2021 17:46)  POCT Blood Glucose.: 132 mg/dL (22 Aug 2021 12:07)  POCT Blood Glucose.: 126 mg/dL (22 Aug 2021 09:09)    I&O's Summary    21 Aug 2021 07:01  -  22 Aug 2021 07:00  --------------------------------------------------------  IN: 720 mL / OUT: 750 mL / NET: -30 mL    22 Aug 2021 07:01  -  22 Aug 2021 23:45  --------------------------------------------------------  IN: 0 mL / OUT: 300 mL / NET: -300 mL        T(C): 36.7 (08-22-21 @ 23:16), Max: 36.7 (08-22-21 @ 10:00)  HR: 63 (08-22-21 @ 23:16) (63 - 75)  BP: 109/53 (08-22-21 @ 23:16) (101/60 - 125/51)  RR: 18 (08-22-21 @ 23:16) (17 - 18)  SpO2: 99% (08-22-21 @ 23:16) (99% - 100%)    PHYSICAL EXAM:  GENERAL: NAD  NECK: Supple, No JVD  CHEST/LUNG: Clear to auscultation bilaterally; No wheezing.  HEART: Regular rate and rhythm; No murmurs, rubs, or gallops  ABDOMEN: Soft, Nontender, Nondistended; Bowel sounds present  EXTREMITIES:   No edema  NEUROLOGY: AAO X 3      LABS:                        7.3    5.93  )-----------( 170      ( 22 Aug 2021 22:50 )             23.4     08-22    139  |  108<H>  |  48<H>  ----------------------------<  121<H>  4.7   |  21<L>  |  1.91<H>    Ca    8.5      22 Aug 2021 08:51  Phos  3.1     08-22  Mg     2.40     08-22    TPro  5.4<L>  /  Alb  3.0<L>  /  TBili  0.9  /  DBili  x   /  AST  21  /  ALT  13  /  AlkPhos  69  08-22    PT/INR - ( 22 Aug 2021 16:18 )   PT: 15.3 sec;   INR: 1.35 ratio         PTT - ( 22 Aug 2021 22:50 )  PTT:192.2 sec        CAPILLARY BLOOD GLUCOSE      POCT Blood Glucose.: 143 mg/dL (22 Aug 2021 22:28)  POCT Blood Glucose.: 132 mg/dL (22 Aug 2021 17:46)  POCT Blood Glucose.: 132 mg/dL (22 Aug 2021 12:07)  POCT Blood Glucose.: 126 mg/dL (22 Aug 2021 09:09)        RADIOLOGY & ADDITIONAL TESTS:    Imaging Personally Reviewed:    Consultant(s) Notes Reviewed:      Care Discussed with Consultants/Other Providers:    Dileep Amador MD, CMD, FACP    [address] [address]  Office Tel: [phone]  Cell: [phone]

## 2021-08-22 NOTE — PROGRESS NOTE ADULT - SUBJECTIVE AND OBJECTIVE BOX
EP ATTENDING    tele: persistent AF, BiV pacing, no further VT    he denies palpitations, syncope, nor angina, ROS Otherwise -      DATE OF SERVICE - 08-22-21     Review of Systems:   Constitutional: [ ] fevers, [ ] chills.   Skin: [ ] dry skin. [ ] rashes.  Psychiatric: [ ] depression, [ ] anxiety.   Gastrointestinal: [ ] BRBPR, [ ] melena.   Neurological: [ ] confusion. [ ] seizures. [ ] shuffling gait.   Ears,Nose,Mouth and Throat: [ ] ear pain [ ] sore throat.   Eyes: [ ] diplopia.   Respiratory: [ ] hemoptysis. [ ] shortness of breath  Cardiovascular: See HPI above  Hematologic/Lymphatic: [ ] anemia. [ ] painful nodes. [ ] prolonged bleeding.   Genitourinary: [ ] hematuria. [ ] flank pain.   Endocrine: [ ] significant change in weight. [ ] intolerance to heat and cold.     Review of systems [ x] otherwise negative, [ ] otherwise unable to obtain    FH: no family history of sudden cardiac death in first degree relatives    SH: [ ] tobacco, [ ] alcohol, [ ] drugs    aMIOdarone    Tablet   Oral   aMIOdarone    Tablet 200 milliGRAM(s) Oral daily  aspirin enteric coated 81 milliGRAM(s) Oral daily  atorvastatin 40 milliGRAM(s) Oral at bedtime  bisacodyl Suppository 10 milliGRAM(s) Rectal every 24 hours  epoetin lizbeth-epbx (RETACRIT) Injectable 34317 Unit(s) SubCutaneous once  finasteride 5 milliGRAM(s) Oral daily  glucagon  Injectable 1 milliGRAM(s) IntraMuscular once  insulin lispro (ADMELOG) corrective regimen sliding scale   SubCutaneous three times a day before meals  insulin lispro (ADMELOG) corrective regimen sliding scale   SubCutaneous at bedtime  loratadine 10 milliGRAM(s) Oral daily  polyethylene glycol 3350 17 Gram(s) Oral two times a day  senna 2 Tablet(s) Oral at bedtime  sodium bicarbonate 650 milliGRAM(s) Oral two times a day  tamsulosin 0.4 milliGRAM(s) Oral at bedtime                            7.4    5.64  )-----------( 167      ( 22 Aug 2021 08:51 )             24.0       08-22    139  |  108<H>  |  48<H>  ----------------------------<  121<H>  4.7   |  21<L>  |  1.91<H>    Ca    8.5      22 Aug 2021 08:51  Phos  3.1     08-22  Mg     2.40     08-22    TPro  5.4<L>  /  Alb  3.0<L>  /  TBili  0.9  /  DBili  x   /  AST  21  /  ALT  13  /  AlkPhos  69  08-22            T(C): 36.7 (08-22-21 @ 10:00), Max: 36.8 (08-21-21 @ 17:20)  HR: 75 (08-22-21 @ 10:00) (73 - 75)  BP: 110/53 (08-22-21 @ 10:00) (101/60 - 110/53)  RR: 18 (08-22-21 @ 10:00) (16 - 18)  SpO2: 100% (08-22-21 @ 10:00) (99% - 100%)  Wt(kg): --    I&O's Summary    21 Aug 2021 07:01  -  22 Aug 2021 07:00  --------------------------------------------------------  IN: 720 mL / OUT: 750 mL / NET: -30 mL    22 Aug 2021 07:01  -  22 Aug 2021 13:53  --------------------------------------------------------  IN: 0 mL / OUT: 300 mL / NET: -300 mL        General: Well nourished in no acute distress. Alert and Oriented * 3.   Head: Normocephalic and atraumatic.   Neck: No JVD. No bruits. Supple. Does not appear to be enlarged.   Cardiovascular: + S1,S2 ; RRR Soft systolic murmur at the left lower sternal border. No rubs noted.    Lungs: CTA b/l. No rhonchi, rales or wheezes.   Abdomen: + BS, soft. Non tender. Non distended. No rebound. No guarding.   Extremities: No clubbing/cyanosis/edema.   Neurologic: Moves all four extremities. Full range of motion.   Skin: Warm and moist. The patient's skin has normal elasticity and good skin turgor.   Psychiatric: Appropriate mood and affect.  Musculoskeletal: Normal range of motion, normal strength      A/P) He is an extremely pleasant 89 year old male with a past medical history of persistent atrial fibrillation and complete heart block. In June 2014 I implanted a Biotronik biventricular pacemaker. Device interrogations in the office demonstrate excellent pacemaker function. He denies palpitations, syncope nor angina. His other medical history includes BPH, stroke, HTN, hyperlipidemia and CAD s/p PCI in 2015. He is now admitted with sustained monomorphic VT requiring emergency rescue DCCV    -d/c plavix  -continue asa  -start heparin drip given AF with prior stroke  -f/u GI regarding guiac positive anemia  -f/u cardiology regarding cath  -f/u renal for clearance for cath  -will transfer to [facility] after above completed for an upgrade to ICD therapy  -depending on his preop venogram I may or may not refer to Dr Jaimes for RV pacing lead removal  -d/w with patient and his son Stan in detail      Holly Villatoro M.D., [facility]  Cardiac Electrophysiology  [facility]  [address] [place] [address], [address] [place], [place] [address]  [email or web address]    office [phone]  pager [phone]

## 2021-08-22 NOTE — PROGRESS NOTE ADULT - ASSESSMENT
89 y.o [place]  male with a PMhx of HTN, HLD, DM, CAD s/p stent to 80% D1 in 2014 , ICMrEF 40% in 2018,Afib on eliquis c/b tachy/perla with BiV PPM( Biotronik) CKD, CVA, Velasquez's esophagus  presents with chest burning, dizziness/ lightheadedness, nausea, weakness with unsteady gait found in ventricular tachycardia and hypotension which did not improved with amiodarone 150mg IVP x1 received shocked x1 by external defibrillator with improve in Blood Pressure and mental status. Started on amiodarone drip.  EKG showed He was admitted to CCU. Labs remarkable for FLOYD on CKD and elevated lactate  post ventricular tachycardia and shock. EKG showed STD in inferior lateral leads. Renal following for FLOYD/CKD Mx.    FLOYD on CKD3: improving  Recent admission for FLOYD on CKD with hyperkalemia S. creat 3.1 > on discharge on 8/12/21 was 1.89,   serum creatinine stable  FLOYD likely 2/2 hemodynamic changes/NSTEMI/arrhythmia  K, vol- acceptable  UA bland  no obstructive uropathy on recent renal sono   lasix and losartan on hold-- no objection to resume lasix   M acidosis 2/2 FLOYD/ckd-better. c/w po Na bicarb 650mg bid 8/14 pm. watch for s/o chf due to Na load  monitor BMP daily   dose all meds for eGFR<15ml/min.   avoid ACEi/ARB/NSAIDs/Nephrotoxics if able.   Intermediate risk for GRACE, explained to pt and son bedside before in detail including the possibility of worsening RFT post cath and if no recovery of RF, may need RRT/dialysis. Both verbalized understanding. no plan for urgent LHC given elevated creatinine and currently chest pain free.   per cardiology held off on cath for now given dropping H/H requiring PRBC transfusion  d/w cardiology Attending -plan for LHC for mid next week- pt optimized renal stand point for cath. pt, family aware of risks of GRACE  would recommend mucomyst 1200mg po bid -2doses pre and 2 doses post cath and ivf for 4-6hrs jordi cath if no s/o chf    ANemia in ckd+acute blood loss/hematoma- Hb dropped to 6.6>7.4 s/p PRBC. added epo 10k subqx1 8/19  NSTEMI (non-ST elevation myocardial infarction).  Patient p/w chest pain and ventricular tachycardia s/p shockedx1  off heparin gtt   cardiac monitoring to monitor for arrhythmias  Low fat DASH diet  -TTE with normal LV function     Ventricular tachycardia s/p defibrillatorx1  on Amiodarone gtt   Monitor for further VT episodes  Keep K > 4.0 and magnesium > 2.0  BIV PPM interrogation showed  monomorphic ventricular tachycardia at 150-160 as per chart  f/u EP. plan for upgrade BiVICD after cath and medical optimization  -LUE ultrasound noted with no dvt    Chronic diastolic congestive heart failure. h/o CHF    CXR; clear-appear euvolemic  Holding home dose lasix and losartan 2/2 FLOYD on CKD and hypotension  no objection to resume lasix . as per cardiology  Daily weight monitoring, Strict I/O, Low sodium DASH diet  ECHO noted -Normal left ventricular systolic function. No segmental wall motion abnormalities. Mild-moderate pulmonary hypertension.    Paroxysmal atrial fibrillation.   Currently on heparin drip for ACS-holding Eliquis till pt on heparin drip  Continuous tele monitoring  Rate control per ccu team      For any question, call:  Cell # [phone]  Pager # [phone]  Callback # [phone]

## 2021-08-22 NOTE — PROGRESS NOTE ADULT - SUBJECTIVE AND OBJECTIVE BOX
Date of service: 08/22/21    chief complaint: chest pain     extended hpi: 89 y.o male with a PMhx of HTN, HLD, DM, CAD s/p stent to  80% D1 in 2014, with cath in 2015 demonstrating patent stents with mild to moderate non-obstructive cad, Isch CMrEF 40% in 2018, atrial fibrillation on eliquis 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      S: no chest pain or sob; ros otherwise negative.     Review of Systems:   Constitutional: [ ] fevers, [ ] chills.   Skin: [ ] dry skin. [ ] rashes.  Psychiatric: [ ] depression, [ ] anxiety.   Gastrointestinal: [ ] BRBPR, [ ] melena.   Neurological: [ ] confusion. [ ] seizures. [ ] shuffling gait.   Ears,Nose,Mouth and Throat: [ ] ear pain [ ] sore throat.   Eyes: [ ] diplopia.   Respiratory: [ ] hemoptysis. [ ] shortness of breath  Cardiovascular: See HPI above  Hematologic/Lymphatic: [ ] anemia. [ ] painful nodes. [ ] prolonged bleeding.   Genitourinary: [ ] hematuria. [ ] flank pain.   Endocrine: [ ] significant change in weight. [ ] intolerance to heat and cold.     Review of systems [ x] otherwise negative, [ ] otherwise unable to obtain    FH: no family history of sudden cardiac death in first degree relatives    SH: [ ] tobacco, [ ] alcohol, [ ] drugs         aMIOdarone    Tablet   Oral   aMIOdarone    Tablet 200 milliGRAM(s) Oral daily  aspirin enteric coated 81 milliGRAM(s) Oral daily  atorvastatin 40 milliGRAM(s) Oral at bedtime  bisacodyl Suppository 10 milliGRAM(s) Rectal every 24 hours  clopidogrel Tablet 75 milliGRAM(s) Oral daily  epoetin lizbeth-epbx (RETACRIT) Injectable 43546 Unit(s) SubCutaneous once  finasteride 5 milliGRAM(s) Oral daily  glucagon  Injectable 1 milliGRAM(s) IntraMuscular once  insulin lispro (ADMELOG) corrective regimen sliding scale   SubCutaneous three times a day before meals  insulin lispro (ADMELOG) corrective regimen sliding scale   SubCutaneous at bedtime  loratadine 10 milliGRAM(s) Oral daily  polyethylene glycol 3350 17 Gram(s) Oral two times a day  senna 2 Tablet(s) Oral at bedtime  sodium bicarbonate 650 milliGRAM(s) Oral two times a day  tamsulosin 0.4 milliGRAM(s) Oral at bedtime                            7.4    5.64  )-----------( 167      ( 22 Aug 2021 08:51 )             24.0       Hemoglobin: 7.4 g/dL (08-22 @ 08:51)  Hemoglobin: 8.3 g/dL (08-21 @ 14:27)  Hemoglobin: 7.4 g/dL (08-21 @ 06:41)  Hemoglobin: 7.7 g/dL (08-20 @ 14:57)  Hemoglobin: 7.4 g/dL (08-20 @ 07:56)      08-22    139  |  108<H>  |  48<H>  ----------------------------<  121<H>  4.7   |  21<L>  |  1.91<H>    Ca    8.5      22 Aug 2021 08:51  Phos  3.1     08-22  Mg     2.40     08-22    TPro  5.4<L>  /  Alb  3.0<L>  /  TBili  0.9  /  DBili  x   /  AST  21  /  ALT  13  /  AlkPhos  69  08-22    Creatinine Trend: 1.91<--, 1.78<--, 1.88<--, 1.87<--, 2.08<--, 1.97<--    COAGS:           T(C): 36.7 (08-22-21 @ 10:00), Max: 36.8 (08-21-21 @ 17:20)  HR: 75 (08-22-21 @ 10:00) (73 - 75)  BP: 110/53 (08-22-21 @ 10:00) (101/60 - 110/53)  RR: 18 (08-22-21 @ 10:00) (16 - 18)  SpO2: 100% (08-22-21 @ 10:00) (99% - 100%)  Wt(kg): --    I&O's Summary    21 Aug 2021 07:01  -  22 Aug 2021 07:00  --------------------------------------------------------  IN: 720 mL / OUT: 750 mL / NET: -30 mL    22 Aug 2021 07:01  -  22 Aug 2021 11:53  --------------------------------------------------------  IN: 0 mL / OUT: 300 mL / NET: -300 mL      General: Well nourished in no acute distress. Alert and Oriented * 3.   Head: Normocephalic and atraumatic.   Neck: No JVD. No bruits. Supple. Does not appear to be enlarged.   Cardiovascular: + S1,S2 ; RRR Soft systolic murmur at the left lower sternal border. No rubs noted.    Lungs: CTA b/l. No rhonchi, rales or wheezes.   Abdomen: + BS, soft. Non tender. Non distended. No rebound. No guarding.   Extremities: L radial pulse 2+, LUE hematoma  Neurologic: Moves all four extremities. Full range of motion.   Skin: Warm and moist. The patient's skin has normal elasticity and good skin turgor.   Psychiatric: Appropriate mood and affect.  Musculoskeletal: Normal range of motion, normal strength    Tele:     TTE: < from: Transthoracic Echocardiogram (08.14.21 @ 14:06) >  Normal left ventricular systolic function. No segmental  wall motion abnormalities.  Mild-moderate pulmonary hypertension.  A device wire is noted in the right heart.    < end of copied text >    < from: VA Duplex Ext Veins Upper Comp, Left. (08.17.21 @ 12:43) >  Summary/Impressions:  No evidence of deep vein thrombosis in the left upper  extremity.    < end of copied text >      < from: CT Upper Extremity No Cont, Left (08.19.21 @ 09:25) >  IMPRESSION:    1.  Mild enlargement of the triceps muscle suspicious for intramuscular hematoma.  2.  Amorphous appearance of the flexor musculature the forearm is nonspecific but can be seen with myositis or intramuscular hematoma.  3.  Moderate soft tissue swelling about the upper extremity, nonspecific but can be seen with cellulitis in the proper clinical setting.  4.  Nodular opacities within the left upper lobe. Small pleural effusion and atelectasis. Dedicated CT the chest can be performed for more detailed evaluation.    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     -continue with amiodarone for VT per EP - no further episodes of VT   -keep K > 4, Mg > 2  -TTE with normal LV function   -EP eval with Dr. Villatoro/Arielle for AICD upgrade appreciated - will need cath and medical optimization prior to upgrade   -will eventually plan for cardiac cath when renal function allows and H/H stable  -Hosp course c/b FLOYD and Anemia.  Pt with LUE hematoma- now resolving  -LUE ultrasound noted with no dvt  -vasc eval appreciated  -heme eval called for anemia and thrombocytopenia appreciated   -renal follow up   -Heparin on Hold.  Now with Guiac + anemia  --GI follow up   -- trend h/h

## 2021-08-23 NOTE — PROGRESS NOTE ADULT - SUBJECTIVE AND OBJECTIVE BOX
Subjective:  Doing well   ___________________________________________________________________________________________  Allergies    No Known Allergies    Intolerances      MEDICATIONS  (STANDING):  aMIOdarone    Tablet   Oral   aMIOdarone    Tablet 200 milliGRAM(s) Oral daily  aspirin enteric coated 81 milliGRAM(s) Oral daily  atorvastatin 40 milliGRAM(s) Oral at bedtime  bisacodyl Suppository 10 milliGRAM(s) Rectal every 24 hours  clopidogrel Tablet 75 milliGRAM(s) Oral daily  epoetin lizbeth-epbx (RETACRIT) Injectable 61666 Unit(s) SubCutaneous once  finasteride 5 milliGRAM(s) Oral daily  glucagon  Injectable 1 milliGRAM(s) IntraMuscular once  insulin lispro (ADMELOG) corrective regimen sliding scale   SubCutaneous three times a day before meals  insulin lispro (ADMELOG) corrective regimen sliding scale   SubCutaneous at bedtime  loratadine 10 milliGRAM(s) Oral daily  polyethylene glycol 3350 17 Gram(s) Oral two times a day  senna 2 Tablet(s) Oral at bedtime  sodium bicarbonate 650 milliGRAM(s) Oral two times a day  tamsulosin 0.4 milliGRAM(s) Oral at bedtime    MEDICATIONS  (PRN):      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    S/P coronary artery stent placement  D1    Pacemaker  BiV PPM      FAMILY HISTORY:  Family history of heart disease (Father)      Social History: No hsitory of : Tobacco use, IVDA, EToH  ______________________________________________________________________________________    PHYSICAL EXAM    Daily     Daily Weight in k.9 (21 Aug 2021 05:06)  BMI: 20.9 ( @ 10:00)  Change in Weight:  Vital Signs Last 24 Hrs  T(C): 36.6 (21 Aug 2021 21:15), Max: 36.8 (20 Aug 2021 22:40)  T(F): 97.8 (21 Aug 2021 21:15), Max: 98.3 (20 Aug 2021 22:40)  HR: 74 (21 Aug 2021 21:15) (68 - 79)  BP: 102/58 (21 Aug 2021 21:15) (94/50 - 109/58)  BP(mean): --  RR: 17 (21 Aug 2021 21:15) (16 - 18)  SpO2: 99% (21 Aug 2021 21:15) (98% - 100%)    General:  Well developed, well nourished, alert and active, no pallor, NAD.  HEENT:    Normal appearance of conjunctiva, ears, nose, lips, oropharynx, and oral mucosa, anicteric.  Neck:  No masses, no asymmetry.  Lymph Nodes:  No lymphadenopathy.   Cardiovascular:  RRR normal S1/S2, no murmur.  Respiratory:  CTA B/L, normal respiratory effort.   Abdominal:   soft, no masses or tenderness, normoactive BS, NT/ND, no HSM.  Extremities:   No clubbing or cyanosis, normal capillary refill, no edema.   Skin:   No rash, jaundice, lesions, eczema.   Musculoskeletal:  No joint swelling, erythema or tenderness.   Neuro: No focal deficits.   Other:   _______________________________________________________________________________________________  Lab Results:                          8.3    6.44  )-----------( 185      ( 21 Aug 2021 14:27 )             26.6     -    137  |  108<H>  |  46<H>  ----------------------------<  136<H>  4.4   |  19<L>  |  1.78<H>    Ca    8.6      21 Aug 2021 06:41  Phos  2.8     08-20  Mg     2.40     08-20    TPro  5.3<L>  /  Alb  2.7<L>  /  TBili  0.8  /  DBili  x   /  AST  20  /  ALT  17  /  AlkPhos  73  08-21    LIVER FUNCTIONS - ( 21 Aug 2021 06:41 )  Alb: 2.7 g/dL / Pro: 5.3 g/dL / ALK PHOS: 73 U/L / ALT: 17 U/L / AST: 20 U/L / GGT: x                   Stool Results:          RADIOLOGY RESULTS:    SURGICAL PATHOLOGY:

## 2021-08-23 NOTE — PROGRESS NOTE ADULT - SUBJECTIVE AND OBJECTIVE BOX
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      S: no chest pain or sob; ros otherwise negative.     Review of Systems:   Constitutional: [ ] fevers, [ ] chills.   Skin: [ ] dry skin. [ ] rashes.  Psychiatric: [ ] depression, [ ] anxiety.   Gastrointestinal: [ ] BRBPR, [ ] melena.   Neurological: [ ] confusion. [ ] seizures. [ ] shuffling gait.   Ears,Nose,Mouth and Throat: [ ] ear pain [ ] sore throat.   Eyes: [ ] diplopia.   Respiratory: [ ] hemoptysis. [ ] shortness of breath  Cardiovascular: See HPI above  Hematologic/Lymphatic: [ ] anemia. [ ] painful nodes. [ ] prolonged bleeding.   Genitourinary: [ ] hematuria. [ ] flank pain.   Endocrine: [ ] significant change in weight. [ ] intolerance to heat and cold.     Review of systems [ x] otherwise negative, [ ] otherwise unable to obtain    FH: no family history of sudden cardiac death in first degree relatives    SH: [ ] tobacco, [ ] alcohol, [ ] drugs    aMIOdarone    Tablet   Oral   aMIOdarone    Tablet 200 milliGRAM(s) Oral daily  aspirin enteric coated 81 milliGRAM(s) Oral daily  atorvastatin 40 milliGRAM(s) Oral at bedtime  bisacodyl Suppository 10 milliGRAM(s) Rectal every 24 hours  epoetin lizbeth-epbx (RETACRIT) Injectable 96053 Unit(s) SubCutaneous once  finasteride 5 milliGRAM(s) Oral daily  glucagon  Injectable 1 milliGRAM(s) IntraMuscular once  heparin   Injectable 4500 Unit(s) IV Push every 6 hours PRN  heparin   Injectable 2000 Unit(s) IV Push every 6 hours PRN  heparin  Infusion.  Unit(s)/Hr IV Continuous <Continuous>  insulin lispro (ADMELOG) corrective regimen sliding scale   SubCutaneous three times a day before meals  insulin lispro (ADMELOG) corrective regimen sliding scale   SubCutaneous at bedtime  loratadine 10 milliGRAM(s) Oral daily  polyethylene glycol 3350 17 Gram(s) Oral two times a day  senna 2 Tablet(s) Oral at bedtime  sodium bicarbonate 650 milliGRAM(s) Oral two times a day  tamsulosin 0.4 milliGRAM(s) Oral at bedtime                            7.6    6.14  )-----------( 184      ( 23 Aug 2021 07:18 )             25.1       08-23    139  |  107  |  46<H>  ----------------------------<  144<H>  4.4   |  19<L>  |  1.78<H>    Ca    8.7      23 Aug 2021 07:18  Phos  3.3     08-23  Mg     2.40     08-23    TPro  5.4<L>  /  Alb  2.9<L>  /  TBili  0.9  /  DBili  x   /  AST  22  /  ALT  14  /  AlkPhos  79  08-23            T(C): 36.4 (08-23-21 @ 14:18), Max: 36.7 (08-22-21 @ 23:16)  HR: 73 (08-23-21 @ 14:18) (63 - 74)  BP: 114/59 (08-23-21 @ 14:18) (100/60 - 114/59)  RR: 16 (08-23-21 @ 14:18) (16 - 18)  SpO2: 100% (08-23-21 @ 14:18) (99% - 100%)  Wt(kg): --    I&O's Summary    22 Aug 2021 07:01  -  23 Aug 2021 07:00  --------------------------------------------------------  IN: 0 mL / OUT: 300 mL / NET: -300 mL    23 Aug 2021 07:01  -  23 Aug 2021 15:53  --------------------------------------------------------  IN: 21 mL / OUT: 200 mL / NET: -179 mL        General: Well nourished in no acute distress. Alert and Oriented * 3.   Head: Normocephalic and atraumatic.   Neck: No JVD. No bruits. Supple. Does not appear to be enlarged.   Cardiovascular: + S1,S2 ; RRR Soft systolic murmur at the left lower sternal border. No rubs noted.    Lungs: CTA b/l. No rhonchi, rales or wheezes.   Abdomen: + BS, soft. Non tender. Non distended. No rebound. No guarding.   Extremities: L radial pulse 2+, LUE hematoma  Neurologic: Moves all four extremities. Full range of motion.   Skin: Warm and moist. The patient's skin has normal elasticity and good skin turgor.   Psychiatric: Appropriate mood and affect.  Musculoskeletal: Normal range of motion, normal strength    Tele:     TTE: < from: Transthoracic Echocardiogram (08.14.21 @ 14:06) >  Normal left ventricular systolic function. No segmental  wall motion abnormalities.  Mild-moderate pulmonary hypertension.  A device wire is noted in the right heart.    < end of copied text >    < from: VA Duplex Ext Veins Upper Comp, Left. (08.17.21 @ 12:43) >  Summary/Impressions:  No evidence of deep vein thrombosis in the left upper  extremity.    < end of copied text >      < from: CT Upper Extremity No Cont, Left (08.19.21 @ 09:25) >  IMPRESSION:    1.  Mild enlargement of the triceps muscle suspicious for intramuscular hematoma.  2.  Amorphous appearance of the flexor musculature the forearm is nonspecific but can be seen with myositis or intramuscular hematoma.  3.  Moderate soft tissue swelling about the upper extremity, nonspecific but can be seen with cellulitis in the proper clinical setting.  4.  Nodular opacities within the left upper lobe. Small pleural effusion and atelectasis. Dedicated CT the chest can be performed for more detailed evaluation.    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     -continue with amiodarone for VT per EP - no further episodes of VT   -keep K > 4, Mg > 2  -TTE with normal LV function   -EP eval with Dr. Villatoro/Arielle for AICD upgrade appreciated - will need cath and medical optimization prior to upgrade   -will eventually plan for cardiac cath when renal function allows and H/H stable  -Hosp course c/b FLOYD and Anemia.  Pt with LUE hematoma- now resolving  -LUE ultrasound noted with no dvt  -vasc eval appreciated  -heme eval called for anemia and thrombocytopenia appreciated   -hep gtt restarted per EP for CVA prevention   -discussed with GI - ok to continue with sapt and ac  -will plan on cath when medically optimized   -arterial duplex with no significant arterial disease - follow up vascular    Estelle Ayala MD

## 2021-08-23 NOTE — PROGRESS NOTE ADULT - ASSESSMENT
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

## 2021-08-23 NOTE — PROGRESS NOTE ADULT - SUBJECTIVE AND OBJECTIVE BOX
Patient is a 89y old  Male who presents with a chief complaint of HIGH K (21 Aug 2021 13:50)      SUBJECTIVE / OVERNIGHT EVENTS: CP free    MEDICATIONS  (STANDING):  aMIOdarone    Tablet   Oral   aMIOdarone    Tablet 200 milliGRAM(s) Oral daily  aspirin enteric coated 81 milliGRAM(s) Oral daily  atorvastatin 40 milliGRAM(s) Oral at bedtime  bisacodyl Suppository 10 milliGRAM(s) Rectal every 24 hours  epoetin lizbeth-epbx (RETACRIT) Injectable 97756 Unit(s) SubCutaneous once  finasteride 5 milliGRAM(s) Oral daily  glucagon  Injectable 1 milliGRAM(s) IntraMuscular once  heparin  Infusion.  Unit(s)/Hr (11 mL/Hr) IV Continuous <Continuous>  insulin lispro (ADMELOG) corrective regimen sliding scale   SubCutaneous three times a day before meals  insulin lispro (ADMELOG) corrective regimen sliding scale   SubCutaneous at bedtime  loratadine 10 milliGRAM(s) Oral daily  polyethylene glycol 3350 17 Gram(s) Oral two times a day  senna 2 Tablet(s) Oral at bedtime  sodium bicarbonate 650 milliGRAM(s) Oral two times a day  tamsulosin 0.4 milliGRAM(s) Oral at bedtime    MEDICATIONS  (PRN):  heparin   Injectable 4500 Unit(s) IV Push every 6 hours PRN For aPTT less than 40  heparin   Injectable 2000 Unit(s) IV Push every 6 hours PRN For aPTT between 40 - 57      Vital Signs Last 24 Hrs  T(F): 97.8 (08-23-21 @ 18:18), Max: 98.1 (08-22-21 @ 23:16)  HR: 76 (08-23-21 @ 18:18) (63 - 76)  BP: 110/60 (08-23-21 @ 18:18) (100/60 - 114/59)  RR: 18 (08-23-21 @ 18:18) (16 - 18)  SpO2: 100% (08-23-21 @ 18:18) (99% - 100%)  Telemetry:   CAPILLARY BLOOD GLUCOSE      POCT Blood Glucose.: 173 mg/dL (23 Aug 2021 17:02)  POCT Blood Glucose.: 199 mg/dL (23 Aug 2021 12:02)  POCT Blood Glucose.: 134 mg/dL (23 Aug 2021 08:43)  POCT Blood Glucose.: 143 mg/dL (22 Aug 2021 22:28)    I&O's Summary    22 Aug 2021 07:01  -  23 Aug 2021 07:00  --------------------------------------------------------  IN: 0 mL / OUT: 300 mL / NET: -300 mL    23 Aug 2021 07:01  -  23 Aug 2021 20:39  --------------------------------------------------------  IN: 1030 mL / OUT: 800 mL / NET: 230 mL        PHYSICAL EXAM:  GENERAL: NAD, well-developed  HEAD:  Atraumatic, Normocephalic  EYES: EOMI, PERRLA, conjunctiva and sclera clear  NECK: Supple, No JVD  CHEST/LUNG: Clear to auscultation bilaterally; No wheeze  HEART: Regular rate and rhythm; No murmurs, rubs, or gallops  ABDOMEN: Soft, Nontender, Nondistended; Bowel sounds present  EXTREMITIES:  2+ Peripheral Pulses, No clubbing, cyanosis, or edema  PSYCH: AAOx3  NEUROLOGY: non-focal  SKIN: No rashes or lesions    LABS:                        7.6    6.14  )-----------( 184      ( 23 Aug 2021 07:18 )             25.1     08-23    139  |  107  |  46<H>  ----------------------------<  144<H>  4.4   |  19<L>  |  1.78<H>    Ca    8.7      23 Aug 2021 07:18  Phos  3.3     08-23  Mg     2.40     08-23    TPro  5.4<L>  /  Alb  2.9<L>  /  TBili  0.9  /  DBili  x   /  AST  22  /  ALT  14  /  AlkPhos  79  08-23    PT/INR - ( 22 Aug 2021 16:18 )   PT: 15.3 sec;   INR: 1.35 ratio         PTT - ( 23 Aug 2021 14:59 )  PTT:82.7 sec

## 2021-08-23 NOTE — PROGRESS NOTE ADULT - SUBJECTIVE AND OBJECTIVE BOX
[facility] - S López / Aroldo S /D Jose/ JUAN DAVID Huynh/ JUAN DAVID Smith/ Adrian Vega / LUBA Alvau/ O Alejandra  service -[phone], office [phone]  ---------------------------------------------------------------------------------------------------------------    Patient seen and examined bedside    Subjective and Objective: No overnight events, sob resolved. No complaints today. feeling better    Allergies: No Known Allergies      Hospital Medications:   MEDICATIONS  (STANDING):  aMIOdarone    Tablet   Oral   aMIOdarone    Tablet 200 milliGRAM(s) Oral daily  aspirin enteric coated 81 milliGRAM(s) Oral daily  atorvastatin 40 milliGRAM(s) Oral at bedtime  bisacodyl Suppository 10 milliGRAM(s) Rectal every 24 hours  epoetin lizbeth-epbx (RETACRIT) Injectable 96257 Unit(s) SubCutaneous once  finasteride 5 milliGRAM(s) Oral daily  glucagon  Injectable 1 milliGRAM(s) IntraMuscular once  heparin  Infusion.  Unit(s)/Hr (11 mL/Hr) IV Continuous <Continuous>  insulin lispro (ADMELOG) corrective regimen sliding scale   SubCutaneous three times a day before meals  insulin lispro (ADMELOG) corrective regimen sliding scale   SubCutaneous at bedtime  loratadine 10 milliGRAM(s) Oral daily  polyethylene glycol 3350 17 Gram(s) Oral two times a day  senna 2 Tablet(s) Oral at bedtime  sodium bicarbonate 650 milliGRAM(s) Oral two times a day  tamsulosin 0.4 milliGRAM(s) Oral at bedtime      REVIEW OF SYSTEMS:  CONSTITUTIONAL: No weakness, fevers or chills  EYES/ENT: No visual changes;  No vertigo or throat pain   NECK: No pain or stiffness  RESPIRATORY: No cough, wheezing, hemoptysis; No shortness of breath  CARDIOVASCULAR: No chest pain or palpitations.  GASTROINTESTINAL: No abdominal or epigastric pain. No nausea, vomiting, or hematemesis; No diarrhea or constipation. No melena or hematochezia.  GENITOURINARY: No dysuria, frequency, foamy urine, urinary urgency, incontinence or hematuria  NEUROLOGICAL: No numbness or weakness  SKIN: No itching, burning, rashes, or lesions   VASCULAR: No bilateral lower extremity edema.   All other review of systems is negative unless indicated above.    VITALS:  T(F): 97.6 (08-23-21 @ 10:18), Max: 98.1 (08-22-21 @ 23:16)  HR: 74 (08-23-21 @ 10:18)  BP: 105/58 (08-23-21 @ 10:18)  RR: 16 (08-23-21 @ 10:18)  SpO2: 100% (08-23-21 @ 10:18)  Wt(kg): --    08-22 @ 07:01  -  08-23 @ 07:00  --------------------------------------------------------  IN: 0 mL / OUT: 300 mL / NET: -300 mL    08-23 @ 07:01  -  08-23 @ 12:51  --------------------------------------------------------  IN: 21 mL / OUT: 200 mL / NET: -179 mL          PHYSICAL EXAM:  Constitutional: NAD  HEENT: anicteric sclera, oropharynx clear  Neck: No JVD  Respiratory: CTAB, no wheezes, rales or rhonchi  Cardiovascular: S1, S2, RRR  Gastrointestinal: BS+, soft, NT/ND  Extremities: No cyanosis or clubbing. No peripheral edema  Neurological: A/O x 3, no focal deficits  Psychiatric: Normal mood, normal affect  : No CVA tenderness. No river.   Skin: No rashes  Vascular Access:    LABS:  08-23    139  |  107  |  46<H>  ----------------------------<  144<H>  4.4   |  19<L>  |  1.78<H>    Ca    8.7      23 Aug 2021 07:18  Phos  3.3     08-23  Mg     2.40     08-23    TPro  5.4<L>  /  Alb  2.9<L>  /  TBili  0.9  /  DBili      /  AST  22  /  ALT  14  /  AlkPhos  79  08-23    Creatinine Trend: 1.78 <--, 1.91 <--, 1.78 <--, 1.88 <--, 1.87 <--, 2.08 <--, 1.97 <--                        7.6    6.14  )-----------( 184      ( 23 Aug 2021 07:18 )             25.1     Urine Studies:        RADIOLOGY & ADDITIONAL STUDIES:   [facility] - S López / Aroldo S /D Jose/ S Amina/ JUAN DAVID Smith/ Adrian Vega / LUBA Alvau/ O Alejandra  service -[phone], office [phone]  ---------------------------------------------------------------------------------------------------------------    Patient seen and examined bedside    Subjective and Objective: No overnight events, denied cp/sob. No complaints today. feeling better    Allergies: No Known Allergies      Hospital Medications:   MEDICATIONS  (STANDING):  aMIOdarone    Tablet   Oral   aMIOdarone    Tablet 200 milliGRAM(s) Oral daily  aspirin enteric coated 81 milliGRAM(s) Oral daily  atorvastatin 40 milliGRAM(s) Oral at bedtime  bisacodyl Suppository 10 milliGRAM(s) Rectal every 24 hours  epoetin lizbeth-epbx (RETACRIT) Injectable 58374 Unit(s) SubCutaneous once  finasteride 5 milliGRAM(s) Oral daily  glucagon  Injectable 1 milliGRAM(s) IntraMuscular once  heparin  Infusion.  Unit(s)/Hr (11 mL/Hr) IV Continuous <Continuous>  insulin lispro (ADMELOG) corrective regimen sliding scale   SubCutaneous three times a day before meals  insulin lispro (ADMELOG) corrective regimen sliding scale   SubCutaneous at bedtime  loratadine 10 milliGRAM(s) Oral daily  polyethylene glycol 3350 17 Gram(s) Oral two times a day  senna 2 Tablet(s) Oral at bedtime  sodium bicarbonate 650 milliGRAM(s) Oral two times a day  tamsulosin 0.4 milliGRAM(s) Oral at bedtime    VITALS:  T(F): 97.6 (08-23-21 @ 10:18), Max: 98.1 (08-22-21 @ 23:16)  HR: 74 (08-23-21 @ 10:18)  BP: 105/58 (08-23-21 @ 10:18)  RR: 16 (08-23-21 @ 10:18)  SpO2: 100% (08-23-21 @ 10:18)  Wt(kg): --    08-22 @ 07:01  -  08-23 @ 07:00  --------------------------------------------------------  IN: 0 mL / OUT: 300 mL / NET: -300 mL    08-23 @ 07:01  -  08-23 @ 12:51  --------------------------------------------------------  IN: 21 mL / OUT: 200 mL / NET: -179 mL      PHYSICAL EXAM:  Constitutional: NAD  HEENT: anicteric sclera  Neck: No JVD  Respiratory: CTAB, no wheezes, rales or rhonchi  Cardiovascular: S1, S2, RRR  Gastrointestinal: BS+, soft, NT/ND  Extremities: No peripheral edema  Neurological: A/O x 3  Psychiatric: Normal mood, normal affect  : No river.     LABS:  08-23    139  |  107  |  46<H>  ----------------------------<  144<H>  4.4   |  19<L>  |  1.78<H>    Ca    8.7      23 Aug 2021 07:18  Phos  3.3     08-23  Mg     2.40     08-23    TPro  5.4<L>  /  Alb  2.9<L>  /  TBili  0.9  /  DBili      /  AST  22  /  ALT  14  /  AlkPhos  79  08-23    Creatinine Trend: 1.78 <--, 1.91 <--, 1.78 <--, 1.88 <--, 1.87 <--, 2.08 <--, 1.97 <--                        7.6    6.14  )-----------( 184      ( 23 Aug 2021 07:18 )             25.1     Urine Studies:        RADIOLOGY & ADDITIONAL STUDIES:

## 2021-08-23 NOTE — PROGRESS NOTE ADULT - ASSESSMENT
89 y.o Kisw[place]  male with a PMhx of HTN, HLD, DM, CAD s/p stent to 80% D1 in 2014 , ICMrEF 40% in 2018,Afib on eliquis c/b tachy/perla with BiV PPM( Biotronik) CKD, CVA, Velasquez's esophagus  presents with chest burning, dizziness/ lightheadedness, nausea, weakness with unsteady gait found in ventricular tachycardia and hypotension which did not improved with amiodarone 150mg IVP x1 received shocked x1 by external defibrillator with improve in Blood Pressure and mental status. Started on amiodarone drip.  EKG showed He was admitted to CCU. Labs remarkable for FLOYD on CKD and elevated lactate  post ventricular tachycardia and shock. EKG showed STD in inferior lateral leads. Renal following for FLOYD/CKD Mx.    FLOYD on CKD3:   Recent admission for FLOYD on CKD with hyperkalemia S. creat 3.1 > on discharge on 8/12/21 was 1.89,   serum creatinine stable  FLOYD 2/2 hemodynamic instability/ATN- now resolved  K, vol- acceptable. Cr improved and now at baseline 1.78 today  UA bland  no obstructive uropathy on recent renal sono   lasix and losartan on hold-- no objection to resume lasix   M acidosis 2/2 FLOYD/ckd-better. c/w po Na bicarb 650mg bid. no s/o chf   monitor BMP daily   dose all meds for eGFR<15ml/min.   avoid ACEi/ARB/NSAIDs/Nephrotoxics if able.   Intermediate risk for GRACE, pt optimized renal stand point for cath. pt, and family aware of risks of GRACE  cath plan as per cardiology, when medically optimized  would recommend mucomyst 1200mg po bid -2doses pre and 2 doses post cath and ivf for 4-6hrs jordi cath if no s/o chf    ANemia in ckd+acute blood loss/hematoma- Hb 7.6 now s/p PRBC last week. s/p epo 10k subqx1 8/19  LUE hematoma- now resolving, LUE ultrasound noted with no dvt  NSTEMI (non-ST elevation myocardial infarction).  Patient p/w chest pain and ventricular tachycardia s/p shockedx1  off heparin gtt   cardiac monitoring to monitor for arrhythmias  Low fat DASH diet  -TTE with normal LV function     Ventricular tachycardia s/p defibrillatorx1  on Amiodarone gtt   Monitor for further VT episodes  Keep K > 4.0 and magnesium > 2.0  BIV PPM interrogation showed  monomorphic ventricular tachycardia at 150-160 as per chart  f/u EP. plan for upgrade BiVICD after cath and medical optimization  -LUE ultrasound noted with no dvt    Chronic diastolic congestive heart failure. h/o CHF    CXR; clear-appear euvolemic  Holding home dose lasix and losartan 2/2 FLOYD on CKD and hypotension  no objection to resume lasix . as per cardiology  Daily weight monitoring, Strict I/O, Low sodium DASH diet  ECHO noted -Normal left ventricular systolic function. No segmental wall motion abnormalities. Mild-moderate pulmonary hypertension.    Paroxysmal atrial fibrillation.   Currently on heparin drip for ACS-holding Eliquis till pt on heparin drip  Continuous tele monitoring  Rate control per ccu team    labs, chart reviewed  For any question, call:  Cell # [phone]  service# [phone]  office# [phone]

## 2021-08-23 NOTE — PROGRESS NOTE ADULT - ASSESSMENT
89 year old male with multiple cardiac issues now with VT and FLOYD    Anemia   Appears stable   Keep on PPI BID   Monitor CBC   Keep hemoglobin > 8     CAD   Given no overt bleeding and relatively stable hemoglobin can proceed with Cath and possible stent. Would suggest BID PPI prior and post     I will follow closely          I was physically present for the key portions of the evaluation and management (E/M) service provided.  The patient was personally seen and examined at bedside.  I have edited the note as appropriate.   Thank you for your consultation and allowing  me to participate in the care of your patients. If you have further questions please contact me at [phone].     Davin Baptiste M.D.       _________________________________________________________________________________________________  [facility]  [address] [place] [id number]  Office: [phone]    Luis Enrique Lau PA-C    ___________________________________________________________________________________________________

## 2021-08-23 NOTE — PROGRESS NOTE ADULT - SUBJECTIVE AND OBJECTIVE BOX
Pt seen, feeling well, no complaints.     MEDICATIONS  (STANDING):  aMIOdarone    Tablet   Oral   aMIOdarone    Tablet 200 milliGRAM(s) Oral daily  aspirin enteric coated 81 milliGRAM(s) Oral daily  atorvastatin 40 milliGRAM(s) Oral at bedtime  bisacodyl Suppository 10 milliGRAM(s) Rectal every 24 hours  epoetin lizbeth-epbx (RETACRIT) Injectable 72781 Unit(s) SubCutaneous once  finasteride 5 milliGRAM(s) Oral daily  glucagon  Injectable 1 milliGRAM(s) IntraMuscular once  heparin  Infusion.  Unit(s)/Hr (11 mL/Hr) IV Continuous <Continuous>  insulin lispro (ADMELOG) corrective regimen sliding scale   SubCutaneous three times a day before meals  insulin lispro (ADMELOG) corrective regimen sliding scale   SubCutaneous at bedtime  loratadine 10 milliGRAM(s) Oral daily  polyethylene glycol 3350 17 Gram(s) Oral two times a day  senna 2 Tablet(s) Oral at bedtime  sodium bicarbonate 650 milliGRAM(s) Oral two times a day  tamsulosin 0.4 milliGRAM(s) Oral at bedtime    MEDICATIONS  (PRN):  heparin   Injectable 4500 Unit(s) IV Push every 6 hours PRN For aPTT less than 40  heparin   Injectable 2000 Unit(s) IV Push every 6 hours PRN For aPTT between 40 - 57      ROS  No fever, sweats, chills  No epistaxis, HA, sore throat  No CP, SOB, cough, sputum  No n/v/d, abd pain, melena, hematochezia  No edema  No rash  No anxiety  No back pain, joint pain  No bleeding, bruising  No dysuria, hematuria    Vital Signs Last 24 Hrs  T(C): 36.4 (23 Aug 2021 10:18), Max: 36.7 (22 Aug 2021 23:16)  T(F): 97.6 (23 Aug 2021 10:18), Max: 98.1 (22 Aug 2021 23:16)  HR: 74 (23 Aug 2021 10:18) (63 - 74)  BP: 105/58 (23 Aug 2021 10:18) (100/60 - 125/51)  BP(mean): --  RR: 16 (23 Aug 2021 10:18) (16 - 18)  SpO2: 100% (23 Aug 2021 10:18) (99% - 100%)    PE  NAD  Awake, alert  Anicteric  remainder of exam limited 2/2 covid pandemic                          7.6    6.14  )-----------( 184      ( 23 Aug 2021 07:18 )             25.1       08-23    139  |  107  |  46<H>  ----------------------------<  144<H>  4.4   |  19<L>  |  1.78<H>    Ca    8.7      23 Aug 2021 07:18  Phos  3.3     08-23  Mg     2.40     08-23    TPro  5.4<L>  /  Alb  2.9<L>  /  TBili  0.9  /  DBili  x   /  AST  22  /  ALT  14  /  AlkPhos  79  08-23

## 2021-08-23 NOTE — PROVIDER CONTACT NOTE (CRITICAL VALUE NOTIFICATION) - ACTION/TREATMENT ORDERED:
Stop infusion for 60 minutes, then decrease rate by 2. Endorsed to day RN
Will stop infusion for 60 minutes and then restart the rate at 9cc/hr.

## 2021-08-23 NOTE — PROGRESS NOTE ADULT - SUBJECTIVE AND OBJECTIVE BOX
EP ATTENDING    tele: AF-BiV pacing    he denies palpitations, syncope, nor angina, ROS otherwise -        DATE OF SERVICE - 08-23-21    Review of Systems:   Constitutional: [ ] fevers, [ ] chills.   Skin: [ ] dry skin. [ ] rashes.  Psychiatric: [ ] depression, [ ] anxiety.   Gastrointestinal: [ ] BRBPR, [ ] melena.   Neurological: [ ] confusion. [ ] seizures. [ ] shuffling gait.   Ears,Nose,Mouth and Throat: [ ] ear pain [ ] sore throat.   Eyes: [ ] diplopia.   Respiratory: [ ] hemoptysis. [ ] shortness of breath  Cardiovascular: See HPI above  Hematologic/Lymphatic: [ ] anemia. [ ] painful nodes. [ ] prolonged bleeding.   Genitourinary: [ ] hematuria. [ ] flank pain.   Endocrine: [ ] significant change in weight. [ ] intolerance to heat and cold.     Review of systems [ x] otherwise negative, [ ] otherwise unable to obtain    FH: no family history of sudden cardiac death in first degree relatives    SH: [ ] tobacco, [ ] alcohol, [ ] drugs    aMIOdarone    Tablet   Oral   aMIOdarone    Tablet 200 milliGRAM(s) Oral daily  aspirin enteric coated 81 milliGRAM(s) Oral daily  atorvastatin 40 milliGRAM(s) Oral at bedtime  bisacodyl Suppository 10 milliGRAM(s) Rectal every 24 hours  epoetin lizbeth-epbx (RETACRIT) Injectable 39518 Unit(s) SubCutaneous once  finasteride 5 milliGRAM(s) Oral daily  glucagon  Injectable 1 milliGRAM(s) IntraMuscular once  heparin   Injectable 4500 Unit(s) IV Push every 6 hours PRN  heparin   Injectable 2000 Unit(s) IV Push every 6 hours PRN  heparin  Infusion.  Unit(s)/Hr IV Continuous <Continuous>  insulin lispro (ADMELOG) corrective regimen sliding scale   SubCutaneous three times a day before meals  insulin lispro (ADMELOG) corrective regimen sliding scale   SubCutaneous at bedtime  loratadine 10 milliGRAM(s) Oral daily  polyethylene glycol 3350 17 Gram(s) Oral two times a day  senna 2 Tablet(s) Oral at bedtime  sodium bicarbonate 650 milliGRAM(s) Oral two times a day  tamsulosin 0.4 milliGRAM(s) Oral at bedtime                            7.6    6.14  )-----------( 184      ( 23 Aug 2021 07:18 )             25.1       08-23    139  |  107  |  46<H>  ----------------------------<  144<H>  4.4   |  19<L>  |  1.78<H>    Ca    8.7      23 Aug 2021 07:18  Phos  3.3     08-23  Mg     2.40     08-23    TPro  5.4<L>  /  Alb  2.9<L>  /  TBili  0.9  /  DBili  x   /  AST  22  /  ALT  14  /  AlkPhos  79  08-23            T(C): 36.4 (08-23-21 @ 10:18), Max: 36.7 (08-22-21 @ 23:16)  HR: 74 (08-23-21 @ 10:18) (63 - 74)  BP: 105/58 (08-23-21 @ 10:18) (100/60 - 125/51)  RR: 16 (08-23-21 @ 10:18) (16 - 18)  SpO2: 100% (08-23-21 @ 10:18) (99% - 100%)  Wt(kg): --    I&O's Summary    22 Aug 2021 07:01  -  23 Aug 2021 07:00  --------------------------------------------------------  IN: 0 mL / OUT: 300 mL / NET: -300 mL    23 Aug 2021 07:01  -  23 Aug 2021 13:01  --------------------------------------------------------  IN: 21 mL / OUT: 200 mL / NET: -179 mL        General: Well nourished in no acute distress. Alert and Oriented * 3.   Head: Normocephalic and atraumatic.   Neck: No JVD. No bruits. Supple. Does not appear to be enlarged.   Cardiovascular: + S1,S2 ; RRR Soft systolic murmur at the left lower sternal border. No rubs noted.    Lungs: CTA b/l. No rhonchi, rales or wheezes.   Abdomen: + BS, soft. Non tender. Non distended. No rebound. No guarding.   Extremities: No clubbing/cyanosis/edema.   Neurologic: Moves all four extremities. Full range of motion.   Skin: Warm and moist. The patient's skin has normal elasticity and good skin turgor.   Psychiatric: Appropriate mood and affect.  Musculoskeletal: Normal range of motion, normal strength        A/P) He is an extremely pleasant 89 year old male with a past medical history of persistent atrial fibrillation and complete heart block. In June 2014 I implanted a Biotronik biventricular pacemaker. Device interrogations in the office demonstrate excellent pacemaker function. He denies palpitations, syncope nor angina. His other medical history includes BPH, stroke, HTN, hyperlipidemia and CAD s/p PCI in 2015. He is now admitted with sustained monomorphic VT requiring emergency rescue DCCV    -d/c plavix  -continue asa  -continue heparin drip given AF with prior stroke  -f/u GI regarding guiac positive anemia  -f/u cardiology regarding cath  -f/u renal for clearance for cath  -will eventually transfer to [place] after above completed for an upgrade to ICD therapy  -depending on his preop venogram I may or may not refer to Dr Jaimes for RV pacing lead removal      Holly Villatoro M.D., [facility]  Cardiac Electrophysiology  [facility]  [address] [place] [address], [place] [address]  [email or web address]    office [phone]  pager [phone]

## 2021-08-23 NOTE — PROGRESS NOTE ADULT - ASSESSMENT
89 y.o Malay  male with a PMhx of HTN, HLD, DM, CAD s/p stent to 80% D1 in 2014 ,dHF EF 60% in 2018,Afib on eliquis c/b tachy/perla with BiV PPM( Biotronik) CKD, CVA, Velasquez's esophagus  presents with chest burning, dizziness/ lightheadedness, nausea, weakness with unsteady gait found in ventricular tachycardia and hypotension which did not improved with amiodarone 150mg IVP x1 received shocked x1 by external defibrillator with improve in Blood Pressure and mental status. Started on amiodarone drip.  EKG showed He was admitted to CCU. Labs remarkable for FLOYD on CKD and elevated lactate  post ventricular tachycardia and shock. EKG showed STD in inferior lateral leads.    Card EP GI Renal, Heme following:   on AMio for VT  Will need AICD upgrade  nl TTE  Card cath when FLOYD resolves  DM stable  Acute blood loss anemia: CBC is stable  cont ASA, cleared by GI  no pAD based on Duplex  cont heparin drip for stroke prevention, cleared by GI

## 2021-08-24 NOTE — PROGRESS NOTE ADULT - SUBJECTIVE AND OBJECTIVE BOX
EP ATTENDING    tele: AF-BiV paced    he denies palpitations, syncope, nor angina, ROS otherwise -      DATE OF SERVICE - 08-24-21     Review of Systems:   Constitutional: [ ] fevers, [ ] chills.   Skin: [ ] dry skin. [ ] rashes.  Psychiatric: [ ] depression, [ ] anxiety.   Gastrointestinal: [ ] BRBPR, [ ] melena.   Neurological: [ ] confusion. [ ] seizures. [ ] shuffling gait.   Ears,Nose,Mouth and Throat: [ ] ear pain [ ] sore throat.   Eyes: [ ] diplopia.   Respiratory: [ ] hemoptysis. [ ] shortness of breath  Cardiovascular: See HPI above  Hematologic/Lymphatic: [ ] anemia. [ ] painful nodes. [ ] prolonged bleeding.   Genitourinary: [ ] hematuria. [ ] flank pain.   Endocrine: [ ] significant change in weight. [ ] intolerance to heat and cold.     Review of systems [ x] otherwise negative, [ ] otherwise unable to obtain    FH: no family history of sudden cardiac death in first degree relatives    SH: [ ] tobacco, [ ] alcohol, [ ] drugs    aMIOdarone    Tablet   Oral   aMIOdarone    Tablet 200 milliGRAM(s) Oral daily  aspirin enteric coated 81 milliGRAM(s) Oral daily  atorvastatin 40 milliGRAM(s) Oral at bedtime  bisacodyl Suppository 10 milliGRAM(s) Rectal every 24 hours  epoetin lizbeth-epbx (RETACRIT) Injectable 00092 Unit(s) SubCutaneous once  finasteride 5 milliGRAM(s) Oral daily  glucagon  Injectable 1 milliGRAM(s) IntraMuscular once  heparin   Injectable 4500 Unit(s) IV Push every 6 hours PRN  heparin   Injectable 2000 Unit(s) IV Push every 6 hours PRN  heparin  Infusion.  Unit(s)/Hr IV Continuous <Continuous>  insulin lispro (ADMELOG) corrective regimen sliding scale   SubCutaneous three times a day before meals  insulin lispro (ADMELOG) corrective regimen sliding scale   SubCutaneous at bedtime  loratadine 10 milliGRAM(s) Oral daily  pantoprazole    Tablet 40 milliGRAM(s) Oral two times a day  polyethylene glycol 3350 17 Gram(s) Oral two times a day  senna 2 Tablet(s) Oral at bedtime  sodium bicarbonate 650 milliGRAM(s) Oral two times a day  tamsulosin 0.4 milliGRAM(s) Oral at bedtime                            7.5    6.39  )-----------( 192      ( 24 Aug 2021 06:57 )             23.8       08-24    139  |  107  |  43<H>  ----------------------------<  146<H>  4.2   |  19<L>  |  1.63<H>    Ca    8.5      24 Aug 2021 06:57  Phos  3.3     08-24  Mg     2.30     08-24    TPro  5.4<L>  /  Alb  2.9<L>  /  TBili  0.9  /  DBili  x   /  AST  22  /  ALT  14  /  AlkPhos  79  08-23            T(C): 36.2 (08-24-21 @ 17:00), Max: 36.8 (08-24-21 @ 16:34)  HR: 75 (08-24-21 @ 17:00) (68 - 78)  BP: 127/61 (08-24-21 @ 17:00) (103/60 - 127/61)  RR: 16 (08-24-21 @ 17:00) (16 - 18)  SpO2: 100% (08-24-21 @ 17:00) (99% - 100%)  Wt(kg): --    I&O's Summary    23 Aug 2021 07:01  -  24 Aug 2021 07:00  --------------------------------------------------------  IN: 1030 mL / OUT: 800 mL / NET: 230 mL    24 Aug 2021 07:01  -  24 Aug 2021 19:14  --------------------------------------------------------  IN: 1584 mL / OUT: 600 mL / NET: 984 mL        General: Well nourished in no acute distress. Alert and Oriented * 3.   Head: Normocephalic and atraumatic.   Neck: No JVD. No bruits. Supple. Does not appear to be enlarged.   Cardiovascular: + S1,S2 ; RRR Soft systolic murmur at the left lower sternal border. No rubs noted.    Lungs: CTA b/l. No rhonchi, rales or wheezes.   Abdomen: + BS, soft. Non tender. Non distended. No rebound. No guarding.   Extremities: No clubbing/cyanosis/edema.   Neurologic: Moves all four extremities. Full range of motion.   Skin: Warm and moist. The patient's skin has normal elasticity and good skin turgor.   Psychiatric: Appropriate mood and affect.  Musculoskeletal: Normal range of motion, normal strength          A/P) He is an extremely pleasant 89 year old male with a past medical history of persistent atrial fibrillation and complete heart block. In June 2014 I implanted a Biotronik biventricular pacemaker. Device interrogations in the office demonstrate excellent pacemaker function. He denies palpitations, syncope nor angina. His other medical history includes BPH, stroke, HTN, hyperlipidemia and CAD s/p PCI in 2015. He is now admitted with sustained monomorphic VT requiring emergency rescue DCCV    -continue asa, no need for plavix  -continue heparin drip given AF with prior stroke  -f/u GI regarding guaiac positive anemia  -f/u cardiology regarding cath  -f/u renal for clearance for cath  -will eventually transfer to [place] after above completed for an upgrade to ICD therapy  -depending on his preop venogram I may or may not refer to Dr Jaimes for RV pacing lead removal      Holly Villatoro M.D., [facility]  Cardiac Electrophysiology  [facility]  [address] [place] [address], [place] [address]  [email or web address]    office [phone]  pager [phone]

## 2021-08-24 NOTE — PROGRESS NOTE ADULT - SUBJECTIVE AND OBJECTIVE BOX
Pt seen, sitting upright, worked with PT      MEDICATIONS  (STANDING):  aMIOdarone    Tablet   Oral   aMIOdarone    Tablet 200 milliGRAM(s) Oral daily  aspirin enteric coated 81 milliGRAM(s) Oral daily  atorvastatin 40 milliGRAM(s) Oral at bedtime  bisacodyl Suppository 10 milliGRAM(s) Rectal every 24 hours  epoetin lizbeth-epbx (RETACRIT) Injectable 62576 Unit(s) SubCutaneous once  finasteride 5 milliGRAM(s) Oral daily  glucagon  Injectable 1 milliGRAM(s) IntraMuscular once  heparin  Infusion.  Unit(s)/Hr (11 mL/Hr) IV Continuous <Continuous>  insulin lispro (ADMELOG) corrective regimen sliding scale   SubCutaneous three times a day before meals  insulin lispro (ADMELOG) corrective regimen sliding scale   SubCutaneous at bedtime  loratadine 10 milliGRAM(s) Oral daily  polyethylene glycol 3350 17 Gram(s) Oral two times a day  senna 2 Tablet(s) Oral at bedtime  sodium bicarbonate 650 milliGRAM(s) Oral two times a day  tamsulosin 0.4 milliGRAM(s) Oral at bedtime    MEDICATIONS  (PRN):  heparin   Injectable 4500 Unit(s) IV Push every 6 hours PRN For aPTT less than 40  heparin   Injectable 2000 Unit(s) IV Push every 6 hours PRN For aPTT between 40 - 57      ROS  No fever, sweats, chills  No epistaxis, HA, sore throat  No CP, SOB, cough, sputum  No n/v/d, abd pain, melena, hematochezia  No edema  No rash  No anxiety  No back pain, joint pain  No bleeding, bruising  No dysuria, hematuria    Vital Signs Last 24 Hrs  T(C): 36.6 (24 Aug 2021 05:06), Max: 36.7 (23 Aug 2021 21:52)  T(F): 97.9 (24 Aug 2021 05:06), Max: 98.1 (23 Aug 2021 21:52)  HR: 75 (24 Aug 2021 05:06) (73 - 76)  BP: 103/60 (24 Aug 2021 05:06) (103/60 - 114/59)  BP(mean): --  RR: 18 (24 Aug 2021 05:06) (16 - 18)  SpO2: 99% (24 Aug 2021 05:06) (99% - 100%)    PE  sitting upright  Anicteric, MMM  RRR  CTAB  Abd soft, NT, ND  No c/c/e  No rash grossly  FROM                          7.5    6.39  )-----------( 192      ( 24 Aug 2021 06:57 )             23.8       08-24    139  |  107  |  43<H>  ----------------------------<  146<H>  4.2   |  19<L>  |  1.63<H>    Ca    8.5      24 Aug 2021 06:57  Phos  3.3     08-24  Mg     2.30     08-24    TPro  5.4<L>  /  Alb  2.9<L>  /  TBili  0.9  /  DBili  x   /  AST  22  /  ALT  14  /  AlkPhos  79  08-23

## 2021-08-24 NOTE — PROGRESS NOTE ADULT - ASSESSMENT
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

## 2021-08-24 NOTE — PROGRESS NOTE ADULT - SUBJECTIVE AND OBJECTIVE BOX
[facility] : Ans Serv [phone], Office [phone]  Dr Garcia/Dr Dawn  /Dr David harris /Dr JUAN DAVID Huynh/Dr Mike Smith/Dr Adrian Vega /Dr LUBA Herman  _______________________________________________________________________________________________    seen and examined today for acute kidney injury on Chronic Kidney Disease Stage 3   Interval : Serum Creatinine stable  VITALS:  T(F): 97.1 (08-24-21 @ 17:00), Max: 98.2 (08-24-21 @ 16:34)  HR: 75 (08-24-21 @ 17:00)  BP: 127/61 (08-24-21 @ 17:00)  RR: 16 (08-24-21 @ 17:00)  SpO2: 100% (08-24-21 @ 17:00)    08-23 @ 07:01  -  08-24 @ 07:00  --------------------------------------------------------  IN: 1030 mL / OUT: 800 mL / NET: 230 mL    08-24 @ 07:01  -  08-24 @ 18:23  --------------------------------------------------------  IN: 1577 mL / OUT: 500 mL / NET: 1077 mL    Physical Exam :-  Constitutional: NAD  Neck: Supple.  Respiratory: Bilateral equal breath sounds, no Crackles present.  Cardiovascular: S1, S2 normal, positive Murmur  Gastrointestinal: Bowel Sounds present,  Data:-  Allergies :   No Known Allergies    Hospital Medications:   MEDICATIONS  (STANDING):  aMIOdarone    Tablet   Oral   aMIOdarone    Tablet 200 milliGRAM(s) Oral daily  aspirin enteric coated 81 milliGRAM(s) Oral daily  atorvastatin 40 milliGRAM(s) Oral at bedtime  bisacodyl Suppository 10 milliGRAM(s) Rectal every 24 hours  epoetin lizbeth-epbx (RETACRIT) Injectable 01861 Unit(s) SubCutaneous once  finasteride 5 milliGRAM(s) Oral daily  glucagon  Injectable 1 milliGRAM(s) IntraMuscular once  heparin  Infusion.  Unit(s)/Hr (11 mL/Hr) IV Continuous <Continuous>  insulin lispro (ADMELOG) corrective regimen sliding scale   SubCutaneous three times a day before meals  insulin lispro (ADMELOG) corrective regimen sliding scale   SubCutaneous at bedtime  loratadine 10 milliGRAM(s) Oral daily  pantoprazole    Tablet 40 milliGRAM(s) Oral two times a day  polyethylene glycol 3350 17 Gram(s) Oral two times a day  senna 2 Tablet(s) Oral at bedtime  sodium bicarbonate 650 milliGRAM(s) Oral two times a day  tamsulosin 0.4 milliGRAM(s) Oral at bedtime    08-24    139  |  107  |  43<H>  ----------------------------<  146<H>  4.2   |  19<L>  |  1.63<H>    Ca    8.5      24 Aug 2021 06:57  Phos  3.3     08-24  Mg     2.30     08-24    TPro  5.4<L>  /  Alb  2.9<L>  /  TBili  0.9  /  DBili      /  AST  22  /  ALT  14  /  AlkPhos  79  08-23    Creatinine Trend: 1.63 <--, 1.78 <--, 1.91 <--, 1.78 <--, 1.88 <--, 1.87 <--, 2.08 <--                        7.5    6.39  )-----------( 192      ( 24 Aug 2021 06:57 )             23.8

## 2021-08-24 NOTE — PROGRESS NOTE ADULT - ASSESSMENT
89 y.o Japanese  male with a PMhx of HTN, HLD, DM, CAD s/p stent to 80% D1 in 2014 ,dHF EF 60% in 2018,Afib on eliquis c/b tachy/perla with BiV PPM( Biotronik) CKD, CVA, Velasquez's esophagus  presents with chest burning, dizziness/ lightheadedness, nausea, weakness with unsteady gait found in ventricular tachycardia and hypotension which did not improved with amiodarone 150mg IVP x1 received shocked x1 by external defibrillator with improve in Blood Pressure and mental status. Started on amiodarone drip.  EKG showed He was admitted to CCU. Labs remarkable for FLOYD on CKD and elevated lactate  post ventricular tachycardia and shock. EKG showed STD in inferior lateral leads.    Card EP GI Renal, Heme following:   on AMio for VT  Will need AICD upgrade  nl TTE  Card cath when FLOYD resolves  DM stable  Acute blood loss anemia: CBC is stable  cont ASA, cleared by GI  no pAD based on Duplex  cont heparin drip for stroke prevention, cleared by GI

## 2021-08-24 NOTE — PROGRESS NOTE ADULT - SUBJECTIVE AND OBJECTIVE BOX
Patient is a 89y old  Male who presents with a chief complaint of HIGH K (21 Aug 2021 13:50)      SUBJECTIVE / OVERNIGHT EVENTS:    Events noted.  CONSTITUTIONAL: No fever,  or fatigue  RESPIRATORY: No cough, wheezing,  No shortness of breath  CARDIOVASCULAR: No chest pain, palpitations, dizziness, or leg swelling  GASTROINTESTINAL: No abdominal or epigastric pain. No nausea, vomiting.  NEUROLOGICAL: No headaches,     MEDICATIONS  (STANDING):  aMIOdarone    Tablet   Oral   aMIOdarone    Tablet 200 milliGRAM(s) Oral daily  aspirin enteric coated 81 milliGRAM(s) Oral daily  atorvastatin 40 milliGRAM(s) Oral at bedtime  bisacodyl Suppository 10 milliGRAM(s) Rectal every 24 hours  epoetin lizbeth-epbx (RETACRIT) Injectable 14688 Unit(s) SubCutaneous once  finasteride 5 milliGRAM(s) Oral daily  glucagon  Injectable 1 milliGRAM(s) IntraMuscular once  heparin  Infusion.  Unit(s)/Hr (11 mL/Hr) IV Continuous <Continuous>  insulin lispro (ADMELOG) corrective regimen sliding scale   SubCutaneous three times a day before meals  insulin lispro (ADMELOG) corrective regimen sliding scale   SubCutaneous at bedtime  loratadine 10 milliGRAM(s) Oral daily  pantoprazole    Tablet 40 milliGRAM(s) Oral two times a day  polyethylene glycol 3350 17 Gram(s) Oral two times a day  senna 2 Tablet(s) Oral at bedtime  sodium bicarbonate 650 milliGRAM(s) Oral two times a day  tamsulosin 0.4 milliGRAM(s) Oral at bedtime    MEDICATIONS  (PRN):  heparin   Injectable 4500 Unit(s) IV Push every 6 hours PRN For aPTT less than 40  heparin   Injectable 2000 Unit(s) IV Push every 6 hours PRN For aPTT between 40 - 57        CAPILLARY BLOOD GLUCOSE      POCT Blood Glucose.: 158 mg/dL (24 Aug 2021 22:08)  POCT Blood Glucose.: 133 mg/dL (24 Aug 2021 17:09)  POCT Blood Glucose.: 175 mg/dL (24 Aug 2021 12:29)  POCT Blood Glucose.: 135 mg/dL (24 Aug 2021 08:30)    I&O's Summary    23 Aug 2021 07:01  -  24 Aug 2021 07:00  --------------------------------------------------------  IN: 1030 mL / OUT: 800 mL / NET: 230 mL    24 Aug 2021 07:01  -  24 Aug 2021 23:46  --------------------------------------------------------  IN: 1584 mL / OUT: 750 mL / NET: 834 mL        T(C): 36.7 (08-24-21 @ 23:09), Max: 36.8 (08-24-21 @ 16:34)  HR: 69 (08-24-21 @ 23:09) (68 - 78)  BP: 122/64 (08-24-21 @ 23:09) (103/60 - 127/61)  RR: 18 (08-24-21 @ 23:09) (16 - 18)  SpO2: 100% (08-24-21 @ 23:09) (99% - 100%)    PHYSICAL EXAM:  GENERAL: NAD  NECK: Supple, No JVD  CHEST/LUNG: Clear to auscultation bilaterally; No wheezing.  HEART: Regular rate and rhythm; No murmurs, rubs, or gallops  ABDOMEN: Soft, Nontender, Nondistended; Bowel sounds present  EXTREMITIES:   No edema  NEUROLOGY: AAO X 3      LABS:                        7.5    6.39  )-----------( 192      ( 24 Aug 2021 06:57 )             23.8     08-24    139  |  107  |  43<H>  ----------------------------<  146<H>  4.2   |  19<L>  |  1.63<H>    Ca    8.5      24 Aug 2021 06:57  Phos  3.3     08-24  Mg     2.30     08-24    TPro  5.4<L>  /  Alb  2.9<L>  /  TBili  0.9  /  DBili  x   /  AST  22  /  ALT  14  /  AlkPhos  79  08-23    PTT - ( 24 Aug 2021 06:57 )  PTT:71.9 sec        CAPILLARY BLOOD GLUCOSE      POCT Blood Glucose.: 158 mg/dL (24 Aug 2021 22:08)  POCT Blood Glucose.: 133 mg/dL (24 Aug 2021 17:09)  POCT Blood Glucose.: 175 mg/dL (24 Aug 2021 12:29)  POCT Blood Glucose.: 135 mg/dL (24 Aug 2021 08:30)        RADIOLOGY & ADDITIONAL TESTS:    Imaging Personally Reviewed:    Consultant(s) Notes Reviewed:      Care Discussed with Consultants/Other Providers:    Dileep Amador MD, CMD, FACP    [address] [address]  Office Tel: [phone]  Cell: [phone]

## 2021-08-24 NOTE — PROGRESS NOTE ADULT - ASSESSMENT
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 Started on amiodarone drip.  EKG showed He was admitted to CCU. Labs remarkable for FLOYD on CKD and elevated lactate  post ventricular tachycardia and shock. EKG showed STD in inferior lateral leads. Renal following for FLOYD/CKD Mx.    FLOYD on CKD3:   Recent admission for FLOYD on CKD with hyperkalemia S. creat 3.1 > on discharge on 8/12/21 was 1.89,   serum creatinine stable  FLOYD 2/2 hemodynamic instability/ATN- IMPROVED  K, vol- acceptable.   U/A REVIEWED  no obstructive uropathy on recent renal sono   lasix and losartan on hold-- no objection to resume lasix   M acidosis   goal bicab level 22 meq  sodium bicarb 650 mg PO BID     ANemia in ckd+acute blood loss/hematoma- Hb 7.6 now s/p PRBC last week. s/p epo 10k subqx1 8/19  LUE hematoma- now resolving, LUE ultrasound noted with no dvt    NSTEMI (non-ST elevation myocardial infarction).  Patient p/w chest pain and ventricular tachycardia s/p shockedx1  off heparin gtt   cardiac monitoring to monitor for arrhythmias  Low fat DASH diet  -TTE with normal LV function   follow up with cardiology     Ventricular tachycardia s/p defibrillatorx1  on Amiodarone gtt   Monitor for further VT episodes  Keep K > 4.0 and magnesium > 2.0  BIV PPM interrogation showed  monomorphic ventricular tachycardia at 150-160 as per chart  f/u EP. plan for upgrade BiVICD after cath and medical optimization  -LUE ultrasound noted with no dvt    Chronic diastolic congestive heart failure. h/o CHF    CXR; clear-appear euvolemic  Holding home dose lasix and losartan 2/2 FLOYD on CKD and hypotension  no objection to resume lasix . as per cardiology  Daily weight monitoring, Strict I/O, Low sodium DASH diet  ECHO noted -Normal left ventricular systolic function. No segmental wall motion abnormalities. Mild-moderate pulmonary hypertension.    Paroxysmal atrial fibrillation.   Currently on heparin drip for ACS-holding Eliquis till pt on heparin drip  Continuous tele monitoring  Rate control per ccu team

## 2021-08-24 NOTE — PROGRESS NOTE ADULT - ASSESSMENT
89 year old male with multiple cardiac issues now with VT and FLOYD    Anemia   Appears stable   Without overt GI bleeding  Keep on PPI BID   Monitor CBC and Keep hemoglobin > 8   High Fiber diet    CAD   Given no overt bleeding and relatively stable hemoglobin can proceed with Cath and possible stent  Would suggest BID PPI while on A/C    will follow closely      Advanced care planning was discussed with patient.  Advanced care planning forms were reviewed and discussed.  Risks, benefits and alternatives of gastroenterologic procedures were discussed in detail and all questions were answered.  30 minutes spent. _________________________________________________________________________________________________  [facility]  [address] [place] [address]  Office: [phone]    Luis Enrique Lau PA-C    ___________________________________________________________________________________________________

## 2021-08-24 NOTE — PROGRESS NOTE ADULT - SUBJECTIVE AND OBJECTIVE BOX
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      S: no chest pain or sob; left arm pain improved; ros otherwise negative.     Review of Systems:   Constitutional: [ ] fevers, [ ] chills.   Skin: [ ] dry skin. [ ] rashes.  Psychiatric: [ ] depression, [ ] anxiety.   Gastrointestinal: [ ] BRBPR, [ ] melena.   Neurological: [ ] confusion. [ ] seizures. [ ] shuffling gait.   Ears,Nose,Mouth and Throat: [ ] ear pain [ ] sore throat.   Eyes: [ ] diplopia.   Respiratory: [ ] hemoptysis. [ ] shortness of breath  Cardiovascular: See HPI above  Hematologic/Lymphatic: [ ] anemia. [ ] painful nodes. [ ] prolonged bleeding.   Genitourinary: [ ] hematuria. [ ] flank pain.   Endocrine: [ ] significant change in weight. [ ] intolerance to heat and cold.     Review of systems [ x] otherwise negative, [ ] otherwise unable to obtain    FH: no family history of sudden cardiac death in first degree relatives    SH: [ ] tobacco, [ ] alcohol, [ ] drugs      aMIOdarone    Tablet   Oral   aMIOdarone    Tablet 200 milliGRAM(s) Oral daily  aspirin enteric coated 81 milliGRAM(s) Oral daily  atorvastatin 40 milliGRAM(s) Oral at bedtime  bisacodyl Suppository 10 milliGRAM(s) Rectal every 24 hours  epoetin lizbeth-epbx (RETACRIT) Injectable 38922 Unit(s) SubCutaneous once  finasteride 5 milliGRAM(s) Oral daily  glucagon  Injectable 1 milliGRAM(s) IntraMuscular once  heparin   Injectable 4500 Unit(s) IV Push every 6 hours PRN  heparin   Injectable 2000 Unit(s) IV Push every 6 hours PRN  heparin  Infusion.  Unit(s)/Hr IV Continuous <Continuous>  insulin lispro (ADMELOG) corrective regimen sliding scale   SubCutaneous three times a day before meals  insulin lispro (ADMELOG) corrective regimen sliding scale   SubCutaneous at bedtime  loratadine 10 milliGRAM(s) Oral daily  pantoprazole    Tablet 40 milliGRAM(s) Oral two times a day  polyethylene glycol 3350 17 Gram(s) Oral two times a day  senna 2 Tablet(s) Oral at bedtime  sodium bicarbonate 650 milliGRAM(s) Oral two times a day  tamsulosin 0.4 milliGRAM(s) Oral at bedtime                            7.5    6.39  )-----------( 192      ( 24 Aug 2021 06:57 )             23.8       08-24    139  |  107  |  43<H>  ----------------------------<  146<H>  4.2   |  19<L>  |  1.63<H>    Ca    8.5      24 Aug 2021 06:57  Phos  3.3     08-24  Mg     2.30     08-24    TPro  5.4<L>  /  Alb  2.9<L>  /  TBili  0.9  /  DBili  x   /  AST  22  /  ALT  14  /  AlkPhos  79  08-23            T(C): 36.7 (08-24-21 @ 11:10), Max: 36.7 (08-23-21 @ 21:52)  HR: 74 (08-24-21 @ 11:10) (73 - 76)  BP: 106/56 (08-24-21 @ 11:10) (103/60 - 114/59)  RR: 17 (08-24-21 @ 11:10) (16 - 18)  SpO2: 100% (08-24-21 @ 11:10) (99% - 100%)  Wt(kg): --    I&O's Summary    23 Aug 2021 07:01  -  24 Aug 2021 07:00  --------------------------------------------------------  IN: 1030 mL / OUT: 800 mL / NET: 230 mL    24 Aug 2021 07:01  -  24 Aug 2021 13:47  --------------------------------------------------------  IN: 748 mL / OUT: 200 mL / NET: 548 mL          General: Well nourished in no acute distress. Alert and Oriented * 3.   Head: Normocephalic and atraumatic.   Neck: No JVD. No bruits. Supple. Does not appear to be enlarged.   Cardiovascular: + S1,S2 ; RRR Soft systolic murmur at the left lower sternal border. No rubs noted.    Lungs: CTA b/l. No rhonchi, rales or wheezes.   Abdomen: + BS, soft. Non tender. Non distended. No rebound. No guarding.   Extremities: L radial pulse 2+, LUE hematoma  Neurologic: Moves all four extremities. Full range of motion.   Skin: Warm and moist. The patient's skin has normal elasticity and good skin turgor.   Psychiatric: Appropriate mood and affect.  Musculoskeletal: Normal range of motion, normal strength    Tele: SR    TTE: < from: Transthoracic Echocardiogram (08.14.21 @ 14:06) >  Normal left ventricular systolic function. No segmental  wall motion abnormalities.  Mild-moderate pulmonary hypertension.  A device wire is noted in the right heart.    < end of copied text >    < from: VA Duplex Ext Veins Upper Comp, Left. (08.17.21 @ 12:43) >  Summary/Impressions:  No evidence of deep vein thrombosis in the left upper  extremity.    < end of copied text >      < from: CT Upper Extremity No Cont, Left (08.19.21 @ 09:25) >  IMPRESSION:    1.  Mild enlargement of the triceps muscle suspicious for intramuscular hematoma.  2.  Amorphous appearance of the flexor musculature the forearm is nonspecific but can be seen with myositis or intramuscular hematoma.  3.  Moderate soft tissue swelling about the upper extremity, nonspecific but can be seen with cellulitis in the proper clinical setting.  4.  Nodular opacities within the left upper lobe. Small pleural effusion and atelectasis. Dedicated CT the chest can be performed for more detailed evaluation.    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     -continue with amiodarone for VT per EP - no further episodes of VT thus far   -keep K > 4, Mg > 2  -TTE with normal LV function   -EP eval with Dr. Villatoro/Arielle for AICD upgrade appreciated - will need cath and medical optimization prior to upgrade   -will eventually plan for cardiac cath when renal function allows and H/H stable  -Hosp course c/b FLOYD and Anemia.  Pt with LUE hematoma- now resolving - follow up vascular   -LUE ultrasound noted with no dvt  -heme eval called for anemia and thrombocytopenia appreciated   -hep gtt restarted per EP for CVA prevention   -discussed with GI - ok to continue with sapt and ac  -will plan on cath when medically optimized   -will transfuse 1U PRBC to keep Hb > 8   -arterial duplex with no significant arterial disease - follow up vascular    Estelle Ayala MD

## 2021-08-24 NOTE — PROGRESS NOTE ADULT - SUBJECTIVE AND OBJECTIVE BOX
INTERVAL HPI/OVERNIGHT EVENTS:    brown stool after suppository yesterday   no rectal bleeding, no melena   no c/o abd pain this morning     MEDICATIONS  (STANDING):  aMIOdarone    Tablet   Oral   aMIOdarone    Tablet 200 milliGRAM(s) Oral daily  aspirin enteric coated 81 milliGRAM(s) Oral daily  atorvastatin 40 milliGRAM(s) Oral at bedtime  bisacodyl Suppository 10 milliGRAM(s) Rectal every 24 hours  epoetin lizbeth-epbx (RETACRIT) Injectable 40329 Unit(s) SubCutaneous once  finasteride 5 milliGRAM(s) Oral daily  glucagon  Injectable 1 milliGRAM(s) IntraMuscular once  heparin  Infusion.  Unit(s)/Hr (11 mL/Hr) IV Continuous <Continuous>  insulin lispro (ADMELOG) corrective regimen sliding scale   SubCutaneous three times a day before meals  insulin lispro (ADMELOG) corrective regimen sliding scale   SubCutaneous at bedtime  loratadine 10 milliGRAM(s) Oral daily  polyethylene glycol 3350 17 Gram(s) Oral two times a day  senna 2 Tablet(s) Oral at bedtime  sodium bicarbonate 650 milliGRAM(s) Oral two times a day  tamsulosin 0.4 milliGRAM(s) Oral at bedtime    MEDICATIONS  (PRN):  heparin   Injectable 4500 Unit(s) IV Push every 6 hours PRN For aPTT less than 40  heparin   Injectable 2000 Unit(s) IV Push every 6 hours PRN For aPTT between 40 - 57      Allergies    No Known Allergies    Intolerances        Review of Systems:    General:  No wt loss, fevers, chills, night sweats, fatigue   Eyes:  Good vision, no reported pain  ENT:  No sore throat, pain, runny nose, dysphagia  CV:  No pain, palpitations, hypo/hypertension  Resp:  No dyspnea, cough, tachypnea, wheezing  GI:  No pain, No nausea, No vomiting, No diarrhea, No constipation, No weight loss, No fever, No pruritis, No rectal bleeding, No melena, No dysphagia  :  No pain, bleeding, incontinence, nocturia  Muscle:  No pain, weakness  Neuro:  No weakness, tingling, memory problems  Psych:  No fatigue, insomnia, mood problems, depression  Endocrine:  No polyuria, polydypsia, cold/heat intolerance  Heme:  No petechiae, ecchymosis, easy bruisability  Skin:  No rash, tattoos, scars, edema      Vital Signs Last 24 Hrs  T(C): 36.7 (24 Aug 2021 11:10), Max: 36.7 (23 Aug 2021 21:52)  T(F): 98.1 (24 Aug 2021 11:10), Max: 98.1 (23 Aug 2021 21:52)  HR: 74 (24 Aug 2021 11:10) (73 - 76)  BP: 106/56 (24 Aug 2021 11:10) (103/60 - 114/59)  BP(mean): --  RR: 17 (24 Aug 2021 11:10) (16 - 18)  SpO2: 100% (24 Aug 2021 11:10) (99% - 100%)    PHYSICAL EXAM:    Constitutional: NAD  HEENT: EOMI, throat clear  Neck: No LAD, supple  Respiratory: CTA and P  Cardiovascular: S1 and S2, RRR, no M  Gastrointestinal: BS+, soft, NT/ND, neg HSM,  Extremities: No peripheral edema, neg clubbing, cyanosis  Vascular: 2+ peripheral pulses  Neurological: A/O x 3, no focal deficits  Psychiatric: Normal mood, normal affect  Skin: No rashes      LABS:                        7.5    6.39  )-----------( 192      ( 24 Aug 2021 06:57 )             23.8     08-24    139  |  107  |  43<H>  ----------------------------<  146<H>  4.2   |  19<L>  |  1.63<H>    Ca    8.5      24 Aug 2021 06:57  Phos  3.3     08-24  Mg     2.30     08-24    TPro  5.4<L>  /  Alb  2.9<L>  /  TBili  0.9  /  DBili  x   /  AST  22  /  ALT  14  /  AlkPhos  79  08-23    PT/INR - ( 22 Aug 2021 16:18 )   PT: 15.3 sec;   INR: 1.35 ratio         PTT - ( 24 Aug 2021 06:57 )  PTT:71.9 sec      RADIOLOGY & ADDITIONAL TESTS:

## 2021-08-25 NOTE — PROGRESS NOTE ADULT - SUBJECTIVE AND OBJECTIVE BOX
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      S: no chest pain or sob; ros otherwise negative.     Review of Systems:   Constitutional: [ ] fevers, [ ] chills.   Skin: [ ] dry skin. [ ] rashes.  Psychiatric: [ ] depression, [ ] anxiety.   Gastrointestinal: [ ] BRBPR, [ ] melena.   Neurological: [ ] confusion. [ ] seizures. [ ] shuffling gait.   Ears,Nose,Mouth and Throat: [ ] ear pain [ ] sore throat.   Eyes: [ ] diplopia.   Respiratory: [ ] hemoptysis. [ ] shortness of breath  Cardiovascular: See HPI above  Hematologic/Lymphatic: [ ] anemia. [ ] painful nodes. [ ] prolonged bleeding.   Genitourinary: [ ] hematuria. [ ] flank pain.   Endocrine: [ ] significant change in weight. [ ] intolerance to heat and cold.     Review of systems [ x] otherwise negative, [ ] otherwise unable to obtain    FH: no family history of sudden cardiac death in first degree relatives    SH: [ ] tobacco, [ ] alcohol, [ ] drugs    aMIOdarone    Tablet   Oral   aMIOdarone    Tablet 200 milliGRAM(s) Oral daily  aspirin enteric coated 81 milliGRAM(s) Oral daily  atorvastatin 40 milliGRAM(s) Oral at bedtime  bisacodyl Suppository 10 milliGRAM(s) Rectal every 24 hours  epoetin lizbeth-epbx (RETACRIT) Injectable 70189 Unit(s) SubCutaneous once  finasteride 5 milliGRAM(s) Oral daily  glucagon  Injectable 1 milliGRAM(s) IntraMuscular once  heparin   Injectable 4500 Unit(s) IV Push every 6 hours PRN  heparin   Injectable 2000 Unit(s) IV Push every 6 hours PRN  heparin  Infusion.  Unit(s)/Hr IV Continuous <Continuous>  insulin lispro (ADMELOG) corrective regimen sliding scale   SubCutaneous three times a day before meals  insulin lispro (ADMELOG) corrective regimen sliding scale   SubCutaneous at bedtime  loratadine 10 milliGRAM(s) Oral daily  pantoprazole    Tablet 40 milliGRAM(s) Oral two times a day  polyethylene glycol 3350 17 Gram(s) Oral two times a day  senna 2 Tablet(s) Oral at bedtime  sodium bicarbonate 650 milliGRAM(s) Oral two times a day  tamsulosin 0.4 milliGRAM(s) Oral at bedtime                            9.0    6.74  )-----------( 212      ( 25 Aug 2021 06:39 )             27.8       08-25    133<L>  |  104  |  41<H>  ----------------------------<  106<H>  4.2   |  18<L>  |  1.72<H>    Ca    8.6      25 Aug 2021 06:39  Phos  2.4     08-25  Mg     2.30     08-25              T(C): 36.7 (08-25-21 @ 13:34), Max: 36.7 (08-24-21 @ 23:09)  HR: 74 (08-25-21 @ 13:34) (69 - 75)  BP: 122/67 (08-25-21 @ 13:34) (122/64 - 127/61)  RR: 17 (08-25-21 @ 13:34) (16 - 18)  SpO2: 99% (08-25-21 @ 13:34) (99% - 100%)  Wt(kg): --    I&O's Summary    24 Aug 2021 07:01  -  25 Aug 2021 07:00  --------------------------------------------------------  IN: 1584 mL / OUT: 750 mL / NET: 834 mL      General: Well nourished in no acute distress. Alert and Oriented * 3.   Head: Normocephalic and atraumatic.   Neck: No JVD. No bruits. Supple. Does not appear to be enlarged.   Cardiovascular: + S1,S2 ; RRR Soft systolic murmur at the left lower sternal border. No rubs noted.    Lungs: CTA b/l. No rhonchi, rales or wheezes.   Abdomen: + BS, soft. Non tender. Non distended. No rebound. No guarding.   Extremities: L radial pulse 2+, LUE hematoma  Neurologic: Moves all four extremities. Full range of motion.   Skin: Warm and moist. The patient's skin has normal elasticity and good skin turgor.   Psychiatric: Appropriate mood and affect.  Musculoskeletal: Normal range of motion, normal strength    Tele: SR    TTE: < from: Transthoracic Echocardiogram (08.14.21 @ 14:06) >  Normal left ventricular systolic function. No segmental  wall motion abnormalities.  Mild-moderate pulmonary hypertension.  A device wire is noted in the right heart.    < end of copied text >    < from: VA Duplex Ext Veins Upper Comp, Left. (08.17.21 @ 12:43) >  Summary/Impressions:  No evidence of deep vein thrombosis in the left upper  extremity.    < end of copied text >      < from: CT Upper Extremity No Cont, Left (08.19.21 @ 09:25) >  IMPRESSION:    1.  Mild enlargement of the triceps muscle suspicious for intramuscular hematoma.  2.  Amorphous appearance of the flexor musculature the forearm is nonspecific but can be seen with myositis or intramuscular hematoma.  3.  Moderate soft tissue swelling about the upper extremity, nonspecific but can be seen with cellulitis in the proper clinical setting.  4.  Nodular opacities within the left upper lobe. Small pleural effusion and atelectasis. Dedicated CT the chest can be performed for more detailed evaluation.    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     -continue with amiodarone for VT per EP - no further episodes of VT thus far   -keep K > 4, Mg > 2  -TTE with normal LV function   -I had a long discussion with the patient and the patient's son today regarding cath.  All risks, benefits, indications, contraindications, inferior alternative options of cath explained to the patient and his son.  At this time, they decline cath and would like medical management of known cad.   -Given no current anginal symptoms, monomorphic VT that is unlikely ischemic driven, and patient's GOC/preferences, will hold off on cath for now and medically manage CAD.   -Monitor H/H on hep gtt  -Plans for device upgrade per EDISON Ayala MD

## 2021-08-25 NOTE — PROGRESS NOTE ADULT - ASSESSMENT
89 year old male with multiple cardiac issues now with VT and FLOYD    Anemia   Appears stable   Without overt GI bleeding  Keep on PPI BID   Monitor CBC and Keep hemoglobin > 8   High Fiber diet    CAD   Given no overt bleeding and relatively stable hemoglobin can proceed with Cath and possible stent  Would suggest BID PPI while on A/C    will follow closely      Advanced care planning was discussed with patient.  Advanced care planning forms were reviewed and discussed.  Risks, benefits and alternatives of gastroenterologic procedures were discussed in detail and all questions were answered.  30 minutes spent. _________________________________________________________________________________________________  [facility]  [address] [place] [address]  Office: [phone]    Luis Enrique Lau PA-C    ___________________________________________________________________________________________________

## 2021-08-25 NOTE — CHART NOTE - NSCHARTNOTEFT_GEN_A_CORE
Patient seen and examined.  Imaging reviewed.  Case discussed as team with [place].  Hematoma stable.  Vascular exam benign.  Pulses present.  Sensorimotor intact.  No indication for acute vascular surgical intervention.  Care per primary.  Please page vascular with questions or concerns.      Vascular Surgery  [id number] Patient seen and examined.  Imaging reviewed.  Duplex negative for venous occlusion.  Case discussed as team with [place].  Hematoma stable.  Ecchymosis over anterior forearm, anterior and posterior arm and chest wall.  No patti necrosis or signs of ischemia to skin.  Palpable radial, ulnar, brachial and axillary arteries.  Skin soft.  Hematoma not palpable. Sensorimotor intact throughout shoulder, arm, forearm and hand.  No indication for vascular surgical intervention.  Care per primary.  Please page vascular with questions or concerns.      Vascular Surgery  [id number]

## 2021-08-25 NOTE — PROGRESS NOTE ADULT - ASSESSMENT
89 y.o [place]  male with a PMhx of HTN, HLD, DM, CAD s/p stent to 80% D1 in 2014 , ICMrEF 40% in 2018,Afib on eliquis c/b tachy/perla with BiV PPM( Biotronik) CKD, CVA, Velasquez's esophagus  presents with chest burning, dizziness/ lightheadedness, nausea, weakness with unsteady gait found in ventricular tachycardia and hypotension which did not improved with amiodarone 150mg IVP x1 received shocked x1 by external defibrillator with improve in Blood Pressure and mental status. Started on amiodarone drip.  EKG showed He was admitted to CCU. Labs remarkable for FLOYD on CKD and elevated lactate  post ventricular tachycardia and shock. EKG showed STD in inferior lateral leads. Renal following for FLOYD/CKD Mx.    FLOYD on CKD3:   Recent admission for FLOYD on CKD with hyperkalemia S. creat 3.1 > on discharge on 8/12/21 was 1.89,   serum creatinine stable  FLOYD 2/2 hemodynamic instability/ATN- now resolved  K, vol- acceptable. Cr improved and now rel stable at baseline 1.72 today  UA bland  no obstructive uropathy on recent renal sono   lasix and losartan on hold-- no objection to resume lasix   M acidosis 2/2 FLOYD/ckd-better. c/w po Na bicarb 650mg bid. no s/o chf   monitor BMP daily   dose all meds for eGFR<15ml/min.   avoid ACEi/ARB/NSAIDs/Nephrotoxics if able.   Intermediate risk for GRACE, pt optimized renal stand point for cath. pt, and family aware of risks of GRACE  cath on hold until hematological and GI issues resolve -as per cardiology, will f/u  would recommend mucomyst 1200mg po bid -2doses pre and 2 doses post cath and ivf for 4-6hrs jordi cath if no s/o chf    ANemia in ckd+acute blood loss/hematoma- Hb 7.6 now s/p PRBC last week. s/p epo 10k subqx1 8/19  LUE hematoma- now resolving, LUE ultrasound noted with no dvt  NSTEMI (non-ST elevation myocardial infarction).  Patient p/w chest pain and ventricular tachycardia s/p shockedx1  off heparin gtt   cardiac monitoring to monitor for arrhythmias  Low fat DASH diet  -TTE with normal LV function     Ventricular tachycardia s/p defibrillatorx1  on Amiodarone gtt   Monitor for further VT episodes  Keep K > 4.0 and magnesium > 2.0  BIV PPM interrogation showed  monomorphic ventricular tachycardia at 150-160 as per chart  f/u EP. plan for upgrade BiVICD after cath and medical optimization  -LUE ultrasound noted with no dvt    Chronic diastolic congestive heart failure. h/o CHF    CXR; clear-appear euvolemic  Holding home dose lasix and losartan 2/2 FLOYD on CKD and hypotension  no objection to resume lasix . as per cardiology  Daily weight monitoring, Strict I/O, Low sodium DASH diet  ECHO noted -Normal left ventricular systolic function. No segmental wall motion abnormalities. Mild-moderate pulmonary hypertension.    Paroxysmal atrial fibrillation.   Currently on heparin drip  Continuous tele monitoring  Rate control per team    labs, chart reviewed  For any question, call:  Cell # [phone]  service# [phone]  office# [phone]

## 2021-08-25 NOTE — PROGRESS NOTE ADULT - SUBJECTIVE AND OBJECTIVE BOX
INTERVAL HPI/OVERNIGHT EVENTS:    doing well   no c/o abd pain   brown stool    MEDICATIONS  (STANDING):  aMIOdarone    Tablet   Oral   aMIOdarone    Tablet 200 milliGRAM(s) Oral daily  aspirin enteric coated 81 milliGRAM(s) Oral daily  atorvastatin 40 milliGRAM(s) Oral at bedtime  bisacodyl Suppository 10 milliGRAM(s) Rectal every 24 hours  epoetin lizbeth-epbx (RETACRIT) Injectable 47153 Unit(s) SubCutaneous once  finasteride 5 milliGRAM(s) Oral daily  glucagon  Injectable 1 milliGRAM(s) IntraMuscular once  heparin  Infusion.  Unit(s)/Hr (11 mL/Hr) IV Continuous <Continuous>  insulin lispro (ADMELOG) corrective regimen sliding scale   SubCutaneous three times a day before meals  insulin lispro (ADMELOG) corrective regimen sliding scale   SubCutaneous at bedtime  loratadine 10 milliGRAM(s) Oral daily  pantoprazole    Tablet 40 milliGRAM(s) Oral two times a day  polyethylene glycol 3350 17 Gram(s) Oral two times a day  senna 2 Tablet(s) Oral at bedtime  sodium bicarbonate 650 milliGRAM(s) Oral two times a day  tamsulosin 0.4 milliGRAM(s) Oral at bedtime    MEDICATIONS  (PRN):  heparin   Injectable 4500 Unit(s) IV Push every 6 hours PRN For aPTT less than 40  heparin   Injectable 2000 Unit(s) IV Push every 6 hours PRN For aPTT between 40 - 57      Allergies    No Known Allergies    Intolerances        Review of Systems:    General:  No wt loss, fevers, chills, night sweats, fatigue   Eyes:  Good vision, no reported pain  ENT:  No sore throat, pain, runny nose, dysphagia  CV:  No pain, palpitations, hypo/hypertension  Resp:  No dyspnea, cough, tachypnea, wheezing  GI:  No pain, No nausea, No vomiting, No diarrhea, No constipation, No weight loss, No fever, No pruritis, No rectal bleeding, No melena, No dysphagia  :  No pain, bleeding, incontinence, nocturia  Muscle:  No pain, weakness  Neuro:  No weakness, tingling, memory problems  Psych:  No fatigue, insomnia, mood problems, depression  Endocrine:  No polyuria, polydypsia, cold/heat intolerance  Heme:  No petechiae, ecchymosis, easy bruisability  Skin:  No rash, tattoos, scars, edema      Vital Signs Last 24 Hrs  T(C): 36.7 (24 Aug 2021 23:09), Max: 36.8 (24 Aug 2021 16:34)  T(F): 98.1 (24 Aug 2021 23:09), Max: 98.2 (24 Aug 2021 16:34)  HR: 69 (24 Aug 2021 23:09) (68 - 78)  BP: 122/64 (24 Aug 2021 23:09) (110/52 - 127/61)  BP(mean): --  RR: 18 (24 Aug 2021 23:09) (16 - 18)  SpO2: 100% (24 Aug 2021 23:09) (100% - 100%)    PHYSICAL EXAM:    Constitutional: NAD  HEENT: EOMI, throat clear  Neck: No LAD, supple  Respiratory: CTA and P  Cardiovascular: S1 and S2, RRR, no M  Gastrointestinal: BS+, soft, NT/ND, neg HSM,  Extremities: No peripheral edema, neg clubbing, cyanosis  Vascular: 2+ peripheral pulses  Neurological: A/O x 3, no focal deficits  Psychiatric: Normal mood, normal affect  Skin: No rashes      LABS:                        9.0    6.74  )-----------( 212      ( 25 Aug 2021 06:39 )             27.8     08-25    133<L>  |  104  |  41<H>  ----------------------------<  106<H>  4.2   |  18<L>  |  1.72<H>    Ca    8.6      25 Aug 2021 06:39  Phos  2.4     08-25  Mg     2.30     08-25      PTT - ( 25 Aug 2021 08:34 )  PTT:139.0 sec      RADIOLOGY & ADDITIONAL TESTS:

## 2021-08-25 NOTE — PROGRESS NOTE ADULT - ASSESSMENT
EP ATTENDING    Agree with above. Awaiting above workup prior to transfer to [place] for upgrade to BiV-ICD.

## 2021-08-25 NOTE — PROGRESS NOTE ADULT - ASSESSMENT
89 y.o Syri[place]  male with a PMhx of HTN, HLD, DM, CAD s/p stent to 80% D1 in 2014 ,dHF EF 60% in 2018,Afib on eliquis c/b tachy/perla with BiV PPM( Biotronik) CKD, CVA, Velasquez's esophagus  presents with chest burning, dizziness/ lightheadedness, nausea, weakness with unsteady gait found in ventricular tachycardia and hypotension which did not improved with amiodarone 150mg IVP x1 received shocked x1 by external defibrillator with improve in Blood Pressure and mental status. Started on amiodarone drip.  EKG showed He was admitted to CCU. Labs remarkable for FLOYD on CKD and elevated lactate  post ventricular tachycardia and shock. EKG showed STD in inferior lateral leads.    Card EP GI Renal, Heme following:   on AMio for VT  Will need AICD upgrade  nl TTE  Card cath when FLOYD resolves  DM stable  Acute blood loss anemia: CBC is stable  cont ASA, cleared by GI  no pAD based on Duplex  cont heparin drip for stroke prevention, cleared by GI

## 2021-08-25 NOTE — PROGRESS NOTE ADULT - SUBJECTIVE AND OBJECTIVE BOX
[facility] - S López / Aroldo S /D Jose/ JUAN DAVID Huynh/ JUAN DAVID Smith/ Adrian Vega / LUBA Alvau/ O Alejandra  service [phone], office [phone]  ---------------------------------------------------------------------------------------------------------------    Patient seen and examined bedside    Subjective and Objective: No overnight events, sob resolved. No complaints today. feeling better    Allergies: No Known Allergies      Hospital Medications:   MEDICATIONS  (STANDING):  aMIOdarone    Tablet   Oral   aMIOdarone    Tablet 200 milliGRAM(s) Oral daily  aspirin enteric coated 81 milliGRAM(s) Oral daily  atorvastatin 40 milliGRAM(s) Oral at bedtime  bisacodyl Suppository 10 milliGRAM(s) Rectal every 24 hours  epoetin lizbeth-epbx (RETACRIT) Injectable 93224 Unit(s) SubCutaneous once  finasteride 5 milliGRAM(s) Oral daily  glucagon  Injectable 1 milliGRAM(s) IntraMuscular once  heparin  Infusion.  Unit(s)/Hr (11 mL/Hr) IV Continuous <Continuous>  insulin lispro (ADMELOG) corrective regimen sliding scale   SubCutaneous at bedtime  insulin lispro (ADMELOG) corrective regimen sliding scale   SubCutaneous three times a day before meals  loratadine 10 milliGRAM(s) Oral daily  pantoprazole    Tablet 40 milliGRAM(s) Oral two times a day  polyethylene glycol 3350 17 Gram(s) Oral two times a day  senna 2 Tablet(s) Oral at bedtime  sodium bicarbonate 650 milliGRAM(s) Oral two times a day  tamsulosin 0.4 milliGRAM(s) Oral at bedtime      REVIEW OF SYSTEMS:  CONSTITUTIONAL: No weakness, fevers or chills  EYES/ENT: No visual changes;  No vertigo or throat pain   NECK: No pain or stiffness  RESPIRATORY: No cough, wheezing, hemoptysis; No shortness of breath  CARDIOVASCULAR: No chest pain or palpitations.  GASTROINTESTINAL: No abdominal or epigastric pain. No nausea, vomiting, or hematemesis; No diarrhea or constipation. No melena or hematochezia.  GENITOURINARY: No dysuria, frequency, foamy urine, urinary urgency, incontinence or hematuria  NEUROLOGICAL: No numbness or weakness  SKIN: No itching, burning, rashes, or lesions   VASCULAR: No bilateral lower extremity edema.   All other review of systems is negative unless indicated above.    VITALS:  T(F): 98.1 (08-24-21 @ 23:09), Max: 98.2 (08-24-21 @ 16:34)  HR: 69 (08-24-21 @ 23:09)  BP: 122/64 (08-24-21 @ 23:09)  RR: 18 (08-24-21 @ 23:09)  SpO2: 100% (08-24-21 @ 23:09)  Wt(kg): --    08-24 @ 07:01  -  08-25 @ 07:00  --------------------------------------------------------  IN: 1584 mL / OUT: 750 mL / NET: 834 mL          PHYSICAL EXAM:  Constitutional: NAD  HEENT: anicteric sclera, oropharynx clear  Neck: No JVD  Respiratory: CTAB, no wheezes, rales or rhonchi  Cardiovascular: S1, S2, RRR  Gastrointestinal: BS+, soft, NT/ND  Extremities: No cyanosis or clubbing. No peripheral edema  Neurological: A/O x 3, no focal deficits  Psychiatric: Normal mood, normal affect  : No CVA tenderness. No river.   Skin: No rashes  Vascular Access:    LABS:  08-25    133<L>  |  104  |  41<H>  ----------------------------<  106<H>  4.2   |  18<L>  |  1.72<H>    Ca    8.6      25 Aug 2021 06:39  Phos  2.4     08-25  Mg     2.30     08-25      Creatinine Trend: 1.72 <--, 1.63 <--, 1.78 <--, 1.91 <--, 1.78 <--, 1.88 <--, 1.87 <--                        9.0    6.74  )-----------( 212      ( 25 Aug 2021 06:39 )             27.8     Urine Studies:        RADIOLOGY & ADDITIONAL STUDIES:   [facility] - S López / Aroldo S /D Jose/ S Amina/ JUAN DAVID Smith/ Adrian Vega / LUBA Alvau/ O Alejandra  service [phone], office [phone]  ---------------------------------------------------------------------------------------------------------------    Patient seen and examined bedside    Subjective and Objective: No overnight events, denied cp/sob. No complaints today. feeling better    Allergies: No Known Allergies      Hospital Medications:   MEDICATIONS  (STANDING):  aMIOdarone    Tablet   Oral   aMIOdarone    Tablet 200 milliGRAM(s) Oral daily  aspirin enteric coated 81 milliGRAM(s) Oral daily  atorvastatin 40 milliGRAM(s) Oral at bedtime  bisacodyl Suppository 10 milliGRAM(s) Rectal every 24 hours  epoetin lizbeth-epbx (RETACRIT) Injectable 41623 Unit(s) SubCutaneous once  finasteride 5 milliGRAM(s) Oral daily  glucagon  Injectable 1 milliGRAM(s) IntraMuscular once  heparin  Infusion.  Unit(s)/Hr (11 mL/Hr) IV Continuous <Continuous>  insulin lispro (ADMELOG) corrective regimen sliding scale   SubCutaneous at bedtime  insulin lispro (ADMELOG) corrective regimen sliding scale   SubCutaneous three times a day before meals  loratadine 10 milliGRAM(s) Oral daily  pantoprazole    Tablet 40 milliGRAM(s) Oral two times a day  polyethylene glycol 3350 17 Gram(s) Oral two times a day  senna 2 Tablet(s) Oral at bedtime  sodium bicarbonate 650 milliGRAM(s) Oral two times a day  tamsulosin 0.4 milliGRAM(s) Oral at bedtime      VITALS:  T(F): 98.1 (08-24-21 @ 23:09), Max: 98.2 (08-24-21 @ 16:34)  HR: 69 (08-24-21 @ 23:09)  BP: 122/64 (08-24-21 @ 23:09)  RR: 18 (08-24-21 @ 23:09)  SpO2: 100% (08-24-21 @ 23:09)  Wt(kg): --    08-24 @ 07:01  -  08-25 @ 07:00  --------------------------------------------------------  IN: 1584 mL / OUT: 750 mL / NET: 834 mL      PHYSICAL EXAM:  Constitutional: NAD  HEENT: anicteric sclera  Neck: No JVD  Respiratory: CTAB, no wheezes, rales or rhonchi  Cardiovascular: S1, S2, RRR  Gastrointestinal: BS+, soft, NT/ND  Extremities: No peripheral edema  Neurological: A/O x 3  Psychiatric: Normal mood, normal affect  : No river.       LABS:  08-25    133<L>  |  104  |  41<H>  ----------------------------<  106<H>  4.2   |  18<L>  |  1.72<H>    Ca    8.6      25 Aug 2021 06:39  Phos  2.4     08-25  Mg     2.30     08-25      Creatinine Trend: 1.72 <--, 1.63 <--, 1.78 <--, 1.91 <--, 1.78 <--, 1.88 <--, 1.87 <--                        9.0    6.74  )-----------( 212      ( 25 Aug 2021 06:39 )             27.8     Urine Studies:        RADIOLOGY & ADDITIONAL STUDIES:

## 2021-08-25 NOTE — PROGRESS NOTE ADULT - SUBJECTIVE AND OBJECTIVE BOX
EP     tele: AF-BiV paced    he denies palpitations, syncope, nor angina, ROS otherwise -    DATE OF SERVICE - 08-25-21     Review of Systems:   Constitutional: [ ] fevers, [ ] chills.   Skin: [ ] dry skin. [ ] rashes.  Psychiatric: [ ] depression, [ ] anxiety.   Gastrointestinal: [ ] BRBPR, [ ] melena.   Neurological: [ ] confusion. [ ] seizures. [ ] shuffling gait.   Ears,Nose,Mouth and Throat: [ ] ear pain [ ] sore throat.   Eyes: [ ] diplopia.   Respiratory: [ ] hemoptysis. [ ] shortness of breath  Cardiovascular: See HPI above  Hematologic/Lymphatic: [ ] anemia. [ ] painful nodes. [ ] prolonged bleeding.   Genitourinary: [ ] hematuria. [ ] flank pain.   Endocrine: [ ] significant change in weight. [ ] intolerance to heat and cold.     Review of systems [x ] otherwise negative, [ ] otherwise unable to obtain    FH: no family history of sudden cardiac death in first degree relatives    SH: [ ] tobacco, [ ] alcohol, [ ] drugs    aMIOdarone    Tablet   Oral   aMIOdarone    Tablet 200 milliGRAM(s) Oral daily  aspirin enteric coated 81 milliGRAM(s) Oral daily  atorvastatin 40 milliGRAM(s) Oral at bedtime  bisacodyl Suppository 10 milliGRAM(s) Rectal every 24 hours  epoetin lizbeth-epbx (RETACRIT) Injectable 24936 Unit(s) SubCutaneous once  finasteride 5 milliGRAM(s) Oral daily  glucagon  Injectable 1 milliGRAM(s) IntraMuscular once  heparin   Injectable 4500 Unit(s) IV Push every 6 hours PRN  heparin   Injectable 2000 Unit(s) IV Push every 6 hours PRN  heparin  Infusion.  Unit(s)/Hr IV Continuous <Continuous>  insulin lispro (ADMELOG) corrective regimen sliding scale   SubCutaneous at bedtime  insulin lispro (ADMELOG) corrective regimen sliding scale   SubCutaneous three times a day before meals  loratadine 10 milliGRAM(s) Oral daily  pantoprazole    Tablet 40 milliGRAM(s) Oral two times a day  polyethylene glycol 3350 17 Gram(s) Oral two times a day  senna 2 Tablet(s) Oral at bedtime  sodium bicarbonate 650 milliGRAM(s) Oral two times a day  tamsulosin 0.4 milliGRAM(s) Oral at bedtime                        9.0    6.74  )-----------( 212      ( 25 Aug 2021 06:39 )             27.8    133<L>  |  104  |  41<H>  ----------------------------<  106<H>  4.2   |  18<L>  |  1.72<H>    Ca    8.6      25 Aug 2021 06:39  Phos  2.4     08-25  Mg     2.30     08-25      T(C): 36.7 (08-24-21 @ 23:09), Max: 36.8 (08-24-21 @ 16:34)  HR: 69 (08-24-21 @ 23:09) (68 - 78)  BP: 122/64 (08-24-21 @ 23:09) (110/52 - 127/61)  RR: 18 (08-24-21 @ 23:09) (16 - 18)  SpO2: 100% (08-24-21 @ 23:09) (100% - 100%)    General: Well nourished in no acute distress. Alert and Oriented * 3.   Head: Normocephalic and atraumatic.   Neck: No JVD. No bruits. Supple. Does not appear to be enlarged.   Cardiovascular: + S1,S2 ; RRR Soft systolic murmur at the left lower sternal border. No rubs noted.    Lungs: CTA b/l. No rhonchi, rales or wheezes.   Abdomen: + BS, soft. Non tender. Non distended. No rebound. No guarding.   Extremities: No clubbing/cyanosis/edema.   Neurologic: Moves all four extremities. Full range of motion.   Skin: Warm and moist. The patient's skin has normal elasticity and good skin turgor.   Psychiatric: Appropriate mood and affect.  Musculoskeletal: Normal range of motion, normal strength      A/P) He is an extremely pleasant 89 year old male with a past medical history of persistent atrial fibrillation and complete heart block. In June 2014 I implanted a Biotronik biventricular pacemaker. Device interrogations in the office demonstrate excellent pacemaker function. He denies palpitations, syncope nor angina. His other medical history includes BPH, stroke, HTN, hyperlipidemia and CAD s/p PCI in 2015. He is now admitted with sustained monomorphic VT requiring emergency rescue DCCV    -continue asa, no need for plavix  -continue heparin drip given AF with prior stroke  -f/u GI regarding guaiac positive anemia  -f/u cardiology regarding cath  -f/u renal for clearance for cath  -will eventually transfer to [place] after above completed for an upgrade to ICD therapy  -depending on his preop venogram I may or may not refer to Dr Jaimes for RV pacing lead removal

## 2021-08-26 NOTE — PROGRESS NOTE ADULT - ASSESSMENT
89 year old male with multiple cardiac issues now with VT and FLOYD    Anemia   Appears stable   Without overt GI bleeding  Keep on PPI BID   Monitor CBC and Keep hemoglobin > 8   High Fiber diet    CAD   Given no overt bleeding and relatively stable hemoglobin can proceed w/cardiac w/u  Would suggest BID PPI while on A/C    will follow closely      Advanced care planning was discussed with patient.  Advanced care planning forms were reviewed and discussed.  Risks, benefits and alternatives of gastroenterologic procedures were discussed in detail and all questions were answered.  30 minutes spent. _________________________________________________________________________________________________  [facility]  [address] [place] [address]  Office: [phone]    Luis Enrique Lau PA-C    ___________________________________________________________________________________________________

## 2021-08-26 NOTE — CHART NOTE - NSCHARTNOTEFT_GEN_A_CORE
Pt noted to have large area of ecchymosis extending from L arm down axilla, L side rib cage, all the way to L hip. Pt denies trauma, feels mild tenderness with palpation. Denies CP, SOB, lightheadedness, dizziness. Will send STAT cbc and type and screen. Will hold heparin gtt for now, discussed with Dr. Ayala. Pt noted to have large area of ecchymosis extending from L arm down axilla, L side rib cage, and continues to progress all the way to L hip. Pt denies trauma, feels mild tenderness with palpation. Denies CP, SOB, lightheadedness, dizziness. Will send STAT cbc and type and screen. Will hold heparin gtt for now, discussed with Dr. Ayala.

## 2021-08-26 NOTE — PROGRESS NOTE ADULT - SUBJECTIVE AND OBJECTIVE BOX
Date of service: 08/26/21    chief complaint: chest pain     extended hpi: 89 y.o male with a PMhx of HTN, HLD, DM, CAD s/p stent to  80% D1 in 2014, with cath in 2015 demonstrating patent stents with mild to moderate non-obstructive cad, Isch CMrEF 40% in 2018, atrial fibrillation on eliquis 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      S: no chest pain or sob; complains of left arm pain; ros otherwise negative.     Review of Systems:   Constitutional: [ ] fevers, [ ] chills.   Skin: [ ] dry skin. [ ] rashes.  Psychiatric: [ ] depression, [ ] anxiety.   Gastrointestinal: [ ] BRBPR, [ ] melena.   Neurological: [ ] confusion. [ ] seizures. [ ] shuffling gait.   Ears,Nose,Mouth and Throat: [ ] ear pain [ ] sore throat.   Eyes: [ ] diplopia.   Respiratory: [ ] hemoptysis. [ ] shortness of breath  Cardiovascular: See HPI above  Hematologic/Lymphatic: [ ] anemia. [ ] painful nodes. [ ] prolonged bleeding.   Genitourinary: [ ] hematuria. [ ] flank pain.   Endocrine: [ ] significant change in weight. [ ] intolerance to heat and cold.     Review of systems [ x] otherwise negative, [ ] otherwise unable to obtain    FH: no family history of sudden cardiac death in first degree relatives    SH: [ ] tobacco, [ ] alcohol, [ ] drugs    aMIOdarone    Tablet 200 milliGRAM(s) Oral daily  aMIOdarone    Tablet   Oral   aspirin enteric coated 81 milliGRAM(s) Oral daily  atorvastatin 40 milliGRAM(s) Oral at bedtime  bisacodyl Suppository 10 milliGRAM(s) Rectal every 24 hours  epoetin lizbeth-epbx (RETACRIT) Injectable 59116 Unit(s) SubCutaneous once  finasteride 5 milliGRAM(s) Oral daily  glucagon  Injectable 1 milliGRAM(s) IntraMuscular once  insulin lispro (ADMELOG) corrective regimen sliding scale   SubCutaneous three times a day before meals  insulin lispro (ADMELOG) corrective regimen sliding scale   SubCutaneous at bedtime  loratadine 10 milliGRAM(s) Oral daily  pantoprazole    Tablet 40 milliGRAM(s) Oral two times a day  polyethylene glycol 3350 17 Gram(s) Oral two times a day  senna 2 Tablet(s) Oral at bedtime  sodium bicarbonate 650 milliGRAM(s) Oral two times a day  tamsulosin 0.4 milliGRAM(s) Oral at bedtime                            9.0    7.34  )-----------( 219      ( 26 Aug 2021 18:40 )             28.3       08-26    136  |  107  |  32<H>  ----------------------------<  82  3.8   |  18<L>  |  1.61<H>    Ca    8.6      26 Aug 2021 07:36  Phos  2.8     08-26  Mg     2.30     08-26              T(C): 36.7 (08-26-21 @ 13:22), Max: 36.9 (08-26-21 @ 09:00)  HR: 78 (08-26-21 @ 13:22) (68 - 78)  BP: 110/67 (08-26-21 @ 13:22) (110/67 - 129/62)  RR: 18 (08-26-21 @ 13:22) (18 - 18)  SpO2: 100% (08-26-21 @ 13:22) (98% - 100%)  Wt(kg): --    I&O's Summary      General: Well nourished in no acute distress. Alert and Oriented * 3.   Head: Normocephalic and atraumatic.   Neck: No JVD. No bruits. Supple. Does not appear to be enlarged.   Cardiovascular: + S1,S2 ; RRR Soft systolic murmur at the left lower sternal border. No rubs noted.    Lungs: CTA b/l. No rhonchi, rales or wheezes.   Abdomen: + BS, soft. Non tender. Non distended. No rebound. No guarding.   Extremities: L radial pulse 2+, LUE hematoma  Neurologic: Moves all four extremities. Full range of motion.   Skin: Warm and moist. The patient's skin has normal elasticity and good skin turgor.   Psychiatric: Appropriate mood and affect.  Musculoskeletal: Normal range of motion, normal strength    Tele: SR    TTE: < from: Transthoracic Echocardiogram (08.14.21 @ 14:06) >  Normal left ventricular systolic function. No segmental  wall motion abnormalities.  Mild-moderate pulmonary hypertension.  A device wire is noted in the right heart.    < end of copied text >    < from: VA Duplex Ext Veins Upper Comp, Left. (08.17.21 @ 12:43) >  Summary/Impressions:  No evidence of deep vein thrombosis in the left upper  extremity.    < end of copied text >      < from: CT Upper Extremity No Cont, Left (08.19.21 @ 09:25) >  IMPRESSION:    1.  Mild enlargement of the triceps muscle suspicious for intramuscular hematoma.  2.  Amorphous appearance of the flexor musculature the forearm is nonspecific but can be seen with myositis or intramuscular hematoma.  3.  Moderate soft tissue swelling about the upper extremity, nonspecific but can be seen with cellulitis in the proper clinical setting.  4.  Nodular opacities within the left upper lobe. Small pleural effusion and atelectasis. Dedicated CT the chest can be performed for more detailed evaluation.    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     -continue with amiodarone for VT per EP - no further episodes of VT thus far   -keep K > 4, Mg > 2  -TTE with normal LV function   -I had a long discussion with the patient and the patient's son today regarding cath.  All risks, benefits, indications, contraindications, inferior alternative options of cath explained to the patient and his son.  At this time, they decline cath and would like medical management of known cad.   -Given no current anginal symptoms, monomorphic VT that is unlikely ischemic driven, and patient's GOC/preferences, will hold off on cath for now and medically manage CAD.   -Pt. with worsening LUE hematoma and pain - will hold hep gtt and monitor h/h - will restart ac when no contraindications   -Plans for device upgrade per EP     Estelle Ayala MD

## 2021-08-26 NOTE — PROGRESS NOTE ADULT - SUBJECTIVE AND OBJECTIVE BOX
Patient is a 89y old  Male who presents with a chief complaint of HIGH K (21 Aug 2021 13:50)      SUBJECTIVE / OVERNIGHT EVENTS:    Events noted.  CONSTITUTIONAL: No fever,  or fatigue  RESPIRATORY: No cough, wheezing,  No shortness of breath  CARDIOVASCULAR: No chest pain, palpitations  GASTROINTESTINAL: No abdominal or epigastric pain.   NEUROLOGICAL: No headaches,     MEDICATIONS  (STANDING):  aMIOdarone    Tablet 200 milliGRAM(s) Oral daily  aMIOdarone    Tablet   Oral   aspirin enteric coated 81 milliGRAM(s) Oral daily  atorvastatin 40 milliGRAM(s) Oral at bedtime  bisacodyl Suppository 10 milliGRAM(s) Rectal every 24 hours  epoetin lizbeth-epbx (RETACRIT) Injectable 62045 Unit(s) SubCutaneous once  finasteride 5 milliGRAM(s) Oral daily  glucagon  Injectable 1 milliGRAM(s) IntraMuscular once  heparin  Infusion.  Unit(s)/Hr (11 mL/Hr) IV Continuous <Continuous>  insulin lispro (ADMELOG) corrective regimen sliding scale   SubCutaneous three times a day before meals  insulin lispro (ADMELOG) corrective regimen sliding scale   SubCutaneous at bedtime  loratadine 10 milliGRAM(s) Oral daily  pantoprazole    Tablet 40 milliGRAM(s) Oral two times a day  polyethylene glycol 3350 17 Gram(s) Oral two times a day  senna 2 Tablet(s) Oral at bedtime  sodium bicarbonate 650 milliGRAM(s) Oral two times a day  tamsulosin 0.4 milliGRAM(s) Oral at bedtime    MEDICATIONS  (PRN):  heparin   Injectable 4500 Unit(s) IV Push every 6 hours PRN For aPTT less than 40  heparin   Injectable 2000 Unit(s) IV Push every 6 hours PRN For aPTT between 40 - 57        CAPILLARY BLOOD GLUCOSE      POCT Blood Glucose.: 202 mg/dL (26 Aug 2021 11:54)  POCT Blood Glucose.: 101 mg/dL (26 Aug 2021 09:05)  POCT Blood Glucose.: 132 mg/dL (25 Aug 2021 21:22)  POCT Blood Glucose.: 110 mg/dL (25 Aug 2021 17:38)    I&O's Summary      T(C): 36.7 (08-26-21 @ 13:22), Max: 36.9 (08-26-21 @ 09:00)  HR: 78 (08-26-21 @ 13:22) (68 - 78)  BP: 110/67 (08-26-21 @ 13:22) (110/67 - 129/65)  RR: 18 (08-26-21 @ 13:22) (18 - 18)  SpO2: 100% (08-26-21 @ 13:22) (98% - 100%)    PHYSICAL EXAM:  GENERAL: NAD  NECK: Supple, No JVD  CHEST/LUNG: Clear to auscultation bilaterally; No wheezing.  HEART: Regular rate and rhythm; No murmurs, rubs, or gallops  ABDOMEN: Soft, Nontender, Nondistended; Bowel sounds present  EXTREMITIES:   No edema  NEUROLOGY: AAO X 3      LABS:                        9.0    7.02  )-----------( 213      ( 26 Aug 2021 07:37 )             28.2     08-26    136  |  107  |  32<H>  ----------------------------<  82  3.8   |  18<L>  |  1.61<H>    Ca    8.6      26 Aug 2021 07:36  Phos  2.8     08-26  Mg     2.30     08-26      PTT - ( 26 Aug 2021 07:37 )  PTT:74.2 sec        CAPILLARY BLOOD GLUCOSE      POCT Blood Glucose.: 202 mg/dL (26 Aug 2021 11:54)  POCT Blood Glucose.: 101 mg/dL (26 Aug 2021 09:05)  POCT Blood Glucose.: 132 mg/dL (25 Aug 2021 21:22)  POCT Blood Glucose.: 110 mg/dL (25 Aug 2021 17:38)        RADIOLOGY & ADDITIONAL TESTS:    Imaging Personally Reviewed:    Consultant(s) Notes Reviewed:      Care Discussed with Consultants/Other Providers:    Dileep Amador MD, CMD, FACP    [address] [place] [address]  Office Tel: [phone]  Cell: [phone]

## 2021-08-26 NOTE — CHART NOTE - NSCHARTNOTEFT_GEN_A_CORE
D/w Dr. Ayala regarding repeat CBC, hemoglobin stable at 9.0. Recommend to continue to hold heparin gtt and monitor hematoma. Will recheck CBC in am.

## 2021-08-26 NOTE — PROGRESS NOTE ADULT - SUBJECTIVE AND OBJECTIVE BOX
INTERVAL HPI/OVERNIGHT EVENTS:    denied constipation or abd pain   no n/v    MEDICATIONS  (STANDING):  aMIOdarone    Tablet 200 milliGRAM(s) Oral daily  aMIOdarone    Tablet   Oral   aspirin enteric coated 81 milliGRAM(s) Oral daily  atorvastatin 40 milliGRAM(s) Oral at bedtime  bisacodyl Suppository 10 milliGRAM(s) Rectal every 24 hours  epoetin lizbeth-epbx (RETACRIT) Injectable 12022 Unit(s) SubCutaneous once  finasteride 5 milliGRAM(s) Oral daily  glucagon  Injectable 1 milliGRAM(s) IntraMuscular once  heparin  Infusion.  Unit(s)/Hr (11 mL/Hr) IV Continuous <Continuous>  insulin lispro (ADMELOG) corrective regimen sliding scale   SubCutaneous three times a day before meals  insulin lispro (ADMELOG) corrective regimen sliding scale   SubCutaneous at bedtime  loratadine 10 milliGRAM(s) Oral daily  pantoprazole    Tablet 40 milliGRAM(s) Oral two times a day  polyethylene glycol 3350 17 Gram(s) Oral two times a day  senna 2 Tablet(s) Oral at bedtime  sodium bicarbonate 650 milliGRAM(s) Oral two times a day  tamsulosin 0.4 milliGRAM(s) Oral at bedtime    MEDICATIONS  (PRN):  heparin   Injectable 4500 Unit(s) IV Push every 6 hours PRN For aPTT less than 40  heparin   Injectable 2000 Unit(s) IV Push every 6 hours PRN For aPTT between 40 - 57      Allergies    No Known Allergies    Intolerances        Review of Systems:    General:  No wt loss, fevers, chills, night sweats, fatigue   Eyes:  Good vision, no reported pain  ENT:  No sore throat, pain, runny nose, dysphagia  CV:  No pain, palpitations, hypo/hypertension  Resp:  No dyspnea, cough, tachypnea, wheezing  GI:  No pain, No nausea, No vomiting, No diarrhea, No constipation, No weight loss, No fever, No pruritis, No rectal bleeding, No melena, No dysphagia  :  No pain, bleeding, incontinence, nocturia  Muscle:  No pain, weakness  Neuro:  No weakness, tingling, memory problems  Psych:  No fatigue, insomnia, mood problems, depression  Endocrine:  No polyuria, polydypsia, cold/heat intolerance  Heme:  No petechiae, ecchymosis, easy bruisability  Skin:  No rash, tattoos, scars, edema      Vital Signs Last 24 Hrs  T(C): 36.9 (26 Aug 2021 09:00), Max: 36.9 (26 Aug 2021 09:00)  T(F): 98.4 (26 Aug 2021 09:00), Max: 98.4 (26 Aug 2021 09:00)  HR: 70 (26 Aug 2021 09:00) (68 - 74)  BP: 113/69 (26 Aug 2021 09:00) (113/59 - 129/65)  BP(mean): --  RR: 18 (26 Aug 2021 09:00) (17 - 18)  SpO2: 98% (26 Aug 2021 09:00) (98% - 100%)    PHYSICAL EXAM:    Constitutional: NAD  HEENT: EOMI, throat clear  Neck: No LAD, supple  Respiratory: CTA and P  Cardiovascular: S1 and S2, RRR, no M  Gastrointestinal: BS+, soft, NT/ND, neg HSM,  Extremities: No peripheral edema, neg clubbing, cyanosis  Vascular: 2+ peripheral pulses  Neurological: A/O x 3, no focal deficits  Psychiatric: Normal mood, normal affect  Skin: No rashes      LABS:                        9.0    7.02  )-----------( 213      ( 26 Aug 2021 07:37 )             28.2     08-26    136  |  107  |  32<H>  ----------------------------<  82  3.8   |  18<L>  |  1.61<H>    Ca    8.6      26 Aug 2021 07:36  Phos  2.8     08-26  Mg     2.30     08-26      PTT - ( 26 Aug 2021 07:37 )  PTT:74.2 sec      RADIOLOGY & ADDITIONAL TESTS:

## 2021-08-26 NOTE — PROGRESS NOTE ADULT - SUBJECTIVE AND OBJECTIVE BOX
[facility] - S López / Aroldo S /D Jose/ S Amina/ JUAN DAVID Smith/ Adrian Vega / LUBA Alvau/ O Alejandra  service -[phone], office [phone]  ---------------------------------------------------------------------------------------------------------------    Patient seen and examined bedside    Subjective and Objective: No overnight events, denied cp/sob. No complaints today. feeling better    Allergies: No Known Allergies      Hospital Medications:   MEDICATIONS  (STANDING):  aMIOdarone    Tablet 200 milliGRAM(s) Oral daily  aMIOdarone    Tablet   Oral   aspirin enteric coated 81 milliGRAM(s) Oral daily  atorvastatin 40 milliGRAM(s) Oral at bedtime  bisacodyl Suppository 10 milliGRAM(s) Rectal every 24 hours  epoetin lizbeth-epbx (RETACRIT) Injectable 04745 Unit(s) SubCutaneous once  finasteride 5 milliGRAM(s) Oral daily  glucagon  Injectable 1 milliGRAM(s) IntraMuscular once  heparin  Infusion.  Unit(s)/Hr (11 mL/Hr) IV Continuous <Continuous>  insulin lispro (ADMELOG) corrective regimen sliding scale   SubCutaneous three times a day before meals  insulin lispro (ADMELOG) corrective regimen sliding scale   SubCutaneous at bedtime  loratadine 10 milliGRAM(s) Oral daily  pantoprazole    Tablet 40 milliGRAM(s) Oral two times a day  polyethylene glycol 3350 17 Gram(s) Oral two times a day  senna 2 Tablet(s) Oral at bedtime  sodium bicarbonate 650 milliGRAM(s) Oral two times a day  tamsulosin 0.4 milliGRAM(s) Oral at bedtime      VITALS:  T(F): 98 (08-26-21 @ 13:22), Max: 98.4 (08-26-21 @ 09:00)  HR: 78 (08-26-21 @ 13:22)  BP: 110/67 (08-26-21 @ 13:22)  RR: 18 (08-26-21 @ 13:22)  SpO2: 100% (08-26-21 @ 13:22)  Wt(kg): --      PHYSICAL EXAM:  Constitutional: NAD  HEENT: anicteric sclera  Neck: No JVD  Respiratory: CTAB, no wheezes, rales or rhonchi  Cardiovascular: S1, S2, RRR  Gastrointestinal: BS+, soft, NT/ND  Extremities: No peripheral edema  Neurological: A/O x 3  Psychiatric: Normal mood, normal affect  : No river.     LABS:  08-26    136  |  107  |  32<H>  ----------------------------<  82  3.8   |  18<L>  |  1.61<H>    Ca    8.6      26 Aug 2021 07:36  Phos  2.8     08-26  Mg     2.30     08-26      Creatinine Trend: 1.61 <--, 1.72 <--, 1.63 <--, 1.78 <--, 1.91 <--, 1.78 <--, 1.88 <--                        9.0    7.02  )-----------( 213      ( 26 Aug 2021 07:37 )             28.2     Urine Studies:        RADIOLOGY & ADDITIONAL STUDIES:

## 2021-08-26 NOTE — PROVIDER CONTACT NOTE (OTHER) - ASSESSMENT
BP is 113/59. Patient asymptomatic. Patient denies chest pain, headache, nausea/vomitting.
BP is 114/55. Patient asymptomatic. no chest pain, no pain, no distress, no nausea/vomitting.

## 2021-08-26 NOTE — PROGRESS NOTE ADULT - SUBJECTIVE AND OBJECTIVE BOX
EP     tele: AF-BiV paced    he denies palpitations, syncope, nor angina, ROS otherwise -    DATE OF SERVICE - 08-26-21       Review of Systems:   Constitutional: [ ] fevers, [ ] chills.   Skin: [ ] dry skin. [ ] rashes.  Psychiatric: [ ] depression, [ ] anxiety.   Gastrointestinal: [ ] BRBPR, [ ] melena.   Neurological: [ ] confusion. [ ] seizures. [ ] shuffling gait.   Ears,Nose,Mouth and Throat: [ ] ear pain [ ] sore throat.   Eyes: [ ] diplopia.   Respiratory: [ ] hemoptysis. [ ] shortness of breath  Cardiovascular: See HPI above  Hematologic/Lymphatic: [ ] anemia. [ ] painful nodes. [ ] prolonged bleeding.   Genitourinary: [ ] hematuria. [ ] flank pain.   Endocrine: [ ] significant change in weight. [ ] intolerance to heat and cold.     Review of systems [x ] otherwise negative, [ ] otherwise unable to obtain    FH: no family history of sudden cardiac death in first degree relatives    SH: [ ] tobacco, [ ] alcohol, [ ] drugs    aMIOdarone    Tablet 200 milliGRAM(s) Oral daily  aMIOdarone    Tablet   Oral   aspirin enteric coated 81 milliGRAM(s) Oral daily  atorvastatin 40 milliGRAM(s) Oral at bedtime  bisacodyl Suppository 10 milliGRAM(s) Rectal every 24 hours  epoetin lizbeth-epbx (RETACRIT) Injectable 50108 Unit(s) SubCutaneous once  finasteride 5 milliGRAM(s) Oral daily  glucagon  Injectable 1 milliGRAM(s) IntraMuscular once  heparin   Injectable 4500 Unit(s) IV Push every 6 hours PRN  heparin   Injectable 2000 Unit(s) IV Push every 6 hours PRN  heparin  Infusion.  Unit(s)/Hr IV Continuous <Continuous>  insulin lispro (ADMELOG) corrective regimen sliding scale   SubCutaneous three times a day before meals  insulin lispro (ADMELOG) corrective regimen sliding scale   SubCutaneous at bedtime  loratadine 10 milliGRAM(s) Oral daily  pantoprazole    Tablet 40 milliGRAM(s) Oral two times a day  polyethylene glycol 3350 17 Gram(s) Oral two times a day  senna 2 Tablet(s) Oral at bedtime  sodium bicarbonate 650 milliGRAM(s) Oral two times a day  tamsulosin 0.4 milliGRAM(s) Oral at bedtime                            9.0    7.02  )-----------( 213      ( 26 Aug 2021 07:37 )             28.2       08-26    136  |  107  |  32<H>  ----------------------------<  82  3.8   |  18<L>  |  1.61<H>    Ca    8.6      26 Aug 2021 07:36  Phos  2.8     08-26  Mg     2.30     08-26      T(C): 36.7 (08-26-21 @ 13:22), Max: 36.9 (08-26-21 @ 09:00)  HR: 78 (08-26-21 @ 13:22) (68 - 78)  BP: 110/67 (08-26-21 @ 13:22) (110/67 - 129/65)  RR: 18 (08-26-21 @ 13:22) (18 - 18)  SpO2: 100% (08-26-21 @ 13:22) (98% - 100%)      General: Well nourished in no acute distress. Alert and Oriented * 3.   Head: Normocephalic and atraumatic.   Neck: No JVD. No bruits. Supple. Does not appear to be enlarged.   Cardiovascular: + S1,S2 ; RRR Soft systolic murmur at the left lower sternal border. No rubs noted.    Lungs: CTA b/l. No rhonchi, rales or wheezes.   Abdomen: + BS, soft. Non tender. Non distended. No rebound. No guarding.   Extremities: No clubbing/cyanosis/edema.   Neurologic: Moves all four extremities. Full range of motion.   Skin: Warm and moist. The patient's skin has normal elasticity and good skin turgor.   Psychiatric: Appropriate mood and affect.  Musculoskeletal: Normal range of motion, normal strength      A/P) He is an extremely pleasant 89 year old male with a past medical history of persistent atrial fibrillation and complete heart block. In June 2014 I implanted a Biotronik biventricular pacemaker. Device interrogations in the office demonstrate excellent pacemaker function. He denies palpitations, syncope nor angina. His other medical history includes BPH, stroke, HTN, hyperlipidemia and CAD s/p PCI in 2015. He is now admitted with sustained monomorphic VT requiring emergency rescue DCCV    -continue asa, no need for plavix  -continue heparin drip given AF with prior stroke  -GI eval noted  -Cardio f/u noted, medical management of CAD for now  -will eventually transfer to [facility] for an upgrade to ICD therapy  -depending on his preop venogram I may or may not refer to Dr Jaimes for RV pacing lead removal

## 2021-08-26 NOTE — PROVIDER CONTACT NOTE (OTHER) - BACKGROUND
Patient admitted for ventricular fibrillation. History of stage 3 CKD, CAD< CVA, anemia, BPH.
Patient is admitted for ventricular fibrillation.

## 2021-08-26 NOTE — PROGRESS NOTE ADULT - ASSESSMENT
89 y.o Portuguese  male with a PMhx of HTN, HLD, DM, CAD s/p stent to 80% D1 in 2014 ,dHF EF 60% in 2018,Afib on eliquis c/b tachy/perla with BiV PPM( Biotronik) CKD, CVA, Velasquez's esophagus  presents with chest burning, dizziness/ lightheadedness, nausea, weakness with unsteady gait found in ventricular tachycardia and hypotension which did not improved with amiodarone 150mg IVP x1 received shocked x1 by external defibrillator with improve in Blood Pressure and mental status. Started on amiodarone drip.  EKG showed He was admitted to CCU. Labs remarkable for FLOYD on CKD and elevated lactate  post ventricular tachycardia and shock. EKG showed STD in inferior lateral leads.    Anemia:    Hb stable  GI f/up noted    VT:  Cardio/EP f/up noted  IV Heparin  Amio  To upgrade to AICD

## 2021-08-26 NOTE — PROGRESS NOTE ADULT - ASSESSMENT
89 y.o [place]  male with a PMhx of HTN, HLD, DM, CAD s/p stent to 80% D1 in 2014 , ICMrEF 40% in 2018,Afib on eliquis c/b tachy/perla with BiV PPM( Biotronik) CKD, CVA, Velasquez's esophagus  presents with chest burning, dizziness/ lightheadedness, nausea, weakness with unsteady gait found in ventricular tachycardia and hypotension which did not improved with amiodarone 150mg IVP x1 received shocked x1 by external defibrillator with improve in Blood Pressure and mental status. Started on amiodarone drip.  EKG showed He was admitted to CCU. Labs remarkable for FLOYD on CKD and elevated lactate  post ventricular tachycardia and shock. EKG showed STD in inferior lateral leads. Renal following for FLOYD/CKD Mx.    CKD3:   Recent admission for FLOYD on CKD with hyperkalemia S. creat 3.1 > on discharge on 8/12/21 was 1.89,   serum creatinine stable  FLOYD 2/2 hemodynamic instability/ATN- now resolved  K, vol- acceptable. Cr improved and now stable at baseline 1.72 >1.61 today  UA bland  no obstructive uropathy on recent renal sono   lasix and losartan on hold-- no objection to resume lasix   M acidosis 2/2 FLOYD/ckd-better. c/w po Na bicarb 650mg bid. no s/o chf   monitor BMP daily   dose all meds for eGFR<15ml/min.   avoid ACEi/ARB/NSAIDs/Nephrotoxics if able.   Intermediate risk for GRACE, pt optimized renal stand point for cath. pt, and family aware of risks of GRACE  cath on hold until hematological and GI issues resolve as per cardiology, will f/u  would recommend mucomyst 1200mg po bid -2doses pre and 2 doses post cath and ivf for 4-6hrs jordi cath if no s/o chf    ANemia in ckd+acute blood loss/hematoma- Hb 7.6 now s/p PRBC last week. s/p epo 10k subqx1 8/19  LUE hematoma- now resolving, LUE ultrasound noted with no dvt  NSTEMI (non-ST elevation myocardial infarction).  Patient p/w chest pain and ventricular tachycardia s/p shockedx1  off heparin gtt   cardiac monitoring to monitor for arrhythmias  Low fat DASH diet  -TTE with normal LV function     Ventricular tachycardia s/p defibrillatorx1  on Amiodarone gtt   Monitor for further VT episodes  Keep K > 4.0 and magnesium > 2.0  BIV PPM interrogation showed  monomorphic ventricular tachycardia at 150-160 as per chart  f/u EP. plan for upgrade BiVICD after cath and medical optimization  -LUE ultrasound noted with no dvt    Chronic diastolic congestive heart failure. h/o CHF    CXR; clear-appear euvolemic  Holding home dose lasix and losartan 2/2 FLOYD on CKD and hypotension  no objection to resume lasix . as per cardiology  Daily weight monitoring, Strict I/O, Low sodium DASH diet  ECHO noted -Normal left ventricular systolic function. No segmental wall motion abnormalities. Mild-moderate pulmonary hypertension.    Paroxysmal atrial fibrillation.   Currently on heparin drip  Continuous tele monitoring  Rate control per team    labs, chart reviewed  poc d/w pt, son bedside  For any question, call:  Cell # [phone]  service# [phone]  office# [phone]

## 2021-08-27 NOTE — PROGRESS NOTE ADULT - SUBJECTIVE AND OBJECTIVE BOX
INTERVAL HPI/OVERNIGHT EVENTS:    family bedside  (+) brown bm after suppository   no c/o abd pain   tolerating PO     MEDICATIONS  (STANDING):  aMIOdarone    Tablet 200 milliGRAM(s) Oral daily  aMIOdarone    Tablet   Oral   aspirin enteric coated 81 milliGRAM(s) Oral daily  atorvastatin 40 milliGRAM(s) Oral at bedtime  bisacodyl Suppository 10 milliGRAM(s) Rectal every 24 hours  epoetin lizbeth-epbx (RETACRIT) Injectable 88224 Unit(s) SubCutaneous once  finasteride 5 milliGRAM(s) Oral daily  glucagon  Injectable 1 milliGRAM(s) IntraMuscular once  insulin lispro (ADMELOG) corrective regimen sliding scale   SubCutaneous three times a day before meals  insulin lispro (ADMELOG) corrective regimen sliding scale   SubCutaneous at bedtime  loratadine 10 milliGRAM(s) Oral daily  pantoprazole    Tablet 40 milliGRAM(s) Oral two times a day  polyethylene glycol 3350 17 Gram(s) Oral two times a day  senna 2 Tablet(s) Oral at bedtime  sodium bicarbonate 650 milliGRAM(s) Oral two times a day  tamsulosin 0.4 milliGRAM(s) Oral at bedtime    MEDICATIONS  (PRN):      Allergies    No Known Allergies    Intolerances        Review of Systems:    General:  No wt loss, fevers, chills, night sweats, fatigue   Eyes:  Good vision, no reported pain  ENT:  No sore throat, pain, runny nose, dysphagia  CV:  No pain, palpitations, hypo/hypertension  Resp:  No dyspnea, cough, tachypnea, wheezing  GI:  No pain, No nausea, No vomiting, No diarrhea, No constipation, No weight loss, No fever, No pruritis, No rectal bleeding, No melena, No dysphagia  :  No pain, bleeding, incontinence, nocturia  Muscle:  No pain, weakness  Neuro:  No weakness, tingling, memory problems  Psych:  No fatigue, insomnia, mood problems, depression  Endocrine:  No polyuria, polydypsia, cold/heat intolerance  Heme:  No petechiae, ecchymosis, easy bruisability  Skin:  No rash, tattoos, scars, edema      Vital Signs Last 24 Hrs  T(C): 36.7 (27 Aug 2021 09:46), Max: 36.8 (26 Aug 2021 21:28)  T(F): 98 (27 Aug 2021 09:46), Max: 98.2 (26 Aug 2021 21:28)  HR: 75 (27 Aug 2021 09:46) (60 - 78)  BP: 126/56 (27 Aug 2021 09:46) (108/62 - 126/56)  BP(mean): --  RR: 18 (27 Aug 2021 09:46) (17 - 18)  SpO2: 100% (27 Aug 2021 09:46) (97% - 100%)    PHYSICAL EXAM:    Constitutional: NAD  HEENT: EOMI, throat clear  Neck: No LAD, supple  Respiratory: CTA and P  Cardiovascular: S1 and S2, RRR, no M  Gastrointestinal: BS+, soft, NT/ND, neg HSM,  Extremities: No peripheral edema, neg clubbing, cyanosis  Vascular: 2+ peripheral pulses  Neurological: A/O x 3, no focal deficits  Psychiatric: Normal mood, normal affect  Skin: No rashes      LABS:                        8.9    6.89  )-----------( 211      ( 27 Aug 2021 08:03 )             28.8     08-27    138  |  107  |  27<H>  ----------------------------<  88  4.0   |  18<L>  |  1.62<H>    Ca    8.8      27 Aug 2021 08:03  Phos  2.7     08-27  Mg     2.20     08-27      PTT - ( 27 Aug 2021 08:03 )  PTT:27.7 sec      RADIOLOGY & ADDITIONAL TESTS:

## 2021-08-27 NOTE — PROGRESS NOTE ADULT - SUBJECTIVE AND OBJECTIVE BOX
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      S: no chest pain or sob; still with left arm pain; ros otherwise negative.     Review of Systems:   Constitutional: [ ] fevers, [ ] chills.   Skin: [ ] dry skin. [ ] rashes.  Psychiatric: [ ] depression, [ ] anxiety.   Gastrointestinal: [ ] BRBPR, [ ] melena.   Neurological: [ ] confusion. [ ] seizures. [ ] shuffling gait.   Ears,Nose,Mouth and Throat: [ ] ear pain [ ] sore throat.   Eyes: [ ] diplopia.   Respiratory: [ ] hemoptysis. [ ] shortness of breath  Cardiovascular: See HPI above  Hematologic/Lymphatic: [ ] anemia. [ ] painful nodes. [ ] prolonged bleeding.   Genitourinary: [ ] hematuria. [ ] flank pain.   Endocrine: [ ] significant change in weight. [ ] intolerance to heat and cold.     Review of systems [ x] otherwise negative, [ ] otherwise unable to obtain    FH: no family history of sudden cardiac death in first degree relatives    SH: [ ] tobacco, [ ] alcohol, [ ] drugs      aMIOdarone    Tablet 200 milliGRAM(s) Oral daily  aMIOdarone    Tablet   Oral   aspirin enteric coated 81 milliGRAM(s) Oral daily  atorvastatin 40 milliGRAM(s) Oral at bedtime  bisacodyl Suppository 10 milliGRAM(s) Rectal every 24 hours  epoetin lizbeth-epbx (RETACRIT) Injectable 33206 Unit(s) SubCutaneous once  finasteride 5 milliGRAM(s) Oral daily  glucagon  Injectable 1 milliGRAM(s) IntraMuscular once  insulin lispro (ADMELOG) corrective regimen sliding scale   SubCutaneous three times a day before meals  insulin lispro (ADMELOG) corrective regimen sliding scale   SubCutaneous at bedtime  pantoprazole    Tablet 40 milliGRAM(s) Oral two times a day  polyethylene glycol 3350 17 Gram(s) Oral two times a day  senna 2 Tablet(s) Oral at bedtime  sodium bicarbonate 650 milliGRAM(s) Oral two times a day  tamsulosin 0.4 milliGRAM(s) Oral at bedtime                            8.9    6.89  )-----------( 211      ( 27 Aug 2021 08:03 )             28.8       08-27    138  |  107  |  27<H>  ----------------------------<  88  4.0   |  18<L>  |  1.62<H>    Ca    8.8      27 Aug 2021 08:03  Phos  2.7     08-27  Mg     2.20     08-27              T(C): 36.7 (08-27-21 @ 09:46), Max: 36.8 (08-26-21 @ 21:28)  HR: 75 (08-27-21 @ 09:46) (60 - 75)  BP: 126/56 (08-27-21 @ 09:46) (108/62 - 126/56)  RR: 18 (08-27-21 @ 09:46) (17 - 18)  SpO2: 100% (08-27-21 @ 09:46) (97% - 100%)  Wt(kg): --    I&O's Summary    26 Aug 2021 07:01  -  27 Aug 2021 07:00  --------------------------------------------------------  IN: 0 mL / OUT: 1150 mL / NET: -1150 mL          General: Well nourished in no acute distress. Alert and Oriented * 3.   Head: Normocephalic and atraumatic.   Neck: No JVD. No bruits. Supple. Does not appear to be enlarged.   Cardiovascular: + S1,S2 ; RRR Soft systolic murmur at the left lower sternal border. No rubs noted.    Lungs: CTA b/l. No rhonchi, rales or wheezes.   Abdomen: + BS, soft. Non tender. Non distended. No rebound. No guarding.   Extremities: L radial pulse 2+, LUE hematoma  Neurologic: Moves all four extremities. Full range of motion.   Skin: Warm and moist. The patient's skin has normal elasticity and good skin turgor.   Psychiatric: Appropriate mood and affect.  Musculoskeletal: Normal range of motion, normal strength    Tele: SR    TTE: < from: Transthoracic Echocardiogram (08.14.21 @ 14:06) >  Normal left ventricular systolic function. No segmental  wall motion abnormalities.  Mild-moderate pulmonary hypertension.  A device wire is noted in the right heart.    < end of copied text >    < from: VA Duplex Ext Veins Upper Comp, Left. (08.17.21 @ 12:43) >  Summary/Impressions:  No evidence of deep vein thrombosis in the left upper  extremity.    < end of copied text >      < from: CT Upper Extremity No Cont, Left (08.19.21 @ 09:25) >  IMPRESSION:    1.  Mild enlargement of the triceps muscle suspicious for intramuscular hematoma.  2.  Amorphous appearance of the flexor musculature the forearm is nonspecific but can be seen with myositis or intramuscular hematoma.  3.  Moderate soft tissue swelling about the upper extremity, nonspecific but can be seen with cellulitis in the proper clinical setting.  4.  Nodular opacities within the left upper lobe. Small pleural effusion and atelectasis. Dedicated CT the chest can be performed for more detailed evaluation.    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     -continue with amiodarone for VT per EP - no further episodes of VT thus far   -keep K > 4, Mg > 2  -TTE with normal LV function   -I had a long discussion with the patient and the patient's son today regarding cath.  All risks, benefits, indications, contraindications, inferior alternative options of cath explained to the patient and his son.  At this time, they decline cath and would like medical management of known cad.   -Given no current anginal symptoms, monomorphic VT that is unlikely ischemic driven, and patient's GOC/preferences, will hold off on cath for now and medically manage CAD.   -Pt. with worsening LUE hematoma and pain - hep gtt on hold - follow up vascular   -Plans for device upgrade per EDISON Ayala MD

## 2021-08-27 NOTE — PROGRESS NOTE ADULT - SUBJECTIVE AND OBJECTIVE BOX
Patient is a 89y old  Male who presents with a chief complaint of HIGH K (21 Aug 2021 13:50)      SUBJECTIVE / OVERNIGHT EVENTS:    Events noted.  CONSTITUTIONAL: LUE hematoma  RESPIRATORY: No cough, wheezing,  No shortness of breath  CARDIOVASCULAR: No chest pain, palpitations, dizziness, or leg swelling  GASTROINTESTINAL: No abdominal or epigastric pain. No nausea, vomiting.  NEUROLOGICAL: No headaches,     MEDICATIONS  (STANDING):  aMIOdarone    Tablet   Oral   aMIOdarone    Tablet 200 milliGRAM(s) Oral daily  aspirin enteric coated 81 milliGRAM(s) Oral daily  atorvastatin 40 milliGRAM(s) Oral at bedtime  bisacodyl Suppository 10 milliGRAM(s) Rectal every 24 hours  epoetin lizbeth-epbx (RETACRIT) Injectable 82600 Unit(s) SubCutaneous once  finasteride 5 milliGRAM(s) Oral daily  glucagon  Injectable 1 milliGRAM(s) IntraMuscular once  insulin lispro (ADMELOG) corrective regimen sliding scale   SubCutaneous three times a day before meals  insulin lispro (ADMELOG) corrective regimen sliding scale   SubCutaneous at bedtime  pantoprazole    Tablet 40 milliGRAM(s) Oral two times a day  polyethylene glycol 3350 17 Gram(s) Oral two times a day  senna 2 Tablet(s) Oral at bedtime  sodium bicarbonate 650 milliGRAM(s) Oral two times a day  tamsulosin 0.4 milliGRAM(s) Oral at bedtime    MEDICATIONS  (PRN):        CAPILLARY BLOOD GLUCOSE      POCT Blood Glucose.: 153 mg/dL (27 Aug 2021 21:19)  POCT Blood Glucose.: 151 mg/dL (27 Aug 2021 17:02)  POCT Blood Glucose.: 143 mg/dL (27 Aug 2021 12:02)  POCT Blood Glucose.: 98 mg/dL (27 Aug 2021 07:48)    I&O's Summary    26 Aug 2021 07:01  -  27 Aug 2021 07:00  --------------------------------------------------------  IN: 0 mL / OUT: 1150 mL / NET: -1150 mL        T(C): 36.9 (08-27-21 @ 21:22), Max: 36.9 (08-27-21 @ 21:22)  HR: 68 (08-27-21 @ 21:22) (60 - 75)  BP: 119/58 (08-27-21 @ 21:22) (108/62 - 128/66)  RR: 18 (08-27-21 @ 21:22) (17 - 18)  SpO2: 100% (08-27-21 @ 21:22) (97% - 100%)    PHYSICAL EXAM:  GENERAL: NAD  NECK: Supple, No JVD  CHEST/LUNG: Clear to auscultation bilaterally; No wheezing.  HEART: Regular rate and rhythm; No murmurs, rubs, or gallops  ABDOMEN: Soft, Nontender, Nondistended; Bowel sounds present  EXTREMITIES:   No edema  NEUROLOGY: AAO       LABS:                        8.9    6.89  )-----------( 211      ( 27 Aug 2021 08:03 )             28.8     08-27    138  |  107  |  27<H>  ----------------------------<  88  4.0   |  18<L>  |  1.62<H>    Ca    8.8      27 Aug 2021 08:03  Phos  2.7     08-27  Mg     2.20     08-27      PTT - ( 27 Aug 2021 08:03 )  PTT:27.7 sec        CAPILLARY BLOOD GLUCOSE      POCT Blood Glucose.: 153 mg/dL (27 Aug 2021 21:19)  POCT Blood Glucose.: 151 mg/dL (27 Aug 2021 17:02)  POCT Blood Glucose.: 143 mg/dL (27 Aug 2021 12:02)  POCT Blood Glucose.: 98 mg/dL (27 Aug 2021 07:48)        RADIOLOGY & ADDITIONAL TESTS:    Imaging Personally Reviewed:    Consultant(s) Notes Reviewed:      Care Discussed with Consultants/Other Providers:    Dileep Amador MD, CMD, FACP    [address] [place] [address]  Office Tel: [phone]  Cell: [phone]

## 2021-08-27 NOTE — PROGRESS NOTE ADULT - ASSESSMENT
Left arm hematoma stable. No indication for vascular surgical intervention.  Care per primary. Keep arm elevated. Please page vascular with any acute onset of left arm numbness, tingling, or swelling.      C team   [id number]

## 2021-08-27 NOTE — PROGRESS NOTE ADULT - SUBJECTIVE AND OBJECTIVE BOX
EP     tele: AF-BiV paced    reports LUE pain. he denies palpitations, syncope, nor angina, ROS otherwise -    DATE OF SERVICE - 08-27-21       Review of Systems:   Constitutional: [ ] fevers, [ ] chills.   Skin: [ ] dry skin. [ ] rashes.  Psychiatric: [ ] depression, [ ] anxiety.   Gastrointestinal: [ ] BRBPR, [ ] melena.   Neurological: [ ] confusion. [ ] seizures. [ ] shuffling gait.   Ears,Nose,Mouth and Throat: [ ] ear pain [ ] sore throat.   Eyes: [ ] diplopia.   Respiratory: [ ] hemoptysis. [ ] shortness of breath  Cardiovascular: See HPI above  Hematologic/Lymphatic: [ ] anemia. [ ] painful nodes. [ ] prolonged bleeding.   Genitourinary: [ ] hematuria. [ ] flank pain.   Endocrine: [ ] significant change in weight. [ ] intolerance to heat and cold.     Review of systems [x ] otherwise negative, [ ] otherwise unable to obtain    FH: no family history of sudden cardiac death in first degree relatives    SH: [ ] tobacco, [ ] alcohol, [ ] drugs      MEDICATIONS  (STANDING):  aMIOdarone    Tablet 200 milliGRAM(s) Oral daily  aMIOdarone    Tablet   Oral   aspirin enteric coated 81 milliGRAM(s) Oral daily  atorvastatin 40 milliGRAM(s) Oral at bedtime  bisacodyl Suppository 10 milliGRAM(s) Rectal every 24 hours  epoetin lizbeth-epbx (RETACRIT) Injectable 03118 Unit(s) SubCutaneous once  finasteride 5 milliGRAM(s) Oral daily  glucagon  Injectable 1 milliGRAM(s) IntraMuscular once  insulin lispro (ADMELOG) corrective regimen sliding scale   SubCutaneous three times a day before meals  insulin lispro (ADMELOG) corrective regimen sliding scale   SubCutaneous at bedtime  loratadine 10 milliGRAM(s) Oral daily  pantoprazole    Tablet 40 milliGRAM(s) Oral two times a day  polyethylene glycol 3350 17 Gram(s) Oral two times a day  senna 2 Tablet(s) Oral at bedtime  sodium bicarbonate 650 milliGRAM(s) Oral two times a day  tamsulosin 0.4 milliGRAM(s) Oral at bedtime    MEDICATIONS  (PRN):      LABS:                          8.9    6.89  )-----------( 211      ( 27 Aug 2021 08:03 )             28.8     Hemoglobin: 8.9 g/dL (08-27 @ 08:03)  Hemoglobin: 9.0 g/dL (08-26 @ 18:40)  Hemoglobin: 9.0 g/dL (08-26 @ 07:37)  Hemoglobin: 9.0 g/dL (08-25 @ 06:39)  Hemoglobin: 7.5 g/dL (08-24 @ 06:57)    08-27    138  |  107  |  27<H>  ----------------------------<  88  4.0   |  18<L>  |  1.62<H>    Ca    8.8      27 Aug 2021 08:03  Phos  2.7     08-27  Mg     2.20     08-27      Creatinine Trend: 1.62<--, 1.61<--, 1.72<--, 1.63<--, 1.78<--, 1.91<--   PTT - ( 27 Aug 2021 08:03 )  PTT:27.7 sec          08-26-21 @ 07:01  -  08-27-21 @ 07:00  --------------------------------------------------------  IN: 0 mL / OUT: 1150 mL / NET: -1150 mL        PHYSICAL EXAM  Vital Signs Last 24 Hrs  T(C): 36.7 (27 Aug 2021 09:46), Max: 36.8 (26 Aug 2021 21:28)  T(F): 98 (27 Aug 2021 09:46), Max: 98.2 (26 Aug 2021 21:28)  HR: 75 (27 Aug 2021 09:46) (60 - 78)  BP: 126/56 (27 Aug 2021 09:46) (108/62 - 126/56)  BP(mean): --  RR: 18 (27 Aug 2021 09:46) (17 - 18)  SpO2: 100% (27 Aug 2021 09:46) (97% - 100%)      General: Well nourished in no acute distress. Alert and Oriented * 3.   Head: Normocephalic and atraumatic.   Neck: No JVD. No bruits. Supple. Does not appear to be enlarged.   Cardiovascular: + S1,S2 ; RRR Soft systolic murmur at the left lower sternal border. No rubs noted.    Lungs: CTA b/l. No rhonchi, rales or wheezes.   Abdomen: + BS, soft. Non tender. Non distended. No rebound. No guarding.   Extremities: No clubbing/cyanosis/edema.   Neurologic: Moves all four extremities. Full range of motion.   Skin: Warm and moist. The patient's skin has normal elasticity and good skin turgor.   Psychiatric: Appropriate mood and affect.  Musculoskeletal: Normal range of motion, normal strength      A/P) He is an extremely pleasant 89 year old male with a past medical history of persistent atrial fibrillation and complete heart block. In June 2014 I implanted a Biotronik biventricular pacemaker. Device interrogations in the office demonstrate excellent pacemaker function. He denies palpitations, syncope nor angina. His other medical history includes BPH, stroke, HTN, hyperlipidemia and CAD s/p PCI in 2015. He is now admitted with sustained monomorphic VT requiring emergency rescue DCCV    -continue asa, no need for plavix  -recommend heparin drip given AF with prior stroke however on hold due to LUE hematoma  -GI eval noted  -Cardio f/u noted, medical management of CAD for now  -will eventually transfer to [facility] for an upgrade to ICD therapy  -depending on his preop venogram I may or may not refer to Dr Jaimes for RV pacing lead removal    Ismael Figueroa PA-C  Pager: [phone]

## 2021-08-27 NOTE — PROGRESS NOTE ADULT - ASSESSMENT
89 year old male with multiple cardiac issues now with VT and FLOYD    Anemia   CBC Appears stable;  Keep hemoglobin > 8   Without overt GI bleeding  No GI objection to anticoagulation/antiplatelet therapy  Keep on PPI BID while on a/c  High Fiber diet    CAD   Given no overt bleeding and relatively stable hemoglobin can proceed w/cardiac w/u  Would suggest BID PPI while on A/C; No GI objection as above      Advanced care planning was discussed with patient.  Advanced care planning forms were reviewed and discussed.  Risks, benefits and alternatives of gastroenterologic procedures were discussed in detail and all questions were answered.  30 minutes spent. _________________________________________________________________________________________________  [facility]  [address] [place] [address]  Office: [phone]    Luis Enrique Lau PA-C    ___________________________________________________________________________________________________

## 2021-08-27 NOTE — PROGRESS NOTE ADULT - SUBJECTIVE AND OBJECTIVE BOX
[facility] - S López / Aroldo S /D Jose/ S Amina/ S Luis/ Adrian Vega / LUBA Herman/ O Alejandra  service -[phone], office [phone]  ---------------------------------------------------------------------------------------------------------------    Patient seen and examined bedside    Subjective and Objective: No overnight events, sob resolved. No complaints today. feeling better    Allergies: No Known Allergies      Hospital Medications:   MEDICATIONS  (STANDING):  aMIOdarone    Tablet 200 milliGRAM(s) Oral daily  aMIOdarone    Tablet   Oral   aspirin enteric coated 81 milliGRAM(s) Oral daily  atorvastatin 40 milliGRAM(s) Oral at bedtime  bisacodyl Suppository 10 milliGRAM(s) Rectal every 24 hours  epoetin lizbeth-epbx (RETACRIT) Injectable 59966 Unit(s) SubCutaneous once  finasteride 5 milliGRAM(s) Oral daily  glucagon  Injectable 1 milliGRAM(s) IntraMuscular once  insulin lispro (ADMELOG) corrective regimen sliding scale   SubCutaneous three times a day before meals  insulin lispro (ADMELOG) corrective regimen sliding scale   SubCutaneous at bedtime  pantoprazole    Tablet 40 milliGRAM(s) Oral two times a day  polyethylene glycol 3350 17 Gram(s) Oral two times a day  senna 2 Tablet(s) Oral at bedtime  sodium bicarbonate 650 milliGRAM(s) Oral two times a day  tamsulosin 0.4 milliGRAM(s) Oral at bedtime      VITALS:  T(F): 98 (08-27-21 @ 09:46), Max: 98.2 (08-26-21 @ 21:28)  HR: 75 (08-27-21 @ 09:46)  BP: 126/56 (08-27-21 @ 09:46)  RR: 18 (08-27-21 @ 09:46)  SpO2: 100% (08-27-21 @ 09:46)  Wt(kg): --    08-26 @ 07:01  -  08-27 @ 07:00  --------------------------------------------------------  IN: 0 mL / OUT: 1150 mL / NET: -1150 mL      PHYSICAL EXAM:  Constitutional: NAD  HEENT: anicteric sclera  Neck: No JVD  Respiratory: CTAB, no wheezes, rales or rhonchi  Cardiovascular: S1, S2, RRR  Gastrointestinal: BS+, soft, NT/ND  Extremities: No peripheral edema  Neurological: A/O x 3  Psychiatric: Normal mood, normal affect  : No river.     LABS:  08-27    138  |  107  |  27<H>  ----------------------------<  88  4.0   |  18<L>  |  1.62<H>    Ca    8.8      27 Aug 2021 08:03  Phos  2.7     08-27  Mg     2.20     08-27      Creatinine Trend: 1.62 <--, 1.61 <--, 1.72 <--, 1.63 <--, 1.78 <--, 1.91 <--, 1.78 <--                        8.9    6.89  )-----------( 211      ( 27 Aug 2021 08:03 )             28.8     Urine Studies:        RADIOLOGY & ADDITIONAL STUDIES:

## 2021-08-27 NOTE — PROGRESS NOTE ADULT - SUBJECTIVE AND OBJECTIVE BOX
Surgery C-Team Daily Progress Note     DARRELL YI | [id number]    SUBJECTIVE / 24H EVENTS  Patient seen and examined on morning rounds. No nausea / vomiting.   Hematoma stable.  Ecchymosis over anterior forearm, anterior and posterior arm and chest wall.  No patti necrosis or signs of ischemia to skin.  Palpable radial, ulnar, brachial and axillary arteries.  Skin soft.  Hematoma not palpable. Sensorimotor intact throughout shoulder, arm, forearm and hand.       OBJECTIVE:    VITAL SIGNS:  T(C): 36.7 (21 @ 09:46), Max: 36.8 (21 @ 21:28)  HR: 75 (21 @ 09:46) (60 - 75)  BP: 126/56 (21 @ 09:46) (108/62 - 126/56)  RR: 18 (21 @ 09:46) (17 - 18)  SpO2: 100% (21 @ 09:46) (97% - 100%)  Daily     Daily Weight in k.7 (27 Aug 2021 05:55)  POCT Blood Glucose.: 151 mg/dL (21 @ 17:02)  POCT Blood Glucose.: 143 mg/dL (21 @ 12:02)  POCT Blood Glucose.: 98 mg/dL (21 @ 07:48)          21 @ 07:01  -  21 @ 07:00  --------------------------------------------------------  IN:  Total IN: 0 mL    OUT:    Voided (mL): 1150 mL  Total OUT: 1150 mL    Total NET: -1150 mL          LAB VALUES:      138  |  107  |  27<H>  ----------------------------<  88  4.0   |  18<L>  |  1.62<H>    Ca    8.8      27 Aug 2021 08:03  Phos  2.7       Mg     2.20                                      8.9    6.89  )-----------( 211      ( 27 Aug 2021 08:03 )             28.8       PTT - ( 27 Aug 2021 08:03 )  PTT:27.7 sec            MICROBIOLOGY:    No new microbiology data for review.     RADIOLOGY:    No new radiographic images for review.    MEDICATIONS  (STANDING):  aMIOdarone    Tablet 200 milliGRAM(s) Oral daily  aMIOdarone    Tablet   Oral   aspirin enteric coated 81 milliGRAM(s) Oral daily  atorvastatin 40 milliGRAM(s) Oral at bedtime  bisacodyl Suppository 10 milliGRAM(s) Rectal every 24 hours  epoetin lizbeth-epbx (RETACRIT) Injectable 42760 Unit(s) SubCutaneous once  finasteride 5 milliGRAM(s) Oral daily  glucagon  Injectable 1 milliGRAM(s) IntraMuscular once  insulin lispro (ADMELOG) corrective regimen sliding scale   SubCutaneous three times a day before meals  insulin lispro (ADMELOG) corrective regimen sliding scale   SubCutaneous at bedtime  pantoprazole    Tablet 40 milliGRAM(s) Oral two times a day  polyethylene glycol 3350 17 Gram(s) Oral two times a day  senna 2 Tablet(s) Oral at bedtime  sodium bicarbonate 650 milliGRAM(s) Oral two times a day  tamsulosin 0.4 milliGRAM(s) Oral at bedtime    MEDICATIONS  (PRN):

## 2021-08-27 NOTE — PROGRESS NOTE ADULT - ASSESSMENT
89 y.o Thai  male with a PMhx of HTN, HLD, DM, CAD s/p stent to 80% D1 in 2014 , ICMrEF 40% in 2018,Afib on eliquis c/b tachy/perla with BiV PPM( Biotronik) CKD, CVA, Velasquez's esophagus  presents with chest burning, dizziness/ lightheadedness, nausea, weakness with unsteady gait found in ventricular tachycardia and hypotension which did not improved with amiodarone 150mg IVP x1 received shocked x1 by external defibrillator with improve in Blood Pressure and mental status. Started on amiodarone drip.  EKG showed He was admitted to CCU. Labs remarkable for FLOYD on CKD and elevated lactate  post ventricular tachycardia and shock. EKG showed STD in inferior lateral leads. Renal following for FLOYD/CKD Mx.    CKD3:   Recent admission for FLOYD on CKD with hyperkalemia S. creat 3.1 > on discharge on 8/12/21 was 1.89,   serum creatinine stable  FLOYD 2/2 hemodynamic instability/ATN- now resolved  K, vol- acceptable. Cr improved and now stable at baseline, 1.6 today  UA bland  no obstructive uropathy on recent renal sono   lasix and losartan on hold-- no objection to resume lasix   M acidosis 2/2 FLOYD/ckd-better. c/w po Na bicarb 650mg bid. no s/o chf   monitor BMP daily   dose all meds for eGFR<15ml/min.   avoid ACEi/ARB/NSAIDs/Nephrotoxics if able.   Intermediate risk for GRACE, pt optimized renal stand point for cath. pt, and family aware of risks of GRACE  cath on hold until hematological and GI issues resolve as per cardiology, will f/u  would recommend mucomyst 1200mg po bid -2doses pre and 2 doses post cath and ivf for 4-6hrs jordi cath if no s/o chf    ANemia in ckd+acute blood loss/hematoma- Hb 7.6 now s/p PRBC last week. s/p epo 10k subqx1 8/19  LUE hematoma- now resolving, LUE ultrasound noted with no dvt  NSTEMI (non-ST elevation myocardial infarction).  Patient p/w chest pain and ventricular tachycardia s/p shockedx1  off heparin gtt   cardiac monitoring to monitor for arrhythmias  Low fat DASH diet  -TTE with normal LV function     Ventricular tachycardia s/p defibrillatorx1  on Amiodarone gtt   Monitor for further VT episodes  Keep K > 4.0 and magnesium > 2.0  BIV PPM interrogation showed  monomorphic ventricular tachycardia at 150-160 as per chart  f/u EP. plan for upgrade BiVICD after cath and medical optimization  -LUE ultrasound noted with no dvt    Chronic diastolic congestive heart failure. h/o CHF    CXR; clear-appear euvolemic  Holding home dose lasix and losartan 2/2 FLOYD on CKD and hypotension  no objection to resume lasix . as per cardiology  Daily weight monitoring, Strict I/O, Low sodium DASH diet  ECHO noted -Normal left ventricular systolic function. No segmental wall motion abnormalities. Mild-moderate pulmonary hypertension.    Paroxysmal atrial fibrillation.   Currently on heparin drip  Continuous tele monitoring  Rate control per team    labs, chart reviewed  poc d/w pt, son bedside  For any question, call:  Cell # [phone]  service# [phone]  office# [phone]

## 2021-08-27 NOTE — PROGRESS NOTE ADULT - ASSESSMENT
89 y.o Greek  male with a PMhx of HTN, HLD, DM, CAD s/p stent to 80% D1 in 2014 ,dHF EF 60% in 2018,Afib on eliquis c/b tachy/perla with BiV PPM( Biotronik) CKD, CVA, Velasquez's esophagus  presents with chest burning, dizziness/ lightheadedness, nausea, weakness with unsteady gait found in ventricular tachycardia and hypotension which did not improved with amiodarone 150mg IVP x1 received shocked x1 by external defibrillator with improve in Blood Pressure and mental status. Started on amiodarone drip.  EKG showed He was admitted to CCU. Labs remarkable for FLOYD on CKD and elevated lactate  post ventricular tachycardia and shock. EKG showed STD in inferior lateral leads.    Anemia:    Hb stable  GI f/up noted    VT:  Cardio/EP f/up noted  IV Heparin on hold due to LUE hematoma  Amio  Plan for upgrade to AICD

## 2021-08-28 NOTE — PROGRESS NOTE ADULT - SUBJECTIVE AND OBJECTIVE BOX
DATE OF SERVICE: 08-28-21    Patient denies chest pain or shortness of breath.   Review of symptoms otherwise negative.    aMIOdarone    Tablet   Oral   aMIOdarone    Tablet 200 milliGRAM(s) Oral daily  aspirin enteric coated 81 milliGRAM(s) Oral daily  atorvastatin 40 milliGRAM(s) Oral at bedtime  bisacodyl Suppository 10 milliGRAM(s) Rectal every 24 hours  epoetin lizbeth-epbx (RETACRIT) Injectable 70830 Unit(s) SubCutaneous once  finasteride 5 milliGRAM(s) Oral daily  glucagon  Injectable 1 milliGRAM(s) IntraMuscular once  insulin lispro (ADMELOG) corrective regimen sliding scale   SubCutaneous three times a day before meals  insulin lispro (ADMELOG) corrective regimen sliding scale   SubCutaneous at bedtime  pantoprazole    Tablet 40 milliGRAM(s) Oral two times a day  polyethylene glycol 3350 17 Gram(s) Oral two times a day  senna 2 Tablet(s) Oral at bedtime  sodium bicarbonate 650 milliGRAM(s) Oral two times a day  tamsulosin 0.4 milliGRAM(s) Oral at bedtime                            8.4    7.14  )-----------( 205      ( 28 Aug 2021 07:57 )             26.3       Hemoglobin: 8.4 g/dL (08-28 @ 07:57)  Hemoglobin: 8.9 g/dL (08-27 @ 08:03)  Hemoglobin: 9.0 g/dL (08-26 @ 18:40)  Hemoglobin: 9.0 g/dL (08-26 @ 07:37)  Hemoglobin: 9.0 g/dL (08-25 @ 06:39)      08-28    137  |  108<H>  |  27<H>  ----------------------------<  100<H>  4.0   |  19<L>  |  1.61<H>    Ca    8.5      28 Aug 2021 07:57  Phos  3.0     08-28  Mg     2.10     08-28    TPro  5.0<L>  /  Alb  2.8<L>  /  TBili  1.2  /  DBili  x   /  AST  20  /  ALT  14  /  AlkPhos  56  08-28    Creatinine Trend: 1.61<--, 1.62<--, 1.61<--, 1.72<--, 1.63<--, 1.78<--    COAGS:           T(C): 36.1 (08-28-21 @ 21:03), Max: 36.6 (08-28-21 @ 01:00)  HR: 70 (08-28-21 @ 17:42) (67 - 71)  BP: 131/78 (08-28-21 @ 21:03) (107/55 - 131/78)  RR: 18 (08-28-21 @ 21:03) (18 - 18)  SpO2: 99% (08-28-21 @ 21:03) (99% - 100%)  Wt(kg): --    I&O's Summary    27 Aug 2021 07:01  -  28 Aug 2021 07:00  --------------------------------------------------------  IN: 0 mL / OUT: 725 mL / NET: -725 mL        General: Well nourished in no acute distress. Alert and Oriented * 3.   Head: Normocephalic and atraumatic.   Neck: No JVD. No bruits. Supple. Does not appear to be enlarged.   Cardiovascular: + S1,S2 ; RRR Soft systolic murmur at the left lower sternal border. No rubs noted.    Lungs: CTA b/l. No rhonchi, rales or wheezes.   Abdomen: + BS, soft. Non tender. Non distended. No rebound. No guarding.   Extremities: L radial pulse 2+, LUE hematoma  Neurologic: Moves all four extremities. Full range of motion.   Skin: Warm and moist. The patient's skin has normal elasticity and good skin turgor.   Psychiatric: Appropriate mood and affect.  Musculoskeletal: Normal range of motion, normal strength    Tele: SR    TTE: < from: Transthoracic Echocardiogram (08.14.21 @ 14:06) >  Normal left ventricular systolic function. No segmental  wall motion abnormalities.  Mild-moderate pulmonary hypertension.  A device wire is noted in the right heart.    < end of copied text >    < from: [facility] Duplex Ext Veins Upper Comp, Left. (08.17.21 @ 12:43) >  Summary/Impressions:  No evidence of deep vein thrombosis in the left upper  extremity.    < end of copied text >      < from: CT Upper Extremity No Cont, Left (08.19.21 @ 09:25) >  IMPRESSION:    1.  Mild enlargement of the triceps muscle suspicious for intramuscular hematoma.  2.  Amorphous appearance of the flexor musculature the forearm is nonspecific but can be seen with myositis or intramuscular hematoma.  3.  Moderate soft tissue swelling about the upper extremity, nonspecific but can be seen with cellulitis in the proper clinical setting.  4.  Nodular opacities within the left upper lobe. Small pleural effusion and atelectasis. Dedicated CT the chest can be performed for more detailed evaluation.    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     -continue with amiodarone for VT per EP - no further episodes of VT thus far   -keep K > 4, Mg > 2  -TTE with normal LV function     At this time, the family  decline cath and would like medical management of known cad.   -Given no current anginal symptoms, monomorphic VT that is unlikely ischemic driven, and patient's GOC/preferences, will hold off on cath for now and medically manage CAD.   -Pt. with worsening LUE hematoma and pain - hep gtt on hold - follow up vascular   -Plans for device upgrade per EP     Alfredo Rain MD, [facility]  BEEPER [phone]

## 2021-08-28 NOTE — PROGRESS NOTE ADULT - ASSESSMENT
89 year old male with multiple cardiac issues now with VT and FLOYD    Anemia   CBC Appears stable;  Keep hemoglobin > 8   Without overt GI bleeding  No GI objection to anticoagulation/antiplatelet therapy  Keep on PPI BID while on a/c  High Fiber diet    CAD   Given no overt bleeding and relatively stable hemoglobin can proceed w/cardiac w/u  Would suggest BID PPI while on A/C; No GI objection as above      Advanced care planning was discussed with patient.  Advanced care planning forms were reviewed and discussed.  Risks, benefits and alternatives of gastroenterologic procedures were discussed in detail and all questions were answered.  30 minutes spent. _________________________________________________________________________________________________  [facility]  [address] [place] [address]  Office: [phone]    Luis Enrique Lau PA-C    ___________________________________________________________________________________________________

## 2021-08-28 NOTE — PROGRESS NOTE ADULT - SUBJECTIVE AND OBJECTIVE BOX
EP     tele: AF-BiV paced    reports LUE pain. he denies palpitations, syncope, nor angina, ROS otherwise -    DATE OF SERVICE - 08-28-21     Review of Systems:   Constitutional: [ ] fevers, [ ] chills.   Skin: [ ] dry skin. [ ] rashes.  Psychiatric: [ ] depression, [ ] anxiety.   Gastrointestinal: [ ] BRBPR, [ ] melena.   Neurological: [ ] confusion. [ ] seizures. [ ] shuffling gait.   Ears,Nose,Mouth and Throat: [ ] ear pain [ ] sore throat.   Eyes: [ ] diplopia.   Respiratory: [ ] hemoptysis. [ ] shortness of breath  Cardiovascular: See HPI above  Hematologic/Lymphatic: [ ] anemia. [ ] painful nodes. [ ] prolonged bleeding.   Genitourinary: [ ] hematuria. [ ] flank pain.   Endocrine: [ ] significant change in weight. [ ] intolerance to heat and cold.     Review of systems [x ] otherwise negative, [ ] otherwise unable to obtain    FH: no family history of sudden cardiac death in first degree relatives    SH: [ ] tobacco, [ ] alcohol, [ ] drugs    aMIOdarone    Tablet   Oral   aMIOdarone    Tablet 200 milliGRAM(s) Oral daily  aspirin enteric coated 81 milliGRAM(s) Oral daily  atorvastatin 40 milliGRAM(s) Oral at bedtime  bisacodyl Suppository 10 milliGRAM(s) Rectal every 24 hours  epoetin lizbeth-epbx (RETACRIT) Injectable 54474 Unit(s) SubCutaneous once  finasteride 5 milliGRAM(s) Oral daily  glucagon  Injectable 1 milliGRAM(s) IntraMuscular once  insulin lispro (ADMELOG) corrective regimen sliding scale   SubCutaneous three times a day before meals  insulin lispro (ADMELOG) corrective regimen sliding scale   SubCutaneous at bedtime  pantoprazole    Tablet 40 milliGRAM(s) Oral two times a day  polyethylene glycol 3350 17 Gram(s) Oral two times a day  senna 2 Tablet(s) Oral at bedtime  sodium bicarbonate 650 milliGRAM(s) Oral two times a day  tamsulosin 0.4 milliGRAM(s) Oral at bedtime                            8.4    7.14  )-----------( 205      ( 28 Aug 2021 07:57 )             26.3       08-28    137  |  108<H>  |  27<H>  ----------------------------<  100<H>  4.0   |  19<L>  |  1.61<H>    Ca    8.5      28 Aug 2021 07:57  Phos  3.0     08-28  Mg     2.10     08-28    TPro  5.0<L>  /  Alb  2.8<L>  /  TBili  1.2  /  DBili  x   /  AST  20  /  ALT  14  /  AlkPhos  56  08-28    T(C): 36.5 (08-28-21 @ 05:39), Max: 36.9 (08-27-21 @ 21:22)  HR: 71 (08-28-21 @ 05:39) (67 - 71)  BP: 107/55 (08-28-21 @ 05:39) (107/55 - 128/66)  RR: 18 (08-28-21 @ 05:39) (18 - 18)  SpO2: 100% (08-28-21 @ 05:39) (100% - 100%)  Wt(kg): --    I&O's Summary    27 Aug 2021 07:01  -  28 Aug 2021 07:00  --------------------------------------------------------  IN: 0 mL / OUT: 725 mL / NET: -725 mL        General: Well nourished in no acute distress. Alert and Oriented * 3.   Head: Normocephalic and atraumatic.   Neck: No JVD. No bruits. Supple. Does not appear to be enlarged.   Cardiovascular: + S1,S2 ; RRR Soft systolic murmur at the left lower sternal border. No rubs noted.    Lungs: CTA b/l. No rhonchi, rales or wheezes.   Abdomen: + BS, soft. Non tender. Non distended. No rebound. No guarding.   Extremities: No clubbing/cyanosis/edema.   Neurologic: Moves all four extremities. Full range of motion.   Skin: Warm and moist. The patient's skin has normal elasticity and good skin turgor.   Psychiatric: Appropriate mood and affect.  Musculoskeletal: Normal range of motion, normal strength        A/P) He is an extremely pleasant 89 year old male with a past medical history of persistent atrial fibrillation and complete heart block. In June 2014 I implanted a Biotronik biventricular pacemaker. Device interrogations in the office demonstrate excellent pacemaker function. He denies palpitations, syncope nor angina. His other medical history includes BPH, stroke, HTN, hyperlipidemia and CAD s/p PCI in 2015. He is now admitted with sustained monomorphic VT requiring emergency rescue DCCV    -continue asa, no need for plavix  -recommend heparin drip given AF with prior stroke however on hold due to LUE hematoma--improving  -GI eval noted  -Cardio f/u noted, medical management of CAD for now  -will eventually transfer to [place] for an upgrade to ICD therapy  -depending on his preop venogram I may or may not refer to Dr Jaimes for RV pacing lead removal

## 2021-08-28 NOTE — PROGRESS NOTE ADULT - ASSESSMENT
89 y.o [place]  male with a PMhx of HTN, HLD, DM, CAD s/p stent to 80% D1 in 2014 ,dHF EF 60% in 2018,Afib on eliquis c/b tachy/perla with BiV PPM( Biotronik) CKD, CVA, Velasquez's esophagus  presents with chest burning, dizziness/ lightheadedness, nausea, weakness with unsteady gait found in ventricular tachycardia and hypotension which did not improved with amiodarone 150mg IVP x1 received shocked x1 by external defibrillator with improve in Blood Pressure and mental status. Started on amiodarone drip.  EKG showed He was admitted to CCU. Labs remarkable for FLOYD on CKD and elevated lactate  post ventricular tachycardia and shock. EKG showed STD in inferior lateral leads.    Anemia:    Hb stable  GI f/up noted    VT:  Cardio/EP f/up noted  IV Heparin on hold due to LUE hematoma  Amio  Plan for upgrade to AICD

## 2021-08-28 NOTE — PROGRESS NOTE ADULT - ASSESSMENT
89 y.o [place]  male with a PMhx of HTN, HLD, DM, CAD s/p stent to 80% D1 in 2014 , ICMrEF 40% in 2018,Afib on eliquis c/b tachy/perla with BiV PPM( Biotronik) CKD, CVA, Velasquez's esophagus  presents with chest burning, dizziness/ lightheadedness, nausea, weakness with unsteady gait found in ventricular tachycardia and hypotension which did not improved with amiodarone 150mg IVP x1 received shocked x1 by external defibrillator with improve in Blood Pressure and mental status. Started on amiodarone drip.  EKG showed He was admitted to CCU. Labs remarkable for FLOYD on CKD and elevated lactate  post ventricular tachycardia and shock. EKG showed STD in inferior lateral leads. Renal following for FLOYD/CKD Mx.    CKD3:   Recent admission for FLOYD on CKD with hyperkalemia S. creat 3.1 > on discharge on 8/12/21 was 1.89,   serum creatinine stable  FLOYD 2/2 hemodynamic instability/ATN- now resolved  K, vol- acceptable. Cr improved and now stable at baseline, 1.6 today  UA bland  no obstructive uropathy on recent renal sono   lasix and losartan on hold-- no objection to resume lasix   M acidosis 2/2 FLOYD/ckd   Na bicarb 650mg bid. no s/o chf       ANemia in ckd+acute blood loss/hematoma- Hb 7.6 now s/p PRBC last week. s/p epo 10k subqx1 8/19  LUE hematoma- now resolving, LUE ultrasound noted with no dvt  NSTEMI (non-ST elevation myocardial infarction).  Patient p/w chest pain and ventricular tachycardia s/p shockedx1  off heparin gtt   cardiac monitoring to monitor for arrhythmias  Low fat DASH diet  -TTE with normal LV function     Ventricular tachycardia s/p defibrillatorx1  on Amiodarone gtt   Monitor for further VT episodes  Keep K > 4.0 and magnesium > 2.0  BIV PPM interrogation showed  monomorphic ventricular tachycardia at 150-160 as per chart  f/u EP. plan for upgrade BiVICD after cath and medical optimization  -LUE ultrasound noted with no dvt    Chronic diastolic congestive heart failure. h/o CHF    CXR; clear-appear euvolemic  Holding home dose lasix and losartan 2/2 FLOYD on CKD and hypotension  no objection to resume lasix . as per cardiology  Daily weight monitoring, Strict I/O, Low sodium DASH diet  ECHO noted -Normal left ventricular systolic function. No segmental wall motion abnormalities. Mild-moderate pulmonary hypertension.    Paroxysmal atrial fibrillation.   Currently on heparin drip  Continuous tele monitoring  Rate control per team

## 2021-08-28 NOTE — PROGRESS NOTE ADULT - SUBJECTIVE AND OBJECTIVE BOX
Patient is a 89y old  Male who presents with a chief complaint of HIGH K (21 Aug 2021 13:50)      SUBJECTIVE / OVERNIGHT EVENTS:    Events noted.  CONSTITUTIONAL: LUE hematoma  RESPIRATORY: No cough, wheezing,  No shortness of breath  CARDIOVASCULAR: No chest pain, palpitations, dizziness, or leg swelling  GASTROINTESTINAL: No abdominal or epigastric pain. No nausea, vomiting.  NEUROLOGICAL: No headaches,     MEDICATIONS  (STANDING):  aMIOdarone    Tablet   Oral   aMIOdarone    Tablet 200 milliGRAM(s) Oral daily  aspirin enteric coated 81 milliGRAM(s) Oral daily  atorvastatin 40 milliGRAM(s) Oral at bedtime  bisacodyl Suppository 10 milliGRAM(s) Rectal every 24 hours  epoetin lizbeth-epbx (RETACRIT) Injectable 63014 Unit(s) SubCutaneous once  finasteride 5 milliGRAM(s) Oral daily  glucagon  Injectable 1 milliGRAM(s) IntraMuscular once  insulin lispro (ADMELOG) corrective regimen sliding scale   SubCutaneous three times a day before meals  insulin lispro (ADMELOG) corrective regimen sliding scale   SubCutaneous at bedtime  pantoprazole    Tablet 40 milliGRAM(s) Oral two times a day  polyethylene glycol 3350 17 Gram(s) Oral two times a day  senna 2 Tablet(s) Oral at bedtime  sodium bicarbonate 650 milliGRAM(s) Oral two times a day  tamsulosin 0.4 milliGRAM(s) Oral at bedtime    MEDICATIONS  (PRN):        CAPILLARY BLOOD GLUCOSE      POCT Blood Glucose.: 153 mg/dL (27 Aug 2021 21:19)  POCT Blood Glucose.: 151 mg/dL (27 Aug 2021 17:02)  POCT Blood Glucose.: 143 mg/dL (27 Aug 2021 12:02)  POCT Blood Glucose.: 98 mg/dL (27 Aug 2021 07:48)    I&O's Summary    26 Aug 2021 07:01  -  27 Aug 2021 07:00  --------------------------------------------------------  IN: 0 mL / OUT: 1150 mL / NET: -1150 mL        T(C): 36.9 (08-27-21 @ 21:22), Max: 36.9 (08-27-21 @ 21:22)  HR: 68 (08-27-21 @ 21:22) (60 - 75)  BP: 119/58 (08-27-21 @ 21:22) (108/62 - 128/66)  RR: 18 (08-27-21 @ 21:22) (17 - 18)  SpO2: 100% (08-27-21 @ 21:22) (97% - 100%)    PHYSICAL EXAM:  GENERAL: NAD  NECK: Supple, No JVD  CHEST/LUNG: Clear to auscultation bilaterally; No wheezing.  HEART: Regular rate and rhythm; No murmurs, rubs, or gallops  ABDOMEN: Soft, Nontender, Nondistended; Bowel sounds present  EXTREMITIES:   No edema  NEUROLOGY: AAO       LABS:                        8.9    6.89  )-----------( 211      ( 27 Aug 2021 08:03 )             28.8     08-27    138  |  107  |  27<H>  ----------------------------<  88  4.0   |  18<L>  |  1.62<H>    Ca    8.8      27 Aug 2021 08:03  Phos  2.7     08-27  Mg     2.20     08-27      PTT - ( 27 Aug 2021 08:03 )  PTT:27.7 sec        CAPILLARY BLOOD GLUCOSE      POCT Blood Glucose.: 153 mg/dL (27 Aug 2021 21:19)  POCT Blood Glucose.: 151 mg/dL (27 Aug 2021 17:02)  POCT Blood Glucose.: 143 mg/dL (27 Aug 2021 12:02)  POCT Blood Glucose.: 98 mg/dL (27 Aug 2021 07:48)        RADIOLOGY & ADDITIONAL TESTS:    Imaging Personally Reviewed:    Consultant(s) Notes Reviewed:      Care Discussed with Consultants/Other Providers:    Dileep Amador MD, CMD, FACP    [address] [place] [address]  Office Tel: [phone]  Cell: [phone]

## 2021-08-28 NOTE — PROGRESS NOTE ADULT - SUBJECTIVE AND OBJECTIVE BOX
INTERVAL HPI/OVERNIGHT EVENTS:  No new overnight event.  No N/V/D.  Tolerating diet.  no melena    Allergies    No Known Allergies    Intolerances    General:  No wt loss, fevers, chills, night sweats, fatigue,   Eyes:  Good vision, no reported pain  ENT:  No sore throat, pain, runny nose, dysphagia  CV:  No pain, palpitations, hypo/hypertension  Resp:  No dyspnea, cough, tachypnea, wheezing  GI:  No pain, No nausea, No vomiting, No diarrhea, No constipation, No weight loss, No fever, No pruritis, No rectal bleeding, No tarry stools, No dysphagia,  :  No pain, bleeding, incontinence, nocturia  Muscle:  No pain, weakness  Neuro:  No weakness, tingling, memory problems  Psych:  No fatigue, insomnia, mood problems, depression  Endocrine:  No polyuria, polydipsia, cold/heat intolerance  Heme:  No petechiae, ecchymosis, easy bruisability  Skin:  No rash, tattoos, scars, edema      PHYSICAL EXAM:   Vital Signs:  Vital Signs Last 24 Hrs  T(C): 36.6 (28 Aug 2021 12:52), Max: 36.9 (27 Aug 2021 21:22)  T(F): 97.9 (28 Aug 2021 12:52), Max: 98.5 (27 Aug 2021 21:22)  HR: 70 (28 Aug 2021 12:52) (67 - 71)  BP: 128/57 (28 Aug 2021 12:52) (107/55 - 128/66)  BP(mean): --  RR: 18 (28 Aug 2021 12:52) (18 - 18)  SpO2: 100% (28 Aug 2021 12:52) (100% - 100%)  Daily     Daily Weight in k.5 (28 Aug 2021 05:39)I&O's Summary    27 Aug 2021 07:01  -  28 Aug 2021 07:00  --------------------------------------------------------  IN: 0 mL / OUT: 725 mL / NET: -725 mL        GENERAL:  Appears stated age, well-groomed, well-nourished, no distress  HEENT:  NC/AT,  conjunctivae clear and pink, no thyromegaly, nodules, adenopathy, no JVD, sclera -anicteric  CHEST:  Full & symmetric excursion, no increased effort, breath sounds clear  HEART:  Regular rhythm, S1, S2, no murmur/rub/S3/S4, no abdominal bruit, no edema  ABDOMEN:  Soft, non-tender, non-distended, normoactive bowel sounds,  no masses ,no hepato-splenomegaly, no signs of chronic liver disease  EXTEREMITIES:  no cyanosis,clubbing or edema  SKIN:  No rash/erythema/ecchymoses/petechiae/wounds/abscess/warm/dry  NEURO:  Alert, oriented, no asterixis, no tremor, no encephalopathy      LABS:                        8.4    7.14  )-----------( 205      ( 28 Aug 2021 07:57 )             26.3         137  |  108<H>  |  27<H>  ----------------------------<  100<H>  4.0   |  19<L>  |  1.61<H>    Ca    8.5      28 Aug 2021 07:57  Phos  3.0       Mg     2.10         TPro  5.0<L>  /  Alb  2.8<L>  /  TBili  1.2  /  DBili  x   /  AST  20  /  ALT  14  /  AlkPhos  56      PTT - ( 27 Aug 2021 08:03 )  PTT:27.7 sec    amylase   lipase  RADIOLOGY & ADDITIONAL TESTS:

## 2021-08-28 NOTE — PROGRESS NOTE ADULT - SUBJECTIVE AND OBJECTIVE BOX
[facility] : Ans Serv [phone], Office [phone]  Dr Garcia/Dr Dawn  /Dr David harris /Dr JUAN DAVID Huynh/Dr Mike Smith/Dr Adrian Vega /Dr LUBA Herman  _______________________________________________________________________________________________    seen and examined today for renal failure  Interval :  VITALS:  T(F): 97.7 (08-28-21 @ 05:39), Max: 98.5 (08-27-21 @ 21:22)  HR: 71 (08-28-21 @ 05:39)  BP: 107/55 (08-28-21 @ 05:39)  RR: 18 (08-28-21 @ 05:39)  SpO2: 100% (08-28-21 @ 05:39)  Wt(kg): --    08-27 @ 07:01  -  08-28 @ 07:00  --------------------------------------------------------  IN: 0 mL / OUT: 725 mL / NET: -725 mL    Physical Exam :-  Constitutional: NAD  Neck: Supple.  Respiratory: Bilateral equal breath sounds, no Crackles present.  Cardiovascular: S1, S2 normal, positive Murmur  Gastrointestinal: Bowel Sounds present, soft, non tender.  Extremities: no Edema Feet  Neurological: Alert   Psychiatric: Normal mood, normal affect  Data:-  Allergies :   No Known Allergies    Hospital Medications:   MEDICATIONS  (STANDING):  aMIOdarone    Tablet   Oral   aMIOdarone    Tablet 200 milliGRAM(s) Oral daily  aspirin enteric coated 81 milliGRAM(s) Oral daily  atorvastatin 40 milliGRAM(s) Oral at bedtime  bisacodyl Suppository 10 milliGRAM(s) Rectal every 24 hours  epoetin lizbeth-epbx (RETACRIT) Injectable 40772 Unit(s) SubCutaneous once  finasteride 5 milliGRAM(s) Oral daily  glucagon  Injectable 1 milliGRAM(s) IntraMuscular once  insulin lispro (ADMELOG) corrective regimen sliding scale   SubCutaneous three times a day before meals  insulin lispro (ADMELOG) corrective regimen sliding scale   SubCutaneous at bedtime  pantoprazole    Tablet 40 milliGRAM(s) Oral two times a day  polyethylene glycol 3350 17 Gram(s) Oral two times a day  senna 2 Tablet(s) Oral at bedtime  sodium bicarbonate 650 milliGRAM(s) Oral two times a day  tamsulosin 0.4 milliGRAM(s) Oral at bedtime    08-28    137  |  108<H>  |  27<H>  ----------------------------<  100<H>  4.0   |  19<L>  |  1.61<H>    Ca    8.5      28 Aug 2021 07:57  Phos  3.0     08-28  Mg     2.10     08-28    TPro  5.0<L>  /  Alb  2.8<L>  /  TBili  1.2  /  DBili      /  AST  20  /  ALT  14  /  AlkPhos  56  08-28    Creatinine Trend: 1.61 <--, 1.62 <--, 1.61 <--, 1.72 <--, 1.63 <--, 1.78 <--, 1.91 <--                        8.4    7.14  )-----------( 205      ( 28 Aug 2021 07:57 )             26.3

## 2021-08-29 NOTE — PROGRESS NOTE ADULT - ASSESSMENT
89 year old male with multiple cardiac issues now with VT and FLOYD    Anemia   CBC Appears stable;  Keep hemoglobin > 8   Without overt GI bleeding  No GI objection to anticoagulation/antiplatelet therapy  Keep on PPI BID while on a/c  High Fiber diet    CAD   Given no overt bleeding and relatively stable hemoglobin can proceed w/cardiac w/u  Would suggest BID PPI while on A/C; No GI objection as above      Advanced care planning was discussed with patient.  Advanced care planning forms were reviewed and discussed.  Risks, benefits and alternatives of gastroenterologic procedures were discussed in detail and all questions were answered.  30 minutes spent. _________________________________________________________________________________________________  [facility]  [address] [place] [address]  Office: [phone]    Luis Enrique Lau PA-C    ___________________________________________________________________________________________________

## 2021-08-29 NOTE — PROGRESS NOTE ADULT - SUBJECTIVE AND OBJECTIVE BOX
DATE OF SERVICE: 08-29-21    Patient denies chest pain or shortness of breath.   Review of symptoms otherwise negative.    aMIOdarone    Tablet   Oral   aMIOdarone    Tablet 200 milliGRAM(s) Oral daily  aspirin enteric coated 81 milliGRAM(s) Oral daily  atorvastatin 40 milliGRAM(s) Oral at bedtime  bisacodyl Suppository 10 milliGRAM(s) Rectal every 24 hours  epoetin lizbeth-epbx (RETACRIT) Injectable 68540 Unit(s) SubCutaneous once  finasteride 5 milliGRAM(s) Oral daily  glucagon  Injectable 1 milliGRAM(s) IntraMuscular once  insulin lispro (ADMELOG) corrective regimen sliding scale   SubCutaneous three times a day before meals  insulin lispro (ADMELOG) corrective regimen sliding scale   SubCutaneous at bedtime  pantoprazole    Tablet 40 milliGRAM(s) Oral two times a day  polyethylene glycol 3350 17 Gram(s) Oral two times a day  senna 2 Tablet(s) Oral at bedtime  sodium bicarbonate 650 milliGRAM(s) Oral two times a day  tamsulosin 0.4 milliGRAM(s) Oral at bedtime                            8.7    6.02  )-----------( 196      ( 29 Aug 2021 08:14 )             27.0       Hemoglobin: 8.7 g/dL (08-29 @ 08:14)  Hemoglobin: 8.4 g/dL (08-28 @ 07:57)  Hemoglobin: 8.9 g/dL (08-27 @ 08:03)  Hemoglobin: 9.0 g/dL (08-26 @ 18:40)  Hemoglobin: 9.0 g/dL (08-26 @ 07:37)      08-29    136  |  107  |  30<H>  ----------------------------<  134<H>  4.6   |  20<L>  |  1.61<H>    Ca    8.5      29 Aug 2021 08:14  Phos  2.8     08-29  Mg     2.20     08-29    TPro  5.5<L>  /  Alb  3.2<L>  /  TBili  1.0  /  DBili  x   /  AST  22  /  ALT  12  /  AlkPhos  76  08-29    Creatinine Trend: 1.61<--, 1.61<--, 1.62<--, 1.61<--, 1.72<--, 1.63<--    COAGS:           T(C): 36.7 (08-29-21 @ 17:44), Max: 36.7 (08-29-21 @ 01:00)  HR: 75 (08-29-21 @ 17:44) (68 - 79)  BP: 127/57 (08-29-21 @ 17:44) (108/62 - 127/57)  RR: 18 (08-29-21 @ 17:44) (17 - 19)  SpO2: 100% (08-29-21 @ 17:44) (97% - 100%)  Wt(kg): --    I&O's Summary    28 Aug 2021 07:01  -  29 Aug 2021 07:00  --------------------------------------------------------  IN: 0 mL / OUT: 500 mL / NET: -500 mL        General: Well nourished in no acute distress. Alert and Oriented * 3.   Head: Normocephalic and atraumatic.   Neck: No JVD. No bruits. Supple. Does not appear to be enlarged.   Cardiovascular: + S1,S2 ; RRR Soft systolic murmur at the left lower sternal border. No rubs noted.    Lungs: CTA b/l. No rhonchi, rales or wheezes.   Abdomen: + BS, soft. Non tender. Non distended. No rebound. No guarding.   Extremities: L radial pulse 2+, LUE hematoma  Neurologic: Moves all four extremities. Full range of motion.   Skin: Warm and moist. The patient's skin has normal elasticity and good skin turgor.   Psychiatric: Appropriate mood and affect.  Musculoskeletal: Normal range of motion, normal strength    Tele: SR    TTE: < from: Transthoracic Echocardiogram (08.14.21 @ 14:06) >  Normal left ventricular systolic function. No segmental  wall motion abnormalities.  Mild-moderate pulmonary hypertension.  A device wire is noted in the right heart.    < end of copied text >    < from: [facility] Duplex Ext Veins Upper Comp, Left. (08.17.21 @ 12:43) >  Summary/Impressions:  No evidence of deep vein thrombosis in the left upper  extremity.    < end of copied text >      < from: CT Upper Extremity No Cont, Left (08.19.21 @ 09:25) >  IMPRESSION:    1.  Mild enlargement of the triceps muscle suspicious for intramuscular hematoma.  2.  Amorphous appearance of the flexor musculature the forearm is nonspecific but can be seen with myositis or intramuscular hematoma.  3.  Moderate soft tissue swelling about the upper extremity, nonspecific but can be seen with cellulitis in the proper clinical setting.  4.  Nodular opacities within the left upper lobe. Small pleural effusion and atelectasis. Dedicated CT the chest can be performed for more detailed evaluation.    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     -continue with amiodarone for VT per EP - no further episodes of VT thus far   -keep K > 4, Mg > 2  -TTE with normal LV function     At this time, the family  decline cath and would like medical management of known cad.   -Given no current anginal symptoms, monomorphic VT that is unlikely ischemic driven, and patient's GOC/preferences, will hold off on cath for now and medically manage CAD.   -Pt. with worsening LUE hematoma and pain - hep gtt on hold - follow up vascular   -Plans for device upgrade per EP     Alfredo Rain MD, [facility]  BEEPER [phone]

## 2021-08-29 NOTE — PROGRESS NOTE ADULT - SUBJECTIVE AND OBJECTIVE BOX
[facility] : [facility] [phone], Office [phone]  Dr Garcia/Dr Dawn  /Dr David harris /Dr JUAN DAVID Huynh/Dr Mike Smith/Dr Adrian Vega /Dr LUBA Herman  _______________________________________________________________________________________________    seen and examined today for renal failure  Interval : Serum Creatinine stable  VITALS:  T(F): 97.9 (08-29-21 @ 09:00), Max: 98 (08-29-21 @ 01:00)  HR: 79 (08-29-21 @ 09:00)  BP: 110/63 (08-29-21 @ 09:00)  RR: 18 (08-29-21 @ 09:00)  SpO2: 97% (08-29-21 @ 09:00)  Wt(kg): --    08-28 @ 07:01  -  08-29 @ 07:00  --------------------------------------------------------  IN: 0 mL / OUT: 500 mL / NET: -500 mL      Physical Exam :-  Constitutional: NAD  Neck: Supple.  Respiratory: Bilateral equal breath sounds, no Crackles present.  Cardiovascular: S1, S2 normal, positive Murmur  Gastrointestinal: Bowel Sounds present, soft, non tender.  Extremities: no Edema Feet  Neurological: Alert   Data:-  Allergies :   No Known Allergies    Hospital Medications:   MEDICATIONS  (STANDING):  aMIOdarone    Tablet   Oral   aMIOdarone    Tablet 200 milliGRAM(s) Oral daily  aspirin enteric coated 81 milliGRAM(s) Oral daily  atorvastatin 40 milliGRAM(s) Oral at bedtime  bisacodyl Suppository 10 milliGRAM(s) Rectal every 24 hours  epoetin libzeth-epbx (RETACRIT) Injectable 29977 Unit(s) SubCutaneous once  finasteride 5 milliGRAM(s) Oral daily  glucagon  Injectable 1 milliGRAM(s) IntraMuscular once  insulin lispro (ADMELOG) corrective regimen sliding scale   SubCutaneous three times a day before meals  insulin lispro (ADMELOG) corrective regimen sliding scale   SubCutaneous at bedtime  pantoprazole    Tablet 40 milliGRAM(s) Oral two times a day  polyethylene glycol 3350 17 Gram(s) Oral two times a day  senna 2 Tablet(s) Oral at bedtime  sodium bicarbonate 650 milliGRAM(s) Oral two times a day  tamsulosin 0.4 milliGRAM(s) Oral at bedtime    08-29    136  |  107  |  30<H>  ----------------------------<  134<H>  4.6   |  20<L>  |  1.61<H>    Ca    8.5      29 Aug 2021 08:14  Phos  2.8     08-29  Mg     2.20     08-29    TPro  5.5<L>  /  Alb  3.2<L>  /  TBili  1.0  /  DBili      /  AST  22  /  ALT  12  /  AlkPhos  76  08-29    Creatinine Trend: 1.61 <--, 1.61 <--, 1.62 <--, 1.61 <--, 1.72 <--, 1.63 <--, 1.78 <--                        8.7    6.02  )-----------( 196      ( 29 Aug 2021 08:14 )             27.0

## 2021-08-29 NOTE — PROGRESS NOTE ADULT - ASSESSMENT
89 y.o Kisw[place]ili  male with a PMhx of HTN, HLD, DM, CAD s/p stent to 80% D1 in 2014 , ICMrEF 40% in 2018,Afib on eliquis c/b tachy/perla with BiV PPM( Biotronik) CKD, CVA, Velasquez's esophagus  presents with chest burning, dizziness/ lightheadedness, nausea, weakness with unsteady gait found in ventricular tachycardia and hypotension which did not improved with amiodarone 150mg IVP x1 received shocked x1 by external defibrillator with improve in Blood Pressure and mental status. Started on amiodarone drip.  EKG showed He was admitted to CCU. Labs remarkable for FLOYD on CKD and elevated lactate  post ventricular tachycardia and shock. EKG showed STD in inferior lateral leads. Renal following for FLOYD/CKD Mx.    CKD3:   Recent admission for FLOYD on CKD with hyperkalemia S. creat 3.1 > on discharge on 8/12/21 was 1.89,   serum creatinine stable  FLOYD 2/2 hemodynamic instability/ATN- now resolved  K, vol- acceptable. Cr improved and now stable at baseline, 1.6 today  UA bland  no obstructive uropathy on recent renal sono   lasix and losartan on hold-- no objection to resume lasix   M acidosis 2/2 FLOYD/ckd   Na bicarb 650mg bid. no s/o chf       ANemia in ckd+acute blood loss/hematoma- Hb 7.6 now s/p PRBC last week. s/p epo 10k subqx1 8/19  LUE hematoma- now resolving, LUE ultrasound noted with no dvt  NSTEMI (non-ST elevation myocardial infarction).  Patient p/w chest pain and ventricular tachycardia s/p shockedx1  off heparin gtt   cardiac monitoring to monitor for arrhythmias  Low fat DASH diet  -TTE with normal LV function     Ventricular tachycardia s/p defibrillatorx1  on Amiodarone gtt   Monitor for further VT episodes  Keep K > 4.0 and magnesium > 2.0  BIV PPM interrogation showed  monomorphic ventricular tachycardia at 150-160 as per chart  f/u EP. plan for upgrade BiVICD after cath and medical optimization  -LUE ultrasound noted with no dvt    Chronic diastolic congestive heart failure. h/o CHF    CXR; clear-appear euvolemic  Holding home dose lasix and losartan 2/2 FLOYD on CKD and hypotension  no objection to resume lasix . as per cardiology  Daily weight monitoring, Strict I/O, Low sodium DASH diet  ECHO noted -Normal left ventricular systolic function. No segmental wall motion abnormalities. Mild-moderate pulmonary hypertension.    Paroxysmal atrial fibrillation.   Currently on heparin drip  Continuous tele monitoring  Rate control per team

## 2021-08-29 NOTE — PROGRESS NOTE ADULT - SUBJECTIVE AND OBJECTIVE BOX
EP     tele: AF-BiV paced    reports LUE swelling improved,  he denies palpitations, syncope, nor angina, ROS otherwise -    DATE OF SERVICE - 08-29-21     Review of Systems:   Constitutional: [ ] fevers, [ ] chills.   Skin: [ ] dry skin. [ ] rashes.  Psychiatric: [ ] depression, [ ] anxiety.   Gastrointestinal: [ ] BRBPR, [ ] melena.   Neurological: [ ] confusion. [ ] seizures. [ ] shuffling gait.   Ears,Nose,Mouth and Throat: [ ] ear pain [ ] sore throat.   Eyes: [ ] diplopia.   Respiratory: [ ] hemoptysis. [ ] shortness of breath  Cardiovascular: See HPI above  Hematologic/Lymphatic: [ ] anemia. [ ] painful nodes. [ ] prolonged bleeding.   Genitourinary: [ ] hematuria. [ ] flank pain.   Endocrine: [ ] significant change in weight. [ ] intolerance to heat and cold.     Review of systems [ x] otherwise negative, [ ] otherwise unable to obtain    FH: no family history of sudden cardiac death in first degree relatives    SH: [ ] tobacco, [ ] alcohol, [ ] drugs    aMIOdarone    Tablet   Oral   aMIOdarone    Tablet 200 milliGRAM(s) Oral daily  aspirin enteric coated 81 milliGRAM(s) Oral daily  atorvastatin 40 milliGRAM(s) Oral at bedtime  bisacodyl Suppository 10 milliGRAM(s) Rectal every 24 hours  epoetin lizbeth-epbx (RETACRIT) Injectable 58814 Unit(s) SubCutaneous once  finasteride 5 milliGRAM(s) Oral daily  glucagon  Injectable 1 milliGRAM(s) IntraMuscular once  insulin lispro (ADMELOG) corrective regimen sliding scale   SubCutaneous three times a day before meals  insulin lispro (ADMELOG) corrective regimen sliding scale   SubCutaneous at bedtime  pantoprazole    Tablet 40 milliGRAM(s) Oral two times a day  polyethylene glycol 3350 17 Gram(s) Oral two times a day  senna 2 Tablet(s) Oral at bedtime  sodium bicarbonate 650 milliGRAM(s) Oral two times a day  tamsulosin 0.4 milliGRAM(s) Oral at bedtime                            8.7    6.02  )-----------( 196      ( 29 Aug 2021 08:14 )             27.0       08-29    136  |  107  |  30<H>  ----------------------------<  134<H>  4.6   |  20<L>  |  1.61<H>    Ca    8.5      29 Aug 2021 08:14  Phos  2.8     08-29  Mg     2.20     08-29    TPro  5.5<L>  /  Alb  3.2<L>  /  TBili  1.0  /  DBili  x   /  AST  22  /  ALT  12  /  AlkPhos  76  08-29    T(C): 36.6 (08-29-21 @ 13:24), Max: 36.7 (08-29-21 @ 01:00)  HR: 68 (08-29-21 @ 13:24) (68 - 79)  BP: 108/62 (08-29-21 @ 13:24) (108/62 - 131/78)  RR: 17 (08-29-21 @ 13:24) (17 - 19)  SpO2: 99% (08-29-21 @ 13:24) (97% - 100%)    General: Well nourished in no acute distress. Alert and Oriented * 3.   Head: Normocephalic and atraumatic.   Neck: No JVD. No bruits. Supple. Does not appear to be enlarged.   Cardiovascular: + S1,S2 ; RRR Soft systolic murmur at the left lower sternal border. No rubs noted.    Lungs: CTA b/l. No rhonchi, rales or wheezes.   Abdomen: + BS, soft. Non tender. Non distended. No rebound. No guarding.   Extremities: No clubbing/cyanosis/edema.   Neurologic: Moves all four extremities. Full range of motion.   Skin: Warm and moist. The patient's skin has normal elasticity and good skin turgor.   Psychiatric: Appropriate mood and affect.  Musculoskeletal: Normal range of motion, normal strength      A/P) He is an extremely pleasant 89 year old male with a past medical history of persistent atrial fibrillation and complete heart block. In June 2014 I implanted a Biotronik biventricular pacemaker. Device interrogations in the office demonstrate excellent pacemaker function. He denies palpitations, syncope nor angina. His other medical history includes BPH, stroke, HTN, hyperlipidemia and CAD s/p PCI in 2015. He is now admitted with sustained monomorphic VT requiring emergency rescue DCCV    -continue asa, no need for plavix  -recommend heparin drip given AF with prior stroke however on hold due to LUE hematoma--improving  -GI eval noted  -Cardio f/u noted, medical management of CAD for now  -will eventually transfer to [facility] for an upgrade to ICD therapy  -depending on his preop venogram I may or may not refer to Dr Jaimes for RV pacing lead removal

## 2021-08-29 NOTE — PROGRESS NOTE ADULT - ASSESSMENT
89 y.o Turkish  male with a PMhx of HTN, HLD, DM, CAD s/p stent to 80% D1 in 2014 ,dHF EF 60% in 2018,Afib on eliquis c/b tachy/perla with BiV PPM( Biotronik) CKD, CVA, Velasquez's esophagus  presents with chest burning, dizziness/ lightheadedness, nausea, weakness with unsteady gait found in ventricular tachycardia and hypotension which did not improved with amiodarone 150mg IVP x1 received shocked x1 by external defibrillator with improve in Blood Pressure and mental status. Started on amiodarone drip.  EKG showed He was admitted to CCU. Labs remarkable for FLOYD on CKD and elevated lactate  post ventricular tachycardia and shock. EKG showed STD in inferior lateral leads.    Anemia:    Hb stable  GI f/up noted    VT:  Cardio/EP f/up noted  IV Heparin on hold due to LUE hematoma  Amio  Plan for upgrade to AICD

## 2021-08-29 NOTE — PROGRESS NOTE ADULT - SUBJECTIVE AND OBJECTIVE BOX
INTERVAL HPI/OVERNIGHT EVENTS:  No new overnight event.  No N/V/D.  Tolerating diet.  no melena    Allergies    No Known Allergies    Intolerances    General:  No wt loss, fevers, chills, night sweats, fatigue,   Eyes:  Good vision, no reported pain  ENT:  No sore throat, pain, runny nose, dysphagia  CV:  No pain, palpitations, hypo/hypertension  Resp:  No dyspnea, cough, tachypnea, wheezing  GI:  No pain, No nausea, No vomiting, No diarrhea, No constipation, No weight loss, No fever, No pruritis, No rectal bleeding, No tarry stools, No dysphagia,  :  No pain, bleeding, incontinence, nocturia  Muscle:  No pain, weakness  Neuro:  No weakness, tingling, memory problems  Psych:  No fatigue, insomnia, mood problems, depression  Endocrine:  No polyuria, polydipsia, cold/heat intolerance  Heme:  No petechiae, ecchymosis, easy bruisability  Skin:  No rash, tattoos, scars, edema      PHYSICAL EXAM:   Vital Signs:  Vital Signs Last 24 Hrs  T(C): 36.6 (29 Aug 2021 13:24), Max: 36.7 (29 Aug 2021 01:00)  T(F): 97.9 (29 Aug 2021 13:24), Max: 98 (29 Aug 2021 01:00)  HR: 68 (29 Aug 2021 13:24) (68 - 79)  BP: 108/62 (29 Aug 2021 13:24) (108/62 - 131/78)  BP(mean): --  RR: 17 (29 Aug 2021 13:24) (17 - 19)  SpO2: 99% (29 Aug 2021 13:24) (97% - 100%)  Daily     Daily I&O's Summary    28 Aug 2021 07:01  -  29 Aug 2021 07:00  --------------------------------------------------------  IN: 0 mL / OUT: 500 mL / NET: -500 mL        GENERAL:  Appears stated age, well-groomed, well-nourished, no distress  HEENT:  NC/AT,  conjunctivae clear and pink, no thyromegaly, nodules, adenopathy, no JVD, sclera -anicteric  CHEST:  Full & symmetric excursion, no increased effort, breath sounds clear  HEART:  Regular rhythm, S1, S2, no murmur/rub/S3/S4, no abdominal bruit, no edema  ABDOMEN:  Soft, non-tender, non-distended, normoactive bowel sounds,  no masses ,no hepato-splenomegaly, no signs of chronic liver disease  EXTEREMITIES:  no cyanosis,clubbing or edema  SKIN:  No rash/erythema/ecchymoses/petechiae/wounds/abscess/warm/dry  NEURO:  Alert, oriented, no asterixis, no tremor, no encephalopathy      LABS:                        8.7    6.02  )-----------( 196      ( 29 Aug 2021 08:14 )             27.0     08-29    136  |  107  |  30<H>  ----------------------------<  134<H>  4.6   |  20<L>  |  1.61<H>    Ca    8.5      29 Aug 2021 08:14  Phos  2.8     08-29  Mg     2.20     08-29    TPro  5.5<L>  /  Alb  3.2<L>  /  TBili  1.0  /  DBili  x   /  AST  22  /  ALT  12  /  AlkPhos  76  08-29        amylase   lipase  RADIOLOGY & ADDITIONAL TESTS:

## 2021-08-29 NOTE — PROGRESS NOTE ADULT - SUBJECTIVE AND OBJECTIVE BOX
Patient is a 89y old  Male who presents with a chief complaint of HIGH K (21 Aug 2021 13:50)      SUBJECTIVE / OVERNIGHT EVENTS:    Events noted.  CONSTITUTIONAL: No fever,  or fatigue  RESPIRATORY: No cough, wheezing,  No shortness of breath  CARDIOVASCULAR: No chest pain, palpitations,   GASTROINTESTINAL: No abdominal or epigastric pain.   NEUROLOGICAL: No headaches,     MEDICATIONS  (STANDING):  aMIOdarone    Tablet   Oral   aMIOdarone    Tablet 200 milliGRAM(s) Oral daily  aspirin enteric coated 81 milliGRAM(s) Oral daily  atorvastatin 40 milliGRAM(s) Oral at bedtime  bisacodyl Suppository 10 milliGRAM(s) Rectal every 24 hours  epoetin lizbeth-epbx (RETACRIT) Injectable 20228 Unit(s) SubCutaneous once  finasteride 5 milliGRAM(s) Oral daily  glucagon  Injectable 1 milliGRAM(s) IntraMuscular once  insulin lispro (ADMELOG) corrective regimen sliding scale   SubCutaneous three times a day before meals  insulin lispro (ADMELOG) corrective regimen sliding scale   SubCutaneous at bedtime  pantoprazole    Tablet 40 milliGRAM(s) Oral two times a day  polyethylene glycol 3350 17 Gram(s) Oral two times a day  senna 2 Tablet(s) Oral at bedtime  sodium bicarbonate 650 milliGRAM(s) Oral two times a day  tamsulosin 0.4 milliGRAM(s) Oral at bedtime    MEDICATIONS  (PRN):        CAPILLARY BLOOD GLUCOSE      POCT Blood Glucose.: 137 mg/dL (29 Aug 2021 22:23)  POCT Blood Glucose.: 121 mg/dL (29 Aug 2021 17:02)  POCT Blood Glucose.: 188 mg/dL (29 Aug 2021 11:18)  POCT Blood Glucose.: 124 mg/dL (29 Aug 2021 08:06)  POCT Blood Glucose.: 159 mg/dL (29 Aug 2021 07:59)    I&O's Summary    28 Aug 2021 07:01  -  29 Aug 2021 07:00  --------------------------------------------------------  IN: 0 mL / OUT: 500 mL / NET: -500 mL        T(C): 36.7 (08-29-21 @ 17:44), Max: 36.7 (08-29-21 @ 01:00)  HR: 75 (08-29-21 @ 17:44) (68 - 79)  BP: 127/57 (08-29-21 @ 17:44) (108/62 - 127/57)  RR: 18 (08-29-21 @ 17:44) (17 - 19)  SpO2: 100% (08-29-21 @ 17:44) (97% - 100%)    PHYSICAL EXAM:    NECK: Supple, No JVD  CHEST/LUNG: Clear to auscultation bilaterally; No wheezing.  HEART: Regular rate and rhythm; No murmurs, rubs, or gallops  ABDOMEN: Soft, Nontender, Nondistended; Bowel sounds present  EXTREMITIES:   No edema  NEUROLOGY: AAO       LABS:                        8.7    6.02  )-----------( 196      ( 29 Aug 2021 08:14 )             27.0     08-29    136  |  107  |  30<H>  ----------------------------<  134<H>  4.6   |  20<L>  |  1.61<H>    Ca    8.5      29 Aug 2021 08:14  Phos  2.8     08-29  Mg     2.20     08-29    TPro  5.5<L>  /  Alb  3.2<L>  /  TBili  1.0  /  DBili  x   /  AST  22  /  ALT  12  /  AlkPhos  76  08-29            CAPILLARY BLOOD GLUCOSE      POCT Blood Glucose.: 137 mg/dL (29 Aug 2021 22:23)  POCT Blood Glucose.: 121 mg/dL (29 Aug 2021 17:02)  POCT Blood Glucose.: 188 mg/dL (29 Aug 2021 11:18)  POCT Blood Glucose.: 124 mg/dL (29 Aug 2021 08:06)  POCT Blood Glucose.: 159 mg/dL (29 Aug 2021 07:59)        RADIOLOGY & ADDITIONAL TESTS:    Imaging Personally Reviewed:    Consultant(s) Notes Reviewed:      Care Discussed with Consultants/Other Providers:    Dileep Amador MD, CMD, FACP    [address] [address]  Office Tel: [phone]  Cell: [phone]

## 2021-08-30 NOTE — PROGRESS NOTE ADULT - ASSESSMENT
89 y.o [place]  male with a PMhx of HTN, HLD, DM, CAD s/p stent to 80% D1 in 2014 ,dHF EF 60% in 2018,Afib on eliquis c/b tachy/perla with BiV PPM( Biotronik) CKD, CVA, Velasquez's esophagus  presents with chest burning, dizziness/ lightheadedness, nausea, weakness with unsteady gait found in ventricular tachycardia and hypotension which did not improved with amiodarone 150mg IVP x1 received shocked x1 by external defibrillator with improve in Blood Pressure and mental status. Started on amiodarone drip.  EKG showed He was admitted to CCU. Labs remarkable for FLOYD on CKD and elevated lactate  post ventricular tachycardia and shock. EKG showed STD in inferior lateral leads.    Anemia:    Hb stable  GI f/up noted    VT:  Cardio/EP f/up noted  IV Heparin on hold due to LUE hematoma  Amio  Plan for upgrade to AICD - Tx to NS

## 2021-08-30 NOTE — PROGRESS NOTE ADULT - SUBJECTIVE AND OBJECTIVE BOX
Patient is a 89y old  Male who presents with a chief complaint of HIGH K (21 Aug 2021 13:50)      SUBJECTIVE / OVERNIGHT EVENTS:    Events noted.  CONSTITUTIONAL: No fever,  or fatigue  RESPIRATORY: No cough, wheezing,  No shortness of breath  CARDIOVASCULAR: No chest pain, palpitations,   GASTROINTESTINAL: No abdominal or epigastric pain.   NEUROLOGICAL: No headaches,     MEDICATIONS  (STANDING):  aMIOdarone    Tablet   Oral   aMIOdarone    Tablet 200 milliGRAM(s) Oral daily  aspirin enteric coated 81 milliGRAM(s) Oral daily  atorvastatin 40 milliGRAM(s) Oral at bedtime  bisacodyl Suppository 10 milliGRAM(s) Rectal every 24 hours  epoetin lizbeth-epbx (RETACRIT) Injectable 21423 Unit(s) SubCutaneous once  finasteride 5 milliGRAM(s) Oral daily  glucagon  Injectable 1 milliGRAM(s) IntraMuscular once  insulin lispro (ADMELOG) corrective regimen sliding scale   SubCutaneous three times a day before meals  insulin lispro (ADMELOG) corrective regimen sliding scale   SubCutaneous at bedtime  pantoprazole    Tablet 40 milliGRAM(s) Oral two times a day  polyethylene glycol 3350 17 Gram(s) Oral two times a day  senna 2 Tablet(s) Oral at bedtime  sodium bicarbonate 650 milliGRAM(s) Oral two times a day  tamsulosin 0.4 milliGRAM(s) Oral at bedtime    MEDICATIONS  (PRN):        CAPILLARY BLOOD GLUCOSE      POCT Blood Glucose.: 137 mg/dL (29 Aug 2021 22:23)  POCT Blood Glucose.: 121 mg/dL (29 Aug 2021 17:02)  POCT Blood Glucose.: 188 mg/dL (29 Aug 2021 11:18)  POCT Blood Glucose.: 124 mg/dL (29 Aug 2021 08:06)  POCT Blood Glucose.: 159 mg/dL (29 Aug 2021 07:59)    I&O's Summary    28 Aug 2021 07:01  -  29 Aug 2021 07:00  --------------------------------------------------------  IN: 0 mL / OUT: 500 mL / NET: -500 mL        T(C): 36.7 (08-29-21 @ 17:44), Max: 36.7 (08-29-21 @ 01:00)  HR: 75 (08-29-21 @ 17:44) (68 - 79)  BP: 127/57 (08-29-21 @ 17:44) (108/62 - 127/57)  RR: 18 (08-29-21 @ 17:44) (17 - 19)  SpO2: 100% (08-29-21 @ 17:44) (97% - 100%)    PHYSICAL EXAM:    NECK: Supple, No JVD  CHEST/LUNG: Clear to auscultation bilaterally; No wheezing.  HEART: Regular rate and rhythm; No murmurs, rubs, or gallops  ABDOMEN: Soft, Nontender, Nondistended; Bowel sounds present  EXTREMITIES:   No edema  NEUROLOGY: AAO       LABS:                        8.7    6.02  )-----------( 196      ( 29 Aug 2021 08:14 )             27.0     08-29    136  |  107  |  30<H>  ----------------------------<  134<H>  4.6   |  20<L>  |  1.61<H>    Ca    8.5      29 Aug 2021 08:14  Phos  2.8     08-29  Mg     2.20     08-29    TPro  5.5<L>  /  Alb  3.2<L>  /  TBili  1.0  /  DBili  x   /  AST  22  /  ALT  12  /  AlkPhos  76  08-29            CAPILLARY BLOOD GLUCOSE      POCT Blood Glucose.: 137 mg/dL (29 Aug 2021 22:23)  POCT Blood Glucose.: 121 mg/dL (29 Aug 2021 17:02)  POCT Blood Glucose.: 188 mg/dL (29 Aug 2021 11:18)  POCT Blood Glucose.: 124 mg/dL (29 Aug 2021 08:06)  POCT Blood Glucose.: 159 mg/dL (29 Aug 2021 07:59)        RADIOLOGY & ADDITIONAL TESTS:    Imaging Personally Reviewed:    Consultant(s) Notes Reviewed:      Care Discussed with Consultants/Other Providers:    Dileep Amador MD, CMD, FACP    [address] [id number]  Office Tel: [phone]  Cell: [phone]

## 2021-08-30 NOTE — PROGRESS NOTE ADULT - ASSESSMENT
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

## 2021-08-30 NOTE — PROGRESS NOTE ADULT - ASSESSMENT
89 year old male with multiple cardiac issues now with VT and FLOYD    Anemia   likely AOCKD; also w/LUE Hematoma  Heme input appreciated; work up in progress   CBC Appears stable;  Keep hemoglobin > 8   Without overt GI bleeding  No GI objection to anticoagulation/antiplatelet therapy  Keep on PPI QD while on a/c  High Fiber diet    CAD   Given no overt bleeding and relatively stable hemoglobin can proceed w/cardiac w/u  Would suggest PPI QD while on A/C; No GI objection as above      Advanced care planning was discussed with patient.  Advanced care planning forms were reviewed and discussed.  Risks, benefits and alternatives of gastroenterologic procedures were discussed in detail and all questions were answered.  30 minutes spent. _________________________________________________________________________________________________  [facility]  [address] [place] [address]  Office: [phone]    Luis Enrique Lau PA-C    ___________________________________________________________________________________________________

## 2021-08-30 NOTE — MEDICAL STUDENT PROGRESS NOTE(EDUCATION) - SUBJECTIVE AND OBJECTIVE BOX
DATE OF SERVICE: 08/30/2021    S: no chest pain or sob; LUE swelling improving; ros otherwise negative.       aMIOdarone    Tablet   Oral   aMIOdarone    Tablet 200 milliGRAM(s) Oral daily  aspirin enteric coated 81 milliGRAM(s) Oral daily  atorvastatin 40 milliGRAM(s) Oral at bedtime  bisacodyl Suppository 10 milliGRAM(s) Rectal every 24 hours  epoetin lizbeth-epbx (RETACRIT) Injectable 51526 Unit(s) SubCutaneous once  finasteride 5 milliGRAM(s) Oral daily  glucagon  Injectable 1 milliGRAM(s) IntraMuscular once  insulin lispro (ADMELOG) corrective regimen sliding scale   SubCutaneous three times a day before meals  insulin lispro (ADMELOG) corrective regimen sliding scale   SubCutaneous at bedtime  pantoprazole    Tablet 40 milliGRAM(s) Oral two times a day  polyethylene glycol 3350 17 Gram(s) Oral two times a day  senna 2 Tablet(s) Oral at bedtime  sodium bicarbonate 650 milliGRAM(s) Oral two times a day  tamsulosin 0.4 milliGRAM(s) Oral at bedtime                            8.5    4.95  )-----------( 194      ( 30 Aug 2021 06:27 )             26.2       08-30    138  |  109<H>  |  28<H>  ----------------------------<  97  4.2   |  19<L>  |  1.58<H>    Ca    8.6      30 Aug 2021 06:27  Phos  3.0     08-30  Mg     2.10     08-30    TPro  5.2<L>  /  Alb  2.8<L>  /  TBili  1.1  /  DBili  x   /  AST  18  /  ALT  14  /  AlkPhos  62  08-30            T(C): 36.7 (08-30-21 @ 09:27), Max: 36.7 (08-29-21 @ 17:44)  HR: 70 (08-30-21 @ 09:27) (70 - 75)  BP: 121/61 (08-30-21 @ 09:27) (121/61 - 154/99)  RR: 15 (08-30-21 @ 09:27) (15 - 19)  SpO2: 98% (08-30-21 @ 09:27) (98% - 100%)  Wt(kg): --    I&O's Summary    29 Aug 2021 07:01  -  30 Aug 2021 07:00  --------------------------------------------------------  IN: 0 mL / OUT: 400 mL / NET: -400 mL          General: Well nourished in no acute distress. Alert and Oriented * 3.   Head: Normocephalic and atraumatic.   Neck: No JVD. No bruits. Supple. Does not appear to be enlarged.   Cardiovascular: + S1,S2 ; RRR Soft systolic murmur at the left lower sternal border. No rubs noted.    Lungs: CTA b/l. No rhonchi, rales or wheezes.   Abdomen: + BS, soft. Non tender. Non distended. No rebound. No guarding.   Extremities: L radial pulse 2+, LUE hematoma  Neurologic: Moves all four extremities. Full range of motion.   Skin: Warm and moist. The patient's skin has normal elasticity and good skin turgor.   Psychiatric: Appropriate mood and affect.  Musculoskeletal: Normal range of motion, normal strength    Tele:     TTE: < from: Transthoracic Echocardiogram (08.14.21 @ 14:06) >  Normal left ventricular systolic function. No segmental  wall motion abnormalities.  Mild-moderate pulmonary hypertension.  A device wire is noted in the right heart.    < end of copied text >    < from: VA Duplex Ext Veins Upper Comp, Left. (08.17.21 @ 12:43) >  Summary/Impressions:  No evidence of deep vein thrombosis in the left upper  extremity.    < end of copied text >      < from: CT Upper Extremity No Cont, Left (08.19.21 @ 09:25) >  IMPRESSION:    1.  Mild enlargement of the triceps muscle suspicious for intramuscular hematoma.  2.  Amorphous appearance of the flexor musculature the forearm is nonspecific but can be seen with myositis or intramuscular hematoma.  3.  Moderate soft tissue swelling about the upper extremity, nonspecific but can be seen with cellulitis in the proper clinical setting.  4.  Nodular opacities within the left upper lobe. Small pleural effusion and atelectasis. Dedicated CT the chest can be performed for more detailed evaluation.    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     -continue with amiodarone for VT per EP - no further episodes of VT thus far on tele   -keep K > 4, Mg > 2  -TTE with normal LV function   -At this time, the family  decline cath and would like medical management of known cad.   -Given no current anginal symptoms, monomorphic VT that is unlikely ischemic driven, and patient's GOC/preferences, will hold off on cath for now and medically manage CAD.   -Pt. with worsening LUE hematoma and pain - hep gtt on hold - follow up vascular - LUE swelling and pain improving   -Plans for device upgrade per EP - follow up EP      Estelle Ayala MD

## 2021-08-30 NOTE — PROGRESS NOTE ADULT - PROVIDER SPECIALTY LIST ADULT
CCU
Cardiology
Electrophysiology
Heme/Onc
Heme/Onc
Internal Medicine
Nephrology
Vascular Surgery
CCU
CCU
Cardiology
Electrophysiology
Gastroenterology
Heme/Onc
Internal Medicine
Nephrology
Vascular Surgery
Cardiology
Cardiology
Gastroenterology
Heme/Onc
Internal Medicine
Nephrology

## 2021-08-30 NOTE — PROGRESS NOTE ADULT - SUBJECTIVE AND OBJECTIVE BOX
DATE OF SERVICE: 08/30/2021    S: no chest pain or sob; LUE swelling improving; ros otherwise negative.       aMIOdarone    Tablet   Oral   aMIOdarone    Tablet 200 milliGRAM(s) Oral daily  aspirin enteric coated 81 milliGRAM(s) Oral daily  atorvastatin 40 milliGRAM(s) Oral at bedtime  bisacodyl Suppository 10 milliGRAM(s) Rectal every 24 hours  epoetin lizbeth-epbx (RETACRIT) Injectable 04288 Unit(s) SubCutaneous once  finasteride 5 milliGRAM(s) Oral daily  glucagon  Injectable 1 milliGRAM(s) IntraMuscular once  insulin lispro (ADMELOG) corrective regimen sliding scale   SubCutaneous three times a day before meals  insulin lispro (ADMELOG) corrective regimen sliding scale   SubCutaneous at bedtime  pantoprazole    Tablet 40 milliGRAM(s) Oral two times a day  polyethylene glycol 3350 17 Gram(s) Oral two times a day  senna 2 Tablet(s) Oral at bedtime  sodium bicarbonate 650 milliGRAM(s) Oral two times a day  tamsulosin 0.4 milliGRAM(s) Oral at bedtime                            8.5    4.95  )-----------( 194      ( 30 Aug 2021 06:27 )             26.2       08-30    138  |  109<H>  |  28<H>  ----------------------------<  97  4.2   |  19<L>  |  1.58<H>    Ca    8.6      30 Aug 2021 06:27  Phos  3.0     08-30  Mg     2.10     08-30    TPro  5.2<L>  /  Alb  2.8<L>  /  TBili  1.1  /  DBili  x   /  AST  18  /  ALT  14  /  AlkPhos  62  08-30            T(C): 36.7 (08-30-21 @ 09:27), Max: 36.7 (08-29-21 @ 17:44)  HR: 70 (08-30-21 @ 09:27) (70 - 75)  BP: 121/61 (08-30-21 @ 09:27) (121/61 - 154/99)  RR: 15 (08-30-21 @ 09:27) (15 - 19)  SpO2: 98% (08-30-21 @ 09:27) (98% - 100%)  Wt(kg): --    I&O's Summary    29 Aug 2021 07:01  -  30 Aug 2021 07:00  --------------------------------------------------------  IN: 0 mL / OUT: 400 mL / NET: -400 mL          General: Well nourished in no acute distress. Alert and Oriented * 3.   Head: Normocephalic and atraumatic.   Neck: No JVD. No bruits. Supple. Does not appear to be enlarged.   Cardiovascular: + S1,S2 ; RRR Soft systolic murmur at the left lower sternal border. No rubs noted.    Lungs: CTA b/l. No rhonchi, rales or wheezes.   Abdomen: + BS, soft. Non tender. Non distended. No rebound. No guarding.   Extremities: L radial pulse 2+, LUE hematoma  Neurologic: Moves all four extremities. Full range of motion.   Skin: Warm and moist. The patient's skin has normal elasticity and good skin turgor.   Psychiatric: Appropriate mood and affect.  Musculoskeletal: Normal range of motion, normal strength    Tele:     TTE: < from: Transthoracic Echocardiogram (08.14.21 @ 14:06) >  Normal left ventricular systolic function. No segmental  wall motion abnormalities.  Mild-moderate pulmonary hypertension.  A device wire is noted in the right heart.    < end of copied text >    < from: VA Duplex Ext Veins Upper Comp, Left. (08.17.21 @ 12:43) >  Summary/Impressions:  No evidence of deep vein thrombosis in the left upper  extremity.    < end of copied text >      < from: CT Upper Extremity No Cont, Left (08.19.21 @ 09:25) >  IMPRESSION:    1.  Mild enlargement of the triceps muscle suspicious for intramuscular hematoma.  2.  Amorphous appearance of the flexor musculature the forearm is nonspecific but can be seen with myositis or intramuscular hematoma.  3.  Moderate soft tissue swelling about the upper extremity, nonspecific but can be seen with cellulitis in the proper clinical setting.  4.  Nodular opacities within the left upper lobe. Small pleural effusion and atelectasis. Dedicated CT the chest can be performed for more detailed evaluation.    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     -continue with amiodarone for VT per EP - no further episodes of VT thus far on tele   -keep K > 4, Mg > 2  -TTE with normal LV function   -At this time, the family  decline cath and would like medical management of known cad.   -Given no current anginal symptoms, monomorphic VT that is unlikely ischemic driven, and patient's GOC/preferences, will hold off on cath for now and medically manage CAD.   -Pt. with worsening LUE hematoma and pain - hep gtt on hold - follow up vascular - LUE swelling and pain improving   -Plans for device upgrade per EP - follow up EP      Estelle Ayala MD

## 2021-08-30 NOTE — PROGRESS NOTE ADULT - SUBJECTIVE AND OBJECTIVE BOX
[facility] - S López / Aroldo S /D Jose/ S Amina/ S Luis/ Adrian Vega / LUBA Herman/ O Alejandra  service -[phone], office [phone]  ---------------------------------------------------------------------------------------------------------------    Patient seen and examined bedside    Subjective and Objective: No overnight events, sob resolved. No complaints today. feeling better    Allergies: No Known Allergies      Hospital Medications:   MEDICATIONS  (STANDING):  aMIOdarone    Tablet   Oral   aMIOdarone    Tablet 200 milliGRAM(s) Oral daily  aspirin enteric coated 81 milliGRAM(s) Oral daily  atorvastatin 40 milliGRAM(s) Oral at bedtime  bisacodyl Suppository 10 milliGRAM(s) Rectal every 24 hours  epoetin lizbeth-epbx (RETACRIT) Injectable 35664 Unit(s) SubCutaneous once  finasteride 5 milliGRAM(s) Oral daily  glucagon  Injectable 1 milliGRAM(s) IntraMuscular once  insulin lispro (ADMELOG) corrective regimen sliding scale   SubCutaneous three times a day before meals  insulin lispro (ADMELOG) corrective regimen sliding scale   SubCutaneous at bedtime  pantoprazole    Tablet 40 milliGRAM(s) Oral two times a day  polyethylene glycol 3350 17 Gram(s) Oral two times a day  senna 2 Tablet(s) Oral at bedtime  sodium bicarbonate 650 milliGRAM(s) Oral two times a day  tamsulosin 0.4 milliGRAM(s) Oral at bedtime    VITALS:  T(F): 97.8 (08-30-21 @ 12:01), Max: 98 (08-29-21 @ 17:44)  HR: 75 (08-30-21 @ 12:01)  BP: 104/54 (08-30-21 @ 12:01)  RR: 15 (08-30-21 @ 12:01)  SpO2: 100% (08-30-21 @ 12:01)  Wt(kg): --    08-29 @ 07:01  -  08-30 @ 07:00  --------------------------------------------------------  IN: 0 mL / OUT: 400 mL / NET: -400 mL    08-30 @ 07:01  -  08-30 @ 17:27  --------------------------------------------------------  IN: 0 mL / OUT: 300 mL / NET: -300 mL      PHYSICAL EXAM:  Constitutional: NAD  HEENT: anicteric sclera, oropharynx clear  Neck: No JVD  Respiratory: CTAB, no wheezes, rales or rhonchi  Cardiovascular: S1, S2, RRR  Gastrointestinal: BS+, soft, NT/ND  Extremities: No cyanosis or clubbing. No peripheral edema  Neurological: A/O x 3, no focal deficits  Psychiatric: Normal mood, normal affect  : No CVA tenderness. No river.   Skin: No rashes  Vascular Access:    LABS:  08-30    138  |  109<H>  |  28<H>  ----------------------------<  97  4.2   |  19<L>  |  1.58<H>    Ca    8.6      30 Aug 2021 06:27  Phos  3.0     08-30  Mg     2.10     08-30    TPro  5.2<L>  /  Alb  2.8<L>  /  TBili  1.1  /  DBili      /  AST  18  /  ALT  14  /  AlkPhos  62  08-30    Creatinine Trend: 1.58 <--, 1.61 <--, 1.61 <--, 1.62 <--, 1.61 <--, 1.72 <--, 1.63 <--                        8.5    4.95  )-----------( 194      ( 30 Aug 2021 06:27 )             26.2     Urine Studies:        RADIOLOGY & ADDITIONAL STUDIES:   [facility] - S López / Aroldo S /D Jose/ JUAN DAVID Huynh/ JUAN DAVID Smith/ Adrian Vega / LUBA Alvau/ O Alejandra  service -[phone], office [phone]  ---------------------------------------------------------------------------------------------------------------    Patient seen and examined bedside    Subjective and Objective: No overnight events, denied cp/sob. No complaints today. feeling better    Allergies: No Known Allergies      Hospital Medications:   MEDICATIONS  (STANDING):  aMIOdarone    Tablet   Oral   aMIOdarone    Tablet 200 milliGRAM(s) Oral daily  aspirin enteric coated 81 milliGRAM(s) Oral daily  atorvastatin 40 milliGRAM(s) Oral at bedtime  bisacodyl Suppository 10 milliGRAM(s) Rectal every 24 hours  epoetin lizbeth-epbx (RETACRIT) Injectable 35244 Unit(s) SubCutaneous once  finasteride 5 milliGRAM(s) Oral daily  glucagon  Injectable 1 milliGRAM(s) IntraMuscular once  insulin lispro (ADMELOG) corrective regimen sliding scale   SubCutaneous three times a day before meals  insulin lispro (ADMELOG) corrective regimen sliding scale   SubCutaneous at bedtime  pantoprazole    Tablet 40 milliGRAM(s) Oral two times a day  polyethylene glycol 3350 17 Gram(s) Oral two times a day  senna 2 Tablet(s) Oral at bedtime  sodium bicarbonate 650 milliGRAM(s) Oral two times a day  tamsulosin 0.4 milliGRAM(s) Oral at bedtime    VITALS:  T(F): 97.8 (08-30-21 @ 12:01), Max: 98 (08-29-21 @ 17:44)  HR: 75 (08-30-21 @ 12:01)  BP: 104/54 (08-30-21 @ 12:01)  RR: 15 (08-30-21 @ 12:01)  SpO2: 100% (08-30-21 @ 12:01)  Wt(kg): --    08-29 @ 07:01  -  08-30 @ 07:00  --------------------------------------------------------  IN: 0 mL / OUT: 400 mL / NET: -400 mL    08-30 @ 07:01  -  08-30 @ 17:27  --------------------------------------------------------  IN: 0 mL / OUT: 300 mL / NET: -300 mL      PHYSICAL EXAM:  Constitutional: NAD  HEENT: anicteric sclera  Neck: No JVD  Respiratory: CTAB, no wheezes, rales or rhonchi  Cardiovascular: S1, S2, RRR  Gastrointestinal: BS+, soft, NT/ND  Extremities: No peripheral edema  Neurological: A/O x 3, no focal deficits  Psychiatric: Normal mood, normal affect  : No river.       LABS:  08-30    138  |  109<H>  |  28<H>  ----------------------------<  97  4.2   |  19<L>  |  1.58<H>    Ca    8.6      30 Aug 2021 06:27  Phos  3.0     08-30  Mg     2.10     08-30    TPro  5.2<L>  /  Alb  2.8<L>  /  TBili  1.1  /  DBili      /  AST  18  /  ALT  14  /  AlkPhos  62  08-30    Creatinine Trend: 1.58 <--, 1.61 <--, 1.61 <--, 1.62 <--, 1.61 <--, 1.72 <--, 1.63 <--                        8.5    4.95  )-----------( 194      ( 30 Aug 2021 06:27 )             26.2     Urine Studies:        RADIOLOGY & ADDITIONAL STUDIES:

## 2021-08-30 NOTE — PROGRESS NOTE ADULT - SUBJECTIVE AND OBJECTIVE BOX
Patient seen and examined at bedside. feels well.     MEDICATIONS  (STANDING):  aMIOdarone    Tablet   Oral   aMIOdarone    Tablet 200 milliGRAM(s) Oral daily  aspirin enteric coated 81 milliGRAM(s) Oral daily  atorvastatin 40 milliGRAM(s) Oral at bedtime  bisacodyl Suppository 10 milliGRAM(s) Rectal every 24 hours  epoetin lizbeth-epbx (RETACRIT) Injectable 06702 Unit(s) SubCutaneous once  finasteride 5 milliGRAM(s) Oral daily  glucagon  Injectable 1 milliGRAM(s) IntraMuscular once  insulin lispro (ADMELOG) corrective regimen sliding scale   SubCutaneous three times a day before meals  insulin lispro (ADMELOG) corrective regimen sliding scale   SubCutaneous at bedtime  pantoprazole    Tablet 40 milliGRAM(s) Oral two times a day  polyethylene glycol 3350 17 Gram(s) Oral two times a day  senna 2 Tablet(s) Oral at bedtime  sodium bicarbonate 650 milliGRAM(s) Oral two times a day  tamsulosin 0.4 milliGRAM(s) Oral at bedtime    MEDICATIONS  (PRN):        Vital Signs Last 24 Hrs  T(C): 36 (30 Aug 2021 05:30), Max: 36.7 (29 Aug 2021 17:44)  T(F): 96.8 (30 Aug 2021 05:30), Max: 98 (29 Aug 2021 17:44)  HR: 74 (30 Aug 2021 05:30) (68 - 75)  BP: 154/99 (30 Aug 2021 08:14) (108/62 - 154/99)  BP(mean): --  RR: 18 (30 Aug 2021 05:30) (17 - 19)  SpO2: 99% (30 Aug 2021 05:30) (99% - 100%)      PHYSICAL EXAM:     GENERAL:  Appears stated age, well-groomed  HEENT:  NC/AT,   CHEST:  CTA b/l  HEART:  S1 s2+   ABDOMEN:  Soft, non-tender, non-distended  EXTEREMITIES:  left upper ext swelling   NEURO:  Alert, oriented, no asterixis                            8.5    4.95  )-----------( 194      ( 30 Aug 2021 06:27 )             26.2       08-30    138  |  109<H>  |  28<H>  ----------------------------<  97  4.2   |  19<L>  |  1.58<H>    Ca    8.6      30 Aug 2021 06:27  Phos  3.0     08-30  Mg     2.10     08-30    TPro  5.2<L>  /  Alb  2.8<L>  /  TBili  1.1  /  DBili  x   /  AST  18  /  ALT  14  /  AlkPhos  62  08-30

## 2021-08-30 NOTE — PROGRESS NOTE ADULT - SUBJECTIVE AND OBJECTIVE BOX
INTERVAL HPI/OVERNIGHT EVENTS:    brown stools  prefers suppositories over po bowel regimen     MEDICATIONS  (STANDING):  aMIOdarone    Tablet   Oral   aMIOdarone    Tablet 200 milliGRAM(s) Oral daily  aspirin enteric coated 81 milliGRAM(s) Oral daily  atorvastatin 40 milliGRAM(s) Oral at bedtime  bisacodyl Suppository 10 milliGRAM(s) Rectal every 24 hours  epoetin lizbeth-epbx (RETACRIT) Injectable 31636 Unit(s) SubCutaneous once  finasteride 5 milliGRAM(s) Oral daily  glucagon  Injectable 1 milliGRAM(s) IntraMuscular once  insulin lispro (ADMELOG) corrective regimen sliding scale   SubCutaneous three times a day before meals  insulin lispro (ADMELOG) corrective regimen sliding scale   SubCutaneous at bedtime  pantoprazole    Tablet 40 milliGRAM(s) Oral two times a day  polyethylene glycol 3350 17 Gram(s) Oral two times a day  senna 2 Tablet(s) Oral at bedtime  sodium bicarbonate 650 milliGRAM(s) Oral two times a day  tamsulosin 0.4 milliGRAM(s) Oral at bedtime    MEDICATIONS  (PRN):      Allergies    No Known Allergies    Intolerances        Review of Systems:    General:  No wt loss, fevers, chills, night sweats, fatigue   Eyes:  Good vision, no reported pain  ENT:  No sore throat, pain, runny nose, dysphagia  CV:  No pain, palpitations, hypo/hypertension  Resp:  No dyspnea, cough, tachypnea, wheezing  GI:  No pain, No nausea, No vomiting, No diarrhea, No constipation, No weight loss, No fever, No pruritis, No rectal bleeding, No melena, No dysphagia  :  No pain, bleeding, incontinence, nocturia  Muscle:  No pain, weakness  Neuro:  No weakness, tingling, memory problems  Psych:  No fatigue, insomnia, mood problems, depression  Endocrine:  No polyuria, polydypsia, cold/heat intolerance  Heme:  No petechiae, ecchymosis, easy bruisability  Skin:  No rash, tattoos, scars, edema      Vital Signs Last 24 Hrs  T(C): 36.7 (30 Aug 2021 09:27), Max: 36.7 (29 Aug 2021 17:44)  T(F): 98 (30 Aug 2021 09:27), Max: 98 (29 Aug 2021 17:44)  HR: 70 (30 Aug 2021 09:27) (68 - 75)  BP: 121/61 (30 Aug 2021 09:27) (108/62 - 154/99)  BP(mean): --  RR: 15 (30 Aug 2021 09:27) (15 - 19)  SpO2: 98% (30 Aug 2021 09:27) (98% - 100%)    PHYSICAL EXAM:    Constitutional: NAD  HEENT: EOMI, throat clear  Neck: No LAD, supple  Respiratory: CTA and P  Cardiovascular: S1 and S2, RRR, no M  Gastrointestinal: BS+, soft, NT/ND, neg HSM,  Extremities: No peripheral edema, neg clubbing, cyanosis  Vascular: 2+ peripheral pulses  Neurological: A/O x 3, no focal deficits  Psychiatric: Normal mood, normal affect  Skin: No rashes      LABS:                        8.5    4.95  )-----------( 194      ( 30 Aug 2021 06:27 )             26.2     08-30    138  |  109<H>  |  28<H>  ----------------------------<  97  4.2   |  19<L>  |  1.58<H>    Ca    8.6      30 Aug 2021 06:27  Phos  3.0     08-30  Mg     2.10     08-30    TPro  5.2<L>  /  Alb  2.8<L>  /  TBili  1.1  /  DBili  x   /  AST  18  /  ALT  14  /  AlkPhos  62  08-30          RADIOLOGY & ADDITIONAL TESTS:

## 2021-08-30 NOTE — PROGRESS NOTE ADULT - ASSESSMENT
89 y.o French  male with a PMhx of HTN, HLD, DM, CAD s/p stent to 80% D1 in 2014 , ICMrEF 40% in 2018,Afib on eliquis c/b tachy/perla with BiV PPM( Biotronik) CKD, CVA, Velasquez's esophagus  presents with chest burning, dizziness/ lightheadedness, nausea, weakness with unsteady gait found in ventricular tachycardia and hypotension which did not improved with amiodarone 150mg IVP x1 received shocked x1 by external defibrillator with improve in Blood Pressure and mental status. Started on amiodarone drip.  EKG showed He was admitted to CCU. Labs remarkable for FLOYD on CKD and elevated lactate  post ventricular tachycardia and shock. EKG showed STD in inferior lateral leads. Renal following for FLOYD/CKD Mx.    CKD3:   Recent admission for FLOYD on CKD with hyperkalemia S. creat 3.1 > on discharge on 8/12/21 was 1.89,   serum creatinine stable  FLOYD 2/2 hemodynamic instability/ATN- now resolved  K, vol- acceptable. Cr improved and now stable at baseline, 1.6 today  UA bland  no obstructive uropathy on recent renal sono   lasix and losartan on hold-- no objection to resume lasix   M acidosis 2/2 FLOYD/ckd   Na bicarb 650mg bid. no s/o chf       ANemia in ckd+acute blood loss/hematoma- Hb 7.6 now s/p PRBC last week. s/p epo 10k subqx1 8/19  LUE hematoma- now resolving, LUE ultrasound noted with no dvt  NSTEMI (non-ST elevation myocardial infarction).  Patient p/w chest pain and ventricular tachycardia s/p shockedx1  off heparin gtt   cardiac monitoring to monitor for arrhythmias  Low fat DASH diet  -TTE with normal LV function     Ventricular tachycardia s/p defibrillatorx1  on Amiodarone gtt   Monitor for further VT episodes  Keep K > 4.0 and magnesium > 2.0  BIV PPM interrogation showed  monomorphic ventricular tachycardia at 150-160 as per chart  f/u EP. plan for upgrade BiVICD after cath and medical optimization  -LUE ultrasound noted with no dvt    Chronic diastolic congestive heart failure. h/o CHF    CXR; clear-appear euvolemic  Holding home dose lasix and losartan 2/2 FLOYD on CKD and hypotension  no objection to resume lasix . as per cardiology  Daily weight monitoring, Strict I/O, Low sodium DASH diet  ECHO noted -Normal left ventricular systolic function. No segmental wall motion abnormalities. Mild-moderate pulmonary hypertension.    Paroxysmal atrial fibrillation.   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 Started on amiodarone drip.  EKG showed He was admitted to CCU. Labs remarkable for FLOYD on CKD and elevated lactate  post ventricular tachycardia and shock. EKG showed STD in inferior lateral leads. Renal following for FLOYD/CKD Mx.    CKD3:   Recent admission for FLOYD on CKD with hyperkalemia S. creat 3.1 > on discharge on 8/12/21 was 1.89,   serum creatinine stable  FLOYD 2/2 hemodynamic instability/ATN- now resolved  K, vol- acceptable. Cr improved and now stable at baseline, 1.58 today  UA bland  no obstructive uropathy on recent renal sono   lasix and losartan on hold-- no objection to resume lasix   M acidosis 2/2 FLOYD/ckd   Na bicarb 650mg bid. no s/o chf     ANemia in ckd+acute blood loss/hematoma- Hb 7.6 now s/p PRBC last week. s/p epo 10k subqx1 8/19  LUE hematoma- now resolving, LUE ultrasound noted with no dvt  NSTEMI (non-ST elevation myocardial infarction).  Patient p/w chest pain and ventricular tachycardia s/p shockedx1  off heparin gtt   cardiac monitoring to monitor for arrhythmias  Low fat DASH diet  -TTE with normal LV function   - At this time, the family declined cath and choose medical management per cardiology  Ventricular tachycardia s/p defibrillatorx1  on Amiodarone gtt   Monitor for further VT episodes  Keep K > 4.0 and magnesium > 2.0  BIV PPM interrogation showed  monomorphic ventricular tachycardia at 150-160 as per chart  f/u EP. plan for upgrade BiVICD  -LUE ultrasound noted with no dvt    Chronic diastolic congestive heart failure. h/o CHF    CXR; clear-appear euvolemic  Holding home dose lasix and losartan 2/2 FLOYD on CKD and hypotension  no objection to resume lasix . as per cardiology  Daily weight monitoring, Strict I/O, Low sodium DASH diet  ECHO noted -Normal left ventricular systolic function. No segmental wall motion abnormalities. Mild-moderate pulmonary hypertension.    Paroxysmal atrial fibrillation.   Currently on heparin drip  Continuous tele monitoring  Rate control per team    poc d/w pt  labs, chart reviewed  For any question, call:  Cell # [phone]  service# [phone]  office# [phone]

## 2021-08-30 NOTE — PROGRESS NOTE ADULT - SUBJECTIVE AND OBJECTIVE BOX
EP     paced  DATE OF SERVICE - 08-30-21   reports LUE swelling improved,  he denies palpitations, syncope, nor angina, ROS otherwise -  tele: AF-BiV       Review of Systems:   Constitutional: [ ] fevers, [ ] chills.   Skin: [ ] dry skin. [ ] rashes.  Psychiatric: [ ] depression, [ ] anxiety.   Gastrointestinal: [ ] BRBPR, [ ] melena.   Neurological: [ ] confusion. [ ] seizures. [ ] shuffling gait.   Ears,Nose,Mouth and Throat: [ ] ear pain [ ] sore throat.   Eyes: [ ] diplopia.   Respiratory: [ ] hemoptysis. [ ] shortness of breath  Cardiovascular: See HPI above  Hematologic/Lymphatic: [ ] anemia. [ ] painful nodes. [ ] prolonged bleeding.   Genitourinary: [ ] hematuria. [ ] flank pain.   Endocrine: [ ] significant change in weight. [ ] intolerance to heat and cold.     Review of systems [ x] otherwise negative, [ ] otherwise unable to obtain    FH: no family history of sudden cardiac death in first degree relatives    SH: [ ] tobacco, [ ] alcohol, [ ] drugs    aMIOdarone    Tablet   Oral   aMIOdarone    Tablet 200 milliGRAM(s) Oral daily  aspirin enteric coated 81 milliGRAM(s) Oral daily  atorvastatin 40 milliGRAM(s) Oral at bedtime  bisacodyl Suppository 10 milliGRAM(s) Rectal every 24 hours  epoetin lizbeth-epbx (RETACRIT) Injectable 32229 Unit(s) SubCutaneous once  finasteride 5 milliGRAM(s) Oral daily  glucagon  Injectable 1 milliGRAM(s) IntraMuscular once  insulin lispro (ADMELOG) corrective regimen sliding scale   SubCutaneous three times a day before meals  insulin lispro (ADMELOG) corrective regimen sliding scale   SubCutaneous at bedtime  pantoprazole    Tablet 40 milliGRAM(s) Oral two times a day  polyethylene glycol 3350 17 Gram(s) Oral two times a day  senna 2 Tablet(s) Oral at bedtime  sodium bicarbonate 650 milliGRAM(s) Oral two times a day  tamsulosin 0.4 milliGRAM(s) Oral at bedtime                            8.5    4.95  )-----------( 194      ( 30 Aug 2021 06:27 )             26.2     08-30    138  |  109<H>  |  28<H>  ----------------------------<  97  4.2   |  19<L>  |  1.58<H>    Ca    8.6      30 Aug 2021 06:27  Phos  3.0     08-30  Mg     2.10     08-30    TPro  5.2<L>  /  Alb  2.8<L>  /  TBili  1.1  /  DBili  x   /  AST  18  /  ALT  14  /  AlkPhos  62  08-30      T(C): 36.6 (08-30-21 @ 12:01), Max: 36.7 (08-29-21 @ 17:44)  HR: 75 (08-30-21 @ 12:01) (70 - 75)  BP: 104/54 (08-30-21 @ 12:01) (104/54 - 154/99)  RR: 15 (08-30-21 @ 12:01) (15 - 19)  SpO2: 100% (08-30-21 @ 12:01) (98% - 100%)  Wt(kg): --    I&O's Summary    29 Aug 2021 07:01  -  30 Aug 2021 07:00  --------------------------------------------------------  IN: 0 mL / OUT: 400 mL / NET: -400 mL    30 Aug 2021 07:01  -  30 Aug 2021 16:33  --------------------------------------------------------  IN: 0 mL / OUT: 300 mL / NET: -300 mL    General: Well nourished in no acute distress. Alert and Oriented * 3.   Head: Normocephalic and atraumatic.   Neck: No JVD. No bruits. Supple. Does not appear to be enlarged.   Cardiovascular: + S1,S2 ; RRR Soft systolic murmur at the left lower sternal border. No rubs noted.    Lungs: CTA b/l. No rhonchi, rales or wheezes.   Abdomen: + BS, soft. Non tender. Non distended. No rebound. No guarding.   Extremities: No clubbing/cyanosis/edema.   Neurologic: Moves all four extremities. Full range of motion.   Skin: Warm and moist. The patient's skin has normal elasticity and good skin turgor.   Psychiatric: Appropriate mood and affect.  Musculoskeletal: Normal range of motion, normal strength      A/P) He is an extremely pleasant 89 year old male with a past medical history of persistent atrial fibrillation and complete heart block. In June 2014 Dr Villatoro implanted a Biotronik biventricular pacemaker. Device interrogations in the office demonstrate excellent pacemaker function. He denies palpitations, syncope nor angina. His other medical history includes BPH, stroke, HTN, hyperlipidemia and CAD s/p PCI in 2015. He is now admitted with sustained monomorphic VT requiring emergency rescue DCCV.    -continue asa, no need for plavix  -No anticoagulation for now.  May resume Apixaban in-office (2.5mg BID)  -Cardio f/u noted, medical management of CAD for now  -NPO tonight after midnight for venogram + PM to ICD upgrade tomorrow, to be done at [place].    -Patient requests consideration of right-side tunneled ICD lead to left pocket before extraction referral.  He may or may not require referral to Dr Jaimes for RV pacing lead removal.    Marcial Guzmán M.D.  Cardiac Electrophysiology  [phone]

## 2021-08-31 PROBLEM — I25.10 ATHEROSCLEROTIC HEART DISEASE OF NATIVE CORONARY ARTERY WITHOUT ANGINA PECTORIS: Chronic | Status: ACTIVE | Noted: 2021-01-01

## 2021-08-31 PROBLEM — Z86.79 PERSONAL HISTORY OF OTHER DISEASES OF THE CIRCULATORY SYSTEM: Chronic | Status: ACTIVE | Noted: 2021-01-01

## 2021-08-31 NOTE — CHART NOTE - NSCHARTNOTEFT_GEN_A_CORE
ACP NIGHT MEDICINE COVERAGE.    Notified by RN, patient last COVID swab on 8/14 and needs new one as he is planned for transfer to [facility] for ICD Upgrade tomorrow 8/31.   Transfer center called, accepting physician: DR. WALDO PRITCHARD. Plans to go to CCU, however does not have a bed.   COVID Swab sent 8/30 evening.    Certificate of Transfer started and discussed with patient at bedside. Pt understands that he is going to [facility] for upgrade of ICD and agrees for transfer.   Information still missing: *Receiving Unit & Physical Exam prior to transfer    Vital Signs Last 24 Hrs  T(C): 36.7 (30 Aug 2021 21:12), Max: 36.7 (30 Aug 2021 09:27)  T(F): 98 (30 Aug 2021 21:12), Max: 98 (30 Aug 2021 09:27)  HR: 74 (30 Aug 2021 21:12) (70 - 75)  BP: 127/68 (30 Aug 2021 21:12) (104/54 - 154/99)  RR: 16 (30 Aug 2021 21:12) (15 - 19)  SpO2: 96% (30 Aug 2021 21:12) (96% - 100%)    Jaqueline Hollis PA  Medicine [id number]

## 2021-08-31 NOTE — H&P CARDIOLOGY - VASCULAR DETAILS
LUE remains with swelling  left lateral abdomen chest and LE with diffuse ecchymotic area LUE remains firm with swelling  left lateral abdomen chest and LE with diffuse ecchymotic area

## 2021-08-31 NOTE — H&P CARDIOLOGY - NSICDXPASTMEDICALHX_GEN_ALL_CORE_FT
PAST MEDICAL HISTORY:  Anemia     Atrial fibrillation     BPH (benign prostatic hypertrophy)     CAD (coronary artery disease)     CVA (Cerebral Infarction) x2 in the past with residual Rt handed numbness and a little word finding trouble    History of ischemic cardiomyopathy     Hypertension     Other and Unspecified Hyperlipidemia

## 2021-08-31 NOTE — CHART NOTE - NSCHARTNOTEFT_GEN_A_CORE
EP Brief Operative Note    Diagnosis: Ventricular Tachycardia  Procedure: Upgrade from BiV Pacemaker to BiV ICD:  addition of RV ICD lead, explant of pacemaker generator, implant of new BiV ICD generator, with Venogram.  Pocket revised to sub-pectoral location.  Surgeon: Marcial Guzmán M.D.  Findings: none  EBL: <30mL  Specimens: none  Post-op Diagnosis: same  Assistants: none    A/P)   Mr Arguelles is a 89yoM with prior MI, chronic systolic CHF, and AFib.  He underwent biventricular pacemaker implant ~6yrs ago.  His LV EF improved.  On this admission, he presented with unstable wide-complex tachycardia requiring ICD shock.  Pacemaker interrogation showed more V than A electrograms consistent with VT.    His ICD upgrade was successful.      3 doses of Ancef 1g q8hrs.  EKG and CXR ASAP.  OK to ambulate and resume diet later today.  Maintain amiodarone, 400mg daily for VT dosing.  Aspirin 81mg daily OK.  No anticoagulation otherwise. No heparin.  Anticipate discharge tomorrow, followup w/ Dr Villatoro on Thurs 9/16 @ 1020 AM at [facility].  [address], [address], [facility].    Marcial Guzmán M.D.  Cardiac Electrophysiology    office [phone]  pager [phone]

## 2021-08-31 NOTE — CHART NOTE - NSCHARTNOTESELECT_GEN_ALL_CORE
Chart Note
Event Note
Transfer Note
Vascular Exam

## 2021-08-31 NOTE — PRE-ANESTHESIA EVALUATION ADULT - BMI (KG/M2)
7/26/19  DME   -  NO PRECERT REQUIRED - $1500 FAMILY DED/ MET  80/20 AFTER DED MET - $2813 FAMILY OOP/ MET - COVERAGE % -  PER TRACEY -   REF # I - [id number] -  NDS
23

## 2021-08-31 NOTE — H&P CARDIOLOGY - HISTORY OF PRESENT ILLNESS
89 y/o  male with PMHx of HTN, HLD, T2DM, CAD s/p ROBERTO to D1 in 2018, ICMpEF (TTE 8/21 with normal LV function), CKD III, Afib on Eliquis, tachy/perla syndrome s/p BiV PPM implant, CVA and Velasquez's esophagus who was admitted to [facility] with sustained monomorphic VT refractory to Amiodarone 150 x 1 requiring emergency DCCV.   Patient was admitted to CCU on Amiodarone gtt, Cardiology and EP consulted, patients family refused LHC.  Nephrology was consulted for FLOYD on CKD (now resolved) felt to be 2/2 hemodynamic instability.  Hem/Onc consulted for Anemia/Thrombocytopenia, impression is anemic of chronic disease (CKD) and HIT - now resolvedtransfuse for Hgb < 8.  While on Heparin gtt for Afib patient developed large LUE hematoma, now resolving.  Vascular surgery consulted, no intervention necessary.  GI consulted 2/2 anemia, recommended PPI BID.  Patient is now transferred to [facility] for upgrade to BiV ICD.   89 y/o  male with PMHx of HTN, HLD, T2DM, CAD s/p ROBERTO to D1 in 2018, ICMpEF (TTE 8/21 with normal LV function), CKD III, Afib on Eliquis, tachy/perla syndrome s/p BiV PPM implant, CVA and Velasquez's esophagus who was admitted to [facility] with sustained monomorphic VT refractory to Amiodarone 150 x 1 requiring emergency DCCV.   Patient was admitted to CCU on Amiodarone gtt, Cardiology and EP consulted, patients family refused ischemic w/u, PPM interrogation revealing sustained monomorphic VT with rate150-160's.  Nephrology was consulted for FLOYD on CKD (now resolved) felt to be 2/2 hemodynamic instability.  Hem/Onc consulted for Anemia/ mild Thrombocytopenia, impression is anemic of chronic disease (CKD)  recommended transfuse for Hgb < 8.  While at [facility] patient developed large LUE hematoma with unknown etiology, now resolving.  Vascular surgery consulted, acute arterial injury ruled out, no intervention necessary.  GI consulted 2/2 anemia, recommended PPI BID.  Patient is now transferred to [facility] for upgrade to BiV ICD.

## 2021-08-31 NOTE — H&P CARDIOLOGY - NSICDXPASTSURGICALHX_GEN_ALL_CORE_FT
PAST SURGICAL HISTORY:  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     Pacemaker BiV PPM    S/P coronary artery stent placement D1

## 2021-08-31 NOTE — PROGRESS NOTE ADULT - ASSESSMENT
Agree with above assessment and plan as outlined above.    - EP f/u    Alfredo Rain MD, [facility]  BEEPER [phone]

## 2021-09-01 NOTE — DISCHARGE NOTE PROVIDER - CARE PROVIDER_API CALL
Estelle Ayala (MD)  Cardiovascular Disease; Internal Medicine; Interventional Cardiology  [address] [place] [address]  Phone: [phone]  Fax: [phone]  Scheduled Appointment: 09/16/2021 10:20 AM   Holly Villatoro  CARDIAC ELECTROPHYSIOLOGY  [address] [facility] [address]  [place] [address]  Phone: [phone]  Fax: [phone]  Scheduled Appointment: 09/16/2021 10:20 AM

## 2021-09-01 NOTE — DISCHARGE NOTE NURSING/CASE MANAGEMENT/SOCIAL WORK - NSDCPEFALRISK_GEN_ALL_CORE
For information on Fall & injury Prevention, visit [email or web address]

## 2021-09-01 NOTE — DISCHARGE NOTE NURSING/CASE MANAGEMENT/SOCIAL WORK - PATIENT PORTAL LINK FT
You can access the FollowMyHealth Patient Portal offered by [facility] by registering at the following website: [email or web address]. By joining [facility]’s FollowMyHealth portal, you will also be able to view your health information using other applications (apps) compatible with our system.

## 2021-09-01 NOTE — CONSULT NOTE ADULT - ASSESSMENT
89 y/o  male with PMHx of HTN, HLD, T2DM, CAD s/p ROBERTO to D1 in 2018, ICMpEF (TTE 8/21 with normal LV function), CKD III, Afib on Eliquis, tachy/perla syndrome s/p BiV PPM implant, CVA and Velasquez's esophagus who was admitted to [facility] with sustained monomorphic VT refractory to Amiodarone 150 x 1 requiring emergency DCCV.     Praneeth on CKD   Serum creatinine improving  no acute intervention at this time  avoid contrast/nephrotoxins  daily BMP      Thank you for allowing me to participate in the care of your patient    For any question, call:  Cell # [phone]  Pager # [phone]  Callback # [phone]

## 2021-09-01 NOTE — PROGRESS NOTE ADULT - ASSESSMENT
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

## 2021-09-01 NOTE — PHYSICAL THERAPY INITIAL EVALUATION ADULT - ACTIVE RANGE OF MOTION EXAMINATION, REHAB EVAL
pt has L UE swelling 2/2 to hematoma/Right UE Active ROM was WNL (within normal limits)/bilateral lower extremity Active ROM was WNL (within normal limits)/Right LE Active ROM was WFL (within functional limits)

## 2021-09-01 NOTE — DISCHARGE NOTE PROVIDER - HOSPITAL COURSE
HPI:  89 y/o  male with PMHx of HTN, HLD, T2DM, CAD s/p ROBERTO to D1 in 2018, ICMpEF (TTE 8/21 with normal LV function), CKD III, Afib on Eliquis, tachy/perla syndrome s/p BiV PPM implant, CVA and Velasquez's esophagus who was admitted to [facility] with sustained monomorphic VT refractory to Amiodarone 150 x 1 requiring emergency DCCV.   Patient was admitted to CCU on Amiodarone gtt, Cardiology and EP consulted, patients family refused ischemic w/u, PPM interrogation revealing sustained monomorphic VT with rate150-160's.  Nephrology was consulted for FLOYD on CKD (now resolved) felt to be 2/2 hemodynamic instability.  Hem/Onc consulted for Anemia/ mild Thrombocytopenia, impression is anemic of chronic disease (CKD)  recommended transfuse for Hgb < 8.  While at [facility] patient developed large LUE hematoma with unknown etiology, now resolving.  Vascular surgery consulted, acute arterial injury ruled out, no intervention necessary.  GI consulted 2/2 anemia, recommended PPI BID.  Patient is now transferred to [facility] for upgrade to BiV ICD.   (31 Aug 2021 08:23).    8/31 s/p [employer] BiV ICD upgrade, DDD . Left chest wall site without swelling, bleeding.

## 2021-09-01 NOTE — PROGRESS NOTE ADULT - SUBJECTIVE AND OBJECTIVE BOX
EP ATTENDING    tele: AV sequential BiV pacing    he denies palpitations, syncope, nor angina, ROS otherwise -      DATE OF SERVICE - 09-01-21     Review of Systems:   Constitutional: [ ] fevers, [ ] chills.   Skin: [ ] dry skin. [ ] rashes.  Psychiatric: [ ] depression, [ ] anxiety.   Gastrointestinal: [ ] BRBPR, [ ] melena.   Neurological: [ ] confusion. [ ] seizures. [ ] shuffling gait.   Ears,Nose,Mouth and Throat: [ ] ear pain [ ] sore throat.   Eyes: [ ] diplopia.   Respiratory: [ ] hemoptysis. [ ] shortness of breath  Cardiovascular: See HPI above  Hematologic/Lymphatic: [ ] anemia. [ ] painful nodes. [ ] prolonged bleeding.   Genitourinary: [ ] hematuria. [ ] flank pain.   Endocrine: [ ] significant change in weight. [ ] intolerance to heat and cold.     Review of systems [ x] otherwise negative, [ ] otherwise unable to obtain    FH: no family history of sudden cardiac death in first degree relatives    SH: [ ] tobacco, [ ] alcohol, [ ] drugs    aMIOdarone    Tablet 400 milliGRAM(s) Oral daily  aspirin enteric coated 81 milliGRAM(s) Oral daily  atorvastatin 40 milliGRAM(s) Oral at bedtime  carvedilol 6.25 milliGRAM(s) Oral every 12 hours  ceFAZolin   IVPB 1000 milliGRAM(s) IV Intermittent every 8 hours  dextrose 40% Gel 15 Gram(s) Oral once  dextrose 5%. 1000 milliLiter(s) IV Continuous <Continuous>  dextrose 5%. 1000 milliLiter(s) IV Continuous <Continuous>  dextrose 50% Injectable 25 Gram(s) IV Push once  dextrose 50% Injectable 12.5 Gram(s) IV Push once  dextrose 50% Injectable 25 Gram(s) IV Push once  finasteride 5 milliGRAM(s) Oral daily  glucagon  Injectable 1 milliGRAM(s) IntraMuscular once  insulin lispro (ADMELOG) corrective regimen sliding scale   SubCutaneous three times a day before meals  insulin lispro (ADMELOG) corrective regimen sliding scale   SubCutaneous at bedtime  pantoprazole    Tablet 40 milliGRAM(s) Oral before breakfast  sodium bicarbonate 650 milliGRAM(s) Oral two times a day  tamsulosin 0.4 milliGRAM(s) Oral at bedtime                            8.4    5.33  )-----------( 194      ( 01 Sep 2021 01:39 )             26.6       09-01    138  |  108  |  25<H>  ----------------------------<  134<H>  4.3   |  19<L>  |  1.55<H>    Ca    8.7      01 Sep 2021 01:39              T(C): 36.9 (09-01-21 @ 04:34), Max: 36.9 (08-31-21 @ 19:35)  HR: 62 (09-01-21 @ 04:34) (59 - 75)  BP: 108/59 (09-01-21 @ 04:34) (108/59 - 168/70)  RR: 17 (09-01-21 @ 04:34) (8 - 24)  SpO2: 96% (09-01-21 @ 04:34) (95% - 100%)  Wt(kg): --    I&O's Summary    31 Aug 2021 07:01  -  01 Sep 2021 07:00  --------------------------------------------------------  IN: 240 mL / OUT: 400 mL / NET: -160 mL    01 Sep 2021 07:01  -  01 Sep 2021 08:12  --------------------------------------------------------  IN: 240 mL / OUT: 0 mL / NET: 240 mL        General: Well nourished in no acute distress. Alert and Oriented * 3.   Head: Normocephalic and atraumatic.   Neck: No JVD. No bruits. Supple. Does not appear to be enlarged.   Cardiovascular: + S1,S2 ; RRR Soft systolic murmur at the left lower sternal border. No rubs noted.    Lungs: CTA b/l. No rhonchi, rales or wheezes.   Abdomen: + BS, soft. Non tender. Non distended. No rebound. No guarding.   Extremities: No clubbing/cyanosis/edema.   Neurologic: Moves all four extremities. Full range of motion.   Skin: Warm and moist. The patient's skin has normal elasticity and good skin turgor.   Psychiatric: Appropriate mood and affect.  Musculoskeletal: Normal range of motion, normal strength        A/P) He is an extremely pleasant 89 year old male with a past medical history of persistent atrial fibrillation and complete heart block. In June 2014 I implanted a Biotronik biventricular pacemaker. Device interrogations in the office demonstrate excellent pacemaker function. He denies palpitations, syncope nor angina. His other medical history includes BPH, stroke, HTN, hyperlipidemia and CAD s/p PCI in 2015. He is now admitted with sustained monomorphic VT requiring emergency rescue DCCV. He is now s/p upgrade to a BiV-ICD for the secondary prevention of sudden cardiac death.    -continue asa, no need for plavix  -please restart eliquis 2.5mg bid in 2 days if no bleeding or swelling  -d/c planning as per physical therapy and primary team  -f/u with me for wound check on Thu 9/16 at 1020  -continue amiodarone 400mg daily      Holly Villatoro M.D., [facility]  Cardiac Electrophysiology  [facility]  [address] [place] [address], [address] [place] [address]  [email or web address]    office [phone]  pager [phone]    
Patient is a 89y old  Male who presents with a chief complaint of sustained monomorphic VT (01 Sep 2021 01:10)  Patient seen today, awake OOB eating breakfast, no complaints      MEDICATIONS  (STANDING):  aMIOdarone    Tablet 400 milliGRAM(s) Oral daily  aspirin enteric coated 81 milliGRAM(s) Oral daily  atorvastatin 40 milliGRAM(s) Oral at bedtime  carvedilol 6.25 milliGRAM(s) Oral every 12 hours  dextrose 40% Gel 15 Gram(s) Oral once  dextrose 5%. 1000 milliLiter(s) (50 mL/Hr) IV Continuous <Continuous>  dextrose 5%. 1000 milliLiter(s) (100 mL/Hr) IV Continuous <Continuous>  dextrose 50% Injectable 25 Gram(s) IV Push once  dextrose 50% Injectable 12.5 Gram(s) IV Push once  dextrose 50% Injectable 25 Gram(s) IV Push once  finasteride 5 milliGRAM(s) Oral daily  glucagon  Injectable 1 milliGRAM(s) IntraMuscular once  insulin lispro (ADMELOG) corrective regimen sliding scale   SubCutaneous three times a day before meals  insulin lispro (ADMELOG) corrective regimen sliding scale   SubCutaneous at bedtime  pantoprazole    Tablet 40 milliGRAM(s) Oral before breakfast  sodium bicarbonate 650 milliGRAM(s) Oral two times a day  tamsulosin 0.4 milliGRAM(s) Oral at bedtime    MEDICATIONS  (PRN):  acetaminophen   Tablet .. 650 milliGRAM(s) Oral every 6 hours PRN Moderate Pain (4 - 6)      ROS  No fever, sweats, chills  No epistaxis, HA, sore throat  No CP, SOB, cough, sputum  No n/v/d, abd pain, melena, hematochezia  No edema  No rash  No anxiety  No back pain, joint pain  No bleeding, bruising  No dysuria, hematuria    Vital Signs Last 24 Hrs  T(C): 36.8 (01 Sep 2021 09:06), Max: 36.9 (31 Aug 2021 19:35)  T(F): 98.2 (01 Sep 2021 09:06), Max: 98.5 (01 Sep 2021 04:34)  HR: 81 (01 Sep 2021 11:45) (60 - 81)  BP: 119/66 (01 Sep 2021 11:45) (102/62 - 168/70)  BP(mean): 74 (01 Sep 2021 04:34) (74 - 112)  RR: 17 (01 Sep 2021 09:06) (12 - 19)  SpO2: 95% (01 Sep 2021 09:06) (95% - 100%)    PE  NAD  Awake, alert  Anicteric, MMM  RRR  CTAB  Abd soft, NT, ND  No c/c/e  No rash grossly  FROM                          8.4    5.33  )-----------( 194      ( 01 Sep 2021 01:39 )             26.6       09-01    138  |  108  |  25<H>  ----------------------------<  134<H>  4.3   |  19<L>  |  1.55<H>    Ca    8.7      01 Sep 2021 01:39        
C A R D I O L O G Y  **********************************     DATE OF SERVICE: 08-31-21    Denies chest pain or shortness of breath.   Review of systems otherwise (-)  	  MEDICATIONS:  MEDICATIONS  (STANDING):  aspirin enteric coated 81 milliGRAM(s) Oral daily  ceFAZolin   IVPB 1000 milliGRAM(s) IV Intermittent every 8 hours  finasteride 5 milliGRAM(s) Oral daily  spironolactone 25 milliGRAM(s) Oral daily      LABS:	 	    CARDIAC MARKERS:                                8.5    4.95  )-----------( 194      ( 30 Aug 2021 06:27 )             26.2     Hemoglobin: 8.5 g/dL (08-30 @ 06:27)  Hemoglobin: 8.7 g/dL (08-29 @ 08:14)  Hemoglobin: 8.4 g/dL (08-28 @ 07:57)  Hemoglobin: 8.9 g/dL (08-27 @ 08:03)  Hemoglobin: 9.0 g/dL (08-26 @ 18:40)      08-30    138  |  109<H>  |  28<H>  ----------------------------<  97  4.2   |  19<L>  |  1.58<H>    Ca    8.6      30 Aug 2021 06:27  Phos  3.0     08-30  Mg     2.10     08-30    TPro  5.2<L>  /  Alb  2.8<L>  /  TBili  1.1  /  DBili  x   /  AST  18  /  ALT  14  /  AlkPhos  62  08-30    Creatinine Trend: 1.58<--, 1.61<--, 1.61<--, 1.62<--, 1.61<--, 1.72<--    COAGS:       proBNP:   Lipid Profile:   HgA1c:   TSH:       PHYSICAL EXAM:  T(C): 36.4 (08-31-21 @ 12:45), Max: 36.8 (08-31-21 @ 05:22)  HR: 60 (08-31-21 @ 13:45) (59 - 75)  BP: 165/76 (08-31-21 @ 13:45) (109/51 - 168/70)  RR: 15 (08-31-21 @ 13:45) (8 - 24)  SpO2: 97% (08-31-21 @ 13:45) (95% - 100%)  Wt(kg): --  I&O's Summary    Height (cm): 162.6 (08-31 @ 10:22)  Weight (kg): 60.8 (08-31 @ 10:22)  BMI (kg/m2): 23 (08-31 @ 10:22)  BSA (m2): 1.65 (08-31 @ 10:22)    Gen: Appears well in NAD  HEENT:  (-)icterus (-)pallor  CV: N S1 S2 1/6 ELLY (+)2 Pulses B/l  Resp:  Clear to auscultation B/L, normal effort  GI: (+) BS Soft, NT, ND  Lymph:  (-)Edema, (-)obvious lymphadenopathy  Skin: Warm to touch, Normal turgor  Psych: Appropriate mood and affect    Tele:  70s    TTE: < from: Transthoracic Echocardiogram (08.14.21 @ 14:06) >  Normal left ventricular systolic function. No segmental  wall motion abnormalities.  Mild-moderate pulmonary hypertension.  A device wire is noted in the right heart.    < end of copied text >    < from: VA Duplex Ext Veins Upper Comp, Left. (08.17.21 @ 12:43) >  Summary/Impressions:  No evidence of deep vein thrombosis in the left upper  extremity.    < end of copied text >      < from: CT Upper Extremity No Cont, Left (08.19.21 @ 09:25) >  IMPRESSION:    1.  Mild enlargement of the triceps muscle suspicious for intramuscular hematoma.  2.  Amorphous appearance of the flexor musculature the forearm is nonspecific but can be seen with myositis or intramuscular hematoma.  3.  Moderate soft tissue swelling about the upper extremity, nonspecific but can be seen with cellulitis in the proper clinical setting.  4.  Nodular opacities within the left upper lobe. Small pleural effusion and atelectasis. Dedicated CT the chest can be performed for more detailed evaluation.    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     -continue with amiodarone for VT per EP - no further episodes of VT thus far on tele   -keep K > 4, Mg > 2  -TTE with normal LV function   -At this time, the family  decline cath and would like medical management of known cad.   -Given no current anginal symptoms, monomorphic VT that is unlikely ischemic driven, and patient's GOC/preferences, will hold off on cath for now and medically manage CAD.   -Pt. with worsening LUE hematoma and pain - hep gtt on hold - follow up vascular - LUE swelling and pain improving   -Plans for device upgrade per EP today    Ismael Figueroa PA-C  Pager: [phone]        
C A R D I O L O G Y  **********************************     DATE OF SERVICE: 09-01-21    Denies chest pain or shortness of breath.   Review of systems otherwise (-)  	    MEDICATIONS  (STANDING):  aMIOdarone    Tablet 400 milliGRAM(s) Oral daily  aspirin enteric coated 81 milliGRAM(s) Oral daily  atorvastatin 40 milliGRAM(s) Oral at bedtime  carvedilol 6.25 milliGRAM(s) Oral every 12 hours  dextrose 40% Gel 15 Gram(s) Oral once  dextrose 5%. 1000 milliLiter(s) (50 mL/Hr) IV Continuous <Continuous>  dextrose 5%. 1000 milliLiter(s) (100 mL/Hr) IV Continuous <Continuous>  dextrose 50% Injectable 25 Gram(s) IV Push once  dextrose 50% Injectable 12.5 Gram(s) IV Push once  dextrose 50% Injectable 25 Gram(s) IV Push once  finasteride 5 milliGRAM(s) Oral daily  glucagon  Injectable 1 milliGRAM(s) IntraMuscular once  insulin lispro (ADMELOG) corrective regimen sliding scale   SubCutaneous three times a day before meals  insulin lispro (ADMELOG) corrective regimen sliding scale   SubCutaneous at bedtime  pantoprazole    Tablet 40 milliGRAM(s) Oral before breakfast  sodium bicarbonate 650 milliGRAM(s) Oral two times a day  tamsulosin 0.4 milliGRAM(s) Oral at bedtime    MEDICATIONS  (PRN):  acetaminophen   Tablet .. 650 milliGRAM(s) Oral every 6 hours PRN Moderate Pain (4 - 6)      LABS:                          8.4    5.33  )-----------( 194      ( 01 Sep 2021 01:39 )             26.6     Hemoglobin: 8.4 g/dL (09-01 @ 01:39)  Hemoglobin: 8.5 g/dL (08-30 @ 06:27)  Hemoglobin: 8.7 g/dL (08-29 @ 08:14)  Hemoglobin: 8.4 g/dL (08-28 @ 07:57)    09-01    138  |  108  |  25<H>  ----------------------------<  134<H>  4.3   |  19<L>  |  1.55<H>    Ca    8.7      01 Sep 2021 01:39      Creatinine Trend: 1.55<--, 1.58<--, 1.61<--, 1.61<--, 1.62<--, 1.61<--             08-31-21 @ 07:01  -  09-01-21 @ 07:00  --------------------------------------------------------  IN: 240 mL / OUT: 400 mL / NET: -160 mL    09-01-21 @ 07:01  -  09-01-21 @ 12:04  --------------------------------------------------------  IN: 240 mL / OUT: 200 mL / NET: 40 mL        PHYSICAL EXAM  Vital Signs Last 24 Hrs  T(C): 36.8 (01 Sep 2021 09:06), Max: 36.9 (31 Aug 2021 19:35)  T(F): 98.2 (01 Sep 2021 09:06), Max: 98.5 (01 Sep 2021 04:34)  HR: 81 (01 Sep 2021 11:45) (59 - 81)  BP: 119/66 (01 Sep 2021 11:45) (102/62 - 168/70)  BP(mean): 74 (01 Sep 2021 04:34) (74 - 112)  RR: 17 (01 Sep 2021 09:06) (8 - 24)  SpO2: 95% (01 Sep 2021 09:06) (95% - 100%)      Gen: Appears well in NAD  HEENT:  (-)icterus (-)pallor  CV: N S1 S2 1/6 ELLY (+)2 Pulses B/l  Resp:  Clear to auscultation B/L, normal effort  GI: (+) BS Soft, NT, ND  Lymph:  (-)Edema, (-)obvious lymphadenopathy  Skin: Warm to touch, Normal turgor  Psych: Appropriate mood and affect    Tele:  60    TTE: < from: Transthoracic Echocardiogram (08.14.21 @ 14:06) >  Normal left ventricular systolic function. No segmental  wall motion abnormalities.  Mild-moderate pulmonary hypertension.  A device wire is noted in the right heart.    < end of copied text >    < from: VA Duplex Ext Veins Upper Comp, Left. (08.17.21 @ 12:43) >  Summary/Impressions:  No evidence of deep vein thrombosis in the left upper  extremity.    < end of copied text >      < from: CT Upper Extremity No Cont, Left (08.19.21 @ 09:25) >  IMPRESSION:    1.  Mild enlargement of the triceps muscle suspicious for intramuscular hematoma.  2.  Amorphous appearance of the flexor musculature the forearm is nonspecific but can be seen with myositis or intramuscular hematoma.  3.  Moderate soft tissue swelling about the upper extremity, nonspecific but can be seen with cellulitis in the proper clinical setting.  4.  Nodular opacities within the left upper lobe. Small pleural effusion and atelectasis. Dedicated CT the chest can be performed for more detailed evaluation.    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     -continue with amiodarone for VT per EP - no further episodes of VT thus far on tele   -keep K > 4, Mg > 2  -TTE with normal LV function   -At this time, the family  decline cath and would like medical management of known cad.   -Given no current anginal symptoms, monomorphic VT that is unlikely ischemic driven, and patient's GOC/preferences, will hold off on cath for now and medically manage CAD.   -EP f/u appreciated - s/p BiV-ICD upgrade, please restart eliquis 2.5mg bid in 2 days if no bleeding or swelling  -No further inpatient cardiac w/u planned  -D/C planning pending PT recs    Ismael Figueroa PA-C  Pager: [phone]        
EP     paced  DATE OF SERVICE - 08-31-21  feels well, awaiting BiV ICD upgrade today.  no angina.  consent via language line video conference.  discussed with both sons today by telephone.    Review of Systems:   Constitutional: [ ] fevers, [ ] chills.   Skin: [ ] dry skin. [ ] rashes.  Psychiatric: [ ] depression, [ ] anxiety.   Gastrointestinal: [ ] BRBPR, [ ] melena.   Neurological: [ ] confusion. [ ] seizures. [ ] shuffling gait.   Ears,Nose,Mouth and Throat: [ ] ear pain [ ] sore throat.   Eyes: [ ] diplopia.   Respiratory: [ ] hemoptysis. [ ] shortness of breath  Cardiovascular: See HPI above  Hematologic/Lymphatic: [ ] anemia. [ ] painful nodes. [ ] prolonged bleeding.   Genitourinary: [ ] hematuria. [ ] flank pain.   Endocrine: [ ] significant change in weight. [ ] intolerance to heat and cold.     Review of systems [ x] otherwise negative, [ ] otherwise unable to obtain    FH: no family history of sudden cardiac death in first degree relatives    SH: [ ] tobacco, [ ] alcohol, [ ] drugs    aMIOdarone    Tablet 400 milliGRAM(s) Oral daily  aspirin enteric coated 81 milliGRAM(s) Oral daily  atorvastatin 40 milliGRAM(s) Oral at bedtime  carvedilol 6.25 milliGRAM(s) Oral every 12 hours  ceFAZolin   IVPB 1000 milliGRAM(s) IV Intermittent every 8 hours  dextrose 40% Gel 15 Gram(s) Oral once  dextrose 5%. 1000 milliLiter(s) IV Continuous <Continuous>  dextrose 5%. 1000 milliLiter(s) IV Continuous <Continuous>  dextrose 50% Injectable 25 Gram(s) IV Push once  dextrose 50% Injectable 12.5 Gram(s) IV Push once  dextrose 50% Injectable 25 Gram(s) IV Push once  finasteride 5 milliGRAM(s) Oral daily  glucagon  Injectable 1 milliGRAM(s) IntraMuscular once  insulin lispro (ADMELOG) corrective regimen sliding scale   SubCutaneous three times a day before meals  insulin lispro (ADMELOG) corrective regimen sliding scale   SubCutaneous at bedtime  pantoprazole    Tablet 40 milliGRAM(s) Oral before breakfast  sodium bicarbonate 650 milliGRAM(s) Oral two times a day  tamsulosin 0.4 milliGRAM(s) Oral at bedtime               8.5    4.95  )-----------( 194      ( 30 Aug 2021 06:27 )             26.2       08-30    138  |  109<H>  |  28<H>  ----------------------------<  97  4.2   |  19<L>  |  1.58<H>    Ca    8.6      30 Aug 2021 06:27  Phos  3.0     08-30  Mg     2.10     08-30    TPro  5.2<L>  /  Alb  2.8<L>  /  TBili  1.1  /  DBili  x   /  AST  18  /  ALT  14  /  AlkPhos  62  08-30      T(C): 36.3 (08-31-21 @ 14:00), Max: 36.8 (08-31-21 @ 05:22)  HR: 67 (08-31-21 @ 14:30) (59 - 75)  BP: 151/71 (08-31-21 @ 14:30) (109/51 - 168/70)  RR: 19 (08-31-21 @ 14:30) (8 - 24)  SpO2: 100% (08-31-21 @ 14:30) (95% - 100%)  Wt(kg): --    I&O's Summary    General: Well nourished in no acute distress. Alert and Oriented * 3.   Head: Normocephalic and atraumatic.   Neck: No JVD. No bruits. Supple. Does not appear to be enlarged.   Cardiovascular: + S1,S2 ; RRR Soft systolic murmur at the left lower sternal border. No rubs noted.    Lungs: CTA b/l. No rhonchi, rales or wheezes.   Abdomen: + BS, soft. Non tender. Non distended. No rebound. No guarding.   Extremities: No clubbing/cyanosis/edema.   Neurologic: Moves all four extremities. Full range of motion.   Skin: Warm and moist. The patient's skin has normal elasticity and good skin turgor.   Psychiatric: Appropriate mood and affect.  Musculoskeletal: Normal range of motion, normal strength      A/P) He is an extremely pleasant 89 year old male with a past medical history of persistent atrial fibrillation and complete heart block. In June 2014 Dr Villatoro implanted a Biotronik biventricular pacemaker. Device interrogations in the office demonstrate excellent pacemaker function. He denies palpitations, syncope nor angina. His other medical history includes BPH, stroke, HTN, hyperlipidemia and CAD s/p PCI in 2015. He is now admitted with sustained monomorphic VT requiring emergency rescue DCCV.    -continue asa, no need for plavix  -No anticoagulation for now.  May resume Apixaban in-office (2.5mg BID)  -Cardio f/u noted, medical management of CAD for now  -NPO for BiV PPM --> BiV ICD upgrade today.    Marcial Guzmán M.D.  Cardiac Electrophysiology  [phone]

## 2021-09-01 NOTE — DISCHARGE NOTE PROVIDER - NSDCCPTREATMENT_GEN_ALL_CORE_FT
PRINCIPAL PROCEDURE  Procedure: Implantation of biv ICD  Findings and Treatment: Med[employer]onic BiV ICD, DDD

## 2021-09-01 NOTE — PHYSICAL THERAPY INITIAL EVALUATION ADULT - ADDITIONAL COMMENTS
Pt lives with son in pvt home with 4 steps to enter and flight of stairs to bedroom/bath. Pt ascends stairs each night and then comes back down to sleep on recliner. Pt does not use AD in the home but uses a cane outdoors, pt owns RW, pt would benefit from rollator for longer distances outdoors.

## 2021-09-01 NOTE — DISCHARGE NOTE PROVIDER - PROVIDER TOKENS
PROVIDER:[[id number]],SCHEDULEDAPPT:[09/16/2021],SCHEDULEDAPPTTIME:[10:20 AM]] PROVIDER:[TOKEN:[[id number]],SCHEDULEDAPPT:[09/16/2021],SCHEDULEDAPPTTIME:[10:20 AM]]

## 2021-09-01 NOTE — DISCHARGE NOTE PROVIDER - NSDCCPCAREPLAN_GEN_ALL_CORE_FT
PRINCIPAL DISCHARGE DIAGNOSIS  Diagnosis: Monomorphic ventricular tachycardia  Assessment and Plan of Treatment: Continue with follow-up visits to your electrophysiology team and with your home remote device monitoring (if applicable). Continue your medications as prescribed. Monitor your left chest wall site for swelling, bleeding.      SECONDARY DISCHARGE DIAGNOSES  Diagnosis: HTN (hypertension)  Assessment and Plan of Treatment: Continue with your blood pressure medications; eat a heart healthy diet with low salt diet; exercise regularly (consult with your physician or cardiologist first); maintain a heart healthy weight; if you smoke - quit (A resource to help you stop smoking is the [facility] [employer] [facility] Tobacco Control – phone number [phone].); include healthy ways to manage stress. Continue to follow with your primary care physician or cardiologist.    Diagnosis: HLD (hyperlipidemia)  Assessment and Plan of Treatment: Continue with your cholesterol medications. Eat a heart healthy diet that is low in saturated fats and salt, and includes whole grains, fruits, vegetables and lean protein; exercise regularly (consult with your physician or cardiologist first); maintain a heart healthy weight; if you smoke - quit (A resource to help you stop smoking is the [facility] for Tobacco Control – phone number [phone].). Continue to follow with your primary physician or cardiologist.

## 2021-09-01 NOTE — PHYSICAL THERAPY INITIAL EVALUATION ADULT - LEVEL OF INDEPENDENCE: STAIR NEGOTIATION, REHAB EVAL
Pt demonstrates good gait endurance, strength and balance to negotiate a flight of steps with handrail

## 2021-09-01 NOTE — PHYSICAL THERAPY INITIAL EVALUATION ADULT - PERTINENT HX OF CURRENT PROBLEM, REHAB EVAL
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 Patient transferred to [facility] now s/p upgrade to BiV ICD.

## 2021-09-01 NOTE — CONSULT NOTE ADULT - SUBJECTIVE AND OBJECTIVE BOX
[facility] - PHIL Dawn / PHIL Kendrick / ANITA Garcia/ PHIL Huynh/ PHIL Smith/ DARLYN Vega / VIVIANE Herman / MADDISON Roberts  -------------------------------------------------------------------------------------------------------  The patient seen and examined today.  HPI:  89 y/o  male with PMHx of HTN, HLD, T2DM, CAD s/p ROBERTO to D1 in 2018, ICMpEF (TTE 8/21 with normal LV function), CKD III, Afib on Eliquis, tachy/perla syndrome s/p BiV PPM implant, CVA and Velasquez's esophagus who was admitted to [facility] with sustained monomorphic VT refractory to Amiodarone 150 x 1 requiring emergency DCCV.   Patient was admitted to CCU on Amiodarone gtt, Cardiology and EP consulted, patients family refused ischemic w/u, PPM interrogation revealing sustained monomorphic VT with rate150-160's.  Nephrology was consulted for FLOYD on CKD (now resolved) felt to be 2/2 hemodynamic instability.  Hem/Onc consulted for Anemia/ mild Thrombocytopenia, impression is anemic of chronic disease (CKD)  recommended transfuse for Hgb < 8.  While at [facility] patient developed large LUE hematoma with unknown etiology, now resolving.  Vascular surgery consulted, acute arterial injury ruled out, no intervention necessary.  GI consulted 2/2 anemia, recommended PPI BID.  Patient is now transferred to [facility] for upgrade to BiV ICD.   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 1000 milliLiter(s) (50 mL/Hr) IV Continuous <Continuous>  dextrose 5%. 1000 milliLiter(s) (100 mL/Hr) IV Continuous <Continuous>  dextrose 50% Injectable 25 Gram(s) IV Push once  dextrose 50% Injectable 12.5 Gram(s) IV Push once  dextrose 50% Injectable 25 Gram(s) IV Push once  finasteride 5 milliGRAM(s) Oral daily  glucagon  Injectable 1 milliGRAM(s) IntraMuscular once  insulin lispro (ADMELOG) corrective regimen sliding scale   SubCutaneous three times a day before meals  insulin lispro (ADMELOG) corrective regimen sliding scale   SubCutaneous at bedtime  pantoprazole    Tablet 40 milliGRAM(s) Oral before breakfast  sodium bicarbonate 650 milliGRAM(s) Oral two times a day  tamsulosin 0.4 milliGRAM(s) Oral at bedtime    SOCIAL HISTORY:  Denies ETOh,Smoking,   FAMILY HISTORY:  Family history of heart disease (Father)        REVIEW OF SYSTEMS:  CONSTITUTIONAL: No weakness, fevers or chills  EYES/ENT: No visual changes;  No vertigo or throat pain   NECK: No pain or stiffness  RESPIRATORY: No cough, wheezing, hemoptysis; No shortness of breath  CARDIOVASCULAR: No chest pain or palpitations.  GASTROINTESTINAL: No abdominal or epigastric pain. No nausea, vomiting, or hematemesis; No diarrhea or constipation. No melena or hematochezia.  GENITOURINARY: No dysuria, frequency, foamy urine, urinary urgency, incontinence or hematuria  NEUROLOGICAL: No numbness or weakness  SKIN: No itching, burning, rashes, or lesions   VASCULAR: No bilateral lower extremity edema.   All other review of systems is negative unless indicated above.    VITALS:  T(F): 98.2 (09-01-21 @ 09:06), Max: 98.5 (09-01-21 @ 04:34)  HR: 60 (09-01-21 @ 09:06)  BP: 102/62 (09-01-21 @ 09:06)  RR: 17 (09-01-21 @ 09:06)  SpO2: 95% (09-01-21 @ 09:06)  Wt(kg): --    08-31 @ 07:01  -  09-01 @ 07:00  --------------------------------------------------------  IN: 240 mL / OUT: 400 mL / NET: -160 mL    09-01 @ 07:01  -  09-01 @ 10:45  --------------------------------------------------------  IN: 240 mL / OUT: 200 mL / NET: 40 mL          PHYSICAL EXAM:  Constitutional: NAD  HEENT: anicteric sclera, oropharynx clear, MMM  Neck: supple.   Respiratory: Bilateral equal breath sounds , no wheezes, no crackles  Cardiovascular: S1, S2, Regular, Murmur present.  Gastrointestinal: Bowel Sound present, soft, NT/ND  Extremities: No cyanosis or clubbing. No peripheral edema  Neurological: Alert and oriented x 3, no focal deficits  Psychiatric: Normal mood, normal affect  : No CVA tenderness. No river.   Skin: No rashes    Data:  09-01    138  |  108  |  25<H>  ----------------------------<  134<H>  4.3   |  19<L>  |  1.55<H>    Ca    8.7      01 Sep 2021 01:39      Creatinine Trend: 1.55 <--, 1.58 <--, 1.61 <--, 1.61 <--, 1.62 <--, 1.61 <--                        8.4    5.33  )-----------( 194      ( 01 Sep 2021 01:39 )             26.6     Urine Studies:

## 2021-09-01 NOTE — DISCHARGE NOTE PROVIDER - NSDCMRMEDTOKEN_GEN_ALL_CORE_FT
Aldactone 25 mg oral tablet: 1 tab(s) orally once a day  HOME  amiodarone 200 mg oral tablet: 200 milligram(s) orally once a day  HOSPITAL   amLODIPine 5 mg oral tablet: 1 tab(s) orally once a day  HOME  aspirin 81 mg oral delayed release tablet: 1 tab(s) orally once a day  HOSPITAL/HOME  atorvastatin 40 mg oral tablet: 1 tab(s) orally once a day (at bedtime)  HOSPITAL  bisacodyl 10 mg rectal suppository: 1 suppository(ies) rectal every 24 hours  HOSPITAL  Coreg 25 mg oral tablet: 1 tab(s) orally 2 times a day  HOME  Eliquis 5 mg oral tablet: 1 tab(s) orally 2 times a day  HOME  finasteride 5 mg oral tablet: 1 tab(s) orally once a day  HOSPITAL/HOME  furosemide 40 mg oral tablet: 1 tab(s) orally once a day  HOME  hydrALAZINE 10 mg oral tablet: 1 tab(s) orally 3 times a day  HOME  insulin lispro 100 units/mL injectable solution: sliding scale 3 times a day (before meals) and at bedtime  HOSPITAL   loratadine 10 mg oral tablet: 1 tab(s) orally once a day  HOSPITAL   losartan 100 mg oral tablet: 1 tab(s) orally once a day  HOME  pantoprazole 40 mg oral delayed release tablet: 1 tab(s) orally 2 times a day  HOSPITAL   polyethylene glycol 3350 oral powder for reconstitution: 17 gram(s) orally 2 times a day  HOSPITAL   senna oral tablet: 2 tab(s) orally once a day (at bedtime)  HOSPITAL   sodium bicarbonate 650 mg oral tablet: 1 tab(s) orally 2 times a day  HOSPITAL   tamsulosin 0.4 mg oral capsule: 1 cap(s) orally once a day  HOSPITAL/HOME   Aldactone 25 mg oral tablet: 1 tab(s) orally once a day  HOME  amiodarone 400 mg oral tablet: 1 tab(s) orally once a day  amLODIPine 5 mg oral tablet: 1 tab(s) orally once a day  HOME  aspirin 81 mg oral delayed release tablet: 1 tab(s) orally once a day  HOSPITAL/HOME  atorvastatin 40 mg oral tablet: 1 tab(s) orally once a day (at bedtime)  HOSPITAL  Coreg 25 mg oral tablet: 1 tab(s) orally 2 times a day  HOME  Eliquis 5 mg oral tablet: 1 tab(s) orally 2 times a day  HOME  finasteride 5 mg oral tablet: 1 tab(s) orally once a day  HOSPITAL/HOME  furosemide 40 mg oral tablet: 1 tab(s) orally once a day  HOME  hydrALAZINE 10 mg oral tablet: 1 tab(s) orally 3 times a day  HOME  losartan 100 mg oral tablet: 1 tab(s) orally once a day  HOME  pantoprazole 40 mg oral delayed release tablet: 1 tab(s) orally once a day (before a meal)  tamsulosin 0.4 mg oral capsule: 1 cap(s) orally once a day  HOSPITAL/HOME   acetaminophen 325 mg oral tablet: 2 tab(s) orally every 6 hours, As needed, Moderate Pain (4 - 6)  amiodarone 400 mg oral tablet: 1 tab(s) orally once a day  apixaban 5 mg oral tablet: 1 tab(s) orally 2 times a day    RESUME IN 2 WEEKS after SEEN by MD for WOUND check   HOLD FOR NOW   aspirin 81 mg oral delayed release tablet: 1 tab(s) orally once a day  HOSPITAL/HOME  atorvastatin 40 mg oral tablet: 1 tab(s) orally once a day (at bedtime)  HOSPITAL  Coreg 25 mg oral tablet: 1 tab(s) orally 2 times a day  HOME  finasteride 5 mg oral tablet: 1 tab(s) orally once a day  HOSPITAL/HOME  furosemide 40 mg oral tablet: 1 tab(s) orally once a day  losartan 100 mg oral tablet: 1 tab(s) orally once a day  outpatient physical therapy: Dx unsteady gait ICD 10 R26.81  pantoprazole 40 mg oral delayed release tablet: 1 tab(s) orally once a day (before a meal)  tamsulosin 0.4 mg oral capsule: 1 cap(s) orally once a day  HOSPITAL/HOME

## 2021-10-13 NOTE — ED ADULT NURSE NOTE - OBJECTIVE STATEMENT
89 year old male with PMH of HTN, HLD, T2DM, CAD s/p ROBERTO to D1 in 2018, ICM,  CKD III, Afib on Eliquis, tachy/perla syndrome  PPM implant, CVA and Velasquez's esophagus presents to ED  from Cardiology office for SOB. Pt with recent admission to hospital for VT, FLOYD and anemia reports that since d/c from hospital 1 month ago he has gained 30 pounds. He also reports persistent cough worsened over past week and increased b/l UE and LE edema with SOB. Pt saw his cardiologist today who advised him to come to ED. Pt denies fevers, chills, chest pain, abd pain, n/v  Pt pox 99 on room air No distress noted Pt is edematous all over Pt has a pocket of fliud over the left bicep and edematous o the left elbow more than the right Mpuleorn 89 year old male with PMH of HTN, HLD, T2DM, CAD s/p ROBERTO to D1 in 2018, ICM,  CKD III, Afib on Eliquis, tachy/perla syndrome  PPM implant, CVA and Velasquez's esophagus presents to ED  from Cardiology office for SOB. Pt with recent admission to hospital for VT, FLOYD and anemia reports that since d/c from hospital 1 month ago he has gained 30 pounds. He also reports persistent cough worsened over past week and increased b/l UE and LE edema with SOB. Pt saw his cardiologist today who advised him to come to ED. Pt denies fevers, chills, chest pain, abd pain, n/v  Pt pox 99 on room air No distress noted Pt is edematous all over Pt has a pocket of fluid over the left bicep and edematous o the left elbow more than the right  steri strips over the defib site intact   Mpuleorn

## 2021-10-13 NOTE — ED ADULT TRIAGE NOTE - WEIGHT IN KG
72.6

## 2021-10-13 NOTE — ED ADULT NURSE NOTE - CAS TRG GEN SKIN CONDITION
Warm
Ears: no ear pain and no hearing problems.Nose: no nasal congestion and no nasal drainage.Mouth/Throat: no dysphagia, no hoarseness and no throat pain.Neck: no lumps, no pain, no stiffness and no swollen glands.

## 2021-10-13 NOTE — ED PROVIDER NOTE - OBJECTIVE STATEMENT
89 year old male with PMHx of HTN, HLD, T2DM, CAD s/p ROBERTO to D1 in 2018, ICM,  CKD III, Afib on Eliquis, tachy/perla syndrome s/p BiV PPM implant, CVA and Velasquez's esophagus presents to ED  from Cardiology office for SOB. Pt with recent admission to hospital for VT, FLOYD and anemia reports that since d/c from hospital 1 month ago he has gained 30 pounds. He also reports persistent cough worsened over past week and increased b/l UE and LE edema with SOB. Pt saw his cardiologist today who advised him to come to ED. Pt denies fevers, chills, chest pain, abd pain, n/v

## 2021-10-13 NOTE — ED PROVIDER NOTE - CLINICAL SUMMARY MEDICAL DECISION MAKING FREE TEXT BOX
89M here for decompensated heart failure. Giving lasix. 89M here for decompensated heart failure. Giving lasix.    Attending Nir: 89 y/o M w/ PMH of CAD, HTN, HLD, DM, a fib on eliquids, pacemaker, ICM presenting w/ edema. Seen in gold, w/ son. Pt primarily Finnish speaking, requesting son at bedside to translate. Pt admitted to hospital approx 1 month ago and since discharge he noticed an approx 30 pound weight gain. Has had dyspnea on exertion. Needing to sleep sitting upright. Son also noticed weeping from extremities. Reports compliance w/ meds. Went to his cardiologist today who told him to come to ED for admission. Pt denying any respiratory symptoms while at rest. On exam pt overall well appearing, in no acute distress. Lungs w/ crackles b/l at the bases, HR regular rate, abd nondistended/soft/nontender. Extremities edematous. Given hx and symptoms, suspect decompensated heart failure. Will eval for ACS but no chest pain or SOB now. W/ CKD hx, will eval kidney function. Plan for labs, imaging, EKG, cardiac monitoring, lasix, additional meds PRN, and admission. Will reassess the need for additional interventions as clinically warranted.

## 2021-10-13 NOTE — ED PROVIDER NOTE - PHYSICAL EXAMINATION
CONSTITUTIONAL: Patient is awake, alert and oriented x 3. Patient on nasal canula in no acute distress   HEAD: NCAT  NECK: supple, FROM  LUNGS: Bibasilar crackles   HEART: RRR  ABDOMEN: Soft nd/nttp, no rebound or guarding  EXTREMITY: b/l upper extremity and lower extremity edema, FROM upper and lower ext b/l  SKIN: with no rash or lesions  NEURO: No focal deficits

## 2021-10-13 NOTE — ED PROVIDER NOTE - ATTENDING CONTRIBUTION TO CARE
Attending Nir: I performed a history and physical exam of the patient and discussed their management with the resident/fellow/ACP/student. I have reviewed the resident/fellow/ACP/student note and agree with the documented findings and plan of care, except as noted. My medical decision making and observations are found above. Please refer to any progress notes for updates on clinical course.

## 2021-10-14 NOTE — PHYSICAL THERAPY INITIAL EVALUATION ADULT - PERTINENT HX OF CURRENT PROBLEM, REHAB EVAL
89 yr old very pleasant gentleman w/ PMH of CAD s/p PCI (ROBERTO to D1 6/2/14), chronic AF (on AC w/ eliquis), tachy/perla syndrome s/p biV PPM placement, CVA x 2, HFpEF who was recently hospitalised in Aug 2021 for monomorphic VT s/p cardioversion presents w/ fluid overload in setting of acute CHF exacerbation.

## 2021-10-14 NOTE — H&P ADULT - PROBLEM SELECTOR PLAN 5
hx of CAD s/p stent to  80% D1 in 2014, with cath in 2015 demonstrating patent stents with mild to moderate non-obstructive cad, Isch CMrEF 40% in 2018  -- per cardiology notes from 9/1/21- family decline cath and would like medical management of known cad  - c/w aspirin and lipitor  - holding BB in acute chf   - w/o current anginal sx

## 2021-10-14 NOTE — PHYSICAL THERAPY INITIAL EVALUATION ADULT - ADDITIONAL COMMENTS
Patient and son report the following social hx/PLOF: patient lives alone, 2 sons live nearby and stay with patient daily. Patient is alone overnight. Ambulates short distance as home with RW, w/c for community mobility. Lives in a 2-level house, sleeps on main level declan recliner chair, shower upstairs, climbs the stairs with assist from sons appx every ~3 days. 1 person assist for ambulation and ADLs.

## 2021-10-14 NOTE — H&P ADULT - PROBLEM SELECTOR PLAN 8
DVT ppx: apixaban as above  Fall precautions   Carb control/low salt diet  Fluid restriction 1200 cc /day

## 2021-10-14 NOTE — H&P ADULT - PROBLEM SELECTOR PLAN 7
Controlled at admission  Holding losartan due to FLOYD  Holding Coreg due to acute CHF exacerbation c/w tamsulosin 0.4 mg qHS and finasteride 5 mg qD  monitor UOP, may need repeat bladder scan, if retaining > 400 cc urine, will need river catheter insertion

## 2021-10-14 NOTE — H&P ADULT - NSICDXPASTMEDICALHX_GEN_ALL_CORE_FT
-For the pain take naproxen, always take with food.    -muscle relaxer given, may cause drowsiness, no driving or operating heavy machinery   -heat to the effected area.  -use Aspercreme with 4% lidocaine to massage area    -Stretching exercises to the affected area will help the pain.    -If pain is caused by misuse, correct the repeating action.   -Physical therapy referral    -RTC if symptoms worsen      
-take flonase daily  -use claritin daily  -declined CT sinuses  -if continues will consider ENT   
PAST MEDICAL HISTORY:  Anemia     Atrial fibrillation     BPH (benign prostatic hypertrophy)     CAD (coronary artery disease)     CVA (Cerebral Infarction) x2 in the past with residual Rt handed numbness and a little word finding trouble    History of ischemic cardiomyopathy     Hypertension     Other and Unspecified Hyperlipidemia

## 2021-10-14 NOTE — H&P ADULT - PROBLEM SELECTOR PLAN 9
c/w tamsulosin 0.4 mg qHS and finasteride 5 mg qD  monitor UOP, may need repeat bladder scan, if retaining > 400 cc urine, will need river catheter insertion

## 2021-10-14 NOTE — H&P ADULT - NSHPREVIEWOFSYSTEMS_GEN_ALL_CORE
REVIEW OF SYSTEMS:  CONSTITUTIONAL: No weakness. No fevers. No chills. No rigors. No weight loss. No night sweats. No poor appetite.  EYES: No blurry or double vision. No eye pain.  ENT: No hearing difficulty. No vertigo. No dysphagia. No sore throat. No Sinusitis/rhinorrhea.   NECK: No pain. No stiffness/rigidity.  CARDIAC: +See HPI, + chest pain. No palpitations. No lightheadedness. No syncope.  RESPIRATORY: + cough, shortness of breath, hemoptysis.  GASTROINTESTINAL: No abdominal pain. No nausea. No vomiting. No hematemesis. No diarrhea. No constipation. No melena. No hematochezia.  GENITOURINARY: No dysuria. No frequency. No hesitancy. No hematuria. No oliguria.  NEUROLOGICAL: No numbness/tingling. No focal weakness. No urinary or fecal incontinence. No headache. No unsteady gait.  BACK: No back pain. No flank pain.  EXTREMITIES: No lower extremity edema. Full ROM. No joint pain.  SKIN: No rashes. No itching. No other lesions.  PSYCHIATRIC: No depression. No anxiety. No SI/HI.  ALLERGIC: No lip swelling. No hives.  All other review of systems is negative unless indicated above.  Unless indicated above, unable to assess ROS 2/2

## 2021-10-14 NOTE — PHYSICAL THERAPY INITIAL EVALUATION ADULT - TRANSFER TRAINING, PT EVAL
GOAL: Patient will transfer between sitting/standing with CGA with use of rolling walker within 2 weeks.

## 2021-10-14 NOTE — PROGRESS NOTE ADULT - SUBJECTIVE AND OBJECTIVE BOX
Patient is a 89y old  Male who presents with a chief complaint of CHF exacerbation (14 Oct 2021 12:00)      SUBJECTIVE / OVERNIGHT EVENTS:    Events noted.  CONSTITUTIONAL: No fever,  or fatigue  RESPIRATORY: Dyspnea on exersion  CARDIOVASCULAR: No chest pain, palpitations, dizziness, or leg swelling  GASTROINTESTINAL: No abdominal or epigastric pain.   NEUROLOGICAL: No headaches,     MEDICATIONS  (STANDING):  aMIOdarone    Tablet 400 milliGRAM(s) Oral daily  apixaban 2.5 milliGRAM(s) Oral two times a day  aspirin enteric coated 81 milliGRAM(s) Oral daily  atorvastatin 40 milliGRAM(s) Oral at bedtime  dextrose 40% Gel 15 Gram(s) Oral once  dextrose 5%. 1000 milliLiter(s) (50 mL/Hr) IV Continuous <Continuous>  dextrose 50% Injectable 25 Gram(s) IV Push once  finasteride 5 milliGRAM(s) Oral daily  furosemide   Injectable 40 milliGRAM(s) IV Push two times a day  glucagon  Injectable 1 milliGRAM(s) IntraMuscular once  influenza   Vaccine 0.5 milliLiter(s) IntraMuscular once  insulin lispro (ADMELOG) corrective regimen sliding scale   SubCutaneous three times a day before meals  insulin lispro (ADMELOG) corrective regimen sliding scale   SubCutaneous at bedtime  ketotifen 0.025% Ophthalmic Solution 1 Drop(s) Right EYE two times a day  pantoprazole    Tablet 40 milliGRAM(s) Oral before breakfast  tamsulosin 0.4 milliGRAM(s) Oral at bedtime    MEDICATIONS  (PRN):  acetaminophen   Tablet .. 650 milliGRAM(s) Oral every 6 hours PRN Temp greater or equal to 38C (100.4F), Mild Pain (1 - 3)        CAPILLARY BLOOD GLUCOSE      POCT Blood Glucose.: 166 mg/dL (14 Oct 2021 12:57)  POCT Blood Glucose.: 169 mg/dL (14 Oct 2021 09:24)  POCT Blood Glucose.: 187 mg/dL (14 Oct 2021 02:39)    I&O's Summary    13 Oct 2021 07:01  -  14 Oct 2021 07:00  --------------------------------------------------------  IN: 60 mL / OUT: 400 mL / NET: -340 mL    14 Oct 2021 07:01  -  14 Oct 2021 13:27  --------------------------------------------------------  IN: 220 mL / OUT: 0 mL / NET: 220 mL        T(C): 36.7 (10-14-21 @ 09:11), Max: 98.2 (10-13-21 @ 18:28)  HR: 61 (10-14-21 @ 09:11) (60 - 89)  BP: 138/- (10-14-21 @ 09:11) (112/55 - 146/85)  RR: 18 (10-14-21 @ 09:11) (16 - 21)  SpO2: 95% (10-14-21 @ 09:11) (92% - 100%)    PHYSICAL EXAM:  GENERAL: NAD  NECK: Supple, No JVD  CHEST/LUNG: Clear to auscultation bilaterally; No wheezing.  HEART: Regular rate and rhythm; No murmurs, rubs, or gallops  ABDOMEN: Soft, Nontender, Nondistended; Bowel sounds present  EXTREMITIES:   No edema  NEUROLOGY: AAO X 3      LABS:                        8.1    10.24 )-----------( 249      ( 14 Oct 2021 05:06 )             25.6     10-14    133<L>  |  96  |  74<H>  ----------------------------<  165<H>  4.4   |  24  |  2.52<H>    Ca    8.8      14 Oct 2021 05:06  Mg     2.7     10-14    TPro  6.6  /  Alb  3.2<L>  /  TBili  1.1  /  DBili  x   /  AST  57<H>  /  ALT  47<H>  /  AlkPhos  97  10-13    PT/INR - ( 13 Oct 2021 18:50 )   PT: 36.2 sec;   INR: 3.19 ratio         PTT - ( 13 Oct 2021 18:50 )  PTT:33.9 sec        CAPILLARY BLOOD GLUCOSE      POCT Blood Glucose.: 166 mg/dL (14 Oct 2021 12:57)  POCT Blood Glucose.: 169 mg/dL (14 Oct 2021 09:24)  POCT Blood Glucose.: 187 mg/dL (14 Oct 2021 02:39)        RADIOLOGY & ADDITIONAL TESTS:    Imaging Personally Reviewed:    Consultant(s) Notes Reviewed:      Care Discussed with Consultants/Other Providers:    Dileep Amador MD, CMD, FACP    [id number] [place] [address]  Office Tel: [phone]  Cell: [phone]

## 2021-10-14 NOTE — PHYSICAL THERAPY INITIAL EVALUATION ADULT - DISCHARGE DISPOSITION, PT EVAL
Subacute Rehab, however patient and son at bedside declining. If patient is to return home, rec 1 person assist for all functional mobility and HPT. Has all DME.

## 2021-10-14 NOTE — H&P ADULT - HISTORY OF PRESENT ILLNESS
89 yr old very pleasant gentleman w/ PMH of CAD s/p PCI (ROBERTO to D1 6/2/14), chronic AF (on AC w/ eliquis), tachy/perla syndrome s/p biV PPM placement, CVA x 2, HFpEF who was recently hospitalised in Aug 2021 for monomorphic VT s/p cardioversion presents w/ fluid overload. History obtained from patient and outpatient notes from patient's cardiologist's office. Pt reports that he weighed 130 lbs at hospital discharge but was weighted by son yesterday and was noted to be 160 lbs. Son also noted increasing bilateral lower and upper extremity edema. Pt also states he has had shortness of breath and a cough w/ trace blood in sputum over the last 1 - 1.5 weeks and notes he feels congestion in his chest. Has been sleeping sitting up at night. Notes today when getting into ride to come to hospital, noted fluid from his arms for the first time. Denies fever and chills. Denies nausea and abdominal pain. Currently on lasix 40 mg daily at home. While in cardiologist's office today, pt noted w/ weeping edema in b/l upper and lower extremities; sent to ED for further fluid overload management.

## 2021-10-14 NOTE — H&P ADULT - PROBLEM SELECTOR PLAN 2
baseline CKD stage 3; baseline CKD stage 3; last sCr 1.55 prior to discharge, now 2.58; likely increased secondary to acute chf  - reassess in am on BMP  - hold losartan  - monitor UOP

## 2021-10-14 NOTE — H&P ADULT - PROBLEM SELECTOR PLAN 4
hx of chronic Afib, s/p biV PPM; ekg w/ atrially paced rhythm  - rate controlled; holding BB (coreg 6.25 mg BID) currently, resume when ok w/ cardiology  - c/w AC: apixaban 2.5 mg BID

## 2021-10-14 NOTE — H&P ADULT - NSHPPHYSICALEXAM_GEN_ALL_CORE
PHYSICAL EXAM:   GENERAL: Alert. Not confused. No acute distress. Not thin. Not cachectic. Not obese.  HEAD:  Atraumatic. Normocephalic.  EYES: EOMI. PERRLA. Normal conjunctiva/sclera.  ENT: Neck supple. No JVD. Moist oral mucosa. Not edentulous. No thrush.  LYMPH: Normal supraclavicular/cervical lymph nodes.   CARDIAC: irregularly irregular. S1. S2. +systolic murmur. No rub. No distant heart sounds.  LUNG/CHEST: b/l rhonchi. No wheezes.   ABDOMEN: Soft. No tenderness. No distension. No fluid wave. Normal bowel sounds.  BACK: No midline/vertebral tenderness. No flank tenderness.  VASCULAR: +2 b/l radial pulses. Palpable DP pulses.  EXTREMITIES:  Weeping edema in b/l LE, b/l LE edematous up to sacrum, b/l UE edema,  Moving all 4.  NEUROLOGY: A&Ox3. Non-focal exam. Cranial nerves intact. Normal speech. Sensation intact.  PSYCH: Normal behavior. Normal affect.  SKIN: No jaundice. No erythema. No rash/lesion.  Vascular Access:     ICU Vital Signs Last 24 Hrs  T(C): 36.9 (13 Oct 2021 19:18), Max: 98.2 (13 Oct 2021 18:28)  T(F): 98.4 (13 Oct 2021 19:18), Max: 208.7 (13 Oct 2021 18:28)  HR: 77 (14 Oct 2021 00:27) (60 - 89)  BP: 129/66 (14 Oct 2021 00:27) (112/55 - 146/85)  BP(mean): 89 (14 Oct 2021 00:27) (87 - 89)  ABP: --  ABP(mean): --  RR: 16 (14 Oct 2021 00:27) (16 - 21)  SpO2: 97% (14 Oct 2021 00:27) (97% - 100%)      I&O's Summary

## 2021-10-14 NOTE — ED ADULT NURSE REASSESSMENT NOTE - NS ED NURSE REASSESS COMMENT FT1
patient has minimal urine output despite 80 mg IV lasix. patient bladder scanned and found to have 700 cc urine in the bladder. MAR contacted at [phone] and gave RN verbal orders to straight cath patient. RN patient has minimal urine output despite 80 mg IV lasix. patient bladder scanned and found to have 700 cc urine in the bladder. LAURIE Doan contacted at [phone] and gave RN verbal orders to straight cath patient as opposed to river placement d/t patients age. RN x2 at patient bedside to straight cath patient with sterile technique with output of 500 cc clear, yellow urine. condom catheter reapplied at this time

## 2021-10-14 NOTE — CHART NOTE - NSCHARTNOTEFT_GEN_A_CORE
This is a 89 year old patient that was seen in August for Anemia and Thrombocytopenia . It was determined this Anemia was due to CKD and Thrombocytopenia resolved during last admission. 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

## 2021-10-14 NOTE — H&P ADULT - NSHPLABSRESULTS_GEN_ALL_CORE
Personally reviewed labs.   Personally reviewed imaging.   Personally reviewed EKG.                           9.0    11.04 )-----------( 283      ( 13 Oct 2021 18:50 )             27.8     130<L>  |  92<L>  |  74<H>  ----------------------------<  199<H>  4.7   |  23  |  2.58<H>    Ca    9.0      13 Oct 2021 18:50    TPro  6.6  /  Alb  3.2<L>  /  TBili  1.1  /  DBili  x   /  AST  57<H>  /  ALT  47<H>  /  AlkPhos  97  10-13      LIVER FUNCTIONS - ( 13 Oct 2021 18:50 )  Alb: 3.2 g/dL / Pro: 6.6 g/dL / ALK PHOS: 97 U/L / ALT: 47 U/L / AST: 57 U/L / GGT: x           PT/INR - ( 13 Oct 2021 18:50 )   PT: 36.2 sec;   INR: 3.19 ratio    PTT - ( 13 Oct 2021 18:50 )  PTT:33.9 sec    CXR: Redemonstrated left chest wall multilead pacemaker/AICD.  Prominent cephalization of vessels with vertical vascular redistribution.  There are no acute bony pathologies.  Cardiomegaly.  Small right and moderate left pleural effusions.    IMPRESSION:  Cardiomegaly with pulmonary edema, and bilateral pleural effusions left greater than right.    EKG: atrially paced rhythm, no acute changes

## 2021-10-14 NOTE — H&P ADULT - PROBLEM SELECTOR PLAN 1
Recent TTE w/ normal LV function, diastolic func unable to be evaluated due to AFib CXR w/ bilateral pulm edema and effusions, BNP 50938, trop downtrending in setting of FLOYD.  -Recent TTE w/ normal LV function, diastolic func unable to be evaluated due to AFib  - s/p Lasix 80 mg IV x1 in ED; about ?500-700 cc UOP  - will lasix challenge again overnight  w/ Lasix 80 mg IV x1; monitor UOP, if none, may need bladder scan to assess for retention (has required straight cath in past) and foely insertion  - if unresponsive to lasix, may consider drip in am; f/up further cardiology recs in am  - currently oxygenating adequately on room air  - monitor spO2 and VS q4h  - fluid restriction to 1200 cc/day  - monitor electrolytes  - maintain on Tele

## 2021-10-15 NOTE — PROGRESS NOTE ADULT - SUBJECTIVE AND OBJECTIVE BOX
Pt seen and examined  no complaints    No Known Allergies    Hospital Medications:   MEDICATIONS  (STANDING):  aMIOdarone    Tablet 400 milliGRAM(s) Oral daily  apixaban 2.5 milliGRAM(s) Oral two times a day  aspirin enteric coated 81 milliGRAM(s) Oral daily  atorvastatin 40 milliGRAM(s) Oral at bedtime  dextrose 40% Gel 15 Gram(s) Oral once  dextrose 5%. 1000 milliLiter(s) (50 mL/Hr) IV Continuous <Continuous>  dextrose 50% Injectable 25 Gram(s) IV Push once  finasteride 5 milliGRAM(s) Oral daily  furosemide   Injectable 40 milliGRAM(s) IV Push two times a day  glucagon  Injectable 1 milliGRAM(s) IntraMuscular once  influenza   Vaccine 0.5 milliLiter(s) IntraMuscular once  insulin lispro (ADMELOG) corrective regimen sliding scale   SubCutaneous three times a day before meals  insulin lispro (ADMELOG) corrective regimen sliding scale   SubCutaneous at bedtime  ketotifen 0.025% Ophthalmic Solution 1 Drop(s) Right EYE two times a day  pantoprazole    Tablet 40 milliGRAM(s) Oral before breakfast  tamsulosin 0.4 milliGRAM(s) Oral at bedtime        VITALS:  T(F): 97.7 (10-15-21 @ 10:17), Max: 98.3 (10-14-21 @ 21:13)  HR: 60 (10-15-21 @ 10:17)  BP: 124/66 (10-15-21 @ 10:17)  RR: 18 (10-15-21 @ 10:17)  SpO2: 96% (10-15-21 @ 10:17)  Wt(kg): --    10-14 @ 07:  -  10-15 @ 07:00  --------------------------------------------------------  IN: 820 mL / OUT: 1325 mL / NET: -505 mL    10-15 @ 07:01  -  10-15 @ 13:44  --------------------------------------------------------  IN: 0 mL / OUT: 125 mL / NET: -125 mL      PHYSICAL EXAM:  Constitutional: NAD  HEENT: anicteric sclera, oropharynx clear, MMM  Neck: No JVD  Respiratory: b/l rhonchi  Cardiovascular: S1, S2, RRR  Gastrointestinal: BS+, soft, NT/ND  Extremities:++ peripheral edema  Neurological: A/O x 3, no focal deficits  Psychiatric: Normal mood, normal affect  : No CVA tenderness. No river.   Skin: No rashes    LABS:  10-15    132<L>  |  94<L>  |  78<H>  ----------------------------<  124<H>  3.7   |  23  |  2.47<H>    Ca    8.7      15 Oct 2021 07:27  Mg     2.7     10-14    TPro  5.7<L>  /  Alb  2.9<L>  /  TBili  1.1  /  DBili      /  AST  62<H>  /  ALT  44  /  AlkPhos  81  10-15    Creatinine Trend: 2.47 <--, 2.52 <--, 2.58 <--                        8.3    8.68  )-----------( 267      ( 15 Oct 2021 07:27 )             25.0     Urine Studies:  Urinalysis Basic - ( 14 Oct 2021 13:02 )    Color: BROWN / Appearance: Turbid / S.013 / pH:   Gluc:  / Ketone: Negative  / Bili: Negative / Urobili: Negative   Blood:  / Protein: 30 mg/dL / Nitrite: Negative   Leuk Esterase: Large / RBC: 1815 /hpf / WBC 86 /HPF   Sq Epi:  / Non Sq Epi: 0 /hpf / Bacteria: Many      Potassium, Random Urine: 49 mmol/L (10-14 @ 13:02)  Osmolality, Random Urine: 348 mos/kg (10-14 @ 13:02)  Sodium, Random Urine: 17 mmol/L (10-14 @ 13:02)  Creatinine, Random Urine: 50 mg/dL (10-14 @ 13:02)  Chloride, Random Urine: 31 mmol/L (10-14 @ 13:02)    RADIOLOGY & ADDITIONAL STUDIES:

## 2021-10-15 NOTE — PROGRESS NOTE ADULT - ASSESSMENT
89 yr old very pleasant gentleman w/ PMH of CAD s/p PCI (ROBERTO to D1 6/2/14), chronic AF (on AC w/ eliquis), tachy/perla syndrome s/p biV PPM placement, CVA x 2, HFpEF who was recently hospitalised in Aug 2021 for monomorphic VT s/p cardioversion presents w/ fluid overload found to have FLOYD on CKD stage 3      1) FLOYD on CKD stage 3  b/l cr around 1.5 to 1.8mg/dl  DDx includes Cardiorenal vs ATn,   Ua showing UTI--> start IV ceftriaxone one gram iv qd x 7 days  Urine lytes reviewed   renal US-> right kidney 9.9cm and left kidney 10.2 cm-->no obstruction  Start lasix 40mg iv bid  keep off nephrotoxins such as ace/arb/nsaids/iv contrast  trend bmp  strict i/o's    2)Edema   lasix 40mg iv bid  strict i/o's  daily wts  monitor    3) HYponatremia  na 133-->132  likely hypervolemic  lasix as above  trend na      Dr Huynh  [phone] 89 yr old very pleasant gentleman w/ PMH of CAD s/p PCI (ROBERTO to D1 6/2/14), chronic AF (on AC w/ eliquis), tachy/perla syndrome s/p biV PPM placement, CVA x 2, HFpEF who was recently hospitalised in Aug 2021 for monomorphic VT s/p cardioversion presents w/ fluid overload found to have FLOYD on CKD stage 3      1) FLOYD on CKD stage 3  b/l cr around 1.5 to 1.8mg/dl  DDx includes Cardiorenal vs ATn,   Ua showing UTI--> start IV ceftriaxone one gram iv qd x 3 days  Urine lytes reviewed   renal US-> right kidney 9.9cm and left kidney 10.2 cm-->no obstruction  Start lasix 40mg iv bid  keep off nephrotoxins such as ace/arb/nsaids/iv contrast  trend bmp  strict i/o's    2)Edema   lasix 40mg iv bid  strict i/o's  daily wts  monitor    3) HYponatremia  na 133-->132  likely hypervolemic  lasix as above  trend na      Dr Huynh  [phone]

## 2021-10-15 NOTE — PROGRESS NOTE ADULT - SUBJECTIVE AND OBJECTIVE BOX
Patient is a 89y old  Male who presents with a chief complaint of CHF exacerbation (14 Oct 2021 12:00)      SUBJECTIVE / OVERNIGHT EVENTS:    Events noted.  CONSTITUTIONAL: No fever,  or fatigue  RESPIRATORY: Dyspnea on exersion  CARDIOVASCULAR: No chest pain, palpitations, dizziness, or leg swelling  GASTROINTESTINAL: No abdominal or epigastric pain.   NEUROLOGICAL: No headaches,     MEDICATIONS  (STANDING):  aMIOdarone    Tablet 400 milliGRAM(s) Oral daily  apixaban 2.5 milliGRAM(s) Oral two times a day  aspirin enteric coated 81 milliGRAM(s) Oral daily  atorvastatin 40 milliGRAM(s) Oral at bedtime  dextrose 40% Gel 15 Gram(s) Oral once  dextrose 5%. 1000 milliLiter(s) (50 mL/Hr) IV Continuous <Continuous>  dextrose 50% Injectable 25 Gram(s) IV Push once  finasteride 5 milliGRAM(s) Oral daily  furosemide   Injectable 40 milliGRAM(s) IV Push two times a day  glucagon  Injectable 1 milliGRAM(s) IntraMuscular once  influenza   Vaccine 0.5 milliLiter(s) IntraMuscular once  insulin lispro (ADMELOG) corrective regimen sliding scale   SubCutaneous three times a day before meals  insulin lispro (ADMELOG) corrective regimen sliding scale   SubCutaneous at bedtime  ketotifen 0.025% Ophthalmic Solution 1 Drop(s) Right EYE two times a day  pantoprazole    Tablet 40 milliGRAM(s) Oral before breakfast  tamsulosin 0.4 milliGRAM(s) Oral at bedtime    MEDICATIONS  (PRN):  acetaminophen   Tablet .. 650 milliGRAM(s) Oral every 6 hours PRN Temp greater or equal to 38C (100.4F), Mild Pain (1 - 3)        CAPILLARY BLOOD GLUCOSE      POCT Blood Glucose.: 166 mg/dL (14 Oct 2021 12:57)  POCT Blood Glucose.: 169 mg/dL (14 Oct 2021 09:24)  POCT Blood Glucose.: 187 mg/dL (14 Oct 2021 02:39)    I&O's Summary    13 Oct 2021 07:01  -  14 Oct 2021 07:00  --------------------------------------------------------  IN: 60 mL / OUT: 400 mL / NET: -340 mL    14 Oct 2021 07:01  -  14 Oct 2021 13:27  --------------------------------------------------------  IN: 220 mL / OUT: 0 mL / NET: 220 mL        T(C): 36.7 (10-14-21 @ 09:11), Max: 98.2 (10-13-21 @ 18:28)  HR: 61 (10-14-21 @ 09:11) (60 - 89)  BP: 138/- (10-14-21 @ 09:11) (112/55 - 146/85)  RR: 18 (10-14-21 @ 09:11) (16 - 21)  SpO2: 95% (10-14-21 @ 09:11) (92% - 100%)    PHYSICAL EXAM:  GENERAL: NAD  NECK: Supple, No JVD  CHEST/LUNG: Clear to auscultation bilaterally; No wheezing.  HEART: Regular rate and rhythm; No murmurs, rubs, or gallops  ABDOMEN: Soft, Nontender, Nondistended; Bowel sounds present  EXTREMITIES:   No edema  NEUROLOGY: AAO X 3      LABS:                        8.1    10.24 )-----------( 249      ( 14 Oct 2021 05:06 )             25.6     10-14    133<L>  |  96  |  74<H>  ----------------------------<  165<H>  4.4   |  24  |  2.52<H>    Ca    8.8      14 Oct 2021 05:06  Mg     2.7     10-14    TPro  6.6  /  Alb  3.2<L>  /  TBili  1.1  /  DBili  x   /  AST  57<H>  /  ALT  47<H>  /  AlkPhos  97  10-13    PT/INR - ( 13 Oct 2021 18:50 )   PT: 36.2 sec;   INR: 3.19 ratio         PTT - ( 13 Oct 2021 18:50 )  PTT:33.9 sec        CAPILLARY BLOOD GLUCOSE      POCT Blood Glucose.: 166 mg/dL (14 Oct 2021 12:57)  POCT Blood Glucose.: 169 mg/dL (14 Oct 2021 09:24)  POCT Blood Glucose.: 187 mg/dL (14 Oct 2021 02:39)        RADIOLOGY & ADDITIONAL TESTS:    Imaging Personally Reviewed:    Consultant(s) Notes Reviewed:      Care Discussed with Consultants/Other Providers:    Dileep Amador MD, CMD, FACP    [address] [place] [address]  Office Tel: [phone]  Cell: [phone]

## 2021-10-16 NOTE — PROGRESS NOTE ADULT - ASSESSMENT
89 yr old very pleasant gentleman w/ PMH of CAD s/p PCI (ROBERTO to D1 6/2/14), chronic AF (on AC w/ eliquis), tachy/perla syndrome s/p biV PPM placement, CVA x 2, HFpEF who was recently hospitalised in Aug 2021 for monomorphic VT s/p cardioversion presents w/ fluid overload found to have FLOYD on CKD stage 3      1) FLOYD on CKD stage 3  b/l cr around 1.5 to 1.8mg/dl  DDx includes Cardiorenal vs ATn,   Ua showing UTII on V ceftriaxone    renal US-> right kidney 9.9cm and left kidney 10.2 cm-->no obstruction  on  lasix 40mg iv bid  keep off nephrotoxins such as ace/arb/nsaids/iv contrast  trend bmp  strict i/o's    2)Edema   lasix 40mg iv bid-- cont   strict i/o's  daily wts  monitor    3) HYponatremia  na improved to 135  likely hypervolemic  lasix as above  trend na      dr Herman   [phone]

## 2021-10-16 NOTE — PROGRESS NOTE ADULT - SUBJECTIVE AND OBJECTIVE BOX
Patient is a 89y old  Male who presents with a chief complaint of CHF exacerbation (16 Oct 2021 11:04)      SUBJECTIVE / OVERNIGHT EVENTS:    Events noted.  CONSTITUTIONAL: No fever,  or fatigue  RESPIRATORY: No cough, wheezing,  No shortness of breath  CARDIOVASCULAR: No chest pain, palpitations  GASTROINTESTINAL: No abdominal or epigastric pain.   NEUROLOGICAL: No headaches,     MEDICATIONS  (STANDING):  aMIOdarone    Tablet 400 milliGRAM(s) Oral daily  apixaban 2.5 milliGRAM(s) Oral two times a day  aspirin enteric coated 81 milliGRAM(s) Oral daily  atorvastatin 40 milliGRAM(s) Oral at bedtime  cefTRIAXone   IVPB 1000 milliGRAM(s) IV Intermittent every 24 hours  dextrose 40% Gel 15 Gram(s) Oral once  dextrose 5%. 1000 milliLiter(s) (50 mL/Hr) IV Continuous <Continuous>  dextrose 50% Injectable 25 Gram(s) IV Push once  finasteride 5 milliGRAM(s) Oral daily  furosemide   Injectable 40 milliGRAM(s) IV Push two times a day  glucagon  Injectable 1 milliGRAM(s) IntraMuscular once  influenza   Vaccine 0.5 milliLiter(s) IntraMuscular once  insulin lispro (ADMELOG) corrective regimen sliding scale   SubCutaneous three times a day before meals  insulin lispro (ADMELOG) corrective regimen sliding scale   SubCutaneous at bedtime  ketotifen 0.025% Ophthalmic Solution 1 Drop(s) Right EYE two times a day  pantoprazole    Tablet 40 milliGRAM(s) Oral before breakfast  tamsulosin 0.4 milliGRAM(s) Oral at bedtime    MEDICATIONS  (PRN):  acetaminophen   Tablet .. 650 milliGRAM(s) Oral every 6 hours PRN Temp greater or equal to 38C (100.4F), Mild Pain (1 - 3)        CAPILLARY BLOOD GLUCOSE      POCT Blood Glucose.: 167 mg/dL (16 Oct 2021 22:01)  POCT Blood Glucose.: 174 mg/dL (16 Oct 2021 17:35)  POCT Blood Glucose.: 161 mg/dL (16 Oct 2021 13:29)  POCT Blood Glucose.: 169 mg/dL (16 Oct 2021 08:20)    I&O's Summary    15 Oct 2021 07:01  -  16 Oct 2021 07:00  --------------------------------------------------------  IN: 320 mL / OUT: 1200 mL / NET: -880 mL    16 Oct 2021 07:01  -  16 Oct 2021 23:43  --------------------------------------------------------  IN: 720 mL / OUT: 700 mL / NET: 20 mL        T(C): 36.5 (10-16-21 @ 21:54), Max: 36.7 (10-16-21 @ 14:41)  HR: 60 (10-16-21 @ 21:54) (60 - 62)  BP: 110/58 (10-16-21 @ 21:54) (110/58 - 129/67)  RR: 18 (10-16-21 @ 21:54) (16 - 18)  SpO2: 94% (10-16-21 @ 21:54) (93% - 99%)    PHYSICAL EXAM:  GENERAL: NAD  NECK: Supple, No JVD  CHEST/LUNG: Clear to auscultation bilaterally; No wheezing.  HEART: Regular rate and rhythm; No murmurs, rubs, or gallops  ABDOMEN: Soft, Nontender, Nondistended; Bowel sounds present  EXTREMITIES:   No edema  NEUROLOGY: AAO X 3      LABS:                        8.0    8.13  )-----------( 271      ( 16 Oct 2021 07:29 )             24.7     10-16    135  |  98  |  70<H>  ----------------------------<  162<H>  3.5   |  24  |  2.29<H>    Ca    8.4      16 Oct 2021 07:29    TPro  5.7<L>  /  Alb  2.9<L>  /  TBili  1.1  /  DBili  x   /  AST  62<H>  /  ALT  44  /  AlkPhos  81  10-15            CAPILLARY BLOOD GLUCOSE      POCT Blood Glucose.: 167 mg/dL (16 Oct 2021 22:01)  POCT Blood Glucose.: 174 mg/dL (16 Oct 2021 17:35)  POCT Blood Glucose.: 161 mg/dL (16 Oct 2021 13:29)  POCT Blood Glucose.: 169 mg/dL (16 Oct 2021 08:20)        RADIOLOGY & ADDITIONAL TESTS:    Imaging Personally Reviewed:    Consultant(s) Notes Reviewed:      Care Discussed with Consultants/Other Providers:    Dileep Amador MD, CMD, FACP    [address] [place] [address]  Office Tel: [phone]  Cell: [phone]

## 2021-10-16 NOTE — PROGRESS NOTE ADULT - SUBJECTIVE AND OBJECTIVE BOX
[facility] : Progress Note :: [phone], (office [phone]),   Dr Herman / Dr Vega / Dr Smith / Dr Kendrick / Dr Amina FALL / Dr Garcia / Dr Dawn / Dr Nicholas harris  _____________________________________________________________________________________________    feels better.  SOB improved    No Known Allergies    Hospital Medications:   MEDICATIONS  (STANDING):  aMIOdarone    Tablet 400 milliGRAM(s) Oral daily  apixaban 2.5 milliGRAM(s) Oral two times a day  aspirin enteric coated 81 milliGRAM(s) Oral daily  atorvastatin 40 milliGRAM(s) Oral at bedtime  cefTRIAXone   IVPB 1000 milliGRAM(s) IV Intermittent every 24 hours  dextrose 40% Gel 15 Gram(s) Oral once  dextrose 5%. 1000 milliLiter(s) (50 mL/Hr) IV Continuous <Continuous>  dextrose 50% Injectable 25 Gram(s) IV Push once  finasteride 5 milliGRAM(s) Oral daily  furosemide   Injectable 40 milliGRAM(s) IV Push two times a day  glucagon  Injectable 1 milliGRAM(s) IntraMuscular once  influenza   Vaccine 0.5 milliLiter(s) IntraMuscular once  insulin lispro (ADMELOG) corrective regimen sliding scale   SubCutaneous three times a day before meals  insulin lispro (ADMELOG) corrective regimen sliding scale   SubCutaneous at bedtime  ketotifen 0.025% Ophthalmic Solution 1 Drop(s) Right EYE two times a day  pantoprazole    Tablet 40 milliGRAM(s) Oral before breakfast  tamsulosin 0.4 milliGRAM(s) Oral at bedtime      VITALS:  T(F): 97.9 (10-16-21 @ 09:20), Max: 98.3 (10-15-21 @ 22:48)  HR: 62 (10-16-21 @ 09:20)  BP: 111/65 (10-16-21 @ 09:20)  RR: 16 (10-16-21 @ 09:20)  SpO2: 93% (10-16-21 @ 09:20)  Wt(kg): --    10-15 @ 07:01  -  10-16 @ 07:00  --------------------------------------------------------  IN: 320 mL / OUT: 1200 mL / NET: -880 mL        PHYSICAL EXAM:  Constitutional: NAD  HEENT: anicteric sclera, oropharynx clear.  Neck: No JVD  Respiratory: CTAB, no wheezes, rales or rhonchi  Cardiovascular: S1, S2, RRR  Gastrointestinal: BS+, soft, NT/ND  Extremities:   peripheral edema+  Neurological: A/O x 3, no focal deficits  : No CVA tenderness. river+       LABS:  10-16    135  |  98  |  70<H>  ----------------------------<  162<H>  3.5   |  24  |  2.29<H>    Ca    8.4      16 Oct 2021 07:29    TPro  5.7<L>  /  Alb  2.9<L>  /  TBili  1.1  /  DBili      /  AST  62<H>  /  ALT  44  /  AlkPhos  81  10-15    Creatinine Trend: 2.29 <--, 2.47 <--, 2.52 <--, 2.58 <--                        8.0    8.13  )-----------( 271      ( 16 Oct 2021 07:29 )             24.7     Urine Studies:  Urinalysis Basic - ( 14 Oct 2021 13:02 )    Color: BROWN / Appearance: Turbid / S.013 / pH:   Gluc:  / Ketone: Negative  / Bili: Negative / Urobili: Negative   Blood:  / Protein: 30 mg/dL / Nitrite: Negative   Leuk Esterase: Large / RBC: 1815 /hpf / WBC 86 /HPF   Sq Epi:  / Non Sq Epi: 0 /hpf / Bacteria: Many      Potassium, Random Urine: 49 mmol/L (10-14 @ 13:02)  Osmolality, Random Urine: 348 mos/kg (10-14 @ 13:02)  Sodium, Random Urine: 17 mmol/L (10-14 @ 13:02)  Creatinine, Random Urine: 50 mg/dL (10-14 @ 13:02)  Chloride, Random Urine: 31 mmol/L (10-14 @ 13:02)    RADIOLOGY & ADDITIONAL STUDIES:

## 2021-10-16 NOTE — CONSULT NOTE ADULT - SUBJECTIVE AND OBJECTIVE BOX
[facility] Consult Note Nephrology - CONSULTATION NOTE    89 yr old very pleasant gentleman w/ PMH of CAD s/p PCI (ROBERTO to D1 6/2/14), chronic AF (on AC w/ eliquis), tachy/perla syndrome s/p biV PPM placement, CVA x 2, HFpEF who was recently hospitalised in Aug 2021 for monomorphic VT s/p cardioversion presents w/ fluid overload. History obtained from patient and outpatient notes from patient's cardiologist's office. Pt reports that he weighed 130 lbs at hospital discharge but was weighted by son yesterday and was noted to be 160 lbs. Son also noted increasing bilateral lower and upper extremity edema. Pt also states he has had shortness of breath and a cough w/ trace blood in sputum over the last 1 - 1.5 weeks and notes he feels congestion in his chest. Has been sleeping sitting up at night. Notes today when getting into ride to come to hospital, noted fluid from his arms for the first time. Denies fever and chills. Denies nausea and abdominal pain. Currently on lasix 40 mg daily at home. While in cardiologist's office today, pt noted w/ weeping edema in b/l upper and lower extremities; sent to ED for further fluid overload management.     Renal Consult for Praneeth on CKd stage 3  B/l cr around 1.5 to 18mg/dl;  Now with acute rise in cr  VOlume overloaded--> s/p  IV lasix  has some sob  denies any chest pain    PAST MEDICAL & SURGICAL HISTORY:  Other and Unspecified Hyperlipidemia    CVA (Cerebral Infarction)  x2 in the past with residual Rt handed numbness and a little word finding trouble    Hypertension    Atrial fibrillation    Anemia    CAD (coronary artery disease)    History of ischemic cardiomyopathy    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    S/P coronary artery stent placement  D1    Pacemaker  BiV PPM      No Known Allergies    Home Medications Reviewed  Hospital Medications:   MEDICATIONS  (STANDING):  aMIOdarone    Tablet 400 milliGRAM(s) Oral daily  apixaban 2.5 milliGRAM(s) Oral two times a day  aspirin enteric coated 81 milliGRAM(s) Oral daily  atorvastatin 40 milliGRAM(s) Oral at bedtime  dextrose 40% Gel 15 Gram(s) Oral once  dextrose 5%. 1000 milliLiter(s) (50 mL/Hr) IV Continuous <Continuous>  dextrose 50% Injectable 25 Gram(s) IV Push once  finasteride 5 milliGRAM(s) Oral daily  glucagon  Injectable 1 milliGRAM(s) IntraMuscular once  influenza   Vaccine 0.5 milliLiter(s) IntraMuscular once  insulin lispro (ADMELOG) corrective regimen sliding scale   SubCutaneous three times a day before meals  insulin lispro (ADMELOG) corrective regimen sliding scale   SubCutaneous at bedtime  ketotifen 0.025% Ophthalmic Solution 1 Drop(s) Right EYE two times a day  pantoprazole    Tablet 40 milliGRAM(s) Oral before breakfast  tamsulosin 0.4 milliGRAM(s) Oral at bedtime    SOCIAL HISTORY:  Denies ETOh,Smoking,   FAMILY HISTORY:  Family history of heart disease (Father)      REVIEW OF SYSTEMS:  CONSTITUTIONAL: No weakness, fevers or chills  EYES/ENT: No visual changes;  No vertigo or throat pain   NECK: No pain or stiffness  RESPIRATORY: +shortness of breath  CARDIOVASCULAR: No chest pain or palpitations.  GASTROINTESTINAL: No abdominal or epigastric pain. No nausea, vomiting, or hematemesis; No diarrhea or constipation. No melena or hematochezia.  GENITOURINARY: No dysuria, frequency, foamy urine, urinary urgency, incontinence or hematuria  NEUROLOGICAL: No numbness or weakness  SKIN: No itching, burning, rashes, or lesions   VASCULA+ bilateral lower extremity edema.   All other review of systems is negative unless indicated above.    VITALS:  T(F): 98.1 (10-14-21 @ 09:11), Max: 208.7 (10-13-21 @ 18:28)  HR: 61 (10-14-21 @ 09:11)  BP: 138/- (10-14-21 @ 09:11)  RR: 18 (10-14-21 @ 09:11)  SpO2: 95% (10-14-21 @ 09:11)  Wt(kg): --    10-13 @ 07:01  -  10-14 @ 07:00  --------------------------------------------------------  IN: 60 mL / OUT: 400 mL / NET: -340 mL    10-14 @ 07:01  -  10-14 @ 12:00  --------------------------------------------------------  IN: 220 mL / OUT: 0 mL / NET: 220 mL      Height (cm): 162.6 (10-13 @ 17:47)  Weight (kg): 72.6 (10-13 @ 17:47)  BMI (kg/m2): 27.5 (10-13 @ 17:47)  BSA (m2): 1.78 (10-13 @ 17:47)  PHYSICAL EXAM:  Constitutional: NAD  HEENT: anicteric sclera, oropharynx clear, MMM  Neck: No JVD  Respiratory: b/l rhonchi  Cardiovascular: S1, S2, RRR  Gastrointestinal: BS+, soft, NT/ND  Extremities:++ peripheral edema  Neurological: A/O x 3, no focal deficits  Psychiatric: Normal mood, normal affect  : No CVA tenderness. No river.   Skin: No rashes      LABS:  10-14    133<L>  |  96  |  74<H>  ----------------------------<  165<H>  4.4   |  24  |  2.52<H>    Ca    8.8      14 Oct 2021 05:06  Mg     2.7     10-14    TPro  6.6  /  Alb  3.2<L>  /  TBili  1.1  /  DBili      /  AST  57<H>  /  ALT  47<H>  /  AlkPhos  97  10-13    Creatinine Trend: 2.52 <--, 2.58 <--                        8.1    10.24 )-----------( 249      ( 14 Oct 2021 05:06 )             25.6     Urine Studies:      RADIOLOGY & ADDITIONAL STUDIES:                
HISTORY OF PRESENT ILLNESS: 89 yr old very pleasant gentleman w/ PMH of CAD s/p PCI (ROBERTO to D1 6/2/14), chronic AF (on AC w/ eliquis), tachy/perla syndrome s/p biV PPM placement, CVA x 2, HFpEF who was recently hospitalised in Aug 2021 for monomorphic VT s/p cardioversion presents w/ fluid overload. History obtained from patient and outpatient notes from patient's cardiologist's office. Pt reports that he weighed 130 lbs at hospital discharge but was weighted by son yesterday and was noted to be 160 lbs. Son also noted increasing bilateral lower and upper extremity edema. Pt also states he has had shortness of breath and a cough w/ trace blood in sputum over the last 1 - 1.5 weeks and notes he feels congestion in his chest. Has been sleeping sitting up at night. Notes today when getting into ride to come to hospital, noted fluid from his arms for the first time. Denies fever and chills. Denies nausea and abdominal pain. Currently on lasix 40 mg daily at home. While in cardiologist's office today, pt noted w/ weeping edema in b/l upper and lower extremities; sent to ED for further fluid overload management.     PAST MEDICAL & SURGICAL HISTORY:  Other and Unspecified Hyperlipidemia    CVA (Cerebral Infarction)  x2 in the past with residual Rt handed numbness and a little word finding trouble    Hypertension    Atrial fibrillation    Anemia    CAD (coronary artery disease)    History of ischemic cardiomyopathy    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    S/P coronary artery stent placement  D1    Pacemaker  BiV PPM        [ x] Diabetes   [x ] Hypertension  [x ] Hyperlipidemia  [x ] CAD  [x ] PCI  [ ] CABG    PREVIOUS DIAGNOSTIC TESTING:    [ ] Echocardiogram:  [ ]  Catheterization:  [ ] Stress Test:  	    MEDICATIONS:  aMIOdarone    Tablet 400 milliGRAM(s) Oral daily  apixaban 2.5 milliGRAM(s) Oral two times a day  aspirin enteric coated 81 milliGRAM(s) Oral daily  furosemide   Injectable 40 milliGRAM(s) IV Push two times a day  tamsulosin 0.4 milliGRAM(s) Oral at bedtime    cefTRIAXone   IVPB 1000 milliGRAM(s) IV Intermittent every 24 hours      acetaminophen   Tablet .. 650 milliGRAM(s) Oral every 6 hours PRN    pantoprazole    Tablet 40 milliGRAM(s) Oral before breakfast    atorvastatin 40 milliGRAM(s) Oral at bedtime  dextrose 40% Gel 15 Gram(s) Oral once  dextrose 50% Injectable 25 Gram(s) IV Push once  finasteride 5 milliGRAM(s) Oral daily  glucagon  Injectable 1 milliGRAM(s) IntraMuscular once  insulin lispro (ADMELOG) corrective regimen sliding scale   SubCutaneous three times a day before meals  insulin lispro (ADMELOG) corrective regimen sliding scale   SubCutaneous at bedtime    dextrose 5%. 1000 milliLiter(s) IV Continuous <Continuous>  influenza   Vaccine 0.5 milliLiter(s) IntraMuscular once  ketotifen 0.025% Ophthalmic Solution 1 Drop(s) Right EYE two times a day      Allergies    No Known Allergies    Intolerances        FAMILY HISTORY:  Family history of heart disease (Father)        SOCIAL HISTORY:    [ ] Non-smoker  [ ] Smoker  [ ] Alcohol      REVIEW OF SYSTEMS:  [ ]chest pain  [  ]shortness of breath  [  ]palpitations  [  ]syncope  [ ]near syncope [  ]diplopia  [  ]altered mental status   [  ]fevers  [ ]chills [ ]nausea  [ ]vomitting  [ ]abdominal pain  [ ]melena  [ ]BRBPR  [  ]epistaxis  [  ]rash  [  ]lower extremity edema      CONSTITUTIONAL: No fever, weight loss, or fatigue  EYES: No eye pain, visual disturbances, or discharge  ENMT:  No difficulty hearing, tinnitus, vertigo; No sinus or throat pain  NECK: No pain or stiffness  RESPIRATORY: No cough, wheezing, chills or hemoptysis; No Shortness of Breath  CARDIOVASCULAR: No chest pain, palpitations, passing out, dizziness, or leg swelling  GASTROINTESTINAL: No abdominal or epigastric pain. No nausea, vomiting, or hematemesis; No diarrhea or constipation. No melena or hematochezia.  GENITOURINARY: No dysuria, frequency, hematuria, or incontinence  NEUROLOGICAL: No headaches, memory loss, loss of strength, numbness, or tremors  SKIN: No itching, burning, rashes, or lesions   LYMPH Nodes: No enlarged glands  ENDOCRINE: No heat or cold intolerance; No hair loss  MUSCULOSKELETAL: No joint pain or swelling; No muscle, back, or extremity pain  PSYCHIATRIC: No depression, anxiety, mood swings, or difficulty sleeping  HEME/LYMPH: No easy bruising, or bleeding gums  ALLERY AND IMMUNOLOGIC: No hives or eczema	    [ ] All others negative	  [ ] Unable to obtain    PHYSICAL EXAM:  T(C): 36.4 (10-16-21 @ 01:24), Max: 36.8 (10-15-21 @ 22:48)  HR: 60 (10-16-21 @ 01:24) (60 - 61)  BP: 129/67 (10-16-21 @ 01:24) (107/62 - 134/66)  RR: 18 (10-16-21 @ 01:24) (18 - 18)  SpO2: 96% (10-16-21 @ 01:24) (95% - 98%)  Wt(kg): --  I&O's Summary    14 Oct 2021 07:01  -  15 Oct 2021 07:00  --------------------------------------------------------  IN: 820 mL / OUT: 1325 mL / NET: -505 mL    15 Oct 2021 07:01  -  16 Oct 2021 04:51  --------------------------------------------------------  IN: 200 mL / OUT: 700 mL / NET: -500 mL        Appearance: Normal	  HEENT:   Normal oral mucosa, PERRL, EOMI	  Lymphatic: No lymphadenopathy  Cardiovascular: Normal S1 S2, No JVD, No murmurs, No edema  Respiratory: Lungs clear to auscultation	  Psychiatry: A & O x 3, Mood & affect appropriate  Gastrointestinal:  Soft, Non-tender, + BS	  Skin: No rashes, No ecchymoses, No cyanosis	  Neurologic: Non-focal  Extremities: Normal range of motion, No clubbing, cyanosis or edema  Vascular: Peripheral pulses palpable 2+ bilaterally    TELEMETRY: 	paced    ECG:  	< from: 12 Lead ECG (10.13.21 @ 18:37) >  Diagnosis Line Atrial-paced rhythm  RIGHT BUNDLE BRANCH BLOCK  INFERIOR INFARCT , AGE UNDETERMINED  ANTEROLATERAL INFARCT , AGE UNDETERMINED  ABNORMAL ECG  WHEN COMPARED WITH ECG OF 01-SEP-2021 08:34,  NO SIGNIFICANT CHANGE WAS FOUND  Confirmed by MD JULISA, SHELLEY ([id number]) on 10/15/2021 12:45:04 PM    < end of copied text >    RADIOLOGY:  OTHER: 	  	  LABS:	 	    CARDIAC MARKERS:      ECHO: < from: Transthoracic Echocardiogram (08.14.21 @ 14:06) >  CONCLUSIONS:  Normal left ventricular systolic function. No segmental  wall motion abnormalities.  Mild-moderate pulmonary hypertension.  A device wire is noted in the right heart.  ------------------------------------------------------------------------  Confirmed on  8/14/2021 - 17:43:47 by Herbie Chan M.D.  ------------------------------------------------------------------------    < end of copied text >                              8.3    8.68  )-----------( 267      ( 15 Oct 2021 07:27 )             25.0     10-15    132<L>  |  94<L>  |  78<H>  ----------------------------<  124<H>  3.7   |  23  |  2.47<H>    Ca    8.7      15 Oct 2021 07:27  Mg     2.7     10-14    TPro  5.7<L>  /  Alb  2.9<L>  /  TBili  1.1  /  DBili  x   /  AST  62<H>  /  ALT  44  /  AlkPhos  81  10-15    proBNP:   Lipid Profile:   HgA1c:   TSH:     ASSESSMENT/PLAN:  89 yr old very pleasant gentleman w/ PMH of CAD s/p PCI (ROBERTO to D1 6/2/14), chronic AF (on AC w/ eliquis), tachy/perla syndrome s/p biV PPM placement, CVA x 2, HFpEF who was recently hospitalised in Aug 2021 for monomorphic VT s/p cardioversion presents w/ fluid overload in setting of acute CHF exacerbation.     cont tele  	  Afib rate controlled, cont A/C with eliquis   ASa, Statin   ABx per medicine   keep net neg with IV lasix   Correct lytes  monitor creatine   D/W Dr Villatoro

## 2021-10-17 NOTE — PROGRESS NOTE ADULT - SUBJECTIVE AND OBJECTIVE BOX
pt seen and examined, no complaints, ROS - .     acetaminophen   Tablet .. 650 milliGRAM(s) Oral every 6 hours PRN  aMIOdarone    Tablet 400 milliGRAM(s) Oral daily  apixaban 2.5 milliGRAM(s) Oral two times a day  aspirin enteric coated 81 milliGRAM(s) Oral daily  atorvastatin 40 milliGRAM(s) Oral at bedtime  cefTRIAXone   IVPB 1000 milliGRAM(s) IV Intermittent every 24 hours  dextrose 40% Gel 15 Gram(s) Oral once  dextrose 5%. 1000 milliLiter(s) IV Continuous <Continuous>  dextrose 50% Injectable 25 Gram(s) IV Push once  finasteride 5 milliGRAM(s) Oral daily  furosemide   Injectable 40 milliGRAM(s) IV Push two times a day  glucagon  Injectable 1 milliGRAM(s) IntraMuscular once  influenza   Vaccine 0.5 milliLiter(s) IntraMuscular once  insulin lispro (ADMELOG) corrective regimen sliding scale   SubCutaneous three times a day before meals  insulin lispro (ADMELOG) corrective regimen sliding scale   SubCutaneous at bedtime  ketotifen 0.025% Ophthalmic Solution 1 Drop(s) Right EYE two times a day  pantoprazole    Tablet 40 milliGRAM(s) Oral before breakfast  tamsulosin 0.4 milliGRAM(s) Oral at bedtime                            8.0    8.13  )-----------( 271      ( 16 Oct 2021 07:29 )             24.7       Hemoglobin: 8.0 g/dL (10-16 @ 07:29)  Hemoglobin: 8.3 g/dL (10-15 @ 07:27)  Hemoglobin: 8.1 g/dL (10-14 @ 05:06)  Hemoglobin: 9.0 g/dL (10-13 @ 18:50)      10-16    135  |  98  |  70<H>  ----------------------------<  162<H>  3.5   |  24  |  2.29<H>    Ca    8.4      16 Oct 2021 07:29      Creatinine Trend: 2.29<--, 2.47<--, 2.52<--, 2.58<--    COAGS:           T(C): 36.7 (10-17-21 @ 05:00), Max: 36.7 (10-16-21 @ 14:41)  HR: 61 (10-17-21 @ 05:00) (58 - 62)  BP: 121/68 (10-17-21 @ 05:00) (110/58 - 143/68)  RR: 18 (10-17-21 @ 05:00) (16 - 18)  SpO2: 96% (10-17-21 @ 05:00) (93% - 99%)  Wt(kg): --    I&O's Summary    16 Oct 2021 07:01  -  17 Oct 2021 07:00  --------------------------------------------------------  IN: 1160 mL / OUT: 1400 mL / NET: -240 mL      HEENT:   Normal oral mucosa, PERRL, EOMI	  Lymphatic: No lymphadenopathy  Cardiovascular: Normal S1 S2, No JVD, No murmurs, No edema  Respiratory: Lungs clear to auscultation	  Psychiatry: A & O x 3, Mood & affect appropriate  Gastrointestinal:  Soft, Non-tender, + BS	  Skin: No rashes, No ecchymoses, No cyanosis	  Neurologic: Non-focal  Extremities: Normal range of motion, No clubbing, cyanosis or edema  Vascular: Peripheral pulses palpable 2+ bilaterally    TELEMETRY: 	paced    ASSESSMENT/PLAN:  89 yr old very pleasant gentleman w/ PMH of CAD s/p PCI (ROBERTO to D1 6/2/14), chronic AF (on AC w/ eliquis), tachy/perla syndrome s/p biV PPM placement, CVA x 2, HFpEF who was recently hospitalised in Aug 2021 for monomorphic VT s/p cardioversion presents w/ fluid overload in setting of acute CHF exacerbation.     cont tele  	  Afib rate controlled, cont A/C with eliquis   ASa, Statin   ABx per medicine   keep net neg with IV lasix   F/U with his cardiologist Dr. Vanegas for cardiology after discharge,

## 2021-10-17 NOTE — PROGRESS NOTE ADULT - ASSESSMENT
CARDIOLOGY ATTENDING    Agree with above. Agree with IV diuresis. No further inpatient cardiac workup expected after he achieves euvolemia. F/U with his cardiologist Dr. Vanegas for cardiology after discharge, and f/u with me for routine BiV-ICD care after discharge as scheduled. Continue lifelong a/c for AF.

## 2021-10-17 NOTE — PROGRESS NOTE ADULT - SUBJECTIVE AND OBJECTIVE BOX
Patient is a 89y old  Male who presents with a chief complaint of CHF exacerbation (16 Oct 2021 11:04)      SUBJECTIVE / OVERNIGHT EVENTS: no events over night     Events noted.  CONSTITUTIONAL: No fever,  or fatigue  RESPIRATORY: No cough, wheezing,  No shortness of breath  CARDIOVASCULAR: No chest pain, palpitations  GASTROINTESTINAL: No abdominal or epigastric pain.   NEUROLOGICAL: No headaches,     MEDICATIONS  (STANDING):  aMIOdarone    Tablet 400 milliGRAM(s) Oral daily  apixaban 2.5 milliGRAM(s) Oral two times a day  aspirin enteric coated 81 milliGRAM(s) Oral daily  atorvastatin 40 milliGRAM(s) Oral at bedtime  cefTRIAXone   IVPB 1000 milliGRAM(s) IV Intermittent every 24 hours  dextrose 40% Gel 15 Gram(s) Oral once  dextrose 5%. 1000 milliLiter(s) (50 mL/Hr) IV Continuous <Continuous>  dextrose 50% Injectable 25 Gram(s) IV Push once  finasteride 5 milliGRAM(s) Oral daily  furosemide   Injectable 40 milliGRAM(s) IV Push two times a day  glucagon  Injectable 1 milliGRAM(s) IntraMuscular once  influenza   Vaccine 0.5 milliLiter(s) IntraMuscular once  insulin lispro (ADMELOG) corrective regimen sliding scale   SubCutaneous three times a day before meals  insulin lispro (ADMELOG) corrective regimen sliding scale   SubCutaneous at bedtime  ketotifen 0.025% Ophthalmic Solution 1 Drop(s) Right EYE two times a day  pantoprazole    Tablet 40 milliGRAM(s) Oral before breakfast  tamsulosin 0.4 milliGRAM(s) Oral at bedtime    MEDICATIONS  (PRN):  acetaminophen   Tablet .. 650 milliGRAM(s) Oral every 6 hours PRN Temp greater or equal to 38C (100.4F), Mild Pain (1 - 3)      T(C): 36.7 (10-17-21 @ 21:03), Max: 36.7 (10-17-21 @ 14:02)  HR: 60 (10-17-21 @ 21:03) (60 - 60)  BP: 149/72 (10-17-21 @ 21:03) (123/55 - 149/72)  RR: 18 (10-17-21 @ 21:03) (16 - 18)  SpO2: 97% (10-17-21 @ 21:03) (93% - 98%)      MEDICATIONS  (STANDING):  aMIOdarone    Tablet 400 milliGRAM(s) Oral daily  apixaban 2.5 milliGRAM(s) Oral two times a day  aspirin enteric coated 81 milliGRAM(s) Oral daily  atorvastatin 40 milliGRAM(s) Oral at bedtime  dextrose 40% Gel 15 Gram(s) Oral once  dextrose 5%. 1000 milliLiter(s) (50 mL/Hr) IV Continuous <Continuous>  dextrose 50% Injectable 25 Gram(s) IV Push once  finasteride 5 milliGRAM(s) Oral daily  furosemide   Injectable 40 milliGRAM(s) IV Push two times a day  glucagon  Injectable 1 milliGRAM(s) IntraMuscular once  influenza   Vaccine 0.5 milliLiter(s) IntraMuscular once  insulin lispro (ADMELOG) corrective regimen sliding scale   SubCutaneous three times a day before meals  insulin lispro (ADMELOG) corrective regimen sliding scale   SubCutaneous at bedtime  ketotifen 0.025% Ophthalmic Solution 1 Drop(s) Right EYE two times a day  pantoprazole    Tablet 40 milliGRAM(s) Oral before breakfast  tamsulosin 0.4 milliGRAM(s) Oral at bedtime    MEDICATIONS  (PRN):  acetaminophen   Tablet .. 650 milliGRAM(s) Oral every 6 hours PRN Temp greater or equal to 38C (100.4F), Mild Pain (1 - 3)  PHYSICAL EXAM:  GENERAL: NAD  NECK: Supple, No JVD  CHEST/LUNG: Clear to auscultation bilaterally; No wheezing.  HEART: Regular rate and rhythm; No murmurs, rubs, or gallops  ABDOMEN: Soft, Nontender, Nondistended; Bowel sounds present  EXTREMITIES:   No edema  NEUROLOGY: AAO X 3                            8.0    8.13  )-----------( 271      ( 16 Oct 2021 07:29 )             24.7               CAPILLARY BLOOD GLUCOSE      POCT Blood Glucose.: 150 mg/dL (17 Oct 2021 20:58)  POCT Blood Glucose.: 194 mg/dL (17 Oct 2021 17:02)  POCT Blood Glucose.: 185 mg/dL (17 Oct 2021 13:04)  POCT Blood Glucose.: 168 mg/dL (17 Oct 2021 08:30)      RADIOLOGY & ADDITIONAL TESTS:    Imaging Personally Reviewed:    Consultant(s) Notes Reviewed:      Care Discussed with Consultants/Other Providers:        [id number] [place] [id number]  Office Tel: [phone]  Cell: [phone]

## 2021-10-18 NOTE — PROGRESS NOTE ADULT - SUBJECTIVE AND OBJECTIVE BOX
Patient is a 89y old  Male who presents with a chief complaint of CHF exacerbation (18 Oct 2021 15:58)      SUBJECTIVE / OVERNIGHT EVENTS:    Events noted.  CONSTITUTIONAL: No fever,  or fatigue  RESPIRATORY: No cough, wheezing,  No shortness of breath  CARDIOVASCULAR: No chest pain, palpitations, dizziness, or leg swelling  GASTROINTESTINAL: No abdominal or epigastric pain.   NEUROLOGICAL: No headaches,     MEDICATIONS  (STANDING):  aMIOdarone    Tablet 400 milliGRAM(s) Oral daily  apixaban 2.5 milliGRAM(s) Oral two times a day  aspirin enteric coated 81 milliGRAM(s) Oral daily  atorvastatin 40 milliGRAM(s) Oral at bedtime  bisacodyl Suppository 10 milliGRAM(s) Rectal once  dextrose 40% Gel 15 Gram(s) Oral once  dextrose 5%. 1000 milliLiter(s) (50 mL/Hr) IV Continuous <Continuous>  dextrose 50% Injectable 25 Gram(s) IV Push once  finasteride 5 milliGRAM(s) Oral daily  furosemide   Injectable 40 milliGRAM(s) IV Push two times a day  glucagon  Injectable 1 milliGRAM(s) IntraMuscular once  influenza   Vaccine 0.5 milliLiter(s) IntraMuscular once  insulin lispro (ADMELOG) corrective regimen sliding scale   SubCutaneous three times a day before meals  insulin lispro (ADMELOG) corrective regimen sliding scale   SubCutaneous at bedtime  ketotifen 0.025% Ophthalmic Solution 1 Drop(s) Right EYE two times a day  pantoprazole    Tablet 40 milliGRAM(s) Oral before breakfast  polyethylene glycol 3350 17 Gram(s) Oral daily  senna 2 Tablet(s) Oral at bedtime  tamsulosin 0.4 milliGRAM(s) Oral at bedtime    MEDICATIONS  (PRN):  acetaminophen   Tablet .. 650 milliGRAM(s) Oral every 6 hours PRN Temp greater or equal to 38C (100.4F), Mild Pain (1 - 3)  sodium chloride 0.65% Nasal 1 Spray(s) Both Nostrils once PRN Nasal Congestion        CAPILLARY BLOOD GLUCOSE      POCT Blood Glucose.: 216 mg/dL (18 Oct 2021 20:58)  POCT Blood Glucose.: 200 mg/dL (18 Oct 2021 18:14)  POCT Blood Glucose.: 215 mg/dL (18 Oct 2021 12:42)  POCT Blood Glucose.: 141 mg/dL (18 Oct 2021 08:17)    I&O's Summary    17 Oct 2021 07:01  -  18 Oct 2021 07:00  --------------------------------------------------------  IN: 960 mL / OUT: 1625 mL / NET: -665 mL    18 Oct 2021 07:01  -  19 Oct 2021 00:10  --------------------------------------------------------  IN: 120 mL / OUT: 750 mL / NET: -630 mL        T(C): 36.5 (10-18-21 @ 21:15), Max: 36.6 (10-18-21 @ 17:12)  HR: 60 (10-18-21 @ 21:15) (60 - 63)  BP: 125/68 (10-18-21 @ 21:15) (118/66 - 151/66)  RR: 18 (10-18-21 @ 21:15) (17 - 18)  SpO2: 93% (10-18-21 @ 21:15) (93% - 97%)    PHYSICAL EXAM:    NECK: Supple, No JVD  CHEST/LUNG: Clear to auscultation bilaterally; No wheezing.  HEART: Regular rate and rhythm; No murmurs, rubs, or gallops  ABDOMEN: Soft, Nontender, Nondistended; Bowel sounds present  EXTREMITIES:   No edema  NEUROLOGY: AAO X 3      LABS:                        7.8    8.16  )-----------( 314      ( 18 Oct 2021 07:19 )             25.7     10-18    140  |  101  |  60<H>  ----------------------------<  139<H>  3.3<L>   |  28  |  1.97<H>    Ca    8.5      18 Oct 2021 07:17    TPro  5.9<L>  /  Alb  2.8<L>  /  TBili  1.0  /  DBili  x   /  AST  73<H>  /  ALT  56<H>  /  AlkPhos  95  10-18            CAPILLARY BLOOD GLUCOSE      POCT Blood Glucose.: 216 mg/dL (18 Oct 2021 20:58)  POCT Blood Glucose.: 200 mg/dL (18 Oct 2021 18:14)  POCT Blood Glucose.: 215 mg/dL (18 Oct 2021 12:42)  POCT Blood Glucose.: 141 mg/dL (18 Oct 2021 08:17)        RADIOLOGY & ADDITIONAL TESTS:    Imaging Personally Reviewed:    Consultant(s) Notes Reviewed:      Care Discussed with Consultants/Other Providers:    Dileep Amador MD, CMD, FACP    [id number] [place] [address]  Office Tel: [phone]  Cell: [phone]

## 2021-10-18 NOTE — PROGRESS NOTE ADULT - SUBJECTIVE AND OBJECTIVE BOX
Patient seen and examined  no complaints      No Known Allergies    Hospital Medications:   MEDICATIONS  (STANDING):  aMIOdarone    Tablet 400 milliGRAM(s) Oral daily  apixaban 2.5 milliGRAM(s) Oral two times a day  aspirin enteric coated 81 milliGRAM(s) Oral daily  atorvastatin 40 milliGRAM(s) Oral at bedtime  dextrose 40% Gel 15 Gram(s) Oral once  dextrose 5%. 1000 milliLiter(s) (50 mL/Hr) IV Continuous <Continuous>  dextrose 50% Injectable 25 Gram(s) IV Push once  finasteride 5 milliGRAM(s) Oral daily  furosemide   Injectable 40 milliGRAM(s) IV Push two times a day  glucagon  Injectable 1 milliGRAM(s) IntraMuscular once  influenza   Vaccine 0.5 milliLiter(s) IntraMuscular once  insulin lispro (ADMELOG) corrective regimen sliding scale   SubCutaneous three times a day before meals  insulin lispro (ADMELOG) corrective regimen sliding scale   SubCutaneous at bedtime  ketotifen 0.025% Ophthalmic Solution 1 Drop(s) Right EYE two times a day  pantoprazole    Tablet 40 milliGRAM(s) Oral before breakfast  potassium chloride    Tablet ER 20 milliEquivalent(s) Oral once  tamsulosin 0.4 milliGRAM(s) Oral at bedtime      VITALS:  T(F): 97.6 (10-18-21 @ 05:59), Max: 98.1 (10-17-21 @ 14:02)  HR: 63 (10-18-21 @ 05:59)  BP: 151/66 (10-18-21 @ 05:59)  RR: 18 (10-18-21 @ 05:59)  SpO2: 95% (10-18-21 @ 05:59)  Wt(kg): --    10-17 @ 07:01  -  10-18 @ 07:00  --------------------------------------------------------  IN: 960 mL / OUT: 1625 mL / NET: -665 mL    PHYSICAL EXAM:  Constitutional: NAD  HEENT: anicteric sclera, oropharynx clear.  Neck: No JVD  Respiratory: CTAB, no wheezes, rales or rhonchi  Cardiovascular: S1, S2, RRR  Gastrointestinal: BS+, soft, NT/ND  Extremities:   peripheral edema+  Neurological: A/O x 3, no focal deficits  : No CVA tenderness. river+     LABS:  10-18    140  |  101  |  60<H>  ----------------------------<  139<H>  3.3<L>   |  28  |  1.97<H>    Ca    8.5      18 Oct 2021 07:17    TPro  5.9<L>  /  Alb  2.8<L>  /  TBili  1.0  /  DBili      /  AST  73<H>  /  ALT  56<H>  /  AlkPhos  95  10-18    Creatinine Trend: 1.97 <--, 2.29 <--, 2.47 <--, 2.52 <--, 2.58 <--                        7.8    8.16  )-----------( 314      ( 18 Oct 2021 07:19 )             25.7     Urine Studies:  Urinalysis Basic - ( 14 Oct 2021 13:02 )    Color: BROWN / Appearance: Turbid / S.013 / pH:   Gluc:  / Ketone: Negative  / Bili: Negative / Urobili: Negative   Blood:  / Protein: 30 mg/dL / Nitrite: Negative   Leuk Esterase: Large / RBC: 1815 /hpf / WBC 86 /HPF   Sq Epi:  / Non Sq Epi: 0 /hpf / Bacteria: Many      Potassium, Random Urine: 49 mmol/L (10-14 @ 13:02)  Osmolality, Random Urine: 348 mos/kg (10-14 @ 13:02)  Sodium, Random Urine: 17 mmol/L (10-14 @ 13:02)  Creatinine, Random Urine: 50 mg/dL (10-14 @ 13:02)  Chloride, Random Urine: 31 mmol/L (10-14 @ 13:02)    RADIOLOGY & ADDITIONAL STUDIES:

## 2021-10-18 NOTE — PROGRESS NOTE ADULT - PROBLEM SELECTOR PLAN 6
Controlled at admission  Holding losartan due to FLOYD  Holding Coreg due to acute CHF exacerbation
Holding losartan due to FLOYD  Holding Coreg due to acute CHF exacerbation

## 2021-10-18 NOTE — PROGRESS NOTE ADULT - PROBLEM SELECTOR PLAN 5
hx of CAD s/p stent to  80% D1 in 2014, with cath in 2015 demonstrating patent stents with mild to moderate non-obstructive cad, Isch CMrEF 40% in 2018  -- per cardiology notes from 9/1/21- family decline cath and would like medical management of known cad  - c/w aspirin and lipitor  - holding BB in acute chf   - w/o current anginal sx
hx of CAD s/p stent to  80% D1 in 2014, with cath in 2015 demonstrating patent stents with mild to moderate non-obstructive cad, Isch CMrEF 40% in 2018  Cardio f/up noted
hx of CAD s/p stent to  80% D1 in 2014, with cath in 2015 demonstrating patent stents with mild to moderate non-obstructive cad, Isch CMrEF 40% in 2018  -- per cardiology notes from 9/1/21- family decline cath and would like medical management of known cad  - c/w aspirin and lipitor  - holding BB in acute chf   - w/o current anginal sx

## 2021-10-18 NOTE — PROGRESS NOTE ADULT - SUBJECTIVE AND OBJECTIVE BOX
C A R D I [facility]  **********************************     DATE OF SERVICE: 10-18-21    Reports SOB improved, remains on nasal cannula. Denies chest pain. Review of systems otherwise (-)  	  MEDICATIONS:  MEDICATIONS  (STANDING):  aMIOdarone    Tablet 400 milliGRAM(s) Oral daily  apixaban 2.5 milliGRAM(s) Oral two times a day  aspirin enteric coated 81 milliGRAM(s) Oral daily  atorvastatin 40 milliGRAM(s) Oral at bedtime  dextrose 40% Gel 15 Gram(s) Oral once  dextrose 5%. 1000 milliLiter(s) (50 mL/Hr) IV Continuous <Continuous>  dextrose 50% Injectable 25 Gram(s) IV Push once  finasteride 5 milliGRAM(s) Oral daily  furosemide   Injectable 40 milliGRAM(s) IV Push two times a day  glucagon  Injectable 1 milliGRAM(s) IntraMuscular once  influenza   Vaccine 0.5 milliLiter(s) IntraMuscular once  insulin lispro (ADMELOG) corrective regimen sliding scale   SubCutaneous three times a day before meals  insulin lispro (ADMELOG) corrective regimen sliding scale   SubCutaneous at bedtime  ketotifen 0.025% Ophthalmic Solution 1 Drop(s) Right EYE two times a day  pantoprazole    Tablet 40 milliGRAM(s) Oral before breakfast  tamsulosin 0.4 milliGRAM(s) Oral at bedtime      LABS:	 	    CARDIAC MARKERS:                                7.8    8.16  )-----------( 314      ( 18 Oct 2021 07:19 )             25.7     Hemoglobin: 7.8 g/dL (10-18 @ 07:19)  Hemoglobin: 8.0 g/dL (10-16 @ 07:29)  Hemoglobin: 8.3 g/dL (10-15 @ 07:27)  Hemoglobin: 8.1 g/dL (10-14 @ 05:06)  Hemoglobin: 9.0 g/dL (10-13 @ 18:50)      10-18    140  |  101  |  60<H>  ----------------------------<  139<H>  3.3<L>   |  28  |  1.97<H>    Ca    8.5      18 Oct 2021 07:17    TPro  5.9<L>  /  Alb  2.8<L>  /  TBili  1.0  /  DBili  x   /  AST  73<H>  /  ALT  56<H>  /  AlkPhos  95  10-18    Creatinine Trend: 1.97<--, 2.29<--, 2.47<--, 2.52<--, 2.58<--    COAGS:       proBNP:   Lipid Profile:   HgA1c:   TSH:       PHYSICAL EXAM:  T(C): 36.4 (10-18-21 @ 05:59), Max: 36.7 (10-17-21 @ 21:03)  HR: 63 (10-18-21 @ 05:59) (60 - 63)  BP: 151/66 (10-18-21 @ 05:59) (124/60 - 151/66)  RR: 18 (10-18-21 @ 05:59) (18 - 18)  SpO2: 95% (10-18-21 @ 05:59) (95% - 98%)  Wt(kg): --  I&O's Summary    17 Oct 2021 07:01  -  18 Oct 2021 07:00  --------------------------------------------------------  IN: 960 mL / OUT: 1625 mL / NET: -665 mL      Gen: Appears well in NAD  HEENT:  (-)icterus (-)pallor  CV: N S1 S2 1/6 ELLY (+)2 Pulses B/l  Resp: Diminished BS at bases, normal effort  GI: (+) BS Soft, NT, ND  Lymph:  (+) B/L LE pitting Edema, (-)obvious lymphadenopathy  Skin: Warm to touch, Normal turgor  Psych: Appropriate mood and affect    TELEMETRY:  60-80    ASSESSMENT/PLAN:  89 yr old very pleasant gentleman w/ PMH of CAD s/p PCI (ROBERTO to D1 6/2/14), chronic AF (on AC w/ eliquis), tachy/perla syndrome s/p biV PPM placement, CVA x 2, HFpEF who was recently hospitalised in Aug 2021 for monomorphic VT s/p cardioversion presents w/ fluid overload in setting of acute on chronic HFpEF exacerbation.     - keep net neg with IV lasix 40mg BID  - Renal eval noted - cr improving  - Continue AC with Eliquis for afib  - Continue medical management of CAD with ASA/Statin  - Continue Amio 400mg daily for prior VT  - No further inpatient cardiac w/u expected after he achieves euvolemia  - F/U with his cardiologist Dr. Vanegas for cardiology after discharge    Ismael Figueroa PA-C  Pager: [phone]

## 2021-10-18 NOTE — PROGRESS NOTE ADULT - ASSESSMENT
89 yr old very pleasant gentleman w/ PMH of CAD s/p PCI (ROBERTO to D1 6/2/14), chronic AF (on AC w/ eliquis), tachy/perla syndrome s/p biV PPM placement, CVA x 2, HFpEF who was recently hospitalised in Aug 2021 for monomorphic VT s/p cardioversion presents w/ fluid overload found to have FLOYD on CKD stage 3      1) FLOYD on CKD stage 3  b/l cr around 1.5 to 1.8mg/dl  DDx includes Cardiorenal vs ATn,   Ua showing UTII s/p ceftriaxone    renal US-> right kidney 9.9cm and left kidney 10.2 cm-->no obstruction  on  lasix 40mg iv bid  keep off nephrotoxins such as ace/arb/nsaids/iv contrast  trend bmp  strict i/o's    2)Edema   lasix 40mg iv bid-- cont   strict i/o's  daily wts  monitor    3) HYponatremia  na improved to 140  likely hypervolemic  lasix as above  trend na      Dr Huynh  [phone]

## 2021-10-19 NOTE — PROGRESS NOTE ADULT - SUBJECTIVE AND OBJECTIVE BOX
C A R [facility] I O [facility] O [facility]  **********************************     DATE OF SERVICE: 10-19-21    Still with LE edema. Denies chest pain or SOB on nasal cannula. Review of systems otherwise (-)  	    MEDICATIONS  (STANDING):  aMIOdarone    Tablet 400 milliGRAM(s) Oral daily  apixaban 2.5 milliGRAM(s) Oral two times a day  aspirin enteric coated 81 milliGRAM(s) Oral daily  atorvastatin 40 milliGRAM(s) Oral at bedtime  dextrose 40% Gel 15 Gram(s) Oral once  dextrose 5%. 1000 milliLiter(s) (50 mL/Hr) IV Continuous <Continuous>  dextrose 50% Injectable 25 Gram(s) IV Push once  finasteride 5 milliGRAM(s) Oral daily  furosemide   Injectable 40 milliGRAM(s) IV Push two times a day  glucagon  Injectable 1 milliGRAM(s) IntraMuscular once  influenza   Vaccine 0.5 milliLiter(s) IntraMuscular once  insulin lispro (ADMELOG) corrective regimen sliding scale   SubCutaneous three times a day before meals  insulin lispro (ADMELOG) corrective regimen sliding scale   SubCutaneous at bedtime  ketotifen 0.025% Ophthalmic Solution 1 Drop(s) Right EYE two times a day  pantoprazole    Tablet 40 milliGRAM(s) Oral before breakfast  polyethylene glycol 3350 17 Gram(s) Oral daily  senna 2 Tablet(s) Oral at bedtime  tamsulosin 0.4 milliGRAM(s) Oral at bedtime    MEDICATIONS  (PRN):  acetaminophen   Tablet .. 650 milliGRAM(s) Oral every 6 hours PRN Temp greater or equal to 38C (100.4F), Mild Pain (1 - 3)  sodium chloride 0.65% Nasal 1 Spray(s) Both Nostrils once PRN Nasal Congestion      LABS:                          7.9    8.20  )-----------( 314      ( 19 Oct 2021 05:51 )             26.1     Hemoglobin: 7.9 g/dL (10-19 @ 05:51)  Hemoglobin: 7.8 g/dL (10-18 @ 07:19)  Hemoglobin: 8.0 g/dL (10-16 @ 07:29)  Hemoglobin: 8.3 g/dL (10-15 @ 07:27)    10-19    141  |  102  |  62<H>  ----------------------------<  196<H>  3.7   |  27  |  2.07<H>    Ca    8.4      19 Oct 2021 05:51    TPro  5.8<L>  /  Alb  2.7<L>  /  TBili  1.0  /  DBili  x   /  AST  73<H>  /  ALT  56<H>  /  AlkPhos  108  10-19    Creatinine Trend: 2.07<--, 1.97<--, 2.29<--, 2.47<--, 2.52<--, 2.58<--             10-18-21 @ 07:01  -  10-19-21 @ 07:00  --------------------------------------------------------  IN: 120 mL / OUT: 1250 mL / NET: -1130 mL    10-19-21 @ 07:01  -  10-19-21 @ 15:32  --------------------------------------------------------  IN: 500 mL / OUT: 802 mL / NET: -302 mL        PHYSICAL EXAM  Vital Signs Last 24 Hrs  T(C): 36.8 (19 Oct 2021 14:34), Max: 36.8 (19 Oct 2021 14:34)  T(F): 98.2 (19 Oct 2021 14:34), Max: 98.2 (19 Oct 2021 14:34)  HR: 58 (19 Oct 2021 14:34) (58 - 61)  BP: 125/68 (19 Oct 2021 14:34) (118/66 - 158/80)  BP(mean): --  RR: 18 (19 Oct 2021 14:34) (17 - 18)  SpO2: 98% (19 Oct 2021 14:34) (89% - 98%)      Gen: Appears well in NAD  HEENT:  (-)icterus (-)pallor  CV: N S1 S2 1/6 ELLY (+)2 Pulses B/l  Resp: Diminished BS at bases, normal effort  GI: (+) BS Soft, NT, ND  Lymph:  (+) B/L LE pitting Edema, (-)obvious lymphadenopathy  Skin: Warm to touch, Normal turgor  Psych: Appropriate mood and affect    TELEMETRY:  60    ASSESSMENT/PLAN:  89 yr old very pleasant gentleman w/ PMH of CAD s/p PCI (ROBERTO to D1 6/2/14), chronic AF (on AC w/ eliquis), tachy/perla syndrome s/p biV PPM placement, CVA x 2, HFpEF who was recently hospitalised in Aug 2021 for monomorphic VT s/p cardioversion presents w/ fluid overload in setting of acute on chronic HFpEF exacerbation.     - keep net neg with IV lasix 40mg BID - strict I/Os, daily standing weights  - Will continue to hold coreg until euvolemic  - ACE/ARB on hold since last admission due to FLOYD on CKD  - Renal eval noted - cr stable  - Continue AC with Eliquis for afib  - Continue medical management of CAD with ASA/Statin  - Continue Amio 400mg daily for prior VT  - No further inpatient cardiac w/u expected after he achieves euvolemia  - F/U with his cardiologist Dr. Vanegas for cardiology after discharge    Ismael Figueroa PA-C  Pager: [phone]

## 2021-10-19 NOTE — PROGRESS NOTE ADULT - ASSESSMENT
89 yr old very pleasant gentleman w/ PMH of CAD s/p PCI (ROBERTO to D1 6/2/14), chronic AF (on AC w/ eliquis), tachy/perla syndrome s/p biV PPM placement, CVA x 2, HFpEF who was recently hospitalised in Aug 2021 for monomorphic VT s/p cardioversion presents w/ fluid overload found to have FLOYD on CKD stage 3      1) FLOYD on CKD stage 3  b/l cr around 1.5 to 1.8mg/dl  DDx includes Cardiorenal vs ATn,   Ua showing UTI s/p ceftriaxone    renal US-> right kidney 9.9cm and left kidney 10.2 cm-->no obstruction  Cr expected to fluctuate   on  lasix 40mg iv bid  keep off nephrotoxins such as ace/arb/nsaids/iv contrast  trend bmp  strict i/o's    2)Edema   lasix 40mg iv bid-- cont   strict i/o's  daily wts  monitor    3) HYponatremia  na improved to 141  likely hypervolemic  lasix as above  trend na      Dr Huynh  [phone]

## 2021-10-19 NOTE — PROGRESS NOTE ADULT - ASSESSMENT
Patient seen and examined, agree with above assessment and plan as transcribed above.    - still with some edema keep net negative    Alfredo Rain MD, [facility]  BEEPER [phone]

## 2021-10-19 NOTE — PROGRESS NOTE ADULT - SUBJECTIVE AND OBJECTIVE BOX
Patient is a 89y old  Male who presents with a chief complaint of CHF exacerbation (19 Oct 2021 15:31)      SUBJECTIVE / OVERNIGHT EVENTS:    Events noted.  CONSTITUTIONAL: No fever,  or fatigue  RESPIRATORY: No cough, wheezing,  No shortness of breath  CARDIOVASCULAR: No chest pain, palpitations, dizziness, or leg swelling  GASTROINTESTINAL: No abdominal or epigastric pain. No nausea, vomiting.  NEUROLOGICAL: No headaches,     MEDICATIONS  (STANDING):  aMIOdarone    Tablet 400 milliGRAM(s) Oral daily  apixaban 2.5 milliGRAM(s) Oral two times a day  aspirin enteric coated 81 milliGRAM(s) Oral daily  atorvastatin 40 milliGRAM(s) Oral at bedtime  dextrose 40% Gel 15 Gram(s) Oral once  dextrose 5%. 1000 milliLiter(s) (50 mL/Hr) IV Continuous <Continuous>  dextrose 50% Injectable 25 Gram(s) IV Push once  finasteride 5 milliGRAM(s) Oral daily  furosemide   Injectable 40 milliGRAM(s) IV Push two times a day  glucagon  Injectable 1 milliGRAM(s) IntraMuscular once  influenza   Vaccine 0.5 milliLiter(s) IntraMuscular once  insulin lispro (ADMELOG) corrective regimen sliding scale   SubCutaneous three times a day before meals  insulin lispro (ADMELOG) corrective regimen sliding scale   SubCutaneous at bedtime  ketotifen 0.025% Ophthalmic Solution 1 Drop(s) Right EYE two times a day  pantoprazole    Tablet 40 milliGRAM(s) Oral before breakfast  polyethylene glycol 3350 17 Gram(s) Oral daily  senna 2 Tablet(s) Oral at bedtime  tamsulosin 0.4 milliGRAM(s) Oral at bedtime    MEDICATIONS  (PRN):  acetaminophen   Tablet .. 650 milliGRAM(s) Oral every 6 hours PRN Temp greater or equal to 38C (100.4F), Mild Pain (1 - 3)  sodium chloride 0.65% Nasal 1 Spray(s) Both Nostrils once PRN Nasal Congestion        CAPILLARY BLOOD GLUCOSE      POCT Blood Glucose.: 209 mg/dL (19 Oct 2021 21:16)  POCT Blood Glucose.: 202 mg/dL (19 Oct 2021 18:13)  POCT Blood Glucose.: 264 mg/dL (19 Oct 2021 12:20)  POCT Blood Glucose.: 184 mg/dL (19 Oct 2021 08:50)    I&O's Summary    18 Oct 2021 07:01  -  19 Oct 2021 07:00  --------------------------------------------------------  IN: 120 mL / OUT: 1250 mL / NET: -1130 mL    19 Oct 2021 07:01  -  19 Oct 2021 23:22  --------------------------------------------------------  IN: 720 mL / OUT: 802 mL / NET: -82 mL        T(C): 36.4 (10-19-21 @ 22:35), Max: 36.8 (10-19-21 @ 14:34)  HR: 61 (10-19-21 @ 22:35) (58 - 61)  BP: 116/66 (10-19-21 @ 22:35) (116/66 - 158/80)  RR: 17 (10-19-21 @ 22:35) (17 - 18)  SpO2: 96% (10-19-21 @ 22:35) (89% - 99%)    PHYSICAL EXAM:  GENERAL: NAD  NECK: Supple, No JVD  CHEST/LUNG: Clear to auscultation bilaterally; No wheezing.  HEART: Regular rate and rhythm; No murmurs, rubs, or gallops  ABDOMEN: Soft, Nontender, Nondistended; Bowel sounds present  EXTREMITIES:   No edema  NEUROLOGY: AAO X 3      LABS:                        7.9    8.20  )-----------( 314      ( 19 Oct 2021 05:51 )             26.1     10-19    141  |  102  |  62<H>  ----------------------------<  196<H>  3.7   |  27  |  2.07<H>    Ca    8.4      19 Oct 2021 05:51    TPro  5.8<L>  /  Alb  2.7<L>  /  TBili  1.0  /  DBili  x   /  AST  73<H>  /  ALT  56<H>  /  AlkPhos  108  10-19            CAPILLARY BLOOD GLUCOSE      POCT Blood Glucose.: 209 mg/dL (19 Oct 2021 21:16)  POCT Blood Glucose.: 202 mg/dL (19 Oct 2021 18:13)  POCT Blood Glucose.: 264 mg/dL (19 Oct 2021 12:20)  POCT Blood Glucose.: 184 mg/dL (19 Oct 2021 08:50)        RADIOLOGY & ADDITIONAL TESTS:    Imaging Personally Reviewed:    Consultant(s) Notes Reviewed:      Care Discussed with Consultants/Other Providers:    Dileep Amador MD, CMD, FACP    [id number] [place] [address]  Office Tel: [phone]  Cell: [phone]

## 2021-10-19 NOTE — PROGRESS NOTE ADULT - SUBJECTIVE AND OBJECTIVE BOX
Patient seen and examined  no complaints    No Known Allergies    Hospital Medications:   MEDICATIONS  (STANDING):  aMIOdarone    Tablet 400 milliGRAM(s) Oral daily  apixaban 2.5 milliGRAM(s) Oral two times a day  aspirin enteric coated 81 milliGRAM(s) Oral daily  atorvastatin 40 milliGRAM(s) Oral at bedtime  dextrose 40% Gel 15 Gram(s) Oral once  dextrose 5%. 1000 milliLiter(s) (50 mL/Hr) IV Continuous <Continuous>  dextrose 50% Injectable 25 Gram(s) IV Push once  finasteride 5 milliGRAM(s) Oral daily  furosemide   Injectable 40 milliGRAM(s) IV Push two times a day  glucagon  Injectable 1 milliGRAM(s) IntraMuscular once  influenza   Vaccine 0.5 milliLiter(s) IntraMuscular once  insulin lispro (ADMELOG) corrective regimen sliding scale   SubCutaneous three times a day before meals  insulin lispro (ADMELOG) corrective regimen sliding scale   SubCutaneous at bedtime  ketotifen 0.025% Ophthalmic Solution 1 Drop(s) Right EYE two times a day  pantoprazole    Tablet 40 milliGRAM(s) Oral before breakfast  polyethylene glycol 3350 17 Gram(s) Oral daily  senna 2 Tablet(s) Oral at bedtime  tamsulosin 0.4 milliGRAM(s) Oral at bedtime        VITALS:  T(F): 98.2 (10-19-21 @ 14:34), Max: 98.2 (10-19-21 @ 14:34)  HR: 58 (10-19-21 @ 14:34)  BP: 125/68 (10-19-21 @ 14:34)  RR: 18 (10-19-21 @ 14:34)  SpO2: 98% (10-19-21 @ 14:34)  Wt(kg): --    10-18 @ 07:01  -  10-19 @ 07:00  --------------------------------------------------------  IN: 120 mL / OUT: 1250 mL / NET: -1130 mL    10-19 @ 07:01  -  10-19 @ 15:04  --------------------------------------------------------  IN: 500 mL / OUT: 802 mL / NET: -302 mL      PHYSICAL EXAM:  Constitutional: NAD  HEENT: anicteric sclera, oropharynx clear.  Neck: No JVD  Respiratory: CTAB, no wheezes, rales or rhonchi  Cardiovascular: S1, S2, RRR  Gastrointestinal: BS+, soft, NT/ND  Extremities:   peripheral edema+  Neurological: A/O x 3, no focal deficits  : No CVA tenderness. river+  LABS:  10-19    141  |  102  |  62<H>  ----------------------------<  196<H>  3.7   |  27  |  2.07<H>    Ca    8.4      19 Oct 2021 05:51    TPro  5.8<L>  /  Alb  2.7<L>  /  TBili  1.0  /  DBili      /  AST  73<H>  /  ALT  56<H>  /  AlkPhos  108  10-19    Creatinine Trend: 2.07 <--, 1.97 <--, 2.29 <--, 2.47 <--, 2.52 <--, 2.58 <--                        7.9    8.20  )-----------( 314      ( 19 Oct 2021 05:51 )             26.1     Urine Studies:  Urinalysis Basic - ( 14 Oct 2021 13:02 )    Color: BROWN / Appearance: Turbid / S.013 / pH:   Gluc:  / Ketone: Negative  / Bili: Negative / Urobili: Negative   Blood:  / Protein: 30 mg/dL / Nitrite: Negative   Leuk Esterase: Large / RBC: 1815 /hpf / WBC 86 /HPF   Sq Epi:  / Non Sq Epi: 0 /hpf / Bacteria: Many      Potassium, Random Urine: 49 mmol/L (10-14 @ 13:02)  Osmolality, Random Urine: 348 mos/kg (10-14 @ 13:02)  Sodium, Random Urine: 17 mmol/L (10-14 @ 13:02)  Creatinine, Random Urine: 50 mg/dL (10-14 @ 13:02)  Chloride, Random Urine: 31 mmol/L (10-14 @ 13:02)    RADIOLOGY & ADDITIONAL STUDIES:

## 2021-10-20 NOTE — PROGRESS NOTE ADULT - SUBJECTIVE AND OBJECTIVE BOX
C A R D I O L O G Y  **********************************     DATE OF SERVICE: 10-20-21    +LE edema, Denies chest pain or SOB on room air today. Review of systems otherwise (-)  	    MEDICATIONS  (STANDING):  aMIOdarone    Tablet 400 milliGRAM(s) Oral daily  apixaban 2.5 milliGRAM(s) Oral two times a day  aspirin enteric coated 81 milliGRAM(s) Oral daily  atorvastatin 40 milliGRAM(s) Oral at bedtime  dextrose 40% Gel 15 Gram(s) Oral once  dextrose 5%. 1000 milliLiter(s) (50 mL/Hr) IV Continuous <Continuous>  dextrose 50% Injectable 25 Gram(s) IV Push once  finasteride 5 milliGRAM(s) Oral daily  furosemide   Injectable 40 milliGRAM(s) IV Push two times a day  glucagon  Injectable 1 milliGRAM(s) IntraMuscular once  influenza   Vaccine 0.5 milliLiter(s) IntraMuscular once  insulin lispro (ADMELOG) corrective regimen sliding scale   SubCutaneous three times a day before meals  insulin lispro (ADMELOG) corrective regimen sliding scale   SubCutaneous at bedtime  ketotifen 0.025% Ophthalmic Solution 1 Drop(s) Right EYE two times a day  pantoprazole    Tablet 40 milliGRAM(s) Oral before breakfast  polyethylene glycol 3350 17 Gram(s) Oral daily  senna 2 Tablet(s) Oral at bedtime  tamsulosin 0.4 milliGRAM(s) Oral at bedtime    MEDICATIONS  (PRN):  acetaminophen   Tablet .. 650 milliGRAM(s) Oral every 6 hours PRN Temp greater or equal to 38C (100.4F), Mild Pain (1 - 3)  sodium chloride 0.65% Nasal 1 Spray(s) Both Nostrils once PRN Nasal Congestion      LABS:                          7.8    9.50  )-----------( 307      ( 20 Oct 2021 07:24 )             24.7     Hemoglobin: 7.8 g/dL (10-20 @ 07:24)  Hemoglobin: 7.9 g/dL (10-19 @ 05:51)  Hemoglobin: 7.8 g/dL (10-18 @ 07:19)  Hemoglobin: 8.0 g/dL (10-16 @ 07:29)    10-20    138  |  100  |  58<H>  ----------------------------<  187<H>  3.6   |  26  |  1.75<H>    Ca    8.5      20 Oct 2021 07:27    TPro  5.7<L>  /  Alb  2.9<L>  /  TBili  1.0  /  DBili  x   /  AST  69<H>  /  ALT  54<H>  /  AlkPhos  96  10-20    Creatinine Trend: 1.75<--, 2.07<--, 1.97<--, 2.29<--, 2.47<--, 2.52<--             10-19-21 @ 07:01  -  10-20-21 @ 07:00  --------------------------------------------------------  IN: 820 mL / OUT: 1377 mL / NET: -557 mL        PHYSICAL EXAM  Vital Signs Last 24 Hrs  T(C): 36.4 (20 Oct 2021 09:51), Max: 36.8 (19 Oct 2021 14:34)  T(F): 97.5 (20 Oct 2021 09:51), Max: 98.2 (19 Oct 2021 14:34)  HR: 60 (20 Oct 2021 09:51) (58 - 61)  BP: 116/66 (20 Oct 2021 09:51) (116/66 - 152/84)  BP(mean): --  RR: 18 (20 Oct 2021 09:51) (17 - 18)  SpO2: 93% (20 Oct 2021 09:51) (93% - 99%)      Gen: Appears well in NAD  HEENT:  (-)icterus (-)pallor  CV: N S1 S2 1/6 ELLY (+)2 Pulses B/l  Resp: CTA B/L, normal effort  GI: (+) BS Soft, NT, ND  Lymph:  (+) B/L LE pitting Edema, (-)obvious lymphadenopathy  Skin: Warm to touch, Normal turgor  Psych: Appropriate mood and affect    TELEMETRY: SR/AP 70    ASSESSMENT/PLAN:  89 yr old very pleasant gentleman w/ PMH of CAD s/p PCI (ROBERTO to D1 6/2/14), chronic AF (on AC w/ eliquis), tachy/perla syndrome s/p biV PPM placement, CVA x 2, HFpEF who was recently hospitalised in Aug 2021 for monomorphic VT s/p cardioversion presents w/ fluid overload in setting of acute on chronic HFpEF exacerbation.     - keep net neg with IV lasix 40mg BID - strict I/Os, daily standing weights  - Check O2 sats with ambulation today (d/w PT at bedside)  - Will continue to hold coreg until euvolemic  - ACE/ARB on hold since last admission due to FLOYD on CKD  - Renal following - cr improving  - Continue AC with Eliquis for afib  - Continue medical management of CAD with ASA/Statin  - Continue Amio 400mg daily for prior VT  - No further inpatient cardiac w/u expected after he achieves euvolemia  - F/U with his cardiologist Dr. Vanegas for cardiology after discharge    Ismael Figueroa PA-C  Pager: [phone]   C A R D I O L O G Y  **********************************     DATE OF SERVICE: 10-20-21    +LE edema, Denies chest pain or SOB on room air today. Review of systems otherwise (-)  	    MEDICATIONS  (STANDING):  aMIOdarone    Tablet 400 milliGRAM(s) Oral daily  apixaban 2.5 milliGRAM(s) Oral two times a day  aspirin enteric coated 81 milliGRAM(s) Oral daily  atorvastatin 40 milliGRAM(s) Oral at bedtime  dextrose 40% Gel 15 Gram(s) Oral once  dextrose 5%. 1000 milliLiter(s) (50 mL/Hr) IV Continuous <Continuous>  dextrose 50% Injectable 25 Gram(s) IV Push once  finasteride 5 milliGRAM(s) Oral daily  furosemide   Injectable 40 milliGRAM(s) IV Push two times a day  glucagon  Injectable 1 milliGRAM(s) IntraMuscular once  influenza   Vaccine 0.5 milliLiter(s) IntraMuscular once  insulin lispro (ADMELOG) corrective regimen sliding scale   SubCutaneous three times a day before meals  insulin lispro (ADMELOG) corrective regimen sliding scale   SubCutaneous at bedtime  ketotifen 0.025% Ophthalmic Solution 1 Drop(s) Right EYE two times a day  pantoprazole    Tablet 40 milliGRAM(s) Oral before breakfast  polyethylene glycol 3350 17 Gram(s) Oral daily  senna 2 Tablet(s) Oral at bedtime  tamsulosin 0.4 milliGRAM(s) Oral at bedtime    MEDICATIONS  (PRN):  acetaminophen   Tablet .. 650 milliGRAM(s) Oral every 6 hours PRN Temp greater or equal to 38C (100.4F), Mild Pain (1 - 3)  sodium chloride 0.65% Nasal 1 Spray(s) Both Nostrils once PRN Nasal Congestion      LABS:                          7.8    9.50  )-----------( 307      ( 20 Oct 2021 07:24 )             24.7     Hemoglobin: 7.8 g/dL (10-20 @ 07:24)  Hemoglobin: 7.9 g/dL (10-19 @ 05:51)  Hemoglobin: 7.8 g/dL (10-18 @ 07:19)  Hemoglobin: 8.0 g/dL (10-16 @ 07:29)    10-20    138  |  100  |  58<H>  ----------------------------<  187<H>  3.6   |  26  |  1.75<H>    Ca    8.5      20 Oct 2021 07:27    TPro  5.7<L>  /  Alb  2.9<L>  /  TBili  1.0  /  DBili  x   /  AST  69<H>  /  ALT  54<H>  /  AlkPhos  96  10-20    Creatinine Trend: 1.75<--, 2.07<--, 1.97<--, 2.29<--, 2.47<--, 2.52<--             10-19-21 @ 07:01  -  10-20-21 @ 07:00  --------------------------------------------------------  IN: 820 mL / OUT: 1377 mL / NET: -557 mL        PHYSICAL EXAM  Vital Signs Last 24 Hrs  T(C): 36.4 (20 Oct 2021 09:51), Max: 36.8 (19 Oct 2021 14:34)  T(F): 97.5 (20 Oct 2021 09:51), Max: 98.2 (19 Oct 2021 14:34)  HR: 60 (20 Oct 2021 09:51) (58 - 61)  BP: 116/66 (20 Oct 2021 09:51) (116/66 - 152/84)  BP(mean): --  RR: 18 (20 Oct 2021 09:51) (17 - 18)  SpO2: 93% (20 Oct 2021 09:51) (93% - 99%)      Gen: Appears well in NAD  HEENT:  (-)icterus (-)pallor  CV: N S1 S2 1/6 ELLY (+)2 Pulses B/l  Resp: CTA B/L, normal effort  GI: (+) BS Soft, NT, ND  Lymph:  (+) B/L LE pitting Edema, (-)obvious lymphadenopathy  Skin: Warm to touch, Normal turgor  Psych: Appropriate mood and affect    TELEMETRY: SR/AP 70    ASSESSMENT/PLAN:  89 yr old very pleasant gentleman w/ PMH of CAD s/p PCI (ROBERTO to D1 6/2/14), chronic AF (on AC w/ eliquis), tachy/perla syndrome s/p biV PPM placement, CVA x 2, HFpEF who was recently hospitalised in Aug 2021 for monomorphic VT s/p cardioversion presents w/ fluid overload in setting of acute on chronic HFpEF exacerbation.     - keep net neg, increase to IV lasix 60mg BID - strict I/Os, daily standing weights  - Check O2 sats with ambulation today (d/w PT at bedside)  - Will continue to hold coreg until euvolemic  - ACE/ARB on hold since last admission due to FLOYD on CKD  - Renal following - cr improving  - Continue AC with Eliquis for afib  - Continue medical management of CAD with ASA/Statin  - Continue Amio 400mg daily for prior VT  - No further inpatient cardiac w/u expected after he achieves euvolemia  - F/U with his cardiologist Dr. Vanegas for cardiology after discharge    Ismael Figueroa PA-C  Pager: [phone]

## 2021-10-20 NOTE — PROGRESS NOTE ADULT - SUBJECTIVE AND OBJECTIVE BOX
Patient seen and examined  no complaints    No Known Allergies    Hospital Medications:   MEDICATIONS  (STANDING):  aMIOdarone    Tablet 400 milliGRAM(s) Oral daily  apixaban 2.5 milliGRAM(s) Oral two times a day  aspirin enteric coated 81 milliGRAM(s) Oral daily  atorvastatin 40 milliGRAM(s) Oral at bedtime  dextrose 40% Gel 15 Gram(s) Oral once  dextrose 5%. 1000 milliLiter(s) (50 mL/Hr) IV Continuous <Continuous>  dextrose 50% Injectable 25 Gram(s) IV Push once  finasteride 5 milliGRAM(s) Oral daily  furosemide   Injectable 40 milliGRAM(s) IV Push two times a day  glucagon  Injectable 1 milliGRAM(s) IntraMuscular once  influenza   Vaccine 0.5 milliLiter(s) IntraMuscular once  insulin lispro (ADMELOG) corrective regimen sliding scale   SubCutaneous three times a day before meals  insulin lispro (ADMELOG) corrective regimen sliding scale   SubCutaneous at bedtime  ketotifen 0.025% Ophthalmic Solution 1 Drop(s) Right EYE two times a day  pantoprazole    Tablet 40 milliGRAM(s) Oral before breakfast  polyethylene glycol 3350 17 Gram(s) Oral daily  senna 2 Tablet(s) Oral at bedtime  tamsulosin 0.4 milliGRAM(s) Oral at bedtime        VITALS:  T(F): 97.8 (10-20-21 @ 14:09), Max: 98.2 (10-19-21 @ 14:34)  HR: 60 (10-20-21 @ 14:09)  BP: 129/65 (10-20-21 @ 14:09)  RR: 18 (10-20-21 @ 14:09)  SpO2: 93% (10-20-21 @ 14:09)  Wt(kg): --    10-19 @ 07:01  -  10-20 @ 07:00  --------------------------------------------------------  IN: 820 mL / OUT: 1377 mL / NET: -557 mL    10-20 @ 07:01  -  10-20 @ 14:27  --------------------------------------------------------  IN: 120 mL / OUT: 75 mL / NET: 45 mL        PHYSICAL EXAM:  Constitutional: NAD  HEENT: anicteric sclera, oropharynx clear.  Neck: No JVD  Respiratory: CTAB, no wheezes, rales or rhonchi  Cardiovascular: S1, S2, RRR  Gastrointestinal: BS+, soft, NT/ND  Extremities:   peripheral edema+  Neurological: A/O x 3, no focal deficits  : No CVA tenderness    LABS:  10-20    138  |  100  |  58<H>  ----------------------------<  187<H>  3.6   |  26  |  1.75<H>    Ca    8.5      20 Oct 2021 07:27    TPro  5.7<L>  /  Alb  2.9<L>  /  TBili  1.0  /  DBili      /  AST  69<H>  /  ALT  54<H>  /  AlkPhos  96  10-20    Creatinine Trend: 1.75 <--, 2.07 <--, 1.97 <--, 2.29 <--, 2.47 <--, 2.52 <--, 2.58 <--                        7.8    9.50  )-----------( 307      ( 20 Oct 2021 07:24 )             24.7     Urine Studies:  Urinalysis Basic - ( 14 Oct 2021 13:02 )    Color: BROWN / Appearance: Turbid / S.013 / pH:   Gluc:  / Ketone: Negative  / Bili: Negative / Urobili: Negative   Blood:  / Protein: 30 mg/dL / Nitrite: Negative   Leuk Esterase: Large / RBC: 1815 /hpf / WBC 86 /HPF   Sq Epi:  / Non Sq Epi: 0 /hpf / Bacteria: Many      Potassium, Random Urine: 49 mmol/L (10-14 @ 13:02)  Osmolality, Random Urine: 348 mos/kg (10-14 @ 13:02)  Sodium, Random Urine: 17 mmol/L (10-14 @ 13:02)  Creatinine, Random Urine: 50 mg/dL (10-14 @ 13:02)  Chloride, Random Urine: 31 mmol/L (10-14 @ 13:02)    RADIOLOGY & ADDITIONAL STUDIES:

## 2021-10-20 NOTE — PROGRESS NOTE ADULT - SUBJECTIVE AND OBJECTIVE BOX
Patient is a 89y old  Male who presents with a chief complaint of CHF exacerbation (19 Oct 2021 15:31)      SUBJECTIVE / OVERNIGHT EVENTS:    Events noted.  CONSTITUTIONAL: No fever,  or fatigue  RESPIRATORY: No cough, wheezing,  No shortness of breath  CARDIOVASCULAR: No chest pain, palpitations, dizziness, or leg swelling  GASTROINTESTINAL: No abdominal or epigastric pain. No nausea, vomiting.  NEUROLOGICAL: No headaches,     MEDICATIONS  (STANDING):  aMIOdarone    Tablet 400 milliGRAM(s) Oral daily  apixaban 2.5 milliGRAM(s) Oral two times a day  aspirin enteric coated 81 milliGRAM(s) Oral daily  atorvastatin 40 milliGRAM(s) Oral at bedtime  dextrose 40% Gel 15 Gram(s) Oral once  dextrose 5%. 1000 milliLiter(s) (50 mL/Hr) IV Continuous <Continuous>  dextrose 50% Injectable 25 Gram(s) IV Push once  finasteride 5 milliGRAM(s) Oral daily  furosemide   Injectable 40 milliGRAM(s) IV Push two times a day  glucagon  Injectable 1 milliGRAM(s) IntraMuscular once  influenza   Vaccine 0.5 milliLiter(s) IntraMuscular once  insulin lispro (ADMELOG) corrective regimen sliding scale   SubCutaneous three times a day before meals  insulin lispro (ADMELOG) corrective regimen sliding scale   SubCutaneous at bedtime  ketotifen 0.025% Ophthalmic Solution 1 Drop(s) Right EYE two times a day  pantoprazole    Tablet 40 milliGRAM(s) Oral before breakfast  polyethylene glycol 3350 17 Gram(s) Oral daily  senna 2 Tablet(s) Oral at bedtime  tamsulosin 0.4 milliGRAM(s) Oral at bedtime    MEDICATIONS  (PRN):  acetaminophen   Tablet .. 650 milliGRAM(s) Oral every 6 hours PRN Temp greater or equal to 38C (100.4F), Mild Pain (1 - 3)  sodium chloride 0.65% Nasal 1 Spray(s) Both Nostrils once PRN Nasal Congestion        CAPILLARY BLOOD GLUCOSE      POCT Blood Glucose.: 209 mg/dL (19 Oct 2021 21:16)  POCT Blood Glucose.: 202 mg/dL (19 Oct 2021 18:13)  POCT Blood Glucose.: 264 mg/dL (19 Oct 2021 12:20)  POCT Blood Glucose.: 184 mg/dL (19 Oct 2021 08:50)    I&O's Summary    18 Oct 2021 07:01  -  19 Oct 2021 07:00  --------------------------------------------------------  IN: 120 mL / OUT: 1250 mL / NET: -1130 mL    19 Oct 2021 07:01  -  19 Oct 2021 23:22  --------------------------------------------------------  IN: 720 mL / OUT: 802 mL / NET: -82 mL        T(C): 36.4 (10-19-21 @ 22:35), Max: 36.8 (10-19-21 @ 14:34)  HR: 61 (10-19-21 @ 22:35) (58 - 61)  BP: 116/66 (10-19-21 @ 22:35) (116/66 - 158/80)  RR: 17 (10-19-21 @ 22:35) (17 - 18)  SpO2: 96% (10-19-21 @ 22:35) (89% - 99%)    PHYSICAL EXAM:  GENERAL: NAD  NECK: Supple, No JVD  CHEST/LUNG: Clear to auscultation bilaterally; No wheezing.  HEART: Regular rate and rhythm; No murmurs, rubs, or gallops  ABDOMEN: Soft, Nontender, Nondistended; Bowel sounds present  EXTREMITIES:   No edema  NEUROLOGY: AAO X 3      LABS:                        7.9    8.20  )-----------( 314      ( 19 Oct 2021 05:51 )             26.1     10-19    141  |  102  |  62<H>  ----------------------------<  196<H>  3.7   |  27  |  2.07<H>    Ca    8.4      19 Oct 2021 05:51    TPro  5.8<L>  /  Alb  2.7<L>  /  TBili  1.0  /  DBili  x   /  AST  73<H>  /  ALT  56<H>  /  AlkPhos  108  10-19            CAPILLARY BLOOD GLUCOSE      POCT Blood Glucose.: 209 mg/dL (19 Oct 2021 21:16)  POCT Blood Glucose.: 202 mg/dL (19 Oct 2021 18:13)  POCT Blood Glucose.: 264 mg/dL (19 Oct 2021 12:20)  POCT Blood Glucose.: 184 mg/dL (19 Oct 2021 08:50)        RADIOLOGY & ADDITIONAL TESTS:    Imaging Personally Reviewed:    Consultant(s) Notes Reviewed:      Care Discussed with Consultants/Other Providers:    Dileep Amador MD, CMD, FACP    [address] [place] [address]  Office Tel: [phone]  Cell: [phone]

## 2021-10-20 NOTE — PROGRESS NOTE ADULT - ASSESSMENT
Agree with above assessment and plan as outlined above.    - cont to keep net negative    Alfredo Rain MD, [facility]  BEEPER [phone]

## 2021-10-20 NOTE — PROGRESS NOTE ADULT - ASSESSMENT
89 yr old very pleasant gentleman w/ PMH of CAD s/p PCI (ROBERTO to D1 6/2/14), chronic AF (on AC w/ eliquis), tachy/perla syndrome s/p biV PPM placement, CVA x 2, HFpEF who was recently hospitalised in Aug 2021 for monomorphic VT s/p cardioversion presents w/ fluid overload found to have FLOYD on CKD stage 3      1) FLOYD on CKD stage 3  b/l cr around 1.5 to 1.8mg/dl  DDx includes Cardiorenal vs ATn,   Ua showing UTI s/p ceftriaxone   Renal US-> right kidney 9.9cm and left kidney 10.2 cm-->no obstruction  Cr expected to fluctuate   on  lasix 40mg iv bid  keep off nephrotoxins such as ace/arb/nsaids/iv contrast  trend bmp  strict i/o's    2)Edema   lasix 40mg iv bid-- cont   strict i/o's  daily wts  monitor    3) HYponatremia  na improved to 138  likely hypervolemic  lasix as above  trend na      Dr Huynh  [phone]

## 2021-10-21 NOTE — PROGRESS NOTE ADULT - SUBJECTIVE AND OBJECTIVE BOX
Patient is a 89y old  Male who presents with a chief complaint of CHF exacerbation (21 Oct 2021 16:42)      SUBJECTIVE / OVERNIGHT EVENTS:    Events noted.  CONSTITUTIONAL: No fever,  or fatigue  RESPIRATORY: No cough, wheezing,  No shortness of breath  CARDIOVASCULAR: No chest pain, palpitations, dizziness, or leg swelling  GASTROINTESTINAL: No abdominal or epigastric pain. No nausea, vomiting.  NEUROLOGICAL: No headaches,     MEDICATIONS  (STANDING):  aMIOdarone    Tablet 400 milliGRAM(s) Oral daily  apixaban 2.5 milliGRAM(s) Oral two times a day  aspirin enteric coated 81 milliGRAM(s) Oral daily  atorvastatin 40 milliGRAM(s) Oral at bedtime  dextrose 40% Gel 15 Gram(s) Oral once  dextrose 5%. 1000 milliLiter(s) (50 mL/Hr) IV Continuous <Continuous>  dextrose 50% Injectable 25 Gram(s) IV Push once  finasteride 5 milliGRAM(s) Oral daily  furosemide   Injectable 60 milliGRAM(s) IV Push two times a day  glucagon  Injectable 1 milliGRAM(s) IntraMuscular once  influenza   Vaccine 0.5 milliLiter(s) IntraMuscular once  insulin lispro (ADMELOG) corrective regimen sliding scale   SubCutaneous three times a day before meals  insulin lispro (ADMELOG) corrective regimen sliding scale   SubCutaneous at bedtime  ketotifen 0.025% Ophthalmic Solution 1 Drop(s) Right EYE two times a day  pantoprazole    Tablet 40 milliGRAM(s) Oral before breakfast  polyethylene glycol 3350 17 Gram(s) Oral daily  potassium chloride    Tablet ER 20 milliEquivalent(s) Oral daily  senna 2 Tablet(s) Oral at bedtime  tamsulosin 0.4 milliGRAM(s) Oral at bedtime    MEDICATIONS  (PRN):  acetaminophen   Tablet .. 650 milliGRAM(s) Oral every 6 hours PRN Temp greater or equal to 38C (100.4F), Mild Pain (1 - 3)  sodium chloride 0.65% Nasal 1 Spray(s) Both Nostrils once PRN Nasal Congestion        CAPILLARY BLOOD GLUCOSE      POCT Blood Glucose.: 230 mg/dL (21 Oct 2021 21:18)  POCT Blood Glucose.: 189 mg/dL (21 Oct 2021 17:31)  POCT Blood Glucose.: 247 mg/dL (21 Oct 2021 12:31)  POCT Blood Glucose.: 157 mg/dL (21 Oct 2021 07:37)    I&O's Summary    20 Oct 2021 07:01  -  21 Oct 2021 07:00  --------------------------------------------------------  IN: 700 mL / OUT: 1150 mL / NET: -450 mL    21 Oct 2021 07:01  -  22 Oct 2021 00:23  --------------------------------------------------------  IN: 650 mL / OUT: 751 mL / NET: -101 mL        T(C): 36.7 (10-21-21 @ 21:57), Max: 36.8 (10-21-21 @ 09:14)  HR: 59 (10-21-21 @ 21:57) (58 - 64)  BP: 112/64 (10-21-21 @ 21:57) (112/64 - 137/71)  RR: 17 (10-21-21 @ 21:57) (17 - 18)  SpO2: 95% (10-21-21 @ 21:57) (92% - 96%)    PHYSICAL EXAM:  GENERAL: NAD  NECK: Supple, No JVD  CHEST/LUNG: Clear to auscultation bilaterally; No wheezing.  HEART: Regular rate and rhythm; No murmurs, rubs, or gallops  ABDOMEN: Soft, Nontender, Nondistended; Bowel sounds present  EXTREMITIES:   No edema  NEUROLOGY: AAO X 3      LABS:                        7.8    9.50  )-----------( 307      ( 20 Oct 2021 07:24 )             24.7     10-21    138  |  101  |  60<H>  ----------------------------<  146<H>  3.4<L>   |  26  |  1.86<H>    Ca    8.3<L>      21 Oct 2021 06:00    TPro  5.7<L>  /  Alb  2.9<L>  /  TBili  1.0  /  DBili  x   /  AST  69<H>  /  ALT  54<H>  /  AlkPhos  96  10-20            CAPILLARY BLOOD GLUCOSE      POCT Blood Glucose.: 230 mg/dL (21 Oct 2021 21:18)  POCT Blood Glucose.: 189 mg/dL (21 Oct 2021 17:31)  POCT Blood Glucose.: 247 mg/dL (21 Oct 2021 12:31)  POCT Blood Glucose.: 157 mg/dL (21 Oct 2021 07:37)        RADIOLOGY & ADDITIONAL TESTS:    Imaging Personally Reviewed:    Consultant(s) Notes Reviewed:      Care Discussed with Consultants/Other Providers:    Dileep Amador MD, CMD, FACP    [address] [place] [address]  Office Tel: [phone]  Cell: [phone]

## 2021-10-21 NOTE — PROGRESS NOTE ADULT - ASSESSMENT
89 yr old very pleasant gentleman w/ PMH of CAD s/p PCI (ROBERTO to D1 6/2/14), chronic AF (on AC w/ eliquis), tachy/perla syndrome s/p biV PPM placement, CVA x 2, HFpEF who was recently hospitalised in Aug 2021 for monomorphic VT s/p cardioversion presents w/ fluid overload found to have FLOYD on CKD stage 3      1) FLOYD on CKD stage 3  b/l cr around 1.5 to 1.8mg/dl  DDx includes Cardiorenal vs ATn,   Ua showing UTI s/p ceftriaxone   Renal US-> right kidney 9.9cm and left kidney 10.2 cm-->no obstruction  Cr expected to fluctuate   volume status improving--> now on lasix 60mg iv bid  keep off nephrotoxins such as ace/arb/nsaids/iv contrast  trend bmp  strict i/o's    2)Edema   lasix 40mg iv bid--> inc to lasix 60mg iv bid  strict i/o's  daily wts  monitor    3) HYponatremia  na improved to 138  likely hypervolemic  lasix as above  trend na    4) Hypokalemia-  s/p kcl 40meq po x 1  trend k      Dr Huynh  [phone]

## 2021-10-21 NOTE — PROGRESS NOTE ADULT - SUBJECTIVE AND OBJECTIVE BOX
C A R D I O L O G Y  **********************************     DATE OF SERVICE: 10-21-21    +LE edema, Denies chest pain or SOB on room air. Reports breathing comfortably when ambulating in the room with PT. Review of systems otherwise (-)  	    MEDICATIONS  (STANDING):  aMIOdarone    Tablet 400 milliGRAM(s) Oral daily  apixaban 2.5 milliGRAM(s) Oral two times a day  aspirin enteric coated 81 milliGRAM(s) Oral daily  atorvastatin 40 milliGRAM(s) Oral at bedtime  dextrose 40% Gel 15 Gram(s) Oral once  dextrose 5%. 1000 milliLiter(s) (50 mL/Hr) IV Continuous <Continuous>  dextrose 50% Injectable 25 Gram(s) IV Push once  finasteride 5 milliGRAM(s) Oral daily  furosemide   Injectable 60 milliGRAM(s) IV Push two times a day  glucagon  Injectable 1 milliGRAM(s) IntraMuscular once  influenza   Vaccine 0.5 milliLiter(s) IntraMuscular once  insulin lispro (ADMELOG) corrective regimen sliding scale   SubCutaneous three times a day before meals  insulin lispro (ADMELOG) corrective regimen sliding scale   SubCutaneous at bedtime  ketotifen 0.025% Ophthalmic Solution 1 Drop(s) Right EYE two times a day  pantoprazole    Tablet 40 milliGRAM(s) Oral before breakfast  polyethylene glycol 3350 17 Gram(s) Oral daily  potassium chloride    Tablet ER 20 milliEquivalent(s) Oral daily  senna 2 Tablet(s) Oral at bedtime  tamsulosin 0.4 milliGRAM(s) Oral at bedtime    MEDICATIONS  (PRN):  acetaminophen   Tablet .. 650 milliGRAM(s) Oral every 6 hours PRN Temp greater or equal to 38C (100.4F), Mild Pain (1 - 3)  sodium chloride 0.65% Nasal 1 Spray(s) Both Nostrils once PRN Nasal Congestion      LABS:                          7.8    9.50  )-----------( 307      ( 20 Oct 2021 07:24 )             24.7     Hemoglobin: 7.8 g/dL (10-20 @ 07:24)  Hemoglobin: 7.9 g/dL (10-19 @ 05:51)  Hemoglobin: 7.8 g/dL (10-18 @ 07:19)    10-21    138  |  101  |  60<H>  ----------------------------<  146<H>  3.4<L>   |  26  |  1.86<H>    Ca    8.3<L>      21 Oct 2021 06:00    TPro  5.7<L>  /  Alb  2.9<L>  /  TBili  1.0  /  DBili  x   /  AST  69<H>  /  ALT  54<H>  /  AlkPhos  96  10-20    Creatinine Trend: 1.86<--, 1.75<--, 2.07<--, 1.97<--, 2.29<--, 2.47<--             10-20-21 @ 07:01  -  10-21-21 @ 07:00  --------------------------------------------------------  IN: 700 mL / OUT: 1150 mL / NET: -450 mL    10-21-21 @ 07:01  -  10-21-21 @ 16:43  --------------------------------------------------------  IN: 250 mL / OUT: 600 mL / NET: -350 mL        PHYSICAL EXAM  Vital Signs Last 24 Hrs  T(C): 36.6 (21 Oct 2021 13:59), Max: 36.8 (21 Oct 2021 09:14)  T(F): 97.9 (21 Oct 2021 13:59), Max: 98.2 (21 Oct 2021 09:14)  HR: 60 (21 Oct 2021 13:59) (58 - 64)  BP: 116/55 (21 Oct 2021 13:59) (115/62 - 153/67)  BP(mean): --  RR: 18 (21 Oct 2021 13:59) (16 - 18)  SpO2: 95% (21 Oct 2021 13:59) (92% - 97%)        Gen: Appears well in NAD  HEENT:  (-)icterus (-)pallor  CV: N S1 S2 1/6 ELLY (+)2 Pulses B/l  Resp: CTA B/L, normal effort  GI: (+) BS Soft, NT, ND  Lymph:  (+) B/L LE/UE pitting Edema, (-)obvious lymphadenopathy  Skin: Warm to touch, Normal turgor  Psych: Appropriate mood and affect    TELEMETRY: SR/AP 60    ASSESSMENT/PLAN:  89 yr old very pleasant gentleman w/ PMH of CAD s/p PCI (ROBERTO to D1 6/2/14), chronic AF (on AC w/ eliquis), tachy/perla syndrome s/p biV PPM placement, CVA x 2, HFpEF who was recently hospitalised in Aug 2021 for monomorphic VT s/p cardioversion presents w/ fluid overload in setting of acute on chronic HFpEF exacerbation.     - keep net neg with IV lasix 60mg BID - strict I/Os, daily standing weights  - Will continue to hold coreg until euvolemic  - ACE/ARB on hold since last admission due to FLOYD on CKD  - Renal following - cr stable  - Continue AC with Eliquis for afib  - Continue medical management of CAD with ASA/Statin  - Continue Amio 400mg daily for prior VT  - Unclear why sig LE edema despite improved EF on last echo, poss due to renal disease  - Will repeat echo to reeval LV function  - F/U with his cardiologist Dr. Vanegas for cardiology after discharge    Ismael Figueroa PA-C  Pager: [phone]

## 2021-10-21 NOTE — PROGRESS NOTE ADULT - SUBJECTIVE AND OBJECTIVE BOX
Patient seen and examined  no complaints    No Known Allergies    Hospital Medications:   MEDICATIONS  (STANDING):  aMIOdarone    Tablet 400 milliGRAM(s) Oral daily  apixaban 2.5 milliGRAM(s) Oral two times a day  aspirin enteric coated 81 milliGRAM(s) Oral daily  atorvastatin 40 milliGRAM(s) Oral at bedtime  dextrose 40% Gel 15 Gram(s) Oral once  dextrose 5%. 1000 milliLiter(s) (50 mL/Hr) IV Continuous <Continuous>  dextrose 50% Injectable 25 Gram(s) IV Push once  finasteride 5 milliGRAM(s) Oral daily  furosemide   Injectable 60 milliGRAM(s) IV Push two times a day  glucagon  Injectable 1 milliGRAM(s) IntraMuscular once  influenza   Vaccine 0.5 milliLiter(s) IntraMuscular once  insulin lispro (ADMELOG) corrective regimen sliding scale   SubCutaneous three times a day before meals  insulin lispro (ADMELOG) corrective regimen sliding scale   SubCutaneous at bedtime  ketotifen 0.025% Ophthalmic Solution 1 Drop(s) Right EYE two times a day  pantoprazole    Tablet 40 milliGRAM(s) Oral before breakfast  polyethylene glycol 3350 17 Gram(s) Oral daily  potassium chloride    Tablet ER 20 milliEquivalent(s) Oral daily  senna 2 Tablet(s) Oral at bedtime  tamsulosin 0.4 milliGRAM(s) Oral at bedtime        VITALS:  T(F): 98.2 (10-21-21 @ 09:14), Max: 98.2 (10-21-21 @ 09:14)  HR: 62 (10-21-21 @ 10:00)  BP: 115/62 (10-21-21 @ 10:00)  RR: 18 (10-21-21 @ 09:14)  SpO2: 95% (10-21-21 @ 10:00)  Wt(kg): --    10-20 @ 07:01  -  10-21 @ 07:00  --------------------------------------------------------  IN: 700 mL / OUT: 1150 mL / NET: -450 mL    10-21 @ 07:01  -  10-21 @ 13:26  --------------------------------------------------------  IN: 250 mL / OUT: 400 mL / NET: -150 mL          PHYSICAL EXAM:  Constitutional: NAD  HEENT: anicteric sclera, oropharynx clear.  Neck: No JVD  Respiratory: CTAB, no wheezes, rales or rhonchi  Cardiovascular: S1, S2, RRR  Gastrointestinal: BS+, soft, NT/ND  Extremities:   peripheral edema  Neurological: A/O x 3, no focal deficits  : No CVA tenderness    LABS:  10-21    138  |  101  |  60<H>  ----------------------------<  146<H>  3.4<L>   |  26  |  1.86<H>    Ca    8.3<L>      21 Oct 2021 06:00    TPro  5.7<L>  /  Alb  2.9<L>  /  TBili  1.0  /  DBili      /  AST  69<H>  /  ALT  54<H>  /  AlkPhos  96  10-20    Creatinine Trend: 1.86 <--, 1.75 <--, 2.07 <--, 1.97 <--, 2.29 <--, 2.47 <--                        7.8    9.50  )-----------( 307      ( 20 Oct 2021 07:24 )             24.7     Urine Studies:      RADIOLOGY & ADDITIONAL STUDIES:

## 2021-10-22 NOTE — PROGRESS NOTE ADULT - SUBJECTIVE AND OBJECTIVE BOX
Patient seen and examined  no complaints    No Known Allergies    Hospital Medications:   MEDICATIONS  (STANDING):  aMIOdarone    Tablet 400 milliGRAM(s) Oral daily  apixaban 2.5 milliGRAM(s) Oral two times a day  aspirin enteric coated 81 milliGRAM(s) Oral daily  atorvastatin 40 milliGRAM(s) Oral at bedtime  dextrose 40% Gel 15 Gram(s) Oral once  dextrose 5%. 1000 milliLiter(s) (50 mL/Hr) IV Continuous <Continuous>  dextrose 50% Injectable 25 Gram(s) IV Push once  finasteride 5 milliGRAM(s) Oral daily  furosemide   Injectable 80 milliGRAM(s) IV Push two times a day  glucagon  Injectable 1 milliGRAM(s) IntraMuscular once  influenza   Vaccine 0.5 milliLiter(s) IntraMuscular once  insulin lispro (ADMELOG) corrective regimen sliding scale   SubCutaneous three times a day before meals  insulin lispro (ADMELOG) corrective regimen sliding scale   SubCutaneous at bedtime  ketotifen 0.025% Ophthalmic Solution 1 Drop(s) Right EYE two times a day  pantoprazole    Tablet 40 milliGRAM(s) Oral before breakfast  polyethylene glycol 3350 17 Gram(s) Oral daily  potassium chloride    Tablet ER 20 milliEquivalent(s) Oral daily  senna 2 Tablet(s) Oral at bedtime  tamsulosin 0.4 milliGRAM(s) Oral at bedtime        VITALS:  T(F): 98.1 (10-22-21 @ 09:38), Max: 98.1 (10-21-21 @ 17:16)  HR: 59 (10-22-21 @ 09:38)  BP: 138/70 (10-22-21 @ 09:38)  RR: 18 (10-22-21 @ 09:38)  SpO2: 93% (10-22-21 @ 09:38)  Wt(kg): --    10-21 @ 07:01  -  10-22 @ 07:00  --------------------------------------------------------  IN: 650 mL / OUT: 1451 mL / NET: -801 mL        PHYSICAL EXAM:  Constitutional: NAD  HEENT: anicteric sclera, oropharynx clear.  Neck: No JVD  Respiratory: CTAB, no wheezes, rales or rhonchi  Cardiovascular: S1, S2, RRR  Gastrointestinal: BS+, soft, NT/ND  Extremities:   ++peripheral edema  Neurological: A/O x 3, no focal deficits  : No CVA tenderness    LABS:  10-22    141  |  101  |  57<H>  ----------------------------<  184<H>  3.8   |  28  |  1.87<H>    Ca    8.2<L>      22 Oct 2021 06:55      Creatinine Trend: 1.87 <--, 1.86 <--, 1.75 <--, 2.07 <--, 1.97 <--, 2.29 <--    Urine Studies:      RADIOLOGY & ADDITIONAL STUDIES:

## 2021-10-22 NOTE — PROGRESS NOTE ADULT - SUBJECTIVE AND OBJECTIVE BOX
Patient is a 89y old  Male who presents with a chief complaint of CHF exacerbation (22 Oct 2021 15:25)      SUBJECTIVE / OVERNIGHT EVENTS:    Events noted.  CONSTITUTIONAL: No fever,  or fatigue  RESPIRATORY: No cough, wheezing,  No shortness of breath  CARDIOVASCULAR: No chest pain, palpitations, dizziness, or leg swelling  GASTROINTESTINAL: No abdominal or epigastric pain.   NEUROLOGICAL: No headaches,     MEDICATIONS  (STANDING):  aMIOdarone    Tablet 400 milliGRAM(s) Oral daily  apixaban 2.5 milliGRAM(s) Oral two times a day  aspirin enteric coated 81 milliGRAM(s) Oral daily  atorvastatin 40 milliGRAM(s) Oral at bedtime  dextrose 40% Gel 15 Gram(s) Oral once  dextrose 5%. 1000 milliLiter(s) (50 mL/Hr) IV Continuous <Continuous>  dextrose 50% Injectable 25 Gram(s) IV Push once  finasteride 5 milliGRAM(s) Oral daily  furosemide   Injectable 80 milliGRAM(s) IV Push two times a day  glucagon  Injectable 1 milliGRAM(s) IntraMuscular once  influenza   Vaccine 0.5 milliLiter(s) IntraMuscular once  insulin lispro (ADMELOG) corrective regimen sliding scale   SubCutaneous three times a day before meals  insulin lispro (ADMELOG) corrective regimen sliding scale   SubCutaneous at bedtime  ketotifen 0.025% Ophthalmic Solution 1 Drop(s) Right EYE two times a day  metolazone 2.5 milliGRAM(s) Oral daily  pantoprazole    Tablet 40 milliGRAM(s) Oral before breakfast  polyethylene glycol 3350 17 Gram(s) Oral daily  potassium chloride    Tablet ER 20 milliEquivalent(s) Oral daily  senna 2 Tablet(s) Oral at bedtime  tamsulosin 0.4 milliGRAM(s) Oral at bedtime    MEDICATIONS  (PRN):  acetaminophen   Tablet .. 650 milliGRAM(s) Oral every 6 hours PRN Temp greater or equal to 38C (100.4F), Mild Pain (1 - 3)  sodium chloride 0.65% Nasal 1 Spray(s) Both Nostrils once PRN Nasal Congestion        CAPILLARY BLOOD GLUCOSE      POCT Blood Glucose.: 200 mg/dL (22 Oct 2021 21:12)  POCT Blood Glucose.: 204 mg/dL (22 Oct 2021 18:05)  POCT Blood Glucose.: 230 mg/dL (22 Oct 2021 15:04)  POCT Blood Glucose.: 215 mg/dL (22 Oct 2021 13:12)  POCT Blood Glucose.: 176 mg/dL (22 Oct 2021 09:12)    I&O's Summary    21 Oct 2021 07:01  -  22 Oct 2021 07:00  --------------------------------------------------------  IN: 650 mL / OUT: 1451 mL / NET: -801 mL    22 Oct 2021 07:01  -  23 Oct 2021 00:16  --------------------------------------------------------  IN: 700 mL / OUT: 926 mL / NET: -226 mL        T(C): 36.7 (10-22-21 @ 21:38), Max: 36.8 (10-22-21 @ 13:49)  HR: 60 (10-22-21 @ 21:38) (58 - 60)  BP: 143/68 (10-22-21 @ 21:38) (115/60 - 154/70)  RR: 18 (10-22-21 @ 21:38) (18 - 18)  SpO2: 98% (10-22-21 @ 21:38) (93% - 98%)    PHYSICAL EXAM:    NECK: Supple, No JVD  CHEST/LUNG: Clear to auscultation bilaterally; No wheezing.  HEART: Regular rate and rhythm; No murmurs, rubs, or gallops  ABDOMEN: Soft, Nontender, Nondistended; Bowel sounds present  EXTREMITIES:   No edema  NEUROLOGY: AAO       LABS:    10-22    141  |  101  |  57<H>  ----------------------------<  184<H>  3.8   |  28  |  1.87<H>    Ca    8.2<L>      22 Oct 2021 06:55              CAPILLARY BLOOD GLUCOSE      POCT Blood Glucose.: 200 mg/dL (22 Oct 2021 21:12)  POCT Blood Glucose.: 204 mg/dL (22 Oct 2021 18:05)  POCT Blood Glucose.: 230 mg/dL (22 Oct 2021 15:04)  POCT Blood Glucose.: 215 mg/dL (22 Oct 2021 13:12)  POCT Blood Glucose.: 176 mg/dL (22 Oct 2021 09:12)        RADIOLOGY & ADDITIONAL TESTS:    Imaging Personally Reviewed:    Consultant(s) Notes Reviewed:      Care Discussed with Consultants/Other Providers:    Dileep Amador MD, CMD, FACP    [address] [address]  Office Tel: [phone]  Cell: [phone]

## 2021-10-22 NOTE — PROGRESS NOTE ADULT - SUBJECTIVE AND OBJECTIVE BOX
C A R D I O L O G Y  **********************************     DATE OF SERVICE: 10-22-21    +sig LE edema, Denies chest pain or SOB on room air. Review of systems otherwise (-)  	    MEDICATIONS  (STANDING):  aMIOdarone    Tablet 400 milliGRAM(s) Oral daily  apixaban 2.5 milliGRAM(s) Oral two times a day  aspirin enteric coated 81 milliGRAM(s) Oral daily  atorvastatin 40 milliGRAM(s) Oral at bedtime  dextrose 40% Gel 15 Gram(s) Oral once  dextrose 5%. 1000 milliLiter(s) (50 mL/Hr) IV Continuous <Continuous>  dextrose 50% Injectable 25 Gram(s) IV Push once  finasteride 5 milliGRAM(s) Oral daily  furosemide   Injectable 80 milliGRAM(s) IV Push two times a day  glucagon  Injectable 1 milliGRAM(s) IntraMuscular once  influenza   Vaccine 0.5 milliLiter(s) IntraMuscular once  insulin lispro (ADMELOG) corrective regimen sliding scale   SubCutaneous three times a day before meals  insulin lispro (ADMELOG) corrective regimen sliding scale   SubCutaneous at bedtime  ketotifen 0.025% Ophthalmic Solution 1 Drop(s) Right EYE two times a day  metolazone 2.5 milliGRAM(s) Oral daily  pantoprazole    Tablet 40 milliGRAM(s) Oral before breakfast  polyethylene glycol 3350 17 Gram(s) Oral daily  potassium chloride    Tablet ER 20 milliEquivalent(s) Oral daily  senna 2 Tablet(s) Oral at bedtime  tamsulosin 0.4 milliGRAM(s) Oral at bedtime    MEDICATIONS  (PRN):  acetaminophen   Tablet .. 650 milliGRAM(s) Oral every 6 hours PRN Temp greater or equal to 38C (100.4F), Mild Pain (1 - 3)  sodium chloride 0.65% Nasal 1 Spray(s) Both Nostrils once PRN Nasal Congestion      LABS:      Hemoglobin: 7.8 g/dL (10-20 @ 07:24)  Hemoglobin: 7.9 g/dL (10-19 @ 05:51)  Hemoglobin: 7.8 g/dL (10-18 @ 07:19)    10-22    141  |  101  |  57<H>  ----------------------------<  184<H>  3.8   |  28  |  1.87<H>    Ca    8.2<L>      22 Oct 2021 06:55      Creatinine Trend: 1.87<--, 1.86<--, 1.75<--, 2.07<--, 1.97<--, 2.29<--             10-21-21 @ 07:01  -  10-22-21 @ 07:00  --------------------------------------------------------  IN: 650 mL / OUT: 1451 mL / NET: -801 mL    10-22-21 @ 07:01  -  10-22-21 @ 15:29  --------------------------------------------------------  IN: 460 mL / OUT: 626 mL / NET: -166 mL        PHYSICAL EXAM  Vital Signs Last 24 Hrs  T(C): 36.8 (22 Oct 2021 13:49), Max: 36.8 (22 Oct 2021 13:49)  T(F): 98.2 (22 Oct 2021 13:49), Max: 98.2 (22 Oct 2021 13:49)  HR: 58 (22 Oct 2021 13:49) (58 - 60)  BP: 154/70 (22 Oct 2021 13:49) (112/64 - 154/70)  BP(mean): --  RR: 18 (22 Oct 2021 13:49) (17 - 18)  SpO2: 95% (22 Oct 2021 13:49) (93% - 96%)        Gen: Appears well in NAD  HEENT:  (-)icterus (-)pallor  CV: N S1 S2 1/6 ELLY (+)2 Pulses B/l  Resp: CTA B/L, normal effort  GI: (+) BS Soft, NT, ND  Lymph:  (+) B/L LE/UE pitting Edema, (-)obvious lymphadenopathy  Skin: Warm to touch, Normal turgor  Psych: Appropriate mood and affect    TELEMETRY: SR/AP 60    < from: TTE with Doppler (w/3D Echo) (Transthoracic Echocardiogram) (10.21.21 @ 14:17) >  Conclusions:  1. Mitral annular calcification and tethered mitral valve  leaflets with normal opening. Mild-moderate mitral  regurgitation.  2. Calcified trileaflet aortic valve with decreased  opening. Peak transaortic valve gradient equals 6 mm Hg,  mean transaortic valve gradient equals 4 mm Hg, estimated  aortic valve area equals 1.8 sqcm (by continuity equation),  aortic valve velocity time integral equals 20 cm,  consistent with mild aortic stenosis.  3. Severely dilated left atrium.  LA volume index = 62  cc/m2.  4. Increased relative wall thickness with normal left  ventricular mass index, consistent with concentric left  ventricular remodeling.  5. Mild to moderate segmental left ventricular systolic  dysfunction.  Overall preserved left ventricular ejection  fraction. Hypokinesis of all basal segments with relatively  hyperdynamic mid to distal segments.  6. Increased E/e'  is consistent with elevated left  ventricular filling pressure.  7. The right ventricle is not well visualized; grossly  reduced  right ventricular systolic function. A device wire  is noted in the right heart.  8. Tricuspid valve not well visualized. Moderate tricuspid  regurgitation.  9. Estimated pulmonary artery systolic pressure equals 72  mm Hg, assuming right atrial pressure equals 10 - 15 mm Hg,  consistent with severe pulmonary pressures.  10. Normal pericardium with trace pericardial effusion.  *** No previous Echoexam.    < end of copied text >      ASSESSMENT/PLAN:  89 yr old very pleasant gentleman w/ PMH of CAD s/p PCI (ROBERTO to D1 6/2/14), chronic AF (on AC w/ eliquis), tachy/perla syndrome s/p biV PPM placement, CVA x 2, HFpEF who was recently hospitalised in Aug 2021 for monomorphic VT s/p cardioversion presents w/ fluid overload in setting of acute on chronic HFpEF exacerbation.     - keep net neg, increase IV lasix to 80mg BID- strict I/Os, daily standing weights  - Renal eval noted - metolazone being added, trend cr  - Will continue to hold coreg until euvolemic  - ACE/ARB on hold since last admission due to FLOYD on CKD  - Continue AC with Eliquis for afib  - Continue medical management of CAD with ASA/Statin  - Continue Amio 400mg daily for prior VT  - Repeat TTE noted with preserved EF, mild-mod segmental LV systolic dysfunction, reduced RV function, severe pulm HTN. Also appears to have severe diastolic dysfuction   - Of note, family declined cath and preferred medical management of CAD last admission  - F/U with his cardiologist Dr. Vanegas for cardiology after discharge    Ismael Figueroa PA-C  Pager: [phone]

## 2021-10-22 NOTE — PROGRESS NOTE ADULT - ASSESSMENT
89 yr old very pleasant gentleman w/ PMH of CAD s/p PCI (ROBERTO to D1 6/2/14), chronic AF (on AC w/ eliquis), tachy/perla syndrome s/p biV PPM placement, CVA x 2, HFpEF who was recently hospitalised in Aug 2021 for monomorphic VT s/p cardioversion presents w/ fluid overload found to have FLOYD on CKD stage 3      1) FLOYD on CKD stage 3  b/l cr around 1.5 to 1.8mg/dl  DDx includes Cardiorenal vs ATn--> cr now at baseline  UTI s/p ceftriaxone   Renal US-> right kidney 9.9cm and left kidney 10.2 cm-->no obstruction  Cr expected to fluctuate   volume status--> 3+ edema--> now lasix inc from  lasix 60mg iv bid to 80mg iv bid--> will add metolazone 2.5mg po , 30 min prior to am dose  keep off nephrotoxins such as ace/arb/nsaids/iv contrast  trend bmp  strict i/o's    2)Edema   lasix 60mg iv bid--> inc to lasix 80mg iv bid  added metolazone today as well  strict i/o's  daily wts  monitor    3) HYponatremia  na improved to 138  likely hypervolemic  lasix as above  trend na    4) Hypokalemia-  improved  trend k      Dr Huynh  [phone] 89 yr old very pleasant gentleman w/ PMH of CAD s/p PCI (ROBERTO to D1 6/2/14), chronic AF (on AC w/ eliquis), tachy/perla syndrome s/p biV PPM placement, CVA x 2, HFpEF who was recently hospitalised in Aug 2021 for monomorphic VT s/p cardioversion presents w/ fluid overload found to have FLOYD on CKD stage 3      1) FLOYD on CKD stage 3  b/l cr around 1.5 to 1.8mg/dl  DDx includes Cardiorenal vs ATn--> cr now at baseline  UTI s/p ceftriaxone   Renal US-> right kidney 9.9cm and left kidney 10.2 cm-->no obstruction  Cr expected to fluctuate   volume status--> 3+ edema--> now lasix inc from  lasix 60mg iv bid to 80mg iv bid--> will add metolazone 2.5mg po , 30 min prior to am dose  keep off nephrotoxins such as ace/arb/nsaids/iv contrast  trend bmp  strict i/o's    2)Edema   lasix 60mg iv bid--> inc to lasix 80mg iv bid  added metolazone today as well  strict i/o's  daily wts  monitor    3) HYponatremia  na improved to 141  likely hypervolemic  lasix as above  trend na    4) Hypokalemia-  improved  trend k      Dr Huynh  [phone]

## 2021-10-23 NOTE — PROGRESS NOTE ADULT - SUBJECTIVE AND OBJECTIVE BOX
C A R D I O L O G Y  **********************************     DATE OF SERVICE: 10-23-21          acetaminophen   Tablet .. 650 milliGRAM(s) Oral every 6 hours PRN  aMIOdarone    Tablet 400 milliGRAM(s) Oral daily  apixaban 2.5 milliGRAM(s) Oral two times a day  aspirin enteric coated 81 milliGRAM(s) Oral daily  atorvastatin 40 milliGRAM(s) Oral at bedtime  dextrose 40% Gel 15 Gram(s) Oral once  dextrose 5%. 1000 milliLiter(s) IV Continuous <Continuous>  dextrose 50% Injectable 25 Gram(s) IV Push once  finasteride 5 milliGRAM(s) Oral daily  furosemide   Injectable 80 milliGRAM(s) IV Push two times a day  glucagon  Injectable 1 milliGRAM(s) IntraMuscular once  influenza   Vaccine 0.5 milliLiter(s) IntraMuscular once  insulin lispro (ADMELOG) corrective regimen sliding scale   SubCutaneous three times a day before meals  insulin lispro (ADMELOG) corrective regimen sliding scale   SubCutaneous at bedtime  ketotifen 0.025% Ophthalmic Solution 1 Drop(s) Right EYE two times a day  metolazone 2.5 milliGRAM(s) Oral daily  pantoprazole    Tablet 40 milliGRAM(s) Oral before breakfast  polyethylene glycol 3350 17 Gram(s) Oral daily  potassium chloride    Tablet ER 20 milliEquivalent(s) Oral daily  senna 2 Tablet(s) Oral at bedtime  sodium chloride 0.65% Nasal 1 Spray(s) Both Nostrils once PRN  tamsulosin 0.4 milliGRAM(s) Oral at bedtime          Hemoglobin: 7.8 g/dL (10-20 @ 07:24)  Hemoglobin: 7.9 g/dL (10-19 @ 05:51)      10-22    141  |  101  |  57<H>  ----------------------------<  184<H>  3.8   |  28  |  1.87<H>    Ca    8.2<L>      22 Oct 2021 06:55      Creatinine Trend: 1.87<--, 1.86<--, 1.75<--, 2.07<--, 1.97<--, 2.29<--    COAGS:           T(C): 36.8 (10-23-21 @ 09:28), Max: 36.8 (10-22-21 @ 13:49)  HR: 60 (10-23-21 @ 09:28) (58 - 60)  BP: 121/63 (10-23-21 @ 09:28) (121/63 - 154/70)  RR: 18 (10-23-21 @ 09:28) (18 - 18)  SpO2: 91% (10-23-21 @ 09:28) (91% - 98%)  Wt(kg): --    I&O's Summary    22 Oct 2021 07:01  -  23 Oct 2021 07:00  --------------------------------------------------------  IN: 820 mL / OUT: 2176 mL / NET: -1356 mL    23 Oct 2021 07:01  -  23 Oct 2021 11:32  --------------------------------------------------------  IN: 300 mL / OUT: 1100 mL / NET: -800 mL        Gen: Appears well in NAD  HEENT:  (-)icterus (-)pallor  CV: N S1 S2 1/6 ELLY (+)2 Pulses B/l  Resp: CTA B/L, normal effort  GI: (+) BS Soft, NT, ND  Lymph:  (+) B/L LE/UE pitting Edema, (-)obvious lymphadenopathy  Skin: Warm to touch, Normal turgor  Psych: Appropriate mood and affect    TELEMETRY: SR/AP 60    < from: TTE with Doppler (w/3D Echo) (Transthoracic Echocardiogram) (10.21.21 @ 14:17) >  Conclusions:  1. Mitral annular calcification and tethered mitral valve  leaflets with normal opening. Mild-moderate mitral  regurgitation.  2. Calcified trileaflet aortic valve with decreased  opening. Peak transaortic valve gradient equals 6 mm Hg,  mean transaortic valve gradient equals 4 mm Hg, estimated  aortic valve area equals 1.8 sqcm (by continuity equation),  aortic valve velocity time integral equals 20 cm,  consistent with mild aortic stenosis.  3. Severely dilated left atrium.  LA volume index = 62  cc/m2.  4. Increased relative wall thickness with normal left  ventricular mass index, consistent with concentric left  ventricular remodeling.  5. Mild to moderate segmental left ventricular systolic  dysfunction.  Overall preserved left ventricular ejection  fraction. Hypokinesis of all basal segments with relatively  hyperdynamic mid to distal segments.  6. Increased E/e'  is consistent with elevated left  ventricular filling pressure.  7. The right ventricle is not well visualized; grossly  reduced  right ventricular systolic function. A device wire  is noted in the right heart.  8. Tricuspid valve not well visualized. Moderate tricuspid  regurgitation.  9. Estimated pulmonary artery systolic pressure equals 72  mm Hg, assuming right atrial pressure equals 10 - 15 mm Hg,  consistent with severe pulmonary pressures.  10. Normal pericardium with trace pericardial effusion.  *** No previous Echoexam.    < end of copied text >      ASSESSMENT/PLAN:  89 yr old very pleasant gentleman w/ PMH of CAD s/p PCI (ROBERTO to D1 6/2/14), chronic AF (on AC w/ eliquis), tachy/perla syndrome s/p biV PPM placement, CVA x 2, HFpEF who was recently hospitalised in Aug 2021 for monomorphic VT s/p cardioversion presents w/ fluid overload in setting of acute on chronic HFpEF exacerbation.     - keep net neg, increase IV lasix to 80mg BID- strict I/Os, daily standing weights  - Renal eval noted - metolazone being added, trend cr  - Will continue to hold coreg until euvolemic  - ACE/ARB on hold since last admission due to FLOYD on CKD  - Continue AC with Eliquis for afib  - Continue medical management of CAD with ASA/Statin  - Continue Amio 400mg daily for prior VT  - Repeat TTE noted with preserved EF, mild-mod segmental LV systolic dysfunction, reduced RV function, severe pulm HTN. Also appears to have severe diastolic dysfuction   - Of note, family declined cath and preferred medical management of CAD last admission  - F/U with his cardiologist Dr. Vanegas for cardiology after discharge

## 2021-10-23 NOTE — PROGRESS NOTE ADULT - SUBJECTIVE AND OBJECTIVE BOX
[facility] : Ans Serv [phone], Office [phone]  Dr Garcia/Dr Dawn  /Dr David harris /Dr JUAN DAVID Huynh/Dr Mike Smith/Dr Adrian Vega /Dr LUBA Herman  _______________________________________________________________________________________________    seen and examined today for Chronic Kidney Disease Stage 3  Interval :  VITALS:  T(F): 98.3 (10-23-21 @ 09:28), Max: 98.3 (10-23-21 @ 09:28)  HR: 60 (10-23-21 @ 09:28)  BP: 121/63 (10-23-21 @ 09:28)  RR: 18 (10-23-21 @ 09:28)  SpO2: 91% (10-23-21 @ 09:28)  Wt(kg): --    10-22 @ 07:01  -  10-23 @ 07:00  --------------------------------------------------------  IN: 820 mL / OUT: 2176 mL / NET: -1356 mL    10-23 @ 07:01  -  10-23 @ 13:33  --------------------------------------------------------  IN: 300 mL / OUT: 1100 mL / NET: -800 mL    Physical Exam :-  Constitutional: NAD  Neck: Supple.  Respiratory: Bilateral equal breath sounds, no Crackles present.  Cardiovascular: S1, S2 normal, positive Murmur  Gastrointestinal: Bowel Sounds present, soft, non tender.  Extremities: no Edema Feet  Neurological: Alert and Oriented x 3  Psychiatric: Normal mood, normal affect  Data:-  Allergies :   No Known Allergies    Hospital Medications:   MEDICATIONS  (STANDING):  aMIOdarone    Tablet 400 milliGRAM(s) Oral daily  apixaban 2.5 milliGRAM(s) Oral two times a day  aspirin enteric coated 81 milliGRAM(s) Oral daily  atorvastatin 40 milliGRAM(s) Oral at bedtime  dextrose 40% Gel 15 Gram(s) Oral once  dextrose 5%. 1000 milliLiter(s) (50 mL/Hr) IV Continuous <Continuous>  dextrose 50% Injectable 25 Gram(s) IV Push once  finasteride 5 milliGRAM(s) Oral daily  furosemide   Injectable 80 milliGRAM(s) IV Push two times a day  glucagon  Injectable 1 milliGRAM(s) IntraMuscular once  influenza   Vaccine 0.5 milliLiter(s) IntraMuscular once  insulin lispro (ADMELOG) corrective regimen sliding scale   SubCutaneous three times a day before meals  insulin lispro (ADMELOG) corrective regimen sliding scale   SubCutaneous at bedtime  ketotifen 0.025% Ophthalmic Solution 1 Drop(s) Right EYE two times a day  metolazone 2.5 milliGRAM(s) Oral daily  pantoprazole    Tablet 40 milliGRAM(s) Oral before breakfast  polyethylene glycol 3350 17 Gram(s) Oral daily  potassium chloride    Tablet ER 20 milliEquivalent(s) Oral daily  senna 2 Tablet(s) Oral at bedtime  tamsulosin 0.4 milliGRAM(s) Oral at bedtime    10-22    141  |  101  |  57<H>  ----------------------------<  184<H>  3.8   |  28  |  1.87<H>    Ca    8.2<L>      22 Oct 2021 06:55      Creatinine Trend: 1.87 <--, 1.86 <--, 1.75 <--, 2.07 <--, 1.97 <--

## 2021-10-23 NOTE — PROGRESS NOTE ADULT - ASSESSMENT
Patient seen and examined, agree with above assessment and plan as transcribed above.    - edema much improved  - F/u SMA7  - monitor crt    Alfredo Rain MD, [facility]  BEEPER [phone]

## 2021-10-23 NOTE — PROGRESS NOTE ADULT - ASSESSMENT
89 yr old very pleasant gentleman w/ PMH of CAD s/p PCI (ROBERTO to D1 6/2/14), chronic AF (on AC w/ eliquis), tachy/perla syndrome s/p biV PPM placement, CVA x 2, HFpEF who was recently hospitalised in Aug 2021 for monomorphic VT s/p cardioversion presents w/ fluid overload found to have FLOYD on CKD stage 3      1) CKD stage 3  b/l cr around 1.5 to 1.8mg/dl  DDx includes Cardiorenal vs ATn--> cr now at baseline  UTI s/p ceftriaxone   Renal US-> right kidney 9.9cm and left kidney 10.2 cm-->no obstruction  Volume Status : high    pLAN  Cr expected to fluctuate with diuresis  lasix 80mg iv bid--> with metolazone 2.5mg po , 30 min prior to am dose  keep off nephrotoxins such as ace/arb/nsaids/iv contrast  trend bmp  strict i/o's          Dr Garcia  Nephrology   cell [phone]  office [phone]  ans [facility]- [phone]  [email or web address] [facility] ( wkend coverage for Hospital)

## 2021-10-23 NOTE — PROGRESS NOTE ADULT - SUBJECTIVE AND OBJECTIVE BOX
Patient is a 89y old  Male who presents with a chief complaint of CHF exacerbation (23 Oct 2021 13:33)      SUBJECTIVE / OVERNIGHT EVENTS:    Events noted.  CONSTITUTIONAL: No fever,  or fatigue  RESPIRATORY: No cough, wheezing,  No shortness of breath  CARDIOVASCULAR: No chest pain, palpitations, dizziness, or leg swelling  GASTROINTESTINAL: No abdominal or epigastric pain.   NEUROLOGICAL: No headaches,     MEDICATIONS  (STANDING):  aMIOdarone    Tablet 400 milliGRAM(s) Oral daily  apixaban 2.5 milliGRAM(s) Oral two times a day  aspirin enteric coated 81 milliGRAM(s) Oral daily  atorvastatin 40 milliGRAM(s) Oral at bedtime  dextrose 40% Gel 15 Gram(s) Oral once  dextrose 5%. 1000 milliLiter(s) (50 mL/Hr) IV Continuous <Continuous>  dextrose 50% Injectable 25 Gram(s) IV Push once  finasteride 5 milliGRAM(s) Oral daily  furosemide   Injectable 80 milliGRAM(s) IV Push two times a day  glucagon  Injectable 1 milliGRAM(s) IntraMuscular once  influenza   Vaccine 0.5 milliLiter(s) IntraMuscular once  insulin lispro (ADMELOG) corrective regimen sliding scale   SubCutaneous at bedtime  insulin lispro (ADMELOG) corrective regimen sliding scale   SubCutaneous three times a day before meals  ketotifen 0.025% Ophthalmic Solution 1 Drop(s) Right EYE two times a day  metolazone 2.5 milliGRAM(s) Oral daily  pantoprazole    Tablet 40 milliGRAM(s) Oral before breakfast  polyethylene glycol 3350 17 Gram(s) Oral daily  potassium chloride    Tablet ER 20 milliEquivalent(s) Oral daily  senna 2 Tablet(s) Oral at bedtime  tamsulosin 0.4 milliGRAM(s) Oral at bedtime    MEDICATIONS  (PRN):  acetaminophen   Tablet .. 650 milliGRAM(s) Oral every 6 hours PRN Temp greater or equal to 38C (100.4F), Mild Pain (1 - 3)  sodium chloride 0.65% Nasal 1 Spray(s) Both Nostrils once PRN Nasal Congestion        CAPILLARY BLOOD GLUCOSE      POCT Blood Glucose.: 165 mg/dL (23 Oct 2021 21:05)  POCT Blood Glucose.: 184 mg/dL (23 Oct 2021 17:54)  POCT Blood Glucose.: 225 mg/dL (23 Oct 2021 13:33)  POCT Blood Glucose.: 186 mg/dL (23 Oct 2021 09:03)    I&O's Summary    22 Oct 2021 07:01  -  23 Oct 2021 07:00  --------------------------------------------------------  IN: 820 mL / OUT: 2176 mL / NET: -1356 mL    23 Oct 2021 07:01  -  23 Oct 2021 23:55  --------------------------------------------------------  IN: 970 mL / OUT: 2600 mL / NET: -1630 mL        T(C): 36.7 (10-23-21 @ 21:31), Max: 36.9 (10-23-21 @ 17:14)  HR: 60 (10-23-21 @ 21:31) (58 - 65)  BP: 130/63 (10-23-21 @ 21:31) (121/63 - 144/68)  RR: 18 (10-23-21 @ 21:31) (18 - 18)  SpO2: 95% (10-23-21 @ 21:31) (91% - 98%)    PHYSICAL EXAM:    NECK: Supple, No JVD  CHEST/LUNG: Clear to auscultation bilaterally; No wheezing.  HEART: Regular rate and rhythm; No murmurs, rubs, or gallops  ABDOMEN: Soft, Nontender, Nondistended; Bowel sounds present  EXTREMITIES:   Pedal edema  NEUROLOGY: AAO X 3      LABS:    10-22    141  |  101  |  57<H>  ----------------------------<  184<H>  3.8   |  28  |  1.87<H>    Ca    8.2<L>      22 Oct 2021 06:55              CAPILLARY BLOOD GLUCOSE      POCT Blood Glucose.: 165 mg/dL (23 Oct 2021 21:05)  POCT Blood Glucose.: 184 mg/dL (23 Oct 2021 17:54)  POCT Blood Glucose.: 225 mg/dL (23 Oct 2021 13:33)  POCT Blood Glucose.: 186 mg/dL (23 Oct 2021 09:03)        RADIOLOGY & ADDITIONAL TESTS:    Imaging Personally Reviewed:    Consultant(s) Notes Reviewed:      Care Discussed with Consultants/Other Providers:    Dileep Amador MD, CMD, FACP    [address] [place] [address]  Office Tel: [phone]  Cell: [phone]

## 2021-10-24 NOTE — PROGRESS NOTE ADULT - SUBJECTIVE AND OBJECTIVE BOX
C A R D I O L O G Y  **********************************     DATE OF SERVICE: 10-24-21          acetaminophen   Tablet .. 650 milliGRAM(s) Oral every 6 hours PRN  aMIOdarone    Tablet 400 milliGRAM(s) Oral daily  apixaban 2.5 milliGRAM(s) Oral two times a day  aspirin enteric coated 81 milliGRAM(s) Oral daily  atorvastatin 40 milliGRAM(s) Oral at bedtime  dextrose 40% Gel 15 Gram(s) Oral once  dextrose 5%. 1000 milliLiter(s) IV Continuous <Continuous>  dextrose 50% Injectable 25 Gram(s) IV Push once  finasteride 5 milliGRAM(s) Oral daily  furosemide   Injectable 80 milliGRAM(s) IV Push two times a day  glucagon  Injectable 1 milliGRAM(s) IntraMuscular once  influenza   Vaccine 0.5 milliLiter(s) IntraMuscular once  insulin lispro (ADMELOG) corrective regimen sliding scale   SubCutaneous three times a day before meals  insulin lispro (ADMELOG) corrective regimen sliding scale   SubCutaneous at bedtime  ketotifen 0.025% Ophthalmic Solution 1 Drop(s) Right EYE two times a day  metolazone 2.5 milliGRAM(s) Oral daily  pantoprazole    Tablet 40 milliGRAM(s) Oral before breakfast  polyethylene glycol 3350 17 Gram(s) Oral daily  potassium chloride    Tablet ER 20 milliEquivalent(s) Oral daily  senna 2 Tablet(s) Oral at bedtime  sodium chloride 0.65% Nasal 1 Spray(s) Both Nostrils once PRN  tamsulosin 0.4 milliGRAM(s) Oral at bedtime          Hemoglobin: 7.8 g/dL (10-20 @ 07:24)      10-24    140  |  98  |  51<H>  ----------------------------<  161<H>  3.1<L>   |  31  |  1.82<H>    Ca    8.3<L>      24 Oct 2021 05:58      Creatinine Trend: 1.82<--, 1.87<--, 1.86<--, 1.75<--, 2.07<--, 1.97<--    COAGS:           DELTA): 36.7 (10-24-21 @ 09:46), Max: 36.9 (10-23-21 @ 17:14)  HR: 60 (10-24-21 @ 09:46) (58 - 65)  BP: 120/60 (10-24-21 @ 09:46) (120/60 - 136/64)  RR: 18 (10-24-21 @ 09:46) (18 - 18)  SpO2: 96% (10-24-21 @ 09:46) (93% - 98%)  Wt(kg): --    I&O's Summary    23 Oct 2021 07:01  -  24 Oct 2021 07:00  --------------------------------------------------------  IN: 1030 mL / OUT: 3350 mL / NET: -2320 mL        Gen: Appears well in NAD  HEENT:  (-)icterus (-)pallor  CV: N S1 S2 1/6 ELLY (+)2 Pulses B/l  Resp: CTA B/L, normal effort  GI: (+) BS Soft, NT, ND  Lymph:  (+) B/L LE/UE pitting Edema, (-)obvious lymphadenopathy  Skin: Warm to touch, Normal turgor  Psych: Appropriate mood and affect    TELEMETRY: SR/AP 60    < from: TTE with Doppler (w/3D Echo) (Transthoracic Echocardiogram) (10.21.21 @ 14:17) >  Conclusions:  1. Mitral annular calcification and tethered mitral valve  leaflets with normal opening. Mild-moderate mitral  regurgitation.  2. Calcified trileaflet aortic valve with decreased  opening. Peak transaortic valve gradient equals 6 mm Hg,  mean transaortic valve gradient equals 4 mm Hg, estimated  aortic valve area equals 1.8 sqcm (by continuity equation),  aortic valve velocity time integral equals 20 cm,  consistent with mild aortic stenosis.  3. Severely dilated left atrium.  LA volume index = 62  cc/m2.  4. Increased relative wall thickness with normal left  ventricular mass index, consistent with concentric left  ventricular remodeling.  5. Mild to moderate segmental left ventricular systolic  dysfunction.  Overall preserved left ventricular ejection  fraction. Hypokinesis of all basal segments with relatively  hyperdynamic mid to distal segments.  6. Increased E/e'  is consistent with elevated left  ventricular filling pressure.  7. The right ventricle is not well visualized; grossly  reduced  right ventricular systolic function. A device wire  is noted in the right heart.  8. Tricuspid valve not well visualized. Moderate tricuspid  regurgitation.  9. Estimated pulmonary artery systolic pressure equals 72  mm Hg, assuming right atrial pressure equals 10 - 15 mm Hg,  consistent with severe pulmonary pressures.  10. Normal pericardium with trace pericardial effusion.  *** No previous Echoexam.    < end of copied text >      ASSESSMENT/PLAN:  89 yr old very pleasant gentleman w/ PMH of CAD s/p PCI (ROBERTO to D1 6/2/14), chronic AF (on AC w/ eliquis), tachy/perla syndrome s/p biV PPM placement, CVA x 2, HFpEF who was recently hospitalised in Aug 2021 for monomorphic VT s/p cardioversion presents w/ fluid overload in setting of acute on chronic HFpEF exacerbation.     - keep net neg, increase IV lasix to 80mg BID- strict I/Os, daily standing weights  - Renal eval noted - metolazone being added, trend cr  - Will continue to hold coreg until euvolemic  - ACE/ARB on hold since last admission due to FLOYD on CKD  - Continue AC with Eliquis for afib  - Continue medical management of CAD with ASA/Statin  - Continue Amio 400mg daily for prior VT  - Repeat TTE noted with preserved EF, mild-mod segmental LV systolic dysfunction, reduced RV function, severe pulm HTN. Also appears to have severe diastolic dysfuction   - Of note, family declined cath and preferred medical management of CAD last admission  - F/U with his cardiologist Dr. Vanegas for cardiology after discharge

## 2021-10-24 NOTE — PROGRESS NOTE ADULT - ASSESSMENT
89 yr old very pleasant gentleman w/ PMH of CAD s/p PCI (ROBERTO to D1 6/2/14), chronic AF (on AC w/ eliquis), tachy/perla syndrome s/p biV PPM placement, CVA x 2, HFpEF who was recently hospitalised in Aug 2021 for monomorphic VT s/p cardioversion presents w/ fluid overload found to have FLOYD on CKD stage 3      1) CKD stage 3  b/l cr around 1.5 to 1.8mg/dl  DDx includes Cardiorenal vs ATn--> cr now at baseline  UTI s/p ceftriaxone   Renal US-> right kidney 9.9cm and left kidney 10.2 cm-->no obstruction  Volume Status : high    pLAN  Cr expected to fluctuate with diuresis  lasix 80mg iv bid--> with metolazone 2.5mg po , 30 min prior to am dose ( if serum cr uptrends will plan to hold metolazone )  keep off nephrotoxins such as ace/arb/nsaids/iv contrast  trend bmp  strict i/o's    Dr Garcia  Nephrology   cell [phone]  office [phone]  ans serv- [phone]  [email or web address] - [facility] ( wkend coverage for Hospital)

## 2021-10-24 NOTE — PROGRESS NOTE ADULT - SUBJECTIVE AND OBJECTIVE BOX
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 No nausea, vomiting, or hematemesis;   GENITOURINARY: No dysuria, frequency, hematuria, o  NEUROLOGICAL: No headaches,     Medications:  MEDICATIONS  (STANDING):  aMIOdarone    Tablet 400 milliGRAM(s) Oral daily  apixaban 2.5 milliGRAM(s) Oral two times a day  aspirin enteric coated 81 milliGRAM(s) Oral daily  atorvastatin 40 milliGRAM(s) Oral at bedtime  dextrose 40% Gel 15 Gram(s) Oral once  dextrose 5%. 1000 milliLiter(s) (50 mL/Hr) IV Continuous <Continuous>  dextrose 50% Injectable 25 Gram(s) IV Push once  finasteride 5 milliGRAM(s) Oral daily  furosemide   Injectable 80 milliGRAM(s) IV Push two times a day  glucagon  Injectable 1 milliGRAM(s) IntraMuscular once  influenza   Vaccine 0.5 milliLiter(s) IntraMuscular once  insulin lispro (ADMELOG) corrective regimen sliding scale   SubCutaneous three times a day before meals  insulin lispro (ADMELOG) corrective regimen sliding scale   SubCutaneous at bedtime  ketotifen 0.025% Ophthalmic Solution 1 Drop(s) Right EYE two times a day  metolazone 2.5 milliGRAM(s) Oral daily  pantoprazole    Tablet 40 milliGRAM(s) Oral before breakfast  polyethylene glycol 3350 17 Gram(s) Oral daily  potassium chloride    Tablet ER 20 milliEquivalent(s) Oral daily  potassium chloride    Tablet ER 40 milliEquivalent(s) Oral once  senna 2 Tablet(s) Oral at bedtime  tamsulosin 0.4 milliGRAM(s) Oral at bedtime    MEDICATIONS  (PRN):  acetaminophen   Tablet .. 650 milliGRAM(s) Oral every 6 hours PRN Temp greater or equal to 38C (100.4F), Mild Pain (1 - 3)  sodium chloride 0.65% Nasal 1 Spray(s) Both Nostrils once PRN Nasal Congestion    	    PHYSICAL EXAM:  T(C): 36.7 (10-24-21 @ 09:46), Max: 36.9 (10-23-21 @ 17:14)  HR: 60 (10-24-21 @ 09:46) (58 - 65)  BP: 120/60 (10-24-21 @ 09:46) (120/60 - 136/64)  RR: 18 (10-24-21 @ 09:46) (18 - 18)  SpO2: 96% (10-24-21 @ 09:46) (93% - 98%)  Wt(kg): --  I&O's Summary    23 Oct 2021 07:01  -  24 Oct 2021 07:00  --------------------------------------------------------  IN: 1030 mL / OUT: 3350 mL / NET: -2320 mL    24 Oct 2021 07:01  -  24 Oct 2021 10:55  --------------------------------------------------------  IN: 240 mL / OUT: 950 mL / NET: -710 mL        Appearance: Normal	  HEENT:   Normal oral mucosa, PERRL, EOMI	  Lymphatic: No lymphadenopathy  Cardiovascular: Normal S1 S2, No   Respiratory: dec bs   Psychiatry: A & O  Gastrointestinal:  Soft, Non-tender, + BS	  Skin: No rashes, No ecchymoses, No cyanosis	  Neurologic: Non-focal  Extremities: Normal range of motion, No clubbing, cyanosis or edema  Vascular: Peripheral pulses palpable     TELEMETRY: 	    ECG:  	  RADIOLOGY:  OTHER: 	  	  LABS:	 	    CARDIAC MARKERS:            10-24    140  |  98  |  51<H>  ----------------------------<  161<H>  3.1<L>   |  31  |  1.82<H>    Ca    8.3<L>      24 Oct 2021 05:58      proBNP:   Lipid Profile:   HgA1c:   TSH:

## 2021-10-24 NOTE — PROGRESS NOTE ADULT - SUBJECTIVE AND OBJECTIVE BOX
[facility] : [facility] [phone], Office [phone]  Dr Garcia/Dr Dawn  /Dr David harris /Dr JUAN DAVID Huynh/Dr Mike Smith/Dr Adrian Vega /Dr LUBA Herman  _______________________________________________________________________________________________    seen and examined today for renal failure  Interval : Serum Creatinine stable  VITALS:  T(F): 98 (10-24-21 @ 09:46), Max: 98.4 (10-23-21 @ 17:14)  HR: 60 (10-24-21 @ 09:46)  BP: 120/60 (10-24-21 @ 09:46)  RR: 18 (10-24-21 @ 09:46)  SpO2: 96% (10-24-21 @ 09:46)  Wt(kg): --    10-23 @ 07:01  -  10-24 @ 07:00  --------------------------------------------------------  IN: 1030 mL / OUT: 3350 mL / NET: -2320 mL    10-24 @ 07:01  -  10-24 @ 11:07  --------------------------------------------------------  IN: 240 mL / OUT: 950 mL / NET: -710 mL    Physical Exam :-  Constitutional: NAD  Neck: Supple.  Respiratory: Bilateral equal breath sounds, no Crackles present.  Cardiovascular: S1, S2 normal, positive Murmur  Gastrointestinal: Bowel Sounds present, soft, non tender.  Extremities: no Edema Feet  Neurological: Alert and Oriented x 3  Psychiatric: Normal mood, normal affect  Data:-  Allergies :   No Known Allergies    Hospital Medications:   MEDICATIONS  (STANDING):  aMIOdarone    Tablet 400 milliGRAM(s) Oral daily  apixaban 2.5 milliGRAM(s) Oral two times a day  aspirin enteric coated 81 milliGRAM(s) Oral daily  atorvastatin 40 milliGRAM(s) Oral at bedtime  dextrose 40% Gel 15 Gram(s) Oral once  dextrose 5%. 1000 milliLiter(s) (50 mL/Hr) IV Continuous <Continuous>  dextrose 50% Injectable 25 Gram(s) IV Push once  finasteride 5 milliGRAM(s) Oral daily  furosemide   Injectable 80 milliGRAM(s) IV Push two times a day  glucagon  Injectable 1 milliGRAM(s) IntraMuscular once  influenza   Vaccine 0.5 milliLiter(s) IntraMuscular once  insulin lispro (ADMELOG) corrective regimen sliding scale   SubCutaneous three times a day before meals  insulin lispro (ADMELOG) corrective regimen sliding scale   SubCutaneous at bedtime  ketotifen 0.025% Ophthalmic Solution 1 Drop(s) Right EYE two times a day  metolazone 2.5 milliGRAM(s) Oral daily  pantoprazole    Tablet 40 milliGRAM(s) Oral before breakfast  polyethylene glycol 3350 17 Gram(s) Oral daily  potassium chloride    Tablet ER 40 milliEquivalent(s) Oral once  potassium chloride    Tablet ER 20 milliEquivalent(s) Oral daily  senna 2 Tablet(s) Oral at bedtime  tamsulosin 0.4 milliGRAM(s) Oral at bedtime    10-24    140  |  98  |  51<H>  ----------------------------<  161<H>  3.1<L>   |  31  |  1.82<H>    Ca    8.3<L>      24 Oct 2021 05:58      Creatinine Trend: 1.82 <--, 1.87 <--, 1.86 <--, 1.75 <--, 2.07 <--, 1.97 <--

## 2021-10-25 NOTE — PROGRESS NOTE ADULT - PROBLEM SELECTOR PROBLEM 2
Acute kidney injury superimposed on CKD

## 2021-10-25 NOTE — PROGRESS NOTE ADULT - PROBLEM SELECTOR PLAN 2
Nephro f/up appreciated  - monitor UOP
Nephro f/up appreciated  - monitor UOP
baseline CKD stage 3; last sCr 1.55 prior to discharge, now 2.58; likely increased secondary to acute chf  Nephro eval appreciated  - hold losartan  - monitor UOP
Nephro f/up  - monitor output and creatinine
Nephro f/up  - monitor output and creatinine
Nephro f/up appreciated  - monitor UOP
baseline CKD stage 3; last sCr 1.55 prior to discharge, now 2.58; likely increased secondary to acute chf  Nephro eval appreciated  - hold losartan  - monitor UOP
baseline CKD stage 3; last sCr 1.55 prior to discharge, now 2.58; likely increased secondary to acute chf  Nephro eval appreciated  - hold losartan  - monitor UOP
Nephro f/up appreciated  - monitor UOP
baseline CKD stage 3; last sCr 1.55 prior to discharge, now 2.58; likely increased secondary to acute chf  Nephro eval appreciated  - hold losartan  - monitor UOP

## 2021-10-25 NOTE — PROGRESS NOTE ADULT - PROBLEM SELECTOR PLAN 3
hx of recent VT in 8/2021; s.p DCCV   - c/w amiodarone 400 mg qd
hx of recent VT in 8/2021; s.p DCCV   - c/w current meds
hx of recent VT in 8/2021; s.p DCCV   - c/w amiodarone 400 mg qd
hx of recent VT in 8/2021; s.p DCCV   - c/w amiodarone 400 mg qd  - keep Mg> 2 and K > 4
hx of recent VT in 8/2021; s.p DCCV   - c/w amiodarone 400 mg qd  - keep Mg> 2 and K > 4
hx of recent VT in 8/2021; s.p DCCV   - c/w current meds
hx of recent VT in 8/2021; s.p DCCV   - c/w amiodarone 400 mg qd
hx of recent VT in 8/2021; s.p DCCV   - c/w amiodarone 400 mg qd  - keep Mg> 2 and K > 4
hx of recent VT in 8/2021; s.p DCCV   - c/w amiodarone 400 mg qd  - keep Mg> 2 and K > 4

## 2021-10-25 NOTE — PROGRESS NOTE ADULT - PROBLEM SELECTOR PROBLEM 1
Acute exacerbation of CHF (congestive heart failure)

## 2021-10-25 NOTE — PROGRESS NOTE ADULT - ASSESSMENT
CARDIOLOGY ATTENDING    Agree with above. No further inpatient cardiac/EP workup expected. F/U with me for routine BiV-ICD care, and his cardiologist Dr Vanegas after discharge

## 2021-10-25 NOTE — PROGRESS NOTE ADULT - SUBJECTIVE AND OBJECTIVE BOX
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 No nausea, vomiting, or hematemesis;   GENITOURINARY: No dysuria, frequency, hematuria, o  NEUROLOGICAL: No headaches,     Medications:        MEDICATIONS  (STANDING):  aMIOdarone    Tablet 400 milliGRAM(s) Oral daily  apixaban 2.5 milliGRAM(s) Oral two times a day  aspirin enteric coated 81 milliGRAM(s) Oral daily  atorvastatin 40 milliGRAM(s) Oral at bedtime  carvedilol 12.5 milliGRAM(s) Oral every 12 hours  dextrose 40% Gel 15 Gram(s) Oral once  dextrose 5%. 1000 milliLiter(s) (50 mL/Hr) IV Continuous <Continuous>  dextrose 50% Injectable 25 Gram(s) IV Push once  finasteride 5 milliGRAM(s) Oral daily  glucagon  Injectable 1 milliGRAM(s) IntraMuscular once  influenza   Vaccine 0.5 milliLiter(s) IntraMuscular once  insulin lispro (ADMELOG) corrective regimen sliding scale   SubCutaneous three times a day before meals  insulin lispro (ADMELOG) corrective regimen sliding scale   SubCutaneous at bedtime  ketotifen 0.025% Ophthalmic Solution 1 Drop(s) Right EYE two times a day  pantoprazole    Tablet 40 milliGRAM(s) Oral before breakfast  polyethylene glycol 3350 17 Gram(s) Oral daily  potassium chloride    Tablet ER 40 milliEquivalent(s) Oral every 4 hours  potassium chloride    Tablet ER 20 milliEquivalent(s) Oral daily  senna 2 Tablet(s) Oral at bedtime  tamsulosin 0.4 milliGRAM(s) Oral at bedtime  torsemide 40 milliGRAM(s) Oral two times a day    MEDICATIONS  (PRN):  acetaminophen   Tablet .. 650 milliGRAM(s) Oral every 6 hours PRN Temp greater or equal to 38C (100.4F), Mild Pain (1 - 3)  sodium chloride 0.65% Nasal 1 Spray(s) Both Nostrils once PRN Nasal Congestion    PHYSICAL EXAM:      T(C): 36.8 (10-25-21 @ 10:00), Max: 36.8 (10-25-21 @ 10:00)  HR: 60 (10-25-21 @ 09:50) (60 - 60)  BP: 110/58 (10-25-21 @ 09:50) (110/58 - 126/62)  RR: 18 (10-25-21 @ 10:00) (17 - 18)  SpO2: 95% (10-25-21 @ 10:00) (95% - 96%)    23 Oct 2021 07:01  -  24 Oct 2021 07:00  --------------------------------------------------------  IN: 1030 mL / OUT: 3350 mL / NET: -2320 mL    24 Oct 2021 07:01  -  24 Oct 2021 10:55  --------------------------------------------------------  IN: 240 mL / OUT: 950 mL / NET: -710 mL        Appearance: Normal	  HEENT:   Normal oral mucosa, PERRL, EOMI	  Lymphatic: No lymphadenopathy  Cardiovascular: Normal S1 S2, No   Respiratory: dec bs   Psychiatry: A & O  Gastrointestinal:  Soft, Non-tender, + BS	  Skin: No rashes, No ecchymoses, No cyanosis	  Neurologic: Non-focal  Extremities: Normal range of motion, No clubbing, cyanosis or edema  Vascular: Peripheral pulses palpable     TELEMETRY: 	    ECG:  	  RADIOLOGY:  OTHER: 	  	  LABS:	 	    CARDIAC MARKERS:                              8.1    9.64  )-----------( 303      ( 24 Oct 2021 21:00 )             25.9               140|95|51<184  3.1|32|2.04  8.6,--,--  10-25 @ 07:15    CAPILLARY BLOOD GLUCOSE      POCT Blood Glucose.: 226 mg/dL (25 Oct 2021 11:55)  POCT Blood Glucose.: 197 mg/dL (25 Oct 2021 07:58)  POCT Blood Glucose.: 205 mg/dL (24 Oct 2021 21:05)  POCT Blood Glucose.: 204 mg/dL (24 Oct 2021 17:53)

## 2021-10-25 NOTE — PROGRESS NOTE ADULT - PROBLEM SELECTOR PLAN 4
hx of chronic Afib, s/p biV PPM; ekg w/ atrially paced rhythm  - rate controlled; holding BB (coreg 6.25 mg BID) currently, resume when ok w/ cardiology  - c/w AC: apixaban 2.5 mg BID
Eliquis  Cardio f/up
hx of chronic Afib, s/p biV PPM; ekg w/ atrially paced rhythm  - rate controlled; holding BB (coreg 6.25 mg BID) currently, resume when ok w/ cardiology  - c/w AC: apixaban 2.5 mg BID
hx of chronic Afib, s/p biV PPM; ekg w/ atrially paced rhythm  - rate controlled; holding BB (coreg 6.25 mg BID) currently, resume when ok w/ cardiology  - c/w AC: apixaban 2.5 mg BID
Eliquis  Cardio f/up
hx of chronic Afib, s/p biV PPM; ekg w/ atrially paced rhythm  - rate controlled; holding BB (coreg 6.25 mg BID) currently, resume when ok w/ cardiology  - c/w AC: apixaban 2.5 mg BID
Eliquis  Cardio f/up
Eliquis  Cardio f/up
hx of chronic Afib, s/p biV PPM; ekg w/ atrially paced rhythm  - rate controlled; holding BB (coreg 6.25 mg BID) currently, resume when ok w/ cardiology  - c/w AC: apixaban 2.5 mg BID

## 2021-10-25 NOTE — PROGRESS NOTE ADULT - SUBJECTIVE AND OBJECTIVE BOX
C A R [facility] O [facility]  **********************************     DATE OF SERVICE: 10-25-21     LE edema much improved, denies chest pain or SOB. Review of systems otherwise (-)      MEDICATIONS  (STANDING):  aMIOdarone    Tablet 400 milliGRAM(s) Oral daily  apixaban 2.5 milliGRAM(s) Oral two times a day  aspirin enteric coated 81 milliGRAM(s) Oral daily  atorvastatin 40 milliGRAM(s) Oral at bedtime  dextrose 40% Gel 15 Gram(s) Oral once  dextrose 5%. 1000 milliLiter(s) (50 mL/Hr) IV Continuous <Continuous>  dextrose 50% Injectable 25 Gram(s) IV Push once  finasteride 5 milliGRAM(s) Oral daily  furosemide   Injectable 80 milliGRAM(s) IV Push two times a day  glucagon  Injectable 1 milliGRAM(s) IntraMuscular once  influenza   Vaccine 0.5 milliLiter(s) IntraMuscular once  insulin lispro (ADMELOG) corrective regimen sliding scale   SubCutaneous three times a day before meals  insulin lispro (ADMELOG) corrective regimen sliding scale   SubCutaneous at bedtime  ketotifen 0.025% Ophthalmic Solution 1 Drop(s) Right EYE two times a day  metolazone 2.5 milliGRAM(s) Oral daily  pantoprazole    Tablet 40 milliGRAM(s) Oral before breakfast  polyethylene glycol 3350 17 Gram(s) Oral daily  potassium chloride    Tablet ER 40 milliEquivalent(s) Oral once  potassium chloride    Tablet ER 20 milliEquivalent(s) Oral daily  senna 2 Tablet(s) Oral at bedtime  tamsulosin 0.4 milliGRAM(s) Oral at bedtime    MEDICATIONS  (PRN):  acetaminophen   Tablet .. 650 milliGRAM(s) Oral every 6 hours PRN Temp greater or equal to 38C (100.4F), Mild Pain (1 - 3)  sodium chloride 0.65% Nasal 1 Spray(s) Both Nostrils once PRN Nasal Congestion      LABS:                          8.1    9.64  )-----------( 303      ( 24 Oct 2021 21:00 )             25.9     Hemoglobin: 8.1 g/dL (10-24 @ 21:00)    10-25    140  |  95<L>  |  51<H>  ----------------------------<  184<H>  3.1<L>   |  32<H>  |  2.04<H>    Ca    8.6      25 Oct 2021 07:15      Creatinine Trend: 2.04<--, 1.82<--, 1.87<--, 1.86<--, 1.75<--, 2.07<--             10-24-21 @ 07:01  -  10-25-21 @ 07:00  --------------------------------------------------------  IN: 1300 mL / OUT: 3800 mL / NET: -2500 mL        PHYSICAL EXAM  Vital Signs Last 24 Hrs  T(C): 36.8 (25 Oct 2021 10:00), Max: 36.8 (25 Oct 2021 10:00)  T(F): 98.3 (25 Oct 2021 10:00), Max: 98.3 (25 Oct 2021 10:00)  HR: 60 (25 Oct 2021 09:50) (60 - 61)  BP: 110/58 (25 Oct 2021 09:50) (110/58 - 132/56)  BP(mean): --  RR: 18 (25 Oct 2021 10:00) (17 - 18)  SpO2: 95% (25 Oct 2021 10:00) (95% - 97%)      Gen: Appears well in NAD  HEENT:  (-)icterus (-)pallor  CV: N S1 S2 1/6 ELLY (+)2 Pulses B/l  Resp: CTA B/L, normal effort  GI: (+) BS Soft, NT, ND  Lymph:  (+) B/L LE/UE pitting Edema improving, (-)obvious lymphadenopathy  Skin: Warm to touch, Normal turgor  Psych: Appropriate mood and affect    TELEMETRY: SR/AP 60    < from: TTE with Doppler (w/3D Echo) (Transthoracic Echocardiogram) (10.21.21 @ 14:17) >  Conclusions:  1. Mitral annular calcification and tethered mitral valve  leaflets with normal opening. Mild-moderate mitral  regurgitation.  2. Calcified trileaflet aortic valve with decreased  opening. Peak transaortic valve gradient equals 6 mm Hg,  mean transaortic valve gradient equals 4 mm Hg, estimated  aortic valve area equals 1.8 sqcm (by continuity equation),  aortic valve velocity time integral equals 20 cm,  consistent with mild aortic stenosis.  3. Severely dilated left atrium.  LA volume index = 62  cc/m2.  4. Increased relative wall thickness with normal left  ventricular mass index, consistent with concentric left  ventricular remodeling.  5. Mild to moderate segmental left ventricular systolic  dysfunction.  Overall preserved left ventricular ejection  fraction. Hypokinesis of all basal segments with relatively  hyperdynamic mid to distal segments.  6. Increased E/e'  is consistent with elevated left  ventricular filling pressure.  7. The right ventricle is not well visualized; grossly  reduced  right ventricular systolic function. A device wire  is noted in the right heart.  8. Tricuspid valve not well visualized. Moderate tricuspid  regurgitation.  9. Estimated pulmonary artery systolic pressure equals 72  mm Hg, assuming right atrial pressure equals 10 - 15 mm Hg,  consistent with severe pulmonary pressures.  10. Normal pericardium with trace pericardial effusion.  *** No previous Echoexam.    < end of copied text >      ASSESSMENT/PLAN:  89 yr old very pleasant gentleman w/ PMH of CAD s/p PCI (ROBERTO to D1 6/2/14), chronic AF (on AC w/ eliquis), tachy/perla syndrome s/p biV PPM placement, CVA x 2, HFpEF who was recently hospitalised in Aug 2021 for monomorphic VT s/p cardioversion presents w/ fluid overload in setting of acute on chronic HFpEF exacerbation.     - Volume status improved - will transition to PO Torsemide today  - Renal f/u - on metolazone, trend cr  - Will resume coreg  - ACE/ARB on hold since last admission due to FLOYD on CKD  - Continue AC with Eliquis for afib  - Continue medical management of CAD with ASA/Statin  - Continue Amio 400mg daily for prior VT  - Repeat TTE noted with preserved EF, mild-mod segmental LV systolic dysfunction, reduced RV function, severe pulm HTN. Also appears to have severe diastolic dysfunction   - Of note, family declined cath and preferred medical management of CAD last admission  - No further inpatient cardiac w/u planned  - F/U with his cardiologist Dr. Vanegas for cardiology after discharge    Ismael Figueroa PA-C  Pager: [phone]      C A R D I O L O G Y  **********************************     DATE OF SERVICE: 10-25-21     LE edema much improved, denies chest pain or SOB. Review of systems otherwise (-)      MEDICATIONS  (STANDING):  aMIOdarone    Tablet 400 milliGRAM(s) Oral daily  apixaban 2.5 milliGRAM(s) Oral two times a day  aspirin enteric coated 81 milliGRAM(s) Oral daily  atorvastatin 40 milliGRAM(s) Oral at bedtime  dextrose 40% Gel 15 Gram(s) Oral once  dextrose 5%. 1000 milliLiter(s) (50 mL/Hr) IV Continuous <Continuous>  dextrose 50% Injectable 25 Gram(s) IV Push once  finasteride 5 milliGRAM(s) Oral daily  furosemide   Injectable 80 milliGRAM(s) IV Push two times a day  glucagon  Injectable 1 milliGRAM(s) IntraMuscular once  influenza   Vaccine 0.5 milliLiter(s) IntraMuscular once  insulin lispro (ADMELOG) corrective regimen sliding scale   SubCutaneous three times a day before meals  insulin lispro (ADMELOG) corrective regimen sliding scale   SubCutaneous at bedtime  ketotifen 0.025% Ophthalmic Solution 1 Drop(s) Right EYE two times a day  metolazone 2.5 milliGRAM(s) Oral daily  pantoprazole    Tablet 40 milliGRAM(s) Oral before breakfast  polyethylene glycol 3350 17 Gram(s) Oral daily  potassium chloride    Tablet ER 40 milliEquivalent(s) Oral once  potassium chloride    Tablet ER 20 milliEquivalent(s) Oral daily  senna 2 Tablet(s) Oral at bedtime  tamsulosin 0.4 milliGRAM(s) Oral at bedtime    MEDICATIONS  (PRN):  acetaminophen   Tablet .. 650 milliGRAM(s) Oral every 6 hours PRN Temp greater or equal to 38C (100.4F), Mild Pain (1 - 3)  sodium chloride 0.65% Nasal 1 Spray(s) Both Nostrils once PRN Nasal Congestion      LABS:                          8.1    9.64  )-----------( 303      ( 24 Oct 2021 21:00 )             25.9     Hemoglobin: 8.1 g/dL (10-24 @ 21:00)    10-25    140  |  95<L>  |  51<H>  ----------------------------<  184<H>  3.1<L>   |  32<H>  |  2.04<H>    Ca    8.6      25 Oct 2021 07:15      Creatinine Trend: 2.04<--, 1.82<--, 1.87<--, 1.86<--, 1.75<--, 2.07<--             10-24-21 @ 07:01  -  10-25-21 @ 07:00  --------------------------------------------------------  IN: 1300 mL / OUT: 3800 mL / NET: -2500 mL        PHYSICAL EXAM  Vital Signs Last 24 Hrs  T(C): 36.8 (25 Oct 2021 10:00), Max: 36.8 (25 Oct 2021 10:00)  T(F): 98.3 (25 Oct 2021 10:00), Max: 98.3 (25 Oct 2021 10:00)  HR: 60 (25 Oct 2021 09:50) (60 - 61)  BP: 110/58 (25 Oct 2021 09:50) (110/58 - 132/56)  BP(mean): --  RR: 18 (25 Oct 2021 10:00) (17 - 18)  SpO2: 95% (25 Oct 2021 10:00) (95% - 97%)      Gen: Appears well in NAD  HEENT:  (-)icterus (-)pallor  CV: N S1 S2 1/6 ELLY (+)2 Pulses B/l  Resp: CTA B/L, normal effort  GI: (+) BS Soft, NT, ND  Lymph:  (+) B/L LE/UE pitting Edema improving, (-)obvious lymphadenopathy  Skin: Warm to touch, Normal turgor  Psych: Appropriate mood and affect    TELEMETRY: SR/AP 60    < from: TTE with Doppler (w/3D Echo) (Transthoracic Echocardiogram) (10.21.21 @ 14:17) >  Conclusions:  1. Mitral annular calcification and tethered mitral valve  leaflets with normal opening. Mild-moderate mitral  regurgitation.  2. Calcified trileaflet aortic valve with decreased  opening. Peak transaortic valve gradient equals 6 mm Hg,  mean transaortic valve gradient equals 4 mm Hg, estimated  aortic valve area equals 1.8 sqcm (by continuity equation),  aortic valve velocity time integral equals 20 cm,  consistent with mild aortic stenosis.  3. Severely dilated left atrium.  LA volume index = 62  cc/m2.  4. Increased relative wall thickness with normal left  ventricular mass index, consistent with concentric left  ventricular remodeling.  5. Mild to moderate segmental left ventricular systolic  dysfunction.  Overall preserved left ventricular ejection  fraction. Hypokinesis of all basal segments with relatively  hyperdynamic mid to distal segments.  6. Increased E/e'  is consistent with elevated left  ventricular filling pressure.  7. The right ventricle is not well visualized; grossly  reduced  right ventricular systolic function. A device wire  is noted in the right heart.  8. Tricuspid valve not well visualized. Moderate tricuspid  regurgitation.  9. Estimated pulmonary artery systolic pressure equals 72  mm Hg, assuming right atrial pressure equals 10 - 15 mm Hg,  consistent with severe pulmonary pressures.  10. Normal pericardium with trace pericardial effusion.  *** No previous Echoexam.    < end of copied text >      ASSESSMENT/PLAN:  89 yr old very pleasant gentleman w/ PMH of CAD s/p PCI (ROBERTO to D1 6/2/14), chronic AF (on AC w/ eliquis), tachy/perla syndrome s/p biV PPM placement, CVA x 2, HFpEF who was recently hospitalised in Aug 2021 for monomorphic VT s/p cardioversion presents w/ fluid overload in setting of acute on chronic HFpEF exacerbation.     - Volume status improved - will transition to PO Torsemide 40mg BID if okay with renal  - Renal f/u - on metolazone, trend cr  - Will resume coreg  - ACE/ARB on hold since last admission due to FLOYD on CKD  - Continue AC with Eliquis for afib  - Continue medical management of CAD with ASA/Statin  - Continue Amio 400mg daily for prior VT  - Repeat TTE noted with preserved EF, mild-mod segmental LV systolic dysfunction, reduced RV function, severe pulm HTN. Also appears to have severe diastolic dysfunction   - Of note, family declined cath and preferred medical management of CAD last admission  - No further inpatient cardiac w/u planned  - F/U with his cardiologist Dr. Vanegas for cardiology after discharge    Ismael Figueroa PA-C  Pager: [phone]

## 2021-10-25 NOTE — PROGRESS NOTE ADULT - ASSESSMENT
89 yr old very pleasant gentleman w/ PMH of CAD s/p PCI (ROBERTO to D1 6/2/14), chronic AF (on AC w/ eliquis), tachy/perla syndrome s/p biV PPM placement, CVA x 2, HFpEF who was recently hospitalised in Aug 2021 for monomorphic VT s/p cardioversion presents w/ fluid overload found to have FLOYD on CKD stage 3      1) CKD stage 3  b/l cr around 1.5 to 1.8mg/dl  DDx includes Cardiorenal vs ATn--> cr around baseline--> fluctuations expected  UTI s/p ceftriaxone   Renal US-> right kidney 9.9cm and left kidney 10.2 cm-->no obstruction  now on torsemide 40mg po BID  keep off nephrotoxins such as ace/arb/nsaids/iv contrast  trend bmp  strict i/o's      2) Hypokalemia  Likely 2/2 diuresis-  On kcl 20meq po qd  Now being supplemented with kcl 40 meq  x 2 doses ( will need to watch from aggressive kcl supplementation given high cr)  will change to kcl 40meq po x 1 instead on 2 doses today  trend k      Dr Huynh  Nephrology   cell   office [phone]  ans serv- [phone]  [email or web address] ( [facility])

## 2021-10-25 NOTE — PROGRESS NOTE ADULT - SUBJECTIVE AND OBJECTIVE BOX
Patient seen and examined  no complaints  says his edema is much better    No Known Allergies    Hospital Medications:   MEDICATIONS  (STANDING):  aMIOdarone    Tablet 400 milliGRAM(s) Oral daily  apixaban 2.5 milliGRAM(s) Oral two times a day  aspirin enteric coated 81 milliGRAM(s) Oral daily  atorvastatin 40 milliGRAM(s) Oral at bedtime  carvedilol 12.5 milliGRAM(s) Oral every 12 hours  dextrose 40% Gel 15 Gram(s) Oral once  dextrose 5%. 1000 milliLiter(s) (50 mL/Hr) IV Continuous <Continuous>  dextrose 50% Injectable 25 Gram(s) IV Push once  finasteride 5 milliGRAM(s) Oral daily  glucagon  Injectable 1 milliGRAM(s) IntraMuscular once  influenza   Vaccine 0.5 milliLiter(s) IntraMuscular once  insulin lispro (ADMELOG) corrective regimen sliding scale   SubCutaneous three times a day before meals  insulin lispro (ADMELOG) corrective regimen sliding scale   SubCutaneous at bedtime  ketotifen 0.025% Ophthalmic Solution 1 Drop(s) Right EYE two times a day  pantoprazole    Tablet 40 milliGRAM(s) Oral before breakfast  polyethylene glycol 3350 17 Gram(s) Oral daily  potassium chloride    Tablet ER 40 milliEquivalent(s) Oral every 4 hours  potassium chloride    Tablet ER 20 milliEquivalent(s) Oral daily  senna 2 Tablet(s) Oral at bedtime  tamsulosin 0.4 milliGRAM(s) Oral at bedtime  torsemide 40 milliGRAM(s) Oral two times a day      VITALS:  T(F): 98.3 (10-25-21 @ 10:00), Max: 98.3 (10-25-21 @ 10:00)  HR: 60 (10-25-21 @ 09:50)  BP: 110/58 (10-25-21 @ 09:50)  RR: 18 (10-25-21 @ 10:00)  SpO2: 95% (10-25-21 @ 10:00)  Wt(kg): --    10-24 @ 07:01  -  10-25 @ 07:00  --------------------------------------------------------  IN: 1300 mL / OUT: 3800 mL / NET: -2500 mL    Physical Exam :-  Constitutional: NAD  Neck: Supple.  Respiratory: Bilateral equal breath sounds, no Crackles present.  Cardiovascular: S1, S2 normal, positive Murmur  Gastrointestinal: Bowel Sounds present, soft, non tender.  Extremities: 1+edema Feet  Neurological: Alert and Oriented x 3  Psychiatric: Normal mood, normal affect  LABS:  10-25    140  |  95<L>  |  51<H>  ----------------------------<  184<H>  3.1<L>   |  32<H>  |  2.04<H>    Ca    8.6      25 Oct 2021 07:15      Creatinine Trend: 2.04 <--, 1.82 <--, 1.87 <--, 1.86 <--, 1.75 <--, 2.07 <--                        8.1    9.64  )-----------( 303      ( 24 Oct 2021 21:00 )             25.9     Urine Studies:      RADIOLOGY & ADDITIONAL STUDIES:

## 2021-10-25 NOTE — PROGRESS NOTE ADULT - PROBLEM SELECTOR PLAN 1
Cardio f/up appreciated  IV Lasix 80 mg twice a day
Cardio f/up appreciated  IV Lasix 80 mg twice a day
Cardio eval  IV Lasix
Cardio f/up appreciated  Changed  iv Lasix to Torsemide to 40 BID   Coreg 12.5 mg BID   supp potassium as needed
Cardio eval  IV Lasix
Cardio f/up appreciated  IV Lasix as per cards     supp potassium as needed
Cardio f/up appreciated  IV Lasix 60 mg twice a day
Cardio f/up appreciated  IV Lasix
Cardio f/up appreciated  IV Lasix 60 mg twice a day
Cardio f/up appreciated  IV Lasix
Cardio eval  IV Lasix
Cardio eval  IV Lasix

## 2021-10-25 NOTE — PROGRESS NOTE ADULT - PROBLEM SELECTOR PROBLEM 3
History of ventricular tachycardia

## 2021-10-26 NOTE — PROGRESS NOTE ADULT - REASON FOR ADMISSION
CHF exacerbation

## 2021-10-26 NOTE — DISCHARGE NOTE PROVIDER - NSDCCPCAREPLAN_GEN_ALL_CORE_FT
PRINCIPAL DISCHARGE DIAGNOSIS  Diagnosis: Decompensated heart failure  Assessment and Plan of Treatment: Please continue on Toresemide 40mg twice daily, as furesemide was discontinued.   Weigh yourself daily.  If you gain 3lbs in 3 days, or 5lbs in a week call your Health Care Provider.  Do not eat or drink foods containing more than 2000mg of salt (sodium) in your diet every day.  Call your Health Care Provider if you have any swelling or increased swelling in your feet, ankles, and/or stomach.  Take all of your medication as directed.  If you become dizzy call your Health Care Provider.      SECONDARY DISCHARGE DIAGNOSES  Diagnosis: Acute kidney injury superimposed on CKD  Assessment and Plan of Treatment: Avoid taking (NSAIDs) - (ex: Ibuprofen, Advil, Celebrex, Naprosyn)  Avoid taking any nephrotoxic agents (can harm kidneys) - Intravenous contrast for diagnostic testing, combination cold medications.  Have all medications adjusted for your renal function by your Health Care Provider.  Blood pressure control is important.  Take all medication as prescribed.    Diagnosis: Essential hypertension  Assessment and Plan of Treatment: Coreg was decreased to 12.5mg twice daily from 25mg twice a day, please continue close follow up with your PMD for monitoring of your blood pressure.   Low salt diet  Activity as tolerated.  Take all medication as prescribed.  Follow up with your medical doctor for routine blood pressure monitoring at your next visit.  Notify your doctor if you have any of the following symptoms:   Dizziness, Lightheadedness, Blurry vision, Headache, Chest pain, Shortness of breath    Diagnosis: CAD (coronary artery disease)  Assessment and Plan of Treatment: Coronary artery disease is a condition where the arteries the supply the heart muscle get clogged with fatty deposits & puts you at risk for a heart attack  Call your doctor if you have any new pain, pressure, or discomfort in the center of your chest, pain, tingling or discomfort in arms, back, neck, jaw, or stomach, shortness of breath, nausea, vomiting, burping or heartburn, sweating, cold and clammy skin, racing or abnormal heartbeat for more than 10 minutes or if they keep coming & going.  Call 911 and do not tr to get to hospital by care  You can help yourself with lefestyle changes (quitting smoking if you smoke), eat lots of fruits & vegetables & low fat dairy products, not a lot of meat & fatty foods, walk or some form of physical activity most days of the week, lose weight if you are overweight  Take your cardiac medication as prescribed to lower cholesterol, to lower blood pressure, aspirin to prevent blood clots, and diabetes control  Make sure to keep appointments with doctor for cardiac follow up care     PRINCIPAL DISCHARGE DIAGNOSIS  Diagnosis: Decompensated heart failure  Assessment and Plan of Treatment: Please continue on Toresemide 40mg twice daily, as furesemide was discontinued.   Weigh yourself daily.  If you gain 3lbs in 3 days, or 5lbs in a week call your Health Care Provider.  Do not eat or drink foods containing more than 2000mg of salt (sodium) in your diet every day.  Call your Health Care Provider if you have any swelling or increased swelling in your feet, ankles, and/or stomach.  Take all of your medication as directed.  If you become dizzy call your Health Care Provider.      SECONDARY DISCHARGE DIAGNOSES  Diagnosis: Acute kidney injury superimposed on CKD  Assessment and Plan of Treatment: hold diuretic and potassium and follow up with Dr. Lara [facility] in one week for rpt blood work and to restart medications   Avoid taking (NSAIDs) - (ex: Ibuprofen, Advil, Celebrex, Naprosyn)  Avoid taking any nephrotoxic agents (can harm kidneys) - Intravenous contrast for diagnostic testing, combination cold medications.  Have all medications adjusted for your renal function by your Health Care Provider.  Blood pressure control is important.  Take all medication as prescribed.    Diagnosis: CAD (coronary artery disease)  Assessment and Plan of Treatment: Coronary artery disease is a condition where the arteries the supply the heart muscle get clogged with fatty deposits & puts you at risk for a heart attack  Call your doctor if you have any new pain, pressure, or discomfort in the center of your chest, pain, tingling or discomfort in arms, back, neck, jaw, or stomach, shortness of breath, nausea, vomiting, burping or heartburn, sweating, cold and clammy skin, racing or abnormal heartbeat for more than 10 minutes or if they keep coming & going.  Call 911 and do not tr to get to hospital by care  You can help yourself with lefestyle changes (quitting smoking if you smoke), eat lots of fruits & vegetables & low fat dairy products, not a lot of meat & fatty foods, walk or some form of physical activity most days of the week, lose weight if you are overweight  Take your cardiac medication as prescribed to lower cholesterol, to lower blood pressure, aspirin to prevent blood clots, and diabetes control  Make sure to keep appointments with doctor for cardiac follow up care    Diagnosis: Essential hypertension  Assessment and Plan of Treatment: Coreg was decreased to 12.5mg twice daily from 25mg twice a day, please continue close follow up with your PMD for monitoring of your blood pressure.   Low salt diet  Activity as tolerated.  Take all medication as prescribed.  Follow up with your medical doctor for routine blood pressure monitoring at your next visit.  Notify your doctor if you have any of the following symptoms:   Dizziness, Lightheadedness, Blurry vision, Headache, Chest pain, Shortness of breath

## 2021-10-26 NOTE — DISCHARGE NOTE PROVIDER - NSDCMRMEDTOKEN_GEN_ALL_CORE_FT
acetaminophen 325 mg oral tablet: 2 tab(s) orally every 6 hours, As needed, Temp greater or equal to 38C (100.4F), Mild Pain (1 - 3)  amiodarone 200 mg oral tablet: 2 tab(s) orally once a day  apixaban 2.5 mg oral tablet: 1 tab(s) orally 2 times a day  aspirin 81 mg oral delayed release tablet: 1 tab(s) orally once a day  atorvastatin 40 mg oral tablet: 1 tab(s) orally once a day (at bedtime)  azelastine 0.05% ophthalmic solution: 1 drop(s) to R eye 2 times a day  finasteride 5 mg oral tablet: 1 tab(s) orally once a day  outpatient physical therapy: Dx unsteady gait ICD 10 R26.81  pantoprazole 40 mg oral delayed release tablet: 1 tab(s) orally once a day (before a meal)  sucralfate 1 g oral tablet: 1 tab(s) orally once a day  tamsulosin 0.4 mg oral capsule: 1 cap(s) orally once a day (at bedtime)   acetaminophen 325 mg oral tablet: 2 tab(s) orally every 6 hours, As needed, Temp greater or equal to 38C (100.4F), Mild Pain (1 - 3)  amiodarone 200 mg oral tablet: 2 tab(s) orally once a day  apixaban 2.5 mg oral tablet: 1 tab(s) orally 2 times a day  aspirin 81 mg oral delayed release tablet: 1 tab(s) orally once a day  atorvastatin 40 mg oral tablet: 1 tab(s) orally once a day (at bedtime)  azelastine 0.05% ophthalmic solution: 1 drop(s) to R eye 2 times a day  finasteride 5 mg oral tablet: 1 tab(s) orally once a day  outpatient physical therapy: Dx unsteady gait ICD 10 R26.81  pantoprazole 40 mg oral delayed release tablet: 1 tab(s) orally once a day (before a meal)  polyethylene glycol 3350 oral powder for reconstitution: 17 gram(s) orally once a day  senna oral tablet: 2 tab(s) orally once a day (at bedtime)  sucralfate 1 g oral tablet: 1 tab(s) orally once a day  tamsulosin 0.4 mg oral capsule: 1 cap(s) orally once a day (at bedtime)   acetaminophen 325 mg oral tablet: 2 tab(s) orally every 6 hours, As needed, Temp greater or equal to 38C (100.4F), Mild Pain (1 - 3)  amiodarone 200 mg oral tablet: 2 tab(s) orally once a day  apixaban 2.5 mg oral tablet: 1 tab(s) orally 2 times a day  aspirin 81 mg oral delayed release tablet: 1 tab(s) orally once a day  atorvastatin 40 mg oral tablet: 1 tab(s) orally once a day (at bedtime)  azelastine 0.05% ophthalmic solution: 1 drop(s) to R eye 2 times a day  carvedilol 12.5 mg oral tablet: 1 tab(s) orally every 12 hours  finasteride 5 mg oral tablet: 1 tab(s) orally once a day  pantoprazole 40 mg oral delayed release tablet: 1 tab(s) orally once a day (before a meal)  polyethylene glycol 3350 oral powder for reconstitution: 17 gram(s) orally once a day  senna oral tablet: 2 tab(s) orally once a day (at bedtime)  tamsulosin 0.4 mg oral capsule: 1 cap(s) orally once a day (at bedtime)   acetaminophen 325 mg oral tablet: 2 tab(s) orally every 6 hours, As needed, Temp greater or equal to 38C (100.4F), Mild Pain (1 - 3)  amiodarone 200 mg oral tablet: 2 tab(s) orally once a day  apixaban 2.5 mg oral tablet: 1 tab(s) orally 2 times a day  aspirin 81 mg oral delayed release tablet: 1 tab(s) orally once a day  atorvastatin 40 mg oral tablet: 1 tab(s) orally once a day (at bedtime)  azelastine 0.05% ophthalmic solution: 1 drop(s) to R eye 2 times a day  carvedilol 12.5 mg oral tablet: 1 tab(s) orally every 12 hours  finasteride 5 mg oral tablet: 1 tab(s) orally once a day  pantoprazole 40 mg oral delayed release tablet: 1 tab(s) orally once a day (before a meal)  polyethylene glycol 3350 oral powder for reconstitution: 17 gram(s) orally once a day  senna oral tablet: 2 tab(s) orally once a day (at bedtime)  tamsulosin 0.4 mg oral capsule: 1 cap(s) orally once a day (at bedtime)  torsemide 20 mg oral tablet: 2 tab(s) orally once a day

## 2021-10-26 NOTE — DISCHARGE NOTE PROVIDER - HOSPITAL COURSE
89 yr old male w/ PMH of CAD s/p PCI (ROBERTO to D1 6/2/14), chronic AF (on AC w/ eliquis), tachy/perla syndrome s/p biV PPM placement, CVA x 2, HFpEF who was recently hospitalised in Aug 2021 for monomorphic VT s/p cardioversion presents w/ fluid overload in setting of acute CHF exacerbation found to have FLOYD on CKD stage 3 started on IV diuresis, now transitioned to Torsemide 40mg PO BID, baseline creatinine around 1.5 to 1.8mg/dl, DDx includes Cardiorenal vs ATn--> cr around baseline--> fluctuations expected  UTI s/p ceftriaxone. Renal US-> right kidney 9.9cm and left kidney 10.2 cm-->no obstruction. Patient advised to remain off of nephrotoxins such as ace/arb/nsaids/iv contrast  Patient to follow up with Nephrology to trend Creatinine. Patient stable for discharge per Cards and Renal.

## 2021-10-26 NOTE — DISCHARGE NOTE PROVIDER - CARE PROVIDERS DIRECT ADDRESSES
,DirectAddress_Unknown,DirectAddress_Unknown,[email or web address]

## 2021-10-26 NOTE — PROGRESS NOTE ADULT - PROVIDER SPECIALTY LIST ADULT
Nephrology
Cardiology
Internal Medicine
Nephrology
Cardiology
Internal Medicine
Nephrology
Internal Medicine

## 2021-10-26 NOTE — PROGRESS NOTE ADULT - ASSESSMENT
89 yr old very pleasant gentleman w/ PMH of CAD s/p PCI (ROBERTO to D1 6/2/14), chronic AF (on AC w/ eliquis), tachy/perla syndrome s/p biV PPM placement, CVA x 2, HFpEF who was recently hospitalised in Aug 2021 for monomorphic VT s/p cardioversion presents w/ fluid overload found to have FLOYD on CKD stage 3      1) CKD stage 3  b/l cr around 1.5 to 1.8mg/dl  Cr rising and edema better--> would dec torsemide from 40mg po bid to q d  UTI s/p ceftriaxone   Renal US-> right kidney 9.9cm and left kidney 10.2 cm-->no obstruction  keep off nephrotoxins such as ace/arb/nsaids/iv contrast  trend bmp  strict i/o's      2) Hypokalemia  Likely 2/2 diuresis-  On kcl 20meq po qd  torsemide dec to 40mg po qd  trend k      Dr Huynh  Nephrology   cell   office [phone]  ans [facility]- [phone]  [email or web address] ([email or web address]) - [email or web address] ( wkend coverage for Hospital)

## 2021-10-26 NOTE — PROGRESS NOTE ADULT - SUBJECTIVE AND OBJECTIVE BOX
Patient seen and examined  no complaints    No Known Allergies    Hospital Medications:   MEDICATIONS  (STANDING):  aMIOdarone    Tablet 400 milliGRAM(s) Oral daily  apixaban 2.5 milliGRAM(s) Oral two times a day  aspirin enteric coated 81 milliGRAM(s) Oral daily  atorvastatin 40 milliGRAM(s) Oral at bedtime  carvedilol 12.5 milliGRAM(s) Oral every 12 hours  dextrose 40% Gel 15 Gram(s) Oral once  dextrose 5%. 1000 milliLiter(s) (50 mL/Hr) IV Continuous <Continuous>  dextrose 50% Injectable 25 Gram(s) IV Push once  finasteride 5 milliGRAM(s) Oral daily  glucagon  Injectable 1 milliGRAM(s) IntraMuscular once  influenza   Vaccine 0.5 milliLiter(s) IntraMuscular once  insulin lispro (ADMELOG) corrective regimen sliding scale   SubCutaneous three times a day before meals  insulin lispro (ADMELOG) corrective regimen sliding scale   SubCutaneous at bedtime  ketotifen 0.025% Ophthalmic Solution 1 Drop(s) Right EYE two times a day  pantoprazole    Tablet 40 milliGRAM(s) Oral before breakfast  polyethylene glycol 3350 17 Gram(s) Oral daily  potassium chloride    Tablet ER 20 milliEquivalent(s) Oral daily  senna 2 Tablet(s) Oral at bedtime  tamsulosin 0.4 milliGRAM(s) Oral at bedtime      VITALS:  T(F): 97.4 (10-26-21 @ 13:07), Max: 98.2 (10-25-21 @ 21:26)  HR: 86 (10-26-21 @ 13:07)  BP: 118/61 (10-26-21 @ 13:07)  RR: 18 (10-26-21 @ 13:07)  SpO2: 97% (10-26-21 @ 13:07)  Wt(kg): --    10-25 @ 07:01  -  10-26 @ 07:00  --------------------------------------------------------  IN: 1130 mL / OUT: 3150 mL / NET: -2020 mL    10-26 @ 07:01  -  10-26 @ 15:56  --------------------------------------------------------  IN: 480 mL / OUT: 1200 mL / NET: -720 mL        Physical Exam :-  Constitutional: NAD  Neck: Supple.  Respiratory: Bilateral equal breath sounds, no Crackles present.  Cardiovascular: S1, S2 normal, positive Murmur  Gastrointestinal: Bowel Sounds present, soft, non tender.  Extremities: 1+edema Feet  Neurological: Alert and Oriented x 3  Psychiatric: Normal mood, normal affect    LABS:  10-26    140  |  95<L>  |  56<H>  ----------------------------<  164<H>  3.5   |  33<H>  |  2.28<H>    Ca    8.9      26 Oct 2021 07:30      Creatinine Trend: 2.28 <--, 2.04 <--, 1.82 <--, 1.87 <--, 1.86 <--, 1.75 <--                        8.2    7.47  )-----------( 269      ( 26 Oct 2021 07:30 )             26.1     Urine Studies:      RADIOLOGY & ADDITIONAL STUDIES:

## 2021-10-26 NOTE — DISCHARGE NOTE PROVIDER - CARE PROVIDER_API CALL
CLEMENTE DELGADO  Internal Medicine  [address] [place] [address]  Phone: [phone]  Fax: [phone]  Follow Up Time:     Kathryn Huynh)  Internal Medicine  [address] [place] [address]  Phone: [phone]  Fax: [phone]  Follow Up Time:     Danny Vanegas)  Cardiovascular Disease; Internal Medicine  [address] [place] [address]  Phone: [phone]  Fax: [phone]  Follow Up Time:    CLEMENTE DELGADO  Internal Medicine  [address] [place] [address]  Phone: [phone]  Fax: [phone]  Follow Up Time:     Kathryn Huynh)  Internal Medicine  [address] [place] [address]  Phone: [phone]  Fax: [phone]  Follow Up Time: 1 week    Danny Vanegas)  Cardiovascular Disease; Internal Medicine  [address] [place] [address]  Phone: [phone]  Fax: [phone]  Follow Up Time:

## 2021-10-26 NOTE — DISCHARGE NOTE PROVIDER - PROVIDER TOKENS
PROVIDER:[TOKEN:[[id number]]],PROVIDER:[TOKEN:[[id number]]],PROVIDER:[TOKEN:[[id number]]] PROVIDER:[TOKEN:[[id number]]],PROVIDER:[TOKEN:[[id number]],FOLLOWUP:[1 week]],PROVIDER:[TOKEN:[[id number]]]

## 2021-10-26 NOTE — PROGRESS NOTE ADULT - SUBJECTIVE AND OBJECTIVE BOX
C A R D I O L O G Y  **********************************     DATE OF SERVICE: 10-26-21     Edema continues to improve. denies chest pain or SOB. Review of systems otherwise (-)      MEDICATIONS  (STANDING):  aMIOdarone    Tablet 400 milliGRAM(s) Oral daily  apixaban 2.5 milliGRAM(s) Oral two times a day  aspirin enteric coated 81 milliGRAM(s) Oral daily  atorvastatin 40 milliGRAM(s) Oral at bedtime  carvedilol 12.5 milliGRAM(s) Oral every 12 hours  dextrose 40% Gel 15 Gram(s) Oral once  dextrose 5%. 1000 milliLiter(s) (50 mL/Hr) IV Continuous <Continuous>  dextrose 50% Injectable 25 Gram(s) IV Push once  finasteride 5 milliGRAM(s) Oral daily  glucagon  Injectable 1 milliGRAM(s) IntraMuscular once  influenza   Vaccine 0.5 milliLiter(s) IntraMuscular once  insulin lispro (ADMELOG) corrective regimen sliding scale   SubCutaneous three times a day before meals  insulin lispro (ADMELOG) corrective regimen sliding scale   SubCutaneous at bedtime  ketotifen 0.025% Ophthalmic Solution 1 Drop(s) Right EYE two times a day  pantoprazole    Tablet 40 milliGRAM(s) Oral before breakfast  polyethylene glycol 3350 17 Gram(s) Oral daily  potassium chloride    Tablet ER 20 milliEquivalent(s) Oral daily  senna 2 Tablet(s) Oral at bedtime  tamsulosin 0.4 milliGRAM(s) Oral at bedtime    MEDICATIONS  (PRN):  acetaminophen   Tablet .. 650 milliGRAM(s) Oral every 6 hours PRN Temp greater or equal to 38C (100.4F), Mild Pain (1 - 3)  sodium chloride 0.65% Nasal 1 Spray(s) Both Nostrils once PRN Nasal Congestion      LABS:                          8.2    7.47  )-----------( 269      ( 26 Oct 2021 07:30 )             26.1     Hemoglobin: 8.2 g/dL (10-26 @ 07:30)  Hemoglobin: 8.1 g/dL (10-24 @ 21:00)    10-26    140  |  95<L>  |  56<H>  ----------------------------<  164<H>  3.5   |  33<H>  |  2.28<H>    Ca    8.9      26 Oct 2021 07:30      Creatinine Trend: 2.28<--, 2.04<--, 1.82<--, 1.87<--, 1.86<--, 1.75<--             10-25-21 @ 07:01  -  10-26-21 @ 07:00  --------------------------------------------------------  IN: 1130 mL / OUT: 3150 mL / NET: -2020 mL    10-26-21 @ 07:01  -  10-26-21 @ 14:06  --------------------------------------------------------  IN: 480 mL / OUT: 1200 mL / NET: -720 mL        PHYSICAL EXAM  Vital Signs Last 24 Hrs  T(C): 36.3 (26 Oct 2021 13:07), Max: 36.8 (25 Oct 2021 21:26)  T(F): 97.4 (26 Oct 2021 13:07), Max: 98.2 (25 Oct 2021 21:26)  HR: 86 (26 Oct 2021 13:07) (58 - 86)  BP: 118/61 (26 Oct 2021 13:07) (108/55 - 146/67)  BP(mean): --  RR: 18 (26 Oct 2021 13:07) (16 - 18)  SpO2: 97% (26 Oct 2021 13:07) (96% - 97%)      Gen: Appears well in NAD  HEENT:  (-)icterus (-)pallor  CV: N S1 S2 1/6 ELLY (+)2 Pulses B/l  Resp: CTA B/L, normal effort  GI: (+) BS Soft, NT, ND  Lymph:  (+) B/L LE/UE pitting Edema improving, (-)obvious lymphadenopathy  Skin: Warm to touch, Normal turgor  Psych: Appropriate mood and affect    TELEMETRY:  59    < from: TTE with Doppler (w/3D Echo) (Transthoracic Echocardiogram) (10.21.21 @ 14:17) >  Conclusions:  1. Mitral annular calcification and tethered mitral valve  leaflets with normal opening. Mild-moderate mitral  regurgitation.  2. Calcified trileaflet aortic valve with decreased  opening. Peak transaortic valve gradient equals 6 mm Hg,  mean transaortic valve gradient equals 4 mm Hg, estimated  aortic valve area equals 1.8 sqcm (by continuity equation),  aortic valve velocity time integral equals 20 cm,  consistent with mild aortic stenosis.  3. Severely dilated left atrium.  LA volume index = 62  cc/m2.  4. Increased relative wall thickness with normal left  ventricular mass index, consistent with concentric left  ventricular remodeling.  5. Mild to moderate segmental left ventricular systolic  dysfunction.  Overall preserved left ventricular ejection  fraction. Hypokinesis of all basal segments with relatively  hyperdynamic mid to distal segments.  6. Increased E/e'  is consistent with elevated left  ventricular filling pressure.  7. The right ventricle is not well visualized; grossly  reduced  right ventricular systolic function. A device wire  is noted in the right heart.  8. Tricuspid valve not well visualized. Moderate tricuspid  regurgitation.  9. Estimated pulmonary artery systolic pressure equals 72  mm Hg, assuming right atrial pressure equals 10 - 15 mm Hg,  consistent with severe pulmonary pressures.  10. Normal pericardium with trace pericardial effusion.  *** No previous Echoexam.    < end of copied text >      ASSESSMENT/PLAN:  89 yr old very pleasant gentleman w/ PMH of CAD s/p PCI (ROBERTO to D1 6/2/14), chronic AF (on AC w/ eliquis), tachy/perla syndrome s/p biV PPM placement, CVA x 2, HFpEF who was recently hospitalised in Aug 2021 for monomorphic VT s/p cardioversion presents w/ fluid overload in setting of acute on chronic HFpEF exacerbation.     - Volume status improved - continue PO Torsemide 40mg BID if okay with renal  - Renal f/u - metolazone stopped, trend cr  - Continue coreg 12.5mg BID  - ACE/ARB on hold since last admission due to FLOYD on CKD  - Continue AC with Eliquis for afib  - Continue medical management of CAD with ASA/Statin  - Continue Amio 400mg daily for prior VT  - Repeat TTE noted with preserved EF, mild-mod segmental LV systolic dysfunction, reduced RV function, severe pulm HTN. Also appears to have severe diastolic dysfunction   - Of note, family declined cath and preferred medical management of CAD last admission  - No further inpatient cardiac w/u planned  - F/U with his cardiologist Dr. Vanegas for cardiology after discharge    Ismael Figueroa PA-C  Pager: [phone]

## 2021-10-26 NOTE — DISCHARGE NOTE NURSING/CASE MANAGEMENT/SOCIAL WORK - PATIENT PORTAL LINK FT
You can access the FollowMyHealth Patient Portal offered by [facility] by registering at the following website: [email or web address]. By joining [facility]’s FollowMyHealth portal, you will also be able to view your health information using other applications (apps) compatible with our system.

## 2022-01-01 ENCOUNTER — INPATIENT (INPATIENT)
Facility: HOSPITAL | Age: 87
LOS: 10 days | DRG: 291 | End: 2022-01-18
Attending: INTERNAL MEDICINE | Admitting: INTERNAL MEDICINE
Payer: COMMERCIAL

## 2022-01-01 VITALS — DIASTOLIC BLOOD PRESSURE: 46 MMHG | SYSTOLIC BLOOD PRESSURE: 92 MMHG

## 2022-01-01 VITALS
TEMPERATURE: 98 F | WEIGHT: 139.99 LBS | OXYGEN SATURATION: 98 % | DIASTOLIC BLOOD PRESSURE: 59 MMHG | RESPIRATION RATE: 16 BRPM | HEART RATE: 76 BPM | SYSTOLIC BLOOD PRESSURE: 78 MMHG | HEIGHT: 64 IN

## 2022-01-01 DIAGNOSIS — I50.9 HEART FAILURE, UNSPECIFIED: ICD-10-CM

## 2022-01-01 DIAGNOSIS — R62.7 ADULT FAILURE TO THRIVE: ICD-10-CM

## 2022-01-01 DIAGNOSIS — Z95.0 PRESENCE OF CARDIAC PACEMAKER: Chronic | ICD-10-CM

## 2022-01-01 DIAGNOSIS — Z95.5 PRESENCE OF CORONARY ANGIOPLASTY IMPLANT AND GRAFT: Chronic | ICD-10-CM

## 2022-01-01 LAB
-  AMPICILLIN: SIGNIFICANT CHANGE UP
-  CIPROFLOXACIN: SIGNIFICANT CHANGE UP
-  LEVOFLOXACIN: SIGNIFICANT CHANGE UP
-  NITROFURANTOIN: SIGNIFICANT CHANGE UP
-  TETRACYCLINE: SIGNIFICANT CHANGE UP
-  VANCOMYCIN: SIGNIFICANT CHANGE UP
A1C WITH ESTIMATED AVERAGE GLUCOSE RESULT: 9.5 % — HIGH (ref 4–5.6)
ALBUMIN SERPL ELPH-MCNC: 2.3 G/DL — LOW (ref 3.3–5)
ALBUMIN SERPL ELPH-MCNC: 2.4 G/DL — LOW (ref 3.3–5)
ALBUMIN SERPL ELPH-MCNC: 3.5 G/DL — SIGNIFICANT CHANGE UP (ref 3.3–5)
ALP SERPL-CCNC: 168 U/L — HIGH (ref 40–120)
ALP SERPL-CCNC: 179 U/L — HIGH (ref 40–120)
ALP SERPL-CCNC: 191 U/L — HIGH (ref 40–120)
ALP SERPL-CCNC: 208 U/L — HIGH (ref 40–120)
ALP SERPL-CCNC: 260 U/L — HIGH (ref 40–120)
ALT FLD-CCNC: 164 U/L — HIGH (ref 10–45)
ALT FLD-CCNC: 167 U/L — HIGH (ref 10–45)
ALT FLD-CCNC: 170 U/L — HIGH (ref 10–45)
ALT FLD-CCNC: 182 U/L — HIGH (ref 10–45)
ALT FLD-CCNC: 185 U/L — HIGH (ref 10–45)
ANION GAP SERPL CALC-SCNC: 12 MMOL/L — SIGNIFICANT CHANGE UP (ref 5–17)
ANION GAP SERPL CALC-SCNC: 13 MMOL/L — SIGNIFICANT CHANGE UP (ref 5–17)
ANION GAP SERPL CALC-SCNC: 13 MMOL/L — SIGNIFICANT CHANGE UP (ref 5–17)
ANION GAP SERPL CALC-SCNC: 14 MMOL/L — SIGNIFICANT CHANGE UP (ref 5–17)
ANION GAP SERPL CALC-SCNC: 15 MMOL/L — SIGNIFICANT CHANGE UP (ref 5–17)
ANION GAP SERPL CALC-SCNC: 16 MMOL/L — SIGNIFICANT CHANGE UP (ref 5–17)
ANION GAP SERPL CALC-SCNC: 16 MMOL/L — SIGNIFICANT CHANGE UP (ref 5–17)
ANION GAP SERPL CALC-SCNC: 17 MMOL/L — SIGNIFICANT CHANGE UP (ref 5–17)
ANION GAP SERPL CALC-SCNC: 18 MMOL/L — HIGH (ref 5–17)
APPEARANCE UR: ABNORMAL
APPEARANCE UR: ABNORMAL
APTT 50/50 2HOUR INCUB: 36.1 SEC — SIGNIFICANT CHANGE UP (ref 27.5–36.5)
APTT 50/50 2HOUR INCUB: 36.4 SEC — SIGNIFICANT CHANGE UP (ref 27.5–36.5)
APTT BLD: 30.6 SEC — SIGNIFICANT CHANGE UP (ref 27.5–36.5)
APTT BLD: 31.1 SEC — SIGNIFICANT CHANGE UP (ref 27.5–35.5)
APTT BLD: 31.3 SEC — SIGNIFICANT CHANGE UP (ref 27.5–35.5)
APTT BLD: 32.4 SEC — SIGNIFICANT CHANGE UP (ref 27.5–36.5)
APTT BLD: 33.8 SEC — SIGNIFICANT CHANGE UP (ref 27.5–35.5)
APTT BLD: 37.3 SEC — HIGH (ref 27.5–35.5)
APTT BLD: 37.7 SEC — HIGH (ref 27.5–35.5)
APTT BLD: 37.8 SEC — HIGH (ref 27.5–35.5)
APTT BLD: 37.9 SEC — HIGH (ref 27.5–35.5)
APTT BLD: 38 SEC — HIGH (ref 27.5–35.5)
APTT BLD: 38.2 SEC — HIGH (ref 27.5–35.5)
APTT BLD: 38.2 SEC — HIGH (ref 27.5–35.5)
APTT BLD: 38.7 SEC — HIGH (ref 27.5–35.5)
APTT BLD: 39 SEC — HIGH (ref 27.5–35.5)
APTT BLD: 39.9 SEC — HIGH (ref 27.5–35.5)
APTT BLD: 42.7 SEC — HIGH (ref 27.5–35.5)
APTT BLD: 42.7 SEC — HIGH (ref 27.5–35.5)
AST SERPL-CCNC: 188 U/L — HIGH (ref 10–40)
AST SERPL-CCNC: 192 U/L — HIGH (ref 10–40)
AST SERPL-CCNC: 207 U/L — HIGH (ref 10–40)
AST SERPL-CCNC: 247 U/L — HIGH (ref 10–40)
AST SERPL-CCNC: 278 U/L — HIGH (ref 10–40)
BACTERIA # UR AUTO: NEGATIVE — SIGNIFICANT CHANGE UP
BACTERIA # UR AUTO: NEGATIVE — SIGNIFICANT CHANGE UP
BASE EXCESS BLDV CALC-SCNC: 7.3 MMOL/L — HIGH (ref -2–2)
BASE EXCESS BLDV CALC-SCNC: 7.9 MMOL/L — HIGH (ref -2–2)
BASOPHILS # BLD AUTO: 0.01 K/UL — SIGNIFICANT CHANGE UP (ref 0–0.2)
BASOPHILS NFR BLD AUTO: 0.1 % — SIGNIFICANT CHANGE UP (ref 0–2)
BILIRUB DIRECT SERPL-MCNC: 1 MG/DL — HIGH (ref 0–0.3)
BILIRUB INDIRECT FLD-MCNC: 0.6 MG/DL — SIGNIFICANT CHANGE UP (ref 0.2–1)
BILIRUB SERPL-MCNC: 1.6 MG/DL — HIGH (ref 0.2–1.2)
BILIRUB SERPL-MCNC: 1.8 MG/DL — HIGH (ref 0.2–1.2)
BILIRUB SERPL-MCNC: 2.6 MG/DL — HIGH (ref 0.2–1.2)
BILIRUB UR-MCNC: NEGATIVE — SIGNIFICANT CHANGE UP
BILIRUB UR-MCNC: NEGATIVE — SIGNIFICANT CHANGE UP
BLD GP AB SCN SERPL QL: NEGATIVE — SIGNIFICANT CHANGE UP
BUN SERPL-MCNC: 103 MG/DL — HIGH (ref 7–23)
BUN SERPL-MCNC: 108 MG/DL — HIGH (ref 7–23)
BUN SERPL-MCNC: 113 MG/DL — HIGH (ref 7–23)
BUN SERPL-MCNC: 121 MG/DL — HIGH (ref 7–23)
BUN SERPL-MCNC: 134 MG/DL — HIGH (ref 7–23)
BUN SERPL-MCNC: 138 MG/DL — HIGH (ref 7–23)
BUN SERPL-MCNC: 144 MG/DL — HIGH (ref 7–23)
BUN SERPL-MCNC: 153 MG/DL — HIGH (ref 7–23)
BUN SERPL-MCNC: 160 MG/DL — HIGH (ref 7–23)
BUN SERPL-MCNC: 52 MG/DL — HIGH (ref 7–23)
BUN SERPL-MCNC: 58 MG/DL — HIGH (ref 7–23)
BUN SERPL-MCNC: 89 MG/DL — HIGH (ref 7–23)
BURR CELLS BLD QL SMEAR: PRESENT — SIGNIFICANT CHANGE UP
CA-I SERPL-SCNC: 1.13 MMOL/L — LOW (ref 1.15–1.33)
CA-I SERPL-SCNC: 1.13 MMOL/L — LOW (ref 1.15–1.33)
CALCIUM SERPL-MCNC: 8.1 MG/DL — LOW (ref 8.4–10.5)
CALCIUM SERPL-MCNC: 8.2 MG/DL — LOW (ref 8.4–10.5)
CALCIUM SERPL-MCNC: 8.3 MG/DL — LOW (ref 8.4–10.5)
CALCIUM SERPL-MCNC: 8.4 MG/DL — SIGNIFICANT CHANGE UP (ref 8.4–10.5)
CALCIUM SERPL-MCNC: 8.5 MG/DL — SIGNIFICANT CHANGE UP (ref 8.4–10.5)
CHLORIDE BLDV-SCNC: 92 MMOL/L — LOW (ref 96–108)
CHLORIDE BLDV-SCNC: 92 MMOL/L — LOW (ref 96–108)
CHLORIDE SERPL-SCNC: 100 MMOL/L — SIGNIFICANT CHANGE UP (ref 96–108)
CHLORIDE SERPL-SCNC: 91 MMOL/L — LOW (ref 96–108)
CHLORIDE SERPL-SCNC: 92 MMOL/L — LOW (ref 96–108)
CHLORIDE SERPL-SCNC: 94 MMOL/L — LOW (ref 96–108)
CHLORIDE SERPL-SCNC: 98 MMOL/L — SIGNIFICANT CHANGE UP (ref 96–108)
CHLORIDE SERPL-SCNC: 98 MMOL/L — SIGNIFICANT CHANGE UP (ref 96–108)
CHLORIDE UR-SCNC: <20 MMOL/L — SIGNIFICANT CHANGE UP
CO2 BLDV-SCNC: 34 MMOL/L — HIGH (ref 22–26)
CO2 BLDV-SCNC: 34 MMOL/L — HIGH (ref 22–26)
CO2 SERPL-SCNC: 19 MMOL/L — LOW (ref 22–31)
CO2 SERPL-SCNC: 25 MMOL/L — SIGNIFICANT CHANGE UP (ref 22–31)
CO2 SERPL-SCNC: 26 MMOL/L — SIGNIFICANT CHANGE UP (ref 22–31)
CO2 SERPL-SCNC: 26 MMOL/L — SIGNIFICANT CHANGE UP (ref 22–31)
CO2 SERPL-SCNC: 27 MMOL/L — SIGNIFICANT CHANGE UP (ref 22–31)
CO2 SERPL-SCNC: 29 MMOL/L — SIGNIFICANT CHANGE UP (ref 22–31)
COLOR SPEC: YELLOW — SIGNIFICANT CHANGE UP
COLOR SPEC: YELLOW — SIGNIFICANT CHANGE UP
CREAT ?TM UR-MCNC: 50 MG/DL — SIGNIFICANT CHANGE UP
CREAT SERPL-MCNC: 2.27 MG/DL — HIGH (ref 0.5–1.3)
CREAT SERPL-MCNC: 2.62 MG/DL — HIGH (ref 0.5–1.3)
CREAT SERPL-MCNC: 3.12 MG/DL — HIGH (ref 0.5–1.3)
CREAT SERPL-MCNC: 3.23 MG/DL — HIGH (ref 0.5–1.3)
CREAT SERPL-MCNC: 3.32 MG/DL — HIGH (ref 0.5–1.3)
CREAT SERPL-MCNC: 3.63 MG/DL — HIGH (ref 0.5–1.3)
CREAT SERPL-MCNC: 4.02 MG/DL — HIGH (ref 0.5–1.3)
CREAT SERPL-MCNC: 4.19 MG/DL — HIGH (ref 0.5–1.3)
CREAT SERPL-MCNC: 4.37 MG/DL — HIGH (ref 0.5–1.3)
CREAT SERPL-MCNC: 4.73 MG/DL — HIGH (ref 0.5–1.3)
CREAT SERPL-MCNC: 4.94 MG/DL — HIGH (ref 0.5–1.3)
CREAT SERPL-MCNC: 4.97 MG/DL — HIGH (ref 0.5–1.3)
CULTURE RESULTS: SIGNIFICANT CHANGE UP
D DIMER BLD IA.RAPID-MCNC: 1045 NG/ML DDU — HIGH
D DIMER BLD IA.RAPID-MCNC: 1916 NG/ML DDU — HIGH
D DIMER BLD IA.RAPID-MCNC: 2647 NG/ML DDU — HIGH
D DIMER BLD IA.RAPID-MCNC: 2782 NG/ML DDU — HIGH
D DIMER BLD IA.RAPID-MCNC: 2805 NG/ML DDU — HIGH
D DIMER BLD IA.RAPID-MCNC: 2890 NG/ML DDU — HIGH
D DIMER BLD IA.RAPID-MCNC: 3263 NG/ML DDU — HIGH
D DIMER BLD IA.RAPID-MCNC: 3288 NG/ML DDU — HIGH
D DIMER BLD IA.RAPID-MCNC: 3867 NG/ML DDU — HIGH
D DIMER BLD IA.RAPID-MCNC: 920 NG/ML DDU — HIGH
DIFF PNL FLD: ABNORMAL
DIFF PNL FLD: ABNORMAL
ELLIPTOCYTES BLD QL SMEAR: SLIGHT — SIGNIFICANT CHANGE UP
EOSINOPHIL # BLD AUTO: 0.03 K/UL — SIGNIFICANT CHANGE UP (ref 0–0.5)
EOSINOPHIL NFR BLD AUTO: 0.4 % — SIGNIFICANT CHANGE UP (ref 0–6)
EPI CELLS # UR: 13 /HPF — HIGH
EPI CELLS # UR: 2 /HPF — SIGNIFICANT CHANGE UP
EPO SERPL-MCNC: 20.4 MIU/ML — HIGH (ref 2.6–18.5)
ESTIMATED AVERAGE GLUCOSE: 226 MG/DL — HIGH (ref 68–114)
FACT IX PPP CHRO-ACNC: 69 % — LOW (ref 80–165)
FACT V ACT/NOR PPP: 17 % — LOW (ref 50–150)
FACT V ACT/NOR PPP: 23 % — LOW (ref 50–150)
FACT VII ACT/NOR PPP: 20 % — LOW (ref 50–165)
FACT VII ACT/NOR PPP: 29 % — LOW (ref 50–165)
FACT VIII ACT/NOR PPP: 399 % — HIGH (ref 60–125)
FACT VIII ACT/NOR PPP: 493 % — HIGH (ref 60–125)
FACT X ACT/NOR PPP: 61 % — LOW (ref 70–170)
FACT X ACT/NOR PPP: 79 % — SIGNIFICANT CHANGE UP (ref 70–170)
FIBRINOGEN AG PPP IA-MCNC: 360 MG/DL — HIGH (ref 180–350)
FIBRINOGEN PPP-MCNC: 289 MG/DL — LOW (ref 290–520)
FIBRINOGEN PPP-MCNC: 300 MG/DL — SIGNIFICANT CHANGE UP (ref 290–520)
FIBRINOGEN PPP-MCNC: 347 MG/DL — SIGNIFICANT CHANGE UP (ref 290–520)
FIBRINOGEN PPP-MCNC: 348 MG/DL — SIGNIFICANT CHANGE UP (ref 290–520)
FIBRINOGEN PPP-MCNC: 419 MG/DL — SIGNIFICANT CHANGE UP (ref 290–520)
FIBRINOGEN PPP-MCNC: 434 MG/DL — SIGNIFICANT CHANGE UP (ref 290–520)
FIBRINOGEN PPP-MCNC: 441 MG/DL — SIGNIFICANT CHANGE UP (ref 290–520)
FIBRINOGEN PPP-MCNC: 463 MG/DL — SIGNIFICANT CHANGE UP (ref 290–520)
FIBRINOGEN PPP-MCNC: 472 MG/DL — SIGNIFICANT CHANGE UP (ref 290–520)
FOLATE SERPL-MCNC: 10.5 NG/ML — SIGNIFICANT CHANGE UP
GAS PNL BLDV: 130 MMOL/L — LOW (ref 136–145)
GAS PNL BLDV: 131 MMOL/L — LOW (ref 136–145)
GAS PNL BLDV: SIGNIFICANT CHANGE UP
GLUCOSE BLDC GLUCOMTR-MCNC: 102 MG/DL — HIGH (ref 70–99)
GLUCOSE BLDC GLUCOMTR-MCNC: 111 MG/DL — HIGH (ref 70–99)
GLUCOSE BLDC GLUCOMTR-MCNC: 113 MG/DL — HIGH (ref 70–99)
GLUCOSE BLDC GLUCOMTR-MCNC: 113 MG/DL — HIGH (ref 70–99)
GLUCOSE BLDC GLUCOMTR-MCNC: 114 MG/DL — HIGH (ref 70–99)
GLUCOSE BLDC GLUCOMTR-MCNC: 114 MG/DL — HIGH (ref 70–99)
GLUCOSE BLDC GLUCOMTR-MCNC: 118 MG/DL — HIGH (ref 70–99)
GLUCOSE BLDC GLUCOMTR-MCNC: 119 MG/DL — HIGH (ref 70–99)
GLUCOSE BLDC GLUCOMTR-MCNC: 123 MG/DL — HIGH (ref 70–99)
GLUCOSE BLDC GLUCOMTR-MCNC: 125 MG/DL — HIGH (ref 70–99)
GLUCOSE BLDC GLUCOMTR-MCNC: 132 MG/DL — HIGH (ref 70–99)
GLUCOSE BLDC GLUCOMTR-MCNC: 133 MG/DL — HIGH (ref 70–99)
GLUCOSE BLDC GLUCOMTR-MCNC: 135 MG/DL — HIGH (ref 70–99)
GLUCOSE BLDC GLUCOMTR-MCNC: 135 MG/DL — HIGH (ref 70–99)
GLUCOSE BLDC GLUCOMTR-MCNC: 136 MG/DL — HIGH (ref 70–99)
GLUCOSE BLDC GLUCOMTR-MCNC: 148 MG/DL — HIGH (ref 70–99)
GLUCOSE BLDC GLUCOMTR-MCNC: 152 MG/DL — HIGH (ref 70–99)
GLUCOSE BLDC GLUCOMTR-MCNC: 153 MG/DL — HIGH (ref 70–99)
GLUCOSE BLDC GLUCOMTR-MCNC: 157 MG/DL — HIGH (ref 70–99)
GLUCOSE BLDC GLUCOMTR-MCNC: 163 MG/DL — HIGH (ref 70–99)
GLUCOSE BLDC GLUCOMTR-MCNC: 166 MG/DL — HIGH (ref 70–99)
GLUCOSE BLDC GLUCOMTR-MCNC: 174 MG/DL — HIGH (ref 70–99)
GLUCOSE BLDC GLUCOMTR-MCNC: 175 MG/DL — HIGH (ref 70–99)
GLUCOSE BLDC GLUCOMTR-MCNC: 175 MG/DL — HIGH (ref 70–99)
GLUCOSE BLDC GLUCOMTR-MCNC: 177 MG/DL — HIGH (ref 70–99)
GLUCOSE BLDC GLUCOMTR-MCNC: 193 MG/DL — HIGH (ref 70–99)
GLUCOSE BLDC GLUCOMTR-MCNC: 195 MG/DL — HIGH (ref 70–99)
GLUCOSE BLDC GLUCOMTR-MCNC: 195 MG/DL — HIGH (ref 70–99)
GLUCOSE BLDC GLUCOMTR-MCNC: 197 MG/DL — HIGH (ref 70–99)
GLUCOSE BLDC GLUCOMTR-MCNC: 226 MG/DL — HIGH (ref 70–99)
GLUCOSE BLDC GLUCOMTR-MCNC: 252 MG/DL — HIGH (ref 70–99)
GLUCOSE BLDC GLUCOMTR-MCNC: 299 MG/DL — HIGH (ref 70–99)
GLUCOSE BLDC GLUCOMTR-MCNC: 304 MG/DL — HIGH (ref 70–99)
GLUCOSE BLDC GLUCOMTR-MCNC: 312 MG/DL — HIGH (ref 70–99)
GLUCOSE BLDC GLUCOMTR-MCNC: 324 MG/DL — HIGH (ref 70–99)
GLUCOSE BLDC GLUCOMTR-MCNC: 336 MG/DL — HIGH (ref 70–99)
GLUCOSE BLDC GLUCOMTR-MCNC: 338 MG/DL — HIGH (ref 70–99)
GLUCOSE BLDC GLUCOMTR-MCNC: 35 MG/DL — CRITICAL LOW (ref 70–99)
GLUCOSE BLDC GLUCOMTR-MCNC: 36 MG/DL — CRITICAL LOW (ref 70–99)
GLUCOSE BLDC GLUCOMTR-MCNC: 394 MG/DL — HIGH (ref 70–99)
GLUCOSE BLDC GLUCOMTR-MCNC: 409 MG/DL — HIGH (ref 70–99)
GLUCOSE BLDC GLUCOMTR-MCNC: 64 MG/DL — LOW (ref 70–99)
GLUCOSE BLDC GLUCOMTR-MCNC: 66 MG/DL — LOW (ref 70–99)
GLUCOSE BLDC GLUCOMTR-MCNC: 68 MG/DL — LOW (ref 70–99)
GLUCOSE BLDC GLUCOMTR-MCNC: 68 MG/DL — LOW (ref 70–99)
GLUCOSE BLDC GLUCOMTR-MCNC: 69 MG/DL — LOW (ref 70–99)
GLUCOSE BLDC GLUCOMTR-MCNC: 71 MG/DL — SIGNIFICANT CHANGE UP (ref 70–99)
GLUCOSE BLDC GLUCOMTR-MCNC: 71 MG/DL — SIGNIFICANT CHANGE UP (ref 70–99)
GLUCOSE BLDC GLUCOMTR-MCNC: 73 MG/DL — SIGNIFICANT CHANGE UP (ref 70–99)
GLUCOSE BLDC GLUCOMTR-MCNC: 74 MG/DL — SIGNIFICANT CHANGE UP (ref 70–99)
GLUCOSE BLDC GLUCOMTR-MCNC: 75 MG/DL — SIGNIFICANT CHANGE UP (ref 70–99)
GLUCOSE BLDC GLUCOMTR-MCNC: 76 MG/DL — SIGNIFICANT CHANGE UP (ref 70–99)
GLUCOSE BLDC GLUCOMTR-MCNC: 82 MG/DL — SIGNIFICANT CHANGE UP (ref 70–99)
GLUCOSE BLDC GLUCOMTR-MCNC: 88 MG/DL — SIGNIFICANT CHANGE UP (ref 70–99)
GLUCOSE BLDC GLUCOMTR-MCNC: 90 MG/DL — SIGNIFICANT CHANGE UP (ref 70–99)
GLUCOSE BLDC GLUCOMTR-MCNC: 91 MG/DL — SIGNIFICANT CHANGE UP (ref 70–99)
GLUCOSE BLDC GLUCOMTR-MCNC: 91 MG/DL — SIGNIFICANT CHANGE UP (ref 70–99)
GLUCOSE BLDC GLUCOMTR-MCNC: 94 MG/DL — SIGNIFICANT CHANGE UP (ref 70–99)
GLUCOSE BLDC GLUCOMTR-MCNC: 98 MG/DL — SIGNIFICANT CHANGE UP (ref 70–99)
GLUCOSE BLDC GLUCOMTR-MCNC: 99 MG/DL — SIGNIFICANT CHANGE UP (ref 70–99)
GLUCOSE BLDV-MCNC: 370 MG/DL — HIGH (ref 70–99)
GLUCOSE BLDV-MCNC: 370 MG/DL — HIGH (ref 70–99)
GLUCOSE SERPL-MCNC: 119 MG/DL — HIGH (ref 70–99)
GLUCOSE SERPL-MCNC: 161 MG/DL — HIGH (ref 70–99)
GLUCOSE SERPL-MCNC: 172 MG/DL — HIGH (ref 70–99)
GLUCOSE SERPL-MCNC: 188 MG/DL — HIGH (ref 70–99)
GLUCOSE SERPL-MCNC: 298 MG/DL — HIGH (ref 70–99)
GLUCOSE SERPL-MCNC: 31 MG/DL — CRITICAL LOW (ref 70–99)
GLUCOSE SERPL-MCNC: 352 MG/DL — HIGH (ref 70–99)
GLUCOSE SERPL-MCNC: 363 MG/DL — HIGH (ref 70–99)
GLUCOSE SERPL-MCNC: 60 MG/DL — LOW (ref 70–99)
GLUCOSE SERPL-MCNC: 65 MG/DL — LOW (ref 70–99)
GLUCOSE SERPL-MCNC: 73 MG/DL — SIGNIFICANT CHANGE UP (ref 70–99)
GLUCOSE SERPL-MCNC: 93 MG/DL — SIGNIFICANT CHANGE UP (ref 70–99)
GLUCOSE UR QL: NEGATIVE — SIGNIFICANT CHANGE UP
GLUCOSE UR QL: NEGATIVE — SIGNIFICANT CHANGE UP
GRAN CASTS # UR COMP ASSIST: 5 /LPF — SIGNIFICANT CHANGE UP
HAPTOGLOB SERPL-MCNC: 56 MG/DL — SIGNIFICANT CHANGE UP (ref 34–200)
HAV IGM SER-ACNC: SIGNIFICANT CHANGE UP
HBV CORE IGM SER-ACNC: SIGNIFICANT CHANGE UP
HBV SURFACE AG SER-ACNC: SIGNIFICANT CHANGE UP
HCO3 BLDV-SCNC: 32 MMOL/L — HIGH (ref 22–29)
HCO3 BLDV-SCNC: 33 MMOL/L — HIGH (ref 22–29)
HCT VFR BLD CALC: 18.3 % — CRITICAL LOW (ref 39–50)
HCT VFR BLD CALC: 20.8 % — CRITICAL LOW (ref 39–50)
HCT VFR BLD CALC: 21.7 % — LOW (ref 39–50)
HCT VFR BLD CALC: 21.9 % — LOW (ref 39–50)
HCT VFR BLD CALC: 22.6 % — LOW (ref 39–50)
HCT VFR BLD CALC: 23.2 % — LOW (ref 39–50)
HCT VFR BLD CALC: 23.6 % — LOW (ref 39–50)
HCT VFR BLD CALC: 24.9 % — LOW (ref 39–50)
HCT VFR BLD CALC: 25 % — LOW (ref 39–50)
HCT VFR BLD CALC: 25.6 % — LOW (ref 39–50)
HCT VFR BLD CALC: 25.7 % — LOW (ref 39–50)
HCT VFR BLD CALC: 25.8 % — LOW (ref 39–50)
HCT VFR BLD CALC: 26 % — LOW (ref 39–50)
HCT VFR BLD CALC: 26.2 % — LOW (ref 39–50)
HCT VFR BLD CALC: 26.3 % — LOW (ref 39–50)
HCT VFR BLD CALC: 26.5 % — LOW (ref 39–50)
HCT VFR BLD CALC: 27.1 % — LOW (ref 39–50)
HCT VFR BLD CALC: 27.3 % — LOW (ref 39–50)
HCT VFR BLDA CALC: 25 % — LOW (ref 39–51)
HCT VFR BLDA CALC: 26 % — LOW (ref 39–51)
HCV AB S/CO SERPL IA: 0.24 S/CO — SIGNIFICANT CHANGE UP (ref 0–0.99)
HCV AB SERPL-IMP: SIGNIFICANT CHANGE UP
HGB BLD CALC-MCNC: 8.2 G/DL — LOW (ref 12.6–17.4)
HGB BLD CALC-MCNC: 8.6 G/DL — LOW (ref 12.6–17.4)
HGB BLD-MCNC: 6.3 G/DL — CRITICAL LOW (ref 13–17)
HGB BLD-MCNC: 7.1 G/DL — LOW (ref 13–17)
HGB BLD-MCNC: 7.2 G/DL — LOW (ref 13–17)
HGB BLD-MCNC: 7.4 G/DL — LOW (ref 13–17)
HGB BLD-MCNC: 7.5 G/DL — LOW (ref 13–17)
HGB BLD-MCNC: 7.7 G/DL — LOW (ref 13–17)
HGB BLD-MCNC: 7.8 G/DL — LOW (ref 13–17)
HGB BLD-MCNC: 8.2 G/DL — LOW (ref 13–17)
HGB BLD-MCNC: 8.4 G/DL — LOW (ref 13–17)
HGB BLD-MCNC: 8.6 G/DL — LOW (ref 13–17)
HGB BLD-MCNC: 8.7 G/DL — LOW (ref 13–17)
HGB BLD-MCNC: 8.9 G/DL — LOW (ref 13–17)
HGB BLD-MCNC: 9.1 G/DL — LOW (ref 13–17)
HGB BLD-MCNC: 9.2 G/DL — LOW (ref 13–17)
HYALINE CASTS # UR AUTO: 67 /LPF — HIGH (ref 0–2)
HYALINE CASTS # UR AUTO: 73 /LPF — HIGH (ref 0–2)
HYPOCHROMIA BLD QL: SIGNIFICANT CHANGE UP
IMM GRANULOCYTES NFR BLD AUTO: 1.5 % — SIGNIFICANT CHANGE UP (ref 0–1.5)
INR BLD: 2.62 RATIO — HIGH (ref 0.88–1.16)
INR BLD: 2.9 RATIO — HIGH (ref 0.88–1.16)
INR BLD: 2.98 RATIO — HIGH (ref 0.88–1.16)
INR BLD: 3.1 RATIO — HIGH (ref 0.88–1.16)
INR BLD: 3.11 RATIO — HIGH (ref 0.88–1.16)
INR BLD: 3.22 RATIO — HIGH (ref 0.88–1.16)
INR BLD: 3.52 RATIO — HIGH (ref 0.88–1.16)
INR BLD: 3.74 RATIO — HIGH (ref 0.88–1.16)
INR BLD: 3.88 RATIO — HIGH (ref 0.88–1.16)
INR BLD: 4.11 RATIO — HIGH (ref 0.88–1.16)
INR BLD: 4.3 RATIO — HIGH (ref 0.88–1.16)
INR BLD: 4.5 RATIO — HIGH (ref 0.88–1.16)
INR BLD: 4.6 RATIO — HIGH (ref 0.88–1.16)
INR BLD: 5.08 RATIO — CRITICAL HIGH (ref 0.88–1.16)
INR BLD: 5.12 RATIO — CRITICAL HIGH (ref 0.88–1.16)
INR BLD: 5.31 RATIO — CRITICAL HIGH (ref 0.88–1.16)
INR BLD: 5.49 RATIO — CRITICAL HIGH (ref 0.88–1.16)
INR BLD: 5.66 RATIO — CRITICAL HIGH (ref 0.88–1.16)
INR BLD: 5.9 RATIO — CRITICAL HIGH (ref 0.88–1.16)
INR BLD: 6.26 RATIO — CRITICAL HIGH (ref 0.88–1.16)
KETONES UR-MCNC: NEGATIVE — SIGNIFICANT CHANGE UP
KETONES UR-MCNC: NEGATIVE — SIGNIFICANT CHANGE UP
LACTATE BLDV-MCNC: 3.1 MMOL/L — HIGH (ref 0.7–2)
LACTATE BLDV-MCNC: 3.9 MMOL/L — HIGH (ref 0.7–2)
LACTATE SERPL-SCNC: 1.9 MMOL/L — SIGNIFICANT CHANGE UP (ref 0.7–2)
LACTATE SERPL-SCNC: 2 MMOL/L — SIGNIFICANT CHANGE UP (ref 0.7–2)
LDH SERPL L TO P-CCNC: 553 U/L — HIGH (ref 50–242)
LEUKOCYTE ESTERASE UR-ACNC: ABNORMAL
LEUKOCYTE ESTERASE UR-ACNC: NEGATIVE — SIGNIFICANT CHANGE UP
LYMPHOCYTES # BLD AUTO: 0.4 K/UL — LOW (ref 1–3.3)
LYMPHOCYTES # BLD AUTO: 5.1 % — LOW (ref 13–44)
MAGNESIUM SERPL-MCNC: 2.8 MG/DL — HIGH (ref 1.6–2.6)
MAGNESIUM SERPL-MCNC: 3 MG/DL — HIGH (ref 1.6–2.6)
MAGNESIUM SERPL-MCNC: 3.1 MG/DL — HIGH (ref 1.6–2.6)
MAGNESIUM SERPL-MCNC: 3.2 MG/DL — HIGH (ref 1.6–2.6)
MANUAL SMEAR VERIFICATION: SIGNIFICANT CHANGE UP
MCHC RBC-ENTMCNC: 24.3 PG — LOW (ref 27–34)
MCHC RBC-ENTMCNC: 24.4 PG — LOW (ref 27–34)
MCHC RBC-ENTMCNC: 24.4 PG — LOW (ref 27–34)
MCHC RBC-ENTMCNC: 24.6 PG — LOW (ref 27–34)
MCHC RBC-ENTMCNC: 25.8 PG — LOW (ref 27–34)
MCHC RBC-ENTMCNC: 26 PG — LOW (ref 27–34)
MCHC RBC-ENTMCNC: 26.1 PG — LOW (ref 27–34)
MCHC RBC-ENTMCNC: 26.3 PG — LOW (ref 27–34)
MCHC RBC-ENTMCNC: 26.4 PG — LOW (ref 27–34)
MCHC RBC-ENTMCNC: 26.6 PG — LOW (ref 27–34)
MCHC RBC-ENTMCNC: 26.7 PG — LOW (ref 27–34)
MCHC RBC-ENTMCNC: 26.8 PG — LOW (ref 27–34)
MCHC RBC-ENTMCNC: 26.8 PG — LOW (ref 27–34)
MCHC RBC-ENTMCNC: 27 PG — SIGNIFICANT CHANGE UP (ref 27–34)
MCHC RBC-ENTMCNC: 32.3 GM/DL — SIGNIFICANT CHANGE UP (ref 32–36)
MCHC RBC-ENTMCNC: 32.7 GM/DL — SIGNIFICANT CHANGE UP (ref 32–36)
MCHC RBC-ENTMCNC: 32.8 GM/DL — SIGNIFICANT CHANGE UP (ref 32–36)
MCHC RBC-ENTMCNC: 32.9 GM/DL — SIGNIFICANT CHANGE UP (ref 32–36)
MCHC RBC-ENTMCNC: 33.1 GM/DL — SIGNIFICANT CHANGE UP (ref 32–36)
MCHC RBC-ENTMCNC: 33.2 GM/DL — SIGNIFICANT CHANGE UP (ref 32–36)
MCHC RBC-ENTMCNC: 33.7 GM/DL — SIGNIFICANT CHANGE UP (ref 32–36)
MCHC RBC-ENTMCNC: 33.8 GM/DL — SIGNIFICANT CHANGE UP (ref 32–36)
MCHC RBC-ENTMCNC: 33.9 GM/DL — SIGNIFICANT CHANGE UP (ref 32–36)
MCHC RBC-ENTMCNC: 34 GM/DL — SIGNIFICANT CHANGE UP (ref 32–36)
MCHC RBC-ENTMCNC: 34.1 GM/DL — SIGNIFICANT CHANGE UP (ref 32–36)
MCHC RBC-ENTMCNC: 34.2 GM/DL — SIGNIFICANT CHANGE UP (ref 32–36)
MCHC RBC-ENTMCNC: 34.3 GM/DL — SIGNIFICANT CHANGE UP (ref 32–36)
MCHC RBC-ENTMCNC: 34.4 GM/DL — SIGNIFICANT CHANGE UP (ref 32–36)
MCHC RBC-ENTMCNC: 34.4 GM/DL — SIGNIFICANT CHANGE UP (ref 32–36)
MCHC RBC-ENTMCNC: 34.5 GM/DL — SIGNIFICANT CHANGE UP (ref 32–36)
MCV RBC AUTO: 71.5 FL — LOW (ref 80–100)
MCV RBC AUTO: 73.5 FL — LOW (ref 80–100)
MCV RBC AUTO: 73.7 FL — LOW (ref 80–100)
MCV RBC AUTO: 74.1 FL — LOW (ref 80–100)
MCV RBC AUTO: 74.6 FL — LOW (ref 80–100)
MCV RBC AUTO: 74.8 FL — LOW (ref 80–100)
MCV RBC AUTO: 75.6 FL — LOW (ref 80–100)
MCV RBC AUTO: 75.8 FL — LOW (ref 80–100)
MCV RBC AUTO: 77.9 FL — LOW (ref 80–100)
MCV RBC AUTO: 78.2 FL — LOW (ref 80–100)
MCV RBC AUTO: 78.4 FL — LOW (ref 80–100)
MCV RBC AUTO: 78.8 FL — LOW (ref 80–100)
MCV RBC AUTO: 78.9 FL — LOW (ref 80–100)
MCV RBC AUTO: 79 FL — LOW (ref 80–100)
MCV RBC AUTO: 79.4 FL — LOW (ref 80–100)
MCV RBC AUTO: 79.8 FL — LOW (ref 80–100)
MCV RBC AUTO: 80.2 FL — SIGNIFICANT CHANGE UP (ref 80–100)
MCV RBC AUTO: 80.7 FL — SIGNIFICANT CHANGE UP (ref 80–100)
METHOD TYPE: SIGNIFICANT CHANGE UP
MONOCYTES # BLD AUTO: 0.45 K/UL — SIGNIFICANT CHANGE UP (ref 0–0.9)
MONOCYTES NFR BLD AUTO: 5.8 % — SIGNIFICANT CHANGE UP (ref 2–14)
NEUTROPHILS # BLD AUTO: 6.79 K/UL — SIGNIFICANT CHANGE UP (ref 1.8–7.4)
NEUTROPHILS NFR BLD AUTO: 87.1 % — HIGH (ref 43–77)
NITRITE UR-MCNC: NEGATIVE — SIGNIFICANT CHANGE UP
NITRITE UR-MCNC: NEGATIVE — SIGNIFICANT CHANGE UP
NRBC # BLD: 0 /100 WBCS — SIGNIFICANT CHANGE UP (ref 0–0)
NT-PROBNP SERPL-SCNC: HIGH PG/ML (ref 0–300)
ORGANISM # SPEC MICROSCOPIC CNT: SIGNIFICANT CHANGE UP
ORGANISM # SPEC MICROSCOPIC CNT: SIGNIFICANT CHANGE UP
OSMOLALITY UR: 375 MOS/KG — SIGNIFICANT CHANGE UP (ref 300–900)
PAT CTL 2H: 35 SEC — SIGNIFICANT CHANGE UP (ref 27.5–36.5)
PAT CTL 2H: 36.5 SEC — SIGNIFICANT CHANGE UP (ref 27.5–36.5)
PCO2 BLDV: 47 MMHG — SIGNIFICANT CHANGE UP (ref 42–55)
PCO2 BLDV: 49 MMHG — SIGNIFICANT CHANGE UP (ref 42–55)
PH BLDV: 7.43 — SIGNIFICANT CHANGE UP (ref 7.32–7.43)
PH BLDV: 7.45 — HIGH (ref 7.32–7.43)
PH UR: 5.5 — SIGNIFICANT CHANGE UP (ref 5–8)
PH UR: 6 — SIGNIFICANT CHANGE UP (ref 5–8)
PHOSPHATE 24H UR-MCNC: 21.9 MG/DL — SIGNIFICANT CHANGE UP
PHOSPHATE SERPL-MCNC: 3.8 MG/DL — SIGNIFICANT CHANGE UP (ref 2.5–4.5)
PHOSPHATE SERPL-MCNC: 3.8 MG/DL — SIGNIFICANT CHANGE UP (ref 2.5–4.5)
PHOSPHATE SERPL-MCNC: 4.9 MG/DL — HIGH (ref 2.5–4.5)
PLAT MORPH BLD: NORMAL — SIGNIFICANT CHANGE UP
PLATELET # BLD AUTO: 108 K/UL — LOW (ref 150–400)
PLATELET # BLD AUTO: 111 K/UL — LOW (ref 150–400)
PLATELET # BLD AUTO: 124 K/UL — LOW (ref 150–400)
PLATELET # BLD AUTO: 131 K/UL — LOW (ref 150–400)
PLATELET # BLD AUTO: 136 K/UL — LOW (ref 150–400)
PLATELET # BLD AUTO: 139 K/UL — LOW (ref 150–400)
PLATELET # BLD AUTO: 141 K/UL — LOW (ref 150–400)
PLATELET # BLD AUTO: 152 K/UL — SIGNIFICANT CHANGE UP (ref 150–400)
PLATELET # BLD AUTO: 153 K/UL — SIGNIFICANT CHANGE UP (ref 150–400)
PLATELET # BLD AUTO: 153 K/UL — SIGNIFICANT CHANGE UP (ref 150–400)
PLATELET # BLD AUTO: 157 K/UL — SIGNIFICANT CHANGE UP (ref 150–400)
PLATELET # BLD AUTO: 157 K/UL — SIGNIFICANT CHANGE UP (ref 150–400)
PLATELET # BLD AUTO: 184 K/UL — SIGNIFICANT CHANGE UP (ref 150–400)
PLATELET # BLD AUTO: 188 K/UL — SIGNIFICANT CHANGE UP (ref 150–400)
PLATELET # BLD AUTO: 193 K/UL — SIGNIFICANT CHANGE UP (ref 150–400)
PLATELET # BLD AUTO: 212 K/UL — SIGNIFICANT CHANGE UP (ref 150–400)
PLATELET # BLD AUTO: 224 K/UL — SIGNIFICANT CHANGE UP (ref 150–400)
PLATELET # BLD AUTO: 228 K/UL — SIGNIFICANT CHANGE UP (ref 150–400)
PO2 BLDV: 22 MMHG — LOW (ref 25–45)
PO2 BLDV: 23 MMHG — LOW (ref 25–45)
POIKILOCYTOSIS BLD QL AUTO: SIGNIFICANT CHANGE UP
POLYCHROMASIA BLD QL SMEAR: SLIGHT — SIGNIFICANT CHANGE UP
POTASSIUM BLDV-SCNC: 4.4 MMOL/L — SIGNIFICANT CHANGE UP (ref 3.5–5.1)
POTASSIUM BLDV-SCNC: 4.5 MMOL/L — SIGNIFICANT CHANGE UP (ref 3.5–5.1)
POTASSIUM SERPL-MCNC: 3.4 MMOL/L — LOW (ref 3.5–5.3)
POTASSIUM SERPL-MCNC: 3.5 MMOL/L — SIGNIFICANT CHANGE UP (ref 3.5–5.3)
POTASSIUM SERPL-MCNC: 4.5 MMOL/L — SIGNIFICANT CHANGE UP (ref 3.5–5.3)
POTASSIUM SERPL-MCNC: 4.5 MMOL/L — SIGNIFICANT CHANGE UP (ref 3.5–5.3)
POTASSIUM SERPL-MCNC: 4.6 MMOL/L — SIGNIFICANT CHANGE UP (ref 3.5–5.3)
POTASSIUM SERPL-MCNC: 4.7 MMOL/L — SIGNIFICANT CHANGE UP (ref 3.5–5.3)
POTASSIUM SERPL-MCNC: 4.8 MMOL/L — SIGNIFICANT CHANGE UP (ref 3.5–5.3)
POTASSIUM SERPL-MCNC: 4.9 MMOL/L — SIGNIFICANT CHANGE UP (ref 3.5–5.3)
POTASSIUM SERPL-MCNC: 5.2 MMOL/L — SIGNIFICANT CHANGE UP (ref 3.5–5.3)
POTASSIUM SERPL-MCNC: 5.2 MMOL/L — SIGNIFICANT CHANGE UP (ref 3.5–5.3)
POTASSIUM SERPL-MCNC: 5.5 MMOL/L — HIGH (ref 3.5–5.3)
POTASSIUM SERPL-MCNC: 5.7 MMOL/L — HIGH (ref 3.5–5.3)
POTASSIUM SERPL-MCNC: 6.2 MMOL/L — CRITICAL HIGH (ref 3.5–5.3)
POTASSIUM SERPL-SCNC: 3.4 MMOL/L — LOW (ref 3.5–5.3)
POTASSIUM SERPL-SCNC: 3.5 MMOL/L — SIGNIFICANT CHANGE UP (ref 3.5–5.3)
POTASSIUM SERPL-SCNC: 4.5 MMOL/L — SIGNIFICANT CHANGE UP (ref 3.5–5.3)
POTASSIUM SERPL-SCNC: 4.5 MMOL/L — SIGNIFICANT CHANGE UP (ref 3.5–5.3)
POTASSIUM SERPL-SCNC: 4.6 MMOL/L — SIGNIFICANT CHANGE UP (ref 3.5–5.3)
POTASSIUM SERPL-SCNC: 4.7 MMOL/L — SIGNIFICANT CHANGE UP (ref 3.5–5.3)
POTASSIUM SERPL-SCNC: 4.8 MMOL/L — SIGNIFICANT CHANGE UP (ref 3.5–5.3)
POTASSIUM SERPL-SCNC: 4.9 MMOL/L — SIGNIFICANT CHANGE UP (ref 3.5–5.3)
POTASSIUM SERPL-SCNC: 5.2 MMOL/L — SIGNIFICANT CHANGE UP (ref 3.5–5.3)
POTASSIUM SERPL-SCNC: 5.2 MMOL/L — SIGNIFICANT CHANGE UP (ref 3.5–5.3)
POTASSIUM SERPL-SCNC: 5.5 MMOL/L — HIGH (ref 3.5–5.3)
POTASSIUM SERPL-SCNC: 5.7 MMOL/L — HIGH (ref 3.5–5.3)
POTASSIUM SERPL-SCNC: 6.2 MMOL/L — CRITICAL HIGH (ref 3.5–5.3)
POTASSIUM UR-SCNC: 56 MMOL/L — SIGNIFICANT CHANGE UP
PROT SERPL-MCNC: 5.1 G/DL — LOW (ref 6–8.3)
PROT SERPL-MCNC: 5.2 G/DL — LOW (ref 6–8.3)
PROT SERPL-MCNC: 5.4 G/DL — LOW (ref 6–8.3)
PROT SERPL-MCNC: 5.6 G/DL — LOW (ref 6–8.3)
PROT SERPL-MCNC: 5.7 G/DL — LOW (ref 6–8.3)
PROT UR-MCNC: ABNORMAL
PROT UR-MCNC: ABNORMAL
PROTHROM AB SERPL-ACNC: 30 SEC — HIGH (ref 10.6–13.6)
PROTHROM AB SERPL-ACNC: 33.1 SEC — HIGH (ref 10.6–13.6)
PROTHROM AB SERPL-ACNC: 33.9 SEC — HIGH (ref 10.6–13.6)
PROTHROM AB SERPL-ACNC: 35.2 SEC — HIGH (ref 10.6–13.6)
PROTHROM AB SERPL-ACNC: 35.4 SEC — HIGH (ref 10.6–13.6)
PROTHROM AB SERPL-ACNC: 36.5 SEC — HIGH (ref 10.6–13.6)
PROTHROM AB SERPL-ACNC: 39.8 SEC — HIGH (ref 10.6–13.6)
PROTHROM AB SERPL-ACNC: 42.1 SEC — HIGH (ref 10.6–13.6)
PROTHROM AB SERPL-ACNC: 43.6 SEC — HIGH (ref 10.6–13.6)
PROTHROM AB SERPL-ACNC: 46.1 SEC — HIGH (ref 10.6–13.6)
PROTHROM AB SERPL-ACNC: 48.1 SEC — HIGH (ref 10.6–13.6)
PROTHROM AB SERPL-ACNC: 50.3 SEC — HIGH (ref 10.6–13.6)
PROTHROM AB SERPL-ACNC: 51.3 SEC — HIGH (ref 10.6–13.6)
PROTHROM AB SERPL-ACNC: 56.4 SEC — HIGH (ref 10.6–13.6)
PROTHROM AB SERPL-ACNC: 56.8 SEC — HIGH (ref 10.6–13.6)
PROTHROM AB SERPL-ACNC: 58.9 SEC — HIGH (ref 10.6–13.6)
PROTHROM AB SERPL-ACNC: 60.7 SEC — HIGH (ref 10.6–13.6)
PROTHROM AB SERPL-ACNC: 62.5 SEC — HIGH (ref 10.6–13.6)
PROTHROM AB SERPL-ACNC: 65.1 SEC — HIGH (ref 10.6–13.6)
PROTHROM AB SERPL-ACNC: 68.8 SEC — HIGH (ref 10.6–13.6)
PT 100%: 43.6 SEC — HIGH (ref 10.6–13.6)
PT 100%: 62.5 SEC — HIGH (ref 10.6–13.6)
PT 50/50: 17.7 SEC — HIGH (ref 10.6–14.7)
PT 50/50: 21.3 SEC — HIGH (ref 10.6–14.7)
RBC # BLD: 2.56 M/UL — LOW (ref 4.2–5.8)
RBC # BLD: 2.75 M/UL — LOW (ref 4.2–5.8)
RBC # BLD: 2.89 M/UL — LOW (ref 4.2–5.8)
RBC # BLD: 2.93 M/UL — LOW (ref 4.2–5.8)
RBC # BLD: 3.03 M/UL — LOW (ref 4.2–5.8)
RBC # BLD: 3.15 M/UL — LOW (ref 4.2–5.8)
RBC # BLD: 3.19 M/UL — LOW (ref 4.2–5.8)
RBC # BLD: 3.21 M/UL — LOW (ref 4.2–5.8)
RBC # BLD: 3.22 M/UL — LOW (ref 4.2–5.8)
RBC # BLD: 3.22 M/UL — LOW (ref 4.2–5.8)
RBC # BLD: 3.25 M/UL — LOW (ref 4.2–5.8)
RBC # BLD: 3.3 M/UL — LOW (ref 4.2–5.8)
RBC # BLD: 3.3 M/UL — LOW (ref 4.2–5.8)
RBC # BLD: 3.33 M/UL — LOW (ref 4.2–5.8)
RBC # BLD: 3.33 M/UL — LOW (ref 4.2–5.8)
RBC # BLD: 3.38 M/UL — LOW (ref 4.2–5.8)
RBC # BLD: 3.4 M/UL — LOW (ref 4.2–5.8)
RBC # BLD: 3.46 M/UL — LOW (ref 4.2–5.8)
RBC # FLD: 19.4 % — HIGH (ref 10.3–14.5)
RBC # FLD: 19.5 % — HIGH (ref 10.3–14.5)
RBC # FLD: 19.5 % — HIGH (ref 10.3–14.5)
RBC # FLD: 19.6 % — HIGH (ref 10.3–14.5)
RBC # FLD: 19.7 % — HIGH (ref 10.3–14.5)
RBC # FLD: 19.9 % — HIGH (ref 10.3–14.5)
RBC # FLD: 20.3 % — HIGH (ref 10.3–14.5)
RBC # FLD: 20.4 % — HIGH (ref 10.3–14.5)
RBC # FLD: 20.6 % — HIGH (ref 10.3–14.5)
RBC # FLD: 20.7 % — HIGH (ref 10.3–14.5)
RBC # FLD: 20.8 % — HIGH (ref 10.3–14.5)
RBC # FLD: 21.3 % — HIGH (ref 10.3–14.5)
RBC # FLD: 21.7 % — HIGH (ref 10.3–14.5)
RBC # FLD: 21.8 % — HIGH (ref 10.3–14.5)
RBC # FLD: 22.1 % — HIGH (ref 10.3–14.5)
RBC # FLD: 22.4 % — HIGH (ref 10.3–14.5)
RBC # FLD: 22.4 % — HIGH (ref 10.3–14.5)
RBC # FLD: 22.5 % — HIGH (ref 10.3–14.5)
RBC BLD AUTO: ABNORMAL
RBC CASTS # UR COMP ASSIST: 2 /HPF — SIGNIFICANT CHANGE UP (ref 0–4)
RBC CASTS # UR COMP ASSIST: 35 /HPF — HIGH (ref 0–4)
REPTILASE TIME: 20.1 SEC — SIGNIFICANT CHANGE UP (ref 15–20.5)
REPTILASE TIME: 20.6 SEC — HIGH (ref 15–20.5)
RH IG SCN BLD-IMP: POSITIVE — SIGNIFICANT CHANGE UP
SAO2 % BLDV: 28.6 % — LOW (ref 67–88)
SAO2 % BLDV: 29.9 % — LOW (ref 67–88)
SARS-COV-2 RNA SPEC QL NAA+PROBE: SIGNIFICANT CHANGE UP
SARS-COV-2 RNA SPEC QL NAA+PROBE: SIGNIFICANT CHANGE UP
SCHISTOCYTES BLD QL AUTO: SIGNIFICANT CHANGE UP
SODIUM SERPL-SCNC: 131 MMOL/L — LOW (ref 135–145)
SODIUM SERPL-SCNC: 131 MMOL/L — LOW (ref 135–145)
SODIUM SERPL-SCNC: 132 MMOL/L — LOW (ref 135–145)
SODIUM SERPL-SCNC: 133 MMOL/L — LOW (ref 135–145)
SODIUM SERPL-SCNC: 135 MMOL/L — SIGNIFICANT CHANGE UP (ref 135–145)
SODIUM SERPL-SCNC: 136 MMOL/L — SIGNIFICANT CHANGE UP (ref 135–145)
SODIUM SERPL-SCNC: 137 MMOL/L — SIGNIFICANT CHANGE UP (ref 135–145)
SODIUM SERPL-SCNC: 138 MMOL/L — SIGNIFICANT CHANGE UP (ref 135–145)
SODIUM UR-SCNC: 7 MMOL/L — SIGNIFICANT CHANGE UP
SP GR SPEC: 1.01 — SIGNIFICANT CHANGE UP (ref 1.01–1.02)
SP GR SPEC: 1.02 — SIGNIFICANT CHANGE UP (ref 1.01–1.02)
SPECIMEN SOURCE: SIGNIFICANT CHANGE UP
TARGETS BLD QL SMEAR: SLIGHT — SIGNIFICANT CHANGE UP
THROMBIN TIME: 26.7 SEC — HIGH (ref 16–25)
THROMBIN TIME: 29.2 SEC — HIGH (ref 16–25)
TROPONIN T, HIGH SENSITIVITY RESULT: 280 NG/L — HIGH (ref 0–51)
TROPONIN T, HIGH SENSITIVITY RESULT: 290 NG/L — HIGH (ref 0–51)
TSH SERPL-MCNC: 1.26 UIU/ML — SIGNIFICANT CHANGE UP (ref 0.27–4.2)
UFH PPP CHRO-ACNC: >2 IU/ML — SIGNIFICANT CHANGE UP (ref 0.3–0.7)
URATE SERPL-MCNC: 9.6 MG/DL — HIGH (ref 3.4–8.8)
UROBILINOGEN FLD QL: ABNORMAL
UROBILINOGEN FLD QL: NEGATIVE — SIGNIFICANT CHANGE UP
UUN UR-MCNC: 711 MG/DL — SIGNIFICANT CHANGE UP
VIT B12 SERPL-MCNC: >2000 PG/ML — HIGH (ref 232–1245)
WBC # BLD: 10.6 K/UL — HIGH (ref 3.8–10.5)
WBC # BLD: 10.62 K/UL — HIGH (ref 3.8–10.5)
WBC # BLD: 4.73 K/UL — SIGNIFICANT CHANGE UP (ref 3.8–10.5)
WBC # BLD: 5.03 K/UL — SIGNIFICANT CHANGE UP (ref 3.8–10.5)
WBC # BLD: 6.58 K/UL — SIGNIFICANT CHANGE UP (ref 3.8–10.5)
WBC # BLD: 6.65 K/UL — SIGNIFICANT CHANGE UP (ref 3.8–10.5)
WBC # BLD: 6.92 K/UL — SIGNIFICANT CHANGE UP (ref 3.8–10.5)
WBC # BLD: 7.24 K/UL — SIGNIFICANT CHANGE UP (ref 3.8–10.5)
WBC # BLD: 7.55 K/UL — SIGNIFICANT CHANGE UP (ref 3.8–10.5)
WBC # BLD: 7.8 K/UL — SIGNIFICANT CHANGE UP (ref 3.8–10.5)
WBC # BLD: 7.82 K/UL — SIGNIFICANT CHANGE UP (ref 3.8–10.5)
WBC # BLD: 8.34 K/UL — SIGNIFICANT CHANGE UP (ref 3.8–10.5)
WBC # BLD: 8.44 K/UL — SIGNIFICANT CHANGE UP (ref 3.8–10.5)
WBC # BLD: 8.62 K/UL — SIGNIFICANT CHANGE UP (ref 3.8–10.5)
WBC # BLD: 8.65 K/UL — SIGNIFICANT CHANGE UP (ref 3.8–10.5)
WBC # BLD: 8.79 K/UL — SIGNIFICANT CHANGE UP (ref 3.8–10.5)
WBC # BLD: 8.82 K/UL — SIGNIFICANT CHANGE UP (ref 3.8–10.5)
WBC # BLD: 9.21 K/UL — SIGNIFICANT CHANGE UP (ref 3.8–10.5)
WBC # FLD AUTO: 10.6 K/UL — HIGH (ref 3.8–10.5)
WBC # FLD AUTO: 10.62 K/UL — HIGH (ref 3.8–10.5)
WBC # FLD AUTO: 4.73 K/UL — SIGNIFICANT CHANGE UP (ref 3.8–10.5)
WBC # FLD AUTO: 5.03 K/UL — SIGNIFICANT CHANGE UP (ref 3.8–10.5)
WBC # FLD AUTO: 6.58 K/UL — SIGNIFICANT CHANGE UP (ref 3.8–10.5)
WBC # FLD AUTO: 6.65 K/UL — SIGNIFICANT CHANGE UP (ref 3.8–10.5)
WBC # FLD AUTO: 6.92 K/UL — SIGNIFICANT CHANGE UP (ref 3.8–10.5)
WBC # FLD AUTO: 7.24 K/UL — SIGNIFICANT CHANGE UP (ref 3.8–10.5)
WBC # FLD AUTO: 7.55 K/UL — SIGNIFICANT CHANGE UP (ref 3.8–10.5)
WBC # FLD AUTO: 7.8 K/UL — SIGNIFICANT CHANGE UP (ref 3.8–10.5)
WBC # FLD AUTO: 7.82 K/UL — SIGNIFICANT CHANGE UP (ref 3.8–10.5)
WBC # FLD AUTO: 8.34 K/UL — SIGNIFICANT CHANGE UP (ref 3.8–10.5)
WBC # FLD AUTO: 8.44 K/UL — SIGNIFICANT CHANGE UP (ref 3.8–10.5)
WBC # FLD AUTO: 8.62 K/UL — SIGNIFICANT CHANGE UP (ref 3.8–10.5)
WBC # FLD AUTO: 8.65 K/UL — SIGNIFICANT CHANGE UP (ref 3.8–10.5)
WBC # FLD AUTO: 8.79 K/UL — SIGNIFICANT CHANGE UP (ref 3.8–10.5)
WBC # FLD AUTO: 8.82 K/UL — SIGNIFICANT CHANGE UP (ref 3.8–10.5)
WBC # FLD AUTO: 9.21 K/UL — SIGNIFICANT CHANGE UP (ref 3.8–10.5)
WBC UR QL: 102 /HPF — HIGH (ref 0–5)
WBC UR QL: 6 /HPF — HIGH (ref 0–5)

## 2022-01-01 PROCEDURE — 92610 EVALUATE SWALLOWING FUNCTION: CPT

## 2022-01-01 PROCEDURE — 85670 THROMBIN TIME PLASMA: CPT

## 2022-01-01 PROCEDURE — 82668 ASSAY OF ERYTHROPOIETIN: CPT

## 2022-01-01 PROCEDURE — 82330 ASSAY OF CALCIUM: CPT

## 2022-01-01 PROCEDURE — 82436 ASSAY OF URINE CHLORIDE: CPT

## 2022-01-01 PROCEDURE — 85230 CLOT FACTOR VII PROCONVERTIN: CPT

## 2022-01-01 PROCEDURE — 85260 CLOT FACTOR X STUART-POWER: CPT

## 2022-01-01 PROCEDURE — U0003: CPT

## 2022-01-01 PROCEDURE — 36415 COLL VENOUS BLD VENIPUNCTURE: CPT

## 2022-01-01 PROCEDURE — 36430 TRANSFUSION BLD/BLD COMPNT: CPT

## 2022-01-01 PROCEDURE — 85635 REPTILASE TEST: CPT

## 2022-01-01 PROCEDURE — 85385 FIBRINOGEN ANTIGEN: CPT

## 2022-01-01 PROCEDURE — 99233 SBSQ HOSP IP/OBS HIGH 50: CPT

## 2022-01-01 PROCEDURE — 83735 ASSAY OF MAGNESIUM: CPT

## 2022-01-01 PROCEDURE — 85730 THROMBOPLASTIN TIME PARTIAL: CPT

## 2022-01-01 PROCEDURE — 74176 CT ABD & PELVIS W/O CONTRAST: CPT | Mod: 26,MA

## 2022-01-01 PROCEDURE — 82435 ASSAY OF BLOOD CHLORIDE: CPT

## 2022-01-01 PROCEDURE — C1894: CPT

## 2022-01-01 PROCEDURE — 85025 COMPLETE CBC W/AUTO DIFF WBC: CPT

## 2022-01-01 PROCEDURE — 80048 BASIC METABOLIC PNL TOTAL CA: CPT

## 2022-01-01 PROCEDURE — 82746 ASSAY OF FOLIC ACID SERUM: CPT

## 2022-01-01 PROCEDURE — 77001 FLUOROGUIDE FOR VEIN DEVICE: CPT

## 2022-01-01 PROCEDURE — 99285 EMERGENCY DEPT VISIT HI MDM: CPT | Mod: 25

## 2022-01-01 PROCEDURE — 93010 ELECTROCARDIOGRAM REPORT: CPT

## 2022-01-01 PROCEDURE — 84484 ASSAY OF TROPONIN QUANT: CPT

## 2022-01-01 PROCEDURE — 71045 X-RAY EXAM CHEST 1 VIEW: CPT | Mod: 26

## 2022-01-01 PROCEDURE — 85732 THROMBOPLASTIN TIME PARTIAL: CPT

## 2022-01-01 PROCEDURE — 83010 ASSAY OF HAPTOGLOBIN QUANT: CPT

## 2022-01-01 PROCEDURE — 36556 INSERT NON-TUNNEL CV CATH: CPT

## 2022-01-01 PROCEDURE — 83880 ASSAY OF NATRIURETIC PEPTIDE: CPT

## 2022-01-01 PROCEDURE — 84295 ASSAY OF SERUM SODIUM: CPT

## 2022-01-01 PROCEDURE — 82803 BLOOD GASES ANY COMBINATION: CPT

## 2022-01-01 PROCEDURE — 80074 ACUTE HEPATITIS PANEL: CPT

## 2022-01-01 PROCEDURE — 76937 US GUIDE VASCULAR ACCESS: CPT | Mod: 26

## 2022-01-01 PROCEDURE — 84300 ASSAY OF URINE SODIUM: CPT

## 2022-01-01 PROCEDURE — 85220 BLOOC CLOT FACTOR V TEST: CPT

## 2022-01-01 PROCEDURE — 85018 HEMOGLOBIN: CPT

## 2022-01-01 PROCEDURE — 82607 VITAMIN B-12: CPT

## 2022-01-01 PROCEDURE — 71250 CT THORAX DX C-: CPT | Mod: MA

## 2022-01-01 PROCEDURE — C1769: CPT

## 2022-01-01 PROCEDURE — 81001 URINALYSIS AUTO W/SCOPE: CPT

## 2022-01-01 PROCEDURE — 71250 CT THORAX DX C-: CPT | Mod: 26,MA

## 2022-01-01 PROCEDURE — 86850 RBC ANTIBODY SCREEN: CPT

## 2022-01-01 PROCEDURE — 84132 ASSAY OF SERUM POTASSIUM: CPT

## 2022-01-01 PROCEDURE — P9016: CPT

## 2022-01-01 PROCEDURE — 85384 FIBRINOGEN ACTIVITY: CPT

## 2022-01-01 PROCEDURE — 85240 CLOT FACTOR VIII AHG 1 STAGE: CPT

## 2022-01-01 PROCEDURE — 85610 PROTHROMBIN TIME: CPT

## 2022-01-01 PROCEDURE — 86901 BLOOD TYPING SEROLOGIC RH(D): CPT

## 2022-01-01 PROCEDURE — 86900 BLOOD TYPING SEROLOGIC ABO: CPT

## 2022-01-01 PROCEDURE — 71045 X-RAY EXAM CHEST 1 VIEW: CPT

## 2022-01-01 PROCEDURE — 83605 ASSAY OF LACTIC ACID: CPT

## 2022-01-01 PROCEDURE — 85520 HEPARIN ASSAY: CPT

## 2022-01-01 PROCEDURE — 83615 LACTATE (LD) (LDH) ENZYME: CPT

## 2022-01-01 PROCEDURE — 96374 THER/PROPH/DIAG INJ IV PUSH: CPT

## 2022-01-01 PROCEDURE — 85014 HEMATOCRIT: CPT

## 2022-01-01 PROCEDURE — U0005: CPT

## 2022-01-01 PROCEDURE — 82570 ASSAY OF URINE CREATININE: CPT

## 2022-01-01 PROCEDURE — 87186 SC STD MICRODIL/AGAR DIL: CPT

## 2022-01-01 PROCEDURE — C1752: CPT

## 2022-01-01 PROCEDURE — 93306 TTE W/DOPPLER COMPLETE: CPT | Mod: 26

## 2022-01-01 PROCEDURE — 77001 FLUOROGUIDE FOR VEIN DEVICE: CPT | Mod: 26

## 2022-01-01 PROCEDURE — 86923 COMPATIBILITY TEST ELECTRIC: CPT

## 2022-01-01 PROCEDURE — 83935 ASSAY OF URINE OSMOLALITY: CPT

## 2022-01-01 PROCEDURE — 84540 ASSAY OF URINE/UREA-N: CPT

## 2022-01-01 PROCEDURE — 82962 GLUCOSE BLOOD TEST: CPT

## 2022-01-01 PROCEDURE — 73560 X-RAY EXAM OF KNEE 1 OR 2: CPT | Mod: 26,50

## 2022-01-01 PROCEDURE — 99223 1ST HOSP IP/OBS HIGH 75: CPT

## 2022-01-01 PROCEDURE — 80053 COMPREHEN METABOLIC PANEL: CPT

## 2022-01-01 PROCEDURE — 85027 COMPLETE CBC AUTOMATED: CPT

## 2022-01-01 PROCEDURE — 87086 URINE CULTURE/COLONY COUNT: CPT

## 2022-01-01 PROCEDURE — 94640 AIRWAY INHALATION TREATMENT: CPT

## 2022-01-01 PROCEDURE — 85250 CLOT FACTOR IX PTC/CHRSTMAS: CPT

## 2022-01-01 PROCEDURE — P9059: CPT

## 2022-01-01 PROCEDURE — 82947 ASSAY GLUCOSE BLOOD QUANT: CPT

## 2022-01-01 PROCEDURE — 85611 PROTHROMBIN TEST: CPT

## 2022-01-01 PROCEDURE — 99261: CPT

## 2022-01-01 PROCEDURE — 93306 TTE W/DOPPLER COMPLETE: CPT

## 2022-01-01 PROCEDURE — 76937 US GUIDE VASCULAR ACCESS: CPT

## 2022-01-01 PROCEDURE — 73560 X-RAY EXAM OF KNEE 1 OR 2: CPT

## 2022-01-01 PROCEDURE — 99222 1ST HOSP IP/OBS MODERATE 55: CPT

## 2022-01-01 PROCEDURE — 84443 ASSAY THYROID STIM HORMONE: CPT

## 2022-01-01 PROCEDURE — 87040 BLOOD CULTURE FOR BACTERIA: CPT

## 2022-01-01 PROCEDURE — 74176 CT ABD & PELVIS W/O CONTRAST: CPT | Mod: MA

## 2022-01-01 PROCEDURE — 85379 FIBRIN DEGRADATION QUANT: CPT

## 2022-01-01 PROCEDURE — 80076 HEPATIC FUNCTION PANEL: CPT

## 2022-01-01 PROCEDURE — 83036 HEMOGLOBIN GLYCOSYLATED A1C: CPT

## 2022-01-01 PROCEDURE — 84133 ASSAY OF URINE POTASSIUM: CPT

## 2022-01-01 PROCEDURE — 84550 ASSAY OF BLOOD/URIC ACID: CPT

## 2022-01-01 PROCEDURE — 84105 ASSAY OF URINE PHOSPHORUS: CPT

## 2022-01-01 PROCEDURE — 84100 ASSAY OF PHOSPHORUS: CPT

## 2022-01-01 PROCEDURE — 97163 PT EVAL HIGH COMPLEX 45 MIN: CPT

## 2022-01-01 PROCEDURE — P9047: CPT

## 2022-01-01 RX ORDER — SENNA PLUS 8.6 MG/1
2 TABLET ORAL AT BEDTIME
Refills: 0 | Status: DISCONTINUED | OUTPATIENT
Start: 2022-01-01 | End: 2022-01-01

## 2022-01-01 RX ORDER — FUROSEMIDE 40 MG
40 TABLET ORAL
Refills: 0 | Status: DISCONTINUED | OUTPATIENT
Start: 2022-01-01 | End: 2022-01-01

## 2022-01-01 RX ORDER — HYDROMORPHONE HYDROCHLORIDE 2 MG/ML
0.5 INJECTION INTRAMUSCULAR; INTRAVENOUS; SUBCUTANEOUS ONCE
Refills: 0 | Status: DISCONTINUED | OUTPATIENT
Start: 2022-01-01 | End: 2022-01-01

## 2022-01-01 RX ORDER — DEXTROSE 50 % IN WATER 50 %
25 SYRINGE (ML) INTRAVENOUS ONCE
Refills: 0 | Status: COMPLETED | OUTPATIENT
Start: 2022-01-01 | End: 2022-01-01

## 2022-01-01 RX ORDER — PROTHROMBIN COMPLEX CONCENTRATE (HUMAN) 25.5; 16.5; 24; 22; 22; 26 [IU]/ML; [IU]/ML; [IU]/ML; [IU]/ML; [IU]/ML; [IU]/ML
1500 POWDER, FOR SOLUTION INTRAVENOUS ONCE
Refills: 0 | Status: DISCONTINUED | OUTPATIENT
Start: 2022-01-01 | End: 2022-01-01

## 2022-01-01 RX ORDER — MIDODRINE HYDROCHLORIDE 2.5 MG/1
10 TABLET ORAL
Refills: 0 | Status: DISCONTINUED | OUTPATIENT
Start: 2022-01-01 | End: 2022-01-01

## 2022-01-01 RX ORDER — ALBUMIN HUMAN 25 %
100 VIAL (ML) INTRAVENOUS EVERY 6 HOURS
Refills: 0 | Status: DISCONTINUED | OUTPATIENT
Start: 2022-01-01 | End: 2022-01-01

## 2022-01-01 RX ORDER — DEXTROSE 50 % IN WATER 50 %
15 SYRINGE (ML) INTRAVENOUS ONCE
Refills: 0 | Status: DISCONTINUED | OUTPATIENT
Start: 2022-01-01 | End: 2022-01-01

## 2022-01-01 RX ORDER — MULTIVIT-MIN/FERROUS GLUCONATE 9 MG/15 ML
1 LIQUID (ML) ORAL DAILY
Refills: 0 | Status: DISCONTINUED | OUTPATIENT
Start: 2022-01-01 | End: 2022-01-01

## 2022-01-01 RX ORDER — SODIUM CHLORIDE 9 MG/ML
4 INJECTION INTRAMUSCULAR; INTRAVENOUS; SUBCUTANEOUS EVERY 6 HOURS
Refills: 0 | Status: DISCONTINUED | OUTPATIENT
Start: 2022-01-01 | End: 2022-01-01

## 2022-01-01 RX ORDER — SODIUM ZIRCONIUM CYCLOSILICATE 10 G/10G
10 POWDER, FOR SUSPENSION ORAL ONCE
Refills: 0 | Status: COMPLETED | OUTPATIENT
Start: 2022-01-01 | End: 2022-01-01

## 2022-01-01 RX ORDER — POTASSIUM CHLORIDE 20 MEQ
40 PACKET (EA) ORAL ONCE
Refills: 0 | Status: COMPLETED | OUTPATIENT
Start: 2022-01-01 | End: 2022-01-01

## 2022-01-01 RX ORDER — SODIUM CHLORIDE 9 MG/ML
1000 INJECTION INTRAMUSCULAR; INTRAVENOUS; SUBCUTANEOUS
Refills: 0 | Status: DISCONTINUED | OUTPATIENT
Start: 2022-01-01 | End: 2022-01-01

## 2022-01-01 RX ORDER — ACETAMINOPHEN 500 MG
650 TABLET ORAL ONCE
Refills: 0 | Status: COMPLETED | OUTPATIENT
Start: 2022-01-01 | End: 2022-01-01

## 2022-01-01 RX ORDER — INSULIN GLARGINE 100 [IU]/ML
10 INJECTION, SOLUTION SUBCUTANEOUS AT BEDTIME
Refills: 0 | Status: DISCONTINUED | OUTPATIENT
Start: 2022-01-01 | End: 2022-01-01

## 2022-01-01 RX ORDER — ACETYLCYSTEINE 200 MG/ML
4 VIAL (ML) MISCELLANEOUS THREE TIMES A DAY
Refills: 0 | Status: DISCONTINUED | OUTPATIENT
Start: 2022-01-01 | End: 2022-01-01

## 2022-01-01 RX ORDER — DEXTROSE 50 % IN WATER 50 %
12.5 SYRINGE (ML) INTRAVENOUS ONCE
Refills: 0 | Status: COMPLETED | OUTPATIENT
Start: 2022-01-01 | End: 2022-01-01

## 2022-01-01 RX ORDER — IRON SUCROSE 20 MG/ML
200 INJECTION, SOLUTION INTRAVENOUS ONCE
Refills: 0 | Status: COMPLETED | OUTPATIENT
Start: 2022-01-01 | End: 2022-01-01

## 2022-01-01 RX ORDER — IRON SUCROSE 20 MG/ML
200 INJECTION, SOLUTION INTRAVENOUS ONCE
Refills: 0 | Status: DISCONTINUED | OUTPATIENT
Start: 2022-01-01 | End: 2022-01-01

## 2022-01-01 RX ORDER — MIDODRINE HYDROCHLORIDE 2.5 MG/1
20 TABLET ORAL THREE TIMES A DAY
Refills: 0 | Status: DISCONTINUED | OUTPATIENT
Start: 2022-01-01 | End: 2022-01-01

## 2022-01-01 RX ORDER — IPRATROPIUM/ALBUTEROL SULFATE 18-103MCG
3 AEROSOL WITH ADAPTER (GRAM) INHALATION ONCE
Refills: 0 | Status: COMPLETED | OUTPATIENT
Start: 2022-01-01 | End: 2022-01-01

## 2022-01-01 RX ORDER — DEXTROSE 50 % IN WATER 50 %
12.5 SYRINGE (ML) INTRAVENOUS ONCE
Refills: 0 | Status: DISCONTINUED | OUTPATIENT
Start: 2022-01-01 | End: 2022-01-01

## 2022-01-01 RX ORDER — PROTHROMBIN COMPLEX CONCENTRATE (HUMAN) 25.5; 16.5; 24; 22; 22; 26 [IU]/ML; [IU]/ML; [IU]/ML; [IU]/ML; [IU]/ML; [IU]/ML
2000 POWDER, FOR SOLUTION INTRAVENOUS ONCE
Refills: 0 | Status: COMPLETED | OUTPATIENT
Start: 2022-01-01 | End: 2022-01-01

## 2022-01-01 RX ORDER — ACETAMINOPHEN 500 MG
500 TABLET ORAL ONCE
Refills: 0 | Status: COMPLETED | OUTPATIENT
Start: 2022-01-01 | End: 2022-01-01

## 2022-01-01 RX ORDER — INSULIN LISPRO 100/ML
VIAL (ML) SUBCUTANEOUS EVERY 6 HOURS
Refills: 0 | Status: DISCONTINUED | OUTPATIENT
Start: 2022-01-01 | End: 2022-01-01

## 2022-01-01 RX ORDER — LIDOCAINE 4 G/100G
1 CREAM TOPICAL DAILY
Refills: 0 | Status: DISCONTINUED | OUTPATIENT
Start: 2022-01-01 | End: 2022-01-01

## 2022-01-01 RX ORDER — ASPIRIN/CALCIUM CARB/MAGNESIUM 324 MG
81 TABLET ORAL DAILY
Refills: 0 | Status: DISCONTINUED | OUTPATIENT
Start: 2022-01-01 | End: 2022-01-01

## 2022-01-01 RX ORDER — SODIUM CHLORIDE 9 MG/ML
4 INJECTION INTRAMUSCULAR; INTRAVENOUS; SUBCUTANEOUS EVERY 12 HOURS
Refills: 0 | Status: DISCONTINUED | OUTPATIENT
Start: 2022-01-01 | End: 2022-01-01

## 2022-01-01 RX ORDER — PROTHROMBIN COMPLEX CONCENTRATE (HUMAN) 25.5; 16.5; 24; 22; 22; 26 [IU]/ML; [IU]/ML; [IU]/ML; [IU]/ML; [IU]/ML; [IU]/ML
1500 POWDER, FOR SOLUTION INTRAVENOUS ONCE
Refills: 0 | Status: COMPLETED | OUTPATIENT
Start: 2022-01-01 | End: 2022-01-01

## 2022-01-01 RX ORDER — SODIUM CHLORIDE 9 MG/ML
1000 INJECTION, SOLUTION INTRAVENOUS
Refills: 0 | Status: DISCONTINUED | OUTPATIENT
Start: 2022-01-01 | End: 2022-01-01

## 2022-01-01 RX ORDER — APIXABAN 2.5 MG/1
2.5 TABLET, FILM COATED ORAL
Refills: 0 | Status: DISCONTINUED | OUTPATIENT
Start: 2022-01-01 | End: 2022-01-01

## 2022-01-01 RX ORDER — ACETAMINOPHEN 500 MG
1000 TABLET ORAL ONCE
Refills: 0 | Status: COMPLETED | OUTPATIENT
Start: 2022-01-01 | End: 2022-01-01

## 2022-01-01 RX ORDER — DEXTROSE 10 % IN WATER 10 %
1000 INTRAVENOUS SOLUTION INTRAVENOUS
Refills: 0 | Status: DISCONTINUED | OUTPATIENT
Start: 2022-01-01 | End: 2022-01-01

## 2022-01-01 RX ORDER — POLYETHYLENE GLYCOL 3350 17 G/17G
17 POWDER, FOR SOLUTION ORAL DAILY
Refills: 0 | Status: DISCONTINUED | OUTPATIENT
Start: 2022-01-01 | End: 2022-01-01

## 2022-01-01 RX ORDER — MIDODRINE HYDROCHLORIDE 2.5 MG/1
10 TABLET ORAL THREE TIMES A DAY
Refills: 0 | Status: DISCONTINUED | OUTPATIENT
Start: 2022-01-01 | End: 2022-01-01

## 2022-01-01 RX ORDER — LIDOCAINE 4 G/100G
2 CREAM TOPICAL DAILY
Refills: 0 | Status: DISCONTINUED | OUTPATIENT
Start: 2022-01-01 | End: 2022-01-01

## 2022-01-01 RX ORDER — PHYTONADIONE (VIT K1) 5 MG
5 TABLET ORAL ONCE
Refills: 0 | Status: DISCONTINUED | OUTPATIENT
Start: 2022-01-01 | End: 2022-01-01

## 2022-01-01 RX ORDER — INFLUENZA VIRUS VACCINE 15; 15; 15; 15 UG/.5ML; UG/.5ML; UG/.5ML; UG/.5ML
0.7 SUSPENSION INTRAMUSCULAR ONCE
Refills: 0 | Status: DISCONTINUED | OUTPATIENT
Start: 2022-01-01 | End: 2022-01-01

## 2022-01-01 RX ORDER — FUROSEMIDE 40 MG
80 TABLET ORAL ONCE
Refills: 0 | Status: DISCONTINUED | OUTPATIENT
Start: 2022-01-01 | End: 2022-01-01

## 2022-01-01 RX ORDER — LIDOCAINE 4 G/100G
2 CREAM TOPICAL EVERY 24 HOURS
Refills: 0 | Status: COMPLETED | OUTPATIENT
Start: 2022-01-01 | End: 2022-01-01

## 2022-01-01 RX ORDER — INSULIN LISPRO 100/ML
VIAL (ML) SUBCUTANEOUS
Refills: 0 | Status: DISCONTINUED | OUTPATIENT
Start: 2022-01-01 | End: 2022-01-01

## 2022-01-01 RX ORDER — KETOTIFEN FUMARATE 0.34 MG/ML
1 SOLUTION OPHTHALMIC
Refills: 0 | Status: DISCONTINUED | OUTPATIENT
Start: 2022-01-01 | End: 2022-01-01

## 2022-01-01 RX ORDER — IPRATROPIUM/ALBUTEROL SULFATE 18-103MCG
3 AEROSOL WITH ADAPTER (GRAM) INHALATION EVERY 6 HOURS
Refills: 0 | Status: DISCONTINUED | OUTPATIENT
Start: 2022-01-01 | End: 2022-01-01

## 2022-01-01 RX ORDER — GLUCAGON INJECTION, SOLUTION 0.5 MG/.1ML
1 INJECTION, SOLUTION SUBCUTANEOUS ONCE
Refills: 0 | Status: DISCONTINUED | OUTPATIENT
Start: 2022-01-01 | End: 2022-01-01

## 2022-01-01 RX ORDER — HYDROMORPHONE HYDROCHLORIDE 2 MG/ML
0.25 INJECTION INTRAMUSCULAR; INTRAVENOUS; SUBCUTANEOUS ONCE
Refills: 0 | Status: DISCONTINUED | OUTPATIENT
Start: 2022-01-01 | End: 2022-01-01

## 2022-01-01 RX ORDER — TRAMADOL HYDROCHLORIDE 50 MG/1
25 TABLET ORAL ONCE
Refills: 0 | Status: DISCONTINUED | OUTPATIENT
Start: 2022-01-01 | End: 2022-01-01

## 2022-01-01 RX ORDER — ATORVASTATIN CALCIUM 80 MG/1
40 TABLET, FILM COATED ORAL AT BEDTIME
Refills: 0 | Status: DISCONTINUED | OUTPATIENT
Start: 2022-01-01 | End: 2022-01-01

## 2022-01-01 RX ORDER — DEXTROSE 50 % IN WATER 50 %
25 SYRINGE (ML) INTRAVENOUS ONCE
Refills: 0 | Status: DISCONTINUED | OUTPATIENT
Start: 2022-01-01 | End: 2022-01-01

## 2022-01-01 RX ORDER — INSULIN LISPRO 100/ML
VIAL (ML) SUBCUTANEOUS AT BEDTIME
Refills: 0 | Status: DISCONTINUED | OUTPATIENT
Start: 2022-01-01 | End: 2022-01-01

## 2022-01-01 RX ORDER — AMIODARONE HYDROCHLORIDE 400 MG/1
400 TABLET ORAL DAILY
Refills: 0 | Status: DISCONTINUED | OUTPATIENT
Start: 2022-01-01 | End: 2022-01-01

## 2022-01-01 RX ORDER — PANTOPRAZOLE SODIUM 20 MG/1
40 TABLET, DELAYED RELEASE ORAL
Refills: 0 | Status: DISCONTINUED | OUTPATIENT
Start: 2022-01-01 | End: 2022-01-01

## 2022-01-01 RX ORDER — ACETAMINOPHEN 500 MG
650 TABLET ORAL ONCE
Refills: 0 | Status: DISCONTINUED | OUTPATIENT
Start: 2022-01-01 | End: 2022-01-01

## 2022-01-01 RX ORDER — PHYTONADIONE (VIT K1) 5 MG
5 TABLET ORAL DAILY
Refills: 0 | Status: DISCONTINUED | OUTPATIENT
Start: 2022-01-01 | End: 2022-01-01

## 2022-01-01 RX ORDER — FUROSEMIDE 40 MG
40 TABLET ORAL ONCE
Refills: 0 | Status: DISCONTINUED | OUTPATIENT
Start: 2022-01-01 | End: 2022-01-01

## 2022-01-01 RX ORDER — PHYTONADIONE (VIT K1) 5 MG
10 TABLET ORAL ONCE
Refills: 0 | Status: COMPLETED | OUTPATIENT
Start: 2022-01-01 | End: 2022-01-01

## 2022-01-01 RX ORDER — PANTOPRAZOLE SODIUM 20 MG/1
40 TABLET, DELAYED RELEASE ORAL EVERY 12 HOURS
Refills: 0 | Status: DISCONTINUED | OUTPATIENT
Start: 2022-01-01 | End: 2022-01-01

## 2022-01-01 RX ORDER — FUROSEMIDE 40 MG
80 TABLET ORAL ONCE
Refills: 0 | Status: COMPLETED | OUTPATIENT
Start: 2022-01-01 | End: 2022-01-01

## 2022-01-01 RX ORDER — SODIUM ZIRCONIUM CYCLOSILICATE 10 G/10G
10 POWDER, FOR SUSPENSION ORAL EVERY 8 HOURS
Refills: 0 | Status: COMPLETED | OUTPATIENT
Start: 2022-01-01 | End: 2022-01-01

## 2022-01-01 RX ORDER — ACETAMINOPHEN 500 MG
500 TABLET ORAL ONCE
Refills: 0 | Status: DISCONTINUED | OUTPATIENT
Start: 2022-01-01 | End: 2022-01-01

## 2022-01-01 RX ORDER — PHYTONADIONE (VIT K1) 5 MG
5 TABLET ORAL ONCE
Refills: 0 | Status: COMPLETED | OUTPATIENT
Start: 2022-01-01 | End: 2022-01-01

## 2022-01-01 RX ADMIN — Medication 12.5 GRAM(S): at 16:47

## 2022-01-01 RX ADMIN — SENNA PLUS 2 TABLET(S): 8.6 TABLET ORAL at 21:59

## 2022-01-01 RX ADMIN — Medication 81 MILLIGRAM(S): at 14:24

## 2022-01-01 RX ADMIN — ATORVASTATIN CALCIUM 40 MILLIGRAM(S): 80 TABLET, FILM COATED ORAL at 22:00

## 2022-01-01 RX ADMIN — Medication 25 GRAM(S): at 15:04

## 2022-01-01 RX ADMIN — SENNA PLUS 2 TABLET(S): 8.6 TABLET ORAL at 21:34

## 2022-01-01 RX ADMIN — Medication 3: at 09:19

## 2022-01-01 RX ADMIN — PANTOPRAZOLE SODIUM 40 MILLIGRAM(S): 20 TABLET, DELAYED RELEASE ORAL at 09:01

## 2022-01-01 RX ADMIN — HYDROMORPHONE HYDROCHLORIDE 0.25 MILLIGRAM(S): 2 INJECTION INTRAMUSCULAR; INTRAVENOUS; SUBCUTANEOUS at 09:41

## 2022-01-01 RX ADMIN — SODIUM ZIRCONIUM CYCLOSILICATE 10 GRAM(S): 10 POWDER, FOR SUSPENSION ORAL at 22:09

## 2022-01-01 RX ADMIN — Medication 30 MILLILITER(S): at 07:00

## 2022-01-01 RX ADMIN — MIDODRINE HYDROCHLORIDE 10 MILLIGRAM(S): 2.5 TABLET ORAL at 17:37

## 2022-01-01 RX ADMIN — KETOTIFEN FUMARATE 1 DROP(S): 0.34 SOLUTION OPHTHALMIC at 05:39

## 2022-01-01 RX ADMIN — KETOTIFEN FUMARATE 1 DROP(S): 0.34 SOLUTION OPHTHALMIC at 18:05

## 2022-01-01 RX ADMIN — AMIODARONE HYDROCHLORIDE 400 MILLIGRAM(S): 400 TABLET ORAL at 06:18

## 2022-01-01 RX ADMIN — HYDROMORPHONE HYDROCHLORIDE 0.25 MILLIGRAM(S): 2 INJECTION INTRAMUSCULAR; INTRAVENOUS; SUBCUTANEOUS at 03:35

## 2022-01-01 RX ADMIN — KETOTIFEN FUMARATE 1 DROP(S): 0.34 SOLUTION OPHTHALMIC at 17:37

## 2022-01-01 RX ADMIN — APIXABAN 2.5 MILLIGRAM(S): 2.5 TABLET, FILM COATED ORAL at 18:20

## 2022-01-01 RX ADMIN — Medication 400 MILLIGRAM(S): at 23:00

## 2022-01-01 RX ADMIN — SODIUM ZIRCONIUM CYCLOSILICATE 10 GRAM(S): 10 POWDER, FOR SUSPENSION ORAL at 10:27

## 2022-01-01 RX ADMIN — Medication 50 MILLILITER(S): at 06:52

## 2022-01-01 RX ADMIN — MIDODRINE HYDROCHLORIDE 10 MILLIGRAM(S): 2.5 TABLET ORAL at 20:31

## 2022-01-01 RX ADMIN — LIDOCAINE 1 PATCH: 4 CREAM TOPICAL at 13:53

## 2022-01-01 RX ADMIN — INSULIN GLARGINE 10 UNIT(S): 100 INJECTION, SOLUTION SUBCUTANEOUS at 21:34

## 2022-01-01 RX ADMIN — AMIODARONE HYDROCHLORIDE 400 MILLIGRAM(S): 400 TABLET ORAL at 05:05

## 2022-01-01 RX ADMIN — PANTOPRAZOLE SODIUM 40 MILLIGRAM(S): 20 TABLET, DELAYED RELEASE ORAL at 16:41

## 2022-01-01 RX ADMIN — KETOTIFEN FUMARATE 1 DROP(S): 0.34 SOLUTION OPHTHALMIC at 17:39

## 2022-01-01 RX ADMIN — KETOTIFEN FUMARATE 1 DROP(S): 0.34 SOLUTION OPHTHALMIC at 18:20

## 2022-01-01 RX ADMIN — APIXABAN 2.5 MILLIGRAM(S): 2.5 TABLET, FILM COATED ORAL at 18:03

## 2022-01-01 RX ADMIN — Medication 1: at 18:13

## 2022-01-01 RX ADMIN — Medication 1: at 18:10

## 2022-01-01 RX ADMIN — KETOTIFEN FUMARATE 1 DROP(S): 0.34 SOLUTION OPHTHALMIC at 18:12

## 2022-01-01 RX ADMIN — Medication 40 MILLIGRAM(S): at 18:24

## 2022-01-01 RX ADMIN — AMIODARONE HYDROCHLORIDE 400 MILLIGRAM(S): 400 TABLET ORAL at 05:54

## 2022-01-01 RX ADMIN — APIXABAN 2.5 MILLIGRAM(S): 2.5 TABLET, FILM COATED ORAL at 00:25

## 2022-01-01 RX ADMIN — PANTOPRAZOLE SODIUM 40 MILLIGRAM(S): 20 TABLET, DELAYED RELEASE ORAL at 05:36

## 2022-01-01 RX ADMIN — SODIUM CHLORIDE 100 MILLILITER(S): 9 INJECTION INTRAMUSCULAR; INTRAVENOUS; SUBCUTANEOUS at 22:18

## 2022-01-01 RX ADMIN — AMIODARONE HYDROCHLORIDE 400 MILLIGRAM(S): 400 TABLET ORAL at 05:36

## 2022-01-01 RX ADMIN — Medication 40 MILLIGRAM(S): at 06:19

## 2022-01-01 RX ADMIN — Medication 1 TABLET(S): at 12:34

## 2022-01-01 RX ADMIN — PROTHROMBIN COMPLEX CONCENTRATE (HUMAN) 400 INTERNATIONAL UNIT(S): 25.5; 16.5; 24; 22; 22; 26 POWDER, FOR SOLUTION INTRAVENOUS at 23:51

## 2022-01-01 RX ADMIN — LIDOCAINE 2 PATCH: 4 CREAM TOPICAL at 13:23

## 2022-01-01 RX ADMIN — Medication 1: at 23:50

## 2022-01-01 RX ADMIN — SENNA PLUS 2 TABLET(S): 8.6 TABLET ORAL at 23:38

## 2022-01-01 RX ADMIN — SENNA PLUS 2 TABLET(S): 8.6 TABLET ORAL at 22:17

## 2022-01-01 RX ADMIN — Medication 650 MILLIGRAM(S): at 13:15

## 2022-01-01 RX ADMIN — Medication 1 TABLET(S): at 16:42

## 2022-01-01 RX ADMIN — Medication 40 MILLIGRAM(S): at 17:56

## 2022-01-01 RX ADMIN — AMIODARONE HYDROCHLORIDE 400 MILLIGRAM(S): 400 TABLET ORAL at 06:54

## 2022-01-01 RX ADMIN — KETOTIFEN FUMARATE 1 DROP(S): 0.34 SOLUTION OPHTHALMIC at 07:15

## 2022-01-01 RX ADMIN — Medication 10 MILLIGRAM(S): at 12:11

## 2022-01-01 RX ADMIN — KETOTIFEN FUMARATE 1 DROP(S): 0.34 SOLUTION OPHTHALMIC at 05:54

## 2022-01-01 RX ADMIN — SODIUM CHLORIDE 100 MILLILITER(S): 9 INJECTION INTRAMUSCULAR; INTRAVENOUS; SUBCUTANEOUS at 12:40

## 2022-01-01 RX ADMIN — Medication 20 MILLIGRAM(S): at 21:58

## 2022-01-01 RX ADMIN — LIDOCAINE 2 PATCH: 4 CREAM TOPICAL at 21:26

## 2022-01-01 RX ADMIN — MIDODRINE HYDROCHLORIDE 20 MILLIGRAM(S): 2.5 TABLET ORAL at 17:39

## 2022-01-01 RX ADMIN — ATORVASTATIN CALCIUM 40 MILLIGRAM(S): 80 TABLET, FILM COATED ORAL at 22:11

## 2022-01-01 RX ADMIN — Medication 50 MILLILITER(S): at 13:49

## 2022-01-01 RX ADMIN — PANTOPRAZOLE SODIUM 40 MILLIGRAM(S): 20 TABLET, DELAYED RELEASE ORAL at 17:08

## 2022-01-01 RX ADMIN — MIDODRINE HYDROCHLORIDE 10 MILLIGRAM(S): 2.5 TABLET ORAL at 06:27

## 2022-01-01 RX ADMIN — POLYETHYLENE GLYCOL 3350 17 GRAM(S): 17 POWDER, FOR SOLUTION ORAL at 12:17

## 2022-01-01 RX ADMIN — Medication 3 MILLILITER(S): at 17:37

## 2022-01-01 RX ADMIN — Medication 40 MILLIGRAM(S): at 05:05

## 2022-01-01 RX ADMIN — Medication 40 MILLIGRAM(S): at 18:12

## 2022-01-01 RX ADMIN — SENNA PLUS 2 TABLET(S): 8.6 TABLET ORAL at 22:11

## 2022-01-01 RX ADMIN — INSULIN GLARGINE 10 UNIT(S): 100 INJECTION, SOLUTION SUBCUTANEOUS at 22:08

## 2022-01-01 RX ADMIN — Medication 200 MILLIGRAM(S): at 07:09

## 2022-01-01 RX ADMIN — Medication 3 MILLILITER(S): at 17:38

## 2022-01-01 RX ADMIN — Medication 3 MILLILITER(S): at 23:45

## 2022-01-01 RX ADMIN — Medication 3 MILLILITER(S): at 06:29

## 2022-01-01 RX ADMIN — PANTOPRAZOLE SODIUM 40 MILLIGRAM(S): 20 TABLET, DELAYED RELEASE ORAL at 05:05

## 2022-01-01 RX ADMIN — Medication 3: at 00:24

## 2022-01-01 RX ADMIN — HYDROMORPHONE HYDROCHLORIDE 0.25 MILLIGRAM(S): 2 INJECTION INTRAMUSCULAR; INTRAVENOUS; SUBCUTANEOUS at 04:32

## 2022-01-01 RX ADMIN — INSULIN GLARGINE 10 UNIT(S): 100 INJECTION, SOLUTION SUBCUTANEOUS at 22:05

## 2022-01-01 RX ADMIN — Medication 2: at 21:34

## 2022-01-01 RX ADMIN — KETOTIFEN FUMARATE 1 DROP(S): 0.34 SOLUTION OPHTHALMIC at 05:06

## 2022-01-01 RX ADMIN — Medication 3 MILLILITER(S): at 11:00

## 2022-01-01 RX ADMIN — Medication 400 MILLIGRAM(S): at 23:36

## 2022-01-01 RX ADMIN — Medication 1: at 18:05

## 2022-01-01 RX ADMIN — SODIUM CHLORIDE 4 MILLILITER(S): 9 INJECTION INTRAMUSCULAR; INTRAVENOUS; SUBCUTANEOUS at 21:58

## 2022-01-01 RX ADMIN — Medication 81 MILLIGRAM(S): at 12:20

## 2022-01-01 RX ADMIN — MIDODRINE HYDROCHLORIDE 20 MILLIGRAM(S): 2.5 TABLET ORAL at 12:34

## 2022-01-01 RX ADMIN — KETOTIFEN FUMARATE 1 DROP(S): 0.34 SOLUTION OPHTHALMIC at 06:09

## 2022-01-01 RX ADMIN — TRAMADOL HYDROCHLORIDE 25 MILLIGRAM(S): 50 TABLET ORAL at 05:25

## 2022-01-01 RX ADMIN — SODIUM CHLORIDE 4 MILLILITER(S): 9 INJECTION INTRAMUSCULAR; INTRAVENOUS; SUBCUTANEOUS at 06:28

## 2022-01-01 RX ADMIN — Medication 1 TABLET(S): at 12:12

## 2022-01-01 RX ADMIN — LIDOCAINE 2 PATCH: 4 CREAM TOPICAL at 00:45

## 2022-01-01 RX ADMIN — KETOTIFEN FUMARATE 1 DROP(S): 0.34 SOLUTION OPHTHALMIC at 05:37

## 2022-01-01 RX ADMIN — Medication 40 MILLIGRAM(S): at 05:50

## 2022-01-01 RX ADMIN — INSULIN GLARGINE 10 UNIT(S): 100 INJECTION, SOLUTION SUBCUTANEOUS at 22:42

## 2022-01-01 RX ADMIN — APIXABAN 2.5 MILLIGRAM(S): 2.5 TABLET, FILM COATED ORAL at 05:54

## 2022-01-01 RX ADMIN — PROTHROMBIN COMPLEX CONCENTRATE (HUMAN) 400 INTERNATIONAL UNIT(S): 25.5; 16.5; 24; 22; 22; 26 POWDER, FOR SOLUTION INTRAVENOUS at 10:25

## 2022-01-01 RX ADMIN — Medication 1 TABLET(S): at 18:11

## 2022-01-01 RX ADMIN — PROTHROMBIN COMPLEX CONCENTRATE (HUMAN) 400 INTERNATIONAL UNIT(S): 25.5; 16.5; 24; 22; 22; 26 POWDER, FOR SOLUTION INTRAVENOUS at 10:51

## 2022-01-01 RX ADMIN — Medication 1: at 08:48

## 2022-01-01 RX ADMIN — Medication 50 MILLILITER(S): at 09:00

## 2022-01-01 RX ADMIN — SODIUM CHLORIDE 100 MILLILITER(S): 9 INJECTION INTRAMUSCULAR; INTRAVENOUS; SUBCUTANEOUS at 10:25

## 2022-01-01 RX ADMIN — Medication 5 MILLIGRAM(S): at 14:24

## 2022-01-01 RX ADMIN — Medication 30 MILLILITER(S): at 14:03

## 2022-01-01 RX ADMIN — PANTOPRAZOLE SODIUM 40 MILLIGRAM(S): 20 TABLET, DELAYED RELEASE ORAL at 05:54

## 2022-01-01 RX ADMIN — Medication 81 MILLIGRAM(S): at 13:00

## 2022-01-01 RX ADMIN — LIDOCAINE 2 PATCH: 4 CREAM TOPICAL at 22:24

## 2022-01-01 RX ADMIN — KETOTIFEN FUMARATE 1 DROP(S): 0.34 SOLUTION OPHTHALMIC at 18:07

## 2022-01-01 RX ADMIN — Medication 1 TABLET(S): at 14:24

## 2022-01-01 RX ADMIN — Medication 1 TABLET(S): at 13:54

## 2022-01-01 RX ADMIN — Medication 4: at 12:58

## 2022-01-01 RX ADMIN — PANTOPRAZOLE SODIUM 40 MILLIGRAM(S): 20 TABLET, DELAYED RELEASE ORAL at 18:06

## 2022-01-01 RX ADMIN — Medication 4: at 17:51

## 2022-01-01 RX ADMIN — SENNA PLUS 2 TABLET(S): 8.6 TABLET ORAL at 21:35

## 2022-01-01 RX ADMIN — Medication 1: at 12:29

## 2022-01-01 RX ADMIN — IRON SUCROSE 110 MILLIGRAM(S): 20 INJECTION, SOLUTION INTRAVENOUS at 20:28

## 2022-01-01 RX ADMIN — POLYETHYLENE GLYCOL 3350 17 GRAM(S): 17 POWDER, FOR SOLUTION ORAL at 13:54

## 2022-01-01 RX ADMIN — PANTOPRAZOLE SODIUM 40 MILLIGRAM(S): 20 TABLET, DELAYED RELEASE ORAL at 07:16

## 2022-01-01 RX ADMIN — HYDROMORPHONE HYDROCHLORIDE 0.25 MILLIGRAM(S): 2 INJECTION INTRAMUSCULAR; INTRAVENOUS; SUBCUTANEOUS at 06:23

## 2022-01-01 RX ADMIN — PANTOPRAZOLE SODIUM 40 MILLIGRAM(S): 20 TABLET, DELAYED RELEASE ORAL at 05:39

## 2022-01-01 RX ADMIN — Medication 81 MILLIGRAM(S): at 14:33

## 2022-01-01 RX ADMIN — PANTOPRAZOLE SODIUM 40 MILLIGRAM(S): 20 TABLET, DELAYED RELEASE ORAL at 06:29

## 2022-01-01 RX ADMIN — INSULIN GLARGINE 10 UNIT(S): 100 INJECTION, SOLUTION SUBCUTANEOUS at 22:11

## 2022-01-01 RX ADMIN — LIDOCAINE 2 PATCH: 4 CREAM TOPICAL at 07:12

## 2022-01-01 RX ADMIN — Medication 30 MILLILITER(S): at 21:57

## 2022-01-01 RX ADMIN — Medication 50 MILLILITER(S): at 18:14

## 2022-01-01 RX ADMIN — PROTHROMBIN COMPLEX CONCENTRATE (HUMAN) 400 INTERNATIONAL UNIT(S): 25.5; 16.5; 24; 22; 22; 26 POWDER, FOR SOLUTION INTRAVENOUS at 16:57

## 2022-01-01 RX ADMIN — LIDOCAINE 2 PATCH: 4 CREAM TOPICAL at 08:15

## 2022-01-01 RX ADMIN — PANTOPRAZOLE SODIUM 40 MILLIGRAM(S): 20 TABLET, DELAYED RELEASE ORAL at 17:38

## 2022-01-01 RX ADMIN — Medication 101 MILLIGRAM(S): at 21:48

## 2022-01-01 RX ADMIN — HYDROMORPHONE HYDROCHLORIDE 0.25 MILLIGRAM(S): 2 INJECTION INTRAMUSCULAR; INTRAVENOUS; SUBCUTANEOUS at 04:50

## 2022-01-01 RX ADMIN — ATORVASTATIN CALCIUM 40 MILLIGRAM(S): 80 TABLET, FILM COATED ORAL at 21:35

## 2022-01-01 RX ADMIN — MIDODRINE HYDROCHLORIDE 10 MILLIGRAM(S): 2.5 TABLET ORAL at 15:43

## 2022-01-01 RX ADMIN — SODIUM CHLORIDE 30 MILLILITER(S): 9 INJECTION, SOLUTION INTRAVENOUS at 09:45

## 2022-01-01 RX ADMIN — Medication 260 MILLIGRAM(S): at 12:59

## 2022-01-01 RX ADMIN — Medication 3 MILLILITER(S): at 16:30

## 2022-01-01 RX ADMIN — Medication 102 MILLIGRAM(S): at 10:48

## 2022-01-01 RX ADMIN — Medication 80 MILLIGRAM(S): at 17:17

## 2022-01-01 RX ADMIN — KETOTIFEN FUMARATE 1 DROP(S): 0.34 SOLUTION OPHTHALMIC at 18:14

## 2022-01-01 RX ADMIN — PANTOPRAZOLE SODIUM 40 MILLIGRAM(S): 20 TABLET, DELAYED RELEASE ORAL at 06:54

## 2022-01-01 RX ADMIN — ATORVASTATIN CALCIUM 40 MILLIGRAM(S): 80 TABLET, FILM COATED ORAL at 23:37

## 2022-01-01 RX ADMIN — Medication 40 MILLIGRAM(S): at 05:57

## 2022-01-01 RX ADMIN — KETOTIFEN FUMARATE 1 DROP(S): 0.34 SOLUTION OPHTHALMIC at 06:54

## 2022-01-01 RX ADMIN — MIDODRINE HYDROCHLORIDE 10 MILLIGRAM(S): 2.5 TABLET ORAL at 07:15

## 2022-01-01 RX ADMIN — KETOTIFEN FUMARATE 1 DROP(S): 0.34 SOLUTION OPHTHALMIC at 16:50

## 2022-01-01 RX ADMIN — AMIODARONE HYDROCHLORIDE 400 MILLIGRAM(S): 400 TABLET ORAL at 05:38

## 2022-01-01 RX ADMIN — KETOTIFEN FUMARATE 1 DROP(S): 0.34 SOLUTION OPHTHALMIC at 06:29

## 2022-01-01 RX ADMIN — APIXABAN 2.5 MILLIGRAM(S): 2.5 TABLET, FILM COATED ORAL at 05:38

## 2022-01-01 RX ADMIN — TRAMADOL HYDROCHLORIDE 25 MILLIGRAM(S): 50 TABLET ORAL at 04:51

## 2022-01-01 RX ADMIN — PANTOPRAZOLE SODIUM 40 MILLIGRAM(S): 20 TABLET, DELAYED RELEASE ORAL at 06:12

## 2022-01-01 RX ADMIN — Medication 4 MILLILITER(S): at 21:57

## 2022-01-01 RX ADMIN — Medication 3 MILLILITER(S): at 23:50

## 2022-01-01 RX ADMIN — Medication 3 MILLILITER(S): at 07:17

## 2022-01-01 RX ADMIN — APIXABAN 2.5 MILLIGRAM(S): 2.5 TABLET, FILM COATED ORAL at 13:00

## 2022-01-01 RX ADMIN — APIXABAN 2.5 MILLIGRAM(S): 2.5 TABLET, FILM COATED ORAL at 17:53

## 2022-01-01 RX ADMIN — PANTOPRAZOLE SODIUM 40 MILLIGRAM(S): 20 TABLET, DELAYED RELEASE ORAL at 18:00

## 2022-01-01 RX ADMIN — LIDOCAINE 2 PATCH: 4 CREAM TOPICAL at 16:51

## 2022-01-01 RX ADMIN — TRAMADOL HYDROCHLORIDE 25 MILLIGRAM(S): 50 TABLET ORAL at 01:51

## 2022-01-01 RX ADMIN — HYDROMORPHONE HYDROCHLORIDE 0.25 MILLIGRAM(S): 2 INJECTION INTRAMUSCULAR; INTRAVENOUS; SUBCUTANEOUS at 17:20

## 2022-01-01 RX ADMIN — INSULIN GLARGINE 10 UNIT(S): 100 INJECTION, SOLUTION SUBCUTANEOUS at 21:35

## 2022-01-01 RX ADMIN — KETOTIFEN FUMARATE 1 DROP(S): 0.34 SOLUTION OPHTHALMIC at 06:18

## 2022-01-01 RX ADMIN — INSULIN GLARGINE 10 UNIT(S): 100 INJECTION, SOLUTION SUBCUTANEOUS at 22:13

## 2022-01-01 RX ADMIN — SODIUM CHLORIDE 4 MILLILITER(S): 9 INJECTION INTRAMUSCULAR; INTRAVENOUS; SUBCUTANEOUS at 17:38

## 2022-01-01 RX ADMIN — APIXABAN 2.5 MILLIGRAM(S): 2.5 TABLET, FILM COATED ORAL at 06:19

## 2022-01-01 RX ADMIN — SODIUM ZIRCONIUM CYCLOSILICATE 10 GRAM(S): 10 POWDER, FOR SUSPENSION ORAL at 16:42

## 2022-01-01 RX ADMIN — ATORVASTATIN CALCIUM 40 MILLIGRAM(S): 80 TABLET, FILM COATED ORAL at 21:34

## 2022-01-01 RX ADMIN — Medication 30 MILLILITER(S): at 10:36

## 2022-01-01 RX ADMIN — Medication 50 MILLILITER(S): at 02:25

## 2022-01-01 RX ADMIN — KETOTIFEN FUMARATE 1 DROP(S): 0.34 SOLUTION OPHTHALMIC at 17:13

## 2022-01-01 RX ADMIN — MIDODRINE HYDROCHLORIDE 10 MILLIGRAM(S): 2.5 TABLET ORAL at 12:12

## 2022-01-01 RX ADMIN — SODIUM ZIRCONIUM CYCLOSILICATE 10 GRAM(S): 10 POWDER, FOR SUSPENSION ORAL at 05:36

## 2022-01-01 RX ADMIN — Medication 50 MILLILITER(S): at 15:41

## 2022-01-01 RX ADMIN — Medication 1: at 22:09

## 2022-01-01 RX ADMIN — KETOTIFEN FUMARATE 1 DROP(S): 0.34 SOLUTION OPHTHALMIC at 17:53

## 2022-01-01 RX ADMIN — LIDOCAINE 2 PATCH: 4 CREAM TOPICAL at 08:13

## 2022-01-01 RX ADMIN — PANTOPRAZOLE SODIUM 40 MILLIGRAM(S): 20 TABLET, DELAYED RELEASE ORAL at 06:18

## 2022-01-01 RX ADMIN — Medication 102 MILLIGRAM(S): at 16:15

## 2022-01-01 RX ADMIN — Medication 3 MILLILITER(S): at 12:13

## 2022-01-01 RX ADMIN — Medication 1000 MILLIGRAM(S): at 00:55

## 2022-01-01 RX ADMIN — Medication 50 MILLILITER(S): at 22:00

## 2022-01-01 RX ADMIN — POLYETHYLENE GLYCOL 3350 17 GRAM(S): 17 POWDER, FOR SOLUTION ORAL at 12:59

## 2022-01-01 RX ADMIN — Medication 1 TABLET(S): at 14:14

## 2022-01-01 RX ADMIN — Medication 5 MILLIGRAM(S): at 18:04

## 2022-01-01 RX ADMIN — POLYETHYLENE GLYCOL 3350 17 GRAM(S): 17 POWDER, FOR SOLUTION ORAL at 12:20

## 2022-01-01 RX ADMIN — Medication 25 GRAM(S): at 07:05

## 2022-01-01 RX ADMIN — HYDROMORPHONE HYDROCHLORIDE 0.25 MILLIGRAM(S): 2 INJECTION INTRAMUSCULAR; INTRAVENOUS; SUBCUTANEOUS at 17:03

## 2022-01-07 NOTE — ED PROVIDER NOTE - PHYSICAL EXAMINATION
Gen: elderly male chronically ill appearing   HEENT: NCAT, EOMI, no nasal discharge, mucous membranes moist  CV: RRR, +S1/S2, no M/R/G  Resp: decreased bs b/l bases, +crackles b/l   GI: Abdomen soft non-distended, NTTP, no masses  : no blood on KATHY, +moderate stool in rectal vault   Back: stage 1 sacral pressure ulcer   MSK: 2+ pitting edema b/l LEs   Neuro: A&Ox3, following commands, moving all four extremities spontaneously strength 5/5 LEs b/l. RUE strength limited > L (prior stroke per son)   Psych: appropriate mood

## 2022-01-07 NOTE — ED PROVIDER NOTE - PROGRESS NOTE DETAILS
Hernandez, PGY3 - cardiology consulted for possible CCU given elevated lactate and hypotension, cardiogenic shock. pt BP improved from triage 104/56. will come see pt Ford, PGY3 - per house cardiology fellow, not a CCU candidate at this time. recommend admit to tele, cardiac monitoring, trend trops. I called Dr. Rain pt's cardiologist, agrees admit to telemetry, one of his partners will see tomorrow. Called Dr. Amador, unable to reach. Spoke to Dr. Carrasquillo agrees admit.

## 2022-01-07 NOTE — ED ADULT NURSE NOTE - NSICDXPASTMEDICALHX_GEN_ALL_CORE_FT
PAST MEDICAL HISTORY:  Anemia     Atrial fibrillation     CAD (coronary artery disease)     CVA (Cerebral Infarction) x2 in the past with residual Rt handed numbness and a little word finding trouble    History of ischemic cardiomyopathy     Hypertension     Other and Unspecified Hyperlipidemia

## 2022-01-07 NOTE — H&P ADULT - NEGATIVE NEUROLOGICAL SYMPTOMS
no transient paralysis/no paresthesias/no generalized seizures/no focal seizures/no syncope/no tremors/no vertigo/no loss of sensation/no headache/no loss of consciousness/no hemiparesis

## 2022-01-07 NOTE — H&P ADULT - NSHPLABSRESULTS_GEN_ALL_CORE
LABS:                        8.2    7.80  )-----------( 224      ( 07 Jan 2022 16:46 )             24.9     01-07    133<L>  |  91<L>  |  113<H>  ----------------------------<  363<H>  4.6   |  27  |  3.32<H>    Ca    8.4      07 Jan 2022 20:32  Phos  3.8     01-07  Mg     2.8     01-07    TPro  5.2<L>  /  Alb  2.3<L>  /  TBili  1.6<H>  /  DBili  x   /  AST  192<H>  /  ALT  170<H>  /  AlkPhos  168<H>  01-07    PT/INR - ( 07 Jan 2022 16:46 )   PT: 56.8 sec;   INR: 5.12 ratio         PTT - ( 07 Jan 2022 16:46 )  PTT:33.8 sec          RADIOLOGY & ADDITIONAL TESTS:

## 2022-01-07 NOTE — ED ADULT NURSE REASSESSMENT NOTE - NS ED NURSE REASSESS COMMENT FT1
Attending MD Carrasquillo at bedside, as per MD hold off on enema, as per MD BP is okay as long as systolic remains in the 90s, patient breathing unlabored, resting comfortably.

## 2022-01-07 NOTE — CONSULT NOTE ADULT - ASSESSMENT
89 yo M w/ PMH HFpEF, ICD, PPM for SSS, Afib on apixaban, CAD s/p stents who presents w/ poor PO intake, dehydration, and increased groin swelling likely in acute HF exacerbation.     #HF exacerbation   Concern for cardiogenic shock given elevated LFT, increased creatinine, and lactate. Patient had 1x low BP in ED which is now 100s/60s. Given 1x lasix in ED. Given BP stable, mental status intact, patient will need aggressive diuresis overnight with lasix. Would repeat labs tonight to trend his lactate, LFT and creatinine. Patient's cardiologist Dr. Rain should be contacted to follow patient. No need for CICU at this time; if any changes in status or questions, please call back  89 yo M w/ PMH HFpEF, ICD, PPM for SSS, Afib on apixaban, CAD s/p stents who presents w/ poor PO intake, dehydration, and increased groin swelling likely in acute HF exacerbation.     #HF exacerbation   Concern for cardiogenic shock given elevated LFT, increased creatinine, and lactate. BNP elevated and CT shows pulm edema and effusions. Trop likely in setting of FLOYD on CKD. Patient had 1x low BP in ED which is now 100s/60s. Given 1x lasix in ED. Given BP stable, mental status intact, patient will need aggressive diuresis overnight with lasix. Would repeat labs tonight to trend his lactate, LFT and creatinine. Patient's cardiologist Dr. Rain should be contacted to follow patient. No need for CICU at this time; if any changes in status or questions, please call back

## 2022-01-07 NOTE — CONSULT NOTE ADULT - ATTENDING COMMENTS
HFpEF, afib on Apixaban presents with decompensated heart failure  In ED he had a hypotensive episode to SBP 70s and CICU was consulted    Per son symptoms have been ongoing for 2 weeks  He has been compliant with medications including Torsemide and Coreg (both were taken today)  BP 100s/50s, HR 60s  A+Ox1  Slurred speech but otherwise non-focal  JVP 14 cm H2O  Edematous genitalia  Bilateral lower extremity pitting edema  +FLOYD and transaminitis with lactate 3.9    Recommend:  Hold Coreg  Aggressive diuresis with IV Lasix to target 1-2L overall negative fluid balance daily  Trend lactate and LFTs as markers of perfusion  Check TTE  Head CT given slurred speech  Cardiology consult to follow    I am not sure why this patient went into a heart failure exacerbation. He has a robust pulse pressure so I think he may respond to a trial of aggressive diuresis on a telemetry floor. If the patient is not able to be diuresed or does not have perfusion markers improve with diuresis, CICU admission can be re-considered. Defer admission currently. CICU will continue to follow HFpEF, afib on Apixaban presents with decompensated heart failure  In ED he had a hypotensive episode to SBP 70s and CICU was consulted    Per son symptoms have been ongoing for 2 weeks  He has been compliant with medications including Torsemide and Coreg (both were taken today)  BP 100s/50s, HR 60s  A+Ox1  Slurred speech but otherwise non-focal  JVP 14 cm H2O  Edematous genitalia  Bilateral lower extremity pitting edema  +FLOYD and transaminitis with lactate 3.9    Recommend:  Hold Coreg  Aggressive diuresis with IV Lasix to target 1-2L overall negative fluid balance daily  Trend lactate and LFTs as markers of perfusion  Check TTE  Head CT given slurred speech  Consult Cardiology     I am not sure why this patient went into a heart failure exacerbation. He has a robust pulse pressure so I think he may respond to a trial of aggressive diuresis on a telemetry floor. If the patient is not able to be diuresed or does not have perfusion markers improve with diuresis, CICU admission can be re-considered. Defer admission currently.

## 2022-01-07 NOTE — ED PROVIDER NOTE - CLINICAL SUMMARY MEDICAL DECISION MAKING FREE TEXT BOX
89 y/o M PMH of CAD s/p PCI (ROBERTO to D1 6/2/14), chronic AF (on AC w/ eliquis), tachy/perla syndrome s/p biV PPM placement, CVA x 2 w/ residual R sided weakness, CKD, HFpEF on torsemide presents to ED BIB son from home for decreased PO intake x2 weeks, LE edema, orthopnea, generalized weakness. concern for CHF exacerbation, worsening kidney function, pna, covid, also considered sbo, stercolitis. plan labs CT c/a/p enema as needed likely admit 89 y/o M PMH of CAD s/p PCI (ROBERTO to D1 6/2/14), chronic AF (on AC w/ eliquis), tachy/perla syndrome s/p biV PPM placement, CVA x 2 w/ residual R sided weakness, CKD, HFpEF on torsemide presents to ED BIB son from home for decreased PO intake x2 weeks, LE edema, orthopnea, generalized weakness. concern for CHF exacerbation, worsening kidney function, pna, covid, also considered sbo, stercolitis. plan labs CT c/a/p enema as needed likely admit    Dr. Campos (Attending Physician)

## 2022-01-07 NOTE — ED ADULT TRIAGE NOTE - HAVE YOU HAD A FIRST COVID-19 BOOSTER?
Yes Closure 2 Information: This tab is for additional flaps and grafts, including complex repair and grafts and complex repair and flaps. You can also specify a different location for the additional defect, if the location is the same you do not need to select a new one. We will insert the automated text for the repair you select below just as we do for solitary flaps and grafts. Please note that at this time if you select a location with a different insurance zone you will need to override the ICD10 and CPT if appropriate.

## 2022-01-07 NOTE — H&P ADULT - ASSESSMENT
89 y/o M PMH of CAD s/p PCI (ROBERTO to D1 6/2/14), chronic AF (on AC w/ eliquis), tachy/perla syndrome s/p biV PPM placement, CVA x 2, HFpEF, hospitalized in October 2021 for CHF exacerbation/overload presents to ED BIB son from home for decreased PO intake x2 weeks associated with constipation and abdominal pain, generalized weakness worsening to point where he is no longer ambulating and now w/ b/l LE edema and orthopnea. Denies f/c, cough, congestion.    acute diastolic congestive heart failure  - Hold Coreg  - Aggressive diuresis with IV Lasix to target 1-2L overall negative fluid balance daily  - Trend lactate and LFTs as markers of perfusion  - Check TTE  - cardiology consult    FLOYD on CKD  - baseline cre 1.7 to 2  - monitor cre  - avoid nephrotoxins  - nephrology consult

## 2022-01-07 NOTE — CONSULT NOTE ADULT - SUBJECTIVE AND OBJECTIVE BOX
Patient seen and evaluated at bedside    Chief Complaint:    HPI:  89 yo M w/ PMH HFpEF, ICD, PPM for SSS, Afib on apixaban, CAD s/p stents who presents w/ poor PO intake, dehydration, and increased groin swelling. Patient's son helped with translation and hx. Patient has been having poor PO intake and feels dehydrated for the past 2 weeks. He had notable increased groin swelling as well. Has been taking all of his meds daily. Son also noticed his sugars has been elevated to 400s. Patient denied any chest pain, SOB, orthopnea, palpitations.     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    S/P coronary artery stent placement    AICD (automatic cardioverter/defibrillator) present    Pacemaker        Allergies:  No Known Allergies      Home Meds:    Current Medications:   saline laxative (FLEET) Rectal Enema 1 Enema Rectal once      FAMILY HISTORY:  Family history of heart disease (Father)        Social History:  Smoking History:  Alcohol Use:  Drug Use:    REVIEW OF SYSTEMS:  Constitutional:     [x ] negative [ ] fevers [ ] chills [ ] weight loss [ ] weight gain  HEENT:                  [x ] negative [ ] dry eyes [ ] eye irritation [ ] postnasal drip [ ] nasal congestion  CV:                         [ x] negative  [ ] chest pain [ ] orthopnea [ ] palpitations [ ] murmur  Resp:                     [x ] negative [ ] cough [ ] shortness of breath [ ] dyspnea [ ] wheezing [ ] sputum [ ]hemoptysis  GI:                          [ x] negative [ ] nausea [ ] vomiting [ ] diarrhea [ ] constipation [ ] abd pain [ ] dysphagia   :                        [ x] negative [ ] dysuria [ ] nocturia [ ] hematuria [ ] increased urinary frequency  Musculoskeletal: [x ] negative [ ] back pain [ ] myalgias [ ] arthralgias [ ] fracture  Skin:                       [ x] negative [ ] rash [ ] itch  Neurological:        [ x] negative [ ] headache [ ] dizziness [ ] syncope [ ] weakness [ ] numbness  Psychiatric:           [ x] negative [ ] anxiety [ ] depression  Endocrine:            [ x] negative [ ] diabetes [ ] thyroid problem  Heme/Lymph:      [ x] negative [ ] anemia [ ] bleeding problem  Allergic/Immune: [ x] negative [ ] itchy eyes [ ] nasal discharge [ ] hives [ ] angioedema    [ x] All other systems negative  [ ] Unable to assess ROS due to      Physical Exam:  T(F): 97.9 (01-07), Max: 97.9 (01-07)  HR: 61 (01-07) (61 - 76)  BP: 104/56 (01-07) (78/52 - 104/56)  RR: 18 (01-07)  SpO2: 96% (01-07)  GENERAL: No acute distress   HEAD:  Atraumatic, Normocephalic  ENT: JVD   CHEST/LUNG: Bilateral crackles    HEART: Regular rate and rhythm; No murmurs, rubs, or gallops  EXTREMITIES:  +1 pitting edema, slightly cold to touch in LE   PSYCH: Nl behavior, nl affect  NEUROLOGY: AAOx3, non-focal   SKIN: Normal color, No rashes or lesions  LINES:    Cardiovascular Diagnostic Testing:    ECG: Personally reviewed:    Echo: Personally reviewed:    Stress Testing:    Cath:    Imaging:    CXR: Personally reviewed    Labs: Personally reviewed                        8.2    7.80  )-----------( 224      ( 07 Jan 2022 16:46 )             24.9     01-07    131<L>  |  91<L>  |  108<H>  ----------------------------<  352<H>  4.5   |  26  |  3.23<H>    Ca    8.5      07 Jan 2022 16:46  Phos  3.8     01-07  Mg     2.8     01-07    TPro  5.6<L>  /  Alb  2.4<L>  /  TBili  1.8<H>  /  DBili  x   /  AST  207<H>  /  ALT  185<H>  /  AlkPhos  179<H>  01-07    PT/INR - ( 07 Jan 2022 16:46 )   PT: 56.8 sec;   INR: 5.12 ratio         PTT - ( 07 Jan 2022 16:46 )  PTT:33.8 sec  Serum Pro-Brain Natriuretic Peptide: 93989 pg/mL (01-07 @ 16:46)

## 2022-01-07 NOTE — ED PROVIDER NOTE - OBJECTIVE STATEMENT
89 y/o M PMH of CAD s/p PCI (ROBERTO to D1 6/2/14), chronic AF (on AC w/ eliquis), tachy/perla syndrome s/p biV PPM placement, CVA x 2, HFpEF, hospitalized in October 2021 for CHF exacerbation/overload presents to ED BIB son from home for decreased PO intake x2 weeks associated with constipation and abdominal pain, generalized weakness worsening to point where he is no longer ambulating and now w/ b/l LE edema and orthopnea. Denies f/c, cough, congestion.

## 2022-01-07 NOTE — ED PROVIDER NOTE - CARE PLAN
Principal Discharge DX:	Pleural effusion due to CHF (congestive heart failure)  Secondary Diagnosis:	Constipation   1

## 2022-01-07 NOTE — ED PROVIDER NOTE - CADM POA URETHRAL CATHETER
Call placed to the lab to inquire about INR results. Our fax machine in the antico clinic is currently out of order. Writer spoke with Franky in the lab at [facility], who gave a verbal lab result of 1.2.   He will fax the INR results to [phone].     No

## 2022-01-07 NOTE — ED ADULT NURSE NOTE - OBJECTIVE STATEMENT
Patient is a 89 y/o male with PMH of HTN, HLD, CVA, A-fib, CAD, HF, kidney failure presenting to the ED via waiting room with c/o constipation, nausea, dec eating/drinking, abdominal pain, weakness, bladder pain, bilateral ankle edema, penis swelling x2 weeks and hyperglycemia x3-4 days. Patient's son is main source of information. As per patient's son patient fell 3-4 days ago d/t weakness/loss of balance when trying to ambulate with walker, patient was assisted to ground by son and landed on buttocks, no LOC, did not hit head. As per patient's son patient is normally able to ambulate and now has not been able to walk w/o losing balance x1 week. A&Ox2, breathing unlabored, denies SOB, denies chest pain, abdomen soft, nondistended, tender upon palpation, radial/pedal pulses strong/equal bilaterally, bilateral edema in lower extremities, Patient is a 89 y/o male with PMH of HTN, HLD, CVA, A-fib, CAD, HF, kidney failure presenting to the ED via waiting room with c/o constipation, nausea, dec eating/drinking, abdominal pain, weakness, bladder pain, bilateral ankle edema, penis swelling x2 weeks and hyperglycemia x3-4 days. Patient speaks some English, mostly [place]er speaking, patient's son is main source of information. As per patient's son patient fell 3-4 days ago d/t weakness/loss of balance when trying to ambulate with walker, patient was assisted to ground by son and landed on buttocks, no LOC, did not hit head. As per patient's son patient is normally able to ambulate and now has not been able to walk w/o losing balance x1 week. A&Ox2, breathing unlabored, denies SOB, crackles auscultated bilaterally, denies chest pain, abdomen soft, nondistended, nontender upon palpation, radial/pedal pulses strong/equal bilaterally, bilateral edema in lower extremities, reports pain in both lower extremities, skin warm, dry, stage 1 pressure ulcer noted on sacrum, denies fever, cough, chills.

## 2022-01-08 NOTE — PATIENT PROFILE ADULT - FALL HARM RISK - HARM RISK INTERVENTIONS
Assistance with ambulation/Assistance OOB with selected safe patient handling equipment/Communicate Risk of Fall with Harm to all staff/Discuss with provider need for PT consult/Monitor gait and stability/Reinforce activity limits and safety measures with patient and family/Tailored Fall Risk Interventions/Visual Cue: Yellow wristband and red socks/Bed in lowest position, wheels locked, appropriate side rails in place/Call bell, personal items and telephone in reach/Instruct patient to call for assistance before getting out of bed or chair/Non-slip footwear when patient is out of bed/Mark to call system/Physically safe environment - no spills, clutter or unnecessary equipment/Purposeful Proactive Rounding/Room/bathroom lighting operational, light cord in reach

## 2022-01-08 NOTE — CONSULT NOTE ADULT - ASSESSMENT
89 y/o M PMH of CAD s/p PCI (ROBERTO to D1 6/2/14), chronic AF (on AC w/ eliquis), tachy/perla syndrome s/p biV PPM placement, CVA x 2, HFpEF, hospitalized in October 2021 for CHF exacerbation/overload presents to ED BIB son from home for decreased PO intake x2 weeks associated with constipation and abdominal pain, generalized weakness worsening to point where he is no longer ambulating and now w/ b/l LE edema, SOB and orthopnea. Renal consulted for for FLOYD/CKD Mx.     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  FLOYD likely cardiorenal  b/l Cr 1.87 12/17/21, 1 mth ago was 2.5  Creatinine Trend: 3.63 <--, 3.32 <--, 3.23 <--  K, bicarb- acceptable  clinically hypervolemic  no e/o obstructive uropathy on CT  no acute s/s uremia    urinary retention PVR 300ml, pt uable to void now, stat straight cath (RN, PA informed), then bladder scan for vol q6h and prn straight cath if vol >200ml vs indwelling river  agree w/diuresis with iv lasix 40mg ivp bid  no indication for HD at this time  - I/o's, daily weights, daily BMP  -avoid NSAIDs/ACEi/ARB/nephrotoxics/iv contrast  low Na in diet, fluid restriction 1L/day   dose all meds for eGFR<15ml/min    Acute diastolic congestive heart failure  - Coreg on hold  - Aggressive diuresis with IV Lasix   - TTE  - cardiology eval  Type 2 diabetes mellitus. - insulin sliding scale, Lantus 10 units qhs. Mx per team  Hypertension. bp low, asympt. watch closely  afib- c/w amio, eliquis    Thanks for consulting. will closely follow up.   poc d/w pt, pts RN, covering NP  will closely follow up.   labs, rad, chart reviewed  For any question, pl call:  Nephrology  Cell -[phone]  Office [phone]  Ans Serv [phone]

## 2022-01-08 NOTE — PHYSICAL THERAPY INITIAL EVALUATION ADULT - GENERAL OBSERVATIONS, REHAB EVAL
Pt encountered semi-supine in bed + portable telemetry, river, IV lock, O2 via nasal cannula at 3L/min

## 2022-01-08 NOTE — PHYSICAL THERAPY INITIAL EVALUATION ADULT - PERTINENT HX OF CURRENT PROBLEM, REHAB EVAL
Pt is a 89yo Greek speaking M admitted 2/2 decreased PO intake x 2 weeks, abdominal pain, constipation, weakness, & worsening LE edema/orthopnea. Pt being treated for CHF exacerbation.,

## 2022-01-08 NOTE — PHYSICAL THERAPY INITIAL EVALUATION ADULT - ADDITIONAL COMMENTS
Pt lives with his wife in a private home, 5 entry steps (+ rail), flight (+ rail) once inside. Pt's son has been living with them recently 2/2 decline in overall functional status. Pt requires 1-2 person assist (dependent on day) for all functional mobility. Pt generally ambulates bed to bathroom with rolling walker & 1 person assist. Pt has wheelchair. Pt requires assist for ADL's. 2 person assist for stair negotiation (sons). No home O2 prior to admission. Goal of therapy: feel better. Son wants pt to go to rehab.

## 2022-01-08 NOTE — PROGRESS NOTE ADULT - SUBJECTIVE AND OBJECTIVE BOX
DATE OF SERVICE: 01-08-22 @ 08:05    Patient is a 89y old  Male who presents with a chief complaint of     SUBJECTIVE / OVERNIGHT EVENTS:  felling better.  c/o being unable to urinate.  has condom catheter.  PVR bladder scan 1000 cc per RN    MEDICATIONS  (STANDING):  aMIOdarone    Tablet 400 milliGRAM(s) Oral daily  apixaban 2.5 milliGRAM(s) Oral two times a day  aspirin enteric coated 81 milliGRAM(s) Oral daily  atorvastatin 40 milliGRAM(s) Oral at bedtime  dextrose 40% Gel 15 Gram(s) Oral once  dextrose 5%. 1000 milliLiter(s) (50 mL/Hr) IV Continuous <Continuous>  dextrose 5%. 1000 milliLiter(s) (100 mL/Hr) IV Continuous <Continuous>  dextrose 50% Injectable 25 Gram(s) IV Push once  dextrose 50% Injectable 12.5 Gram(s) IV Push once  dextrose 50% Injectable 25 Gram(s) IV Push once  furosemide   Injectable 40 milliGRAM(s) IV Push two times a day  glucagon  Injectable 1 milliGRAM(s) IntraMuscular once  influenza  Vaccine (HIGH DOSE) 0.7 milliLiter(s) IntraMuscular once  insulin glargine Injectable (LANTUS) 10 Unit(s) SubCutaneous at bedtime  insulin lispro (ADMELOG) corrective regimen sliding scale   SubCutaneous three times a day before meals  insulin lispro (ADMELOG) corrective regimen sliding scale   SubCutaneous at bedtime  ketotifen 0.025% Ophthalmic Solution 1 Drop(s) Right EYE two times a day  pantoprazole    Tablet 40 milliGRAM(s) Oral before breakfast  polyethylene glycol 3350 17 Gram(s) Oral daily  senna 2 Tablet(s) Oral at bedtime    MEDICATIONS  (PRN):      Vital Signs Last 24 Hrs  T(C): 36.3 (08 Jan 2022 04:36), Max: 36.8 (07 Jan 2022 23:20)  T(F): 97.3 (08 Jan 2022 04:36), Max: 98.2 (07 Jan 2022 23:20)  HR: 63 (08 Jan 2022 04:36) (60 - 76)  BP: 100/66 (08 Jan 2022 04:36) (78/52 - 104/56)  BP(mean): --  RR: 18 (08 Jan 2022 04:36) (15 - 20)  SpO2: 97% (08 Jan 2022 04:36) (90% - 100%)  CAPILLARY BLOOD GLUCOSE      POCT Blood Glucose.: 394 mg/dL (07 Jan 2022 23:45)  POCT Blood Glucose.: 366 mg/dL (07 Jan 2022 16:03)    I&O's Summary      PHYSICAL EXAM:  GENERAL: NAD, well-developed  HEAD:  Atraumatic, Normocephalic  EYES: EOMI, PERRLA, conjunctiva and sclera clear  NECK: Supple, No JVD  CHEST/LUNG: dec bs  HEART: Regular rate and rhythm; No murmurs, rubs, or gallops  ABDOMEN: Soft, Nontender, Nondistended; Bowel sounds present  EXTREMITIES:  2+ Peripheral Pulses, No clubbing, cyanosis, diane edema  PSYCH: AAOx3  NEUROLOGY: non-focal  SKIN: No rashes or lesions    LABS:                        7.7    6.92  )-----------( 193      ( 08 Jan 2022 07:32 )             23.2     01-07    133<L>  |  91<L>  |  113<H>  ----------------------------<  363<H>  4.6   |  27  |  3.32<H>    Ca    8.4      07 Jan 2022 20:32  Phos  3.8     01-07  Mg     2.8     01-07    TPro  5.2<L>  /  Alb  2.3<L>  /  TBili  1.6<H>  /  DBili  x   /  AST  192<H>  /  ALT  170<H>  /  AlkPhos  168<H>  01-07    PT/INR - ( 07 Jan 2022 16:46 )   PT: 56.8 sec;   INR: 5.12 ratio         PTT - ( 07 Jan 2022 16:46 )  PTT:33.8 sec          RADIOLOGY & ADDITIONAL TESTS:    Imaging Personally Reviewed:    Consultant(s) Notes Reviewed:      Care Discussed with Consultants/Other Providers:

## 2022-01-08 NOTE — PROGRESS NOTE ADULT - ASSESSMENT
89 y/o M PMH of CAD s/p PCI (ROBERTO to D1 6/2/14), chronic AF (on AC w/ eliquis), tachy/perla syndrome s/p biV PPM placement, CVA x 2, HFpEF, hospitalized in October 2021 for CHF exacerbation/overload presents to ED BIB son from home for decreased PO intake x2 weeks associated with constipation and abdominal pain, generalized weakness worsening to point where he is no longer ambulating and now w/ b/l LE edema and orthopnea. Denies f/c, cough, congestion.    acute diastolic congestive heart failure  - Hold Coreg  - Aggressive diuresis with IV Lasix to target 1-2L overall negative fluid balance daily  - Trend lactate and LFTs as markers of perfusion  - Check TTE  - cardiology consult    FLOYD on CKD  - baseline cre 1.7 to 2  - monitor cre  - avoid nephrotoxins  - nephrology consult    anemia  - likely 2/2 ckd  - check iron studies    uncontrolled diabetes  - fs qid  - hgb a1c  - insulin sliding scale  - Lantus 10 units qhs    afib  - c/w amio  - c/w eliquis    constipation  - senna and miralax

## 2022-01-08 NOTE — CONSULT NOTE ADULT - SUBJECTIVE AND OBJECTIVE BOX
[facility] - S López / Aroldo S /D Jose/ S Amina/ JUAN DAVID Smith/ Adrian Vega / LUBA Alvau/ O Alejandra  service -[phone], office [phone]  ---------------------------------------------------------------------------------------------------------------    Patient is a 89y Male whom presented to the hospital with   Denied recent NSAID use/Abx use/iv contrast studies.    Patient seen and examined    PAST MEDICAL & SURGICAL HISTORY:  Other and Unspecified Hyperlipidemia    CVA (Cerebral Infarction)  x2 in the past with residual Rt handed numbness and a little word finding trouble    Hypertension    Atrial fibrillation    Anemia    CAD (coronary artery disease)    History of ischemic cardiomyopathy    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    S/P coronary artery stent placement  D1    Pacemaker  BiV PPM        Allergies: No Known Allergies    Home Medications Reviewed  Hospital Medications:   MEDICATIONS  (STANDING):  aMIOdarone    Tablet 400 milliGRAM(s) Oral daily  apixaban 2.5 milliGRAM(s) Oral two times a day  aspirin enteric coated 81 milliGRAM(s) Oral daily  atorvastatin 40 milliGRAM(s) Oral at bedtime  dextrose 40% Gel 15 Gram(s) Oral once  dextrose 5%. 1000 milliLiter(s) (50 mL/Hr) IV Continuous <Continuous>  dextrose 5%. 1000 milliLiter(s) (100 mL/Hr) IV Continuous <Continuous>  dextrose 50% Injectable 25 Gram(s) IV Push once  dextrose 50% Injectable 12.5 Gram(s) IV Push once  dextrose 50% Injectable 25 Gram(s) IV Push once  furosemide   Injectable 40 milliGRAM(s) IV Push two times a day  glucagon  Injectable 1 milliGRAM(s) IntraMuscular once  influenza  Vaccine (HIGH DOSE) 0.7 milliLiter(s) IntraMuscular once  insulin glargine Injectable (LANTUS) 10 Unit(s) SubCutaneous at bedtime  insulin lispro (ADMELOG) corrective regimen sliding scale   SubCutaneous three times a day before meals  insulin lispro (ADMELOG) corrective regimen sliding scale   SubCutaneous at bedtime  ketotifen 0.025% Ophthalmic Solution 1 Drop(s) Right EYE two times a day  pantoprazole    Tablet 40 milliGRAM(s) Oral before breakfast  polyethylene glycol 3350 17 Gram(s) Oral daily  senna 2 Tablet(s) Oral at bedtime    SOCIAL HISTORY:  Denies ETOh,Smoking, illicit drug use  FAMILY HISTORY:  Family history of heart disease (Father)        REVIEW OF SYSTEMS:  CONSTITUTIONAL: No weakness, fevers or chills  EYES/ENT: No visual changes;  No vertigo or throat pain   NECK: No pain or stiffness  RESPIRATORY: No cough, wheezing, hemoptysis; No shortness of breath  CARDIOVASCULAR: No chest pain or palpitations.  GASTROINTESTINAL: No abdominal or epigastric pain. No nausea, vomiting, or hematemesis; No diarrhea or constipation. No melena or hematochezia.  GENITOURINARY: No dysuria, frequency, foamy urine, urinary urgency, incontinence or hematuria  NEUROLOGICAL: No numbness or weakness  SKIN: No itching, burning, rashes, or lesions   VASCULAR: No bilateral lower extremity edema.   All other review of systems is negative unless indicated above.    VITALS:  T(F): 97.5 (01-08-22 @ 12:01), Max: 98.2 (01-07-22 @ 23:20)  HR: 62 (01-08-22 @ 11:50)  BP: 95/56 (01-08-22 @ 12:01)  RR: 18 (01-08-22 @ 12:01)  SpO2: 96% (01-08-22 @ 12:01)  Wt(kg): --    01-08 @ 07:01  -  01-08 @ 15:21  --------------------------------------------------------  IN: 180 mL / OUT: 0 mL / NET: 180 mL          PHYSICAL EXAM:  Constitutional: NAD  HEENT: anicteric sclera, oropharynx clear, MMM  Neck: No JVD  Respiratory: CTAB, no wheezes, rales or rhonchi  Cardiovascular: S1, S2, RRR  Gastrointestinal: BS+, soft, NT/ND  Extremities: No cyanosis or clubbing. No peripheral edema  Neurological: A/O x 3, no focal deficits  Psychiatric: Normal mood, normal affect  : No CVA tenderness. No river.   Skin: No rashes  Vascular Access:    LABS:  01-08    132<L>  |  91<L>  |  121<H>  ----------------------------<  298<H>  4.5   |  27  |  3.63<H>    Ca    8.4      08 Jan 2022 07:31  Phos  3.8     01-08  Mg     3.0     01-08    TPro  5.4<L>  /  Alb  2.3<L>  /  TBili  1.6<H>  /  DBili  1.0<H>  /  AST  188<H>  /  ALT  167<H>  /  AlkPhos  191<H>  01-08    Creatinine Trend: 3.63 <--, 3.32 <--, 3.23 <--                        7.7    6.92  )-----------( 193      ( 08 Jan 2022 07:32 )             23.2     Urine Studies:        RADIOLOGY & ADDITIONAL STUDIES:                 [facility] - S López / Aroldo S /D Jose/ S Amina/ JUAN DAVID Smith/ Adrian Vega / LUBA Alvau/ O Alejandra  service -[phone], office [phone]  ---------------------------------------------------------------------------------------------------------------  Patient seen and examined bedside    89 y/o M PMH of CAD s/p PCI (ROBERTO to D1 6/2/14), chronic AF (on AC w/ eliquis), tachy/perla syndrome s/p biV PPM placement, CVA x 2, HFpEF, hospitalized in October 2021 for CHF exacerbation/overload presented to ED BIB son from home for decreased PO intake x2 weeks associated with constipation and abdominal pain, generalized weakness worsening to point where he is no longer ambulating and now w/ b/l LE edema and orthopnea. Renal consulted for for FLOYD/CKD Mx. Pt has known h/o CKD, follows w/my partner Dr Vega, seen last month in office. Pt reports SOB a little better. stated unable to void since yesterday. Denies hematuria, f/c, cough, congestion. Denied recent NSAID use/Abx use/iv contrast studies.    PAST MEDICAL & SURGICAL HISTORY:  Other and Unspecified Hyperlipidemia    CVA (Cerebral Infarction)  x2 in the past with residual Rt handed numbness and a little word finding trouble    Hypertension    Atrial fibrillation    Anemia    CAD (coronary artery disease)    History of ischemic cardiomyopathy    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    S/P coronary artery stent placement  D1    Pacemaker  BiV PPM        Allergies: No Known Allergies    Home Medications Reviewed  Hospital Medications:   MEDICATIONS  (STANDING):  aMIOdarone    Tablet 400 milliGRAM(s) Oral daily  apixaban 2.5 milliGRAM(s) Oral two times a day  aspirin enteric coated 81 milliGRAM(s) Oral daily  atorvastatin 40 milliGRAM(s) Oral at bedtime  dextrose 40% Gel 15 Gram(s) Oral once  dextrose 5%. 1000 milliLiter(s) (50 mL/Hr) IV Continuous <Continuous>  dextrose 5%. 1000 milliLiter(s) (100 mL/Hr) IV Continuous <Continuous>  dextrose 50% Injectable 25 Gram(s) IV Push once  dextrose 50% Injectable 12.5 Gram(s) IV Push once  dextrose 50% Injectable 25 Gram(s) IV Push once  furosemide   Injectable 40 milliGRAM(s) IV Push two times a day  glucagon  Injectable 1 milliGRAM(s) IntraMuscular once  influenza  Vaccine (HIGH DOSE) 0.7 milliLiter(s) IntraMuscular once  insulin glargine Injectable (LANTUS) 10 Unit(s) SubCutaneous at bedtime  insulin lispro (ADMELOG) corrective regimen sliding scale   SubCutaneous three times a day before meals  insulin lispro (ADMELOG) corrective regimen sliding scale   SubCutaneous at bedtime  ketotifen 0.025% Ophthalmic Solution 1 Drop(s) Right EYE two times a day  pantoprazole    Tablet 40 milliGRAM(s) Oral before breakfast  polyethylene glycol 3350 17 Gram(s) Oral daily  senna 2 Tablet(s) Oral at bedtime    SOCIAL HISTORY:  Denies ETOh,Smoking, illicit drug use  FAMILY HISTORY:  Family history of heart disease (Father)    REVIEW OF SYSTEMS:  CONSTITUTIONAL: No weakness, fevers or chills  EYES/ENT: No visual changes;  No vertigo or throat pain   NECK: No pain or stiffness  RESPIRATORY: No cough, wheezing, hemoptysis; + shortness of breath  CARDIOVASCULAR: No chest pain or palpitations.  GASTROINTESTINAL: No abdominal or epigastric pain. No nausea, vomiting, or hematemesis; No diarrhea or constipation. No melena or hematochezia.  NEUROLOGICAL: No numbness or weakness  SKIN: No itching, burning, rashes, or lesions   VASCULAR: No bilateral lower extremity edema.   All other review of systems is negative unless indicated above.    VITALS:  T(F): 97.5 (01-08-22 @ 12:01), Max: 98.2 (01-07-22 @ 23:20)  HR: 62 (01-08-22 @ 11:50)  BP: 95/56 (01-08-22 @ 12:01)  RR: 18 (01-08-22 @ 12:01)  SpO2: 96% (01-08-22 @ 12:01)  Wt(kg): --    01-08 @ 07:01  -  01-08 @ 15:21  --------------------------------------------------------  IN: 180 mL / OUT: 0 mL / NET: 180 mL      PHYSICAL EXAM:  Constitutional: NAD  HEENT: anicteric sclera  Neck: No JVD  Respiratory: bibasilar crepts+   Cardiovascular: S1, S2, RRR  Gastrointestinal: BS+, soft, NT/ND  Extremities: 1+ peripheral edema b/l  Neurological: A/O x 3, no focal deficits  Psychiatric: Normal mood, normal affect  : No indwelling river. +condom cath w/no urine    LABS:  01-08    132<L>  |  91<L>  |  121<H>  ----------------------------<  298<H>  4.5   |  27  |  3.63<H>    Ca    8.4      08 Jan 2022 07:31  Phos  3.8     01-08  Mg     3.0     01-08    TPro  5.4<L>  /  Alb  2.3<L>  /  TBili  1.6<H>  /  DBili  1.0<H>  /  AST  188<H>  /  ALT  167<H>  /  AlkPhos  191<H>  01-08    Creatinine Trend: 3.63 <--, 3.32 <--, 3.23 <--                        7.7    6.92  )-----------( 193      ( 08 Jan 2022 07:32 )             23.2     Urine Studies:        RADIOLOGY & ADDITIONAL STUDIES:    < from: CT Chest No Cont (01.07.22 @ 17:11) >  IMPRESSION:  Moderate bilateral pleural effusions.    Likely pulmonary edema. Additional small nodular opacities in the right   upper lobe may be related to edema however follow-up imaging is   recommended to ensure resolution and exclude other etiologies.    No bowel obstruction or acute intra-abdominal pathology.    < end of copied text >    < from: CT Abdomen and Pelvis No Cont (01.07.22 @ 17:11) >  KIDNEYS/URETERS: Within normal limits.    BLADDER: Within normal limits.    < end of copied text >

## 2022-01-08 NOTE — CONSULT NOTE ADULT - SUBJECTIVE AND OBJECTIVE BOX
EP ATTENDING    History: He is an extremely pleasant 89 year old male with sick sinus syndrome, paroxysmal atrial fibrillation, complete heart block, and chronic systolic CHF. In June 2014 I implanted a Biotronik biventricular pacemaker. In August 2021 he presented with sustained monomorphic ventricular tachycardia requiring emergency DCCV for rescue therapy. Given the above he was upgraded to a  Medtronic BiV-ICD, and the old RV pacing lead was capped and abandoned. Device interrogation demonstrates excellent BiV-ICD function. He denies palpitations, syncope, nor angina, but continues to report ongoing NYHA Class II symptoms of chronic systolic CHF including dyspnea and orthopnea. He is now a/w another CHF exacerbation.    PMHX: sick sinus syndrome, paroxysmal atrial fibrillation, complete heart block, BPH, stroke, CKD, hypertension, hyperlipidemia, CAD s/p PCI, ventricular tachycardia  PSHX: Coronary artery stenting, Medtronic BiV-ICD  Social HX: No tobacco, alcohol, or drugs.  Allergies: He has no known drug allergies.  Home Medications: coreg 25mg bid, eliquis 2.5mg bid, asa 81mg daily, lasix 40mg daily, lipitor 20mg daily, losartan 100mg daily, amiodarone 400mg daily, tamsulosin 0.4mg qhs, protonix 40mg daily, finasteride 5mg daily        DATE OF SERVICE - 01-08-22 @ 12:02    Review of Systems:   Constitutional: [ ] fevers, [ ] chills.   Skin: [ ] dry skin. [ ] rashes.  Psychiatric: [ ] depression, [ ] anxiety.   Gastrointestinal: [ ] BRBPR, [ ] melena.   Neurological: [ ] confusion. [ ] seizures. [ ] shuffling gait.   Ears,Nose,Mouth and Throat: [ ] ear pain [ ] sore throat.   Eyes: [ ] diplopia.   Respiratory: [ ] hemoptysis. [ ] shortness of breath  Cardiovascular: See HPI above  Hematologic/Lymphatic: [ ] anemia. [ ] painful nodes. [ ] prolonged bleeding.   Genitourinary: [ ] hematuria. [ ] flank pain.   Endocrine: [ ] significant change in weight. [ ] intolerance to heat and cold.     Review of systems [ x] otherwise negative, [ ] otherwise unable to obtain    INPATIENT MEDICATIONS  aMIOdarone    Tablet 400 milliGRAM(s) Oral daily  apixaban 2.5 milliGRAM(s) Oral two times a day  aspirin enteric coated 81 milliGRAM(s) Oral daily  atorvastatin 40 milliGRAM(s) Oral at bedtime  dextrose 40% Gel 15 Gram(s) Oral once  dextrose 5%. 1000 milliLiter(s) IV Continuous <Continuous>  dextrose 5%. 1000 milliLiter(s) IV Continuous <Continuous>  dextrose 50% Injectable 25 Gram(s) IV Push once  dextrose 50% Injectable 12.5 Gram(s) IV Push once  dextrose 50% Injectable 25 Gram(s) IV Push once  furosemide   Injectable 40 milliGRAM(s) IV Push two times a day  glucagon  Injectable 1 milliGRAM(s) IntraMuscular once  influenza  Vaccine (HIGH DOSE) 0.7 milliLiter(s) IntraMuscular once  insulin glargine Injectable (LANTUS) 10 Unit(s) SubCutaneous at bedtime  insulin lispro (ADMELOG) corrective regimen sliding scale   SubCutaneous three times a day before meals  insulin lispro (ADMELOG) corrective regimen sliding scale   SubCutaneous at bedtime  ketotifen 0.025% Ophthalmic Solution 1 Drop(s) Right EYE two times a day  pantoprazole    Tablet 40 milliGRAM(s) Oral before breakfast  polyethylene glycol 3350 17 Gram(s) Oral daily  senna 2 Tablet(s) Oral at bedtime                            7.7    6.92  )-----------( 193      ( 08 Jan 2022 07:32 )             23.2       01-08    132<L>  |  91<L>  |  121<H>  ----------------------------<  298<H>  4.5   |  27  |  3.63<H>    Ca    8.4      08 Jan 2022 07:31  Phos  3.8     01-08  Mg     3.0     01-08    TPro  5.4<L>  /  Alb  2.3<L>  /  TBili  1.6<H>  /  DBili  1.0<H>  /  AST  188<H>  /  ALT  167<H>  /  AlkPhos  191<H>  01-08            T(C): 36.4 (01-08-22 @ 12:01), Max: 36.8 (01-07-22 @ 23:20)  HR: 62 (01-08-22 @ 11:50) (60 - 76)  BP: 95/56 (01-08-22 @ 12:01) (78/52 - 104/56)  RR: 18 (01-08-22 @ 12:01) (15 - 20)  SpO2: 96% (01-08-22 @ 12:01) (90% - 100%)  Wt(kg): --    I&O's Summary    08 Jan 2022 07:01  -  08 Jan 2022 12:02  --------------------------------------------------------  IN: 180 mL / OUT: 0 mL / NET: 180 mL        General: Well nourished in no acute distress. Alert and Oriented * 3.   Head: Normocephalic and atraumatic.   Neck: No JVD. No bruits. Supple. Does not appear to be enlarged.   Cardiovascular: + S1,S2 ; RRR Soft systolic murmur at the left lower sternal border. No rubs noted.    Lungs: CTA b/l. No rhonchi, rales or wheezes.   Abdomen: + BS, soft. Non tender. Non distended. No rebound. No guarding.   Extremities: No clubbing/cyanosis/edema.   Neurologic: Moves all four extremities. Full range of motion.   Skin: Warm and moist. The patient's skin has normal elasticity and good skin turgor.   Psychiatric: Appropriate mood and affect.  Musculoskeletal: Normal range of motion, normal strength      A/P) He is an extremely pleasant 89 year old male with sick sinus syndrome, paroxysmal atrial fibrillation, complete heart block, and chronic systolic CHF. In June 2014 I implanted a Biotronik biventricular pacemaker. In August 2021 he presented with sustained monomorphic ventricular tachycardia requiring emergency DCCV for rescue therapy. Given the above he was upgraded to a  Medtronic BiV-ICD, and the old RV pacing lead was capped and abandoned. Device interrogation demonstrates excellent BiV-ICD function. He denies palpitations, syncope, nor angina, but continues to report ongoing NYHA Class II symptoms of chronic systolic CHF including dyspnea and orthopnea. He is now a/w another CHF exacerbation.    -continue amio 400mg daily for VT  -continue eliquis 2.5mg bid for lifelong a/c for PAF  -medical therapy for CAD with severe LV dysfunction as per cardiology  -would switch to PO lasix tomorrow as he now appears euvolemic  -f/u with me after discharge for routine ICD care as scheduled  -f/u with his cardiologist Dr. Danny Vanegas at [facility] after discharge  -no further inpatient EP workup expected as long as he remains free of AF/VT      Holly Villatoro M.D., [facility]  Cardiac Electrophysiology  [facility]  [address] [place] [address], [address] [place] [address]  [email or web address]    office [phone]  pager [phone]

## 2022-01-08 NOTE — PHYSICAL THERAPY INITIAL EVALUATION ADULT - BALANCE TRAINING, PT EVAL
GOAL: pt will be able to independently perform ADL's while sitting at edge of bed without loss of balance within 4 weeks. pt will be able to independently ambulate 100ft with use of rolling walker & without loss of balance within 4 weeks

## 2022-01-09 NOTE — PROGRESS NOTE ADULT - ASSESSMENT
89 y/o M PMH of CAD s/p PCI (ROBERTO to D1 6/2/14), chronic AF (on AC w/ eliquis), tachy/perla syndrome s/p biV PPM placement, CVA x 2, HFpEF, hospitalized in October 2021 for CHF exacerbation/overload presents to ED BIB son from home for decreased PO intake x2 weeks associated with constipation and abdominal pain, generalized weakness worsening to point where he is no longer ambulating and now w/ b/l LE edema, SOB and orthopnea. Renal consulted for for FLOYD/CKD Mx.     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  FLOYD likely cardiorenal  b/l Cr 1.87 12/17/21, 1 mth ago was 2.5  Creatinine Trend: 3.63 <--, 3.32 <--, 3.23 <--  clinically hypervolemic  no e/o obstructive uropathy on CT  no acute s/s uremia    urinary retention - c/w straight cath bladder scan for vol q6h w/straight cath w/urine 350ml and 400ml drained  consider indwelling river  c/w diuresis with iv lasix 40mg ivp bid  no indication for HD at this time  - I/o's, daily weights, daily BMP  -avoid NSAIDs/ACEi/ARB/nephrotoxics/iv contrast  low Na in diet, fluid restriction 1L/day   dose all meds for eGFR<15ml/min    Acute diastolic congestive heart failure  - Coreg on hold  - Aggressive diuresis with IV Lasix   - TTE  - cardiology eval  Type 2 diabetes mellitus. - insulin sliding scale, Lantus 10 units qhs. Mx per team  Hypertension. bp low, asympt. watch closely  afib- c/w amio, eliquis    will closely follow up.   poc d/w pt, pts RN  will closely follow up.   no new labs, chart reviewed  For any question, pl call:  Nephrology  Cell -[phone]  Office [phone]  Ans Serv [phone]     89 y/o M PMH of CAD s/p PCI (ROBERTO to D1 6/2/14), chronic AF (on AC w/ eliquis), tachy/perla syndrome s/p biV PPM placement, CVA x 2, HFpEF, hospitalized in October 2021 for CHF exacerbation/overload presents to ED BIB son from home for decreased PO intake x2 weeks associated with constipation and abdominal pain, generalized weakness worsening to point where he is no longer ambulating and now w/ b/l LE edema, SOB and orthopnea. Renal consulted for for FLOYD/CKD Mx.     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  FLOYD likely cardiorenal  b/l Cr 1.87 12/17/21, 1 mth ago was 2.5  Creatinine Trend: 3.63 <--, 3.32 <--, 3.23 <--  clinically hypervolemic  no e/o obstructive uropathy on CT  no acute s/s uremia    urinary retention - c/w straight cath bladder scan for vol q6h w/straight cath w/urine 350ml and 400ml drained  consider indwelling river  c/w diuresis with iv lasix 40mg ivp bid  no indication for HD at this time  - I/o's, daily weights, daily BMP  no BMP today, ordered stat  -avoid NSAIDs/ACEi/ARB/nephrotoxics/iv contrast  low Na in diet, fluid restriction 1L/day   dose all meds for eGFR<15ml/min    Acute diastolic congestive heart failure  - Coreg on hold  - Aggressive diuresis with IV Lasix   - TTE  - cardiology eval  Type 2 diabetes mellitus. - insulin sliding scale, Lantus 10 units qhs. Mx per team  Hypertension. bp low, asympt. watch closely  afib- c/w amiostephyquis    will closely follow up.   poc d/w pt  will closely follow up.   no new labs, chart reviewed  For any question, pl call:  Nephrology  Cell -[phone]  Office [phone]  Ans Serv [phone]     89 y/o M PMH of CAD s/p PCI (ROBERTO to D1 6/2/14), chronic AF (on AC w/ eliquis), tachy/perla syndrome s/p biV PPM placement, CVA x 2, HFpEF, hospitalized in October 2021 for CHF exacerbation/overload presents to ED BIB son from home for decreased PO intake x2 weeks associated with constipation and abdominal pain, generalized weakness worsening to point where he is no longer ambulating and now w/ b/l LE edema, SOB and orthopnea. Renal consulted for for FLOYD/CKD Mx.     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  FLOYD likely cardiorenal  b/l Cr 1.87 12/17/21, 1 mth ago was 2.5  Creatinine Trend: 3.63 <--, 3.32 <--, 3.23 <--  clinically hypervolemic  no e/o obstructive uropathy on CT  no acute s/s uremia    urinary retention - c/w straight cath bladder scan for vol q6h w/straight cath w/urine 350ml and 400ml drained  consider indwelling river  agree with changing iv lasix to po 40mg ivp bid  no indication for HD at this time  - I/o's, daily weights, daily BMP  no BMP today, ordered stat  -avoid NSAIDs/ACEi/ARB/nephrotoxics/iv contrast  low Na in diet, fluid restriction 1L/day   dose all meds for eGFR<15ml/min    Acute diastolic congestive heart failure  - Coreg on hold  - Aggressive diuresis with IV Lasix   - TTE  - cardiology eval  Type 2 diabetes mellitus. - insulin sliding scale, Lantus 10 units qhs. Mx per team  Hypertension. bp low, asympt. watch closely  afib- c/w amio, eliquis    will closely follow up.   poc d/w pt  will closely follow up.   no new labs, chart reviewed  For any question, pl call:  Nephrology  Cell -[phone]  Office [phone]  Ans Serv [phone]

## 2022-01-09 NOTE — PROGRESS NOTE ADULT - ASSESSMENT
89 y/o M PMH of CAD s/p PCI (ROBERTO to D1 6/2/14), chronic AF (on AC w/ eliquis), tachy/perla syndrome s/p biV PPM placement, CVA x 2, HFpEF, hospitalized in October 2021 for CHF exacerbation/overload presents to ED BIB son from home for decreased PO intake x2 weeks associated with constipation and abdominal pain, generalized weakness worsening to point where he is no longer ambulating and now w/ b/l LE edema and orthopnea. Denies f/c, cough, congestion.    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  - FLOYD likely cardiorenal  - b/l Cr 1.87 12/17/21, 1 mth ago was 2.5  - Creatinine Trend: 3.63 <--, 3.32 <--, 3.23 <--  - K, bicarb- acceptable  - clinically hypervolemic  - no e/o obstructive uropathy on CT  - no acute s/s uremia    anemia  - likely 2/2 ckd  - check iron studies    diabetes beyeyr controlled  - fs qid  - hgb a1c  - insulin sliding scale  - Lantus 10 units qhs    unstageable pressure ulcer present on admission  - optimize nutrition  - off loading  - T&P q 2 hours  - wound care consult    afib  - c/w amio  - c/w eliquis    constipation  - senna and miralax  - dulcolax supp x 1

## 2022-01-09 NOTE — DIETITIAN INITIAL EVALUATION ADULT. - ADD RECOMMEND
Add glucerna x2 daily Add glucerna x2 daily; assist with totaal feeding; add MVI with minerals; monitor/encourage PO intake.

## 2022-01-09 NOTE — DIETITIAN INITIAL EVALUATION ADULT. - REASON INDICATOR FOR ASSESSMENT
Subjective:       Patient ID: Alicia Joshi is a 22 y.o. male.    Chief Complaint: Establish Care    Vitals:    12/22/17 1138   BP: 136/78   Pulse: 99   Temp: 96.1 °F (35.6 °C)       Establish Care:       Pt is a 22 year old who is here too establish care.       Review of Systems   Constitutional: Negative.    HENT: Negative.    Respiratory: Negative.    Cardiovascular: Negative.    Gastrointestinal: Negative.    Skin: Negative.    Neurological: Negative.    Psychiatric/Behavioral: Negative.        Objective:      Physical Exam   Constitutional: He is oriented to person, place, and time. He appears well-developed and well-nourished.   HENT:   Head: Normocephalic.   Eyes: EOM are normal. Pupils are equal, round, and reactive to light.   Neck: Normal range of motion. Neck supple. No JVD present. No thyromegaly present.   Cardiovascular: Normal rate and regular rhythm.    Pulmonary/Chest: Effort normal and breath sounds normal.   Abdominal: Soft. Bowel sounds are normal.   Musculoskeletal: Normal range of motion.   Lymphadenopathy:     He has no cervical adenopathy.   Neurological: He is alert and oriented to person, place, and time. He has normal reflexes.   Skin: Skin is warm and dry.   Psychiatric: He has a normal mood and affect. His behavior is normal.       Assessment:       1. Annual physical exam    2. Obesity (BMI 35.0-39.9 without comorbidity)        Plan:       Annual physical exam  Comments:  Pt is a 22 year old who is here too establish care. Pt BMI is elevated but over all in good health and may not at all reflect health status  Orders:  -     CBC auto differential; Future; Expected date: 12/22/2017  -     Comprehensive metabolic panel; Future; Expected date: 12/22/2017  -     Lipid panel; Future; Expected date: 12/22/2017    Obesity (BMI 35.0-39.9 without comorbidity)  Comments:  Pt has higher muscle tone. Raw weight is not reflective of this pt over all health.         
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

## 2022-01-09 NOTE — DIETITIAN INITIAL EVALUATION ADULT. - PERTINENT MEDS FT
MEDICATIONS  (STANDING):  aMIOdarone    Tablet 400 milliGRAM(s) Oral daily  apixaban 2.5 milliGRAM(s) Oral two times a day  aspirin enteric coated 81 milliGRAM(s) Oral daily  atorvastatin 40 milliGRAM(s) Oral at bedtime  dextrose 40% Gel 15 Gram(s) Oral once  dextrose 5%. 1000 milliLiter(s) (50 mL/Hr) IV Continuous <Continuous>  dextrose 5%. 1000 milliLiter(s) (100 mL/Hr) IV Continuous <Continuous>  dextrose 50% Injectable 25 Gram(s) IV Push once  dextrose 50% Injectable 12.5 Gram(s) IV Push once  dextrose 50% Injectable 25 Gram(s) IV Push once  furosemide   Injectable 40 milliGRAM(s) IV Push two times a day  glucagon  Injectable 1 milliGRAM(s) IntraMuscular once  influenza  Vaccine (HIGH DOSE) 0.7 milliLiter(s) IntraMuscular once  insulin glargine Injectable (LANTUS) 10 Unit(s) SubCutaneous at bedtime  insulin lispro (ADMELOG) corrective regimen sliding scale   SubCutaneous three times a day before meals  insulin lispro (ADMELOG) corrective regimen sliding scale   SubCutaneous at bedtime  ketotifen 0.025% Ophthalmic Solution 1 Drop(s) Right EYE two times a day  pantoprazole    Tablet 40 milliGRAM(s) Oral before breakfast  polyethylene glycol 3350 17 Gram(s) Oral daily  senna 2 Tablet(s) Oral at bedtime

## 2022-01-09 NOTE — DIETITIAN NUTRITION RISK NOTIFICATION - MUSCLE MASS (LOSS OF MUSCLE)
Patient position prone. Patient prepped and draped per unit standard.    Safety straps applied: NA  
Procedure start  
Temples.../Clavicles.../Shoulders.../Calf.../Dorsal hand...

## 2022-01-09 NOTE — PROGRESS NOTE ADULT - SUBJECTIVE AND OBJECTIVE BOX
Nephrology  Patient: Toño Mccartney Date:10/30/2019   : 1957 Attending: Peterson Reza MD   62 year old male        Chief complaint: Chronic kidney disease - Stage III and Fluid overload    HPI from initial consult:   This is a 62 year old male with a past medical history significant for DM, HTN, morbid obesity who presented to the ER 10/27 with Sob and urinary catheter pain x 1 week. Patient was recently seen by his PCP who referred him to Urology at [facility]. A bladder scan revealed urinary retention. A issa catheter was placed and 0.4mg flomax given. Per patient, the catheter was scheduled to be removed Wednesday. He notes increased penile pain and bloody discharge around catheter.     Nephrology Consult:   Nephrology was consulted for the evaluation and management of the patient's chronic kidney disease stage III and hypervolemia. The patient follows with Dr Villanueva outpatient. Last office visit 10/21. Baseline Cr high 1's low 2's. CKD III d/t diabetic nephropathy + microalbuminuria in the past 867 mg/g by U P/C ratio - losartan on hold while actively diuresing.  Patient on lasix 80 BID and receiving outpatient Bumex infusions 3x/week, with last infusion on Saturday. Echo 19 with EF 25%.  Wedge 33 at a wt 330 lbs 150 kg. Admit wt 147.7 kg. Patient placed on milrinone gtt and lasix gtt @ 5 mg/hr. CXR 10/27 with mild pulmonary congestion.     Hospital Course:  10/29: Up in chair, feels breathing improved. UOP 900ml, wt unchanged.   10/30: Pt states breathing is about the same. UOP 2.35L, wt up.     Past Medical History:   Diagnosis Date   • Arthritis    • Bronchitis    • Chronic kidney disease    • Chronic pain    • Congestive cardiac failure (CMS/HCC)    • Essential hypertension, benign    • Gastroesophageal reflux disease    • High cholesterol    • Myocardial infarction (CMS/HCC)    • Obesity, unspecified    • Personal history of colonic polyps 3/16/2007    dr light, hyperplastic, f/u 8-10 years    • Pneumonia    • Proteinuria 2008   • RAD (reactive airway disease)    • Sinusitis, chronic    • Type II or unspecified type diabetes mellitus without mention of complication, not stated as uncontrolled    • Unspecified pulmonary tuberculosis, confirmation unspecified     Tuberculosis, was on INH for a year   • Unspecified sleep apnea    • Urinary tract infection        Past Surgical History:   Procedure Laterality Date   • Cardiac catherization     • Cardiac surgery  2019    quadruple bypass   • Colonoscopy w biopsy  07    hyperplastic x1,rpt 8-10 yrs, Dr. Pinzon   • Eye surgery     • Knee scope,diagnostic  03    lt knee arthroscopy   • Shoulder surg proc unlisted  04    Lt shoulder manipulation under anesthesia & Lt shoulder intrarticular cortisone injection - [place]   • Upper arm/elbow surgery unlisted      Unspecified upper arm/elbow procedure, cyst removed from R elbow   • Vascular surgery Right 2016    right leg vascular surgery: on Plavix        Social History     Socioeconomic History   • Marital status: /Civil Union     Spouse name: Not on file   • Number of children: 2   • Years of education: Not on file   • Highest education level: Not on file   Occupational History   • Occupation: environmental services     Employer: [employer]   Social Needs   • Financial resource strain: Not on file   • Food insecurity:     Worry: Not on file     Inability: Not on file   • Transportation needs:     Medical: Not on file     Non-medical: Not on file   Tobacco Use   • Smoking status: Former Smoker     Packs/day: 0.00     Last attempt to quit: 1990     Years since quittin.8   • Smokeless tobacco: Never Used   Substance and Sexual Activity   • Alcohol use: No     Frequency: Never     Drinks per session: 1 or 2     Binge frequency: Never   • Drug use: No   • Sexual activity: Yes     Partners: Female   Lifestyle   • Physical activity:     Days per week: Not on  [facility] - S López / Aroldo S /D Jose/ S Amina/ S Luis/ Adrian Vega / LUBA Alvau/ O Alejandra  service -[phone], office [phone]  ---------------------------------------------------------------------------------------------------------------    Patient seen and examined bedside    Subjective and Objective: No overnight events, sob resolved. No complaints today. feeling better    Allergies: No Known Allergies      Hospital Medications:   MEDICATIONS  (STANDING):  aMIOdarone    Tablet 400 milliGRAM(s) Oral daily  apixaban 2.5 milliGRAM(s) Oral two times a day  aspirin enteric coated 81 milliGRAM(s) Oral daily  atorvastatin 40 milliGRAM(s) Oral at bedtime  dextrose 40% Gel 15 Gram(s) Oral once  dextrose 5%. 1000 milliLiter(s) (50 mL/Hr) IV Continuous <Continuous>  dextrose 5%. 1000 milliLiter(s) (100 mL/Hr) IV Continuous <Continuous>  dextrose 50% Injectable 25 Gram(s) IV Push once  dextrose 50% Injectable 12.5 Gram(s) IV Push once  dextrose 50% Injectable 25 Gram(s) IV Push once  furosemide    Tablet 40 milliGRAM(s) Oral two times a day  glucagon  Injectable 1 milliGRAM(s) IntraMuscular once  influenza  Vaccine (HIGH DOSE) 0.7 milliLiter(s) IntraMuscular once  insulin glargine Injectable (LANTUS) 10 Unit(s) SubCutaneous at bedtime  insulin lispro (ADMELOG) corrective regimen sliding scale   SubCutaneous three times a day before meals  insulin lispro (ADMELOG) corrective regimen sliding scale   SubCutaneous at bedtime  ketotifen 0.025% Ophthalmic Solution 1 Drop(s) Right EYE two times a day  pantoprazole    Tablet 40 milliGRAM(s) Oral before breakfast  polyethylene glycol 3350 17 Gram(s) Oral daily  senna 2 Tablet(s) Oral at bedtime      REVIEW OF SYSTEMS:  CONSTITUTIONAL: No weakness, fevers or chills  EYES/ENT: No visual changes;  No vertigo or throat pain   NECK: No pain or stiffness  RESPIRATORY: No cough, wheezing, hemoptysis; No shortness of breath  CARDIOVASCULAR: No chest pain or palpitations.  GASTROINTESTINAL: No abdominal or epigastric pain. No nausea, vomiting, or hematemesis; No diarrhea or constipation. No melena or hematochezia.  GENITOURINARY: No dysuria, frequency, foamy urine, urinary urgency, incontinence or hematuria  NEUROLOGICAL: No numbness or weakness  SKIN: No itching, burning, rashes, or lesions   VASCULAR: No bilateral lower extremity edema.   All other review of systems is negative unless indicated above.    VITALS:  T(F): 97.7 (22 @ 13:17), Max: 97.7 (22 @ 13:17)  HR: 61 (22 @ 13:17)  BP: 100/62 (22 @ 13:17)  RR: 18 (22 @ 13:17)  SpO2: 94% (22 @ 13:17)  Wt(kg): --     @ 07:  -   @ 07:00  --------------------------------------------------------  IN: 180 mL / OUT: 750 mL / NET: -570 mL      PHYSICAL EXAM:  Constitutional: NAD  HEENT: anicteric sclera  Neck: No JVD  Respiratory: bibasilar crepts+   Cardiovascular: S1, S2, RRR  Gastrointestinal: BS+, soft, NT/ND  Extremities: 1+ peripheral edema b/l  Neurological: A/O x 3, no focal deficits  Psychiatric: Normal mood, normal affect  : No indwelling river.     LABS:      132<L>  |  91<L>  |  121<H>  ----------------------------<  298<H>  4.5   |  27  |  3.63<H>    Ca    8.4      2022 07:31  Phos  3.8     -  Mg     3.0     -08    TPro  5.4<L>  /  Alb  2.3<L>  /  TBili  1.6<H>  /  DBili  1.0<H>  /  AST  188<H>  /  ALT  167<H>  /  AlkPhos  191<H>  -08    Creatinine Trend: 3.63 <--, 3.32 <--, 3.23 <--                        7.4    7.24  )-----------( 188      ( 2022 07:15 )             22.6     Urine Studies:  Urinalysis Basic - ( 2022 07:00 )    Color: Yellow / Appearance: Slightly Turbid / S.016 / pH:   Gluc:  / Ketone: Negative  / Bili: Negative / Urobili: Negative   Blood:  / Protein: 30 mg/dL / Nitrite: Negative   Leuk Esterase: Negative / RBC: 2 /hpf / WBC 6 /HPF   Sq Epi:  / Non Sq Epi: 13 /hpf / Bacteria: Negative          RADIOLOGY & ADDITIONAL STUDIES:   file     Minutes per session: Not on file   • Stress: Not on file   Relationships   • Social connections:     Talks on phone: Not on file     Gets together: Not on file     Attends [employer] service: Not on file     Active member of club or organization: Not on file     Attends meetings of clubs or organizations: Not on file     Relationship status: Not on file   • Intimate partner violence:     Fear of current or ex partner: Not on file     Emotionally abused: Not on file     Physically abused: Not on file     Forced sexual activity: Not on file   Other Topics Concern   •  Service No   • Blood Transfusions No   • Caffeine Concern Not Asked   • Occupational Exposure Not Asked   • Hobby Hazards Not Asked   • Sleep Concern Not Asked   • Stress Concern Not Asked   • Weight Concern Not Asked   • Special Diet Yes     Comment: low cholesterol, diabetic   • Back Care Not Asked   • Exercise Yes     Comment: just work   • Bike Helmet Not Asked   • Seat Belt Yes   • Self-Exams Not Asked   Social History Narrative   • Not on file       Family History   Problem Relation Age of Onset   • Diabetes Sister    • Diabetes Brother    • Heart Mother         MI   • Kidney disease Mother    • Heart Brother         2 with bypass   • Hypertension Sister    • Diabetes Son    • Cancer Brother         prostate        ALLERGIES:   Allergen Reactions   • Dye [Contrast Media] NAUSEA         Review of Systems:  All pertinent positive and negative ROS reviewed in HPI and hospital course.     Vital Last Value 24 Hour Range   Temperature 97.5 °F (36.4 °C) Temp  Min: 97.5 °F (36.4 °C)  Max: 98.4 °F (36.9 °C)   Pulse 108 Pulse  Min: 100  Max: 108   Respiratory 18 Resp  Min: 18  Max: 18   Blood Pressure 153/78 BP  Min: 142/73  Max: 153/78   Art BP   No data recorded   Pulse Oximetry 94 % SpO2  Min: 94 %  Max: 95 %     Vital Today Admitted   Weight (!) 148.3 kg Weight: (!) 152.7 kg   Height N/A Height: 5' 11\" (180.3 cm)   BMI N/A BMI  (Calculated): 45.41     Weight over the past 48 Hours:  Patient Vitals for the past 48 hrs:   Weight   10/29/19 0500 (!) 147.2 kg   10/29/19 0605 (!) 147.2 kg   10/30/19 0600 (!) 148.3 kg        Hemodynamics:      Last Value 24 Hour Range   CVP   No data recorded   PAS/PAD   No data recorded   PCWP   No data recorded   CO   No data recorded   CI   No data recorded   SVR   No data recorded   SV02   No data recorded     Intake/Output:    Last Stool Occurrence: 1 (10/27/19 2635)    I/O this shift:  In: 300 [P.O.:300]  Out: 350 [Urine:350]    I/O last 3 completed shifts:  In: 1620 [P.O.:995; I.V.:323; IV Piggyback:302]  Out: 2350 [Urine:2350]      Intake/Output Summary (Last 24 hours) at 10/30/2019 0955  Last data filed at 10/30/2019 0851  Gross per 24 hour   Intake 1720 ml   Output 1975 ml   Net -255 ml       Medications/Infusions:  Medications Prior to Admission   Medication Sig Dispense Refill   • tamsulosin (FLOMAX) 0.4 MG Cap Take 0.4 mg by mouth at bedtime.     • furosemide (LASIX) 80 MG tablet 120mg (1.5 tablets) by mouth twice daily for 5 days followed by 80mg (1 tablet) by mouth twice daily thereafter (Patient taking differently: as directed. 120mg (1.5 tablets) by mouth twice daily for 5 days followed by 80mg (1 tablet) by mouth twice daily thereafter) 180 tablet 3   • insulin detemir (LEVEMIR) 100 UNIT/ML injectable solution Inject 50 Units into the skin daily. (Patient taking differently: Inject 30 Units into the skin 2 times daily. ) 30 mL 1   • metoPROLOL tartrate (LOPRESSOR) 25 MG tablet Take 1 tablet by mouth every 12 hours. 60 tablet 0   • potassium CHLORIDE (KLOR-CON M) 20 MEQ david ER tablet Take 4 tablets by mouth daily (with breakfast). Do not start before August 9, 2019. (Patient taking differently: Take 80 mEq by mouth 2 times daily. Dose: 4 tablets (=80MEQ)) 120 tablet 0   • AMIODarone (PACERONE,CORDARONE) 200 MG tablet Take 1 tablet by mouth daily. 90 tablet 1   • albuterol 108 (90 Base) MCG/ACT  [facility] - S López / Aroldo S /D Jose/ S Amnia/ S Luis/ Adrian Vega / LUBA Alvau/ O Alejandra  service -[phone], office [phone]  ---------------------------------------------------------------------------------------------------------------    Patient seen and examined bedside    Subjective and Objective: No overnight events, sob better. No new complaints today.    Allergies: No Known Allergies      Hospital Medications:   MEDICATIONS  (STANDING):  aMIOdarone    Tablet 400 milliGRAM(s) Oral daily  apixaban 2.5 milliGRAM(s) Oral two times a day  aspirin enteric coated 81 milliGRAM(s) Oral daily  atorvastatin 40 milliGRAM(s) Oral at bedtime  dextrose 40% Gel 15 Gram(s) Oral once  dextrose 5%. 1000 milliLiter(s) (50 mL/Hr) IV Continuous <Continuous>  dextrose 5%. 1000 milliLiter(s) (100 mL/Hr) IV Continuous <Continuous>  dextrose 50% Injectable 25 Gram(s) IV Push once  dextrose 50% Injectable 12.5 Gram(s) IV Push once  dextrose 50% Injectable 25 Gram(s) IV Push once  furosemide    Tablet 40 milliGRAM(s) Oral two times a day  glucagon  Injectable 1 milliGRAM(s) IntraMuscular once  influenza  Vaccine (HIGH DOSE) 0.7 milliLiter(s) IntraMuscular once  insulin glargine Injectable (LANTUS) 10 Unit(s) SubCutaneous at bedtime  insulin lispro (ADMELOG) corrective regimen sliding scale   SubCutaneous three times a day before meals  insulin lispro (ADMELOG) corrective regimen sliding scale   SubCutaneous at bedtime  ketotifen 0.025% Ophthalmic Solution 1 Drop(s) Right EYE two times a day  pantoprazole    Tablet 40 milliGRAM(s) Oral before breakfast  polyethylene glycol 3350 17 Gram(s) Oral daily  senna 2 Tablet(s) Oral at bedtime    VITALS:  T(F): 97.7 (22 @ 13:17), Max: 97.7 (22 @ 13:17)  HR: 61 (22 @ 13:17)  BP: 100/62 (22 @ 13:17)  RR: 18 (22 @ 13:17)  SpO2: 94% (22 @ 13:17)  Wt(kg): --     @ 07:01  -   07:00  --------------------------------------------------------  IN: 180 mL / OUT: 750 mL / NET: -570 mL      PHYSICAL EXAM:  Constitutional: NAD  HEENT: anicteric sclera  Neck: No JVD  Respiratory: bibasilar crepts+   Cardiovascular: S1, S2, RRR  Gastrointestinal: BS+, soft, NT/ND  Extremities: 1+ peripheral edema b/l  Neurological: A/O x 3, no focal deficits  Psychiatric: Normal mood, normal affect  : No indwelling river.     LABS:      132<L>  |  91<L>  |  121<H>  ----------------------------<  298<H>  4.5   |  27  |  3.63<H>    Ca    8.4      2022 07:31  Phos  3.8       Mg     3.0         TPro  5.4<L>  /  Alb  2.3<L>  /  TBili  1.6<H>  /  DBili  1.0<H>  /  AST  188<H>  /  ALT  167<H>  /  AlkPhos  191<H>      Creatinine Trend: 3.63 <--, 3.32 <--, 3.23 <--                        7.4    7.24  )-----------( 188      ( 2022 07:15 )             22.6     Urine Studies:  Urinalysis Basic - ( 2022 07:00 )    Color: Yellow / Appearance: Slightly Turbid / S.016 / pH:   Gluc:  / Ketone: Negative  / Bili: Negative / Urobili: Negative   Blood:  / Protein: 30 mg/dL / Nitrite: Negative   Leuk Esterase: Negative / RBC: 2 /hpf / WBC 6 /HPF   Sq Epi:  / Non Sq Epi: 13 /hpf / Bacteria: Negative          RADIOLOGY & ADDITIONAL STUDIES:   inhaler Inhale 2 puffs into the lungs every 4 hours as needed for Wheezing. 8.5 g 0   • allopurinol (ZYLOPRIM) 100 MG tablet Take 100 mg by mouth 2 times daily.      • ergocalciferol (DRISDOL) 25908 units capsule Take 1 capsule by mouth 1 day a week. (Patient taking differently: Take 50,000 Units by mouth 1 day a week. On Tuesdays) 12 capsule 6   • famotidine (PEPCID) 20 MG tablet Take 1 tablet by mouth daily. 30 tablet 0   • atorvastatin (LIPITOR) 80 MG tablet Take 1 tablet by mouth nightly. 30 tablet 2   • insulin aspart (NOVOLOG) 100 UNIT/ML sliding scale injection Inject 30 Units into the skin 3 times daily (before meals). 30 mL 1   • aspirin 81 MG tablet Take 81 mg by mouth daily.     • clopidogrel (PLAVIX) 75 MG tablet Take 75 mg by mouth daily.     • dulaglutide (TRULICITY) 0.75 MG/0.5ML pen-injector Inject 0.75 mg into the skin 1 day a week. On Sundays       • polyethylene glycol  17 g Oral Daily   • insulin lispro   Subcutaneous TID WC   • allopurinol  100 mg Oral BID   • AMIODarone  200 mg Oral Daily   • aspirin  81 mg Oral Daily   • atorvastatin  80 mg Oral Nightly   • clopidogrel  75 mg Oral Daily   • famotidine  20 mg Oral Daily   • insulin glargine  50 Units Subcutaneous Daily   • sodium chloride (PF)  2 mL Intracatheter 2 times per day   • heparin (porcine)  7,500 Units Subcutaneous 3 times per day     • dextrose 5 % infusion     • milrinone (PRIMACOR) 20 mg/100 mL in dextrose 5 % infusion premix 0.2 mcg/kg/min (10/30/19 6928)   • furosemide (LASIX) 250 mg/125 mL in sodium chloride 0.9 % infusion 10 mg/hr (10/30/19 2870)       Physical Exam:    General: Alert, no acute distress  HEENT: Head atraumatic, normocephalic.  Moist oral mucus membranes.  Sclera non-icteric.   Neck: Supple, no JVD.  Trachea midline.  Chest/Lungs: Normal effort.  Symmetrical expansion.  Clear to auscultation.  No wheezes, rales or rhonchi.  CVS: Regular rate and rhythm. S1,S2 well heard. There is no S3 or S4 gallop. Has no  pericardial rub heard.  Abdomen: BS well heard. Soft, non tender, non distended.  No hepatosplenomegaly.  Neuro: No focal neurological deficit.  A&O.   Skin: Warm, dry.  No rashes.   Extremities: Pulses intact and symmetric. No clubbing or cyanosis. Bilateral 3+ pitting LE edema.      Laboratory Results:  Recent Labs   Lab 10/30/19  0452 10/29/19  1820 10/29/19  1224 10/29/19  0223  10/28/19  0443 10/27/19  1612   SODIUM 138  --   --  138  --  140 138   POTASSIUM 3.7 3.7 3.4 3.8   < > 3.7 3.9   CHLORIDE 101  --   --  102  --  102 101   CO2 30  --   --  31  --  30 29   ANIONGAP 11  --   --  9*  --  12 12   BUN 38*  --   --  44*  --  40* 37*   CREATININE 1.46*  --   --  1.59*  --  1.54* 1.57*   GFRNA 51  --   --  46  --  48 47   GFRA 59  --   --  53  --  55 54   GLUCOSE 138*  --   --  192*  --  123* 274*   CALCIUM 8.9  --   --  8.6  --  8.9 8.6   ALBUMIN 3.1*  --   --   --   --   --  3.0*   MG 2.1  --   --  2.2  --  2.2 2.0   AST 22  --   --   --   --   --  45*   GPT 66*  --   --   --   --   --  101*   ALKPT 100  --   --   --   --   --  120*   BILIRUBIN 1.1*  --   --   --   --   --  0.8   LIPA  --   --   --   --   --   --  94    < > = values in this interval not displayed.       Recent Labs   Lab 10/30/19  0452 10/29/19  0223 10/27/19  1612   WBC 10.5 9.2 8.6   HGB 12.0* 11.8* 11.9*   HCT 37.8* 37.0* 37.3*    217 231       No results found    No results found    Urine Panel  Recent Labs   Lab 10/29/19  1053   USPG 1.011   UPH 5.0   UKET NEGATIVE   UPROT TRACE*   UGLU NEGATIVE   UBILI NEGATIVE   UROB 1.0   UWBC SMALL*   UBACTR NONE SEEN   UNITR NEGATIVE   URBC SMALL*       Imaging/Procedures:  CXR 10/28/19: FINDINGS:  Status post sternotomy and valvoplasty and myocardial revascularization. The superiormost sternotomy wire is again broken. The cardiac silhouette is moderately enlarged and unchanged. Mild pulmonary venous congestion is increased from 9/17/2019. Unchanged linear opacity in the left lower lung may  represent atelectasis or scarring. No focal  consolidation. No pleural effusion. No pneumothorax.     IMPRESSION:  Mild pulmonary venous congestion is increased from 9/17/2019. Moderate cardiomegaly.    Impression, Plan & Recommendations:  -Chronic Kidney Disease Stage III  · CKD d/t diabetic nephropathy + microalbuminuria in the past 867 mg/g by U P/C ratio   · Hx of AKIs and baseline Cr now high 1's low 2's. - currently at baseline   · UA with proteinuria and hematuria   · Normal Renal US 2/21/2019  · Sequela of CKD 8/12/19: secondary hyperparathyroidism: iPTH (200) improved with correcting vit D, vit D and phos good   -Acute on chronic heart failure   · PTA: lasix 80 BID and receiving outpatient Bumex infusions 3x/week, with last infusion 10/26.   · Echo 9/18/19 with EF 25%.    · CXR 10/27 with mild pulmonary congestion.  · Wedge 33 at wt 150 kg. Admit wt 147.7 kg.  · Monitor UOP and weight  · EP on consult: plan for BiV ICD when patient euvolemic   · Discussed with Pt that wt has not improved with outpatient Bumex infusions and there may be a need to start HD for volume control pending current diuresis   · Patient placed on milrinone gtt and lasix gtt @ 5 mg/hr --> rate increased to 10 mg/hr 10/28  · Metolazone 5 mg BID added 10/30  -Urinary Rentention   · Catheter placed and Flomax started by urology at [facility] d/t urinary retention with plan for removal Wednesday  · Urology consulted - pt voiding well   -UTI: per urology abx once final cx results   -DM Type II - last A1c 7.3 on 7/20/19      I was present for the ROS and Physical exam, which was performed by Dr. Chakraborty.   Discussed assessment and plan.    Shiela Lucas PA-C     I have personally seen and examined the patient, formulated the assessment and plan, reviewed above and agree with the above note.    Deon Chakraborty M.D.

## 2022-01-09 NOTE — DIETITIAN INITIAL EVALUATION ADULT. - FEEDING SKILL
patient with increased weakness over past 2 weeks. requiring total feeding assistance/total assistance

## 2022-01-09 NOTE — PROGRESS NOTE ADULT - SUBJECTIVE AND OBJECTIVE BOX
DATE OF SERVICE: 22 @ 13:53    Patient is a 89y old  Male who presents with a chief complaint of     SUBJECTIVE / OVERNIGHT EVENTS:  sob has improved.  pt c/o constipation    MEDICATIONS  (STANDING):  aMIOdarone    Tablet 400 milliGRAM(s) Oral daily  apixaban 2.5 milliGRAM(s) Oral two times a day  aspirin enteric coated 81 milliGRAM(s) Oral daily  atorvastatin 40 milliGRAM(s) Oral at bedtime  dextrose 40% Gel 15 Gram(s) Oral once  dextrose 5%. 1000 milliLiter(s) (50 mL/Hr) IV Continuous <Continuous>  dextrose 5%. 1000 milliLiter(s) (100 mL/Hr) IV Continuous <Continuous>  dextrose 50% Injectable 25 Gram(s) IV Push once  dextrose 50% Injectable 12.5 Gram(s) IV Push once  dextrose 50% Injectable 25 Gram(s) IV Push once  furosemide   Injectable 40 milliGRAM(s) IV Push two times a day  glucagon  Injectable 1 milliGRAM(s) IntraMuscular once  influenza  Vaccine (HIGH DOSE) 0.7 milliLiter(s) IntraMuscular once  insulin glargine Injectable (LANTUS) 10 Unit(s) SubCutaneous at bedtime  insulin lispro (ADMELOG) corrective regimen sliding scale   SubCutaneous three times a day before meals  insulin lispro (ADMELOG) corrective regimen sliding scale   SubCutaneous at bedtime  ketotifen 0.025% Ophthalmic Solution 1 Drop(s) Right EYE two times a day  pantoprazole    Tablet 40 milliGRAM(s) Oral before breakfast  polyethylene glycol 3350 17 Gram(s) Oral daily  senna 2 Tablet(s) Oral at bedtime    MEDICATIONS  (PRN):      Vital Signs Last 24 Hrs  T(C): 36.5 (2022 13:17), Max: 36.5 (2022 13:17)  T(F): 97.7 (2022 13:17), Max: 97.7 (2022 13:17)  HR: 61 (2022 13:17) (60 - 75)  BP: 100/62 (2022 13:17) (100/62 - 105/60)  BP(mean): --  RR: 18 (2022 13:17) (18 - 18)  SpO2: 94% (2022 13:17) (94% - 97%)  CAPILLARY BLOOD GLUCOSE      POCT Blood Glucose.: 163 mg/dL (2022 12:56)  POCT Blood Glucose.: 133 mg/dL (2022 09:16)  POCT Blood Glucose.: 304 mg/dL (2022 21:27)  POCT Blood Glucose.: 336 mg/dL (2022 17:19)    I&O's Summary    2022 07:01  -  2022 07:00  --------------------------------------------------------  IN: 180 mL / OUT: 750 mL / NET: -570 mL        PHYSICAL EXAM:  GENERAL: NAD, well-developed  HEAD:  Atraumatic, Normocephalic  EYES: EOMI, PERRLA, conjunctiva and sclera clear  NECK: Supple, No JVD  CHEST/LUNG: Clear to auscultation bilaterally; No wheeze  HEART: Regular rate and rhythm; No murmurs, rubs, or gallops  ABDOMEN: Soft, Nontender, Nondistended; Bowel sounds present  EXTREMITIES:  2+ Peripheral Pulses, No clubbing, cyanosis, or edema  PSYCH: AAOx3  NEUROLOGY: non-focal  SKIN: large unstageable sacral pressure ulcer    LABS:                        7.4    7.24  )-----------( 188      ( 2022 07:15 )             22.6     01-08    132<L>  |  91<L>  |  121<H>  ----------------------------<  298<H>  4.5   |  27  |  3.63<H>    Ca    8.4      2022 07:31  Phos  3.8     -08  Mg     3.0     -08    TPro  5.4<L>  /  Alb  2.3<L>  /  TBili  1.6<H>  /  DBili  1.0<H>  /  AST  188<H>  /  ALT  167<H>  /  AlkPhos  191<H>  -08    PT/INR - ( 2022 07:15 )   PT: 56.4 sec;   INR: 5.08 ratio         PTT - ( 2022 07:15 )  PTT:31.3 sec  lLactate, Blood (22 @ 07:21)   Lactate, Blood: 2.0 mmol/L     Urinalysis Basic - ( 2022 07:00 )    Color: Yellow / Appearance: Slightly Turbid / S.016 / pH: x  Gluc: x / Ketone: Negative  / Bili: Negative / Urobili: Negative   Blood: x / Protein: 30 mg/dL / Nitrite: Negative   Leuk Esterase: Negative / RBC: 2 /hpf / WBC 6 /HPF   Sq Epi: x / Non Sq Epi: 13 /hpf / Bacteria: Negative  < from: Transthoracic Echocardiogram (22 @ 11:27) >  Observations:  Mitral Valve: Mitral annular calcification, otherwise  normal mitral valve. Mild mitral regurgitation.  Aortic Valve/Aorta: Calcified trileaflet aortic valve with  normal opening.  Normal aortic root size. (Ao: 3.2 cm at the sinuses of  Valsalva).  Left Atrium: Normal left atrium.  LA volume index = 23  cc/m2.  Left Ventricle: Normal left ventricular systolic function.  No segmental wall motion abnormalities. Normal left  ventricular internal dimensions and wall thicknesses.  Right Heart: Normal right atrium. Normal right ventricular  size and function.  A device wire is noted in the right  heart. Normal tricuspid valve. Moderate tricuspid  regurgitation. Pulmonic valve not well visualized, probably  normal.  Pericardium/Pleura: Normal pericardium with no pericardial  effusion.  Bilateral pleural effusions.  Hemodynamic: Estimated right atrial pressure is 8 mm Hg.  Estimated right ventricular systolic pressure equals 50 mm  Hg, assuming right atrial pressure equals 8 mm Hg,  consistent with moderate pulmonary hypertension.  ------------------------------------------------------------------------  Conclusions:  1. Mitral annular calcification, otherwise normal mitral  valve. Mild mitral regurgitation.  2. Normal left ventricular internal dimensions and wall  thicknesses.  3. Normal left ventricular systolic function. No segmental  wall motion abnormalities.  4. Normal right ventricular size and function.  A device  wire is noted in the right heart.  5. Estimated right ventricular systolic pressure equals 50  mm Hg, assuming right atrial pressure equals 8 mm Hg,  consistent with moderate pulmonary hypertension.  6. Bilateral pleural effusions.  ------------------------------------------------------------------------    < end of copied text >        RADIOLOGY & ADDITIONAL TESTS:    Imaging Personally Reviewed:    Consultant(s) Notes Reviewed:      Care Discussed with Consultants/Other Providers:

## 2022-01-09 NOTE — DIETITIAN INITIAL EVALUATION ADULT. - PHYSICAL ASSESSMENT SHOULDERS
· Now resolved, continue with ceftriaxone  · Infectious disease consulted, see recommendations  · Blood cultures and urine cultures in process, awaiting speciation  · Continue supportive care moderate severe

## 2022-01-09 NOTE — DIETITIAN INITIAL EVALUATION ADULT. - ORAL INTAKE PTA/DIET HISTORY
Patient lives with his son Gurpreet who prepares his meals and monitors his BS.  Patient presented to ED BIB son from home for decreased PO intake x2 weeks associated with constipation and abdominal pain, generalized weakness worsening to point where he is no longer ambulating and now w/ b/l LE edema, Pt states he drinks Glucerna sometimes at home. He is noted with poor dentition, tolerates soft diet.  Unable to reach Son Gurpreet by phone, Per patient usual bodyweight is 135lbs as stated. Patient lives with his son Gurpreet who prepares his meals and monitors his BS.  Patient presented to ED BIB son from home for decreased PO intake x2 weeks associated with constipation and abdominal pain, generalized weakness worsening to point where he is no longer ambulating and now w/ b/l LE edema, Pt states he drinks Glucerna sometimes at home. He is noted with poor dentition, tolerates soft diet.  Unable to reach Son Gurpreet by phone, Per patient usual bodyweight is 135lbs., he suspects wt loss

## 2022-01-10 NOTE — PROGRESS NOTE ADULT - ASSESSMENT
89 y/o M PMH of CAD s/p PCI (ROBERTO to D1 6/2/14), chronic AF (on AC w/ eliquis), tachy/perla syndrome s/p biV PPM placement, CVA x 2, HFpEF, hospitalized in October 2021 for CHF exacerbation/overload presents to ED BIB son from home for decreased PO intake x2 weeks associated with constipation and abdominal pain, generalized weakness worsening to point where he is no longer ambulating and now w/ b/l LE edema, SOB and orthopnea. Renal consulted for for FLOYD/CKD Mx.     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  FLOYD likely cardiorenal +/- Obstruction  b/l Cr 1.87 12/17/21, 1 mth ago was 2.5mg/dl  c/w river- trend urineoutput  Cr continues to rise--> pt with confusion, k rising, and still with high volume --> d/w SOn over the phone and Pt--> Pt will likely benefit from HD given possibility of uremic encephalopathy and volume overload  Family and pt to decide if HD is something they would like to pursue-->will d/w Family again tomm  c/w lasix 40mg po bid  - I/o's, daily weights, daily BMP  -avoid NSAIDs/ACEi/ARB/nephrotoxics/iv contrast  low Na in diet, fluid restriction 1L/day   dose all meds for eGFR<15ml/min    Acute diastolic congestive heart failure  - Coreg on hold  On PO lasix  - TTE  - cardiology eval    Hypertension. bp low, asympt. watch closely  afib- c/w anabella conteh Dr  [phone]

## 2022-01-10 NOTE — PROGRESS NOTE ADULT - SUBJECTIVE AND OBJECTIVE BOX
EP ATTENDING    tele: AV sequential BiV pacing    he denies palpitations, syncope, nor angina, ROS otherwise -      DATE OF SERVICE - 01-10-22     Review of Systems:   Constitutional: [ ] fevers, [ ] chills.   Skin: [ ] dry skin. [ ] rashes.  Psychiatric: [ ] depression, [ ] anxiety.   Gastrointestinal: [ ] BRBPR, [ ] melena.   Neurological: [ ] confusion. [ ] seizures. [ ] shuffling gait.   Ears,Nose,Mouth and Throat: [ ] ear pain [ ] sore throat.   Eyes: [ ] diplopia.   Respiratory: [ ] hemoptysis. [ ] shortness of breath  Cardiovascular: See HPI above  Hematologic/Lymphatic: [ ] anemia. [ ] painful nodes. [ ] prolonged bleeding.   Genitourinary: [ ] hematuria. [ ] flank pain.   Endocrine: [ ] significant change in weight. [ ] intolerance to heat and cold.     Review of systems [ x] otherwise negative, [ ] otherwise unable to obtain    FH: no family history of sudden cardiac death in first degree relatives    SH: [ ] tobacco, [ ] alcohol, [ ] drugs    aMIOdarone    Tablet 400 milliGRAM(s) Oral daily  apixaban 2.5 milliGRAM(s) Oral two times a day  aspirin enteric coated 81 milliGRAM(s) Oral daily  atorvastatin 40 milliGRAM(s) Oral at bedtime  dextrose 40% Gel 15 Gram(s) Oral once  dextrose 5%. 1000 milliLiter(s) IV Continuous <Continuous>  dextrose 5%. 1000 milliLiter(s) IV Continuous <Continuous>  dextrose 50% Injectable 25 Gram(s) IV Push once  dextrose 50% Injectable 12.5 Gram(s) IV Push once  dextrose 50% Injectable 25 Gram(s) IV Push once  furosemide    Tablet 40 milliGRAM(s) Oral two times a day  glucagon  Injectable 1 milliGRAM(s) IntraMuscular once  influenza  Vaccine (HIGH DOSE) 0.7 milliLiter(s) IntraMuscular once  insulin glargine Injectable (LANTUS) 10 Unit(s) SubCutaneous at bedtime  insulin lispro (ADMELOG) corrective regimen sliding scale   SubCutaneous three times a day before meals  insulin lispro (ADMELOG) corrective regimen sliding scale   SubCutaneous at bedtime  iron sucrose Injectable 200 milliGRAM(s) IV Push once  ketotifen 0.025% Ophthalmic Solution 1 Drop(s) Right EYE two times a day  multivitamin/minerals 1 Tablet(s) Oral daily  pantoprazole    Tablet 40 milliGRAM(s) Oral before breakfast  polyethylene glycol 3350 17 Gram(s) Oral daily  senna 2 Tablet(s) Oral at bedtime                            7.8    10.60 )-----------( 228      ( 10 Ryan 2022 07:27 )             23.6       01-10    131<L>  |  91<L>  |  138<H>  ----------------------------<  119<H>  5.2   |  27  |  4.37<H>    Ca    8.5      10 Ryan 2022 07:24  Mg     3.1     01-10              T(C): 36.7 (01-10-22 @ 04:34), Max: 36.7 (01-10-22 @ 04:34)  HR: 66 (01-10-22 @ 04:34) (60 - 66)  BP: 98/61 (01-10-22 @ 04:34) (98/61 - 100/62)  RR: 18 (01-10-22 @ 04:34) (18 - 18)  SpO2: 96% (01-10-22 @ 04:34) (94% - 96%)  Wt(kg): --    I&O's Summary    09 Jan 2022 07:01  -  10 Ryan 2022 07:00  --------------------------------------------------------  IN: 360 mL / OUT: 600 mL / NET: -240 mL        General: Well nourished in no acute distress. Alert and Oriented * 3.   Head: Normocephalic and atraumatic.   Neck: No JVD. No bruits. Supple. Does not appear to be enlarged.   Cardiovascular: + S1,S2 ; RRR Soft systolic murmur at the left lower sternal border. No rubs noted.    Lungs: CTA b/l. No rhonchi, rales or wheezes.   Abdomen: + BS, soft. Non tender. Non distended. No rebound. No guarding.   Extremities: No clubbing/cyanosis/edema.   Neurologic: Moves all four extremities. Full range of motion.   Skin: Warm and moist. The patient's skin has normal elasticity and good skin turgor.   Psychiatric: Appropriate mood and affect.  Musculoskeletal: Normal range of motion, normal strength        A/P) He is an extremely pleasant 89 year old male with sick sinus syndrome, paroxysmal atrial fibrillation, complete heart block, and chronic systolic CHF. In June 2014 I implanted a Biotronik biventricular pacemaker. In August 2021 he presented with sustained monomorphic ventricular tachycardia requiring emergency DCCV for rescue therapy. Given the above he was upgraded to a  Medtronic BiV-ICD, and the old RV pacing lead was capped and abandoned. Device interrogation demonstrates excellent BiV-ICD function. He denies palpitations, syncope, nor angina, but continues to report ongoing NYHA Class II symptoms of chronic systolic CHF including dyspnea and orthopnea. He is now a/w another CHF exacerbation.    -continue amio 400mg daily for VT  -continue eliquis 2.5mg bid for lifelong a/c for PAF  -medical therapy for CAD with severe LV dysfunction as per cardiology  -would continue PO lasix as he now appears euvolemic, f/u renal  -f/u with me after discharge for routine ICD care as scheduled  -f/u with his cardiologist Dr. Danny Vanegas at [facility] after discharge  -no further inpatient EP workup expected as long as he remains free of AF/VT      Holly Villatoro M.D., [facility]  Cardiac Electrophysiology  [facility]  [address] [place] [address], [place] [address]  [email or web address]    office [phone]  pager [phone]

## 2022-01-10 NOTE — PROGRESS NOTE ADULT - SUBJECTIVE AND OBJECTIVE BOX
DATE OF SERVICE: 01-10-22 @ 10:30    Patient is a 89y old  Male who presents with a chief complaint of     SUBJECTIVE / OVERNIGHT EVENTS:  No chest pain. No shortness of breath. No complaints. No events overnight.     MEDICATIONS  (STANDING):  aMIOdarone    Tablet 400 milliGRAM(s) Oral daily  apixaban 2.5 milliGRAM(s) Oral two times a day  aspirin enteric coated 81 milliGRAM(s) Oral daily  atorvastatin 40 milliGRAM(s) Oral at bedtime  dextrose 40% Gel 15 Gram(s) Oral once  dextrose 5%. 1000 milliLiter(s) (50 mL/Hr) IV Continuous <Continuous>  dextrose 5%. 1000 milliLiter(s) (100 mL/Hr) IV Continuous <Continuous>  dextrose 50% Injectable 25 Gram(s) IV Push once  dextrose 50% Injectable 12.5 Gram(s) IV Push once  dextrose 50% Injectable 25 Gram(s) IV Push once  furosemide    Tablet 40 milliGRAM(s) Oral two times a day  glucagon  Injectable 1 milliGRAM(s) IntraMuscular once  influenza  Vaccine (HIGH DOSE) 0.7 milliLiter(s) IntraMuscular once  insulin glargine Injectable (LANTUS) 10 Unit(s) SubCutaneous at bedtime  insulin lispro (ADMELOG) corrective regimen sliding scale   SubCutaneous three times a day before meals  insulin lispro (ADMELOG) corrective regimen sliding scale   SubCutaneous at bedtime  ketotifen 0.025% Ophthalmic Solution 1 Drop(s) Right EYE two times a day  multivitamin/minerals 1 Tablet(s) Oral daily  pantoprazole    Tablet 40 milliGRAM(s) Oral before breakfast  polyethylene glycol 3350 17 Gram(s) Oral daily  senna 2 Tablet(s) Oral at bedtime    MEDICATIONS  (PRN):      Vital Signs Last 24 Hrs  T(C): 36.7 (10 Ryan 2022 04:34), Max: 36.7 (10 Ryan 2022 04:34)  T(F): 98 (10 Ryan 2022 04:34), Max: 98 (10 Ryan 2022 04:34)  HR: 66 (10 Ryan 2022 04:34) (60 - 66)  BP: 98/61 (10 Ryan 2022 04:34) (98/61 - 100/62)  BP(mean): --  RR: 18 (10 Ryan 2022 04:34) (18 - 18)  SpO2: 96% (10 Ryan 2022 04:34) (94% - 96%)  CAPILLARY BLOOD GLUCOSE      POCT Blood Glucose.: 118 mg/dL (10 Ryan 2022 08:55)  POCT Blood Glucose.: 195 mg/dL (2022 21:18)  POCT Blood Glucose.: 175 mg/dL (2022 17:17)  POCT Blood Glucose.: 163 mg/dL (2022 12:56)    I&O's Summary    2022 07:01  -  10 Ryan 2022 07:00  --------------------------------------------------------  IN: 360 mL / OUT: 600 mL / NET: -240 mL        PHYSICAL EXAM:  GENERAL: NAD, well-developed  HEAD:  Atraumatic, Normocephalic  EYES: EOMI, PERRLA, conjunctiva and sclera clear  NECK: Supple, No JVD  CHEST/LUNG: Clear to auscultation bilaterally; No wheeze  HEART: Regular rate and rhythm; No murmurs, rubs, or gallops  ABDOMEN: Soft, Nontender, Nondistended; Bowel sounds present  EXTREMITIES:  2+ Peripheral Pulses, No clubbing, cyanosis, or edema  PSYCH: AAOx3  NEUROLOGY: significant generalized weakness  SKIN: unstageable pressure ulcer    LABS:                        7.8    10.60 )-----------( 228      ( 10 Ryan 2022 07:27 )             23.6     01-10    131<L>  |  91<L>  |  138<H>  ----------------------------<  119<H>  5.2   |  27  |  4.37<H>    Ca    8.5      10 Ryan 2022 07:24  Mg     3.1     01-10      PT/INR - ( 2022 07:15 )   PT: 56.4 sec;   INR: 5.08 ratio         PTT - ( 2022 07:15 )  PTT:31.3 sec      Urinalysis Basic - ( 2022 07:00 )    Color: Yellow / Appearance: Slightly Turbid / S.016 / pH: x  Gluc: x / Ketone: Negative  / Bili: Negative / Urobili: Negative   Blood: x / Protein: 30 mg/dL / Nitrite: Negative   Leuk Esterase: Negative / RBC: 2 /hpf / WBC 6 /HPF   Sq Epi: x / Non Sq Epi: 13 /hpf / Bacteria: Negative        RADIOLOGY & ADDITIONAL TESTS:    Imaging Personally Reviewed:    Consultant(s) Notes Reviewed:      Care Discussed with Consultants/Other Providers:

## 2022-01-10 NOTE — PROGRESS NOTE ADULT - ASSESSMENT
89 y/o M PMH of CAD s/p PCI (ROBERTO to D1 6/2/14), chronic AF (on AC w/ eliquis), tachy/perla syndrome s/p biV PPM placement, CVA x 2, HFpEF, hospitalized in October 2021 for CHF exacerbation/overload presents to ED BIB son from home for decreased PO intake x2 weeks associated with constipation and abdominal pain, generalized weakness worsening to point where he is no longer ambulating and now w/ b/l LE edema and orthopnea. Denies f/c, cough, congestion.    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  - FLOYD likely cardiorenal  - b/l Cr 1.87 12/17/21, 1 mth ago was 2.5  - Creatinine Trend: 3.63 <--, 3.32 <--, 3.23 <--  - K, bicarb- acceptable  - clinically hypervolemic  - no e/o obstructive uropathy on CT  - no acute s/s uremia  - nephrology following    anemia  - likely 2/2 ckd and iron def  - iv iron    had blood tinged BM  - due to multiple comorbidities and poor health will not pursue GI eval at this time unless the bleeding worsens    diabetes beyeyr controlled  - fs qid  - hgb a1c  - insulin sliding scale  - Lantus 10 units qhs    unstageable pressure ulcer present on admission  - optimize nutrition  - off loading  - T&P q 2 hours  - wound care consult    afib  - c/w amio  - c/w eliquis    constipation  - senna and miralax  - dulcolax supp x 1    dispo  - JENNIFER

## 2022-01-10 NOTE — GOALS OF CARE CONVERSATION - ADVANCED CARE PLANNING - CONVERSATION DETAILS
Patient was seen for goals of care conversation, did not watch video.  Pt was sleepy unable to remain in participation for conversation.   Rhett [id number] was present.  Patient confirmed son was his HCP and new document completed.  Pt was questioned on CPR and Intubation and said he would not want them.  I spoke with son confirmed HCP and said he has not heard his father make limitation on his care.  Code TWLC, XQBX provided for further determination on discharge.  No Sikhism exemptions noted. Charli BUSBY made aware of conversations between HCP and pt.        Samaria Chatman RN  Palliative Care Educator  [phone] cell

## 2022-01-10 NOTE — CONSULT NOTE ADULT - ASSESSMENT
Patient seen and examined, agree with above assessment and plan as transcribed above.    - Lasix on hold  - Renal f/u     Alfredo Rain MD, [facility]  BEEPER [phone]

## 2022-01-10 NOTE — CHART NOTE - NSCHARTNOTEFT_GEN_A_CORE
Nutrition Follow Up Note  Patient seen for: consult/follow up on 3 DSU    Chart reviewed, events noted.    Source: [] Patient       [x] EMR        [] RN        [] Family at bedside       [] Other:    -If unable to interview patient: [] Trach/Vent/BiPAP  [] Disoriented/confused/inappropriate to interview    Diet Order:   Diet, Consistent Carbohydrate/No Snacks:   Soft and Bite Sized ([id number])  Supplement Feeding Modality:  Oral  Glucerna Shake Cans or Servings Per Day:  2       Frequency:  Daily (01-10-22)    - Is current order appropriate/adequate? [] Yes  [x]  No: pt requesting Glucerna x 2 daily- RD placed pending verification and alerted diet office.    - PO intake meals :   [x] >75%  Adequate    [] 50-75%  Fair       [] <50%  Poor  RD observed PCA feeding pt breakfast today  - PO intake of supplements if pt receiving: []>75% []50% []25%   as per   []flow sheet  []patient  []family/aide  []PCA  []Nurse  [x]RD observation    - Nutrition-related concerns: pt malnourished, total feed    pt seen by SLP []Yes [x]No    GI:  Last BM ___.   Bowel Regimen? [x] Yes   [] No Miralax, senna      Weights:   Daily Weight in k.3 (01-10), Weight in k ()    Nutritionally Pertinent MEDICATIONS  (STANDING):  aMIOdarone    Tablet  atorvastatin  dextrose 40% Gel  dextrose 5%.  dextrose 5%.  dextrose 50% Injectable  dextrose 50% Injectable  dextrose 50% Injectable  furosemide    Tablet  glucagon  Injectable  insulin glargine Injectable (LANTUS)  insulin lispro (ADMELOG) corrective regimen sliding scale  insulin lispro (ADMELOG) corrective regimen sliding scale  multivitamin/minerals  pantoprazole    Tablet  polyethylene glycol 3350  senna    Pertinent Labs: 01-10 @ 07:24: Na 131<L>, <H>, Cr 4.37<H>, <H>, K+ 5.2, Phos --, Mg 3.1<H>, Alk Phos --, ALT/SGPT --, AST/SGOT --, HbA1c --   @ 19:40: Na 133<L>, <H>, Cr 4.19<H>, <H>, K+ 4.7, Phos --, Mg --, Alk Phos --, ALT/SGPT --, AST/SGOT --, HbA1c --    A1C with Estimated Average Glucose Result: 9.5 % (22 @ 09:52)  A1C with Estimated Average Glucose Result: 7.7 % (08-15-21 @ 06:26)    Finger Sticks:  POCT Blood Glucose.: 118 mg/dL (01-10 @ 08:55)  POCT Blood Glucose.: 195 mg/dL ( @ 21:18)  POCT Blood Glucose.: 175 mg/dL ( @ 17:17)  POCT Blood Glucose.: 163 mg/dL ( @ 12:56)      Pressure Injuries as per nursing documentation: right big toe, DTI, sacrum-suspected DTI  Edema:     Estimated Needs:   [x] no change since previous assessment  [] recalculated:     Previous Nutrition Diagnosis: severe malnutrition  Nutrition Diagnosis is: [x] ongoing  [] resolved [] not applicable     New Nutrition Diagnosis: [x] Not applicable    Nutrition Care Plan:  [] In Progress  [x] Achieved  [] Not applicable    Nutrition Interventions:     Education Provided:       [x] Yes:  [] No:   pt was educated on the importance of supplements to increase calorie and protein intake in light of RD's nutritional findings [x]Yes []N/A         Recommendations:         [] Continue current diet order     [x] Change diet to: Consistent Carbohydrate, soft bite sized     [x] add Glucerna x 2 daily oral nutrition supplement        [x] Add micronutrient supplementation: multivitamins with minerals     [] Continue current micronutrient supplementation:        [x]Discussed recommendations with provider     [] Needed to escalate to provider     [x]Placed pending verification with NP/PA      []Placed pending verification with Team      []Placed sticker (malnutrition/BMI/underweight)     []Recommend swallow evaluation     [] monitor need for diet ed reinforcement     [x] Other: monitor renal indices and need to change supplement to Suplena, change multivitamin to Nephro-Julee and add renal modifications. monitor need for low sodium diet modification.     Monitoring and Evaluation:   Continue to monitor nutritional intake, tolerance to diet prescription, weights, labs, skin integrity      RD remains available upon request and will follow up per protocol  Samra Alvarado MA RD, CDN #[id number]

## 2022-01-10 NOTE — PROGRESS NOTE ADULT - SUBJECTIVE AND OBJECTIVE BOX
Patient seen and examined  confused      No Known Allergies    Hospital Medications:   MEDICATIONS  (STANDING):  aMIOdarone    Tablet 400 milliGRAM(s) Oral daily  apixaban 2.5 milliGRAM(s) Oral two times a day  aspirin enteric coated 81 milliGRAM(s) Oral daily  atorvastatin 40 milliGRAM(s) Oral at bedtime  dextrose 40% Gel 15 Gram(s) Oral once  dextrose 5%. 1000 milliLiter(s) (50 mL/Hr) IV Continuous <Continuous>  dextrose 5%. 1000 milliLiter(s) (100 mL/Hr) IV Continuous <Continuous>  dextrose 50% Injectable 25 Gram(s) IV Push once  dextrose 50% Injectable 12.5 Gram(s) IV Push once  dextrose 50% Injectable 25 Gram(s) IV Push once  furosemide    Tablet 40 milliGRAM(s) Oral two times a day  glucagon  Injectable 1 milliGRAM(s) IntraMuscular once  influenza  Vaccine (HIGH DOSE) 0.7 milliLiter(s) IntraMuscular once  insulin glargine Injectable (LANTUS) 10 Unit(s) SubCutaneous at bedtime  insulin lispro (ADMELOG) corrective regimen sliding scale   SubCutaneous three times a day before meals  insulin lispro (ADMELOG) corrective regimen sliding scale   SubCutaneous at bedtime  iron sucrose IVPB 200 milliGRAM(s) IV Intermittent once  ketotifen 0.025% Ophthalmic Solution 1 Drop(s) Right EYE two times a day  multivitamin/minerals 1 Tablet(s) Oral daily  pantoprazole    Tablet 40 milliGRAM(s) Oral before breakfast  phytonadione   Solution 5 milliGRAM(s) Oral daily  polyethylene glycol 3350 17 Gram(s) Oral daily  senna 2 Tablet(s) Oral at bedtime      VITALS:  T(F): 97.5 (01-10-22 @ 12:39), Max: 98 (01-10-22 @ 04:34)  HR: 60 (01-10-22 @ 12:39)  BP: 91/51 (01-10-22 @ 12:39)  RR: 18 (01-10-22 @ 12:39)  SpO2: 98% (01-10-22 @ 12:39)  Wt(kg): --     @ 07:01  -  01-10 @ 07:00  --------------------------------------------------------  IN: 360 mL / OUT: 600 mL / NET: -240 mL    01-10 @ 07:01  -  01-10 @ 14:28  --------------------------------------------------------  IN: 360 mL / OUT: 250 mL / NET: 110 mL        PHYSICAL EXAM:  Constitutional: NAD  HEENT: anicteric sclera  Neck: No JVD  Respiratory: bibasilar crepts+   Cardiovascular: S1, S2, RRR  Gastrointestinal: BS+, soft, NT/ND  Extremities: 1+ peripheral edema b/l  Neurological: A/O x 3, no focal deficits  Psychiatric: Normal mood, normal affect  : No indwelling river.   LABS:  01-10    131<L>  |  91<L>  |  138<H>  ----------------------------<  119<H>  5.2   |  27  |  4.37<H>    Ca    8.5      10 Ryan 2022 07:24  Mg     3.1     01-10      Creatinine Trend: 4.37 <--, 4.19 <--, 3.63 <--, 3.32 <--, 3.23 <--                        7.8    10.60 )-----------( 228      ( 10 Ryan 2022 07:27 )             23.6     Urine Studies:  Urinalysis Basic - ( 2022 07:00 )    Color: Yellow / Appearance: Slightly Turbid / S.016 / pH:   Gluc:  / Ketone: Negative  / Bili: Negative / Urobili: Negative   Blood:  / Protein: 30 mg/dL / Nitrite: Negative   Leuk Esterase: Negative / RBC: 2 /hpf / WBC 6 /HPF   Sq Epi:  / Non Sq Epi: 13 /hpf / Bacteria: Negative        RADIOLOGY & ADDITIONAL STUDIES:

## 2022-01-10 NOTE — GOALS OF CARE CONVERSATION - ADVANCED CARE PLANNING - WHAT MATTERS MOST
I would not want to have a breathing tube.  I am 89 years old and when its time to go I want to just go.

## 2022-01-10 NOTE — CONSULT NOTE ADULT - ASSESSMENT
A/P:    Wound Consult requested to assist w/ management of    Compression BLE  Consider WANDY/PVR, XRay, Duplex, MRI, CT  BLE elevation  Abx per Medicine/ ID  Moisturize intact skin w/ SWEEN cream BID  Nutrition Consult for optimization as tolerated in pt w/ Protein Calorie Malnutirion        Inadequate PO intake        consider MVI & Vit C to promote wound healing        encourage high quality protein, w/ supplements such as ensure/ glucerna  Hyperglycemia - consider HgA1c        FS w/ ISS q6h, consider Endo Consult  Anemia- transfusions, Fe studies  Continue turning and positioning w/ offloading assistive devices as per protocol  Continue w/ Pericare as per protocol  Waffle Cushion to chair when oob to chair  Continue w/ low air loss fluidized bed surface   Care as per medicine, will follow w/ you  Upon discharge f/u as outpatient at [facility] [address] [place], [place] [phone]  Seen w/ attng and D/w team  Thank you for this consult  Winsome Guzmán PA-C CWS [id number]   A/P: 89 y/o M PMH of CAD s/p PCI (ROBERTO to D1 6/2/14), chronic AF (on AC w/ eliquis), tachy/perla syndrome s/p biV PPM placement, CVA x 2, HFpEF, admitted w/ CHF exacerbation/overload and FLOYD    Wound Consult requested to assist w/ management of Evolving DTI of sacrum  Incontinence of stool and urine    Sacrum - CAVILON ADVANCE TIW      pericare BID and prn soiling   BLE elevation  Consider Duplex to r/o DVT   Abx per Medicine/ ID  Moisturize intact skin w/ SWEEN cream BID  Nutrition Consult for optimization as tolerated in pt w/severe malnutrition        consider MVI & Vit C to promote wound healing        encourage high quality protein  Hyperglycemia - ADA diet and lantus and FS w/ ISS   Continue turning and positioning w/ offloading assistive devices as per protocol  Waffle Cushion to chair when oob to chair  Continue w/ low air loss bed surface   Care as per medicine, will follow w/ you  Upon discharge f/u as outpatient at [facility] [address] [place], [place] [phone]  D/w team & s/w RN  Thank you for this consult  Winsome Guzmán PA-C CWS [id number]  Ispent 50minutes face to face w/ this pt of which more than 50% of the time was spent counseling & coordinating care of this pt.

## 2022-01-10 NOTE — CONSULT NOTE ADULT - SUBJECTIVE AND OBJECTIVE BOX
Wound SURGERY CONSULT NOTE    HPI:  89 y/o M PMH of CAD s/p PCI (ROBERTO to D1 6/2/14), chronic AF (on AC w/ eliquis), tachy/perla syndrome s/p biV PPM placement, CVA x 2, HFpEF, hospitalized in October 2021 for CHF exacerbation/overload presents to ED BIB son from home for decreased PO intake x2 weeks associated with constipation and abdominal pain, generalized weakness worsening to point where he is no longer ambulating and now w/ b/l LE edema and orthopnea. Denies f/c, cough, congestion. (07 Jan 2022 22:19)        N/V/D,  BM/ Flatus,   NGT,     palp/ sob/dyspnea/ cp,       F/C/S  Wound consult requested to assist w/ management of      wound/ pressure injury.    c/o pain, drainage, odor, color change,  worsening swelling . Increasingly sedentary.  Incontinent of urine & stool.  H/o falls, trauma. Aquacell/ Allevyn/ medihoney/ dakins/ Adaptic/ DSD used at home/ while awaiting consult.  Appetite good/ decreased.  weight loss.   All questions asked and answered to pt's and family's satisfaction.    Current Diet: Diet, Consistent Carbohydrate/No Snacks:   Soft and Bite Sized (SOFTBTSZ)  Supplement Feeding Modality:  Oral  Glucerna Shake Cans or Servings Per Day:  2       Frequency:  Daily (01-10-22 @ 09:15)      PAST MEDICAL & SURGICAL HISTORY:  Other and Unspecified Hyperlipidemia    CVA (Cerebral Infarction)  x2 in the past with residual Rt handed numbness and a little word finding trouble    Hypertension    Atrial fibrillation    Anemia    CAD (coronary artery disease)    History of ischemic cardiomyopathy    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    S/P coronary artery stent placement  D1    Pacemaker  BiV PPM        REVIEW OF SYSTEMS: Pt unable to offer  General/ Breast/ Skin/ Neuro/ MSK: see HPI  All other systems negative    MEDICATIONS  (STANDING):  aMIOdarone    Tablet 400 milliGRAM(s) Oral daily  apixaban 2.5 milliGRAM(s) Oral two times a day  aspirin enteric coated 81 milliGRAM(s) Oral daily  atorvastatin 40 milliGRAM(s) Oral at bedtime  dextrose 40% Gel 15 Gram(s) Oral once  dextrose 5%. 1000 milliLiter(s) (50 mL/Hr) IV Continuous <Continuous>  dextrose 5%. 1000 milliLiter(s) (100 mL/Hr) IV Continuous <Continuous>  dextrose 50% Injectable 25 Gram(s) IV Push once  dextrose 50% Injectable 12.5 Gram(s) IV Push once  dextrose 50% Injectable 25 Gram(s) IV Push once  furosemide    Tablet 40 milliGRAM(s) Oral two times a day  glucagon  Injectable 1 milliGRAM(s) IntraMuscular once  influenza  Vaccine (HIGH DOSE) 0.7 milliLiter(s) IntraMuscular once  insulin glargine Injectable (LANTUS) 10 Unit(s) SubCutaneous at bedtime  insulin lispro (ADMELOG) corrective regimen sliding scale   SubCutaneous three times a day before meals  insulin lispro (ADMELOG) corrective regimen sliding scale   SubCutaneous at bedtime  iron sucrose IVPB 200 milliGRAM(s) IV Intermittent once  ketotifen 0.025% Ophthalmic Solution 1 Drop(s) Right EYE two times a day  multivitamin/minerals 1 Tablet(s) Oral daily  pantoprazole    Tablet 40 milliGRAM(s) Oral before breakfast  phytonadione   Solution 5 milliGRAM(s) Oral daily  polyethylene glycol 3350 17 Gram(s) Oral daily  senna 2 Tablet(s) Oral at bedtime    MEDICATIONS  (PRN):      Allergies    No Known Allergies    Intolerances        SOCIAL HISTORY:  / /single/ ; (+)HHA/ lives in SNF; Former smoker, Denies smoking, ETOH, drugs    FAMILY HISTORY:  Family history of heart disease (Father)        PHYSICAL EXAM:  Vital Signs Last 24 Hrs  T(C): 36.4 (10 Ryan 2022 12:39), Max: 36.7 (10 Ryan 2022 04:34)  T(F): 97.5 (10 Ryan 2022 12:39), Max: 98 (10 Ryan 2022 04:34)  HR: 60 (10 Ryan 2022 12:39) (60 - 66)  BP: 91/51 (10 Ryan 2022 12:39) (91/51 - 99/65)  BP(mean): --  RR: 18 (10 Ryan 2022 12:39) (18 - 18)  SpO2: 98% (10 Ryan 2022 12:39) (96% - 98%)    NAD / gaurded but stable,  A&Ox3/ Alert/ Confused  cachectic/ MO/ Obese/ frail  WD/ WN/ WG/ Disheveled  Total Care Sport/ Versa Care P500 bed/ Envella     HEENT:  NC/AT, PERRL, EOMI, mucosa moist, throat clear, trachea midline, neck supple    Cardiovascular: RRR (+)m    Respiratory: CTA    Gastrointestinal soft NT/ND (+)BS  (+)PEG (+)ostomy    Neurology  weakened strength & sensation grossly intact/ paraesthesia  nonverbal, no follow commands/ paraplegic    Musculoskeletal:  limited/ FROM, no deformities/ contractures    Vascular: BLE equally warm/ cool,  no cyanosis, clubbing, edema  >LE //BLE edema equal  DP/PT pulses palpable  no acute ischemia noted  hemosiderin staining    Skin:  moist w/ good turgor  frail,  ecchymosis w/o hematoma  serosanguinous drainage  No odor, erythema, increased warmth, tenderness, induration, fluctuance    LABS/ CULTURES/ RADIOLOGY:                        7.8    10.60 )-----------( 228      ( 10 Ryan 2022 07:27 )             23.6       131  |  91  |  138  ----------------------------<  119      [01-10-22 @ 07:24]  5.2   |  27  |  4.37        Ca     8.5     [01-10-22 @ 07:24]      Mg     3.1     [01-10-22 @ 07:24]      Phos  3.8     [01-08-22 @ 07:31]      PT/INR: PT 56.4 , INR 5.08       [01-09-22 @ 07:15]  PTT: 31.3       [01-09-22 @ 07:15]          Culture - Blood (collected 01-07-22 @ 21:41)  Source: .Blood Blood  Preliminary Report (01-08-22 @ 22:01):    No growth to date.    Culture - Blood (collected 01-07-22 @ 21:41)  Source: .Blood Blood  Preliminary Report (01-08-22 @ 22:01):    No growth to date.                     Wound SURGERY CONSULT NOTE    HPI:  89 y/o M PMH of CAD s/p PCI (ROBERTO to D1 6/2/14), chronic AF (on AC w/ eliquis), tachy/perla syndrome s/p biV PPM placement, CVA x 2, HFpEF, hospitalized in October 2021 for CHF exacerbation/overload presents to ED BIB son from home for decreased PO intake x2 weeks associated with constipation and abdominal pain, generalized weakness worsening to point where he is no longer ambulating and w/ BLE edema and orthopnea. Pt w/ elevated INR. Denies f/c, cough, congestion. Currently pt improving and w/o N/V/D, palp/ sob/dyspnea/ cp, or F/C/S. Wound consult requested to assist w/ management of sacral wound.  Pt w/o c/o pain, drainage, odor, color change, or swelling. Pt  Increasingly sedentary and Incontinent of urine & stool. Pt w/o h/o falls, trauma. Offloading and pericare initiated w/ Shree w/ while awaiting consult. ction.    Current Diet: Diet, Consistent Carbohydrate/No Snacks:   Soft and Bite Sized (SOFTBTSZ)  Supplement Feeding Modality:  Oral  Glucerna Shake Cans or Servings Per Day:  2       Frequency:  Daily (01-10-22 @ 09:15)      PAST MEDICAL & SURGICAL HISTORY:  Hyperlipidemia    CVA (Cerebral Infarction)  x2 in the past with residual Rt handed numbness and a little word finding trouble    Hypertension    Atrial fibrillation    Anemia    CAD (coronary artery disease)    Ischemic cardiomyopathy    S/P coronary artery stent placement    tachy/perla syndrome, s/p Pacemaker, BiV PPM    s/p Cataract Surgery of bilateral eyes      REVIEW OF SYSTEMS: Pt unable to offer      MEDICATIONS  (STANDING):  aMIOdarone    Tablet 400 milliGRAM(s) Oral daily  apixaban 2.5 milliGRAM(s) Oral two times a day  aspirin enteric coated 81 milliGRAM(s) Oral daily  atorvastatin 40 milliGRAM(s) Oral at bedtime  dextrose 40% Gel 15 Gram(s) Oral once  dextrose 5%. 1000 milliLiter(s) (50 mL/Hr) IV Continuous <Continuous>  dextrose 5%. 1000 milliLiter(s) (100 mL/Hr) IV Continuous <Continuous>  dextrose 50% Injectable 25 Gram(s) IV Push once  dextrose 50% Injectable 12.5 Gram(s) IV Push once  dextrose 50% Injectable 25 Gram(s) IV Push once  furosemide    Tablet 40 milliGRAM(s) Oral two times a day  glucagon  Injectable 1 milliGRAM(s) IntraMuscular once  influenza  Vaccine (HIGH DOSE) 0.7 milliLiter(s) IntraMuscular once  insulin glargine Injectable (LANTUS) 10 Unit(s) SubCutaneous at bedtime  insulin lispro (ADMELOG) corrective regimen sliding scale   SubCutaneous three times a day before meals  insulin lispro (ADMELOG) corrective regimen sliding scale   SubCutaneous at bedtime  iron sucrose IVPB 200 milliGRAM(s) IV Intermittent once  ketotifen 0.025% Ophthalmic Solution 1 Drop(s) Right EYE two times a day  multivitamin/minerals 1 Tablet(s) Oral daily  pantoprazole    Tablet 40 milliGRAM(s) Oral before breakfast  phytonadione   Solution 5 milliGRAM(s) Oral daily  polyethylene glycol 3350 17 Gram(s) Oral daily  senna 2 Tablet(s) Oral at bedtime      No Known Allergies    SOCIAL HISTORY: no smoking, ETOH, drugs    FAMILY HISTORY: no PVD or skin/ wound healing problem  Family history of heart disease (Father)         PHYSICAL EXAM:  Vital Signs Last 24 Hrs  T(C): 36.4 (10 Ryan 2022 12:39), Max: 36.7 (10 Ryan 2022 04:34)  T(F): 97.5 (10 Ryan 2022 12:39), Max: 98 (10 Ryan 2022 04:34)  HR: 60 (10 Ryan 2022 12:39) (60 - 66)  BP: 91/51 (10 Ryan 2022 12:39) (91/51 - 99/65)  BP(mean): --  RR: 18 (10 Ryan 2022 12:39) (18 - 18)  SpO2: 98% (10 Ryan 2022 12:39) (96% - 98%)    NAD  Alert,  frail, WD/ WN/ WG  Versa Care P500 bed   HEENT:  NC/AT, PERRL, EOMI, mucosa moist, throat clear, trachea midline, neck supple  Cardiovascular: RRR   Respiratory: CTA  Gastrointestinal soft NT/ND (+)BS    Neurology:  weakened strength & sensation grossly intact  Musculoskeletal:  limited ROM  Vascular: BLE equally warm,  no cyanosis, clubbing,      BLE mild edema equal,  hemosiderin staining     Rt Great toe lesion 1cm black w/o drainage or blistering     long fungal toenails  Skin:  thin, frail,  ecchymosis w/o hematoma    evolving DTI w/ maroon hyperpigmented skin in the periwound area    10cm x 10cm x 0cm    no blistering or drainage  No odor, erythema, increased warmth, tenderness, induration, fluctuance    LABS/ CULTURES/ RADIOLOGY:                        7.8    10.60 )-----------( 228      ( 10 Ryan 2022 07:27 )             23.6       131  |  91  |  138  ----------------------------<  119      [01-10-22 @ 07:24]  5.2   |  27  |  4.37        Ca     8.5     [01-10-22 @ 07:24]      Mg     3.1     [01-10-22 @ 07:24]      Phos  3.8     [01-08-22 @ 07:31]      PT/INR: PT 56.4 , INR 5.08       [01-09-22 @ 07:15]  PTT: 31.3       [01-09-22 @ 07:15]      Culture - Blood (collected 01-07-22 @ 21:41)  Source: .Blood Blood  Preliminary Report (01-08-22 @ 22:01):    No growth to date.    Culture - Blood (collected 01-07-22 @ 21:41)  Source: .Blood Blood  Preliminary Report (01-08-22 @ 22:01):    No growth to date.

## 2022-01-10 NOTE — CONSULT NOTE ADULT - SUBJECTIVE AND OBJECTIVE BOX
C A R D I O L O G Y  *********************    DATE OF SERVICE: 01-10-22    HISTORY OF PRESENT ILLNESS: HPI:  Patient is a 89 y/o male with PMH of CAD s/p PCI (ROBERTO to D1 6/2/14), chronic AF (on AC w/ eliquis), 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 Cardiology consulted for further evaluation. Patient's volume status now improved. Resting comfortably in bed with no complaints. Denies chest pain, SOB, palpitations, dizziness, or syncope.    PAST MEDICAL & SURGICAL HISTORY:  Other and Unspecified Hyperlipidemia    CVA (Cerebral Infarction)  x2 in the past with residual Rt handed numbness and a little word finding trouble    Hypertension    Atrial fibrillation    Anemia    CAD (coronary artery disease)    History of ischemic cardiomyopathy    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    S/P coronary artery stent placement  D1    Pacemaker  BiV PPM            MEDICATIONS:  MEDICATIONS  (STANDING):  aMIOdarone    Tablet 400 milliGRAM(s) Oral daily  apixaban 2.5 milliGRAM(s) Oral two times a day  aspirin enteric coated 81 milliGRAM(s) Oral daily  atorvastatin 40 milliGRAM(s) Oral at bedtime  dextrose 40% Gel 15 Gram(s) Oral once  dextrose 5%. 1000 milliLiter(s) (50 mL/Hr) IV Continuous <Continuous>  dextrose 5%. 1000 milliLiter(s) (100 mL/Hr) IV Continuous <Continuous>  dextrose 50% Injectable 25 Gram(s) IV Push once  dextrose 50% Injectable 12.5 Gram(s) IV Push once  dextrose 50% Injectable 25 Gram(s) IV Push once  furosemide    Tablet 40 milliGRAM(s) Oral two times a day  glucagon  Injectable 1 milliGRAM(s) IntraMuscular once  influenza  Vaccine (HIGH DOSE) 0.7 milliLiter(s) IntraMuscular once  insulin glargine Injectable (LANTUS) 10 Unit(s) SubCutaneous at bedtime  insulin lispro (ADMELOG) corrective regimen sliding scale   SubCutaneous three times a day before meals  insulin lispro (ADMELOG) corrective regimen sliding scale   SubCutaneous at bedtime  iron sucrose Injectable 200 milliGRAM(s) IV Push once  ketotifen 0.025% Ophthalmic Solution 1 Drop(s) Right EYE two times a day  multivitamin/minerals 1 Tablet(s) Oral daily  pantoprazole    Tablet 40 milliGRAM(s) Oral before breakfast  polyethylene glycol 3350 17 Gram(s) Oral daily  senna 2 Tablet(s) Oral at bedtime      Allergies    No Known Allergies    Intolerances        FAMILY HISTORY:  Family history of heart disease (Father)      Non-contributary for premature coronary disease or sudden cardiac death    SOCIAL HISTORY:    [x ] Non-smoker  [ ] Smoker  [ ] Alcohol    FLU VACCINE THIS YEAR STARTS IN AUGUST:  [ ] Yes    [ ] No    IF OVER 65 HAVE YOU EVER HAD A PNA VACCINE:  [ ] Yes    [ ] No       [ ] N/A      REVIEW OF SYSTEMS:  [ ]chest pain  [ x ]shortness of breath  [  ]palpitations  [  ]syncope  [ ]near syncope [ ]upper extremity weakness   [ ] lower extremity weakness  [  ]diplopia  [  ]altered mental status   [  ]fevers  [ ]chills [ ]nausea  [ ]vomiting  [  ]dysphagia    [ ]abdominal pain  [ ]melena  [ ]BRBPR    [  ]epistaxis  [  ]rash    [ x]lower extremity edema        [X] All others negative	  [ ] Unable to obtain      LABS:	 	    CARDIAC MARKERS:                              7.8    10.60 )-----------( 228      ( 10 Ryan 2022 07:27 )             23.6     Hb Trend: 7.8<--    01-10    131<L>  |  91<L>  |  138<H>  ----------------------------<  119<H>  5.2   |  27  |  4.37<H>    Ca    8.5      10 Ryan 2022 07:24  Mg     3.1     01-10      Creatinine Trend: 4.37<--, 4.19<--, 3.63<--, 3.32<--, 3.23<--    Coags:      proBNP:   Lipid Profile:   HgA1c:   TSH:         PHYSICAL EXAM:  T(C): 36.7 (01-10-22 @ 04:34), Max: 36.7 (01-10-22 @ 04:34)  HR: 66 (01-10-22 @ 04:34) (60 - 66)  BP: 98/61 (01-10-22 @ 04:34) (98/61 - 100/62)  RR: 18 (01-10-22 @ 04:34) (18 - 18)  SpO2: 96% (01-10-22 @ 04:34) (94% - 96%)  Wt(kg): --   BMI (kg/m2): 24 (01-07-22 @ 15:01)  I&O's Summary    09 Jan 2022 07:01  -  10 Ryan 2022 07:00  --------------------------------------------------------  IN: 360 mL / OUT: 600 mL / NET: -240 mL        Gen: Appears well in NAD  HEENT:  (-)icterus (-)pallor  CV: N S1 S2 1/6 ELLY (+)2 Pulses B/l  Resp:  Clear to auscultation B/L, normal effort  GI: (+) BS Soft, NT, ND  Lymph:  (-)Edema, (-)obvious lymphadenopathy  Skin: Warm to touch, Normal turgor  Psych: Appropriate mood and affect      TELEMETRY:  50-60s	      ECG: AV Paced 	    < from: Transthoracic Echocardiogram (01.08.22 @ 11:27) >  Conclusions:  1. Mitral annular calcification, otherwise normal mitral  valve. Mild mitral regurgitation.  2. Normal left ventricular internal dimensions and wall  thicknesses.  3. Normal left ventricular systolic function. No segmental  wall motion abnormalities.  4. Normal right ventricular size and function.  A device  wire is noted in the right heart.  5. Estimated right ventricular systolic pressure equals 50  mm Hg, assuming right atrial pressure equals 8 mm Hg,  consistent with moderate pulmonary hypertension.  6. Bilateral pleural effusions.    < end of copied text >    ASSESSMENT/PLAN: Patient is a 89 y/o male with PMH of CAD s/p PCI (ROBERTO to D1 6/2/14), chronic AF (on AC w/ eliquis), 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 Cardiology consulted for further evaluation.    - Continue PO lasix if okay with renal, cr uptrending  - Coreg on hold  - Continue AC with Eliquis for AF/Stroke prevention  - Continue medical management of CAD with ASA/Statin  - EP eval appreciated - Continue Amio 400mg daily for prior VT  - Repeat TTE noted with normal LV function, mod pulm HTN  - Of note, family declined cath and preferred medical management of CAD prior admission  - F/U with his cardiologist Dr. Vanegas for cardiology after discharge    Ismael Figueroa PA-C  Pager: [phone]

## 2022-01-11 NOTE — PROGRESS NOTE ADULT - SUBJECTIVE AND OBJECTIVE BOX
Patient seen and examined  no complaints    No Known Allergies    Hospital Medications:   MEDICATIONS  (STANDING):  albumin human 25% IVPB 100 milliLiter(s) IV Intermittent every 6 hours  aMIOdarone    Tablet 400 milliGRAM(s) Oral daily  aspirin enteric coated 81 milliGRAM(s) Oral daily  atorvastatin 40 milliGRAM(s) Oral at bedtime  dextrose 40% Gel 15 Gram(s) Oral once  dextrose 5%. 1000 milliLiter(s) (50 mL/Hr) IV Continuous <Continuous>  dextrose 5%. 1000 milliLiter(s) (100 mL/Hr) IV Continuous <Continuous>  dextrose 50% Injectable 25 Gram(s) IV Push once  dextrose 50% Injectable 12.5 Gram(s) IV Push once  dextrose 50% Injectable 25 Gram(s) IV Push once  glucagon  Injectable 1 milliGRAM(s) IntraMuscular once  influenza  Vaccine (HIGH DOSE) 0.7 milliLiter(s) IntraMuscular once  insulin glargine Injectable (LANTUS) 10 Unit(s) SubCutaneous at bedtime  insulin lispro (ADMELOG) corrective regimen sliding scale   SubCutaneous three times a day before meals  insulin lispro (ADMELOG) corrective regimen sliding scale   SubCutaneous at bedtime  ketotifen 0.025% Ophthalmic Solution 1 Drop(s) Right EYE two times a day  lidocaine   4% Patch 1 Patch Transdermal daily  multivitamin/minerals 1 Tablet(s) Oral daily  pantoprazole    Tablet 40 milliGRAM(s) Oral before breakfast  phytonadione   Solution 5 milliGRAM(s) Oral daily  polyethylene glycol 3350 17 Gram(s) Oral daily  senna 2 Tablet(s) Oral at bedtime      VITALS:  T(F): 97.5 (22 @ 12:30), Max: 97.5 (22 @ 12:30)  HR: 62 (22 @ 12:30)  BP: 91/54 (22 @ 12:30)  RR: 18 (22 @ 12:30)  SpO2: 92% (22 @ 12:30)  Wt(kg): --    01-10 @ 07:01  -   @ 07:00  --------------------------------------------------------  IN: 540 mL / OUT: 550 mL / NET: -10 mL        PHYSICAL EXAM:  Constitutional: NAD  HEENT: anicteric sclera  Neck: No JVD  Respiratory: bibasilar crepts+   Cardiovascular: S1, S2, RRR  Gastrointestinal: BS+, soft, NT/ND  Extremities: 1+ peripheral edema b/l  Neurological: A/O x 3, no focal deficits  Psychiatric: Normal mood, normal affect  : No indwelling river.     LABS:      133<L>  |  92<L>  |  144<H>  ----------------------------<  161<H>  5.2   |  27  |  4.73<H>    Ca    8.4      2022 09:06  Mg     3.1     01-10    TPro  5.1<L>  /  Alb  2.3<L>  /  TBili  1.6<H>  /  DBili      /  AST  247<H>  /  ALT  182<H>  /  AlkPhos  208<H>      Creatinine Trend: 4.73 <--, 4.37 <--, 4.19 <--, 3.63 <--, 3.32 <--, 3.23 <--                        7.2    10.62 )-----------( 212      ( 2022 09:05 )             21.7     Urine Studies:  Urinalysis Basic - ( 2022 07:00 )    Color: Yellow / Appearance: Slightly Turbid / S.016 / pH:   Gluc:  / Ketone: Negative  / Bili: Negative / Urobili: Negative   Blood:  / Protein: 30 mg/dL / Nitrite: Negative   Leuk Esterase: Negative / RBC: 2 /hpf / WBC 6 /HPF   Sq Epi:  / Non Sq Epi: 13 /hpf / Bacteria: Negative        RADIOLOGY & ADDITIONAL STUDIES:

## 2022-01-11 NOTE — CHART NOTE - NSCHARTNOTEFT_GEN_A_CORE
Consent signed with nephaldo Huynh for dialysis catheter. Obtain urine lytes stat and start albumin. lasix / eliquis on hold until further decision is made.

## 2022-01-11 NOTE — PROGRESS NOTE ADULT - ASSESSMENT
89 y/o M PMH of CAD s/p PCI (ROBERTO to D1 6/2/14), chronic AF (on AC w/ eliquis), tachy/perla syndrome s/p biV PPM placement, CVA x 2, HFpEF, hospitalized in October 2021 for CHF exacerbation/overload presents to ED BIB son from home for decreased PO intake x2 weeks associated with constipation and abdominal pain, generalized weakness worsening to point where he is no longer ambulating and now w/ b/l LE edema, SOB and orthopnea. Renal consulted for for FLOYD/CKD Mx.     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  FLOYD pre renal +/- cardiorenal +/- Obstruction  b/l Cr 1.87 12/17/21, 1 mth ago was 2.5mg/dl  c/w river- trend urineoutput  Cr continues to rise-_ d/c lasix  check ua and urine na/cr/cl/osm/cl  As pt has had dec po intake-->will give albumin q6h x 8 doses and re assess  after discussing with son and patient --> IF HD is needed then will pursue  check acute hep panel  - I/o's, daily weights, daily BMP  -avoid NSAIDs/ACEi/ARB/nephrotoxics/iv contrast  low Na in diet, fluid restriction 1L/day   dose all meds for eGFR<15ml/min    Acute diastolic congestive heart failure  - Coreg on hold  d/c lasix  - cardiology eval    Hypertension. bp low, asympt. watch closely  afib- c/w anabella conteh Dr  [phone]

## 2022-01-11 NOTE — PROGRESS NOTE ADULT - SUBJECTIVE AND OBJECTIVE BOX
C A R D I O L O G Y  **********************************     DATE OF SERVICE: 01-11-22    Patient denies chest pain or shortness of breath.   Review of systems otherwise (-)  	  MEDICATIONS:  MEDICATIONS  (STANDING):  albumin human 25% IVPB 100 milliLiter(s) IV Intermittent every 6 hours  aMIOdarone    Tablet 400 milliGRAM(s) Oral daily  aspirin enteric coated 81 milliGRAM(s) Oral daily  atorvastatin 40 milliGRAM(s) Oral at bedtime  dextrose 40% Gel 15 Gram(s) Oral once  dextrose 5%. 1000 milliLiter(s) (50 mL/Hr) IV Continuous <Continuous>  dextrose 5%. 1000 milliLiter(s) (100 mL/Hr) IV Continuous <Continuous>  dextrose 50% Injectable 25 Gram(s) IV Push once  dextrose 50% Injectable 12.5 Gram(s) IV Push once  dextrose 50% Injectable 25 Gram(s) IV Push once  glucagon  Injectable 1 milliGRAM(s) IntraMuscular once  influenza  Vaccine (HIGH DOSE) 0.7 milliLiter(s) IntraMuscular once  insulin glargine Injectable (LANTUS) 10 Unit(s) SubCutaneous at bedtime  insulin lispro (ADMELOG) corrective regimen sliding scale   SubCutaneous three times a day before meals  insulin lispro (ADMELOG) corrective regimen sliding scale   SubCutaneous at bedtime  ketotifen 0.025% Ophthalmic Solution 1 Drop(s) Right EYE two times a day  lidocaine   4% Patch 1 Patch Transdermal daily  multivitamin/minerals 1 Tablet(s) Oral daily  pantoprazole    Tablet 40 milliGRAM(s) Oral before breakfast  phytonadione   Solution 5 milliGRAM(s) Oral daily  polyethylene glycol 3350 17 Gram(s) Oral daily  senna 2 Tablet(s) Oral at bedtime      LABS:	 	    CARDIAC MARKERS:                                    7.2    10.62 )-----------( 212      ( 11 Jan 2022 09:05 )             21.7     Hemoglobin: 7.2 g/dL (01-11 @ 09:05)  Hemoglobin: 7.8 g/dL (01-10 @ 07:27)  Hemoglobin: 7.4 g/dL (01-09 @ 07:15)  Hemoglobin: 7.7 g/dL (01-08 @ 07:32)  Hemoglobin: 8.2 g/dL (01-07 @ 16:46)      01-11    133<L>  |  92<L>  |  144<H>  ----------------------------<  161<H>  5.2   |  27  |  4.73<H>    Ca    8.4      11 Jan 2022 09:06  Mg     3.1     01-10    TPro  5.1<L>  /  Alb  2.3<L>  /  TBili  1.6<H>  /  DBili  x   /  AST  247<H>  /  ALT  182<H>  /  AlkPhos  208<H>  01-11    Creatinine Trend: 4.73<--, 4.37<--, 4.19<--, 3.63<--, 3.32<--, 3.23<--    COAGS:   PT/INR - ( 11 Jan 2022 09:06 )   PT: 65.1 sec;   INR: 5.90 ratio         PTT - ( 11 Jan 2022 09:06 )  PTT:38.2 sec    PHYSICAL EXAM:  T(C): 36.4 (01-11-22 @ 12:30), Max: 36.4 (01-11-22 @ 12:30)  HR: 62 (01-11-22 @ 12:30) (60 - 68)  BP: 91/54 (01-11-22 @ 12:30) (91/54 - 101/66)  RR: 18 (01-11-22 @ 12:30) (18 - 18)  SpO2: 92% (01-11-22 @ 12:30) (92% - 93%)  Wt(kg): --  I&O's Summary    10 Ryan 2022 07:01  -  11 Jan 2022 07:00  --------------------------------------------------------  IN: 540 mL / OUT: 550 mL / NET: -10 mL      Gen: Appears well in NAD  HEENT:  (-)icterus (-)pallor  CV: N S1 S2 1/6 ELLY (+)2 Pulses B/l  Resp:  Clear to auscultation B/L, normal effort  GI: (+) BS Soft, NT, ND  Lymph:  (-)Edema, (-)obvious lymphadenopathy  Skin: Warm to touch, Normal turgor  Psych: Appropriate mood and affect      TELEMETRY:  50-60s	        ASSESSMENT/PLAN: Patient is a 89 y/o male with PMH of CAD s/p PCI (ROBERTO to D1 6/2/14), chronic AF (on AC w/ eliquis), 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 Cardiology consulted for further evaluation.    - renal f/u noted, trial of albumin  - may need HD at this point  - Coreg on hold  - Continue AC with Eliquis for AF/Stroke prevention  - Continue medical management of CAD with ASA/Statin  - EP eval appreciated - Continue Amio 400mg daily for prior VT  - Repeat TTE noted with normal LV function, mod pulm HTN  - Of note, family declined cath and preferred medical management of CAD prior admission  - F/U with his cardiologist Dr. Vanegas for cardiology after discharge    Alfredo Rain MD, [facility]  BEEPER [phone]

## 2022-01-11 NOTE — PROGRESS NOTE ADULT - SUBJECTIVE AND OBJECTIVE BOX
DATE OF SERVICE: 01-11-22 @ 12:16    Patient is a 89y old  Male who presents with a chief complaint of     SUBJECTIVE / OVERNIGHT EVENTS:  c/o generalized body aches    MEDICATIONS  (STANDING):  aMIOdarone    Tablet 400 milliGRAM(s) Oral daily  apixaban 2.5 milliGRAM(s) Oral two times a day  aspirin enteric coated 81 milliGRAM(s) Oral daily  atorvastatin 40 milliGRAM(s) Oral at bedtime  dextrose 40% Gel 15 Gram(s) Oral once  dextrose 5%. 1000 milliLiter(s) (50 mL/Hr) IV Continuous <Continuous>  dextrose 5%. 1000 milliLiter(s) (100 mL/Hr) IV Continuous <Continuous>  dextrose 50% Injectable 25 Gram(s) IV Push once  dextrose 50% Injectable 12.5 Gram(s) IV Push once  dextrose 50% Injectable 25 Gram(s) IV Push once  furosemide    Tablet 40 milliGRAM(s) Oral two times a day  glucagon  Injectable 1 milliGRAM(s) IntraMuscular once  influenza  Vaccine (HIGH DOSE) 0.7 milliLiter(s) IntraMuscular once  insulin glargine Injectable (LANTUS) 10 Unit(s) SubCutaneous at bedtime  insulin lispro (ADMELOG) corrective regimen sliding scale   SubCutaneous three times a day before meals  insulin lispro (ADMELOG) corrective regimen sliding scale   SubCutaneous at bedtime  ketotifen 0.025% Ophthalmic Solution 1 Drop(s) Right EYE two times a day  multivitamin/minerals 1 Tablet(s) Oral daily  pantoprazole    Tablet 40 milliGRAM(s) Oral before breakfast  phytonadione   Solution 5 milliGRAM(s) Oral daily  polyethylene glycol 3350 17 Gram(s) Oral daily  senna 2 Tablet(s) Oral at bedtime    MEDICATIONS  (PRN):      Vital Signs Last 24 Hrs  T(C): 36.3 (11 Jan 2022 04:18), Max: 36.4 (10 Ryan 2022 12:39)  T(F): 97.4 (11 Jan 2022 04:18), Max: 97.5 (10 Ryan 2022 12:39)  HR: 60 (11 Jan 2022 04:18) (60 - 68)  BP: 101/66 (11 Jan 2022 04:18) (91/51 - 101/66)  BP(mean): --  RR: 18 (11 Jan 2022 04:18) (18 - 18)  SpO2: 93% (11 Jan 2022 04:18) (92% - 98%)  CAPILLARY BLOOD GLUCOSE      POCT Blood Glucose.: 153 mg/dL (11 Jan 2022 08:34)  POCT Blood Glucose.: 226 mg/dL (10 Ryan 2022 21:21)  POCT Blood Glucose.: 193 mg/dL (10 Ryan 2022 17:18)  POCT Blood Glucose.: 174 mg/dL (10 Ryan 2022 12:20)    I&O's Summary    10 Ryan 2022 07:01  -  11 Jan 2022 07:00  --------------------------------------------------------  IN: 540 mL / OUT: 550 mL / NET: -10 mL        PHYSICAL EXAM:  GENERAL: NAD, well-developed  HEAD:  Atraumatic, Normocephalic  EYES: EOMI, PERRLA, conjunctiva and sclera clear  NECK: Supple, No JVD  CHEST/LUNG: Clear to auscultation bilaterally; No wheeze  HEART: Regular rate and rhythm; No murmurs, rubs, or gallops  ABDOMEN: Soft, Nontender, Nondistended; Bowel sounds present  EXTREMITIES:  2+ Peripheral Pulses, No clubbing, cyanosis, or edema  PSYCH: AAOx3  NEUROLOGY: non-focal  SKIN: unstageable pressure ulcer present on admission    LABS:                        7.2    10.62 )-----------( 212      ( 11 Jan 2022 09:05 )             21.7     01-11    133<L>  |  92<L>  |  144<H>  ----------------------------<  161<H>  5.2   |  27  |  4.73<H>    Ca    8.4      11 Jan 2022 09:06  Mg     3.1     01-10    TPro  5.1<L>  /  Alb  2.3<L>  /  TBili  1.6<H>  /  DBili  x   /  AST  247<H>  /  ALT  182<H>  /  AlkPhos  208<H>  01-11    PT/INR - ( 11 Jan 2022 09:06 )   PT: 65.1 sec;   INR: 5.90 ratio         PTT - ( 11 Jan 2022 09:06 )  PTT:38.2 sec    eh< from: Transthoracic Echocardiogram (01.08.22 @ 11:27) >  ------------------------------------------------------------------------  Dimensions:    Normal Values:  LA:     3.8    2.0 - 4.0 cm  Ao:     3.2    2.0 - 3.8 cm  SEPTUM: 0.9    0.6 - 1.2 cm  PWT:    0.8    0.6 - 1.1 cm  LVIDd:  4.0    3.0 - 5.6 cm  LVIDs:  2.6    1.8 - 4.0 cm  Derived variables:  LVMI: 60 g/m2  RWT: 0.40  Fractional short: 35 %  EF (Calabrese Rule): 65 %  ------------------------------------------------------------------------  Observations:  Mitral Valve: Mitral annular calcification, otherwise  normal mitral valve. Mild mitral regurgitation.  Aortic Valve/Aorta: Calcified trileaflet aortic valve with  normal opening.  Normal aortic root size. (Ao: 3.2 cm at the sinuses of  Valsalva).  Left Atrium: Normal left atrium.  LA volume index = 23  cc/m2.  Left Ventricle: Normal left ventricular systolic function.  No segmental wall motion abnormalities. Normal left  ventricular internal dimensions and wall thicknesses.  Right Heart: Normal right atrium. Normal right ventricular  size and function.  A device wire is noted in the right  heart. Normal tricuspid valve. Moderate tricuspid  regurgitation. Pulmonic valve not well visualized, probably  normal.  Pericardium/Pleura: Normal pericardium with no pericardial  effusion.  Bilateral pleural effusions.  Hemodynamic: Estimated right atrial pressure is 8 mm Hg.  Estimated right ventricular systolic pressure equals 50 mm  Hg, assuming right atrial pressure equals 8 mm Hg,  consistent with moderate pulmonary hypertension.  ------------------------------------------------------------------------  Conclusions:  1. Mitral annular calcification, otherwise normal mitral  valve. Mild mitral regurgitation.  2. Normal left ventricular internal dimensions and wall  thicknesses.  3. Normal left ventricular systolic function. No segmental  wall motion abnormalities.  4. Normal right ventricular size and function.  A device  wire is noted in the right heart.  5. Estimated right ventricular systolic pressure equals 50  mm Hg, assuming right atrial pressure equals 8 mm Hg,  consistent with moderate pulmonary hypertension.  6. Bilateral pleural effusions.    < end of copied text >        RADIOLOGY & ADDITIONAL TESTS:    Imaging Personally Reviewed:    Consultant(s) Notes Reviewed:      Care Discussed with Consultants/Other Providers:

## 2022-01-11 NOTE — PROGRESS NOTE ADULT - ASSESSMENT
89 y/o M PMH of CAD s/p PCI (ROBERTO to D1 6/2/14), chronic AF (on AC w/ eliquis), tachy/perla syndrome s/p biV PPM placement, CVA x 2, HFpEF, hospitalized in October 2021 for CHF exacerbation/overload presents to ED BIB son from home for decreased PO intake x2 weeks associated with constipation and abdominal pain, generalized weakness worsening to point where he is no longer ambulating and now w/ b/l LE edema and orthopnea. Denies f/c, cough, congestion.    acute on chronic diastolic congestive heart failure  - Continue PO lasix if okay with renal, cr uptrending  - Coreg on hold  - Continue AC with Eliquis for AF/Stroke prevention  - Continue medical management of CAD with ASA/Statin  - EP eval appreciated - Continue Amio 400mg daily for prior VT  - Repeat TTE noted with normal LV function, mod pulm HTN  - Of note, family declined cath and preferred medical management of CAD prior admission  - F/U with his cardiologist Dr. 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  - FLOYD likely cardiorenal +/- Obstruction  - b/l Cr 1.87 12/17/21, 1 mth ago was 2.5mg/dl  - c/w river- trend urineoutput  - Cr continues to rise--> pt with confusion, k rising, and still with high volume   - Pt will likely benefit from HD given possibility of uremic encephalopathy and volume overload  - Family and pt to decide if HD is something they would like to pursue  - c/w lasix 40mg po bid  - I/o's, daily weights, daily BMP  -avoid NSAIDs/ACEi/ARB/nephrotoxics/iv contrast  - low Na in diet, fluid restriction 1L/day   - dose all meds for eGFR<15ml/min  - nephrology following    elevated liver enzymes worsening  - likely 2/2 to CHF    anemia  - likely 2/2 ckd and iron def  - iv iron    had blood tinged BM  - due to multiple comorbidities and poor health will not pursue GI eval at this time unless the bleeding worsens    diabetes beyeyr controlled  - fs qid  - hgb a1c  - insulin sliding scale  - Lantus 10 units qhs    unstageable pressure ulcer present on admission  - optimize nutrition  - off loading  - T&P q 2 hours  - wound care consult    afib  - c/w amio  - c/w eliquis    constipation  - senna and miralax  - dulcolax supp x 1    dispo  - JENNIFER

## 2022-01-11 NOTE — PROGRESS NOTE ADULT - SUBJECTIVE AND OBJECTIVE BOX
EP ATTENDING    tele: AV sequential BiV pacing    He denies palpitations, syncope, nor angina, ROS otherwise -  Resting comfortably  DATE OF SERVICE - 01-11-22     Review of Systems:   Constitutional: [ ] fevers, [ ] chills.   Skin: [ ] dry skin. [ ] rashes.  Psychiatric: [ ] depression, [ ] anxiety.   Gastrointestinal: [ ] BRBPR, [ ] melena.   Neurological: [ ] confusion. [ ] seizures. [ ] shuffling gait.   Ears,Nose,Mouth and Throat: [ ] ear pain [ ] sore throat.   Eyes: [ ] diplopia.   Respiratory: [ ] hemoptysis. [ ] shortness of breath  Cardiovascular: See HPI above  Hematologic/Lymphatic: [ ] anemia. [ ] painful nodes. [ ] prolonged bleeding.   Genitourinary: [ ] hematuria. [ ] flank pain.   Endocrine: [ ] significant change in weight. [ ] intolerance to heat and cold.     Review of systems [ x] otherwise negative, [ ] otherwise unable to obtain    FH: no family history of sudden cardiac death in first degree relatives    SH: [ ] tobacco, [ ] alcohol, [ ] drugs    aMIOdarone    Tablet 400 milliGRAM(s) Oral daily  apixaban 2.5 milliGRAM(s) Oral two times a day  aspirin enteric coated 81 milliGRAM(s) Oral daily  atorvastatin 40 milliGRAM(s) Oral at bedtime  dextrose 40% Gel 15 Gram(s) Oral once  dextrose 5%. 1000 milliLiter(s) IV Continuous <Continuous>  dextrose 5%. 1000 milliLiter(s) IV Continuous <Continuous>  dextrose 50% Injectable 25 Gram(s) IV Push once  dextrose 50% Injectable 12.5 Gram(s) IV Push once  dextrose 50% Injectable 25 Gram(s) IV Push once  furosemide    Tablet 40 milliGRAM(s) Oral two times a day  glucagon  Injectable 1 milliGRAM(s) IntraMuscular once  influenza  Vaccine (HIGH DOSE) 0.7 milliLiter(s) IntraMuscular once  insulin glargine Injectable (LANTUS) 10 Unit(s) SubCutaneous at bedtime  insulin lispro (ADMELOG) corrective regimen sliding scale   SubCutaneous three times a day before meals  insulin lispro (ADMELOG) corrective regimen sliding scale   SubCutaneous at bedtime  ketotifen 0.025% Ophthalmic Solution 1 Drop(s) Right EYE two times a day  multivitamin/minerals 1 Tablet(s) Oral daily  pantoprazole    Tablet 40 milliGRAM(s) Oral before breakfast  phytonadione   Solution 5 milliGRAM(s) Oral daily  polyethylene glycol 3350 17 Gram(s) Oral daily  senna 2 Tablet(s) Oral at bedtime                            7.2    10.62 )-----------( 212      ( 11 Jan 2022 09:05 )             21.7       01-11    133<L>  |  92<L>  |  144<H>  ----------------------------<  161<H>  5.2   |  27  |  4.73<H>    Ca    8.4      11 Jan 2022 09:06  Mg     3.1     01-10    TPro  5.1<L>  /  Alb  2.3<L>  /  TBili  1.6<H>  /  DBili  x   /  AST  247<H>  /  ALT  182<H>  /  AlkPhos  208<H>  01-11    T(C): 36.3 (01-11-22 @ 04:18), Max: 36.4 (01-10-22 @ 12:39)  HR: 60 (01-11-22 @ 04:18) (60 - 68)  BP: 101/66 (01-11-22 @ 04:18) (91/51 - 101/66)  RR: 18 (01-11-22 @ 04:18) (18 - 18)  SpO2: 93% (01-11-22 @ 04:18) (92% - 98%)  Wt(kg): --    I&O's Summary    10 Ryan 2022 07:01  -  11 Jan 2022 07:00  --------------------------------------------------------  IN: 540 mL / OUT: 550 mL / NET: -10 mL    General: Well nourished in no acute distress. Alert and Oriented * 3.   Head: Normocephalic and atraumatic.   Neck: No JVD. No bruits. Supple. Does not appear to be enlarged.   Cardiovascular: + S1,S2 ; RRR Soft systolic murmur at the left lower sternal border. No rubs noted.    Lungs: CTA b/l. No rhonchi, rales or wheezes.   Abdomen: + BS, soft. Non tender. Non distended. No rebound. No guarding.   Extremities: No clubbing/cyanosis/edema.   Neurologic: Moves all four extremities. Full range of motion.   Skin: Warm and moist. The patient's skin has normal elasticity and good skin turgor.   Psychiatric: Appropriate mood and affect.  Musculoskeletal: Normal range of motion, normal strength      A/P) He is an extremely pleasant 89 year old male with sick sinus syndrome, paroxysmal atrial fibrillation, complete heart block, and chronic systolic CHF. In June 2014 I implanted a Biotronik biventricular pacemaker. In August 2021 he presented with sustained monomorphic ventricular tachycardia requiring emergency DCCV for rescue therapy. Given the above he was upgraded to a  [employer] BiV-ICD, and the old RV pacing lead was capped and abandoned. Device interrogation demonstrates excellent BiV-ICD function. He denies palpitations, syncope, nor angina, but continues to report ongoing NYHA Class II symptoms of chronic systolic CHF including dyspnea and orthopnea. He is now a/w another CHF exacerbation.    -continue amio 400mg daily for VT secondary prevention.  -continue eliquis 2.5mg bid for lifelong a/c for PAF  -medical therapy for CAD with severe LV dysfunction as per cardiology  -would continue PO lasix as he now appears euvolemic, f/u renal  -f/u with Dr Villatoro after discharge for routine ICD care as scheduled  -f/u with his cardiologist Dr. Danny Vanegas at [facility] after discharge  -no further inpatient EP workup expected as long as he remains free of AF/VT    Marcial Guzmán M.D.  Cardiac Electrophysiology  [phone]

## 2022-01-12 NOTE — CONSULT NOTE ADULT - ASSESSMENT
89 year old man with PMH of CAD s/p PCI (ROBERTO to D1 6/2/14), chronic AF (on AC w/ eliquis), tachy/perla syndrome s/p biV PPM placement, CVA x 2, HFpEF, hospitalized in October 2021 for CHF exacerbation/overload presents for decreased PO intake  for 2 weeks associated with constipation and abdominal pain, generalized weakness worsening to point where he is no longer ambulating and now with c/b mucosa bleeding, rectal bleeding, urethral bleeding, and from PIV site. Hematology consulted for further management. 89 year old man with PMH of CAD s/p PCI (ROBERTO to D1 6/2/14), chronic AF (on AC w/ eliquis), tachy/perla syndrome s/p biV PPM placement, CVA x 2, HFpEF, hospitalized in October 2021 for CHF exacerbation/overload presents for decreased PO intake  for 2 weeks associated with constipation and abdominal pain, generalized weakness worsening to point where he is no longer ambulating and now with c/b mucosa bleeding, rectal bleeding, urethral bleeding, and from PIV site. Hematology consulted for further management.    #Coagulopathy with active bleeding  -Pt on Eliquis, please discontinue for now in the setting of active bleeding and elevated INR  -Will try to reverse Eliquis, last dose 1/11/2022. Half Life appears to be around 12 hours.  -Will administer K Centra 2000 units for now  -There is concern for DIC given coagulopathy, elevated D-dimer, and mucosal bleeding. Pending fibrinogen, haptoglobin, and LDH.  -Agree with pRBC  -Would discontinue ASA given bleeding  -Will review smear  -Daily CBC, trend hgb. INR/PT/PTT q12 hours for now      Please page with questions or concerns. Will Follow with you.      Ryan Soler M.D.  Hematology/Oncology Fellow PGY4  Pager [phone]  After 5pm, please contact on-call team.

## 2022-01-12 NOTE — PROGRESS NOTE ADULT - SUBJECTIVE AND OBJECTIVE BOX
EP ATTENDING    tele: AV sequential BiV pacing    He denies palpitations, syncope, nor angina, ROS otherwise -  Resting comfortably  DATE OF SERVICE - 01-12-22     Review of Systems:   Constitutional: [ ] fevers, [ ] chills.   Skin: [ ] dry skin. [ ] rashes.  Psychiatric: [ ] depression, [ ] anxiety.   Gastrointestinal: [ ] BRBPR, [ ] melena.   Neurological: [ ] confusion. [ ] seizures. [ ] shuffling gait.   Ears,Nose,Mouth and Throat: [ ] ear pain [ ] sore throat.   Eyes: [ ] diplopia.   Respiratory: [ ] hemoptysis. [ ] shortness of breath  Cardiovascular: See HPI above  Hematologic/Lymphatic: [ ] anemia. [ ] painful nodes. [ ] prolonged bleeding.   Genitourinary: [ ] hematuria. [ ] flank pain.   Endocrine: [ ] significant change in weight. [ ] intolerance to heat and cold.     Review of systems [ x] otherwise negative, [ ] otherwise unable to obtain    FH: no family history of sudden cardiac death in first degree relatives    SH: [ ] tobacco, [ ] alcohol, [ ] drugs    albumin human 25% IVPB 100 milliLiter(s) IV Intermittent every 6 hours  aMIOdarone    Tablet 400 milliGRAM(s) Oral daily  aspirin enteric coated 81 milliGRAM(s) Oral daily  atorvastatin 40 milliGRAM(s) Oral at bedtime  dextrose 40% Gel 15 Gram(s) Oral once  dextrose 5%. 1000 milliLiter(s) IV Continuous <Continuous>  dextrose 5%. 1000 milliLiter(s) IV Continuous <Continuous>  dextrose 50% Injectable 25 Gram(s) IV Push once  dextrose 50% Injectable 12.5 Gram(s) IV Push once  dextrose 50% Injectable 25 Gram(s) IV Push once  glucagon  Injectable 1 milliGRAM(s) IntraMuscular once  influenza  Vaccine (HIGH DOSE) 0.7 milliLiter(s) IntraMuscular once  insulin glargine Injectable (LANTUS) 10 Unit(s) SubCutaneous at bedtime  insulin lispro (ADMELOG) corrective regimen sliding scale   SubCutaneous three times a day before meals  insulin lispro (ADMELOG) corrective regimen sliding scale   SubCutaneous at bedtime  ketotifen 0.025% Ophthalmic Solution 1 Drop(s) Right EYE two times a day  lidocaine   4% Patch 1 Patch Transdermal daily  multivitamin/minerals 1 Tablet(s) Oral daily  pantoprazole    Tablet 40 milliGRAM(s) Oral before breakfast  phytonadione   Solution 5 milliGRAM(s) Oral daily  phytonadione  IVPB 5 milliGRAM(s) IV Intermittent once  polyethylene glycol 3350 17 Gram(s) Oral daily  senna 2 Tablet(s) Oral at bedtime                            6.3    8.82  )-----------( 184      ( 12 Jan 2022 06:47 )             18.3       01-12    133<L>  |  92<L>  |  153<H>  ----------------------------<  73  5.7<H>   |  25  |  4.94<H>    Ca    8.4      12 Jan 2022 06:47    TPro  5.1<L>  /  Alb  2.3<L>  /  TBili  1.6<H>  /  DBili  x   /  AST  247<H>  /  ALT  182<H>  /  AlkPhos  208<H>  01-11      T(C): 36.3 (01-12-22 @ 04:24), Max: 36.4 (01-11-22 @ 12:30)  HR: 60 (01-12-22 @ 04:24) (60 - 62)  BP: 98/59 (01-12-22 @ 04:24) (91/50 - 98/59)  RR: 17 (01-12-22 @ 04:24) (17 - 18)  SpO2: 95% (01-12-22 @ 04:24) (92% - 95%)  Wt(kg): --    I&O's Summary    11 Jan 2022 07:01  -  12 Jan 2022 07:00  --------------------------------------------------------  IN: 320 mL / OUT: 500 mL / NET: -180 mL      General: Well nourished in no acute distress. Alert and Oriented * 3.   Head: Normocephalic and atraumatic.   Neck: No JVD. No bruits. Supple. Does not appear to be enlarged.   Cardiovascular: + S1,S2 ; RRR Soft systolic murmur at the left lower sternal border. No rubs noted.    Lungs: CTA b/l. No rhonchi, rales or wheezes.   Abdomen: + BS, soft. Non tender. Non distended. No rebound. No guarding.   Extremities: No clubbing/cyanosis/edema.   Neurologic: Moves all four extremities. Full range of motion.   Skin: Warm and moist. The patient's skin has normal elasticity and good skin turgor.   Psychiatric: Appropriate mood and affect.  Musculoskeletal: Normal range of motion, normal strength      A/P) He is an extremely pleasant 89 year old male with sick sinus syndrome, paroxysmal atrial fibrillation, complete heart block, and chronic systolic CHF. In June 2014 I implanted a Biotronik biventricular pacemaker. In August 2021 he presented with sustained monomorphic ventricular tachycardia requiring emergency DCCV for rescue therapy. Given the above he was upgraded to a  [employer] BiV-ICD, and the old RV pacing lead was capped and abandoned. Device interrogation demonstrates excellent BiV-ICD function. He denies palpitations, syncope, nor angina, but continues to report ongoing NYHA Class II symptoms of chronic systolic CHF including dyspnea and orthopnea. He is now a/w another CHF exacerbation.    -continue amio 400mg daily for VT secondary prevention.  -continue eliquis 2.5mg bid for lifelong a/c for PAF  -medical therapy for CAD with severe LV dysfunction as per cardiology  -would continue PO lasix as he now appears euvolemic, f/u renal  -f/u with Dr Villatoro after discharge for routine ICD care as scheduled  -f/u with his cardiologist Dr. Danny Vanegas at [facility] after discharge  -no further inpatient EP workup expected as long as he remains free of AF/VT    Marcial Guzmán M.D.  Cardiac Electrophysiology  [phone]

## 2022-01-12 NOTE — CONSULT NOTE ADULT - SUBJECTIVE AND OBJECTIVE BOX
Hematology/Oncology Consult Note    HPI:  89 year old man PMH of CAD s/p PCI (ROBERTO to D1 6/2/14), chronic AF (on AC w/ eliquis), tachy/perla syndrome s/p biV PPM placement, CVA x 2, HFpEF, hospitalized in October 2021 for CHF exacerbation/overload presents to ED BIB son from home for decreased PO intake x2 weeks associated with constipation and abdominal pain, generalized weakness worsening to point where he is no longer ambulating and now w/ b/l LE edema and orthopnea. Denies f/c, cough, congestion. (07 Jan 2022 22:19)      Allergies: No Known Allergies        MEDICATIONS  (STANDING):  albumin human 25% IVPB 100 milliLiter(s) IV Intermittent every 6 hours  aMIOdarone    Tablet 400 milliGRAM(s) Oral daily  aspirin enteric coated 81 milliGRAM(s) Oral daily  atorvastatin 40 milliGRAM(s) Oral at bedtime  dextrose 40% Gel 15 Gram(s) Oral once  dextrose 5%. 1000 milliLiter(s) (50 mL/Hr) IV Continuous <Continuous>  dextrose 5%. 1000 milliLiter(s) (100 mL/Hr) IV Continuous <Continuous>  dextrose 50% Injectable 25 Gram(s) IV Push once  dextrose 50% Injectable 12.5 Gram(s) IV Push once  dextrose 50% Injectable 25 Gram(s) IV Push once  glucagon  Injectable 1 milliGRAM(s) IntraMuscular once  influenza  Vaccine (HIGH DOSE) 0.7 milliLiter(s) IntraMuscular once  insulin glargine Injectable (LANTUS) 10 Unit(s) SubCutaneous at bedtime  insulin lispro (ADMELOG) corrective regimen sliding scale   SubCutaneous three times a day before meals  insulin lispro (ADMELOG) corrective regimen sliding scale   SubCutaneous at bedtime  ketotifen 0.025% Ophthalmic Solution 1 Drop(s) Right EYE two times a day  lidocaine   4% Patch 1 Patch Transdermal daily  multivitamin/minerals 1 Tablet(s) Oral daily  pantoprazole    Tablet 40 milliGRAM(s) Oral before breakfast  polyethylene glycol 3350 17 Gram(s) Oral daily  senna 2 Tablet(s) Oral at bedtime    MEDICATIONS  (PRN):      PAST MEDICAL & SURGICAL HISTORY:  Other and Unspecified Hyperlipidemia  CVA (Cerebral Infarction)  x2 in the past with residual Rt handed numbness and a little word finding trouble  Hypertension  Atrial fibrillation  Anemia  CAD (coronary artery disease)  History of ischemic cardiomyopathy  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 No nausea, vomiting, or hematemesis; No diarrhea or constipation. No melena or hematochezia.  GENITOURINARY: No dysuria, frequency or hematuria  NEUROLOGICAL: No numbness or weakness  SKIN: No itching, burning, rashes, or lesions   All other review of systems is negative unless indicated above.        T(F): 97.6 (01-12-22 @ 11:37), Max: 97.6 (01-12-22 @ 11:37)  HR: 60 (01-12-22 @ 11:37) (60 - 60)  BP: 98/61 (01-12-22 @ 11:37)  RR: 17 (01-12-22 @ 11:37)  SpO2: 93% (01-12-22 @ 11:37) (93% - 95%)    Physical Exam  GENERAL: mild distress  HEAD:  Atraumatic, Normocephalic  EYES: EOMI, PERRLA, conjunctiva and sclera clear  Mouth: dried blood on lip mucosa and gum bleeding  NECK: Supple, No JVD  CHEST/LUNG: Clear to auscultation bilaterally; No wheeze  HEART: Regular rate and rhythm; No murmurs, rubs, or gallops  ABDOMEN: Soft, Nontender, Nondistended; Bowel sounds present  Rectal: rectum visualized with active bleeding  EXTREMITIES:  2+ Peripheral Pulses, No clubbing, cyanosis, or edema  NEUROLOGY: non-focal  SKIN: No rashes or lesions. Several ecchymosis b/l UE with active bleeding from PIV on RUE                          6.3    8.82  )-----------( 184      ( 12 Jan 2022 06:47 )             18.3       01-12    133<L>  |  92<L>  |  153<H>  ----------------------------<  73  5.7<H>   |  25  |  4.94<H>    Ca    8.4      12 Jan 2022 06:47    TPro  5.1<L>  /  Alb  2.3<L>  /  TBili  1.6<H>  /  DBili  x   /  AST  247<H>  /  ALT  182<H>  /  AlkPhos  208<H>  01-11      Lactate Dehydrogenase, Serum: 553 U/L (01-12 @ 11:21)      Folate, Serum: 10.5 ng/mL (01-08-22 @ 09:55)  Vitamin B12, Serum: >2000 pg/mL (01-08-22 @ 09:55)      Imaging:  < from: CT Chest No Cont (01.07.22 @ 17:11) >  IMPRESSION:  Moderate bilateral pleural effusions.  Likely pulmonary edema. Additional small nodular opacities in the right   upper lobe may be related to edema however follow-up imaging is   recommended to ensure resolution and exclude other etiologies.  No bowel obstruction or acute intra-abdominal pathology.      < from: Xray Abdomen 1 View PORTABLE -Urgent (Xray Abdomen 1 View PORTABLE -Urgent .) (10.22.21 @ 17:48) >  IMPRESSION:  No dilated bowel

## 2022-01-12 NOTE — PROGRESS NOTE ADULT - ASSESSMENT
89 y/o M PMH of CAD s/p PCI (ROBERTO to D1 6/2/14), chronic AF (on AC w/ eliquis), tachy/perla syndrome s/p biV PPM placement, CVA x 2, HFpEF, hospitalized in October 2021 for CHF exacerbation/overload presents to ED BIB son from home for decreased PO intake x2 weeks associated with constipation and abdominal pain, generalized weakness worsening to point where he is no longer ambulating and now w/ b/l LE edema and orthopnea. Denies f/c, cough, congestion.    acute on chronic diastolic congestive heart failure  - Continue PO lasix if okay with renal, cr uptrending  - Coreg on hold  - DISContinue AC with Eliquis due to acute bleeding  - Continue medical management of CAD with ASA/Statin  - EP eval appreciated - Continue Amio 400mg daily for prior VT  - Repeat TTE noted with normal LV function, mod pulm HTN  - Of note, family declined cath and preferred medical management of CAD prior admission  - F/U with his cardiologist Dr. Vanegas for cardiology after discharge    FLOYD pre renal +/- cardiorenal +/- Obstruction  b/l Cr 1.87 12/17/21, 1 mth ago was 2.5mg/dl  c/w river- trend urineoutput  Cr continues to rise- UA and URine lytes reviewed Showing intravascular depletion--> lasix was discontinued  Given GIB and low hgb--> Agree with 2 units prbc  reverse coagulopathy--> f/u Heme and GI  c/w albumin for now  as per family wishes --> IF HD is needed then will pursue  If situation does not improve by tomm will strongly consider started HD if pt is medically stable  - I/o's, daily weights, daily BMP  -avoid NSAIDs/ACEi/ARB/nephrotoxics/iv contrast  low Na in diet, fluid restriction 1L/day   dose all meds for eGFR<15ml/min  - nephrology following    Coagulopathy with active bleeding  -Pt on Eliquis, please discontinue for now in the setting of active bleeding and elevated INR  -Will try to reverse Eliquis, last dose 1/11/2022. Half Life appears to be around 12 hours.  -Will administer K Centra 2000 units for now  -There is concern for DIC given coagulopathy, elevated D-dimer, and mucosal bleeding. Pending fibrinogen, haptoglobin, and LDH.  -Agree with pRBC  -Would discontinue ASA given bleeding  -Daily CBC, trend hgb. INR/PT/PTT q12 hours for now  - vit k     elevated liver enzymes worsening  - likely 2/2 to CHF    anemia  - transfuse 2 units of prbc  - likely 2/2 ckd and iron def  - s/p  iv iron    had blood tinged BM  - due to multiple comorbidities and poor health will not pursue GI eval at this time unless the bleeding worsens    diabetes  controlled  - fs qid  - hgb a1c  - insulin sliding scale  - Lantus 10 units qhs    unstageable pressure ulcer present on admission  - optimize nutrition  - off loading  - T&P q 2 hours  - wound care consult    afib  - c/w amio  - d/c  eliquis    constipation  - senna and miralax    dispo  - JENNIFER

## 2022-01-12 NOTE — CONSULT NOTE ADULT - ATTENDING COMMENTS
89 year old man with PMH of CAD s/p PCI (ROBERTO to D1 6/2/14), chronic AF (on AC w/ eliquis), tachy/perla syndrome s/p biV PPM placement, CVA x 2, HFpEF, hospitalized in October 2021 for CHF exacerbation/overload presents for decreased PO intake  for 2 weeks associated with constipation and abdominal pain, generalized weakness worsening to point where he is no longer ambulating and now with c/b mucosa bleeding, rectal bleeding, urethral bleeding, and from PIV site. Hematology consulted for further management.    #Coagulopathy with active bleeding  -Pt on Eliquis, please discontinue for now in the setting of active bleeding and elevated INR  -Recommended  K Centra 2000 units, will reevaluate response.   -There is concern for DIC given coagulopathy, elevated D-dimer, and mucosal bleeding. Pending fibrinogen, haptoglobin, and LDH.  -Agree with pRBC  -Would discontinue ASA given bleeding  -Daily CBC, trend hgb. INR/PT/PTT q12 hours for now  will follow.

## 2022-01-12 NOTE — PROGRESS NOTE ADULT - SUBJECTIVE AND OBJECTIVE BOX
C A R D I O L O G Y  **********************************     DATE OF SERVICE: 01-12-22    Has blood tinged sputum. Denies chest pain or shortness of breath.   Review of systems otherwise (-)      MEDICATIONS  (STANDING):  albumin human 25% IVPB 100 milliLiter(s) IV Intermittent every 6 hours  aMIOdarone    Tablet 400 milliGRAM(s) Oral daily  aspirin enteric coated 81 milliGRAM(s) Oral daily  atorvastatin 40 milliGRAM(s) Oral at bedtime  dextrose 40% Gel 15 Gram(s) Oral once  dextrose 5%. 1000 milliLiter(s) (50 mL/Hr) IV Continuous <Continuous>  dextrose 5%. 1000 milliLiter(s) (100 mL/Hr) IV Continuous <Continuous>  dextrose 50% Injectable 25 Gram(s) IV Push once  dextrose 50% Injectable 12.5 Gram(s) IV Push once  dextrose 50% Injectable 25 Gram(s) IV Push once  glucagon  Injectable 1 milliGRAM(s) IntraMuscular once  influenza  Vaccine (HIGH DOSE) 0.7 milliLiter(s) IntraMuscular once  insulin glargine Injectable (LANTUS) 10 Unit(s) SubCutaneous at bedtime  insulin lispro (ADMELOG) corrective regimen sliding scale   SubCutaneous three times a day before meals  insulin lispro (ADMELOG) corrective regimen sliding scale   SubCutaneous at bedtime  ketotifen 0.025% Ophthalmic Solution 1 Drop(s) Right EYE two times a day  lidocaine   4% Patch 1 Patch Transdermal daily  multivitamin/minerals 1 Tablet(s) Oral daily  pantoprazole    Tablet 40 milliGRAM(s) Oral before breakfast  polyethylene glycol 3350 17 Gram(s) Oral daily  senna 2 Tablet(s) Oral at bedtime    MEDICATIONS  (PRN):      LABS:                          6.3    8.82  )-----------( 184      ( 12 Jan 2022 06:47 )             18.3     Hemoglobin: 6.3 g/dL (01-12 @ 06:47)  Hemoglobin: 7.2 g/dL (01-11 @ 09:05)  Hemoglobin: 7.8 g/dL (01-10 @ 07:27)  Hemoglobin: 7.4 g/dL (01-09 @ 07:15)  Hemoglobin: 7.7 g/dL (01-08 @ 07:32)    01-12    133<L>  |  92<L>  |  153<H>  ----------------------------<  73  5.7<H>   |  25  |  4.94<H>    Ca    8.4      12 Jan 2022 06:47    TPro  5.1<L>  /  Alb  2.3<L>  /  TBili  1.6<H>  /  DBili  x   /  AST  247<H>  /  ALT  182<H>  /  AlkPhos  208<H>  01-11    Creatinine Trend: 4.94<--, 4.73<--, 4.37<--, 4.19<--, 3.63<--, 3.32<--   PT/INR - ( 12 Jan 2022 06:47 )   PT: 68.8 sec;   INR: 6.26 ratio         PTT - ( 12 Jan 2022 06:47 )  PTT:38.0 sec          01-11-22 @ 07:01  -  01-12-22 @ 07:00  --------------------------------------------------------  IN: 320 mL / OUT: 500 mL / NET: -180 mL        PHYSICAL EXAM  Vital Signs Last 24 Hrs  T(C): 36.3 (12 Jan 2022 04:24), Max: 36.4 (11 Jan 2022 12:30)  T(F): 97.3 (12 Jan 2022 04:24), Max: 97.5 (11 Jan 2022 12:30)  HR: 60 (12 Jan 2022 04:24) (60 - 62)  BP: 98/59 (12 Jan 2022 04:24) (91/50 - 98/59)  BP(mean): 67 (11 Jan 2022 12:30) (67 - 67)  RR: 17 (12 Jan 2022 04:24) (17 - 18)  SpO2: 95% (12 Jan 2022 04:24) (92% - 95%)      Gen: NAD  HEENT:  (-)icterus (-)pallor  CV: N S1 S2 1/6 ELLY (+)2 Pulses B/l  Resp:  Clear to auscultation B/L, normal effort  GI: (+) BS Soft, NT, ND  Lymph:  (-)Edema, (-)obvious lymphadenopathy  Skin: Warm to touch, Normal turgor  Psych: Appropriate mood and affect      TELEMETRY:  60      ASSESSMENT/PLAN: Patient is a 89 y/o male with PMH of CAD s/p PCI (ROBERTO to D1 6/2/14), chronic AF (on AC w/ eliquis), 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 Cardiology consulted for further evaluation.    - renal f/u noted, trial of albumin  - may need HD at this point  - Coreg on hold  - AC with Eliquis for AF/Stroke prevention on hold given acute anemia and supratherapeutic INR, pending PRBC and s/p Kcentra  - Continue medical management of CAD with ASA/Statin  - EP eval appreciated - Continue Amio 400mg daily for prior VT  - Repeat TTE noted with normal LV function, mod pulm HTN  - Of note, family declined cath and preferred medical management of CAD prior admission  - F/U with his cardiologist Dr. Vanegas for cardiology after discharge    Ismael Figueroa PA-C  Pager: [phone]

## 2022-01-12 NOTE — PROGRESS NOTE ADULT - ASSESSMENT
89 y/o M PMH of CAD s/p PCI (ROBERTO to D1 6/2/14), chronic AF (on AC w/ eliquis), tachy/perla syndrome s/p biV PPM placement, CVA x 2, HFpEF, hospitalized in October 2021 for CHF exacerbation/overload presents to ED BIB son from home for decreased PO intake x2 weeks associated with constipation and abdominal pain, generalized weakness worsening to point where he is no longer ambulating and now w/ b/l LE edema, SOB and orthopnea. Renal consulted for for FLOYD/CKD Mx.     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  FLOYD pre renal +/- cardiorenal +/- Obstruction  b/l Cr 1.87 12/17/21, 1 mth ago was 2.5mg/dl  c/w river- trend urineoutput  Cr continues to rise- UA and URine lytes reviewed Showing intravascular depletion--> lasix was discontinued  Given GIB and low hgb--> Agree with 2 units prbc  reverse coagulopathy--> f/u Heme and GI  c/w albumin for now  after discussing with son and patient --> IF HD is needed then will pursue  If situation does not improve by tomm will strongly consider started HD if pt is medically stable  - I/o's, daily weights, daily BMP  -avoid NSAIDs/ACEi/ARB/nephrotoxics/iv contrast  low Na in diet, fluid restriction 1L/day   dose all meds for eGFR<15ml/min    Acute diastolic congestive heart failure  - Coreg on hold  d/c lasix  - cardiology eval    Hypertension. bp low, asympt. watch closely  afib- c/w amio, off eliquis 2/2 high inr and bleeding  d/w son Gurpreet Huynh  [phone]   89 y/o M PMH of CAD s/p PCI (ROBERTO to D1 6/2/14), chronic AF (on AC w/ eliquis), tachy/perla syndrome s/p biV PPM placement, CVA x 2, HFpEF, hospitalized in October 2021 for CHF exacerbation/overload presents to ED BIB son from home for decreased PO intake x2 weeks associated with constipation and abdominal pain, generalized weakness worsening to point where he is no longer ambulating and now w/ b/l LE edema, SOB and orthopnea. Renal consulted for for FLOYD/CKD Mx.     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  FLOYD pre renal +/- cardiorenal +/- Obstruction  b/l Cr 1.87 12/17/21, 1 mth ago was 2.5mg/dl  c/w river- trend urineoutput  Cr continues to rise- UA and URine lytes reviewed Showing intravascular depletion--> lasix was discontinued  Given GIB and low hgb--> Agree with 2 units prbc  reverse coagulopathy--> f/u Heme and GI  c/w albumin for now  after discussing with son and patient --> IF HD is needed then will pursue  If situation does not improve by tomm will strongly consider started HD if pt is medically stable  - I/o's, daily weights, daily BMP  -avoid NSAIDs/ACEi/ARB/nephrotoxics/iv contrast  low Na in diet, fluid restriction 1L/day   dose all meds for eGFR<15ml/min    Hyperkalemia  lokelma 10grams po x 2 doses  with prbc and albumin- forward flow should help  trend k    Acute diastolic congestive heart failure  - Coreg on hold  d/c lasix  - cardiology eval    Hypertension. bp low, asympt. watch closely  afib- c/w amio, off eliquis 2/2 high inr and bleeding  d/w son Gurprete Huynh  [phone]

## 2022-01-12 NOTE — PROGRESS NOTE ADULT - SUBJECTIVE AND OBJECTIVE BOX
Patient seen and examined  appears lethargic  has been having a GIB    No Known Allergies    Hospital Medications:   MEDICATIONS  (STANDING):  albumin human 25% IVPB 100 milliLiter(s) IV Intermittent every 6 hours  aMIOdarone    Tablet 400 milliGRAM(s) Oral daily  aspirin enteric coated 81 milliGRAM(s) Oral daily  atorvastatin 40 milliGRAM(s) Oral at bedtime  dextrose 40% Gel 15 Gram(s) Oral once  dextrose 5%. 1000 milliLiter(s) (50 mL/Hr) IV Continuous <Continuous>  dextrose 5%. 1000 milliLiter(s) (100 mL/Hr) IV Continuous <Continuous>  dextrose 50% Injectable 25 Gram(s) IV Push once  dextrose 50% Injectable 12.5 Gram(s) IV Push once  dextrose 50% Injectable 25 Gram(s) IV Push once  glucagon  Injectable 1 milliGRAM(s) IntraMuscular once  influenza  Vaccine (HIGH DOSE) 0.7 milliLiter(s) IntraMuscular once  insulin glargine Injectable (LANTUS) 10 Unit(s) SubCutaneous at bedtime  insulin lispro (ADMELOG) corrective regimen sliding scale   SubCutaneous three times a day before meals  insulin lispro (ADMELOG) corrective regimen sliding scale   SubCutaneous at bedtime  ketotifen 0.025% Ophthalmic Solution 1 Drop(s) Right EYE two times a day  lidocaine   4% Patch 1 Patch Transdermal daily  multivitamin/minerals 1 Tablet(s) Oral daily  pantoprazole    Tablet 40 milliGRAM(s) Oral before breakfast  phytonadione  IVPB 10 milliGRAM(s) IV Intermittent once  polyethylene glycol 3350 17 Gram(s) Oral daily  senna 2 Tablet(s) Oral at bedtime  sodium zirconium cyclosilicate 10 Gram(s) Oral every 8 hours      VITALS:  T(F): 97.6 (22 @ 11:37), Max: 97.6 (22 @ 11:37)  HR: 60 (22 @ 11:37)  BP: 98/61 (22 @ 11:37)  RR: 17 (22 @ 11:37)  SpO2: 93% (22 @ 11:37)  Wt(kg): --     @ 07:01  -   @ 07:00  --------------------------------------------------------  IN: 320 mL / OUT: 500 mL / NET: -180 mL        PHYSICAL EXAM:  Constitutional: NAD  HEENT: anicteric sclera  Neck: No JVD  Respiratory: bibasilar crepts+   Cardiovascular: S1, S2, RRR  Gastrointestinal: BS+, soft, NT/ND  Extremities: 1+ peripheral edema b/l  Neurological: A/O x 3, no focal deficits    LABS:      133<L>  |  92<L>  |  153<H>  ----------------------------<  73  5.7<H>   |  25  |  4.94<H>    Ca    8.4      2022 06:47    TPro  5.1<L>  /  Alb  2.3<L>  /  TBili  1.6<H>  /  DBili      /  AST  247<H>  /  ALT  182<H>  /  AlkPhos  208<H>      Creatinine Trend: 4.94 <--, 4.73 <--, 4.37 <--, 4.19 <--, 3.63 <--, 3.32 <--, 3.23 <--                        6.3    8.82  )-----------( 184      ( 2022 06:47 )             18.3     Urine Studies:  Urinalysis Basic - ( 2022 14:36 )    Color: Yellow / Appearance: Slightly Turbid / S.014 / pH:   Gluc:  / Ketone: Negative  / Bili: Negative / Urobili: 4 mg/dL   Blood:  / Protein: 30 mg/dL / Nitrite: Negative   Leuk Esterase: Large / RBC: 35 /hpf /  /HPF   Sq Epi:  / Non Sq Epi: 2 /hpf / Bacteria: Negative      Potassium, Random Urine: 56 mmol/L ( @ 14:36)  Osmolality, Random Urine: 375 mos/kg ( @ 14:36)  Sodium, Random Urine: 7 mmol/L ( @ 14:36)  Chloride, Random Urine: <20 mmol/L ( @ 14:36)  Creatinine, Random Urine: 50 mg/dL ( @ 14:36)    RADIOLOGY & ADDITIONAL STUDIES:     Patient seen and examined  appears lethargic  has been having a GIB    No Known Allergies    Hospital Medications:   MEDICATIONS  (STANDING):  albumin human 25% IVPB 100 milliLiter(s) IV Intermittent every 6 hours  aMIOdarone    Tablet 400 milliGRAM(s) Oral daily  aspirin enteric coated 81 milliGRAM(s) Oral daily  atorvastatin 40 milliGRAM(s) Oral at bedtime  dextrose 40% Gel 15 Gram(s) Oral once  dextrose 5%. 1000 milliLiter(s) (50 mL/Hr) IV Continuous <Continuous>  dextrose 5%. 1000 milliLiter(s) (100 mL/Hr) IV Continuous <Continuous>  dextrose 50% Injectable 25 Gram(s) IV Push once  dextrose 50% Injectable 12.5 Gram(s) IV Push once  dextrose 50% Injectable 25 Gram(s) IV Push once  glucagon  Injectable 1 milliGRAM(s) IntraMuscular once  influenza  Vaccine (HIGH DOSE) 0.7 milliLiter(s) IntraMuscular once  insulin glargine Injectable (LANTUS) 10 Unit(s) SubCutaneous at bedtime  insulin lispro (ADMELOG) corrective regimen sliding scale   SubCutaneous three times a day before meals  insulin lispro (ADMELOG) corrective regimen sliding scale   SubCutaneous at bedtime  ketotifen 0.025% Ophthalmic Solution 1 Drop(s) Right EYE two times a day  lidocaine   4% Patch 1 Patch Transdermal daily  multivitamin/minerals 1 Tablet(s) Oral daily  pantoprazole    Tablet 40 milliGRAM(s) Oral before breakfast  phytonadione  IVPB 10 milliGRAM(s) IV Intermittent once  polyethylene glycol 3350 17 Gram(s) Oral daily  senna 2 Tablet(s) Oral at bedtime  sodium zirconium cyclosilicate 10 Gram(s) Oral every 8 hours      VITALS:  T(F): 97.6 (22 @ 11:37), Max: 97.6 (22 @ 11:37)  HR: 60 (22 @ 11:37)  BP: 98/61 (22 @ 11:37)  RR: 17 (22 @ 11:37)  SpO2: 93% (22 @ 11:37)  Wt(kg): --     @ 07:01  -   @ 07:00  --------------------------------------------------------  IN: 320 mL / OUT: 500 mL / NET: -180 mL        PHYSICAL EXAM:  Constitutional: NAD  HEENT: anicteric sclera  Neck: No JVD  Respiratory: bibasilar crepts+   Cardiovascular: S1, S2, RRR  Gastrointestinal: BS+, soft, NT/ND  Extremities: 1+ edema upper b/l ext  Neurological: A/O x 1, no focal deficits    LABS:      133<L>  |  92<L>  |  153<H>  ----------------------------<  73  5.7<H>   |  25  |  4.94<H>    Ca    8.4      2022 06:47    TPro  5.1<L>  /  Alb  2.3<L>  /  TBili  1.6<H>  /  DBili      /  AST  247<H>  /  ALT  182<H>  /  AlkPhos  208<H>      Creatinine Trend: 4.94 <--, 4.73 <--, 4.37 <--, 4.19 <--, 3.63 <--, 3.32 <--, 3.23 <--                        6.3    8.82  )-----------( 184      ( 2022 06:47 )             18.3     Urine Studies:  Urinalysis Basic - ( 2022 14:36 )    Color: Yellow / Appearance: Slightly Turbid / S.014 / pH:   Gluc:  / Ketone: Negative  / Bili: Negative / Urobili: 4 mg/dL   Blood:  / Protein: 30 mg/dL / Nitrite: Negative   Leuk Esterase: Large / RBC: 35 /hpf /  /HPF   Sq Epi:  / Non Sq Epi: 2 /hpf / Bacteria: Negative      Potassium, Random Urine: 56 mmol/L ( @ 14:36)  Osmolality, Random Urine: 375 mos/kg ( @ 14:36)  Sodium, Random Urine: 7 mmol/L ( @ 14:36)  Chloride, Random Urine: <20 mmol/L ( @ 14:36)  Creatinine, Random Urine: 50 mg/dL ( @ 14:36)    RADIOLOGY & ADDITIONAL STUDIES:

## 2022-01-12 NOTE — PROGRESS NOTE ADULT - SUBJECTIVE AND OBJECTIVE BOX
DATE OF SERVICE: 22 @ 15:09    Patient is a 89y old  Male who presents with a chief complaint of     SUBJECTIVE / OVERNIGHT EVENTS:  pt having rectal and gum bleeding    MEDICATIONS  (STANDING):  albumin human 25% IVPB 100 milliLiter(s) IV Intermittent every 6 hours  aMIOdarone    Tablet 400 milliGRAM(s) Oral daily  aspirin enteric coated 81 milliGRAM(s) Oral daily  atorvastatin 40 milliGRAM(s) Oral at bedtime  dextrose 40% Gel 15 Gram(s) Oral once  dextrose 5%. 1000 milliLiter(s) (50 mL/Hr) IV Continuous <Continuous>  dextrose 5%. 1000 milliLiter(s) (100 mL/Hr) IV Continuous <Continuous>  dextrose 50% Injectable 25 Gram(s) IV Push once  dextrose 50% Injectable 12.5 Gram(s) IV Push once  dextrose 50% Injectable 25 Gram(s) IV Push once  glucagon  Injectable 1 milliGRAM(s) IntraMuscular once  influenza  Vaccine (HIGH DOSE) 0.7 milliLiter(s) IntraMuscular once  insulin glargine Injectable (LANTUS) 10 Unit(s) SubCutaneous at bedtime  insulin lispro (ADMELOG) corrective regimen sliding scale   SubCutaneous three times a day before meals  insulin lispro (ADMELOG) corrective regimen sliding scale   SubCutaneous at bedtime  ketotifen 0.025% Ophthalmic Solution 1 Drop(s) Right EYE two times a day  lidocaine   4% Patch 1 Patch Transdermal daily  multivitamin/minerals 1 Tablet(s) Oral daily  pantoprazole    Tablet 40 milliGRAM(s) Oral before breakfast  phytonadione  IVPB 10 milliGRAM(s) IV Intermittent once  polyethylene glycol 3350 17 Gram(s) Oral daily  senna 2 Tablet(s) Oral at bedtime  sodium zirconium cyclosilicate 10 Gram(s) Oral every 8 hours    MEDICATIONS  (PRN):      Vital Signs Last 24 Hrs  T(C): 36.4 (2022 11:37), Max: 36.4 (2022 11:37)  T(F): 97.6 (2022 11:37), Max: 97.6 (2022 11:37)  HR: 60 (2022 11:37) (60 - 60)  BP: 98/61 (2022 11:37) (91/50 - 98/61)  BP(mean): 74 (2022 11:37) (74 - 74)  RR: 17 (2022 11:37) (17 - 17)  SpO2: 93% (2022 11:37) (93% - 95%)  CAPILLARY BLOOD GLUCOSE      POCT Blood Glucose.: 125 mg/dL (2022 12:41)  POCT Blood Glucose.: 90 mg/dL (2022 08:38)  POCT Blood Glucose.: 157 mg/dL (2022 21:22)  POCT Blood Glucose.: 152 mg/dL (2022 17:24)    I&O's Summary    2022 07:01  -  2022 07:00  --------------------------------------------------------  IN: 320 mL / OUT: 500 mL / NET: -180 mL        PHYSICAL EXAM:  GENERAL: NAD, well-developed  HEAD:  Atraumatic, Normocephalic  EYES: EOMI, PERRLA, conjunctiva and sclera clear  NECK: Supple, No JVD  CHEST/LUNG: Clear to auscultation bilaterally; No wheeze  HEART: Regular rate and rhythm; No murmurs, rubs, or gallops  ABDOMEN: Soft, Nontender, Nondistended; Bowel sounds present  EXTREMITIES:  2+ Peripheral Pulses, No clubbing, cyanosis, or edema  PSYCH: AAOx3  NEUROLOGY: non-focal  SKIN: unstageable sacral ulcer    LABS:                        6.3    8.82  )-----------( 184      ( 2022 06:47 )             18.3     12    133<L>  |  92<L>  |  153<H>  ----------------------------<  73  5.7<H>   |  25  |  4.94<H>    Ca    8.4      2022 06:47    TPro  5.1<L>  /  Alb  2.3<L>  /  TBili  1.6<H>  /  DBili  x   /  AST  247<H>  /  ALT  182<H>  /  AlkPhos  208<H>  01-11    PT/INR - ( 2022 06:47 )   PT: 68.8 sec;   INR: 6.26 ratio         PTT - ( 2022 06:47 )  PTT:38.0 sec      Urinalysis Basic - ( 2022 14:36 )    Color: Yellow / Appearance: Slightly Turbid / S.014 / pH: x  Gluc: x / Ketone: Negative  / Bili: Negative / Urobili: 4 mg/dL   Blood: x / Protein: 30 mg/dL / Nitrite: Negative   Leuk Esterase: Large / RBC: 35 /hpf /  /HPF   Sq Epi: x / Non Sq Epi: 2 /hpf / Bacteria: Negative        RADIOLOGY & ADDITIONAL TESTS:    Imaging Personally Reviewed:    Consultant(s) Notes Reviewed:      Care Discussed with Consultants/Other Providers:

## 2022-01-13 NOTE — PROGRESS NOTE ADULT - SUBJECTIVE AND OBJECTIVE BOX
EP ATTENDING    tele: AV sequential BiV pacing  lethargic today, refuses interview  DATE OF SERVICE - 01-13-22     Review of Systems:   Constitutional: [ ] fevers, [ ] chills.   Skin: [ ] dry skin. [ ] rashes.  Psychiatric: [ ] depression, [ ] anxiety.   Gastrointestinal: [ ] BRBPR, [ ] melena.   Neurological: [ ] confusion. [ ] seizures. [ ] shuffling gait.   Ears,Nose,Mouth and Throat: [ ] ear pain [ ] sore throat.   Eyes: [ ] diplopia.   Respiratory: [ ] hemoptysis. [ ] shortness of breath  Cardiovascular: See HPI above  Hematologic/Lymphatic: [ ] anemia. [ ] painful nodes. [ ] prolonged bleeding.   Genitourinary: [ ] hematuria. [ ] flank pain.   Endocrine: [ ] significant change in weight. [ ] intolerance to heat and cold.     Review of systems [ x] otherwise negative, [ ] otherwise unable to obtain    FH: no family history of sudden cardiac death in first degree relatives    SH: [ ] tobacco, [ ] alcohol, [ ] drugs    albumin human 25% IVPB 100 milliLiter(s) IV Intermittent every 6 hours  aMIOdarone    Tablet 400 milliGRAM(s) Oral daily  atorvastatin 40 milliGRAM(s) Oral at bedtime  dextrose 40% Gel 15 Gram(s) Oral once  dextrose 5%. 1000 milliLiter(s) IV Continuous <Continuous>  dextrose 5%. 1000 milliLiter(s) IV Continuous <Continuous>  dextrose 50% Injectable 25 Gram(s) IV Push once  dextrose 50% Injectable 12.5 Gram(s) IV Push once  dextrose 50% Injectable 25 Gram(s) IV Push once  glucagon  Injectable 1 milliGRAM(s) IntraMuscular once  influenza  Vaccine (HIGH DOSE) 0.7 milliLiter(s) IntraMuscular once  insulin glargine Injectable (LANTUS) 10 Unit(s) SubCutaneous at bedtime  insulin lispro (ADMELOG) corrective regimen sliding scale   SubCutaneous three times a day before meals  insulin lispro (ADMELOG) corrective regimen sliding scale   SubCutaneous at bedtime  ketotifen 0.025% Ophthalmic Solution 1 Drop(s) Right EYE two times a day  lidocaine   4% Patch 2 Patch Transdermal every 24 hours  multivitamin/minerals 1 Tablet(s) Oral daily  pantoprazole  Injectable 40 milliGRAM(s) IV Push every 12 hours  polyethylene glycol 3350 17 Gram(s) Oral daily  senna 2 Tablet(s) Oral at bedtime  sodium zirconium cyclosilicate 10 Gram(s) Oral every 8 hours                            7.5    8.44  )-----------( 153      ( 13 Jan 2022 05:49 )             21.9       01-13    133<L>  |  91<L>  |  160<H>  ----------------------------<  172<H>  6.2<HH>   |  25  |  4.97<H>    Ca    8.5      13 Jan 2022 03:39  Phos  4.9     01-13  Mg     3.2     01-13    TPro  5.7<L>  /  Alb  3.5  /  TBili  2.6<H>  /  DBili  x   /  AST  278<H>  /  ALT  164<H>  /  AlkPhos  260<H>  01-13    T(C): 36.3 (01-13-22 @ 04:41), Max: 36.4 (01-12-22 @ 11:37)  HR: 60 (01-13-22 @ 04:41) (59 - 61)  BP: 106/65 (01-13-22 @ 04:41) (95/57 - 106/66)  RR: 18 (01-13-22 @ 04:41) (17 - 19)  SpO2: 97% (01-13-22 @ 04:41) (93% - 99%)  Wt(kg): --    I&O's Summary    12 Jan 2022 07:01  -  13 Jan 2022 07:00  --------------------------------------------------------  IN: 720 mL / OUT: 200 mL / NET: 520 mL      General: more lethargic/ fatigued today.  frail elderly man, nondiaphoretic.  Head: Normocephalic and atraumatic.   Neck: No JVD. No bruits. Supple. Does not appear to be enlarged.   Cardiovascular: + S1,S2 ; RRR Soft systolic murmur at the left lower sternal border. No rubs noted.    Lungs: CTA b/l. No rhonchi, rales or wheezes.   Abdomen: + BS, soft. Non tender. Non distended. No rebound. No guarding.   Extremities: No clubbing/cyanosis/edema.   Neurologic: Moves all four extremities. Full range of motion.   Skin: Warm and moist. The patient's skin has normal elasticity and good skin turgor.   Psychiatric: Appropriate mood and affect.  Musculoskeletal: Normal range of motion, normal strength      A/P) He is an extremely pleasant 89 year old male with sick sinus syndrome, paroxysmal atrial fibrillation, complete heart block, and chronic systolic CHF. In June 2014 I implanted a Biotronik biventricular pacemaker. In August 2021 he presented with sustained monomorphic ventricular tachycardia requiring emergency DCCV for rescue therapy. Given the above he was upgraded to a  Medtronic BiV-ICD, and the old RV pacing lead was capped and abandoned. Device interrogation demonstrates excellent BiV-ICD function. He denies palpitations, syncope, nor angina, but continues to report ongoing NYHA Class II symptoms of chronic systolic CHF including dyspnea and orthopnea. He is now a/w another CHF exacerbation.    -continue amio 400mg daily for VT secondary prevention.  -continue eliquis 2.5mg bid for lifelong a/c for PAF  -medical therapy for CAD with severe LV dysfunction as per cardiology  -may require dialysis for hyperkalemia?  having worsening coagulopathy, hyperkalemia, uremia.    -transfused for melanotic stool.  antiplatelets and anticoag on hold.  -Recommend goals of care discussion w/ patient + family.  -f/u with Dr Villatoro after discharge for routine ICD care as scheduled  -f/u with his cardiologist Dr. Danny Vanegas at [facility] after discharge  -no further inpatient EP workup expected as long as he remains free of AF/VT    Marcial Guzmán M.D.  Cardiac Electrophysiology  [phone] EP ATTENDING    tele: AV sequential BiV pacing  lethargic today, refuses interview  DATE OF SERVICE - 01-13-22     Review of Systems:   Constitutional: [ ] fevers, [ ] chills.   Skin: [ ] dry skin. [ ] rashes.  Psychiatric: [ ] depression, [ ] anxiety.   Gastrointestinal: [ ] BRBPR, [ ] melena.   Neurological: [ ] confusion. [ ] seizures. [ ] shuffling gait.   Ears,Nose,Mouth and Throat: [ ] ear pain [ ] sore throat.   Eyes: [ ] diplopia.   Respiratory: [ ] hemoptysis. [ ] shortness of breath  Cardiovascular: See HPI above  Hematologic/Lymphatic: [ ] anemia. [ ] painful nodes. [ ] prolonged bleeding.   Genitourinary: [ ] hematuria. [ ] flank pain.   Endocrine: [ ] significant change in weight. [ ] intolerance to heat and cold.     Review of systems [ x] otherwise negative, [ ] otherwise unable to obtain    FH: no family history of sudden cardiac death in first degree relatives    SH: [ ] tobacco, [ ] alcohol, [ ] drugs    albumin human 25% IVPB 100 milliLiter(s) IV Intermittent every 6 hours  aMIOdarone    Tablet 400 milliGRAM(s) Oral daily  atorvastatin 40 milliGRAM(s) Oral at bedtime  dextrose 40% Gel 15 Gram(s) Oral once  dextrose 5%. 1000 milliLiter(s) IV Continuous <Continuous>  dextrose 5%. 1000 milliLiter(s) IV Continuous <Continuous>  dextrose 50% Injectable 25 Gram(s) IV Push once  dextrose 50% Injectable 12.5 Gram(s) IV Push once  dextrose 50% Injectable 25 Gram(s) IV Push once  glucagon  Injectable 1 milliGRAM(s) IntraMuscular once  influenza  Vaccine (HIGH DOSE) 0.7 milliLiter(s) IntraMuscular once  insulin glargine Injectable (LANTUS) 10 Unit(s) SubCutaneous at bedtime  insulin lispro (ADMELOG) corrective regimen sliding scale   SubCutaneous three times a day before meals  insulin lispro (ADMELOG) corrective regimen sliding scale   SubCutaneous at bedtime  ketotifen 0.025% Ophthalmic Solution 1 Drop(s) Right EYE two times a day  lidocaine   4% Patch 2 Patch Transdermal every 24 hours  multivitamin/minerals 1 Tablet(s) Oral daily  pantoprazole  Injectable 40 milliGRAM(s) IV Push every 12 hours  polyethylene glycol 3350 17 Gram(s) Oral daily  senna 2 Tablet(s) Oral at bedtime  sodium zirconium cyclosilicate 10 Gram(s) Oral every 8 hours                            7.5    8.44  )-----------( 153      ( 13 Jan 2022 05:49 )             21.9       01-13    133<L>  |  91<L>  |  160<H>  ----------------------------<  172<H>  6.2<HH>   |  25  |  4.97<H>    Ca    8.5      13 Jan 2022 03:39  Phos  4.9     01-13  Mg     3.2     01-13    TPro  5.7<L>  /  Alb  3.5  /  TBili  2.6<H>  /  DBili  x   /  AST  278<H>  /  ALT  164<H>  /  AlkPhos  260<H>  01-13    T(C): 36.3 (01-13-22 @ 04:41), Max: 36.4 (01-12-22 @ 11:37)  HR: 60 (01-13-22 @ 04:41) (59 - 61)  BP: 106/65 (01-13-22 @ 04:41) (95/57 - 106/66)  RR: 18 (01-13-22 @ 04:41) (17 - 19)  SpO2: 97% (01-13-22 @ 04:41) (93% - 99%)  Wt(kg): --    I&O's Summary    12 Jan 2022 07:01  -  13 Jan 2022 07:00  --------------------------------------------------------  IN: 720 mL / OUT: 200 mL / NET: 520 mL      General: more lethargic/ fatigued today.  frail elderly man, nondiaphoretic.  Head: Normocephalic and atraumatic.   Neck: No JVD. No bruits. Supple. Does not appear to be enlarged.   Cardiovascular: + S1,S2 ; RRR Soft systolic murmur at the left lower sternal border. No rubs noted.    Lungs: CTA b/l. No rhonchi, rales or wheezes.   Abdomen: + BS, soft. Non tender. Non distended. No rebound. No guarding.   Extremities: No clubbing/cyanosis/edema.   Neurologic: Moves all four extremities. Full range of motion.   Skin: Warm and moist. The patient's skin has normal elasticity and good skin turgor.   Psychiatric: Appropriate mood and affect.  Musculoskeletal: Normal range of motion, normal strength      A/P) He is an extremely pleasant 89 year old male with sick sinus syndrome, paroxysmal atrial fibrillation, complete heart block, and chronic systolic CHF. In June 2014 I implanted a Biotronik biventricular pacemaker. In August 2021 he presented with sustained monomorphic ventricular tachycardia requiring emergency DCCV for rescue therapy. Given the above he was upgraded to a  Medtronic BiV-ICD, and the old RV pacing lead was capped and abandoned. Device interrogation demonstrates excellent BiV-ICD function. He denies palpitations, syncope, nor angina, but continues to report ongoing NYHA Class II symptoms of chronic systolic CHF including dyspnea and orthopnea. He is now a/w another CHF exacerbation.    -amiodarone was started to prevent ICD shocks for VT.  at this time, INR and LFT's are abnormal.  OK to stop this medication. we may re-evaluate restarting it as an outpatient.  -continue eliquis 2.5mg bid for lifelong a/c for PAF  -medical therapy for CAD with severe LV dysfunction as per cardiology  -may require dialysis for hyperkalemia?  having worsening coagulopathy, hyperkalemia, uremia.    -transfused for melanotic stool.  antiplatelets and anticoag on hold.  -Recommend goals of care discussion w/ patient + family.  -f/u with Dr Villatoro after discharge for routine ICD care as scheduled  -f/u with his cardiologist Dr. Danny Vanegas at [facility] after discharge  -no further inpatient EP workup expected as long as he remains free of AF/VT    Marcial Guzmán M.D.  Cardiac Electrophysiology  [phone]

## 2022-01-13 NOTE — CONSULT NOTE ADULT - ASSESSMENT
melena  GI bleed  anemia    cbc daily   monitor H/H  transfuse PRN hgb>8  PPI IV BID  hold AC  heme following   unable to pursue upper gastrointestinal endoscopy at this time due elevated INR  reversal of Eliquis  conservative management for now   will follow    Advanced care planning was discussed with patient and family.  Advanced care planning forms were reviewed and discussed.  Risks, benefits and alternatives of gastroenterologic procedures were discussed in detail and all questions were answered.    30 minutes spent.  melena  GI bleed  anemia    cbc daily   monitor H/H  transfuse PRN hgb>8  PPI IV BID  hold AC  heme following, coagulopathy workup  unable to pursue upper gastrointestinal endoscopy at this time due elevated INR  reversal of Eliquis  conservative management for now   will follow    Advanced care planning was discussed with patient and family.  Advanced care planning forms were reviewed and discussed.  Risks, benefits and alternatives of gastroenterologic procedures were discussed in detail and all questions were answered.    30 minutes spent.

## 2022-01-13 NOTE — PROGRESS NOTE ADULT - ASSESSMENT
89 y/o M PMH of CAD s/p PCI (ROBERTO to D1 6/2/14), chronic AF (on AC w/ eliquis), tachy/perla syndrome s/p biV PPM placement, CVA x 2, HFpEF, hospitalized in October 2021 for CHF exacerbation/overload presents to ED BIB son from home for decreased PO intake x2 weeks associated with constipation and abdominal pain, generalized weakness worsening to point where he is no longer ambulating and now w/ b/l LE edema and orthopnea. Denies f/c, cough, congestion.    acute on chronic diastolic congestive heart failure  - hold  PO lasix . cr uptrending  - Coreg on hold  - DISContinue AC with Eliquis due to acute bleeding  - Continue medical management of CAD with ASA/Statin  - EP eval appreciated - Continue Amio 400mg daily for prior VT  - Repeat TTE noted with normal LV function, mod pulm HTN  - Of note, family declined cath and preferred medical management of CAD prior admission  - F/U with his cardiologist Dr. Vanegas for cardiology after discharge    FLOYD pre renal +/- cardiorenal +/- Obstruction  b/l Cr 1.87 12/17/21, 1 mth ago was 2.5mg/dl  c/w river- trend urineoutput  Cr continues to rise- UA and URine lytes reviewed Showing intravascular depletion--> lasix was discontinued  Given GIB and low hgb--> Agree with 2 units prbc  reverse coagulopathy--> f/u Heme and GI  c/w albumin for now  as per family wishes --> IF HD is needed then will pursue  If situation does not improve by tomm will strongly consider started HD if pt is medically stable  - I/o's, daily weights, daily BMP  -avoid NSAIDs/ACEi/ARB/nephrotoxics/iv contrast  low Na in diet, fluid restriction 1L/day   dose all meds for eGFR<15ml/min  - nephrology following    Coagulopathy with active bleeding  -Pt on Eliquis, please discontinue for now in the setting of active bleeding and elevated INR  -Will try to reverse Eliquis, last dose 1/11/2022. Half Life appears to be around 12 hours.  -Will administer K Centra 2000 units for now  -There is concern for DIC given coagulopathy, elevated D-dimer, and mucosal bleeding. Pending fibrinogen, haptoglobin, and LDH.  -Agree with pRBC  -Would discontinue ASA given bleeding  -Daily CBC, trend hgb. INR/PT/PTT q12 hours for now  - vit k   - hematology following    GI bleed  - c/w PPI    elevated liver enzymes worsening  - likely 2/2 to CHF    anemia  - transfuse 2 units of prbc  - likely 2/2 ckd and iron def  - s/p  iv iron    had blood tinged BM  - due to multiple comorbidities and poor health will not pursue GI eval at this time unless the bleeding worsens    diabetes  controlled  - fs qid  - hgb a1c  - insulin sliding scale  - Lantus 10 units qhs    unstageable pressure ulcer present on admission  - optimize nutrition  - off loading  - T&P q 2 hours  - wound care consult    afib  - c/w amio  - d/c  eliquis    constipation  - senna and miralax    dispo  - JENNIFER

## 2022-01-13 NOTE — PROVIDER CONTACT NOTE (CRITICAL VALUE NOTIFICATION) - BACKGROUND
Patient with admitting diagnosis of heart failure. Now with acute bleeding per rectum. History of afib, Patient with admitting diagnosis of heart failure. Now with blood BMs, low Hgb. History of afib,

## 2022-01-13 NOTE — CHART NOTE - NSCHARTNOTEFT_GEN_A_CORE
RN to PA- Patient having 2 episodes of melena overnight. Patient     Vital Signs Last 24 Hrs  T(C): 36.1 (12 Jan 2022 23:15), Max: 36.4 (12 Jan 2022 11:37)  T(F): 97 (12 Jan 2022 23:15), Max: 97.6 (12 Jan 2022 11:37)  HR: 60 (12 Jan 2022 23:15) (59 - 61)  BP: 106/66 (12 Jan 2022 23:15) (95/57 - 106/66)  BP(mean): 74 (12 Jan 2022 11:37) (74 - 74)  RR: 18 (12 Jan 2022 23:15) (17 - 19)  SpO2: 97% (12 Jan 2022 23:15) (93% - 99%) RN to PA- Patient had 2 episodes of melena overnight. Patient's BP is unchanged and patient near completion of 2u of PRBC and 1u plasma. Patient also continues to receive albumin q6h. Per day team, patient to receive cryoprecipitate if fibrinogen is low. CBC after transfusion is completed and will follow up.     In addition, patient had a large blister on his lower back? Blister has punctured. Will order for wound care consult in AM.     Vital Signs Last 24 Hrs  T(C): 36.1 (12 Jan 2022 23:15), Max: 36.4 (12 Jan 2022 11:37)  T(F): 97 (12 Jan 2022 23:15), Max: 97.6 (12 Jan 2022 11:37)  HR: 60 (12 Jan 2022 23:15) (59 - 61)  BP: 106/66 (12 Jan 2022 23:15) (95/57 - 106/66)  BP(mean): 74 (12 Jan 2022 11:37) (74 - 74)  RR: 18 (12 Jan 2022 23:15) (17 - 19)  SpO2: 97% (12 Jan 2022 23:15) (93% - 99%)      Brandee Schwab PA-C  [id number] RN to PA- Patient had 2 episodes of melena overnight. Patient's BP is unchanged and patient near completion of 2u of PRBC and 1u plasma. Patient also continues to receive albumin q6h. Per day team, patient to receive cryoprecipitate if fibrinogen is low. CBC after transfusion is completed and will follow up.     In addition, patient had a large blister on his lower back? Blister has punctured. Will order for wound care consult in AM.     Vital Signs Last 24 Hrs  T(C): 36.1 (12 Jan 2022 23:15), Max: 36.4 (12 Jan 2022 11:37)  T(F): 97 (12 Jan 2022 23:15), Max: 97.6 (12 Jan 2022 11:37)  HR: 60 (12 Jan 2022 23:15) (59 - 61)  BP: 106/66 (12 Jan 2022 23:15) (95/57 - 106/66)  BP(mean): 74 (12 Jan 2022 11:37) (74 - 74)  RR: 18 (12 Jan 2022 23:15) (17 - 19)  SpO2: 97% (12 Jan 2022 23:15) (93% - 99%)      Brandee Schwab PA-C  [id number]      ADDENDUM:   CBC s/p 2u PRBCs overnight was 7.5 from 6.3. Patient had multiple bloody bowel movement overnight. Hemodynamically the same since overnight. Repeat CBC ordered for 11AM for monitoring.   Will relay to day team to follow up CBC.     Brandee Schwab PA-C  [id number]

## 2022-01-13 NOTE — PROGRESS NOTE ADULT - SUBJECTIVE AND OBJECTIVE BOX
[facility]-- WOUND TEAM -- FOLLOW UP NOTE  --------------------------------------------------------------------------------    24 hour events/subjective:      afebrrile  tolerating diet  (+)bleeding, ongoing monitoring  incontinent  weak    Diet:  Diet, NPO:   NPO for Procedure/Test     NPO Start Date: 13-Jan-2022,   NPO Start Time: 10:05 (01-13-22 @ 10:05)  Diet, Consistent Carbohydrate/No Snacks:   Soft and Bite Sized (SOFTBTSZ)  Supplement Feeding Modality:  Oral  Glucerna Shake Cans or Servings Per Day:  2       Frequency:  Daily (01-10-22 @ 09:15)      ROS: pt unable to offer    ALLERGIES & MEDICATIONS  --------------------------------------------------------------------------------    No Known Allergies    STANDING INPATIENT MEDICATIONS  dextrose 40% Gel 15 Gram(s) Oral once  dextrose 5%. 1000 milliLiter(s) IV Continuous <Continuous>  dextrose 5%. 1000 milliLiter(s) IV Continuous <Continuous>  dextrose 50% Injectable 25 Gram(s) IV Push once  dextrose 50% Injectable 12.5 Gram(s) IV Push once  dextrose 50% Injectable 25 Gram(s) IV Push once  glucagon  Injectable 1 milliGRAM(s) IntraMuscular once  influenza  Vaccine (HIGH DOSE) 0.7 milliLiter(s) IntraMuscular once  insulin glargine Injectable (LANTUS) 10 Unit(s) SubCutaneous at bedtime  insulin lispro (ADMELOG) corrective regimen sliding scale   SubCutaneous three times a day before meals  insulin lispro (ADMELOG) corrective regimen sliding scale   SubCutaneous at bedtime  ketotifen 0.025% Ophthalmic Solution 1 Drop(s) Right EYE two times a day  lidocaine   4% Patch 2 Patch Transdermal every 24 hours  multivitamin/minerals 1 Tablet(s) Oral daily  pantoprazole  Injectable 40 milliGRAM(s) IV Push every 12 hours  polyethylene glycol 3350 17 Gram(s) Oral daily  senna 2 Tablet(s) Oral at bedtime  sodium chloride 0.9%. 1000 milliLiter(s) IV Continuous <Continuous>        VITALS/PHYSICAL EXAM  --------------------------------------------------------------------------------  T(C): 36.3 (01-13-22 @ 17:07), Max: 36.3 (01-13-22 @ 04:41)  HR: 69 (01-13-22 @ 17:07) (60 - 69)  BP: 112/71 (01-13-22 @ 17:07) (95/57 - 112/71)  RR: 16 (01-13-22 @ 17:07) (16 - 19)  SpO2: 96% (01-13-22 @ 17:07) (95% - 99%)  Wt(kg): --        01-12-22 @ 07:01  -  01-13-22 @ 07:00  --------------------------------------------------------  IN: 720 mL / OUT: 200 mL / NET: 520 mL    01-13-22 @ 07:01  -  01-13-22 @ 17:51  --------------------------------------------------------  IN: 360 mL / OUT: 2 mL / NET: 358 mL    NAD  Alert,  frail, WD/ WN/ WG  Versa Care P500 bed   HEENT:  NC/AT, PERRL, EOMI, mucosa moist, throat clear, trachea midline, neck supple  Psych: appropriate    Neurology:  weakened strength & sensation grossly intact  Musculoskeletal:  limited ROM  Vascular: BLE equally warm,  no cyanosis, clubbing,      BLE mild edema equal,  hemosiderin staining     Rt Great toe lesion 1cm black w/o drainage or blistering     long fungal toenails  Skin:  thin, frail,  ecchymosis w/o hematoma    evolving DTI w/ maroon hyperpigmented skin in the periwound area    10cm x 10cm x 0cm  Procedure Note  Using aseptic technique  sacral wound was excisionally debrided using a scissor and forceps through epidermal layer.  Pt tolerated procedure well.  Hemostasis was maintained throughout.  Extra care taken not to cut viable tissue/ cause bleeding. Debulking debridement of epidermal slippage.      no blistering or drainage  No odor, erythema, increased warmth, tenderness, induration, fluctuance        LABS/ CULTURES/ RADIOLOGY:              7.1    8.34  >-----------<  157      [01-13-22 @ 12:00]              20.8     x   |  x   |  x   ----------------------------<  x       [01-13-22 @ 12:00]  5.5   |  x   |  x         Ca     8.5     [01-13-22 @ 03:39]      Mg     3.2     [01-13-22 @ 03:39]      Phos  4.9     [01-13-22 @ 03:39]    TPro  5.7  /  Alb  3.5  /  TBili  2.6  /  DBili  x   /  AST  278  /  ALT  164  /  AlkPhos  260  [01-13-22 @ 03:39]    PT/INR: PT 43.6 , INR 3.88       [01-13-22 @ 12:00]  PTT: 38.0       [01-12-22 @ 06:47]    CAPILLARY BLOOD GLUCOSE  POCT Blood Glucose.: 114 mg/dL (13 Jan 2022 17:10)  POCT Blood Glucose.: 114 mg/dL (13 Jan 2022 12:10)  POCT Blood Glucose.: 135 mg/dL (13 Jan 2022 08:38)  POCT Blood Glucose.: 252 mg/dL (12 Jan 2022 21:21)      Culture - Blood (collected 01-07-22 @ 21:41)  Source: .Blood Blood  Final Report (01-12-22 @ 22:01):    No Growth Final    Culture - Blood (collected 01-07-22 @ 21:41)  Source: .Blood Blood  Final Report (01-12-22 @ 22:01):    No Growth Final    A1C with Estimated Average Glucose Result: 9.5 % (01-08-22 @ 09:52)  A1C with Estimated Average Glucose Result: 7.7 % (08-15-21 @ 06:26)

## 2022-01-13 NOTE — PROGRESS NOTE ADULT - ASSESSMENT
A/P: 89 y/o M PMH of CAD s/p PCI (ROBERTO to D1 6/2/14), chronic AF (on AC w/ eliquis), tachy/perla syndrome s/p biV PPM placement, CVA x 2, HFpEF, admitted w/ CHF exacerbation/overload and FLOYD    Wound Consult requested to assist w/ management of Evolving DTI of sacrum  Incontinence of stool and urine    Sacrum - CAVILON ADVANCE TIW      pericare BID and prn soiling   BLE elevation  Consider Duplex to r/o DVT   Abx per Medicine/ ID  Moisturize intact skin w/ SWEEN cream BID  Nutrition Consult for optimization as tolerated in pt w/severe malnutrition        consider MVI & Vit C to promote wound healing        encourage high quality protein  Anemia- improving after transfusing, (+)brbpr, kcentra, ongoing monitoring by primary team  Hyperglycemia - ADA diet and lantus and FS w/ ISS   Continue turning and positioning w/ offloading assistive devices as per protocol  Waffle Cushion to chair when oob to chair  Continue w/ low air loss bed surface   Care as per medicine, will follow w/ you  Upon discharge f/u as outpatient at [facility] [address] [place], [place] [phone]  D/w team & s/w RN & attng  Thank you for this consult  Winsome Guzmán PA-C [id number]  We spent 25minutes face to face w/ this pt of which more than 50% of the time was spent counseling & coordinating care of this pt.

## 2022-01-13 NOTE — PROGRESS NOTE ADULT - SUBJECTIVE AND OBJECTIVE BOX
Hematology Oncology Follow-up    INTERVAL HPI/OVERNIGHT EVENTS:  No o/n events, patient resting comfortably. No complaints at this time. Patient specifically denies fever, chills, dizziness, weakness, CP, palpitations, SOB, cough, N/V/D/C, dysuria, changes in bowel movements, LE edema.    Review of Systems:  General: denies fevers/chills, night sweats, malaise, changes in appetite  Head: denies HA  Eyes: denies vision change  ENT: denies oral lesions, rhinorrhea, epistaxys, sore throat, dysphagia  Respiratory: denies cough, shortness of breath, pleurisy  Cardiovascular: denies chest pain, palpitaitons, BROWN  Gastrointestinal: denies nausea, vomiting, abdominal pain, constipation, diarrhea, melena, hematochezia  MSK: denies joint pain or muscle pain  Neuro: denies headache, weakness, or parasthesias  Skin: denies rash, petichiae, echymoses  Psych: denies anxiety or sleep disturbances    VITAL SIGNS:  T(F): 97.2 (01-13-22 @ 11:47)  HR: 61 (01-13-22 @ 11:47)  BP: 106/59 (01-13-22 @ 11:47)  RR: 18 (01-13-22 @ 11:47)  SpO2: 97% (01-13-22 @ 11:47)  Wt(kg): --    01-12-22 @ 07:01  -  01-13-22 @ 07:00  --------------------------------------------------------  IN: 720 mL / OUT: 200 mL / NET: 520 mL    01-13-22 @ 07:01  -  01-13-22 @ 16:58  --------------------------------------------------------  IN: 360 mL / OUT: 2 mL / NET: 358 mL        PHYSICAL EXAM:    Constitutional: AAOx3, NAD  Eyes: PERRL, EOMI, sclera non-icteric  Neck: supple, no masses, no JVD, no lymphadenopathy  Respiratory: CTA b/l, no wheezing, rhonchi, rales, with normal respiratory effort  Cardiovascular: RRR, normal S1S2, no M/R/G  Gastrointestinal: soft, NTND, no masses palpable, BS normal in all four quadrants, no HSM  Extremities:  no edema  MSK: no obvious abnormalities, normal ROM, no lymphadenopathy  Neurological: Grossly intact  Skin: Normal temperature, no rash, no echymoses, no petichiae  Psych: normal affect    MEDICATIONS  (STANDING):  dextrose 40% Gel 15 Gram(s) Oral once  dextrose 5%. 1000 milliLiter(s) (50 mL/Hr) IV Continuous <Continuous>  dextrose 5%. 1000 milliLiter(s) (100 mL/Hr) IV Continuous <Continuous>  dextrose 50% Injectable 25 Gram(s) IV Push once  dextrose 50% Injectable 12.5 Gram(s) IV Push once  dextrose 50% Injectable 25 Gram(s) IV Push once  glucagon  Injectable 1 milliGRAM(s) IntraMuscular once  HYDROmorphone  Injectable 0.25 milliGRAM(s) IV Push once  influenza  Vaccine (HIGH DOSE) 0.7 milliLiter(s) IntraMuscular once  insulin glargine Injectable (LANTUS) 10 Unit(s) SubCutaneous at bedtime  insulin lispro (ADMELOG) corrective regimen sliding scale   SubCutaneous three times a day before meals  insulin lispro (ADMELOG) corrective regimen sliding scale   SubCutaneous at bedtime  ketotifen 0.025% Ophthalmic Solution 1 Drop(s) Right EYE two times a day  lidocaine   4% Patch 2 Patch Transdermal every 24 hours  multivitamin/minerals 1 Tablet(s) Oral daily  pantoprazole  Injectable 40 milliGRAM(s) IV Push every 12 hours  polyethylene glycol 3350 17 Gram(s) Oral daily  senna 2 Tablet(s) Oral at bedtime  sodium chloride 0.9%. 1000 milliLiter(s) (100 mL/Hr) IV Continuous <Continuous>    MEDICATIONS  (PRN):      No Known Allergies      LABS:                        7.1    8.34  )-----------( 157      ( 13 Jan 2022 12:00 )             20.8     01-13    x   |  x   |  x   ----------------------------<  x   5.5<H>   |  x   |  x     Ca    8.5      13 Jan 2022 03:39  Phos  4.9     01-13  Mg     3.2     01-13    TPro  5.7<L>  /  Alb  3.5  /  TBili  2.6<H>  /  DBili  x   /  AST  278<H>  /  ALT  164<H>  /  AlkPhos  260<H>  01-13    PT/INR - ( 13 Jan 2022 12:00 )   PT: 43.6 sec;   INR: 3.88 ratio       PTT - ( 12 Jan 2022 06:47 )  PTT:38.0 sec Fibrinogen Assay: 289 mg/dL (01-13 @ 12:00)    Folate, Serum: 10.5 ng/mL (01-08-22 @ 09:55)  Vitamin B12, Serum: >2000 pg/mL (01-08-22 @ 09:55)    Mixing Study - PT/APTT (01.13.22 @ 12:00)   PAT CTL 2H: 35.0 sec   %: 43.6: Effective October 30 the reference range for PT has changed. sec   PT 50/50: 21.3: A reference range has been established using normal samples. PT 50/50   results which correct into the reference range should be evaluated for   factor deficiency. PT 50/50 results which do not correct suggest the   presence of an inhibitor. sec   APTT 100%: 39.9 sec   APTT 50/50: 32.4      Fibrinogen Assay: 289    RADIOLOGY & ADDITIONAL TESTS:  Studies reviewed.     Hematology Oncology Follow-up    INTERVAL HPI/OVERNIGHT EVENTS:  Continues to have ongoing bleeding today and overnight (melena)    Review of Systems: ROS otherwise negative    VITAL SIGNS:  T(F): 97.2 (01-13-22 @ 11:47)  HR: 61 (01-13-22 @ 11:47)  BP: 106/59 (01-13-22 @ 11:47)  RR: 18 (01-13-22 @ 11:47)  SpO2: 97% (01-13-22 @ 11:47)  Wt(kg): --    01-12-22 @ 07:01  -  01-13-22 @ 07:00  --------------------------------------------------------  IN: 720 mL / OUT: 200 mL / NET: 520 mL    01-13-22 @ 07:01  -  01-13-22 @ 16:58  --------------------------------------------------------  IN: 360 mL / OUT: 2 mL / NET: 358 mL        PHYSICAL EXAM:  GENERAL:  Appears stated age; appears ill  HEENT:  sclera clear; dried and fresh blood noted in mouth  CHEST:  Full & symmetric excursion,   HEART:  Regular rhythm  ABDOMEN:  Soft, non-tender, non-distended  RECTAL: continues to have hematochez  SKIN:  multiple echymoses noted  NEURO:  Alert        MEDICATIONS  (STANDING):  dextrose 40% Gel 15 Gram(s) Oral once  dextrose 5%. 1000 milliLiter(s) (50 mL/Hr) IV Continuous <Continuous>  dextrose 5%. 1000 milliLiter(s) (100 mL/Hr) IV Continuous <Continuous>  dextrose 50% Injectable 25 Gram(s) IV Push once  dextrose 50% Injectable 12.5 Gram(s) IV Push once  dextrose 50% Injectable 25 Gram(s) IV Push once  glucagon  Injectable 1 milliGRAM(s) IntraMuscular once  HYDROmorphone  Injectable 0.25 milliGRAM(s) IV Push once  influenza  Vaccine (HIGH DOSE) 0.7 milliLiter(s) IntraMuscular once  insulin glargine Injectable (LANTUS) 10 Unit(s) SubCutaneous at bedtime  insulin lispro (ADMELOG) corrective regimen sliding scale   SubCutaneous three times a day before meals  insulin lispro (ADMELOG) corrective regimen sliding scale   SubCutaneous at bedtime  ketotifen 0.025% Ophthalmic Solution 1 Drop(s) Right EYE two times a day  lidocaine   4% Patch 2 Patch Transdermal every 24 hours  multivitamin/minerals 1 Tablet(s) Oral daily  pantoprazole  Injectable 40 milliGRAM(s) IV Push every 12 hours  polyethylene glycol 3350 17 Gram(s) Oral daily  senna 2 Tablet(s) Oral at bedtime  sodium chloride 0.9%. 1000 milliLiter(s) (100 mL/Hr) IV Continuous <Continuous>    MEDICATIONS  (PRN):      No Known Allergies      LABS:                        7.1    8.34  )-----------( 157      ( 13 Jan 2022 12:00 )             20.8     01-13    x   |  x   |  x   ----------------------------<  x   5.5<H>   |  x   |  x     Ca    8.5      13 Jan 2022 03:39  Phos  4.9     01-13  Mg     3.2     01-13    TPro  5.7<L>  /  Alb  3.5  /  TBili  2.6<H>  /  DBili  x   /  AST  278<H>  /  ALT  164<H>  /  AlkPhos  260<H>  01-13    PT/INR - ( 13 Jan 2022 12:00 )   PT: 43.6 sec;   INR: 3.88 ratio       PTT - ( 12 Jan 2022 06:47 )  PTT:38.0 sec Fibrinogen Assay: 289 mg/dL (01-13 @ 12:00)    Folate, Serum: 10.5 ng/mL (01-08-22 @ 09:55)  Vitamin B12, Serum: >2000 pg/mL (01-08-22 @ 09:55)    Mixing Study - PT/APTT (01.13.22 @ 12:00)   PAT CTL 2H: 35.0 sec   %: 43.6: Effective October 30 the reference range for PT has changed. sec   PT 50/50: 21.3: A reference range has been established using normal samples. PT 50/50   results which correct into the reference range should be evaluated for   factor deficiency. PT 50/50 results which do not correct suggest the   presence of an inhibitor. sec   APTT 100%: 39.9 sec   APTT 50/50: 32.4      Fibrinogen Assay: 289    RADIOLOGY & ADDITIONAL TESTS:  Studies reviewed.

## 2022-01-13 NOTE — PROGRESS NOTE ADULT - SUBJECTIVE AND OBJECTIVE BOX
Patient seen and examined  confused  received 2 units of prbc yesterday    No Known Allergies    Hospital Medications:   MEDICATIONS  (STANDING):  aMIOdarone    Tablet 400 milliGRAM(s) Oral daily  atorvastatin 40 milliGRAM(s) Oral at bedtime  dextrose 40% Gel 15 Gram(s) Oral once  dextrose 5%. 1000 milliLiter(s) (50 mL/Hr) IV Continuous <Continuous>  dextrose 5%. 1000 milliLiter(s) (100 mL/Hr) IV Continuous <Continuous>  dextrose 50% Injectable 25 Gram(s) IV Push once  dextrose 50% Injectable 12.5 Gram(s) IV Push once  dextrose 50% Injectable 25 Gram(s) IV Push once  glucagon  Injectable 1 milliGRAM(s) IntraMuscular once  influenza  Vaccine (HIGH DOSE) 0.7 milliLiter(s) IntraMuscular once  insulin glargine Injectable (LANTUS) 10 Unit(s) SubCutaneous at bedtime  insulin lispro (ADMELOG) corrective regimen sliding scale   SubCutaneous three times a day before meals  insulin lispro (ADMELOG) corrective regimen sliding scale   SubCutaneous at bedtime  ketotifen 0.025% Ophthalmic Solution 1 Drop(s) Right EYE two times a day  lidocaine   4% Patch 2 Patch Transdermal every 24 hours  multivitamin/minerals 1 Tablet(s) Oral daily  pantoprazole  Injectable 40 milliGRAM(s) IV Push every 12 hours  polyethylene glycol 3350 17 Gram(s) Oral daily  senna 2 Tablet(s) Oral at bedtime  sodium chloride 0.9%. 1000 milliLiter(s) (100 mL/Hr) IV Continuous <Continuous>  sodium zirconium cyclosilicate 10 Gram(s) Oral every 8 hours        VITALS:  T(F): 97.2 (22 @ 11:47), Max: 97.3 (22 @ 16:26)  HR: 61 (22 @ 11:47)  BP: 106/59 (22 @ 11:47)  RR: 18 (22 @ 11:47)  SpO2: 97% (22 @ 11:47)  Wt(kg): --     07:01  -   @ 07:00  --------------------------------------------------------  IN: 720 mL / OUT: 200 mL / NET: 520 mL        PHYSICAL EXAM:  Constitutional: NAD  HEENT: anicteric sclera  Neck: No JVD  Respiratory: bibasilar crepts+   Cardiovascular: S1, S2, RRR  Gastrointestinal: BS+, soft, NT/ND  Extremities: 1+ edema upper b/l ext  Neurological: A/O x 1, no focal deficits    LABS:      133<L>  |  91<L>  |  160<H>  ----------------------------<  172<H>  6.2<HH>   |  25  |  4.97<H>    Ca    8.5      2022 03:39  Phos  4.9       Mg     3.2         TPro  5.7<L>  /  Alb  3.5  /  TBili  2.6<H>  /  DBili      /  AST  278<H>  /  ALT  164<H>  /  AlkPhos  260<H>      Creatinine Trend: 4.97 <--, 4.94 <--, 4.73 <--, 4.37 <--, 4.19 <--, 3.63 <--, 3.32 <--, 3.23 <--                        7.5    8.44  )-----------( 153      ( 2022 05:49 )             21.9     Urine Studies:  Urinalysis Basic - ( 2022 14:36 )    Color: Yellow / Appearance: Slightly Turbid / S.014 / pH:   Gluc:  / Ketone: Negative  / Bili: Negative / Urobili: 4 mg/dL   Blood:  / Protein: 30 mg/dL / Nitrite: Negative   Leuk Esterase: Large / RBC: 35 /hpf /  /HPF   Sq Epi:  / Non Sq Epi: 2 /hpf / Bacteria: Negative      Potassium, Random Urine: 56 mmol/L ( @ 14:36)  Osmolality, Random Urine: 375 mos/kg ( @ 14:36)  Sodium, Random Urine: 7 mmol/L ( @ 14:36)  Chloride, Random Urine: <20 mmol/L ( @ 14:36)  Creatinine, Random Urine: 50 mg/dL ( @ 14:36)    RADIOLOGY & ADDITIONAL STUDIES:

## 2022-01-13 NOTE — CONSULT NOTE ADULT - SUBJECTIVE AND OBJECTIVE BOX
[facility]  Luis Enrique Sotomayor PA-C  [address] [place] [address]  [phone]      Chief Complaint:  Patient is a 89y old  Male who presents with a chief complaint of     HPI: 89 y/o M PMH of CAD s/p PCI (ROBERTO to D1 14), chronic AF (on AC w/ eliquis), tachy/perla syndrome s/p biV PPM placement, CVA x 2, HFpEF, hospitalized in 2021 for CHF exacerbation/overload presents to ED BIB son from home for decreased PO intake x2 weeks associated with constipation and abdominal pain, generalized weakness worsening to point where he is no longer ambulating and now w/ b/l LE edema and orthopnea. Denies f/c, cough, congestion.    GI asked to consult for melena  -pt with melena x 1 day. Hgb 7.1. Received 2 units PRBC yesterday, 1 unit FFP.  -Pt had EGD in  concerning for barretts esophagus  -pt on Eliquis and ASA, last dose . IRN @ 5.3.    Allergies:  No Known Allergies      Medications:  aMIOdarone    Tablet 400 milliGRAM(s) Oral daily  atorvastatin 40 milliGRAM(s) Oral at bedtime  dextrose 40% Gel 15 Gram(s) Oral once  dextrose 5%. 1000 milliLiter(s) IV Continuous <Continuous>  dextrose 5%. 1000 milliLiter(s) IV Continuous <Continuous>  dextrose 50% Injectable 25 Gram(s) IV Push once  dextrose 50% Injectable 12.5 Gram(s) IV Push once  dextrose 50% Injectable 25 Gram(s) IV Push once  glucagon  Injectable 1 milliGRAM(s) IntraMuscular once  influenza  Vaccine (HIGH DOSE) 0.7 milliLiter(s) IntraMuscular once  insulin glargine Injectable (LANTUS) 10 Unit(s) SubCutaneous at bedtime  insulin lispro (ADMELOG) corrective regimen sliding scale   SubCutaneous three times a day before meals  insulin lispro (ADMELOG) corrective regimen sliding scale   SubCutaneous at bedtime  ketotifen 0.025% Ophthalmic Solution 1 Drop(s) Right EYE two times a day  lidocaine   4% Patch 2 Patch Transdermal every 24 hours  multivitamin/minerals 1 Tablet(s) Oral daily  pantoprazole  Injectable 40 milliGRAM(s) IV Push every 12 hours  polyethylene glycol 3350 17 Gram(s) Oral daily  senna 2 Tablet(s) Oral at bedtime  sodium chloride 0.9%. 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    S/P coronary artery stent placement    AICD (automatic cardioverter/defibrillator) present    Pacemaker        Family history:  No pertinent family history in first degree relatives    Family history of heart disease (Father)        Social History:     ROS:     General:  No wt loss, fevers, chills, night sweats, fatigue,   Eyes:  Good vision, no reported pain  ENT:  No sore throat, pain, runny nose, dysphagia  CV:  No pain, palpitations, hypo/hypertension  Resp:  No dyspnea, cough, tachypnea, wheezing  GI:  No pain, No nausea, No vomiting, No diarrhea, No constipation, No weight loss, No fever, No pruritis, No rectal bleeding,  tarry stools, No dysphagia,  :  No pain, bleeding, incontinence, nocturia  Muscle:  No pain, weakness  Neuro:  No weakness, tingling, memory problems  Psych:  No fatigue, insomnia, mood problems, depression  Endocrine:  No polyuria, polydipsia, cold/heat intolerance  Heme:  No petechiae, ecchymosis, easy bruisability  Skin:  No rash, tattoos, scars, edema      PHYSICAL EXAM:   Vital Signs:  Vital Signs Last 24 Hrs  T(C): 36.2 (2022 11:47), Max: 36.3 (2022 16:26)  T(F): 97.2 (2022 11:47), Max: 97.3 (2022 16:26)  HR: 61 (2022 11:47) (59 - 61)  BP: 106/59 (2022 11:47) (95/57 - 106/66)  BP(mean): --  RR: 18 (2022 11:47) (17 - 19)  SpO2: 97% (2022 11:47) (95% - 99%)  Daily     Daily     GENERAL:  Appears stated age,   HEENT:  NC/AT,    CHEST:  Full & symmetric excursion,   HEART:  Regular rhythm  ABDOMEN:  Soft, non-tender, non-distended,   EXTEREMITIES:  no cyanosis,clubbing or edema  SKIN:  No rash  NEURO:  Alert,    LABS:                        7.1    8.34  )-----------( 157      ( 2022 12:00 )             20.8     -    x   |  x   |  x   ----------------------------<  x   5.5<H>   |  x   |  x     Ca    8.5      2022 03:39  Phos  4.9       Mg     3.2         TPro  5.7<L>  /  Alb  3.5  /  TBili  2.6<H>  /  DBili  x   /  AST  278<H>  /  ALT  164<H>  /  AlkPhos  260<H>      LIVER FUNCTIONS - ( 2022 03:39 )  Alb: 3.5 g/dL / Pro: 5.7 g/dL / ALK PHOS: 260 U/L / ALT: 164 U/L / AST: 278 U/L / GGT: x           PT/INR - ( 2022 05:49 )   PT: 58.9 sec;   INR: 5.31 ratio         PTT - ( 2022 06:47 )  PTT:38.0 sec  Urinalysis Basic - ( 2022 14:36 )    Color: Yellow / Appearance: Slightly Turbid / S.014 / pH: x  Gluc: x / Ketone: Negative  / Bili: Negative / Urobili: 4 mg/dL   Blood: x / Protein: 30 mg/dL / Nitrite: Negative   Leuk Esterase: Large / RBC: 35 /hpf /  /HPF   Sq Epi: x / Non Sq Epi: 2 /hpf / Bacteria: Negative          Imaging:

## 2022-01-13 NOTE — PROCEDURE NOTE - PROCEDURE DATE TIME, MLM
Spoke with pts wife Glendy and relayed Dr. Baird message.  Pt with verbal understanding. No further questions. Call ended.   13-Jan-2022 19:03

## 2022-01-13 NOTE — PROGRESS NOTE ADULT - SUBJECTIVE AND OBJECTIVE BOX
[facility]  **********************************     DATE OF SERVICE: 01-13-22    Melena overnight, has gum bleeding. Denies chest pain or shortness of breath.   Review of systems otherwise (-)      MEDICATIONS  (STANDING):  dextrose 40% Gel 15 Gram(s) Oral once  dextrose 5%. 1000 milliLiter(s) (50 mL/Hr) IV Continuous <Continuous>  dextrose 5%. 1000 milliLiter(s) (100 mL/Hr) IV Continuous <Continuous>  dextrose 50% Injectable 25 Gram(s) IV Push once  dextrose 50% Injectable 12.5 Gram(s) IV Push once  dextrose 50% Injectable 25 Gram(s) IV Push once  glucagon  Injectable 1 milliGRAM(s) IntraMuscular once  influenza  Vaccine (HIGH DOSE) 0.7 milliLiter(s) IntraMuscular once  insulin glargine Injectable (LANTUS) 10 Unit(s) SubCutaneous at bedtime  insulin lispro (ADMELOG) corrective regimen sliding scale   SubCutaneous three times a day before meals  insulin lispro (ADMELOG) corrective regimen sliding scale   SubCutaneous at bedtime  ketotifen 0.025% Ophthalmic Solution 1 Drop(s) Right EYE two times a day  lidocaine   4% Patch 2 Patch Transdermal every 24 hours  multivitamin/minerals 1 Tablet(s) Oral daily  pantoprazole  Injectable 40 milliGRAM(s) IV Push every 12 hours  polyethylene glycol 3350 17 Gram(s) Oral daily  senna 2 Tablet(s) Oral at bedtime  sodium chloride 0.9%. 1000 milliLiter(s) (100 mL/Hr) IV Continuous <Continuous>  sodium zirconium cyclosilicate 10 Gram(s) Oral every 8 hours    MEDICATIONS  (PRN):      LABS:                          7.1    8.34  )-----------( 157      ( 13 Jan 2022 12:00 )             20.8     Hemoglobin: 7.1 g/dL (01-13 @ 12:00)  Hemoglobin: 7.5 g/dL (01-13 @ 05:49)  Hemoglobin: 6.3 g/dL (01-12 @ 06:47)  Hemoglobin: 7.2 g/dL (01-11 @ 09:05)  Hemoglobin: 7.8 g/dL (01-10 @ 07:27)    01-13    x   |  x   |  x   ----------------------------<  x   5.5<H>   |  x   |  x     Ca    8.5      13 Jan 2022 03:39  Phos  4.9     01-13  Mg     3.2     01-13    TPro  5.7<L>  /  Alb  3.5  /  TBili  2.6<H>  /  DBili  x   /  AST  278<H>  /  ALT  164<H>  /  AlkPhos  260<H>  01-13    Creatinine Trend: 4.97<--, 4.94<--, 4.73<--, 4.37<--, 4.19<--, 3.63<--   PT/INR - ( 13 Jan 2022 05:49 )   PT: 58.9 sec;   INR: 5.31 ratio          01-12-22 @ 07:01  -  01-13-22 @ 07:00  --------------------------------------------------------  IN: 720 mL / OUT: 200 mL / NET: 520 mL    01-13-22 @ 07:01 - 01-13-22 @ 14:03  --------------------------------------------------------  IN: 360 mL / OUT: 2 mL / NET: 358 mL        PHYSICAL EXAM  Vital Signs Last 24 Hrs  T(C): 36.2 (13 Jan 2022 11:47), Max: 36.3 (12 Jan 2022 16:26)  T(F): 97.2 (13 Jan 2022 11:47), Max: 97.3 (12 Jan 2022 16:26)  HR: 61 (13 Jan 2022 11:47) (59 - 61)  BP: 106/59 (13 Jan 2022 11:47) (95/57 - 106/66)  BP(mean): --  RR: 18 (13 Jan 2022 11:47) (17 - 19)  SpO2: 97% (13 Jan 2022 11:47) (95% - 99%)      Gen: NAD  HEENT:  (-)icterus (-)pallor  CV: N S1 S2 1/6 ELLY (+)2 Pulses B/l  Resp:  Clear to auscultation B/L, normal effort  GI: (+) BS Soft, NT, ND  Lymph:  (-)Edema, (-)obvious lymphadenopathy  Skin: Warm to touch, Normal turgor  Psych: Appropriate mood and affect      TELEMETRY:  60      ASSESSMENT/PLAN: Patient is a 89 y/o male with PMH of CAD s/p PCI (ROBERTO to D1 6/2/14), chronic AF (on AC w/ eliquis), 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 Cardiology consulted for further evaluation.    - Plan to start HD per renal  - Coreg on hold  - AC with Eliquis for AF/Stroke prevention on hold given acute anemia and supratherapeutic INR, s/p PRBC, Kcentra and vit K  - Recommend medical management of CAD however ASA held for anemia/bleeding and statin d/gita for transaminitis  - EP eval appreciated - d/c amio given elevated LFTs/INR  - Repeat TTE noted with normal LV function, mod pulm HTN  - Of note, family declined cath and preferred medical management of CAD prior admission  - F/U with his cardiologist Dr. Vanegas for cardiology after discharge    Ismael Figueroa PA-C  Pager: [phone]

## 2022-01-13 NOTE — PROGRESS NOTE ADULT - ASSESSMENT
89 y/o M PMH of CAD s/p PCI (ROBERTO to D1 6/2/14), chronic AF (on AC w/ eliquis), tachy/perla syndrome s/p biV PPM placement, CVA x 2, HFpEF, hospitalized in October 2021 for CHF exacerbation/overload presents to ED BIB son from home for decreased PO intake x2 weeks associated with constipation and abdominal pain, generalized weakness worsening to point where he is no longer ambulating and now w/ b/l LE edema, SOB and orthopnea. Renal consulted for for FLOYD/CKD Mx.     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  FLOYD pre renal +/- cardiorenal +/- Obstruction  b/l Cr 1.87 12/17/21, 1 mth ago was 2.5mg/dl  c/w river- trend urineoutput  Cr continues to rise- UA and URine lytes reviewed   Pt with GIB and severe anemia s/p 2 units prbc and one unit FFP  Given low egfr, Hyperkalemia, confusion/uremia--> pt does meet indication for HD  Needs annia--> plan for HD today--> d/w primary team and HD units-->orders placed  - I/o's, daily weights, daily BMP  -avoid NSAIDs/ACEi/ARB/nephrotoxics/iv contrast  low Na in diet, fluid restriction 1L/day   dose all meds for eGFR<15ml/min    Hyperkalemia  lokelma 10grams po x 2 doses again today until hd  change albumin to standing ivf nacl @ 100c for now to help forward flow  trend k    Acute diastolic congestive heart failure  - Coreg on hold  d/c lasix  - cardiology eval    Hypertension. bp low, asympt. watch closely  afib- c/w amio, off eliquis 2/2 high inr and bleeding  d/w son Gurpreet Huynh  [phone]

## 2022-01-13 NOTE — PROGRESS NOTE ADULT - ASSESSMENT
89 year old man with PMH of CAD s/p PCI (ROBERTO to D1 6/2/14), chronic AF (on AC w/ eliquis), tachy/perla syndrome s/p biV PPM placement, CVA x 2, HFpEF, hospitalized in October 2021 for CHF exacerbation/overload presents for decreased PO intake  for 2 weeks associated with constipation and abdominal pain, generalized weakness worsening to point where he is no longer ambulating and now with c/b mucosa bleeding, rectal bleeding, urethral bleeding, and from PIV site. Hematology consulted for further management given multifactorial coagulopathy    #Coagulopathy with active bleeding  -discontinued eliquis and ASA in setting of active bleeding and elevated INR  -given vitamin K on 1/12/21; given additional 10 mg on 1/13/21  -given KCentra 2000 units on 1/12 and on 1/13; INR still elevated but improved, recommend giving an additional 1500 units now  -mixing study today shows correction, suggesting that apixaban might be getting cleared sufficiently  -d-dimer elevated and fibrinogen low, suggestive of component of DIC vs liver disease; checking factors V, VII, VIII, and X with AM labs to better characterize  -monitor fibrinogen; though >150, would give cryoprecipitate given active bleeding ongoing today; discussed with blood bank today  -haptoglobin is normal, making hemolysis less likely  -patient has worsening hyperkalemia and uremia; uremia might be partially 2/2 GI bleed / blood reabsorption, but would have low threshold to dialyze given suspicion that uremic platelets are contributing to coagulopathy  -transfuse as needed; transfuse for Hgb <7.0 or if symptomatically anemic; transfuse for plts <25k  -Daily CBC, trend hgb. INR/PT/PTT, fibrinogen, D-dimer q12 hours for now  -given renal failure, please check EPO level    Please page with questions or concerns. Will Follow with you.    Matthias Moe MD, MPH  Hematology / Oncology Fellow, PGY7  p   After hours or on weekends, please call on-call fellow

## 2022-01-13 NOTE — PROVIDER CONTACT NOTE (CRITICAL VALUE NOTIFICATION) - BACKGROUND
Patient with admitting diagnosis of heart failure. Now with blood bowel movements, and low hgb. S/p 2 units PBRCs, 1 unit FFP

## 2022-01-13 NOTE — PROGRESS NOTE ADULT - SUBJECTIVE AND OBJECTIVE BOX
DATE OF SERVICE: 22 @ 13:25    Patient is a 89y old  Male who presents with a chief complaint of     SUBJECTIVE / OVERNIGHT EVENTS:  weak and lethargic.  spoke with son at bedside    MEDICATIONS  (STANDING):  aMIOdarone    Tablet 400 milliGRAM(s) Oral daily  atorvastatin 40 milliGRAM(s) Oral at bedtime  dextrose 40% Gel 15 Gram(s) Oral once  dextrose 5%. 1000 milliLiter(s) (50 mL/Hr) IV Continuous <Continuous>  dextrose 5%. 1000 milliLiter(s) (100 mL/Hr) IV Continuous <Continuous>  dextrose 50% Injectable 25 Gram(s) IV Push once  dextrose 50% Injectable 12.5 Gram(s) IV Push once  dextrose 50% Injectable 25 Gram(s) IV Push once  glucagon  Injectable 1 milliGRAM(s) IntraMuscular once  influenza  Vaccine (HIGH DOSE) 0.7 milliLiter(s) IntraMuscular once  insulin glargine Injectable (LANTUS) 10 Unit(s) SubCutaneous at bedtime  insulin lispro (ADMELOG) corrective regimen sliding scale   SubCutaneous three times a day before meals  insulin lispro (ADMELOG) corrective regimen sliding scale   SubCutaneous at bedtime  ketotifen 0.025% Ophthalmic Solution 1 Drop(s) Right EYE two times a day  lidocaine   4% Patch 2 Patch Transdermal every 24 hours  multivitamin/minerals 1 Tablet(s) Oral daily  pantoprazole  Injectable 40 milliGRAM(s) IV Push every 12 hours  polyethylene glycol 3350 17 Gram(s) Oral daily  senna 2 Tablet(s) Oral at bedtime  sodium chloride 0.9%. 1000 milliLiter(s) (100 mL/Hr) IV Continuous <Continuous>  sodium zirconium cyclosilicate 10 Gram(s) Oral every 8 hours    MEDICATIONS  (PRN):      Vital Signs Last 24 Hrs  T(C): 36.2 (2022 11:47), Max: 36.3 (2022 16:26)  T(F): 97.2 (2022 11:47), Max: 97.3 (2022 16:26)  HR: 61 (2022 11:47) (59 - 61)  BP: 106/59 (2022 11:47) (95/57 - 106/66)  BP(mean): --  RR: 18 (2022 11:47) (17 - 19)  SpO2: 97% (2022 11:47) (95% - 99%)  CAPILLARY BLOOD GLUCOSE      POCT Blood Glucose.: 114 mg/dL (2022 12:10)  POCT Blood Glucose.: 135 mg/dL (2022 08:38)  POCT Blood Glucose.: 252 mg/dL (2022 21:21)  POCT Blood Glucose.: 197 mg/dL (2022 17:20)    I&O's Summary    2022 07:01  -  2022 07:00  --------------------------------------------------------  IN: 720 mL / OUT: 200 mL / NET: 520 mL        PHYSICAL EXAM:  GENERAL: NAD, well-developed  HEAD:  Atraumatic, Normocephalic  EYES: EOMI, PERRLA, conjunctiva and sclera clear  NECK: Supple, No JVD  CHEST/LUNG: Clear to auscultation bilaterally; No wheeze  HEART: Regular rate and rhythm; No murmurs, rubs, or gallops  ABDOMEN: Soft, Nontender, Nondistended; Bowel sounds present  EXTREMITIES:  2+ Peripheral Pulses, No clubbing, cyanosis, or edema    NEUROLOGY: non-focal  SKIN: unstageable pressure ulcer    LABS:                        7.1    8.34  )-----------( 157      ( 2022 12:00 )             20.8         x   |  x   |  x   ----------------------------<  x   5.5<H>   |  x   |  x     Ca    8.5      2022 03:39  Phos  4.9       Mg     3.2         TPro  5.7<L>  /  Alb  3.5  /  TBili  2.6<H>  /  DBili  x   /  AST  278<H>  /  ALT  164<H>  /  AlkPhos  260<H>      PT/INR - ( 2022 05:49 )   PT: 58.9 sec;   INR: 5.31 ratio         PTT - ( 2022 06:47 )  PTT:38.0 sec      Urinalysis Basic - ( 2022 14:36 )    Color: Yellow / Appearance: Slightly Turbid / S.014 / pH: x  Gluc: x / Ketone: Negative  / Bili: Negative / Urobili: 4 mg/dL   Blood: x / Protein: 30 mg/dL / Nitrite: Negative   Leuk Esterase: Large / RBC: 35 /hpf /  /HPF   Sq Epi: x / Non Sq Epi: 2 /hpf / Bacteria: Negative        RADIOLOGY & ADDITIONAL TESTS:    Imaging Personally Reviewed:    Consultant(s) Notes Reviewed:      Care Discussed with Consultants/Other Providers:

## 2022-01-13 NOTE — CONSULT NOTE ADULT - SUBJECTIVE AND OBJECTIVE BOX
Interventional Radiology    Evaluate for Procedure: non-tunneled HD catheter placement    HPI: 89 y/o M PMH of CAD s/p PCI (ROBERTO to D1 6/2/14), chronic AF (on AC w/ eliquis), tachy/perla syndrome s/p biV PPM placement, CVA x 2, HFpEF, hospitalized in October 2021 for CHF exacerbation/overload presents to ED BIB son from home for decreased PO intake x2 weeks associated with constipation and abdominal pain, generalized weakness worsening to point where he is no longer ambulating and now w/ b/l LE edema, SOB and orthopnea. Renal consulted for for FLOYD/CKD Mx. IR consulted for non-tunneled HD catheter placement.    Allergies:   Medications (Abx/Cardiac/Anticoagulation/Blood Products)  aMIOdarone    Tablet: 400 milliGRAM(s) Oral (01-13 @ 05:36)  aspirin enteric coated: 81 milliGRAM(s) Oral (01-11 @ 14:24)  prothrombin complex concentrate IVPB (KCENTRA): 400 mL/Hr IV Intermittent (01-12 @ 10:51)    Data:  T(C): 36.3  HR: 60  BP: 106/65  RR: 18  SpO2: 97%    -WBC 8.44 / HgB 7.5 / Hct 21.9 / Plt 153  -Na 133 / Cl 91 /  / Glucose 172  -K 6.2 / CO2 25 / Cr 4.97  - / Alk Phos 260 / T.Bili 2.6  -INR 5.31 / PTT --      Assessment/Plan: 89 y/o M PMH of CAD s/p PCI (ROBERTO to D1 6/2/14), chronic AF (on AC w/ eliquis), tachy/perla syndrome s/p biV PPM placement, CVA x 2, HFpEF, hospitalized in October 2021 for CHF exacerbation/overload presents to ED BIB son from home for decreased PO intake x2 weeks associated with constipation and abdominal pain, generalized weakness worsening to point where he is no longer ambulating and now w/ b/l LE edema, SOB and orthopnea. Renal consulted for for FLOYD/CKD Mx. IR consulted for non-tunneled HD catheter placement.    -case reviewed and approved for today pending INR correction  -please place IR procedure under NP [place] Bulld/w primary team    Please contact IR with any questions or concerns.

## 2022-01-14 NOTE — PROGRESS NOTE ADULT - ASSESSMENT
89 y/o M PMH of CAD s/p PCI (ROBERTO to D1 6/2/14), chronic AF (on AC w/ eliquis), tachy/perla syndrome s/p biV PPM placement, CVA x 2, HFpEF, hospitalized in October 2021 for CHF exacerbation/overload presents to ED BIB son from home for decreased PO intake x2 weeks associated with constipation and abdominal pain, generalized weakness worsening to point where he is no longer ambulating and now w/ b/l LE edema, SOB and orthopnea. Renal consulted for for FLOYD/CKD Mx.     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  DDx includes ATN +/- CKD progression  cr continued to rise, pt was confused and hyperkalemia--> Initiated HD yesterday  1st HD ( 1/13/22)-> During HD yesterday had Low bp which improved, no Uf  Plan for 2nd HD session today and 3rd tomm  d/c IVF  encourage po intake  - I/o's, daily weights, daily BMP  -avoid NSAIDs/ACEi/ARB/nephrotoxics/iv contrast  low Na in diet, fluid restriction 1L/day   dose all meds for eGFR<15ml/min    Hyperkalemia  s/p LOkelma  s/p HD yesterday  improving  trend k    Acute diastolic congestive heart failure  - Coreg on hold  off lasix  - cardiology eval    Hypertension. bp low, asympt. watch closely  afib- c/w amio, off eliquis 2/2 high inr and bleeding      d/w son Gurpreet  Dr Huynh  [phone]

## 2022-01-14 NOTE — PROGRESS NOTE ADULT - SUBJECTIVE AND OBJECTIVE BOX
C A R D I O L O G Y  **********************************     DATE OF SERVICE: 01-14-22    c/o b/l thigh pain. Denies chest pain or shortness of breath.   Review of systems otherwise (-)      MEDICATIONS  (STANDING):  acetaminophen   IVPB .. 1000 milliGRAM(s) IV Intermittent once  dextrose 40% Gel 15 Gram(s) Oral once  dextrose 5%. 1000 milliLiter(s) (50 mL/Hr) IV Continuous <Continuous>  dextrose 5%. 1000 milliLiter(s) (100 mL/Hr) IV Continuous <Continuous>  dextrose 50% Injectable 25 Gram(s) IV Push once  dextrose 50% Injectable 12.5 Gram(s) IV Push once  dextrose 50% Injectable 25 Gram(s) IV Push once  glucagon  Injectable 1 milliGRAM(s) IntraMuscular once  influenza  Vaccine (HIGH DOSE) 0.7 milliLiter(s) IntraMuscular once  insulin glargine Injectable (LANTUS) 10 Unit(s) SubCutaneous at bedtime  insulin lispro (ADMELOG) corrective regimen sliding scale   SubCutaneous three times a day before meals  insulin lispro (ADMELOG) corrective regimen sliding scale   SubCutaneous at bedtime  ketotifen 0.025% Ophthalmic Solution 1 Drop(s) Right EYE two times a day  multivitamin/minerals 1 Tablet(s) Oral daily  pantoprazole  Injectable 40 milliGRAM(s) IV Push every 12 hours  polyethylene glycol 3350 17 Gram(s) Oral daily  senna 2 Tablet(s) Oral at bedtime    MEDICATIONS  (PRN):      LABS:                          9.1    8.65  )-----------( 152      ( 14 Jan 2022 08:55 )             26.5     Hemoglobin: 9.1 g/dL (01-14 @ 08:55)  Hemoglobin: 8.9 g/dL (01-13 @ 19:43)  Hemoglobin: 7.1 g/dL (01-13 @ 12:00)  Hemoglobin: 7.5 g/dL (01-13 @ 05:49)  Hemoglobin: 6.3 g/dL (01-12 @ 06:47)    01-14    136  |  94<L>  |  103<H>  ----------------------------<  65<L>  4.8   |  26  |  4.02<H>    Ca    8.2<L>      14 Jan 2022 08:51  Phos  4.9     01-13  Mg     3.2     01-13    TPro  5.7<L>  /  Alb  3.5  /  TBili  2.6<H>  /  DBili  x   /  AST  278<H>  /  ALT  164<H>  /  AlkPhos  260<H>  01-13    Creatinine Trend: 4.02<--, 4.97<--, 4.94<--, 4.73<--, 4.37<--, 4.19<--   PT/INR - ( 14 Jan 2022 08:51 )   PT: 42.1 sec;   INR: 3.74 ratio         PTT - ( 14 Jan 2022 08:51 )  PTT:38.2 sec          01-13-22 @ 07:01  -  01-14-22 @ 07:00  --------------------------------------------------------  IN: 360 mL / OUT: 252 mL / NET: 108 mL    01-14-22 @ 07:01 - 01-14-22 @ 13:26  --------------------------------------------------------  IN: 200 mL / OUT: 0 mL / NET: 200 mL        PHYSICAL EXAM  Vital Signs Last 24 Hrs  T(C): 36.6 (14 Jan 2022 11:56), Max: 36.6 (14 Jan 2022 11:56)  T(F): 97.9 (14 Jan 2022 11:56), Max: 97.9 (14 Jan 2022 11:56)  HR: 60 (14 Jan 2022 11:56) (60 - 69)  BP: 115/64 (14 Jan 2022 11:56) (105/66 - 125/71)  BP(mean): --  RR: 18 (14 Jan 2022 11:56) (16 - 18)  SpO2: 94% (14 Jan 2022 11:56) (92% - 97%)        Gen: NAD  HEENT:  (-)icterus (-)pallor  CV: N S1 S2 1/6 ELLY (+)2 Pulses B/l  Resp:  Clear to auscultation B/L, normal effort  GI: (+) BS Soft, NT, ND  Lymph:  (-)Edema, (-)obvious lymphadenopathy  Skin: Warm to touch, Normal turgor  Psych: Appropriate mood and affect      TELEMETRY:  60      ASSESSMENT/PLAN: Patient is a 89 y/o male with PMH of CAD s/p PCI (ROBERTO to D1 6/2/14), chronic AF (on AC w/ eliquis), 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 Cardiology consulted for further evaluation.    - Continue HD per renal  - Coreg on hold  - AC with Eliquis for AF/Stroke prevention on hold given acute anemia and supratherapeutic INR, s/p PRBC, Kcentra and vit K  - Recommend medical management of CAD however ASA held for anemia/bleeding and statin d/gita for transaminitis  - EP eval appreciated - d/c amio given elevated LFTs/INR  - Repeat TTE noted with normal LV function, mod pulm HTN  - Of note, family declined cath and preferred medical management of CAD prior admission  - F/U with his cardiologist Dr. Vanegas for cardiology after discharge    Ismael Figueroa PA-C  Pager: [phone]

## 2022-01-14 NOTE — PROGRESS NOTE ADULT - ASSESSMENT
melena  GI bleed  anemia    cbc daily   monitor H/H  transfuse PRN hgb>7  PPI IV BID  hold AC  heme following, coagulopathy workup  unable to pursue upper gastrointestinal endoscopy at this time due elevated INR  reversal of Eliquis  conservative management for now   will follow    Advanced care planning was discussed with patient and family.  Advanced care planning forms were reviewed and discussed.  Risks, benefits and alternatives of gastroenterologic procedures were discussed in detail and all questions were answered.    30 minutes spent.  melena  GI bleed  anemia    cbc daily   monitor H/H  transfuse PRN hgb>7  PPI IV BID  hold AC  heme following, coagulopathy workup  unable to pursue upper gastrointestinal endoscopy at this time due elevated INR and pts comorbidities   reversal of Eliquis  conservative management for now   will follow    Advanced care planning was discussed with patient and family.  Advanced care planning forms were reviewed and discussed.  Risks, benefits and alternatives of gastroenterologic procedures were discussed in detail and all questions were answered.    30 minutes spent.

## 2022-01-14 NOTE — PROGRESS NOTE ADULT - ASSESSMENT
89 y/o M PMH of CAD s/p PCI (ROBERTO to D1 6/2/14), chronic AF (on AC w/ eliquis), tachy/perla syndrome s/p biV PPM placement, CVA x 2, HFpEF, hospitalized in October 2021 for CHF exacerbation/overload presents to ED BIB son from home for decreased PO intake x2 weeks associated with constipation and abdominal pain, generalized weakness worsening to point where he is no longer ambulating and now w/ b/l LE edema and orthopnea. Denies f/c, cough, congestion.    acute on chronic diastolic congestive heart failure  - hold  PO lasix . cr uptrending  - Coreg on hold  - DISContinue AC with Eliquis due to acute bleeding  - Continue medical management of CAD with ASA/Statin  - EP eval appreciated - hold Amio 400mg due to elevated lfts  - Repeat TTE noted with normal LV function, mod pulm HTN  - Of note, family declined cath and preferred medical management of CAD prior admission  - F/U with his cardiologist Dr. Vanegas for cardiology after discharge    FLOYD pre renal +/- cardiorenal +/- Obstruction  b/l Cr 1.87 12/17/21, 1 mth ago was 2.5mg/dl  c/w river- trend urineoutput  Cr continues to rise- UA and URine lytes reviewed Showing intravascular depletion--> lasix was discontinued  Given GIB and low hgb--> Agree with 2 units prbc  reverse coagulopathy--> f/u Heme and GI  c/w albumin for now  HD per renal  - I/o's, daily weights, daily BMP  -avoid NSAIDs/ACEi/ARB/nephrotoxics/iv contrast  low Na in diet, fluid restriction 1L/day   dose all meds for eGFR<15ml/min  - nephrology following    Coagulopathy with active bleeding  -discontinued eliquis and ASA in setting of active bleeding and elevated INR  -given vitamin K on 1/12/21; given additional 10 mg on 1/13/21  -given KCentra 2000 units on 1/12 and on 1/13; INR still elevated but improved, recommend giving an additional 1500 units now  -mixing study today shows correction, suggesting that apixaban might be getting cleared sufficiently  -d-dimer elevated and fibrinogen low, suggestive of component of DIC vs liver disease; checking factors V, VII, VIII, and X with AM labs to better characterize  -monitor fibrinogen; though >150, would give cryoprecipitate given active bleeding ongoing today; discussed with blood bank today  -haptoglobin is normal, making hemolysis less likely  -patient has worsening hyperkalemia and uremia; uremia might be partially 2/2 GI bleed / blood reabsorption, but would have low threshold to dialyze given suspicion that uremic platelets are contributing to coagulopathy  -transfuse as needed; transfuse for Hgb <7.0 or if symptomatically anemic; transfuse for plts <25k  -Daily CBC, trend hgb. INR/PT/PTT, fibrinogen, D-dimer q12 hours for now  -given renal failure, please check EPO level  - hematology following    GI bleed  - c/w PPI  - was seen by GI  - no a candidate for endoscopy at this time    elevated liver enzymes worsening  - likely 2/2 to CHF    anemia  - transfuse 2 units of prbc  - likely 2/2 ckd and iron def  - s/p  iv iron    diabetes  controlled  - fs qid  - hgb a1c  - insulin sliding scale  - Lantus 10 units qhs    unstageable pressure ulcer present on admission  - optimize nutrition  - off loading  - T&P q 2 hours  - wound care consult    afib  - c/w amio  - d/c  eliquis    constipation  - senna and miralax    dispo  - JENNIFER

## 2022-01-14 NOTE — PROGRESS NOTE ADULT - ASSESSMENT
89 year old man with PMH of CAD s/p PCI (ROBERTO to D1 6/2/14), chronic AF (on AC w/ eliquis), tachy/perla syndrome s/p biV PPM placement, CVA x 2, HFpEF, hospitalized in October 2021 for CHF exacerbation/overload presents for decreased PO intake  for 2 weeks associated with constipation and abdominal pain, generalized weakness worsening to point where he is no longer ambulating and now with c/b mucosa bleeding, rectal bleeding, urethral bleeding, and from PIV site. Hematology consulted for further management given multifactorial coagulopathy    #Coagulopathy with active bleeding  -discontinued eliquis and ASA in setting of active bleeding and elevated INR  -given vitamin K on 1/12/21; given additional 10 mg on 1/13/21  -given KCentra 2000 units on 1/12 and on 1/13; INR still elevated but improved, recommend giving an additional 1500 units now  -mixing study today shows correction, suggesting that apixaban might be getting cleared sufficiently  -Minimal improvement of INR despite Kcentra/Vitamin K  -Factor 8 is elevated thus less likely DIC  -EPO level elevated  -monitor fibrinogen; though >150, would give cryoprecipitate given active bleeding ongoing today; discussed with blood bank  -haptoglobin is normal, making hemolysis less likely  -s/p session of HD, continue as per nephrology   -transfuse as needed; transfuse for Hgb <7.0 or if symptomatically anemic; transfuse for plts <25k  -Daily CBC, trend hgb. INR/PT/PTT, fibrinogen, D-dimer q12 hours for now      Please page with questions or concerns. Will follow with you.      Ryan Soler M.D.  Hematology/Oncology Fellow PGY4  Pager [phone]  After 5pm, please contact on-call team.

## 2022-01-14 NOTE — PROGRESS NOTE ADULT - SUBJECTIVE AND OBJECTIVE BOX
Patient seen and examined  s/p 1st HD yesterday- had low bp which improved- tolerated well overall    No Known Allergies    Hospital Medications:   MEDICATIONS  (STANDING):  acetaminophen   IVPB .. 1000 milliGRAM(s) IV Intermittent once  dextrose 40% Gel 15 Gram(s) Oral once  dextrose 5%. 1000 milliLiter(s) (50 mL/Hr) IV Continuous <Continuous>  dextrose 5%. 1000 milliLiter(s) (100 mL/Hr) IV Continuous <Continuous>  dextrose 50% Injectable 25 Gram(s) IV Push once  dextrose 50% Injectable 12.5 Gram(s) IV Push once  dextrose 50% Injectable 25 Gram(s) IV Push once  glucagon  Injectable 1 milliGRAM(s) IntraMuscular once  influenza  Vaccine (HIGH DOSE) 0.7 milliLiter(s) IntraMuscular once  insulin glargine Injectable (LANTUS) 10 Unit(s) SubCutaneous at bedtime  insulin lispro (ADMELOG) corrective regimen sliding scale   SubCutaneous three times a day before meals  insulin lispro (ADMELOG) corrective regimen sliding scale   SubCutaneous at bedtime  ketotifen 0.025% Ophthalmic Solution 1 Drop(s) Right EYE two times a day  multivitamin/minerals 1 Tablet(s) Oral daily  pantoprazole  Injectable 40 milliGRAM(s) IV Push every 12 hours  polyethylene glycol 3350 17 Gram(s) Oral daily  senna 2 Tablet(s) Oral at bedtime  sodium chloride 0.9%. 1000 milliLiter(s) (100 mL/Hr) IV Continuous <Continuous>    VITALS:  T(F): 97.9 (22 @ 11:56), Max: 97.9 (22 @ 11:56)  HR: 60 (22 @ 11:56)  BP: 115/64 (22 @ 11:56)  RR: 18 (22 @ 11:56)  SpO2: 94% (22 @ 11:56)  Wt(kg): --     @ 07: @ 07:00  --------------------------------------------------------  IN: 360 mL / OUT: 252 mL / NET: 108 mL     @ 07:  -   @ 12:54  --------------------------------------------------------  IN: 200 mL / OUT: 0 mL / NET: 200 mL        PHYSICAL EXAM:  Constitutional: NAD  HEENT: anicteric sclera, oropharynx clear, MMM  Neck: No JVD  Respiratory: CTAB, no wheezes, rales or rhonchi  Cardiovascular: S1, S2, RRR  Gastrointestinal: BS+, soft, NT/ND  Extremities: No cyanosis or clubbing. No peripheral edema  Neurological: A/O x 3, no focal deficits  Psychiatric: Normal mood, normal affect  : No CVA tenderness. No river.   Skin: No rashes  Vascular Access:    LABS:      136  |  94<L>  |  103<H>  ----------------------------<  65<L>  4.8   |  26  |  4.02<H>    Ca    8.2<L>      2022 08:51  Phos  4.9       Mg     3.2         TPro  5.7<L>  /  Alb  3.5  /  TBili  2.6<H>  /  DBili      /  AST  278<H>  /  ALT  164<H>  /  AlkPhos  260<H>      Creatinine Trend: 4.02 <--, 4.97 <--, 4.94 <--, 4.73 <--, 4.37 <--, 4.19 <--, 3.63 <--, 3.32 <--, 3.23 <--                        9.1    8.65  )-----------( 152      ( 2022 08:55 )             26.5     Urine Studies:  Urinalysis Basic - ( 2022 14:36 )    Color: Yellow / Appearance: Slightly Turbid / S.014 / pH:   Gluc:  / Ketone: Negative  / Bili: Negative / Urobili: 4 mg/dL   Blood:  / Protein: 30 mg/dL / Nitrite: Negative   Leuk Esterase: Large / RBC: 35 /hpf /  /HPF   Sq Epi:  / Non Sq Epi: 2 /hpf / Bacteria: Negative      Potassium, Random Urine: 56 mmol/L ( @ 14:36)  Osmolality, Random Urine: 375 mos/kg ( @ 14:36)  Sodium, Random Urine: 7 mmol/L ( @ :36)  Chloride, Random Urine: <20 mmol/L ( @ :36)  Creatinine, Random Urine: 50 mg/dL ( @ :36)    RADIOLOGY & ADDITIONAL STUDIES:

## 2022-01-14 NOTE — PROGRESS NOTE ADULT - SUBJECTIVE AND OBJECTIVE BOX
DATE OF SERVICE: 01-14-22 @ 11:27    Patient is a 89y old  Male who presents with a chief complaint of     SUBJECTIVE / OVERNIGHT EVENTS:  c/o generalized pain    MEDICATIONS  (STANDING):  acetaminophen   IVPB .. 1000 milliGRAM(s) IV Intermittent once  dextrose 40% Gel 15 Gram(s) Oral once  dextrose 5%. 1000 milliLiter(s) (50 mL/Hr) IV Continuous <Continuous>  dextrose 5%. 1000 milliLiter(s) (100 mL/Hr) IV Continuous <Continuous>  dextrose 50% Injectable 25 Gram(s) IV Push once  dextrose 50% Injectable 12.5 Gram(s) IV Push once  dextrose 50% Injectable 25 Gram(s) IV Push once  glucagon  Injectable 1 milliGRAM(s) IntraMuscular once  influenza  Vaccine (HIGH DOSE) 0.7 milliLiter(s) IntraMuscular once  insulin glargine Injectable (LANTUS) 10 Unit(s) SubCutaneous at bedtime  insulin lispro (ADMELOG) corrective regimen sliding scale   SubCutaneous three times a day before meals  insulin lispro (ADMELOG) corrective regimen sliding scale   SubCutaneous at bedtime  ketotifen 0.025% Ophthalmic Solution 1 Drop(s) Right EYE two times a day  multivitamin/minerals 1 Tablet(s) Oral daily  pantoprazole  Injectable 40 milliGRAM(s) IV Push every 12 hours  polyethylene glycol 3350 17 Gram(s) Oral daily  senna 2 Tablet(s) Oral at bedtime  sodium chloride 0.9%. 1000 milliLiter(s) (100 mL/Hr) IV Continuous <Continuous>    MEDICATIONS  (PRN):      Vital Signs Last 24 Hrs  T(C): 36.3 (14 Jan 2022 04:36), Max: 36.3 (13 Jan 2022 17:07)  T(F): 97.3 (14 Jan 2022 04:36), Max: 97.4 (13 Jan 2022 17:07)  HR: 60 (14 Jan 2022 04:36) (60 - 69)  BP: 105/66 (14 Jan 2022 04:36) (105/66 - 125/71)  BP(mean): --  RR: 18 (14 Jan 2022 04:36) (16 - 18)  SpO2: 92% (14 Jan 2022 04:36) (92% - 97%)  CAPILLARY BLOOD GLUCOSE      POCT Blood Glucose.: 71 mg/dL (14 Jan 2022 08:39)  POCT Blood Glucose.: 75 mg/dL (14 Jan 2022 08:38)  POCT Blood Glucose.: 99 mg/dL (13 Jan 2022 22:39)  POCT Blood Glucose.: 91 mg/dL (13 Jan 2022 22:19)  POCT Blood Glucose.: 114 mg/dL (13 Jan 2022 17:10)  POCT Blood Glucose.: 114 mg/dL (13 Jan 2022 12:10)    I&O's Summary    13 Jan 2022 07:01  -  14 Jan 2022 07:00  --------------------------------------------------------  IN: 360 mL / OUT: 252 mL / NET: 108 mL    14 Jan 2022 07:01  -  14 Jan 2022 11:27  --------------------------------------------------------  IN: 200 mL / OUT: 0 mL / NET: 200 mL        PHYSICAL EXAM:  GENERAL: NAD, well-developed  HEAD:  Atraumatic, Normocephalic  EYES: EOMI, PERRLA, conjunctiva and sclera clear  NECK: Supple, No JVD  CHEST/LUNG: Clear to auscultation bilaterally; No wheeze  HEART: Regular rate and rhythm; No murmurs, rubs, or gallops  ABDOMEN: Soft, Nontender, Nondistended; Bowel sounds present  EXTREMITIES:  2+ Peripheral Pulses, No clubbing, cyanosis, or edema  NEUROLOGY: non-focal  SKIN: No rashes or lesions    LABS:                        9.1    8.65  )-----------( 152      ( 14 Jan 2022 08:55 )             26.5     01-14    136  |  94<L>  |  103<H>  ----------------------------<  65<L>  4.8   |  26  |  4.02<H>    Ca    8.2<L>      14 Jan 2022 08:51  Phos  4.9     01-13  Mg     3.2     01-13    TPro  5.7<L>  /  Alb  3.5  /  TBili  2.6<H>  /  DBili  x   /  AST  278<H>  /  ALT  164<H>  /  AlkPhos  260<H>  01-13    PT/INR - ( 14 Jan 2022 08:51 )   PT: 42.1 sec;   INR: 3.74 ratio         PTT - ( 14 Jan 2022 08:51 )  PTT:38.2 sec          RADIOLOGY & ADDITIONAL TESTS:    Imaging Personally Reviewed:    Consultant(s) Notes Reviewed:      Care Discussed with Consultants/Other Providers:

## 2022-01-14 NOTE — PROGRESS NOTE ADULT - SUBJECTIVE AND OBJECTIVE BOX
[facility]  Luis Enrique Sotomayor PA-C  [facility] [place] [address]  [phone]      INTERVAL HPI/OVERNIGHT EVENTS:  pt seen and examined, very tired  pt with continued melena overnight  H/H stable s/p 1 Unit PRBC yesterday   INR remains elevated     MEDICATIONS  (STANDING):  dextrose 40% Gel 15 Gram(s) Oral once  dextrose 5%. 1000 milliLiter(s) (50 mL/Hr) IV Continuous <Continuous>  dextrose 5%. 1000 milliLiter(s) (100 mL/Hr) IV Continuous <Continuous>  dextrose 50% Injectable 25 Gram(s) IV Push once  dextrose 50% Injectable 12.5 Gram(s) IV Push once  dextrose 50% Injectable 25 Gram(s) IV Push once  glucagon  Injectable 1 milliGRAM(s) IntraMuscular once  influenza  Vaccine (HIGH DOSE) 0.7 milliLiter(s) IntraMuscular once  insulin glargine Injectable (LANTUS) 10 Unit(s) SubCutaneous at bedtime  insulin lispro (ADMELOG) corrective regimen sliding scale   SubCutaneous three times a day before meals  insulin lispro (ADMELOG) corrective regimen sliding scale   SubCutaneous at bedtime  ketotifen 0.025% Ophthalmic Solution 1 Drop(s) Right EYE two times a day  multivitamin/minerals 1 Tablet(s) Oral daily  pantoprazole  Injectable 40 milliGRAM(s) IV Push every 12 hours  polyethylene glycol 3350 17 Gram(s) Oral daily  senna 2 Tablet(s) Oral at bedtime  sodium chloride 0.9%. 1000 milliLiter(s) (100 mL/Hr) IV Continuous <Continuous>    MEDICATIONS  (PRN):      Allergies    No Known Allergies    Intolerances        ROS:   General:  No wt loss, fevers, chills, night sweats, fatigue,   Eyes:  Good vision, no reported pain  ENT:  No sore throat, pain, runny nose, dysphagia  CV:  No pain, palpitations, hypo/hypertension  Resp:  No dyspnea, cough, tachypnea, wheezing  GI:  No pain, No nausea, No vomiting, No diarrhea, No constipation, No weight loss, No fever, No pruritis, No rectal bleeding, + tarry stools, No dysphagia,  :  No pain, bleeding, incontinence, nocturia  Muscle:  No pain, weakness  Neuro:  No weakness, tingling, memory problems  Psych:  No fatigue, insomnia, mood problems, depression  Endocrine:  No polyuria, polydipsia, cold/heat intolerance  Heme:  No petechiae, ecchymosis, easy bruisability  Skin:  No rash, tattoos, scars, edema      PHYSICAL EXAM:   Vital Signs:  Vital Signs Last 24 Hrs  T(C): 36.3 (2022 04:36), Max: 36.3 (2022 17:07)  T(F): 97.3 (2022 04:36), Max: 97.4 (2022 17:07)  HR: 60 (2022 04:36) (60 - 69)  BP: 105/66 (2022 04:36) (105/66 - 125/71)  BP(mean): --  RR: 18 (2022 04:36) (16 - 18)  SpO2: 92% (2022 04:36) (92% - 97%)  Daily     Daily Weight in k.4 (2022 07:23)    GENERAL:  Appears stated age,   HEENT:  NC/AT,    CHEST:  Full & symmetric excursion,   HEART:  Regular rhythm,  ABDOMEN:  Soft, non-tender, non-distended,  EXTEREMITIES:  no cyanosis  SKIN:  No rash  NEURO:  Alert,       LABS:                        9.1    8.65  )-----------( 152      ( 2022 08:55 )             26.5         136  |  94<L>  |  103<H>  ----------------------------<  65<L>  4.8   |  26  |  4.02<H>    Ca    8.2<L>      2022 08:51  Phos  4.9       Mg     3.2         TPro  5.7<L>  /  Alb  3.5  /  TBili  2.6<H>  /  DBili  x   /  AST  278<H>  /  ALT  164<H>  /  AlkPhos  260<H>      PT/INR - ( 2022 08:51 )   PT: 42.1 sec;   INR: 3.74 ratio         PTT - ( 2022 08:51 )  PTT:38.2 sec      RADIOLOGY & ADDITIONAL TESTS:

## 2022-01-14 NOTE — PROGRESS NOTE ADULT - SUBJECTIVE AND OBJECTIVE BOX
EP ATTENDING    tele: AV sequential BiV pacing  lethargic today, refuses interview  DATE OF SERVICE - 01-14-22     Review of Systems:   Constitutional: [ ] fevers, [ ] chills.   Skin: [ ] dry skin. [ ] rashes.  Psychiatric: [ ] depression, [ ] anxiety.   Gastrointestinal: [ ] BRBPR, [ ] melena.   Neurological: [ ] confusion. [ ] seizures. [ ] shuffling gait.   Ears,Nose,Mouth and Throat: [ ] ear pain [ ] sore throat.   Eyes: [ ] diplopia.   Respiratory: [ ] hemoptysis. [ ] shortness of breath  Cardiovascular: See HPI above  Hematologic/Lymphatic: [ ] anemia. [ ] painful nodes. [ ] prolonged bleeding.   Genitourinary: [ ] hematuria. [ ] flank pain.   Endocrine: [ ] significant change in weight. [ ] intolerance to heat and cold.     Review of systems [ x] otherwise negative, [ ] otherwise unable to obtain    FH: no family history of sudden cardiac death in first degree relatives    SH: [ ] tobacco, [ ] alcohol, [ ] drugs    dextrose 40% Gel 15 Gram(s) Oral once  dextrose 5%. 1000 milliLiter(s) IV Continuous <Continuous>  dextrose 5%. 1000 milliLiter(s) IV Continuous <Continuous>  dextrose 50% Injectable 25 Gram(s) IV Push once  dextrose 50% Injectable 12.5 Gram(s) IV Push once  dextrose 50% Injectable 25 Gram(s) IV Push once  glucagon  Injectable 1 milliGRAM(s) IntraMuscular once  influenza  Vaccine (HIGH DOSE) 0.7 milliLiter(s) IntraMuscular once  insulin glargine Injectable (LANTUS) 10 Unit(s) SubCutaneous at bedtime  insulin lispro (ADMELOG) corrective regimen sliding scale   SubCutaneous three times a day before meals  insulin lispro (ADMELOG) corrective regimen sliding scale   SubCutaneous at bedtime  ketotifen 0.025% Ophthalmic Solution 1 Drop(s) Right EYE two times a day  multivitamin/minerals 1 Tablet(s) Oral daily  pantoprazole  Injectable 40 milliGRAM(s) IV Push every 12 hours  polyethylene glycol 3350 17 Gram(s) Oral daily  senna 2 Tablet(s) Oral at bedtime  sodium chloride 0.9%. 1000 milliLiter(s) IV Continuous <Continuous>                            9.1    8.65  )-----------( 152      ( 14 Jan 2022 08:55 )             26.5       01-14    136  |  94<L>  |  x   ----------------------------<  65<L>  4.8   |  26  |  4.02<H>    Ca    8.2<L>      14 Jan 2022 08:51  Phos  4.9     01-13  Mg     3.2     01-13    TPro  5.7<L>  /  Alb  3.5  /  TBili  2.6<H>  /  DBili  x   /  AST  278<H>  /  ALT  164<H>  /  AlkPhos  260<H>  01-13    T(C): 36.3 (01-14-22 @ 04:36), Max: 36.3 (01-13-22 @ 17:07)  HR: 60 (01-14-22 @ 04:36) (60 - 69)  BP: 105/66 (01-14-22 @ 04:36) (105/66 - 125/71)  RR: 18 (01-14-22 @ 04:36) (16 - 18)  SpO2: 92% (01-14-22 @ 04:36) (92% - 97%)  Wt(kg): --    I&O's Summary    13 Jan 2022 07:01  -  14 Jan 2022 07:00  --------------------------------------------------------  IN: 360 mL / OUT: 252 mL / NET: 108 mL    General: more lethargic/ fatigued today.  frail elderly man, nondiaphoretic.  Head: Normocephalic and atraumatic.   Neck: No JVD. No bruits. Supple. Does not appear to be enlarged.   Cardiovascular: + S1,S2 ; RRR Soft systolic murmur at the left lower sternal border. No rubs noted.    Lungs: CTA b/l. No rhonchi, rales or wheezes.   Abdomen: + BS, soft. Non tender. Non distended. No rebound. No guarding.   Extremities: No clubbing/cyanosis/edema.   Neurologic: Moves all four extremities. Full range of motion.   Skin: Warm and moist. The patient's skin has normal elasticity and good skin turgor.   Psychiatric: Appropriate mood and affect.  Musculoskeletal: Normal range of motion, normal strength      A/P) He is an extremely pleasant 89 year old male with sick sinus syndrome, paroxysmal atrial fibrillation, complete heart block, and chronic systolic CHF. In June 2014 I implanted a Biotronik biventricular pacemaker. In August 2021 he presented with sustained monomorphic ventricular tachycardia requiring emergency DCCV for rescue therapy. Given the above he was upgraded to a  [employer]onic BiV-ICD, and the old RV pacing lead was capped and abandoned. Device interrogation demonstrates excellent BiV-ICD function. He denies palpitations, syncope, nor angina, but continues to report ongoing NYHA Class II symptoms of chronic systolic CHF including dyspnea and orthopnea. He is now a/w another CHF exacerbation.    -amiodarone was started to prevent ICD shocks for VT.  at this time, INR and LFT's are abnormal.  OK to stop this medication. we may re-evaluate restarting it as an outpatient.  -continue eliquis 2.5mg bid for lifelong a/c for PAF  -medical therapy for CAD with severe LV dysfunction as per cardiology  - getting dialysis for hyperkalemia, uremia.  -transfused for melanotic stool.  antiplatelets and anticoag on hold.  -Goals of care are DNR/DNI.  Awaiting specifics from his son.  -f/u with Dr Villatoro after discharge for routine ICD care as scheduled  -f/u with his cardiologist Dr. Danny Vanegas at [facility] after discharge  -no further inpatient EP workup expected as long as he remains free of AF/VT    Marcial Guzmán M.D.  Cardiac Electrophysiology  [phone]

## 2022-01-14 NOTE — PROGRESS NOTE ADULT - ATTENDING COMMENTS
89 year old man with PMH of CAD s/p PCI (ROBERTO to D1 6/2/14), chronic AF (on AC w/ eliquis), tachy/perla syndrome s/p biV PPM placement, CVA x 2, HFpEF, hospitalized in October 2021 for CHF exacerbation/overload presents for decreased PO intake  for 2 weeks associated with constipation and abdominal pain, generalized weakness worsening to point where he is no longer ambulating and now with c/b mucosa bleeding, rectal bleeding, urethral bleeding, and from PIV site. Hematology consulted for further management given multifactorial coagulopathy    #Coagulopathy with active bleeding  -given vitamin K on 1/12/21; given additional 10 mg on 1/13/21  -given KCentra 2000 units on 1/12 and on 1/13; INR still elevated but improved, recommend giving an additional 1500 units now  -mixing study today shows correction, suggesting that apixaban might be getting cleared sufficiently  -monitor fibrinogen; though >150, would give cryoprecipitate given active bleeding ongoing today; discussed with blood bank today  -patient is awaiting correction of coagulopathy to get a shiley for dialysis.   -transfuse for Hgb <7.0 or if symptomatically anemic; transfuse for plts <25k  -Daily CBC, trend hgb. INR/PT/PTT, fibrinogen, D-dimer q12 hours for now  -given renal failure, please check EPO level
89 year old man with PMH of CAD s/p PCI (ROBERTO to D1 6/2/14), chronic AF (on AC w/ eliquis), tachy/perla syndrome s/p biV PPM placement, CVA x 2, HFpEF, hospitalized in October 2021 for CHF exacerbation/overload presents for decreased PO intake  for 2 weeks associated with constipation and abdominal pain, generalized weakness worsening to point where he is no longer ambulating and now with c/b mucosa bleeding, rectal bleeding, urethral bleeding, and from PIV site. Hematology consulted for further management given multifactorial coagulopathy    #Coagulopathy with active bleeding    -S/P vitamin K on 1/12/21; given additional 10 mg on 1/13/21  -S/P  KCentra 2000 units on 1/12 and on 1/13;  1/14- Kcentra 1500 units.   -mixing study today shows correction, suggesting that apixaban might be getting cleared sufficiently  -Minimal improvement of INR despite Kcentra/Vitamin K  -monitor fibrinogen; though >150, would give cryoprecipitate.   -haptoglobin is normal, making hemolysis less likely  -s/p session of HD, continue as per nephrology   -transfuse as needed; transfuse for Hgb <7.0 or if symptomatically anemic; transfuse for plts <25k  -Daily CBC, trend hgb. INR/PT/PTT, fibrinogen, D-dimer q12 hours for now

## 2022-01-14 NOTE — PROGRESS NOTE ADULT - SUBJECTIVE AND OBJECTIVE BOX
Hematology Follow-up    INTERVAL HPI/OVERNIGHT EVENTS:  Patient S&E at bedside. No o/n events, patient resting comfortably. He mentions back pain. No melena as per primary team.    VITAL SIGNS:  T(F): 97.9 (01-14-22 @ 11:56)  HR: 60 (01-14-22 @ 11:56)  BP: 115/64 (01-14-22 @ 11:56)  RR: 18 (01-14-22 @ 11:56)  SpO2: 94% (01-14-22 @ 11:56)  Wt(kg): --    PHYSICAL EXAM:    ENERAL:  Appears stated age; appears ill  HEENT:  sclera clear; dried and fresh blood noted in mouth  CHEST:  Full & symmetric excursion,   HEART:  Regular rhythm  ABDOMEN:  Soft, non-tender, non-distended  RECTAL: No hematochezia seen  SKIN:  multiple ecchymoses noted  NEURO:  Alert and following commands    MEDICATIONS  (STANDING):  acetaminophen   IVPB .. 1000 milliGRAM(s) IV Intermittent once  dextrose 40% Gel 15 Gram(s) Oral once  dextrose 5%. 1000 milliLiter(s) (50 mL/Hr) IV Continuous <Continuous>  dextrose 5%. 1000 milliLiter(s) (100 mL/Hr) IV Continuous <Continuous>  dextrose 50% Injectable 25 Gram(s) IV Push once  dextrose 50% Injectable 12.5 Gram(s) IV Push once  dextrose 50% Injectable 25 Gram(s) IV Push once  dextrose 50% Injectable 25 Gram(s) IV Push once  glucagon  Injectable 1 milliGRAM(s) IntraMuscular once  influenza  Vaccine (HIGH DOSE) 0.7 milliLiter(s) IntraMuscular once  insulin glargine Injectable (LANTUS) 10 Unit(s) SubCutaneous at bedtime  insulin lispro (ADMELOG) corrective regimen sliding scale   SubCutaneous three times a day before meals  insulin lispro (ADMELOG) corrective regimen sliding scale   SubCutaneous at bedtime  ketotifen 0.025% Ophthalmic Solution 1 Drop(s) Right EYE two times a day  multivitamin/minerals 1 Tablet(s) Oral daily  pantoprazole  Injectable 40 milliGRAM(s) IV Push every 12 hours  polyethylene glycol 3350 17 Gram(s) Oral daily  senna 2 Tablet(s) Oral at bedtime    MEDICATIONS  (PRN):      No Known Allergies      LABS:                        9.1    8.65  )-----------( 152      ( 14 Jan 2022 08:55 )             26.5     01-14    136  |  94<L>  |  103<H>  ----------------------------<  65<L>  4.8   |  26  |  4.02<H>    Ca    8.2<L>      14 Jan 2022 08:51  Phos  4.9     01-13  Mg     3.2     01-13    TPro  5.7<L>  /  Alb  3.5  /  TBili  2.6<H>  /  DBili  x   /  AST  278<H>  /  ALT  164<H>  /  AlkPhos  260<H>  01-13    PT/INR - ( 14 Jan 2022 08:51 )   PT: 42.1 sec;   INR: 3.74 ratio         PTT - ( 14 Jan 2022 08:51 )  PTT:38.2 sec Fibrinogen Assay: 300 mg/dL (01-14 @ 08:51)        RADIOLOGY & ADDITIONAL TESTS:  Studies reviewed.

## 2022-01-15 NOTE — PROGRESS NOTE ADULT - ASSESSMENT
89 y/o M PMH of CAD s/p PCI (ROBERTO to D1 6/2/14), chronic AF (on AC w/ eliquis), tachy/perla syndrome s/p biV PPM placement, CVA x 2, HFpEF, hospitalized in October 2021 for CHF exacerbation/overload presents to ED BIB son from home for decreased PO intake x2 weeks associated with constipation and abdominal pain, generalized weakness worsening to point where he is no longer ambulating and now w/ b/l LE edema, SOB and orthopnea. Renal consulted for for FLOYD/CKD Mx.     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     Acute diastolic congestive heart failure  - Coreg on hold  off lasix  - cardiology eval    Hypertension. bp low, asympt. watch closely    afib- c/w amio, off eliquis 2/2 high inr and bleeding      Dr Garcia  Nephrology   cell [phone]  office [phone]  ans serv- [phone]  [email or web address] [email or web address] ( [facility]end coverage for [facility])

## 2022-01-15 NOTE — PROGRESS NOTE ADULT - SUBJECTIVE AND OBJECTIVE BOX
C A R D I O L O G Y  **********************************     DATE OF SERVICE: 01-15-22    c/o b/l thigh pain. Denies chest pain or shortness of breath.   Review of systems otherwise (-)          dextrose 40% Gel 15 Gram(s) Oral once  dextrose 5%. 1000 milliLiter(s) IV Continuous <Continuous>  dextrose 5%. 1000 milliLiter(s) IV Continuous <Continuous>  dextrose 50% Injectable 25 Gram(s) IV Push once  dextrose 50% Injectable 12.5 Gram(s) IV Push once  dextrose 50% Injectable 25 Gram(s) IV Push once  glucagon  Injectable 1 milliGRAM(s) IntraMuscular once  influenza  Vaccine (HIGH DOSE) 0.7 milliLiter(s) IntraMuscular once  insulin glargine Injectable (LANTUS) 10 Unit(s) SubCutaneous at bedtime  insulin lispro (ADMELOG) corrective regimen sliding scale   SubCutaneous three times a day before meals  insulin lispro (ADMELOG) corrective regimen sliding scale   SubCutaneous at bedtime  ketotifen 0.025% Ophthalmic Solution 1 Drop(s) Right EYE two times a day  multivitamin/minerals 1 Tablet(s) Oral daily  pantoprazole  Injectable 40 milliGRAM(s) IV Push every 12 hours  polyethylene glycol 3350 17 Gram(s) Oral daily  senna 2 Tablet(s) Oral at bedtime                            8.7    7.82  )-----------( 139      ( 15 Ryan 2022 07:19 )             25.6       Hemoglobin: 8.7 g/dL (01-15 @ 07:19)  Hemoglobin: 9.2 g/dL (01-14 @ 23:37)  Hemoglobin: 9.1 g/dL (01-14 @ 08:55)  Hemoglobin: 8.9 g/dL (01-13 @ 19:43)  Hemoglobin: 7.1 g/dL (01-13 @ 12:00)      01-15    135  |  98  |  89<H>  ----------------------------<  60<L>  4.9   |  19<L>  |  3.12<H>    Ca    8.1<L>      15 Ryan 2022 07:19      Creatinine Trend: 3.12<--, 4.02<--, 4.97<--, 4.94<--, 4.73<--, 4.37<--    COAGS: PT/INR - ( 15 Ryan 2022 07:19 )   PT: 50.3 sec;   INR: 4.50 ratio         PTT - ( 15 Ryan 2022 07:19 )  PTT:39.0 sec          T(C): 36.3 (01-15-22 @ 04:17), Max: 36.6 (01-14-22 @ 11:56)  HR: 60 (01-15-22 @ 04:17) (60 - 72)  BP: 94/53 (01-15-22 @ 04:27) (93/40 - 115/64)  RR: 18 (01-15-22 @ 04:17) (17 - 18)  SpO2: 91% (01-15-22 @ 04:17) (91% - 94%)  Wt(kg): --    I&O's Summary    14 Jan 2022 07:01  -  15 Ryan 2022 07:00  --------------------------------------------------------  IN: 1930 mL / OUT: 1370 mL / NET: 560 mL      Gen: NAD  HEENT:  (-)icterus (-)pallor  CV: N S1 S2 1/6 ELLY (+)2 Pulses B/l  Resp:  Clear to auscultation B/L, normal effort  GI: (+) BS Soft, NT, ND  Lymph:  (-)Edema, (-)obvious lymphadenopathy  Skin: Warm to touch, Normal turgor  Psych: Appropriate mood and affect      TELEMETRY:  60      ASSESSMENT/PLAN: Patient is a 89 y/o male with PMH of CAD s/p PCI (ROBERTO to D1 6/2/14), chronic AF (on AC w/ eliquis), 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 Cardiology consulted for further evaluation.    - Continue HD per renal  - Coreg on hold  - AC with Eliquis for AF/Stroke prevention on hold given acute anemia and supratherapeutic INR, s/p PRBC, Kcentra and vit K  - Recommend medical management of CAD however ASA held for anemia/bleeding and statin d/gita for transaminitis  - EP eval appreciated - d/c amio given elevated LFTs/INR  - Repeat TTE noted with normal LV function, mod pulm HTN  - Of note, family declined cath and preferred medical management of CAD prior admission  - F/U with his cardiologist Dr. Vanegas for cardiology after discharge

## 2022-01-15 NOTE — CHART NOTE - NSCHARTNOTEFT_GEN_A_CORE
Asked to evaluate for " noisy breathing " and mild hematuria and new R subconjunctival hemorrhage    Vital Signs Last 24 Hrs  T(C): 36.2 (15 Ryan 2022 14:36), Max: 36.4 (15 Ryan 2022 13:50)  T(F): 97.2 (15 Ryan 2022 14:36), Max: 97.5 (15 Ryan 2022 13:50)  HR: 63 (15 Ryan 2022 14:36) (60 - 64)  BP: 92/56 (15 Ryan 2022 14:36) (92/56 - 110/55)  BP(mean): --  RR: 18 (15 Ryan 2022 14:36) (17 - 18)  SpO2: 90% (15 Ryan 2022 14:36) (90% - 93%)    Physical Exam:  General: WN/WD NAD  Neurology: A&Ox3, nonfocal, DAVIS x 4  Head:  Normocephalic, atraumatic, + R subconjunctival hemorrhage , + dry blood in oral cavity  Respiratory: + b/l rhonchi  CV: RRR, S1S2, no murmur  Abdominal: Soft, NT, ND no palpable mass  MSK: + edema  Labs:                          8.7    7.82  )-----------( 139      ( 15 Ryan 2022 07:19 )             25.6     01-15    135  |  98  |  89<H>  ----------------------------<  60<L>  4.9   |  19<L>  |  3.12<H>    Ca    8.1<L>      15 Ryan 2022 07:19      Assessment & Plan:  89 y/o M PMH of CAD s/p PCI (ROBERTO to D1 6/2/14), chronic AF (on AC w/ eliquis), tachy/perla syndrome s/p biV PPM placement, CVA x 2, HFpEF, hospitalized in October 2021 for CHF exacerbation/overload presents to ED BIB son from home for decreased PO intake x2 weeks associated with constipation and abdominal pain, generalized weakness worsening to point where he is no longer ambulating and now w/ b/l LE edema and orthopnea. Admitted with acute on chronic diastolic congestive heart failure. Hospital course complicated  with forest on CKD requiring emergent HD . Pt also developed coagulopathy and melena requiring vit K, K centra and FFP. Now with increasing lung congestion sec fluid overload Vs ? bleeding . s/p HD today with no fluid removal due to low BP    > FFP 1 unit  > Duoneb q6  > Chest X ray stat  > Discussed with attending and hematology   > Attending to call pulmonary consult in AM    Phylicia Watson ANP-BC  #[phone]                                Phylicia Watson ANP-BC  [employer]ink #[phone]

## 2022-01-15 NOTE — PROGRESS NOTE ADULT - SUBJECTIVE AND OBJECTIVE BOX
[facility] : Ans Serv [phone], Office [phone]  Dr Garcia/Dr Dawn  /Dr David harris /Dr JUAN DAVID Huynh/Dr Mike Smith/Dr Adrian Vega /Dr LUBA Herman  _______________________________________________________________________________________________    seen and examined today for End Stage Renal Disease on Dialysis   Interval :hypotension on dialysis  VITALS:  T(F): 97.2 (01-15-22 @ 10:46), Max: 97.4 (01-15-22 @ 04:17)  HR: 63 (01-15-22 @ 10:46)  BP: 94/54 (01-15-22 @ 10:46)  RR: 18 (01-15-22 @ 10:46)  SpO2: 91% (01-15-22 @ 04:17)  01-14 @ 07:01  -  01-15 @ 07:00  --------------------------------------------------------  IN: 1930 mL / OUT: 1370 mL / NET: 560 mL    Physical Exam :-  Respiratory: Bilateral equal breath sounds, + Crackles present.  Cardiovascular: S1, S2 normal, positive Murmur    Data:-  Allergies :   No Known Allergies    Hospital Medications:   MEDICATIONS  (STANDING):  acetaminophen   IVPB .. 650 milliGRAM(s) IV Intermittent once  dextrose 40% Gel 15 Gram(s) Oral once  dextrose 5%. 1000 milliLiter(s) (50 mL/Hr) IV Continuous <Continuous>  dextrose 5%. 1000 milliLiter(s) (100 mL/Hr) IV Continuous <Continuous>  dextrose 50% Injectable 25 Gram(s) IV Push once  dextrose 50% Injectable 12.5 Gram(s) IV Push once  dextrose 50% Injectable 25 Gram(s) IV Push once  glucagon  Injectable 1 milliGRAM(s) IntraMuscular once  influenza  Vaccine (HIGH DOSE) 0.7 milliLiter(s) IntraMuscular once  insulin glargine Injectable (LANTUS) 10 Unit(s) SubCutaneous at bedtime  insulin lispro (ADMELOG) corrective regimen sliding scale   SubCutaneous three times a day before meals  insulin lispro (ADMELOG) corrective regimen sliding scale   SubCutaneous at bedtime  ketotifen 0.025% Ophthalmic Solution 1 Drop(s) Right EYE two times a day  multivitamin/minerals 1 Tablet(s) Oral daily  pantoprazole  Injectable 40 milliGRAM(s) IV Push every 12 hours  polyethylene glycol 3350 17 Gram(s) Oral daily  senna 2 Tablet(s) Oral at bedtime    01-15    135  |  98  |  89<H>  ----------------------------<  60<L>  4.9   |  19<L>  |  3.12<H>    Ca    8.1<L>      15 Ryan 2022 07:19      Creatinine Trend: 3.12 <--, 4.02 <--, 4.97 <--, 4.94 <--, 4.73 <--, 4.37 <--, 4.19 <--                        8.7    7.82  )-----------( 139      ( 15 Ryan 2022 07:19 )             25.6

## 2022-01-15 NOTE — PROGRESS NOTE ADULT - SUBJECTIVE AND OBJECTIVE BOX
DATE OF SERVICE: 01-15-22 @ 11:17    Patient is a 89y old  Male who presents with a chief complaint of     SUBJECTIVE / OVERNIGHT EVENTS:  No chest pain. No shortness of breath. No complaints. No events overnight.     MEDICATIONS  (STANDING):  dextrose 40% Gel 15 Gram(s) Oral once  dextrose 5%. 1000 milliLiter(s) (50 mL/Hr) IV Continuous <Continuous>  dextrose 5%. 1000 milliLiter(s) (100 mL/Hr) IV Continuous <Continuous>  dextrose 50% Injectable 25 Gram(s) IV Push once  dextrose 50% Injectable 12.5 Gram(s) IV Push once  dextrose 50% Injectable 25 Gram(s) IV Push once  glucagon  Injectable 1 milliGRAM(s) IntraMuscular once  influenza  Vaccine (HIGH DOSE) 0.7 milliLiter(s) IntraMuscular once  insulin glargine Injectable (LANTUS) 10 Unit(s) SubCutaneous at bedtime  insulin lispro (ADMELOG) corrective regimen sliding scale   SubCutaneous three times a day before meals  insulin lispro (ADMELOG) corrective regimen sliding scale   SubCutaneous at bedtime  ketotifen 0.025% Ophthalmic Solution 1 Drop(s) Right EYE two times a day  multivitamin/minerals 1 Tablet(s) Oral daily  pantoprazole  Injectable 40 milliGRAM(s) IV Push every 12 hours  polyethylene glycol 3350 17 Gram(s) Oral daily  senna 2 Tablet(s) Oral at bedtime    MEDICATIONS  (PRN):      Vital Signs Last 24 Hrs  T(C): 36.2 (15 Ryan 2022 10:46), Max: 36.6 (14 Jan 2022 11:56)  T(F): 97.2 (15 Ryan 2022 10:46), Max: 97.9 (14 Jan 2022 11:56)  HR: 63 (15 Ryan 2022 10:46) (60 - 72)  BP: 94/54 (15 Ryan 2022 10:46) (93/40 - 115/64)  BP(mean): --  RR: 18 (15 Ryan 2022 10:46) (17 - 18)  SpO2: 91% (15 Ryan 2022 04:17) (91% - 94%)  CAPILLARY BLOOD GLUCOSE      POCT Blood Glucose.: 76 mg/dL (15 Ryan 2022 08:47)  POCT Blood Glucose.: 88 mg/dL (14 Jan 2022 22:00)  POCT Blood Glucose.: 91 mg/dL (14 Jan 2022 21:19)  POCT Blood Glucose.: 132 mg/dL (14 Jan 2022 17:01)  POCT Blood Glucose.: 66 mg/dL (14 Jan 2022 16:18)  POCT Blood Glucose.: 68 mg/dL (14 Jan 2022 16:12)  POCT Blood Glucose.: 71 mg/dL (14 Jan 2022 12:23)  POCT Blood Glucose.: 69 mg/dL (14 Jan 2022 12:22)    I&O's Summary    14 Jan 2022 07:01  -  15 Ryan 2022 07:00  --------------------------------------------------------  IN: 1930 mL / OUT: 1370 mL / NET: 560 mL        PHYSICAL EXAM:  GENERAL: NAD, well-developed  HEAD:  Atraumatic, Normocephalic  EYES: EOMI, PERRLA, conjunctiva and sclera clear  NECK: Supple, No JVD  CHEST/LUNG: Clear to auscultation bilaterally; No wheeze  HEART: Regular rate and rhythm; No murmurs, rubs, or gallops  ABDOMEN: Soft, Nontender, Nondistended; Bowel sounds present  EXTREMITIES:  2+ Peripheral Pulses, No clubbing, cyanosis, or edema  NEUROLOGY: non-focal  SKIN: No rashes or lesions    LABS:                        8.7    7.82  )-----------( 139      ( 15 Ryan 2022 07:19 )             25.6     01-15    135  |  98  |  89<H>  ----------------------------<  60<L>  4.9   |  19<L>  |  3.12<H>    Ca    8.1<L>      15 Ryan 2022 07:19      PT/INR - ( 15 Ryan 2022 07:19 )   PT: 50.3 sec;   INR: 4.50 ratio         PTT - ( 15 Ryan 2022 07:19 )  PTT:39.0 sec          RADIOLOGY & ADDITIONAL TESTS:    Imaging Personally Reviewed:    Consultant(s) Notes Reviewed:      Care Discussed with Consultants/Other Providers:

## 2022-01-15 NOTE — CHART NOTE - NSCHARTNOTEFT_GEN_A_CORE
MEDICINE PA EPISODIC NOTE    CC: Pain     Notified by RN that patient is c/o of pain in his legs and back.    Patient seen and examined at bedside. Patient lying supine on stretcher, c/o of pain. Patient admits to 9/10 constant pain in bilateral calves, knees, and back.  Pt denies headache, chest pain, sob, n/v/d, weakness, dizziness, numbness, or tingling.     Vital Signs Last 24 Hrs  T(C): 36.2 (22 @ 21:21), Max: 36.6 (22 @ 11:56)  T(F): 97.2 (22 @ 21:21), Max: 97.9 (22 @ 11:56)  HR: 64 (22 @ 21:21) (60 - 72)  BP: 110/55 (22 @ 21:21) (100/54 - 115/64)  BP(mean): --  RR: 17 (22 @ 21:21) (17 - 18)  SpO2: 93% (22 @ 21:21) (92% - 94%)  Daily     Daily Weight in k.5 (2022 21:10)  I&O's Summary    2022 07:01  -  2022 07:00  --------------------------------------------------------  IN: 360 mL / OUT: 252 mL / NET: 108 mL    2022 07:01  -  15 2022 00:18  --------------------------------------------------------  IN: 1930 mL / OUT: 1350 mL / NET: 580 mL      CAPILLARY BLOOD GLUCOSE                          9.2    8.62  )-----------( 157      ( 2022 23:37 )             27.3         136  |  94<L>  |  103<H>  ----------------------------<  65<L>  4.8   |  26  |  4.02<H>    Ca    8.2<L>      2022 08:51  Phos  4.9       Mg     3.2         TPro  5.7<L>  /  Alb  3.5  /  TBili  2.6<H>  /  DBili  x   /  AST  278<H>  /  ALT  164<H>  /  AlkPhos  260<H>      PT/INR - ( 2022 08:51 )   PT: 42.1 sec;   INR: 3.74 ratio         PTT - ( 2022 08:51 )  PTT:38.2 sec            PHYSICAL EXAM:  GENERAL: NAD, well-developed  CHEST/LUNG: Clear to auscultation bilaterally; No wheezes, rhonchi or rales appreciated  HEART: Regular rate and rhythm; No murmurs, rubs, or gallops  EXTREMITIES:  2+ Peripheral Pulses, No clubbing, cyanosis, or edema. unable to assess muscle strength as patient unable raise lower extremities due to pain. Patient TTP on bilateral calves. Normal sensation in lower extremities. 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 Now c/o of pain in bilateral calves, knees, and back.     #Bilateral lower extremity pain r/o DVT  >1 gram IV tylenol ordered for pain  >Bilateral VA duplex ordered to r/o DVT  >Will monitor and reassess   > Will endorse to AM team, attending to follow    Catina Casey PA-C,   Department of Medicine

## 2022-01-15 NOTE — PROGRESS NOTE ADULT - SUBJECTIVE AND OBJECTIVE BOX
C A R D I O L O G Y  **********************************     DATE OF SERVICE: 01-15-22    Patient denies chest pain or shortness of breath.   Review of symptoms otherwise negative.    dextrose 40% Gel 15 Gram(s) Oral once  dextrose 5%. 1000 milliLiter(s) IV Continuous <Continuous>  dextrose 5%. 1000 milliLiter(s) IV Continuous <Continuous>  dextrose 50% Injectable 25 Gram(s) IV Push once  dextrose 50% Injectable 12.5 Gram(s) IV Push once  dextrose 50% Injectable 25 Gram(s) IV Push once  glucagon  Injectable 1 milliGRAM(s) IntraMuscular once  influenza  Vaccine (HIGH DOSE) 0.7 milliLiter(s) IntraMuscular once  insulin glargine Injectable (LANTUS) 10 Unit(s) SubCutaneous at bedtime  insulin lispro (ADMELOG) corrective regimen sliding scale   SubCutaneous three times a day before meals  insulin lispro (ADMELOG) corrective regimen sliding scale   SubCutaneous at bedtime  ketotifen 0.025% Ophthalmic Solution 1 Drop(s) Right EYE two times a day  multivitamin/minerals 1 Tablet(s) Oral daily  pantoprazole  Injectable 40 milliGRAM(s) IV Push every 12 hours  polyethylene glycol 3350 17 Gram(s) Oral daily  senna 2 Tablet(s) Oral at bedtime                            8.7    7.82  )-----------( 139      ( 15 Ryan 2022 07:19 )             25.6       Hemoglobin: 8.7 g/dL (01-15 @ 07:19)  Hemoglobin: 9.2 g/dL (01-14 @ 23:37)  Hemoglobin: 9.1 g/dL (01-14 @ 08:55)  Hemoglobin: 8.9 g/dL (01-13 @ 19:43)  Hemoglobin: 7.1 g/dL (01-13 @ 12:00)      01-15    135  |  98  |  89<H>  ----------------------------<  60<L>  4.9   |  19<L>  |  3.12<H>    Ca    8.1<L>      15 Ryan 2022 07:19      Creatinine Trend: 3.12<--, 4.02<--, 4.97<--, 4.94<--, 4.73<--, 4.37<--    COAGS: PT/INR - ( 15 Ryan 2022 07:19 )   PT: 50.3 sec;   INR: 4.50 ratio         PTT - ( 15 Ryan 2022 07:19 )  PTT:39.0 sec          T(C): 36.2 (01-15-22 @ 14:36), Max: 36.4 (01-15-22 @ 13:50)  HR: 63 (01-15-22 @ 14:36) (60 - 72)  BP: 92/56 (01-15-22 @ 14:36) (92/56 - 110/55)  RR: 18 (01-15-22 @ 14:36) (17 - 18)  SpO2: 90% (01-15-22 @ 14:36) (90% - 94%)  Wt(kg): --    I&O's Summary    14 Jan 2022 07:01  -  15 Ryan 2022 07:00  --------------------------------------------------------  IN: 1930 mL / OUT: 1370 mL / NET: 560 mL    15 Ryan 2022 07:01  -  15 Ryan 2022 14:41  --------------------------------------------------------  IN: 0 mL / OUT: 0 mL / NET: 0 mL        Gen: NAD  HEENT:  (-)icterus (-)pallor  CV: N S1 S2 1/6 ELLY (+)2 Pulses B/l  Resp:  Clear to auscultation B/L, normal effort  GI: (+) BS Soft, NT, ND  Lymph:  (-)Edema, (-)obvious lymphadenopathy  Skin: Warm to touch, Normal turgor  Psych: Appropriate mood and affect      TELEMETRY:  60      ASSESSMENT/PLAN: Patient is a 89 y/o male with PMH of CAD s/p PCI (ROBERTO to D1 6/2/14), chronic AF (on AC w/ eliquis), 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 Cardiology consulted for further evaluation.    - Continue HD per renal  - Coreg on hold  - AC with Eliquis for AF/Stroke prevention on hold given acute anemia and supratherapeutic INR, s/p PRBC, Kcentra and vit K  - Recommend medical management of CAD however ASA held for anemia/bleeding and statin d/gita for transaminitis  - EP eval appreciated - d/c amio given elevated LFTs/INR  - Repeat TTE noted with normal LV function, mod pulm HTN  - Of note, family declined cath and preferred medical management of CAD prior admission  - F/U with his cardiologist Dr. Vanegas for cardiology after discharge    Alfredo Rain MD, [facility]  [facility]PER [phone]

## 2022-01-15 NOTE — PROVIDER CONTACT NOTE (OTHER) - BACKGROUND
Patient admitted for heart failure, FLOYD, melena, dehydration Patient admitted for heart failure, FLOYD, melena, dehydration.  PMH of AFib, HTN, CAD, CVA

## 2022-01-15 NOTE — PROGRESS NOTE ADULT - SUBJECTIVE AND OBJECTIVE BOX
INTERVAL HPI/OVERNIGHT EVENTS:  Patient S&E at bedside. No o/n events,     VITAL SIGNS:  T(F): 97.5 (01-15-22 @ 13:50)  HR: 61 (01-15-22 @ 13:50)  BP: 99/60 (01-15-22 @ 13:50)  RR: 17 (01-15-22 @ 13:50)  SpO2: 93% (01-15-22 @ 13:50)  Wt(kg): --    PHYSICAL EXAM:    Constitutional: NAD  Eyes: EOMI, sclera non-icteric  Neck: supple, no masses, no JVD  Respiratory: CTA b/l, good air entry b/l  Cardiovascular: RRR, no M/R/G  Gastrointestinal: soft, NTND, no masses palpable, + BS, no hepatosplenomegaly  Extremities: no c/c/e  Neurological: AAOx3      MEDICATIONS  (STANDING):  dextrose 40% Gel 15 Gram(s) Oral once  dextrose 5%. 1000 milliLiter(s) (50 mL/Hr) IV Continuous <Continuous>  dextrose 5%. 1000 milliLiter(s) (100 mL/Hr) IV Continuous <Continuous>  dextrose 50% Injectable 25 Gram(s) IV Push once  dextrose 50% Injectable 12.5 Gram(s) IV Push once  dextrose 50% Injectable 25 Gram(s) IV Push once  glucagon  Injectable 1 milliGRAM(s) IntraMuscular once  influenza  Vaccine (HIGH DOSE) 0.7 milliLiter(s) IntraMuscular once  insulin glargine Injectable (LANTUS) 10 Unit(s) SubCutaneous at bedtime  insulin lispro (ADMELOG) corrective regimen sliding scale   SubCutaneous three times a day before meals  insulin lispro (ADMELOG) corrective regimen sliding scale   SubCutaneous at bedtime  ketotifen 0.025% Ophthalmic Solution 1 Drop(s) Right EYE two times a day  multivitamin/minerals 1 Tablet(s) Oral daily  pantoprazole  Injectable 40 milliGRAM(s) IV Push every 12 hours  polyethylene glycol 3350 17 Gram(s) Oral daily  senna 2 Tablet(s) Oral at bedtime    MEDICATIONS  (PRN):      Allergies    No Known Allergies    Intolerances        LABS:                        8.7    7.82  )-----------( 139      ( 15 Ryan 2022 07:19 )             25.6     01-15    135  |  98  |  89<H>  ----------------------------<  60<L>  4.9   |  19<L>  |  3.12<H>    Ca    8.1<L>      15 Ryan 2022 07:19      PT/INR - ( 15 Ryan 2022 07:19 )   PT: 50.3 sec;   INR: 4.50 ratio         PTT - ( 15 Ryan 2022 07:19 )  PTT:39.0 sec      RADIOLOGY & ADDITIONAL TESTS:  Studies reviewed.

## 2022-01-15 NOTE — PROGRESS NOTE ADULT - ASSESSMENT
89 year ld male admitted for elevated PT/ PTT post apixaban treatment; he has received two doses of [place] centra and cyroprecitipate in treatment of coagulopathy. Fibrinogen is normal, fibin split products are elevated. Patient appears acutely ill and he is seen in dialysis unit.  HGB improve to 8.7 and platelets are stable. he has upper respiratory sounds and is receiving oxygen. note do not resuscitate status in record. Supportive care continues. Hopefully gastro intestinal bleeding is controlled by these effeorts

## 2022-01-15 NOTE — PROVIDER CONTACT NOTE (OTHER) - ACTION/TREATMENT ORDERED:
NP notified and aware.  Okay to hold bedtime dose of Lantus due to previous events of hypoglycemia.  Will endorse to primary team.  Continue to monitor pt

## 2022-01-15 NOTE — PROGRESS NOTE ADULT - SUBJECTIVE AND OBJECTIVE BOX
[facility]  Luis Enrique Sotomayor PA-C  [facility] [place] [address]  [phone]      INTERVAL HPI/OVERNIGHT EVENTS:  pt seen and examined, events noted  c/o thigh pain  H/H stable, no melena overnight, INR remains elevated     MEDICATIONS  (STANDING):  dextrose 40% Gel 15 Gram(s) Oral once  dextrose 5%. 1000 milliLiter(s) (50 mL/Hr) IV Continuous <Continuous>  dextrose 5%. 1000 milliLiter(s) (100 mL/Hr) IV Continuous <Continuous>  dextrose 50% Injectable 25 Gram(s) IV Push once  dextrose 50% Injectable 12.5 Gram(s) IV Push once  dextrose 50% Injectable 25 Gram(s) IV Push once  glucagon  Injectable 1 milliGRAM(s) IntraMuscular once  influenza  Vaccine (HIGH DOSE) 0.7 milliLiter(s) IntraMuscular once  insulin glargine Injectable (LANTUS) 10 Unit(s) SubCutaneous at bedtime  insulin lispro (ADMELOG) corrective regimen sliding scale   SubCutaneous three times a day before meals  insulin lispro (ADMELOG) corrective regimen sliding scale   SubCutaneous at bedtime  ketotifen 0.025% Ophthalmic Solution 1 Drop(s) Right EYE two times a day  multivitamin/minerals 1 Tablet(s) Oral daily  pantoprazole  Injectable 40 milliGRAM(s) IV Push every 12 hours  polyethylene glycol 3350 17 Gram(s) Oral daily  senna 2 Tablet(s) Oral at bedtime  sodium chloride 0.9%. 1000 milliLiter(s) (100 mL/Hr) IV Continuous <Continuous>    MEDICATIONS  (PRN):      Allergies    No Known Allergies    Intolerances        ROS:   General:  No wt loss, fevers, chills, night sweats, fatigue,   Eyes:  Good vision, no reported pain  ENT:  No sore throat, pain, runny nose, dysphagia  CV:  No pain, palpitations, hypo/hypertension  Resp:  No dyspnea, cough, tachypnea, wheezing  GI:  No pain, No nausea, No vomiting, No diarrhea, No constipation, No weight loss, No fever, No pruritis, No rectal bleeding, + tarry stools, No dysphagia,  :  No pain, bleeding, incontinence, nocturia  Muscle:  No pain, weakness  Neuro:  No weakness, tingling, memory problems  Psych:  No fatigue, insomnia, mood problems, depression  Endocrine:  No polyuria, polydipsia, cold/heat intolerance  Heme:  No petechiae, ecchymosis, easy bruisability  Skin:  No rash, tattoos, scars, edema      PHYSICAL EXAM:   Vital Signs Last 24 Hrs  T(C): 36.2 (15 Ryan 2022 10:46), Max: 36.6 (2022 11:56)  T(F): 97.2 (15 Ryan 2022 10:46), Max: 97.9 (2022 11:56)  HR: 63 (15 Ryan 2022 10:46) (60 - 72)  BP: 94/54 (15 Ryan 2022 10:46) (93/40 - 115/64)  BP(mean): --  RR: 18 (15 Ryan 2022 10:46) (17 - 18)  SpO2: 91% (15 Ryan 2022 04:17) (91% - 94%)  Daily     Daily Weight in k.4 (2022 07:23)    GENERAL:  Appears stated age,   HEENT:  NC/AT,    CHEST:  Full & symmetric excursion,   HEART:  Regular rhythm,  ABDOMEN:  Soft, non-tender, non-distended,  EXTEREMITIES:  no cyanosis  SKIN:  No rash  NEURO:  Alert,       LABS:                                      8.7    7.82  )-----------( 139      ( 15 Ryan 2022 07:19 )             25.6   01-15    135  |  98  |  89<H>  ----------------------------<  60<L>  4.9   |  19<L>  |  3.12<H>    Ca    8.1<L>      15 Ryan 2022 07:19    PT/INR - ( 15 Ryan 2022 07:19 )   PT: 50.3 sec;   INR: 4.50 ratio         PTT - ( 15 Ryan 2022 07:19 )  PTT:39.0 sec      RADIOLOGY & ADDITIONAL TESTS:

## 2022-01-15 NOTE — CHART NOTE - NSCHARTNOTEFT_GEN_A_CORE
Pt with Xray showing worsening airspace opacities, fluid overload VS infection. pt with no fever nor leucocytosis.  currently requiring 4 l nasal cannula. BP 92/53    Plan  > Midodrine 10 mg tid   > Lasix 80 mg IVP x 1  > Discussed with Renal and Attending

## 2022-01-15 NOTE — PROGRESS NOTE ADULT - ASSESSMENT
melena  GI bleed  anemia    cbc daily   monitor H/H  transfuse PRN hgb>7  PPI IV BID  hold AC  heme following, coagulopathy workup  unable to pursue upper gastrointestinal endoscopy at this time due elevated INR and pts comorbidities   reversal of Eliquis  conservative management  will follow    Advanced care planning was discussed with patient and family.  Advanced care planning forms were reviewed and discussed.  Risks, benefits and alternatives of gastroenterologic procedures were discussed in detail and all questions were answered.    30 minutes spent.

## 2022-01-15 NOTE — PROGRESS NOTE ADULT - ASSESSMENT
89 y/o M PMH of CAD s/p PCI (ROBERTO to D1 6/2/14), chronic AF (on AC w/ eliquis), tachy/perla syndrome s/p biV PPM placement, CVA x 2, HFpEF, hospitalized in October 2021 for CHF exacerbation/overload presents to ED BIB son from home for decreased PO intake x2 weeks associated with constipation and abdominal pain, generalized weakness worsening to point where he is no longer ambulating and now w/ b/l LE edema and orthopnea. Denies f/c, cough, congestion.    acute on chronic diastolic congestive heart failure  - hold  PO lasix . cr uptrending  - Coreg on hold  - DISContinue AC with Eliquis due to acute bleeding  - Continue medical management of CAD with ASA/Statin  - EP eval appreciated - hold Amio 400mg due to elevated lfts  - Repeat TTE noted with normal LV function, mod pulm HTN  - Of note, family declined cath and preferred medical management of CAD prior admission  - F/U with his cardiologist Dr. Vanegas for cardiology after discharge    FLOYD pre renal +/- cardiorenal +/- Obstruction  b/l Cr 1.87 12/17/21, 1 mth ago was 2.5mg/dl  c/w river- trend urineoutput  Cr continues to rise- UA and URine lytes reviewed Showing intravascular depletion--> lasix was discontinued  Given GIB and low hgb--> Agree with 2 units prbc  reverse coagulopathy--> f/u Heme and GI  c/w albumin for now  HD per renal  - I/o's, daily weights, daily BMP  -avoid NSAIDs/ACEi/ARB/nephrotoxics/iv contrast  low Na in diet, fluid restriction 1L/day   dose all meds for eGFR<15ml/min  - nephrology following    Coagulopathy with active bleeding  -discontinued eliquis and ASA in setting of active bleeding and elevated INR  -given vitamin K on 1/12/21; given additional 10 mg on 1/13/21  -given KCentra 2000 units on 1/12 and on 1/13; INR still elevated but improved, recommend giving an additional 1500 units now  -mixing study today shows correction, suggesting that apixaban might be getting cleared sufficiently  -d-dimer elevated and fibrinogen low, suggestive of component of DIC vs liver disease; checking factors V, VII, VIII, and X with AM labs to better characterize  -monitor fibrinogen; though >150, would give cryoprecipitate given active bleeding ongoing today; discussed with blood bank today  -haptoglobin is normal, making hemolysis less likely  -patient has worsening hyperkalemia and uremia; uremia might be partially 2/2 GI bleed / blood reabsorption, but would have low threshold to dialyze given suspicion that uremic platelets are contributing to coagulopathy  -transfuse as needed; transfuse for Hgb <7.0 or if symptomatically anemic; transfuse for plts <25k  -Daily CBC, trend hgb. INR/PT/PTT, fibrinogen, D-dimer q12 hours for now  -given renal failure, please check EPO level  - hematology following    GI bleed  - c/w PPI  - was seen by GI  - no a candidate for endoscopy at this time    elevated liver enzymes worsening  - likely 2/2 to CHF    anemia  - transfuse 2 units of prbc  - likely 2/2 ckd and iron def  - s/p  iv iron    diabetes  controlled  - fs qid  - hgb a1c  - insulin sliding scale  - Lantus 10 units qhs    unstageable pressure ulcer present on admission  - optimize nutrition  - off loading  - T&P q 2 hours  - wound care consult    afib  - c/w amio  - d/c  eliquis    constipation  - senna and miralax    dispo  - JENNIFER

## 2022-01-15 NOTE — CHART NOTE - NSCHARTNOTEFT_GEN_A_CORE
Medicine PA EPISODIC NOTE    CC: Elevated INR/D-dimer    Notified by RN of uptrending INR of 4.3 and D-dimer of 2782.     Patient was seen and examined at bedside prior. Patient lying supine on stretcher with c/o pain. Patient has no active signs of bleeding, however RN noted small smear of blood in recent BM.       Vital Signs Last 24 Hrs  T(C): 36.2 (22 @ 21:21), Max: 36.6 (22 @ 11:56)  T(F): 97.2 (22 @ 21:21), Max: 97.9 (22 @ 11:56)  HR: 64 (22 @ 21:21) (60 - 72)  BP: 110/55 (22 @ 21:21) (100/54 - 115/64)  BP(mean): --  RR: 17 (22 @ 21:21) (17 - 18)  SpO2: 93% (22 @ 21:21) (92% - 94%)  Daily     Daily Weight in k.5 (2022 21:10)  I&O's Summary    2022 07:01  -  2022 07:00  --------------------------------------------------------  IN: 360 mL / OUT: 252 mL / NET: 108 mL    2022 07:01  -  15 2022 03:06  --------------------------------------------------------  IN: 1930 mL / OUT: 1350 mL / NET: 580 mL      CAPILLARY BLOOD GLUCOSE                          9.2    8.62  )-----------( 157      ( 2022 23:37 )             27.3         136  |  94<L>  |  103<H>  ----------------------------<  65<L>  4.8   |  26  |  4.02<H>    Ca    8.2<L>      2022 08:51  Phos  4.9       Mg     3.2         TPro  5.7<L>  /  Alb  3.5  /  TBili  2.6<H>  /  DBili  x   /  AST  278<H>  /  ALT  164<H>  /  AlkPhos  260<H>      PT/INR - ( 2022 23:37 )   PT: 48.1 sec;   INR: 4.30 ratio  D-dimer 2782       PTT - ( 2022 23:37 )  PTT:37.9 sec        PHYSICAL EXAM:  GENERAL: NAD, well-developed  CHEST/LUNG: Clear to auscultation bilaterally; No wheezes, rhonchi or rales appreciated  HEART: Regular rate and rhythm; No murmurs, rubs, or gallops  ABDOMEN: Soft, Nontender, Nondistended; Bowel sounds present. No rebound, or guarding noted.    Assessment/Plan: HPI:  89 y/o M PMH of CAD s/p PCI (ROBERTO to D1 14), chronic AF (on AC w/ eliquis), tachy/perla syndrome s/p biV PPM placement, CVA x 2, HFpEF, hospitalized in 2021 for CHF exacerbation/overload presents to ED BIB son from home for decreased PO intake x2 weeks associated with constipation and abdominal pain, generalized weakness worsening to point where he is no longer ambulating and now w/ b/l LE edema and orthopnea. Patient's course c/b mucosal bleeding, rectal bleeding, urethral bleeding, and bleeding from PIV. Hematology/Oncology following for multifactorial coagulopathy, for which Eliquis and Asa was d/c. Patient received Vitamin K and K-centra, however INR still remains elevated. Now with uptrending INR of 4.3 and D-dimer of 2782. Patient has no active signs of bleeding, other than a small smear of blood in BM.     #Elevated INR/D-dimer r/o DIC  >VS hemodynamically stable, Hgb/hct stable at 9.2/27.3, no active signs of bleeding other than smear of blood in BM  >Occult blood ordered   >Patient s/p Vitamin K and K-centra 2000 units on -  >Initially, Cryoprecipitate was to be given but was put on hold by heme/onco  >Will continue PT/INR, D-dimer, Fibrinogen q12 hours, will continue to trend daily CBCs  >Case d/w , heme/onc fellow on call, recommended to give FFP if any active signs of bleeding develop  >Will closely monitor and reassess for any signs of bleeding  > Will endorse to AM team, attending to follow    Catina Casey PA-C,   Department of Medicine  [phone]

## 2022-01-16 NOTE — CONSULT NOTE ADULT - CONSULT REQUESTED DATE/TIME
08-Jan-2022
08-Jan-2022 12:02
10-Ryan-2022 12:11
12-Jan-2022 13:15
13-Jan-2022 10:21
16-Jan-2022 12:17
13-Jan-2022 13:07
10-Ryan-2022 18:57
07-Jan-2022 18:25

## 2022-01-16 NOTE — CHART NOTE - NSCHARTNOTEFT_GEN_A_CORE
Called by RN, patient with O2 saturation 80's on nasal cannula . Pt seen and examined       Vital Signs Last 24 Hrs  T(C): 36.3 (16 Jan 2022 20:39), Max: 36.4 (16 Jan 2022 16:28)  T(F): 97.4 (16 Jan 2022 20:39), Max: 97.5 (16 Jan 2022 16:28)  HR: 60 (16 Jan 2022 20:39) (60 - 61)  BP: 88/48 (16 Jan 2022 21:26) (88/48 - 105/61)  BP(mean): --  RR: 18 (16 Jan 2022 20:39) (18 - 18)  SpO2: 90% (16 Jan 2022 20:39) (90% - 93%)      Labs:                          8.4    6.58  )-----------( 108      ( 16 Jan 2022 19:11 )             26.0     01-16    138  |  100  |  58<H>  ----------------------------<  31<LL>  3.4<L>   |  25  |  2.62<H>    Ca    8.4      16 Jan 2022 07:37          Physical Exam:  Neurology: A&Ox2 nonfocal  Respiratory:  noisy respiratory sounds. no acute distress    MSK: +4 edema, generalized     ASSESSMENT/PLAN   89 y/o M PMH of CAD s/p PCI (ROBERTO to D1 6/2/14), chronic AF (on AC w/ Eliquis), tachy/perla syndrome s/p biV PPM placement, CVA x 2, HFpEF, hospitalized in October 2021 for CHF exacerbation/overload presents to ED BIB son from home for decreased PO intake x2 weeks associated with constipation and abdominal pain, generalized weakness worsening to point where he is no longer ambulating and now w/ b/l LE edema and orthopnea. Denies f/c, cough, congestion. (07 Jan 2022 22:19)    - Non-rebreather mask applied ( 80's on NC > mids 90's NRB)  - Family member - son; notified regarding prognosis   - Will continue to monitor clinical status

## 2022-01-16 NOTE — PROGRESS NOTE ADULT - SUBJECTIVE AND OBJECTIVE BOX
[facility]  Luis Enrique Sotomayor PA-C  [facility] [place] [address]  [phone]      INTERVAL HPI/OVERNIGHT EVENTS:  pt seen and examined, events noted  H/H stable, no melena overnight, INR remains elevated   S&S noted, pt made NPO    MEDICATIONS  (STANDING):  dextrose 40% Gel 15 Gram(s) Oral once  dextrose 5%. 1000 milliLiter(s) (50 mL/Hr) IV Continuous <Continuous>  dextrose 5%. 1000 milliLiter(s) (100 mL/Hr) IV Continuous <Continuous>  dextrose 50% Injectable 25 Gram(s) IV Push once  dextrose 50% Injectable 12.5 Gram(s) IV Push once  dextrose 50% Injectable 25 Gram(s) IV Push once  glucagon  Injectable 1 milliGRAM(s) IntraMuscular once  influenza  Vaccine (HIGH DOSE) 0.7 milliLiter(s) IntraMuscular once  insulin glargine Injectable (LANTUS) 10 Unit(s) SubCutaneous at bedtime  insulin lispro (ADMELOG) corrective regimen sliding scale   SubCutaneous three times a day before meals  insulin lispro (ADMELOG) corrective regimen sliding scale   SubCutaneous at bedtime  ketotifen 0.025% Ophthalmic Solution 1 Drop(s) Right EYE two times a day  multivitamin/minerals 1 Tablet(s) Oral daily  pantoprazole  Injectable 40 milliGRAM(s) IV Push every 12 hours  polyethylene glycol 3350 17 Gram(s) Oral daily  senna 2 Tablet(s) Oral at bedtime  sodium chloride 0.9%. 1000 milliLiter(s) (100 mL/Hr) IV Continuous <Continuous>    MEDICATIONS  (PRN):      Allergies    No Known Allergies    Intolerances        ROS:   General:  No wt loss, fevers, chills, night sweats, fatigue,   Eyes:  Good vision, no reported pain  ENT:  No sore throat, pain, runny nose, dysphagia  CV:  No pain, palpitations, hypo/hypertension  Resp:  No dyspnea, cough, tachypnea, wheezing  GI:  No pain, No nausea, No vomiting, No diarrhea, No constipation, No weight loss, No fever, No pruritis, No rectal bleeding, + tarry stools, No dysphagia,  :  No pain, bleeding, incontinence, nocturia  Muscle:  No pain, weakness  Neuro:  No weakness, tingling, memory problems  Psych:  No fatigue, insomnia, mood problems, depression  Endocrine:  No polyuria, polydipsia, cold/heat intolerance  Heme:  No petechiae, ecchymosis, easy bruisability  Skin:  No rash, tattoos, scars, edema      PHYSICAL EXAM:   Vital Signs Last 24 Hrs  T(C): 36.2 (2022 08:55), Max: 36.4 (15 Ryan 2022 13:50)  T(F): 97.2 (2022 08:55), Max: 97.5 (15 Ryan 2022 13:50)  HR: 60 (2022 08:55) (60 - 63)  BP: 100/60 (2022 08:55) (88/50 - 100/60)  BP(mean): --  RR: 18 (2022 08:55) (17 - 18)  SpO2: 93% (2022 08:55) (90% - 93%)  Daily     Daily Weight in k.4 (2022 07:23)    GENERAL:  Appears stated age,   HEENT:  NC/AT,    CHEST:  Full & symmetric excursion,   HEART:  Regular rhythm,  ABDOMEN:  Soft, non-tender, non-distended,  EXTEREMITIES:  no cyanosis  SKIN:  No rash  NEURO:  Alert,       LABS:                                   8.9    6.65  )-----------( 136      ( 2022 07:40 )             26.3   01-16    138  |  100  |  58<H>  ----------------------------<  31<LL>  3.4<L>   |  25  |  2.62<H>    Ca    8.4      2022 07:37    PT/INR - ( 2022 07:40 )   PT: 46.1 sec;   INR: 4.11 ratio         PTT - ( 2022 07:40 )  PTT:37.7 sec    RADIOLOGY & ADDITIONAL TESTS:

## 2022-01-16 NOTE — PROGRESS NOTE ADULT - SUBJECTIVE AND OBJECTIVE BOX
[facility]  **********************************     DATE OF SERVICE: 01-16-22    Patient denies chest pain breathing comfortable at rest. Review of symptoms otherwise negative.    albuterol/ipratropium for Nebulization 3 milliLiter(s) Nebulizer every 6 hours  dextrose 10%. 1000 milliLiter(s) IV Continuous <Continuous>  dextrose 40% Gel 15 Gram(s) Oral once  dextrose 5%. 1000 milliLiter(s) IV Continuous <Continuous>  dextrose 5%. 1000 milliLiter(s) IV Continuous <Continuous>  dextrose 50% Injectable 25 Gram(s) IV Push once  dextrose 50% Injectable 12.5 Gram(s) IV Push once  dextrose 50% Injectable 25 Gram(s) IV Push once  glucagon  Injectable 1 milliGRAM(s) IntraMuscular once  influenza  Vaccine (HIGH DOSE) 0.7 milliLiter(s) IntraMuscular once  insulin lispro (ADMELOG) corrective regimen sliding scale   SubCutaneous every 6 hours  ketotifen 0.025% Ophthalmic Solution 1 Drop(s) Right EYE two times a day  midodrine. 10 milliGRAM(s) Oral three times a day  multivitamin/minerals 1 Tablet(s) Oral daily  pantoprazole  Injectable 40 milliGRAM(s) IV Push every 12 hours  polyethylene glycol 3350 17 Gram(s) Oral daily  senna 2 Tablet(s) Oral at bedtime                            8.4    6.58  )-----------( 108      ( 16 Jan 2022 19:11 )             26.0       Hemoglobin: 8.4 g/dL (01-16 @ 19:11)  Hemoglobin: 8.9 g/dL (01-16 @ 07:40)  Hemoglobin: 8.6 g/dL (01-16 @ 00:43)  Hemoglobin: 8.7 g/dL (01-15 @ 20:23)  Hemoglobin: 8.7 g/dL (01-15 @ 07:19)      01-16    138  |  100  |  58<H>  ----------------------------<  31<LL>  3.4<L>   |  25  |  2.62<H>    Ca    8.4      16 Jan 2022 07:37      Creatinine Trend: 2.62<--, 2.27<--, 3.12<--, 4.02<--, 4.97<--, 4.94<--    COAGS: PT/INR - ( 16 Jan 2022 07:40 )   PT: 46.1 sec;   INR: 4.11 ratio         PTT - ( 16 Jan 2022 07:40 )  PTT:37.7 sec          T(C): 36.4 (01-16-22 @ 16:28), Max: 36.4 (01-16-22 @ 16:28)  HR: 61 (01-16-22 @ 16:28) (60 - 61)  BP: 105/61 (01-16-22 @ 16:28) (88/50 - 105/61)  RR: 18 (01-16-22 @ 16:28) (18 - 18)  SpO2: 93% (01-16-22 @ 16:28) (91% - 93%)  Wt(kg): --    I&O's Summary    15 Ryan 2022 07:01  -  16 Jan 2022 07:00  --------------------------------------------------------  IN: 60 mL / OUT: 0 mL / NET: 60 mL    16 Jan 2022 07:01  -  16 Jan 2022 19:34  --------------------------------------------------------  IN: 282.5 mL / OUT: 100 mL / NET: 182.5 mL          Gen: NAD  HEENT:  (-)icterus (-)pallor  CV: N S1 S2 1/6 ELLY (+)2 Pulses B/l  Resp:  scattered ronchi B/L, normal effort  GI: (+) BS Soft, NT, ND  Lymph:  (-)Edema, (-)obvious lymphadenopathy  Skin: Warm to touch, Normal turgor  Psych: Appropriate mood and affect      TELEMETRY:  60      ASSESSMENT/PLAN: Patient is a 89 y/o male with PMH of CAD s/p PCI (ROBERTO to D1 6/2/14), chronic AF (on AC w/ eliquis), 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 Cardiology consulted for further evaluation.    - Continue HD per renal  - Coreg on hold  - AC with Eliquis for AF/Stroke prevention on hold given acute anemia and supratherapeutic INR, s/p PRBC, Kcentra and vit K  - Recommend medical management of CAD however ASA held for anemia/bleeding and statin d/gita for transaminitis  - EP eval appreciated - d/c amio given elevated LFTs/INR  - Repeat TTE noted with normal LV function, mod pulm HTN  - Of note, family declined cath and preferred medical management of CAD prior admission  - F/U with his cardiologist Dr. Vanegas for cardiology after discharge    Alfredo Rain MD, [facility]  BEEPER [phone]

## 2022-01-16 NOTE — SWALLOW BEDSIDE ASSESSMENT ADULT - SLP GENERAL OBSERVATIONS
Encountered in bed, awake, baseline congestion breathing, notable history of CHF, NC+ Encountered in bed, awake, baseline SIGNIFICANT congestion breathing, notable history of CHF, NC+. RN reporting frequent suctioning; notable container with fair amount of secretions

## 2022-01-16 NOTE — CHART NOTE - NSCHARTNOTEFT_GEN_A_CORE
I was notified by the medical team that this patient had elevated INR of 5.47, with elevated PT of 60.7.    This patient is a 89yo gentleman with PMH of CAD s/p PCI, chronic AF previously on eliquis, tachy-perla syndrome s/p biV ppm, CVA x2, HFpEF, CHF hypervolemia p/w generalized weakness, c/b bleeding from gingiva, rectum, urethra, and IV site.   Per NP report, he has dried blood around gums and pink tinged urine.  Hgb appears to be downtrending a bit from this morning.     - Continue to trend Hgb  - Repeat mixing study  - Check factor levels and fibrinogen  - OK to give vitamin K 5mg now.  - can give Kcentra 2000 units if the patient rebleeds     We will follow with you. Please do not hesitate to page with questions.     Denisha Cedeno MD PGY4   [phone]  Hematology-Oncology Fellow I was notified by the medical team that this patient had elevated INR of 5.47, with elevated PT of 60.7.    This patient is a 89yo gentleman with PMH of CAD s/p PCI, chronic AF previously on eliquis, tachy-perla syndrome s/p biV ppm, CVA x2, HFpEF, CHF hypervolemia p/w generalized weakness, c/b bleeding from gingiva, rectum, urethra, and IV site.   Per NP report, he has dried blood around gums and pink tinged urine but does no appear to be actively bleeding.   Hgb appears to be downtrending a bit from this morning.     - Continue to trend Hgb  - Repeat mixing study  - Check factor levels and fibrinogen  - OK to give vitamin K 5mg now.  - can give Kcentra 2000 units if the patient rebleeds     Plan discussed with NP/PA    We will follow with you. Please do not hesitate to page with questions.     Denisha Cedeno MD PGY4   [phone]  Hematology-Oncology Fellow

## 2022-01-16 NOTE — CONSULT NOTE ADULT - SUBJECTIVE AND OBJECTIVE BOX
[facility] - [facility]  CONSULT NOTE    REASON FOR CONSULT:    Asked to see this    HPI:    PMHX:  Benign Essential Hypertension    Other and Unspecified Hyperlipidemia    CVA (Cerebral Infarction)    Hypertension    Atrial fibrillation    BPH (benign prostatic hypertrophy)    Pacemaker    Anemia    CAD (coronary artery disease)    History of ischemic cardiomyopathy        PSHX:  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    S/P coronary artery stent placement    AICD (automatic cardioverter/defibrillator) present    Pacemaker        FAMILY HISTORY:  Family history of heart disease (Father)        SOCIAL HISTORY:    Pulmonary Medications:   albuterol/ipratropium for Nebulization 3 milliLiter(s) Nebulizer every 6 hours      Antimicrobials:      Cardiology:  midodrine. 10 milliGRAM(s) Oral three times a day      Other:  dextrose 40% Gel 15 Gram(s) Oral once  dextrose 5%. 1000 milliLiter(s) IV Continuous <Continuous>  dextrose 5%. 1000 milliLiter(s) IV Continuous <Continuous>  dextrose 5%. 1000 milliLiter(s) IV Continuous <Continuous>  dextrose 50% Injectable 25 Gram(s) IV Push once  dextrose 50% Injectable 12.5 Gram(s) IV Push once  dextrose 50% Injectable 25 Gram(s) IV Push once  glucagon  Injectable 1 milliGRAM(s) IntraMuscular once  influenza  Vaccine (HIGH DOSE) 0.7 milliLiter(s) IntraMuscular once  insulin lispro (ADMELOG) corrective regimen sliding scale   SubCutaneous every 6 hours  ketotifen 0.025% Ophthalmic Solution 1 Drop(s) Right EYE two times a day  multivitamin/minerals 1 Tablet(s) Oral daily  pantoprazole  Injectable 40 milliGRAM(s) IV Push every 12 hours  polyethylene glycol 3350 17 Gram(s) Oral daily  senna 2 Tablet(s) Oral at bedtime      Allergies    No Known Allergies    Intolerances        HOME MEDICATIONS:    REVIEW OF SYSTEMS:      Constitutional: Within normal limits  HEENT: Within normal limits  Neck: Within normal limits  Resp: No dyspnea at rest, dyspnea on exertion, chest congestion/wheeze, cough. stridor, sputum production, chest pain with respiration, hemoptysis  CV: No chest pain, chest pressure, chest discomfort, palpitations, lightheadedness/dizziness, syncope, lower extremity edema, inability to lay flat to sleep, awakening from sleep unable to sleep, claudication.  Extremities: Within normal limits  GI: Within normal limits  : Within normal limits   Psych/Neurological: Within normal limits  Musculoskeletal: Within normal limits  Hematologic: Within normal limits  Endocrinologic: Within normal limits   Skin: Within normal limits                                                                                                                                                                                [ ]Unable to obtain    OBJECTIVE:      ICU Vital Signs Last 24 Hrs  T(C): 36.3 (2022 12:05), Max: 36.4 (15 Ryan 2022 13:50)  T(F): 97.4 (2022 12:05), Max: 97.5 (15 Ryan 2022 13:50)  HR: 60 (2022 12:05) (60 - 63)  BP: 101/60 (2022 12:05) (88/50 - 101/60)  BP(mean): --  ABP: --  ABP(mean): --  RR: 18 (2022 12:05) (17 - 18)  SpO2: 93% (2022 12:05) (90% - 93%)      I&O's Detail    15 Ryan 2022 07:01  -  2022 07:00  --------------------------------------------------------  IN:    Oral Fluid: 60 mL  Total IN: 60 mL    OUT:    Indwelling Catheter - Urethral (mL): 0 mL    Other (mL): 0 mL  Total OUT: 0 mL    Total NET: 60 mL      2022 07:01  -  2022 12:18  --------------------------------------------------------  IN:  Total IN: 0 mL    OUT:    Indwelling Catheter - Urethral (mL): 50 mL    Oral Fluid: 0 mL  Total OUT: 50 mL    Total NET: -50 mL        Daily     Daily Weight in k.7 (2022 04:55)  CAPILLARY BLOOD GLUCOSE      POCT Blood Glucose.: 102 mg/dL (2022 09:02)      PHYSICAL EXAM:  General: Awake, alert, cooperative, no distress, appears stated age   Head: Atraumatic, normocephalic  Eyes: Anicteric, conjunctiva/corneas clear, EOM's intact, PERRL, both eyes               Ears: External examination WNL, both ears                Nose: Nares normal, septum midline, mucosa normal, no drainage or sinus tenderness  Throat: Mallampati Grade:     No tonsillar or pharyngeal exudates. Lips, mucosa and tongue WNL; teeth and gums WNL  Neck: Supple, symmetric, trachea midline; thyroid without enlargement/tenderness/nodules; no carotid bruit; no JVD  Back: Symmetric, no curvature, range of motion normal, no CVA tenderness  Chest wall: No tenderness or deformity  Respiratory: Respirations unlabored; Clear to auscultation and percussion bilaterally  Cardiovascular: Regular rate and rhythm, S1 S2 normal. No murmurs, rubs or gallops.   Abdomen: Soft, non-tender, non-distended. No organomegaly. No masses. Normal bowel sounds  Extremities: Warm to touch. No clubbing or cyanosis. No pedal edema.  Pulses: 2+ peripheral pulses all extremities  Skin: Normal skin color, texture and turgor. No rashes or lesions  Lymph Nodes: Cervical, supraclavicular and axillary nodes normal  Neurological: Motor and sensory examination equal and normal. A and O x 3  Psychiatry: Appropriate mood and affect.    LABS:                            8.9    6.65  )-----------( 136      ( 2022 07:40 )             26.3     01-16    138  |  100  |  58<H>  ----------------------------<  31<LL>  3.4<L>   |  25  |  2.62<H>    Ca    8.4      2022 07:37      PT/INR - ( 2022 07:40 )   PT: 46.1 sec;   INR: 4.11 ratio         PTT - ( 2022 07:40 )  PTT:37.7 sec          MICROBIOLOGY:     RADIOLOGY:  [ ] Reviewed and interpreted by me    CXR:      CT Scan:     [facility] - [facility]  CONSULT NOTE    REASON FOR CONSULT:  Cough dyspnea and chest congestion.    Asked to see this    HPI: 89 yo male admitted on  for failure to thrive.  He has multiple medical problems including an admission in 2021 for CHF.  Currently requiring dialysis, difficulty tolerating secondary to hypotension, coagulopathy with bleeding requiring cryoprecipitate.  He was noted to have marked chest congestion with increased dypsnea and difficulty clearing secretions.     PMHX:  Benign Essential Hypertension    Other and Unspecified Hyperlipidemia    CVA (Cerebral Infarction)    Hypertension    Atrial fibrillation    BPH (benign prostatic hypertrophy)    Pacemaker    Anemia    CAD (coronary artery disease)    History of ischemic cardiomyopathy        PSHX:  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    S/P coronary artery stent placement    AICD (automatic cardioverter/defibrillator) present    Pacemaker        FAMILY HISTORY:  Family history of heart disease (Father)        SOCIAL HISTORY:    Pulmonary Medications:   albuterol/ipratropium for Nebulization 3 milliLiter(s) Nebulizer every 6 hours      Antimicrobials:      Cardiology:  midodrine. 10 milliGRAM(s) Oral three times a day      Other:  dextrose 40% Gel 15 Gram(s) Oral once  dextrose 5%. 1000 milliLiter(s) IV Continuous <Continuous>  dextrose 5%. 1000 milliLiter(s) IV Continuous <Continuous>  dextrose 5%. 1000 milliLiter(s) IV Continuous <Continuous>  dextrose 50% Injectable 25 Gram(s) IV Push once  dextrose 50% Injectable 12.5 Gram(s) IV Push once  dextrose 50% Injectable 25 Gram(s) IV Push once  glucagon  Injectable 1 milliGRAM(s) IntraMuscular once  influenza  Vaccine (HIGH DOSE) 0.7 milliLiter(s) IntraMuscular once  insulin lispro (ADMELOG) corrective regimen sliding scale   SubCutaneous every 6 hours  ketotifen 0.025% Ophthalmic Solution 1 Drop(s) Right EYE two times a day  multivitamin/minerals 1 Tablet(s) Oral daily  pantoprazole  Injectable 40 milliGRAM(s) IV Push every 12 hours  polyethylene glycol 3350 17 Gram(s) Oral daily  senna 2 Tablet(s) Oral at bedtime      Allergies    No Known Allergies    Intolerances        HOME MEDICATIONS:    REVIEW OF SYSTEMS:      Constitutional: Within normal limits  HEENT: Within normal limits  Neck: Within normal limits  Resp: No dyspnea at rest, dyspnea on exertion, chest congestion/wheeze, cough. stridor, sputum production, chest pain with respiration, hemoptysis  CV: No chest pain, chest pressure, chest discomfort, palpitations, lightheadedness/dizziness, syncope, lower extremity edema, inability to lay flat to sleep, awakening from sleep unable to sleep, claudication.  Extremities: Within normal limits  GI: Within normal limits  : Within normal limits   Psych/Neurological: Within normal limits  Musculoskeletal: Within normal limits  Hematologic: Within normal limits  Endocrinologic: Within normal limits   Skin: Within normal limits                                                                                                                                                                                [ ]Unable to obtain    OBJECTIVE:      ICU Vital Signs Last 24 Hrs  T(C): 36.3 (2022 12:05), Max: 36.4 (15 Ryan 2022 13:50)  T(F): 97.4 (2022 12:05), Max: 97.5 (15 Ryan 2022 13:50)  HR: 60 (2022 12:05) (60 - 63)  BP: 101/60 (2022 12:05) (88/50 - 101/60)  BP(mean): --  ABP: --  ABP(mean): --  RR: 18 (2022 12:05) (17 - 18)  SpO2: 93% (2022 12:05) (90% - 93%)      I&O's Detail    15 Ryan 2022 07:01  -  2022 07:00  --------------------------------------------------------  IN:    Oral Fluid: 60 mL  Total IN: 60 mL    OUT:    Indwelling Catheter - Urethral (mL): 0 mL    Other (mL): 0 mL  Total OUT: 0 mL    Total NET: 60 mL      2022 07:01  -  2022 12:18  --------------------------------------------------------  IN:  Total IN: 0 mL    OUT:    Indwelling Catheter - Urethral (mL): 50 mL    Oral Fluid: 0 mL  Total OUT: 50 mL    Total NET: -50 mL        Daily     Daily Weight in k.7 (2022 04:55)  CAPILLARY BLOOD GLUCOSE      POCT Blood Glucose.: 102 mg/dL (2022 09:02)      PHYSICAL EXAM:  General: Awake, alert, cooperative, no distress, appears stated age   Head: Atraumatic, normocephalic  Eyes: Anicteric, conjunctiva/corneas clear, EOM's intact, PERRL, both eyes               Ears: External examination WNL, both ears                Nose: Nares normal, septum midline, mucosa normal, no drainage or sinus tenderness  Throat: Mallampati Grade:     No tonsillar or pharyngeal exudates. Lips, mucosa and tongue WNL; teeth and gums WNL  Neck: Supple, symmetric, trachea midline; thyroid without enlargement/tenderness/nodules; no carotid bruit; no JVD  Back: Symmetric, no curvature, range of motion normal, no CVA tenderness  Chest wall: No tenderness or deformity  Respiratory: Respirations unlabored; Clear to auscultation and percussion bilaterally  Cardiovascular: Regular rate and rhythm, S1 S2 normal. No murmurs, rubs or gallops.   Abdomen: Soft, non-tender, non-distended. No organomegaly. No masses. Normal bowel sounds  Extremities: Warm to touch. No clubbing or cyanosis. No pedal edema.  Pulses: 2+ peripheral pulses all extremities  Skin: Normal skin color, texture and turgor. No rashes or lesions  Lymph Nodes: Cervical, supraclavicular and axillary nodes normal  Neurological: Motor and sensory examination equal and normal. A and O x 3  Psychiatry: Appropriate mood and affect.    LABS:                            8.9    6.65  )-----------( 136      ( 2022 07:40 )             26.3     01-16    138  |  100  |  58<H>  ----------------------------<  31<LL>  3.4<L>   |  25  |  2.62<H>    Ca    8.4      2022 07:37      PT/INR - ( 2022 07:40 )   PT: 46.1 sec;   INR: 4.11 ratio         PTT - ( 2022 07:40 )  PTT:37.7 sec          MICROBIOLOGY:     RADIOLOGY:  [ ] Reviewed and interpreted by me    CXR:      CT Scan:     [facility] - [facility]  CONSULT NOTE    REASON FOR CONSULT:  Cough dyspnea and chest congestion.    Asked to see this    HPI: 89 yo male admitted on  for failure to thrive.  He has multiple medical problems including an admission in 2021 for CHF.  Currently requiring dialysis, difficulty tolerating secondary to hypotension, coagulopathy with bleeding requiring cryoprecipitate.  He was noted to have marked chest congestion with increased dypsnea and difficulty clearing secretions.   PMHX:  Benign Essential Hypertension    Other and Unspecified Hyperlipidemia    CVA (Cerebral Infarction)    Hypertension    Atrial fibrillation    BPH (benign prostatic hypertrophy)    Pacemaker    Anemia    CAD (coronary artery disease)    History of ischemic cardiomyopathy        PSHX:  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    S/P coronary artery stent placement    AICD (automatic cardioverter/defibrillator) present    Pacemaker        FAMILY HISTORY:  Family history of heart disease (Father)        SOCIAL HISTORY:    Pulmonary Medications:   albuterol/ipratropium for Nebulization 3 milliLiter(s) Nebulizer every 6 hours      Antimicrobials:      Cardiology:  midodrine. 10 milliGRAM(s) Oral three times a day      Other:  dextrose 40% Gel 15 Gram(s) Oral once  dextrose 5%. 1000 milliLiter(s) IV Continuous <Continuous>  dextrose 5%. 1000 milliLiter(s) IV Continuous <Continuous>  dextrose 5%. 1000 milliLiter(s) IV Continuous <Continuous>  dextrose 50% Injectable 25 Gram(s) IV Push once  dextrose 50% Injectable 12.5 Gram(s) IV Push once  dextrose 50% Injectable 25 Gram(s) IV Push once  glucagon  Injectable 1 milliGRAM(s) IntraMuscular once  influenza  Vaccine (HIGH DOSE) 0.7 milliLiter(s) IntraMuscular once  insulin lispro (ADMELOG) corrective regimen sliding scale   SubCutaneous every 6 hours  ketotifen 0.025% Ophthalmic Solution 1 Drop(s) Right EYE two times a day  multivitamin/minerals 1 Tablet(s) Oral daily  pantoprazole  Injectable 40 milliGRAM(s) IV Push every 12 hours  polyethylene glycol 3350 17 Gram(s) Oral daily  senna 2 Tablet(s) Oral at bedtime      Allergies    No Known Allergies    Intolerances        HOME MEDICATIONS:    REVIEW OF SYSTEMS:      Constitutional: Within normal limits  HEENT: Within normal limits  Neck: Within normal limits  Resp: No dyspnea at rest, dyspnea on exertion, chest congestion/wheeze, cough. stridor, sputum production, chest pain with respiration, hemoptysis  CV: No chest pain, chest pressure, chest discomfort, palpitations, lightheadedness/dizziness, syncope, lower extremity edema, inability to lay flat to sleep, awakening from sleep unable to sleep, claudication.  Extremities: Within normal limits  GI: Within normal limits  : Within normal limits   Psych/Neurological: Within normal limits  Musculoskeletal: Within normal limits  Hematologic: Within normal limits  Endocrinologic: Within normal limits   Skin: Within normal limits                                                                                                                                                                                [ ]Unable to obtain    OBJECTIVE:      ICU Vital Signs Last 24 Hrs  T(C): 36.3 (2022 12:05), Max: 36.4 (15 Ryan 2022 13:50)  T(F): 97.4 (2022 12:05), Max: 97.5 (15 Ryan 2022 13:50)  HR: 60 (2022 12:05) (60 - 63)  BP: 101/60 (2022 12:05) (88/50 - 101/60)  BP(mean): --  ABP: --  ABP(mean): --  RR: 18 (2022 12:05) (17 - 18)  SpO2: 93% (2022 12:05) (90% - 93%)      I&O's Detail    15 Ryan 2022 07:01  -  2022 07:00  --------------------------------------------------------  IN:    Oral Fluid: 60 mL  Total IN: 60 mL    OUT:    Indwelling Catheter - Urethral (mL): 0 mL    Other (mL): 0 mL  Total OUT: 0 mL    Total NET: 60 mL      2022 07:01  -  2022 12:18  --------------------------------------------------------  IN:  Total IN: 0 mL    OUT:    Indwelling Catheter - Urethral (mL): 50 mL    Oral Fluid: 0 mL  Total OUT: 50 mL    Total NET: -50 mL        Daily     Daily Weight in k.7 (2022 04:55)  CAPILLARY BLOOD GLUCOSE      POCT Blood Glucose.: 102 mg/dL (2022 09:02)      PHYSICAL EXAM:  General: Awake, alert, cooperative, no distress, appears stated age   Head: Atraumatic, normocephalic  Eyes: Anicteric, conjunctiva/corneas clear, EOM's intact, PERRL, both eyes               Ears: External examination WNL, both ears                Nose: Nares normal, septum midline, mucosa normal, no drainage or sinus tenderness  Throat: Mallampati Grade:     No tonsillar or pharyngeal exudates. Lips, mucosa and tongue WNL; teeth and gums WNL  Neck: Supple, symmetric, trachea midline; thyroid without enlargement/tenderness/nodules; no carotid bruit; no JVD  Back: Symmetric, no curvature, range of motion normal, no CVA tenderness  Chest wall: No tenderness or deformity  Respiratory: Respirations unlabored; Clear to auscultation and percussion bilaterally  Cardiovascular: Regular rate and rhythm, S1 S2 normal. No murmurs, rubs or gallops.   Abdomen: Soft, non-tender, non-distended. No organomegaly. No masses. Normal bowel sounds  Extremities: Warm to touch. No clubbing or cyanosis. No pedal edema.  Pulses: 2+ peripheral pulses all extremities  Skin: Normal skin color, texture and turgor. No rashes or lesions  Lymph Nodes: Cervical, supraclavicular and axillary nodes normal  Neurological: Motor and sensory examination equal and normal. A and O x 3  Psychiatry: Appropriate mood and affect.    LABS:                            8.9    6.65  )-----------( 136      ( 2022 07:40 )             26.3     01-16    138  |  100  |  58<H>  ----------------------------<  31<LL>  3.4<L>   |  25  |  2.62<H>    Ca    8.4      2022 07:37      PT/INR - ( 2022 07:40 )   PT: 46.1 sec;   INR: 4.11 ratio         PTT - ( 2022 07:40 )  PTT:37.7 sec          MICROBIOLOGY:     RADIOLOGY:  [ ] Reviewed and interpreted by me    CXR:      CT Scan:     [facility] - [facility]  CONSULT NOTE    REASON FOR CONSULT:  Cough dyspnea and chest congestion.    HPI: Asked to see this  89 yo male admitted on  for failure to thrive.  He has multiple medical problems including an admission in 2021 for CHF.  He also has a history of CAD s/p PCI (ROBERTO to D1 14), chronic AF (on AC w/ eliquis), tachy/perla syndrome s/p biV PPM placement, CVA x 2, HFpEF, FLOYD, currently requiring dialysis, difficulty tolerating secondary to hypotension, coagulopathy with bleeding requiring cryoprecipitate.  He was noted to have marked chest congestion with increased dypsnea and difficulty clearing secretions.   The patient notes increased dyspnea with chest discomfort, with generalized discomfort, not verbalizing other complaints, but he appears to be confused at times.         PMHX:  Benign Essential Hypertension    Other and Unspecified Hyperlipidemia    CVA (Cerebral Infarction)    Hypertension    Atrial fibrillation    BPH (benign prostatic hypertrophy)    Pacemaker    Anemia    CAD (coronary artery disease)    History of ischemic cardiomyopathy        PSHX:  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    S/P coronary artery stent placement    AICD (automatic cardioverter/defibrillator) present    Pacemaker        FAMILY HISTORY:  Family history of heart disease (Father)        SOCIAL HISTORY:  No history of recent smoking (?previous cigs, unable to obtain further details presently)  No recent Etoh abuse noted    Pulmonary Medications:   albuterol/ipratropium for Nebulization 3 milliLiter(s) Nebulizer every 6 hours      Antimicrobials:      Cardiology:  midodrine. 10 milliGRAM(s) Oral three times a day      Other:  dextrose 40% Gel 15 Gram(s) Oral once  dextrose 5%. 1000 milliLiter(s) IV Continuous <Continuous>  dextrose 5%. 1000 milliLiter(s) IV Continuous <Continuous>  dextrose 5%. 1000 milliLiter(s) IV Continuous <Continuous>  dextrose 50% Injectable 25 Gram(s) IV Push once  dextrose 50% Injectable 12.5 Gram(s) IV Push once  dextrose 50% Injectable 25 Gram(s) IV Push once  glucagon  Injectable 1 milliGRAM(s) IntraMuscular once  influenza  Vaccine (HIGH DOSE) 0.7 milliLiter(s) IntraMuscular once  insulin lispro (ADMELOG) corrective regimen sliding scale   SubCutaneous every 6 hours  ketotifen 0.025% Ophthalmic Solution 1 Drop(s) Right EYE two times a day  multivitamin/minerals 1 Tablet(s) Oral daily  pantoprazole  Injectable 40 milliGRAM(s) IV Push every 12 hours  polyethylene glycol 3350 17 Gram(s) Oral daily  senna 2 Tablet(s) Oral at bedtime      Allergies    No Known Allergies    Intolerances        HOME MEDICATIONS:    REVIEW OF SYSTEMS:      Constitutional: Within normal limits  HEENT: Within normal limits  Neck: Within normal limits  Resp: positive chest congestion, with associated dyspnea and discomfort  CV: No report of palpitations; positive orthopnea  Extremities: LE edema  GI:  decreased oral intake  :  No report of significant dysuria  Psych/Neurological: appears confused at times  Musculoskeletal:  weakness, unable to ambulate recently  Hematologic: Increased INR with recent bleeding as noted above and in previous notes  Endocrinologic: Within normal limits   Skin: no reported complaints                                                                                                                                                                                x[ ]Full details, Unable to obtain    OBJECTIVE:      ICU Vital Signs Last 24 Hrs  T(C): 36.3 (2022 12:05), Max: 36.4 (15 Ryan 2022 13:50)  T(F): 97.4 (2022 12:05), Max: 97.5 (15 Ryan 2022 13:50)  HR: 60 (2022 12:05) (60 - 63)  BP: 101/60 (2022 12:05) (88/50 - 101/60)  BP(mean): --  ABP: --  ABP(mean): --  RR: 18 (2022 12:05) (17 - 18)  SpO2: 93% (2022 12:05) (90% - 93%)      I&O's Detail    15 Ryan 2022 07:01  -  2022 07:00  --------------------------------------------------------  IN:    Oral Fluid: 60 mL  Total IN: 60 mL    OUT:    Indwelling Catheter - Urethral (mL): 0 mL    Other (mL): 0 mL  Total OUT: 0 mL    Total NET: 60 mL      2022 07:01  -  2022 12:18  --------------------------------------------------------  IN:  Total IN: 0 mL    OUT:    Indwelling Catheter - Urethral (mL): 50 mL    Oral Fluid: 0 mL  Total OUT: 50 mL    Total NET: -50 mL        Daily     Daily Weight in k.7 (2022 04:55)  CAPILLARY BLOOD GLUCOSE      POCT Blood Glucose.: 102 mg/dL (2022 09:02)      PHYSICAL EXAM:  General: Awake, alert, cooperative, confused, with dyspnea and chest congestion  Head: Atraumatic, normocephalic  Eyes: grossly unremarkable             Ears: External examination WNL, both ears                Nose: unremarkable  Throat: poor visualizaiton, no marked abnormalities   Neck: Supple, symmetric, trachea midline; prominent EJ  Back:  kyphoscoliosis  Chest wall: No tenderness or deformity  Respiratory:  Dyspnea with frequent coughing   Cardiovascular:  Irreg ?S3   Abdomen: Soft, voluntary guarding, positive bowel sounds  Extremities: Arthritic changes, pedal edema  Pulses: no marked abnormalities  Skin: Changes consistent with chronic vascular insufficiency of the LEs  Lymph Nodes: Cervical, supraclavicular and axillary nodes normal  Neurological:  Appears confused at times,, appropriately responsive to simple questions  Psychiatry: Appropriate mood and affect.    LABS:                            8.9    6.65  )-----------( 136      ( 2022 07:40 )             26.3     01-16    138  |  100  |  58<H>  ----------------------------<  31<LL>  3.4<L>   |  25  |  2.62<H>    Ca    8.4      2022 07:37      PT/INR - ( 2022 07:40 )   PT: 46.1 sec;   INR: 4.11 ratio         PTT - ( 2022 07:40 )  PTT:37.7 sec          MICROBIOLOGY:     RADIOLOGY:  [ ] Reviewed and interpreted by me    CXR:      CT Scan:     [facility] - [facility]  CONSULT NOTE    REASON FOR CONSULT:  Cough dyspnea and chest congestion.    HPI: Asked to see this  89 yo male admitted on  for failure to thrive.  He has multiple medical problems including an admission in 2021 for CHF.  He also has a history of CAD s/p PCI (ROBERTO to D1 14), chronic AF (on AC w/ eliquis), tachy/perla syndrome s/p biV PPM placement, CVA x 2, HFpEF, FLOYD, currently requiring dialysis, difficulty tolerating secondary to hypotension; coagulopathy with melena recently requiring [facility] and cryoprecipitate.  He was noted to have marked chest congestion with increased dypsnea and difficulty clearing secretions.   The patient notes increased dyspnea with chest discomfort, with generalized discomfort, not verbalizing other complaints, but he appears to be confused and anxious.         PMHX:  Benign Essential Hypertension    Other and Unspecified Hyperlipidemia    CVA (Cerebral Infarction)    Hypertension    Atrial fibrillation    BPH (benign prostatic hypertrophy)    Pacemaker    Anemia    CAD (coronary artery disease)    History of ischemic cardiomyopathy        PSHX:  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    S/P coronary artery stent placement    AICD (automatic cardioverter/defibrillator) present    Pacemaker        FAMILY HISTORY:  Family history of heart disease (Father)        SOCIAL HISTORY:  No history of recent smoking (?previous cigs, unable to obtain further details presently)  No recent Etoh abuse noted    Pulmonary Medications:   albuterol/ipratropium for Nebulization 3 milliLiter(s) Nebulizer every 6 hours      Antimicrobials:      Cardiology:  midodrine. 10 milliGRAM(s) Oral three times a day      Other:  dextrose 40% Gel 15 Gram(s) Oral once  dextrose 5%. 1000 milliLiter(s) IV Continuous <Continuous>  dextrose 5%. 1000 milliLiter(s) IV Continuous <Continuous>  dextrose 5%. 1000 milliLiter(s) IV Continuous <Continuous>  dextrose 50% Injectable 25 Gram(s) IV Push once  dextrose 50% Injectable 12.5 Gram(s) IV Push once  dextrose 50% Injectable 25 Gram(s) IV Push once  glucagon  Injectable 1 milliGRAM(s) IntraMuscular once  influenza  Vaccine (HIGH DOSE) 0.7 milliLiter(s) IntraMuscular once  insulin lispro (ADMELOG) corrective regimen sliding scale   SubCutaneous every 6 hours  ketotifen 0.025% Ophthalmic Solution 1 Drop(s) Right EYE two times a day  multivitamin/minerals 1 Tablet(s) Oral daily  pantoprazole  Injectable 40 milliGRAM(s) IV Push every 12 hours  polyethylene glycol 3350 17 Gram(s) Oral daily  senna 2 Tablet(s) Oral at bedtime      Allergies    No Known Allergies    Intolerances        HOME MEDICATIONS:    REVIEW OF SYSTEMS:      Constitutional: Within normal limits  HEENT: Within normal limits  Neck: Within normal limits  Resp: positive chest congestion, with associated dyspnea and discomfort  CV: No report of palpitations; positive orthopnea  Extremities: LE edema  GI:  decreased oral intake  :  No report of significant dysuria  Psych/Neurological: appears confused at times  Musculoskeletal:  weakness, unable to ambulate recently  Hematologic: Increased INR with recent bleeding as noted above and in previous notes  Endocrinologic: Within normal limits   Skin: no reported complaints                                                                                                                                                                                x[ ]Full details, Unable to obtain    OBJECTIVE:      ICU Vital Signs Last 24 Hrs  T(C): 36.3 (2022 12:05), Max: 36.4 (15 Ryan 2022 13:50)  T(F): 97.4 (2022 12:05), Max: 97.5 (15 Ryan 2022 13:50)  HR: 60 (2022 12:05) (60 - 63)  BP: 101/60 (2022 12:05) (88/50 - 101/60)  BP(mean): --  ABP: --  ABP(mean): --  RR: 18 (2022 12:05) (17 - 18)  SpO2: 93% (2022 12:05) (90% - 93%)      I&O's Detail    15 Ryan 2022 07:01  -  2022 07:00  --------------------------------------------------------  IN:    Oral Fluid: 60 mL  Total IN: 60 mL    OUT:    Indwelling Catheter - Urethral (mL): 0 mL    Other (mL): 0 mL  Total OUT: 0 mL    Total NET: 60 mL      2022 07:01  -  2022 12:18  --------------------------------------------------------  IN:  Total IN: 0 mL    OUT:    Indwelling Catheter - Urethral (mL): 50 mL    Oral Fluid: 0 mL  Total OUT: 50 mL    Total NET: -50 mL        Daily     Daily Weight in k.7 (2022 04:55)  CAPILLARY BLOOD GLUCOSE      POCT Blood Glucose.: 102 mg/dL (2022 09:02)      PHYSICAL EXAM:  General: Awake, alert, cooperative, confused, with dyspnea and chest congestion  Head: Atraumatic, normocephalic  Eyes: grossly unremarkable             Ears: External examination WNL, both ears                Nose: unremarkable  Throat: poor visualizaiton, no marked abnormalities   Neck: Supple, symmetric, trachea midline; prominent EJ  Back:  kyphoscoliosis  Chest wall: No tenderness or deformity  Respiratory:  Dyspnea with frequent coughing   Cardiovascular:  Irreg ?S3   Abdomen: Soft, voluntary guarding, positive bowel sounds  Extremities: Arthritic changes, pedal edema  Pulses: no marked abnormalities  Skin: Changes consistent with chronic vascular insufficiency of the LEs  Lymph Nodes: Cervical, supraclavicular and axillary nodes normal  Neurological:  Appears confused at times,, appropriately responsive to simple questions  Psychiatry: Appropriate mood and affect.    LABS:                            8.9    6.65  )-----------( 136      ( 2022 07:40 )             26.3     01-    138  |  100  |  58<H>  ----------------------------<  31<LL>  3.4<L>   |  25  |  2.62<H>    Ca    8.4      2022 07:37      PT/INR - ( 2022 07:40 )   PT: 46.1 sec;   INR: 4.11 ratio         PTT - ( 2022 07:40 )  PTT:37.7 sec          MICROBIOLOGY:     RADIOLOGY:  [ ] Reviewed and interpreted by me    CXR:  ACC: [id number] EXAM:  XR CHEST PORTABLE URGENT 1V                          PROCEDURE DATE:  01/15/2022          INTERPRETATION:  HISTORY: Admitting Dxs: I50.9 DECREASE PO INTAKE,   WEAKNESS;  SOB;  TECHNIQUE: Portable frontal view of the chest, 1 view.  COMPARISON: 2022.  FINDINGS/  IMPRESSION:  A cardiac device overlies and obscures the left hemithorax with leads in   place.  HEART:  Enlarged  LUNGS: Large left effusion, bilateral infiltrates. Small right effusion.   Similar to prior study. Likely represents congestive heart failure.   Superimposed pneumonia cannot be excluded.  BONES: degenerative changes      --- End of Report ---            FLAVIO ROJAS MD; Attending Interventional Radiologist  This document has been electronically signed. 2022 10:52AM      CT Scan:

## 2022-01-16 NOTE — SWALLOW BEDSIDE ASSESSMENT ADULT - SWALLOW EVAL: DIAGNOSIS
Pt p/w Acute diastolic congestive heart failure with melena during hospital course; Pt p/w an oropharyngeal dysphagia in presence of suspected latent swallow response, deconditioned state, and concern for airway invasion. See below for details.

## 2022-01-16 NOTE — PROVIDER CONTACT NOTE (CRITICAL VALUE NOTIFICATION) - ASSESSMENT
Patient's Lab Glucose, Serum Results is 31. Patient is asymptomatic. Patient is Alert and Oriented times Three - Four. No Neurological Deficits noted. Patient denies pain, shortness of breath, dizziness and lightheadedness. Night Shift RN assessed patient's Blood Glucose Fingertip and Result was 36 and repeat 35 and RN followed Hypoglycemia Protocol and post Protocol patient's Blood Glucose Fingertip Result was 195 and 119.

## 2022-01-16 NOTE — SWALLOW BEDSIDE ASSESSMENT ADULT - COMMENTS
Continued history:  > hold  PO Lasix  > Discontinue AC with Eliquis due to acute bleeding  >1/13: RN to PA- Patient had 2 episodes of melena overnight. Patient's BP is unchanged and patient near completion of 2u of PRBC and 1u plasma.  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   >1/14: HD per renal; s/p 1st HD yesterday- had low bp which improved  >1/15: Pt with Xray showing worsening airspace opacities, fluid overload VS infection. pt with no fever nor leucocytosis.  currently requiring 4 l nasal cannula.  >mixing study shows correction, suggesting that apixaban might be getting cleared sufficiently  > As per MD. patient has worsening hyperkalemia and uremia; uremia might be partially 2/2 GI bleed / blood reabsorption, but would have low threshold to dialyze given suspicion that uremic platelets are contributing to coagulopathy    Swallow history: No reports in SCM or in PACs. No family present. Pt is not a good historian. Son makes food for patient at home, as per RD notation.

## 2022-01-16 NOTE — SWALLOW BEDSIDE ASSESSMENT ADULT - ORAL PREPARATORY PHASE
required cues to accept by staff ; initially holding bolus purposefully/Refuses to accept bolus into oral cavity

## 2022-01-16 NOTE — PROGRESS NOTE ADULT - SUBJECTIVE AND OBJECTIVE BOX
DATE OF SERVICE: 01-16-22 @ 13:59    Patient is a 89y old  Male who presents with a chief complaint of     SUBJECTIVE / OVERNIGHT EVENTS:  sob with upper airway secretions    MEDICATIONS  (STANDING):  albuterol/ipratropium for Nebulization 3 milliLiter(s) Nebulizer every 6 hours  dextrose 10%. 1000 milliLiter(s) (30 mL/Hr) IV Continuous <Continuous>  dextrose 40% Gel 15 Gram(s) Oral once  dextrose 5%. 1000 milliLiter(s) (50 mL/Hr) IV Continuous <Continuous>  dextrose 5%. 1000 milliLiter(s) (100 mL/Hr) IV Continuous <Continuous>  dextrose 5%. 1000 milliLiter(s) (30 mL/Hr) IV Continuous <Continuous>  dextrose 50% Injectable 25 Gram(s) IV Push once  dextrose 50% Injectable 12.5 Gram(s) IV Push once  dextrose 50% Injectable 25 Gram(s) IV Push once  glucagon  Injectable 1 milliGRAM(s) IntraMuscular once  influenza  Vaccine (HIGH DOSE) 0.7 milliLiter(s) IntraMuscular once  insulin lispro (ADMELOG) corrective regimen sliding scale   SubCutaneous every 6 hours  ketotifen 0.025% Ophthalmic Solution 1 Drop(s) Right EYE two times a day  midodrine. 10 milliGRAM(s) Oral three times a day  multivitamin/minerals 1 Tablet(s) Oral daily  pantoprazole  Injectable 40 milliGRAM(s) IV Push every 12 hours  polyethylene glycol 3350 17 Gram(s) Oral daily  senna 2 Tablet(s) Oral at bedtime    MEDICATIONS  (PRN):      Vital Signs Last 24 Hrs  T(C): 36.3 (16 Jan 2022 12:05), Max: 36.3 (15 Ryan 2022 20:35)  T(F): 97.4 (16 Jan 2022 12:05), Max: 97.4 (16 Jan 2022 04:55)  HR: 60 (16 Jan 2022 12:05) (60 - 63)  BP: 101/60 (16 Jan 2022 12:05) (88/50 - 101/60)  BP(mean): --  RR: 18 (16 Jan 2022 12:05) (18 - 18)  SpO2: 93% (16 Jan 2022 12:05) (90% - 93%)  CAPILLARY BLOOD GLUCOSE      POCT Blood Glucose.: 73 mg/dL (16 Jan 2022 12:27)  POCT Blood Glucose.: 102 mg/dL (16 Jan 2022 09:02)  POCT Blood Glucose.: 119 mg/dL (16 Jan 2022 07:30)  POCT Blood Glucose.: 195 mg/dL (16 Jan 2022 07:06)  POCT Blood Glucose.: 35 mg/dL (16 Jan 2022 06:46)  POCT Blood Glucose.: 36 mg/dL (16 Jan 2022 06:45)  POCT Blood Glucose.: 82 mg/dL (16 Jan 2022 01:31)  POCT Blood Glucose.: 94 mg/dL (15 Ryan 2022 21:42)  POCT Blood Glucose.: 113 mg/dL (15 Ryan 2022 17:07)  POCT Blood Glucose.: 166 mg/dL (15 Ryan 2022 15:15)  POCT Blood Glucose.: 68 mg/dL (15 Ryan 2022 14:47)  POCT Blood Glucose.: 64 mg/dL (15 Ryan 2022 14:45)    I&O's Summary    15 Ryan 2022 07:01  -  16 Jan 2022 07:00  --------------------------------------------------------  IN: 60 mL / OUT: 0 mL / NET: 60 mL    16 Jan 2022 07:01  -  16 Jan 2022 13:59  --------------------------------------------------------  IN: 0 mL / OUT: 50 mL / NET: -50 mL        PHYSICAL EXAM:  GENERAL: NAD, well-developed  HEAD:  Atraumatic, Normocephalic  EYES: EOMI, PERRLA, conjunctiva and sclera clear  NECK: Supple, No JVD  CHEST/LUNG: diane rhonchi and gurgling  HEART: Regular rate and rhythm; No murmurs, rubs, or gallops  ABDOMEN: Soft, Nontender, Nondistended; Bowel sounds present  EXTREMITIES:  2+ Peripheral Pulses, No clubbing, cyanosis, or edema  PSYCH: AAOx3  NEUROLOGY: non-focal  SKIN: No rashes or lesions    LABS:                        8.9    6.65  )-----------( 136      ( 16 Jan 2022 07:40 )             26.3     01-16    138  |  100  |  58<H>  ----------------------------<  31<LL>  3.4<L>   |  25  |  2.62<H>    Ca    8.4      16 Jan 2022 07:37      PT/INR - ( 16 Jan 2022 07:40 )   PT: 46.1 sec;   INR: 4.11 ratio         PTT - ( 16 Jan 2022 07:40 )  PTT:37.7 sec          RADIOLOGY & ADDITIONAL TESTS:    Imaging Personally Reviewed:    Consultant(s) Notes Reviewed:      Care Discussed with Consultants/Other Providers:

## 2022-01-16 NOTE — CHART NOTE - NSCHARTNOTEFT_GEN_A_CORE
CRITICAL INR 5.49    INR resulted 5.49, no overt signs of bleeding noticed. Spoke with Dr Valdez, will give Vitamin K 5mg IVB infusion x 1.  All AC had been discontinue. H/H stable. VSS.  Will continue to monitor

## 2022-01-16 NOTE — PROGRESS NOTE ADULT - SUBJECTIVE AND OBJECTIVE BOX
[facility] : Ans Serv [phone], Office [phone]  Dr Garcia/Dr Dawn  /Dr David harris /Dr JUAN DAVID Huynh/Dr Mike Smith/Dr Adrian Vega /Dr LUBA Herman  _______________________________________________________________________________________________    seen and examined today for renal failure on hemodialysis   Interval : s/p hemodialysis yesterday,   1 hr after hemodialysis received call from [facility] regarding breathing status- the pt had low blood pressure and was not able to tolerate Ultrafiltration on Dialysis   given situation team recommaned to give midodrine 10mg x 1 dose and iv lasix    VITALS:  T(F): 97.2 (01-16-22 @ 08:55), Max: 97.5 (01-15-22 @ 13:50)  HR: 60 (01-16-22 @ 08:55)  BP: 100/60 (01-16-22 @ 08:55)  RR: 18 (01-16-22 @ 08:55)  SpO2: 93% (01-16-22 @ 08:55)  Wt(kg): --    01-15 @ 07:01 - 01-16 @ 07:00  --------------------------------------------------------  IN: 60 mL / OUT: 0 mL / NET: 60 mL    01-16 @ 07:01 - 01-16 @ 11:36  --------------------------------------------------------  IN: 0 mL / OUT: 50 mL / NET: -50 mL    Physical Exam :-  Constitutional: NAD  Neck: Supple.  Respiratory: Bilateral equal breath sounds, ++ Crackles present.  Cardiovascular: S1, S2 normal, positive Murmur  Gastrointestinal: Bowel Sounds present, soft, non tender.  Extremities: no Edema Feet  Neurological: Alert  Psychiatric: Normal mood, normal affect  Data:-  Allergies :   No Known Allergies    Hospital Medications:   MEDICATIONS  (STANDING):  albuterol/ipratropium for Nebulization 3 milliLiter(s) Nebulizer every 6 hours  dextrose 40% Gel 15 Gram(s) Oral once  dextrose 5%. 1000 milliLiter(s) (50 mL/Hr) IV Continuous <Continuous>  dextrose 5%. 1000 milliLiter(s) (100 mL/Hr) IV Continuous <Continuous>  dextrose 5%. 1000 milliLiter(s) (30 mL/Hr) IV Continuous <Continuous>  dextrose 50% Injectable 25 Gram(s) IV Push once  dextrose 50% Injectable 12.5 Gram(s) IV Push once  dextrose 50% Injectable 25 Gram(s) IV Push once  glucagon  Injectable 1 milliGRAM(s) IntraMuscular once  influenza  Vaccine (HIGH DOSE) 0.7 milliLiter(s) IntraMuscular once  insulin lispro (ADMELOG) corrective regimen sliding scale   SubCutaneous every 6 hours  ketotifen 0.025% Ophthalmic Solution 1 Drop(s) Right EYE two times a day  midodrine. 10 milliGRAM(s) Oral three times a day  multivitamin/minerals 1 Tablet(s) Oral daily  pantoprazole  Injectable 40 milliGRAM(s) IV Push every 12 hours  polyethylene glycol 3350 17 Gram(s) Oral daily  senna 2 Tablet(s) Oral at bedtime    01-16    138  |  100  |  58<H>  ----------------------------<  31<LL>  3.4<L>   |  25  |  2.62<H>    Ca    8.4      16 Jan 2022 07:37      Creatinine Trend: 2.62 <--, 2.27 <--, 3.12 <--, 4.02 <--, 4.97 <--, 4.94 <--, 4.73 <--, 4.37 <--, 4.19 <--                        8.9    6.65  )-----------( 136      ( 16 Jan 2022 07:40 )             26.3

## 2022-01-16 NOTE — CHART NOTE - NSCHARTNOTEFT_GEN_A_CORE
Notified by RN, BG 35mg/dl, patient seen and examined. Asymptomatic.       Plan   Dextrose 50% Injectable 25 Gram(s) IV Push  x1   Repeat FS within 30 mins   ? Dextrose infusion initiation , however given fluid volume overloaded status will hold for now   VSS  Continue to monitor     POCT Blood Glucose:  119 mg/dL (01-16-22 @ 07:30)  195 mg/dL (01-16-22 @ 07:06)  35 mg/dL (01-16-22 @ 06:46)  36 mg/dL (01-16-22 @ 06:45)  82 mg/dL (01-16-22 @ 01:31)  94 mg/dL (01-15-22 @ 21:42)  113 mg/dL (01-15-22 @ 17:07)  166 mg/dL (01-15-22 @ 15:15)

## 2022-01-16 NOTE — CHART NOTE - NSCHARTNOTEFT_GEN_A_CORE
Called by RN to evaluate persist hypotension and generalized body pain, Pt seen at bedside and examined.  .  Vital Signs Last 24 Hrs  T(C): 36.3 (15 Ryan 2022 23:57), Max: 36.4 (15 Ryan 2022 13:50)  T(F): 97.3 (15 Ryan 2022 23:57), Max: 97.5 (15 Ryan 2022 13:50)  HR: 60 (15 Ryan 2022 23:57) (60 - 63)  BP: 88/50 (16 Jan 2022 00:06) (88/50 - 99/60)  BP(mean): --  RR: 18 (15 Ryan 2022 23:57) (17 - 18)  SpO2: 91% (15 Ryan 2022 23:57) (90% - 93%)      Labs:                          8.7    4.73  )-----------( 124      ( 15 Ryan 2022 20:23 )             26.2     01-15    137  |  98  |  52<H>  ----------------------------<  93  3.5   |  25  |  2.27<H>    Ca    8.4      15 Ryan 2022 20:23        Physical Exam:  Neurology: A&Ox3, nonfocal, DAVIS x 4  Respiratory: + rales, noisy breathing   CV: RRR, S1S2, no murmur  Abdominal: Soft, NT, ND no palpable mass  MSK: + 3 pitting edema upper and lower extremities ,    Assessment & Plan:  89 y/o M PMH of CAD s/p PCI (ROBERTO to D1 6/2/14), chronic AF (on AC w/ eliquis), tachy/perla syndrome s/p biV PPM placement, CVA x 2, HFpEF, hospitalized in October 2021 for CHF exacerbation/overload presents to ED BIB son from home for decreased PO intake x2 weeks associated with constipation and abdominal pain, generalized weakness worsening to point where he is no longer ambulating and now w/ b/l LE edema and orthopnea now with persistent hypotension       Plan   Stat CBC ordered and sent   Tylenol 1000mg IV x 1 administered, will defer opiates until hemodynamically stable   K+ 3.4, patient refusing oral repletion   SBP 80-90'/50's, will continue to monitor. Clinically stable at the moment   Awaiting CBC to be resulted

## 2022-01-16 NOTE — PROGRESS NOTE ADULT - ASSESSMENT
89 y/o M PMH of CAD s/p PCI (ROBERTO to D1 6/2/14), chronic AF (on AC w/ eliquis), tachy/perla syndrome s/p biV PPM placement, CVA x 2, HFpEF, hospitalized in October 2021 for CHF exacerbation/overload presents to ED BIB son from home for decreased PO intake x2 weeks associated with constipation and abdominal pain, generalized weakness worsening to point where he is no longer ambulating and now w/ b/l LE edema and orthopnea. Denies f/c, cough, congestion.    upper airway secretions 2/2 aspiration vs fluid overload vs increased bronchial hyperreactivity.  - seen by pulm  Continue neb rx  Acapella flutter valve  Respiratory vest  Suctioning (cautiously)  Encouraging secretion clearance  Aspiration precautions inc elevating hob  Incentive spirometry  Following CxR  Attempting to optimize CV/fluid status  We will have a low threshold for a fever work up and antibiotic coverage.  He may warrant a course of steroids if he does not improve in the near future.      acute on chronic diastolic congestive heart failure  - hold  PO lasix . cr uptrending  - Coreg on hold  - DISContinue AC with Eliquis due to acute bleeding  - Continue medical management of CAD with ASA/Statin  - EP eval appreciated - hold Amio 400mg due to elevated lfts  - Repeat TTE noted with normal LV function, mod pulm HTN  - Of note, family declined cath and preferred medical management of CAD prior admission    FLOYD pre renal +/- cardiorenal +/- Obstruction  b/l Cr 1.87 12/17/21, 1 mth ago was 2.5mg/dl  c/w river- trend urineoutput  Cr continues to rise- UA and URine lytes reviewed Showing intravascular depletion--> lasix was discontinued  Given GIB and low hgb--> Agree with 2 units prbc  reverse coagulopathy--> f/u Heme and GI  c/w albumin for now  HD per renal  - I/o's, daily weights, daily BMP  -avoid NSAIDs/ACEi/ARB/nephrotoxics/iv contrast  low Na in diet, fluid restriction 1L/day   dose all meds for eGFR<15ml/min  - nephrology following    Coagulopathy with active bleeding  -discontinued eliquis and ASA in setting of active bleeding and elevated INR  -given vitamin K on 1/12/21; given additional 10 mg on 1/13/21  -given KCentra 2000 units on 1/12 and on 1/13; INR still elevated but improved, recommend giving an additional 1500 units now  -mixing study today shows correction, suggesting that apixaban might be getting cleared sufficiently  -d-dimer elevated and fibrinogen low, suggestive of component of DIC vs liver disease; checking factors V, VII, VIII, and X with AM labs to better characterize  -monitor fibrinogen; though >150, would give cryoprecipitate given active bleeding ongoing today; discussed with blood bank today  -haptoglobin is normal, making hemolysis less likely  -patient has worsening hyperkalemia and uremia; uremia might be partially 2/2 GI bleed / blood reabsorption, but would have low threshold to dialyze given suspicion that uremic platelets are contributing to coagulopathy  -transfuse as needed; transfuse for Hgb <7.0 or if symptomatically anemic; transfuse for plts <25k  -Daily CBC, trend hgb. INR/PT/PTT, fibrinogen, D-dimer q12 hours for now  -given renal failure, please check EPO level  - hematology following    GI bleed  - c/w PPI  - was seen by GI  - no a candidate for endoscopy at this time    elevated liver enzymes worsening  - likely 2/2 to CHF    anemia  - transfuse 2 units of prbc  - likely 2/2 ckd and iron def  - s/p  iv iron    diabetes  controlled  - fs qid  - hgb a1c  - insulin sliding scale  - Lantus 10 units qhs    unstageable pressure ulcer present on admission  - optimize nutrition  - off loading  - T&P q 2 hours  - wound care consult    afib  - c/w amio  - d/c  eliquis    constipation  - senna and miralax    prognosis is poor  - will discuss GOC with family

## 2022-01-16 NOTE — PROGRESS NOTE ADULT - SUBJECTIVE AND OBJECTIVE BOX
C A R D I O L O G Y  **********************************     DATE OF SERVICE: 01-16-22    pt seen and examined, no complaints, ROS - .     albuterol/ipratropium for Nebulization 3 milliLiter(s) Nebulizer every 6 hours  dextrose 40% Gel 15 Gram(s) Oral once  dextrose 5%. 1000 milliLiter(s) IV Continuous <Continuous>  dextrose 5%. 1000 milliLiter(s) IV Continuous <Continuous>  dextrose 50% Injectable 25 Gram(s) IV Push once  dextrose 50% Injectable 12.5 Gram(s) IV Push once  dextrose 50% Injectable 25 Gram(s) IV Push once  glucagon  Injectable 1 milliGRAM(s) IntraMuscular once  influenza  Vaccine (HIGH DOSE) 0.7 milliLiter(s) IntraMuscular once  insulin lispro (ADMELOG) corrective regimen sliding scale   SubCutaneous three times a day before meals  insulin lispro (ADMELOG) corrective regimen sliding scale   SubCutaneous at bedtime  ketotifen 0.025% Ophthalmic Solution 1 Drop(s) Right EYE two times a day  midodrine. 10 milliGRAM(s) Oral three times a day  multivitamin/minerals 1 Tablet(s) Oral daily  pantoprazole  Injectable 40 milliGRAM(s) IV Push every 12 hours  polyethylene glycol 3350 17 Gram(s) Oral daily  senna 2 Tablet(s) Oral at bedtime                            8.9    6.65  )-----------( 136      ( 16 Jan 2022 07:40 )             26.3       Hemoglobin: 8.9 g/dL (01-16 @ 07:40)  Hemoglobin: 8.6 g/dL (01-16 @ 00:43)  Hemoglobin: 8.7 g/dL (01-15 @ 20:23)  Hemoglobin: 8.7 g/dL (01-15 @ 07:19)  Hemoglobin: 9.2 g/dL (01-14 @ 23:37)      01-16    138  |  100  |  58<H>  ----------------------------<  31<LL>  3.4<L>   |  25  |  2.62<H>    Ca    8.4      16 Jan 2022 07:37      Creatinine Trend: 2.62<--, 2.27<--, 3.12<--, 4.02<--, 4.97<--, 4.94<--    COAGS: PT/INR - ( 16 Jan 2022 07:40 )   PT: 46.1 sec;   INR: 4.11 ratio         PTT - ( 16 Jan 2022 07:40 )  PTT:37.7 sec          T(C): 36.3 (01-16-22 @ 04:55), Max: 36.4 (01-15-22 @ 13:50)  HR: 61 (01-16-22 @ 04:55) (60 - 63)  BP: 90/50 (01-16-22 @ 04:55) (88/50 - 99/60)  RR: 18 (01-16-22 @ 04:55) (17 - 18)  SpO2: 91% (01-16-22 @ 04:55) (90% - 93%)  Wt(kg): --    I&O's Summary    15 Ryan 2022 07:01  -  16 Jan 2022 07:00  --------------------------------------------------------  IN: 60 mL / OUT: 0 mL / NET: 60 mL      Gen: NAD  HEENT:  (-)icterus (-)pallor  CV: N S1 S2 1/6 ELLY (+)2 Pulses B/l  Resp:  Clear to auscultation B/L, normal effort  GI: (+) BS Soft, NT, ND  Lymph:  (-)Edema, (-)obvious lymphadenopathy  Skin: Warm to touch, Normal turgor  Psych: Appropriate mood and affect      TELEMETRY:  60      ASSESSMENT/PLAN: Patient is a 89 y/o male with PMH of CAD s/p PCI (ROBERTO to D1 6/2/14), chronic AF (on AC w/ eliquis), 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 Cardiology consulted for further evaluation.    - Continue HD per renal  - Coreg on hold, post IV bolus   - AC with Eliquis for AF/Stroke prevention on hold given acute anemia and supratherapeutic INR, s/p PRBC, Kcentra and vit K  - Recommend medical management of CAD however ASA held for anemia/bleeding and statin d/gita for transaminitis  - EP eval appreciated - d/c amio given elevated LFTs/INR  - Repeat TTE noted with normal LV function, mod pulm HTN  - Of note, family declined cath and preferred medical management of CAD prior admission  - F/U with his cardiologist Dr. Vanegas for cardiology after discharge

## 2022-01-16 NOTE — CONSULT NOTE ADULT - ASSESSMENT
Increased chest congestion with difficulty mobilizing secretions:    Continue neb rx  Acapella flutter valve  Respiratory vest  Suctioning (cautiously)  Encouraging secretion clearance  Aspiration precautions inc elevating hob  Incentive spirometry  Following CxR  Attempting to optimize CV/fluid status   Increased chest congestion with difficulty mobilizing secretions.   The patient's increase in symptoms may be secondary to a component of aspiration, fluid overload and increased bronchial hyperreactivity.      Continue neb rx  Acapella flutter valve  Respiratory vest  Suctioning (cautiously)  Encouraging secretion clearance  Aspiration precautions inc elevating hob  Incentive spirometry  Following CxR  Attempting to optimize CV/fluid status  We will have a low threshold for a fever work up and antibiotic coverage.  He may warrant a course of steroids if he does not improve in the near future. Increased chest congestion with difficulty mobilizing secretions.   The patient's increase in symptoms appear to be secondary to a component of aspiration, fluid overload and increased bronchial hyperreactivity. Other causes cannot be definitively excluded.    Supplemental O2 to maintain saturation 92% or above  Elevating HOB  Continue neb rx  Add nebulized saline  Acapella flutter valve  Respiratory vest  Suctioning (cautiously)  Encouraging secretion clearance  Aspiration precautions inc elevating hob  Incentive spirometry if able  Following CxR  Attempting to optimize CV/fluid status  We will have a low threshold for a fever work up and antibiotic coverage.  He may warrant a course of steroids if he does not improve in the near future.    Discussed with the patient and the [facility] staff.    Thank you for this consult.  Will follow.    Lobo Leroy MD  Pulmonary Medicine  [phone] Increased chest congestion with difficulty mobilizing secretions.   The patient's increase in symptoms appear to be secondary to a component of aspiration, fluid overload/pulmonary edema and increased bronchial hyperreactivity. Other causes cannot be definitively excluded.    Supplemental O2 to maintain saturation 92% or above  Elevating HOB  Continue neb rx  Add nebulized saline  Acapella flutter valve  Respiratory vest  Suctioning (cautiously to avoid suction trauma)  Encouraging secretion clearance  Aspiration precautions inc elevating hob  Incentive spirometry if able  Following CxR  Attempting to optimize CV/fluid status  We will have a low threshold for a fever work up and antibiotic coverage.  Will consider drainage of the left pleural effusion if respiratory distress is refractory to the above treatment plan if the effusion persists.  He also may warrant a course of steroids if he does not improve in the near future.    Discussed with the patient and the [facility] staff.    Thank you for this consult.  Will follow.    Lobo Leroy MD  Pulmonary Medicine  [phone]

## 2022-01-16 NOTE — SWALLOW BEDSIDE ASSESSMENT ADULT - SLP PERTINENT HISTORY OF CURRENT PROBLEM
Mr. Mina Arguelles, is a 89 y/o M PMH of CAD s/p PCI (ROBERTO to D1 6/2/14), chronic AF (on AC w/ eliquis), tachy/perla syndrome s/p biV PPM placement, CVA x 2, HFpEF, hospitalized in October 2021 for CHF exacerbation/overload presents to ED BIB son from home for decreased PO intake x2 weeks associated with constipation and abdominal pain, generalized weakness worsening to point where he is no longer ambulating and now w/ b/l LE edema and orthopnea. Currently pt improving and w/o N/V/D, palp/ sob/dyspnea/ cp, or F/C/S. Wound consult requested to assist w/ management of sacral wound.

## 2022-01-16 NOTE — CONSULT NOTE ADULT - CONSULT REQUESTED BY NAME
Medicine
Briseyda
Dr Carrasquillo
Dr. Carrasquillo
Medicine Team
medicine
Dr. Carrasquillo
Dr. Carrasquillo
ED

## 2022-01-16 NOTE — PROGRESS NOTE ADULT - ASSESSMENT
89 y/o M PMH of CAD s/p PCI (ROBERTO to D1 6/2/14), chronic AF (on AC w/ eliquis), tachy/perla syndrome s/p biV PPM placement, CVA x 2, HFpEF, hospitalized in October 2021 for CHF exacerbation/overload presents to ED BIB son from home for decreased PO intake x2 weeks associated with constipation and abdominal pain, generalized weakness worsening to point where he is no longer ambulating and now w/ b/l LE edema, SOB and orthopnea. Renal consulted for for FLOYD/CKD Mx.     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  Hyperkalemia improved  met acidosis due to renal failure- improving    cr continued to rise, pt was confused and hyperkalemia--> Initiated hemodialysis   1st HD ( 1/13/22)-> lower blood pressur noted  3rd hemodialysis session - low bp noted no Ultrafiltration on Dialysis   not tolerating Ultrafiltration on Dialysis     Plan  not able to fully tolerate dialysis due to hypotension, today low blood pressure, hold if sbp below 80 mm of Hg   current condition regarding blood pressure similar   cont close monitoring, if blood pressure goes higher than can plan for extra fluid removal   - may need icu or pressure support if not able to tolerate dialysis  - not able to remove fluid due to lower blood pressure  - use sodium variation on dialysis 145-meq- step    code status DNR on chart.     Acute diastolic congestive heart failure  - Coreg on hold  off lasix  - cardiology eval    Hypertension. bp low, considering midodrine 10mg as needed    afib- c/w amio, off eliquis 2/2 high inr and bleeding      Dr Garcia  Nephrology   cell [phone]  office [phone]  ans serv- [phone]  [email or web address] [facility] ( wkend coverage for [facility])

## 2022-01-16 NOTE — SWALLOW BEDSIDE ASSESSMENT ADULT - NS SPL SWALLOW CLINIC TRIAL FT
Observed pt w/ conservative textures by this SLP and RN, pt with progressive wet / congested breathe sounds as trials progressed with eventual mild cough. Concern for airway invasion with conservative textures. To add, pt adamantly indicated that he does not want anything to eat / drink. Following exiting room/ call placed to NP. Overheard patient coughing, SLP entered room. Observed PCA providing pt with thin liquids via straw from tray with immediate cough (s/s of aspiration). Provided education to PCA with regards to current swallow function and plan for NPO, in the interim as per d/w NP. Observed pt w/ conservative textures by this SLP and RN, pt with progressive wet / congested breathe sounds as trials progressed with eventual mild cough, differing from baseline presentation. Concern for airway invasion with conservative textures. To add, pt adamantly indicated that he does not want anything to eat / drink. Following exiting room/ call placed to NP. Overheard patient coughing, SLP entered room. Observed PCA providing pt with thin liquids via straw from tray with immediate cough (s/s of aspiration). Provided education to PCA with regards to current swallow function and plan for NPO, in the interim as per d/w NP.

## 2022-01-16 NOTE — SWALLOW BEDSIDE ASSESSMENT ADULT - PHARYNGEAL PHASE
suspected latent swallow response, concern for airway invasion given current swallow function w/ findings this date

## 2022-01-16 NOTE — SWALLOW BEDSIDE ASSESSMENT ADULT - SWALLOW EVAL: RECOMMENDED DIET
NPO / NGT as per GOC and medical state; IF WISH for PO textures being aware of the aspiration risk would encourage conservative textures of puree and honey/moderately thick liquids via tsp only-- pending GOC with family/pt NPO / NGT as per GOC and medical state; IF WISH for PO textures being aware of the aspiration risk would encourage conservative textures of puree and honey/moderately thick liquids via tsp only-- pending GOC with family/pt. It should be noted that pt actively declining to consume any po.

## 2022-01-16 NOTE — PROGRESS NOTE ADULT - ASSESSMENT
melena  GI bleed  anemia    cbc daily   monitor H/H  transfuse PRN hgb>7  PPI IV BID  hold AC  heme following, coagulopathy workup  unable to pursue upper gastrointestinal endoscopy at this time due elevated INR and pts comorbidities   reversal of Eliquis  conservative management  S&S noted, pt made NPO  GOC discussion pending   will follow    Advanced care planning was discussed with patient and family.  Advanced care planning forms were reviewed and discussed.  Risks, benefits and alternatives of gastroenterologic procedures were discussed in detail and all questions were answered.    30 minutes spent.

## 2022-01-16 NOTE — CONSULT NOTE ADULT - CONSULT REASON
mary
sacral wound
CHF
Dyspnea
VT
coagulopathy
FLOYD, CKD Mx
non-tunneled HD catheter placement
Cardiogenic Shock

## 2022-01-16 NOTE — PROVIDER CONTACT NOTE (OTHER) - ACTION/TREATMENT ORDERED:
NP aware and notified, D50 25mg pushed, will reassess bgl in 15 minutes. Will continue to monitor and maintain safety.

## 2022-01-17 NOTE — PROGRESS NOTE ADULT - ASSESSMENT
ASSESSMENT:    hypoxic respiratory failure - multifactorial    1) pulmonary edema and bilateral pleural effusions in the setting of ESRD - ECHO with preserved LVEF and no significant valvular heart disease  2) weak cough unable to expectorate secretions from the throat and airways -> atelectasis and bronchospasm  3) unlikely to have VTE disease with an elevated INR    CAD s/p PCI - admitted in 10/21 for CHF    chronic atrial fibrillation/tachy-perla syndrope s/p BiV-PPM placement    CVA x 2    coagulopathy with GI bleeding    PLAN/RECOMMENDATIONS:    oxygen supplementation to keep saturation greater than 90% - currently on a 100% NRB  bedside ultrasound  if effusions are large would consider a bedside thoracentesis  solumedrol 20mg IVP q6h  albuterol/atrovent nebs q6h  mucomyst and hypertonic saline nebs to facilitate mucous clearance  unable to use an acapella device -> chest vest  renal follow-up -> dialysis as tolerated by hemodynamics - on midodrine - having difficulty with ultrafiltration  holding eliquis and ASA - s/p vitamin K and KCentra - hematology evaluation ongoing - PRBCs as needed  glucose control  wound care  bowel regimen    Will follow with you. Plan of care discussed with the patient at bedside. Palliative care evaluation if cannot tolerate dialysis to remove fluid    Cristo Amado MD, [facility]  [phone]  Pulmonary Medicine

## 2022-01-17 NOTE — PROGRESS NOTE ADULT - SUBJECTIVE AND OBJECTIVE BOX
INTERVAL HPI/OVERNIGHT EVENTS:  Patient S&E at bedside. No o/n events,     VITAL SIGNS:  T(F): 97.4 (01-17-22 @ 11:59)  HR: 61 (01-17-22 @ 11:59)  BP: 108/68 (01-17-22 @ 11:59)  RR: 18 (01-17-22 @ 11:59)  SpO2: 97% (01-17-22 @ 11:59)  Wt(kg): --    PHYSICAL EXAM:    Constitutional: NAD  Eyes: EOMI, sclera non-icteric  Neck: supple, no masses, no JVD  Respiratory: CTA b/l, good air entry b/l  Cardiovascular: RRR, no M/R/G  Gastrointestinal: soft, NTND, no masses palpable, + BS, no hepatosplenomegaly  Extremities: no c/c/e  Neurological: AAOx3      MEDICATIONS  (STANDING):  albuterol/ipratropium for Nebulization 3 milliLiter(s) Nebulizer every 6 hours  dextrose 10%. 1000 milliLiter(s) (30 mL/Hr) IV Continuous <Continuous>  dextrose 40% Gel 15 Gram(s) Oral once  dextrose 5%. 1000 milliLiter(s) (50 mL/Hr) IV Continuous <Continuous>  dextrose 5%. 1000 milliLiter(s) (100 mL/Hr) IV Continuous <Continuous>  dextrose 50% Injectable 25 Gram(s) IV Push once  dextrose 50% Injectable 12.5 Gram(s) IV Push once  dextrose 50% Injectable 25 Gram(s) IV Push once  glucagon  Injectable 1 milliGRAM(s) IntraMuscular once  influenza  Vaccine (HIGH DOSE) 0.7 milliLiter(s) IntraMuscular once  insulin lispro (ADMELOG) corrective regimen sliding scale   SubCutaneous every 6 hours  ketotifen 0.025% Ophthalmic Solution 1 Drop(s) Right EYE two times a day  lidocaine   4% Patch 2 Patch Transdermal daily  midodrine. 20 milliGRAM(s) Oral three times a day  multivitamin/minerals 1 Tablet(s) Oral daily  pantoprazole  Injectable 40 milliGRAM(s) IV Push every 12 hours  polyethylene glycol 3350 17 Gram(s) Oral daily  senna 2 Tablet(s) Oral at bedtime  sodium chloride 3%  Inhalation 4 milliLiter(s) Inhalation every 12 hours    MEDICATIONS  (PRN):      Allergies    No Known Allergies    Intolerances        LABS:                        8.7    8.79  )-----------( 111      ( 17 Jan 2022 06:17 )             25.7     01-16    138  |  100  |  58<H>  ----------------------------<  31<LL>  3.4<L>   |  25  |  2.62<H>    Ca    8.4      16 Jan 2022 07:37      PT/INR - ( 17 Jan 2022 06:17 )   PT: 33.9 sec;   INR: 2.98 ratio         PTT - ( 17 Jan 2022 06:17 )  PTT:37.8 sec      RADIOLOGY & ADDITIONAL TESTS:  Studies reviewed.

## 2022-01-17 NOTE — PROVIDER CONTACT NOTE (CRITICAL VALUE NOTIFICATION) - SITUATION
Factor 7 is 29%
INR 6.26
Patient's Lab Glucose, Serum Resulted.
H/H=7.1/20.8
Potassium 6.2
INR 5.31
INR 5.9
Pt reported with critical lab value of INR 5.49
Pt reported with critical lab value of INR 5.66

## 2022-01-17 NOTE — PROVIDER CONTACT NOTE (CRITICAL VALUE NOTIFICATION) - NAME OF MD/NP/PA/DO NOTIFIED:
ANDREW Pantoja
QI Hastings
QI Liu
QI Liu
Ema Turner
JOSE MORALES
ANDREW Pantoja
Masha BUSBY
NISREEN Watson NP

## 2022-01-17 NOTE — PROVIDER CONTACT NOTE (CRITICAL VALUE NOTIFICATION) - ACTION/TREATMENT ORDERED:
ANDREW Schwab made aware. Lokelma given as ordered. Will continue to monitor.
NP aware and notified, Vitamin K IVPB 5mg to be ordered, will continue to monitor pt and maintain safety.
NP aware and notified. Kcentra IVPB to be ordered, will reassess PT/INR within 30mins post KCentra. Will continue to monitor pt and maintain safety.
will continue to monitor
NP aware & assessed patient & reviewed all orders and lab results.
ANDREW Schwab made aware. No immediate interventions. Will continue to monitor.
NP aware
PA aware. Pt to receive blood.

## 2022-01-17 NOTE — PROGRESS NOTE ADULT - SUBJECTIVE AND OBJECTIVE BOX
DATE OF SERVICE: 01-17-22 @ 13:12    Patient is a 89y old  Male who presents with a chief complaint of     SUBJECTIVE / OVERNIGHT EVENTS:  upper airway secretions    MEDICATIONS  (STANDING):  albuterol/ipratropium for Nebulization 3 milliLiter(s) Nebulizer every 6 hours  dextrose 10%. 1000 milliLiter(s) (30 mL/Hr) IV Continuous <Continuous>  dextrose 40% Gel 15 Gram(s) Oral once  dextrose 5%. 1000 milliLiter(s) (50 mL/Hr) IV Continuous <Continuous>  dextrose 5%. 1000 milliLiter(s) (100 mL/Hr) IV Continuous <Continuous>  dextrose 50% Injectable 25 Gram(s) IV Push once  dextrose 50% Injectable 12.5 Gram(s) IV Push once  dextrose 50% Injectable 25 Gram(s) IV Push once  glucagon  Injectable 1 milliGRAM(s) IntraMuscular once  influenza  Vaccine (HIGH DOSE) 0.7 milliLiter(s) IntraMuscular once  insulin lispro (ADMELOG) corrective regimen sliding scale   SubCutaneous every 6 hours  ketotifen 0.025% Ophthalmic Solution 1 Drop(s) Right EYE two times a day  lidocaine   4% Patch 2 Patch Transdermal daily  midodrine. 20 milliGRAM(s) Oral three times a day  multivitamin/minerals 1 Tablet(s) Oral daily  pantoprazole  Injectable 40 milliGRAM(s) IV Push every 12 hours  polyethylene glycol 3350 17 Gram(s) Oral daily  senna 2 Tablet(s) Oral at bedtime  sodium chloride 3%  Inhalation 4 milliLiter(s) Inhalation every 12 hours    MEDICATIONS  (PRN):      Vital Signs Last 24 Hrs  T(C): 36.3 (17 Jan 2022 11:59), Max: 36.4 (16 Jan 2022 16:28)  T(F): 97.4 (17 Jan 2022 11:59), Max: 97.6 (17 Jan 2022 04:18)  HR: 61 (17 Jan 2022 11:59) (60 - 65)  BP: 108/68 (17 Jan 2022 11:59) (88/48 - 110/67)  BP(mean): --  RR: 18 (17 Jan 2022 11:59) (18 - 18)  SpO2: 97% (17 Jan 2022 11:59) (90% - 100%)  CAPILLARY BLOOD GLUCOSE      POCT Blood Glucose.: 136 mg/dL (17 Jan 2022 12:33)  POCT Blood Glucose.: 123 mg/dL (17 Jan 2022 09:03)  POCT Blood Glucose.: 113 mg/dL (17 Jan 2022 05:23)  POCT Blood Glucose.: 111 mg/dL (17 Jan 2022 00:12)  POCT Blood Glucose.: 98 mg/dL (16 Jan 2022 21:16)  POCT Blood Glucose.: 74 mg/dL (16 Jan 2022 17:12)    I&O's Summary    16 Jan 2022 07:01  -  17 Jan 2022 07:00  --------------------------------------------------------  IN: 282.5 mL / OUT: 100 mL / NET: 182.5 mL    17 Jan 2022 07:01  -  17 Jan 2022 13:12  --------------------------------------------------------  IN: 0 mL / OUT: 50 mL / NET: -50 mL        PHYSICAL EXAM:  GENERAL: NAD, well-developed  HEAD:  Atraumatic, Normocephalic  EYES: EOMI, PERRLA, conjunctiva and sclera clear  NECK: Supple, No JVD  CHEST/LUNG: Clear to auscultation bilaterally; No wheeze  HEART: Regular rate and rhythm; No murmurs, rubs, or gallops  ABDOMEN: Soft, Nontender, Nondistended; Bowel sounds present  EXTREMITIES:  2+ Peripheral Pulses, No clubbing, cyanosis, or edema  NEUROLOGY: non-focal, generalized weakness  SKIN: No rashes or lesions    LABS:                        8.7    8.79  )-----------( 111      ( 17 Jan 2022 06:17 )             25.7     01-16    138  |  100  |  58<H>  ----------------------------<  31<LL>  3.4<L>   |  25  |  2.62<H>    Ca    8.4      16 Jan 2022 07:37      PT/INR - ( 17 Jan 2022 06:17 )   PT: 33.9 sec;   INR: 2.98 ratio         PTT - ( 17 Jan 2022 06:17 )  PTT:37.8 sec          RADIOLOGY & ADDITIONAL TESTS:    Imaging Personally Reviewed:    Consultant(s) Notes Reviewed:      Care Discussed with Consultants/Other Providers:

## 2022-01-17 NOTE — PROGRESS NOTE ADULT - ASSESSMENT
89 y/o M PMH of CAD s/p PCI (ROBERTO to D1 6/2/14), chronic AF (on AC w/ eliquis), tachy/perla syndrome s/p biV PPM placement, CVA x 2, HFpEF, hospitalized in October 2021 for CHF exacerbation/overload presents to ED BIB son from home for decreased PO intake x2 weeks associated with constipation and abdominal pain, generalized weakness worsening to point where he is no longer ambulating and now w/ b/l LE edema and orthopnea. Denies f/c, cough, congestion.    upper airway secretions 2/2 aspiration vs fluid overload vs increased bronchial hyperreactivity.  - seen by pulm  Continue neb rx  Acapella flutter valve  Respiratory vest  Suctioning (cautiously)  Encouraging secretion clearance  Aspiration precautions inc elevating hob  Incentive spirometry  Following CxR  Attempting to optimize CV/fluid status  We will have a low threshold for a fever work up and antibiotic coverage.  He may warrant a course of steroids if he does not improve in the near future.      acute on chronic diastolic congestive heart failure  - hold  PO lasix . cr uptrending  - Coreg on hold  - DISContinue AC with Eliquis due to acute bleeding  - Continue medical management of CAD with ASA/Statin  - EP eval appreciated - hold Amio 400mg due to elevated lfts  - Repeat TTE noted with normal LV function, mod pulm HTN  - Of note, family declined cath and preferred medical management of CAD prior admission    FLOYD pre renal +/- cardiorenal +/- Obstruction  b/l Cr 1.87 12/17/21, 1 mth ago was 2.5mg/dl  c/w river- trend urineoutput  Cr continues to rise- UA and URine lytes reviewed Showing intravascular depletion--> lasix was discontinued  Given GIB and low hgb--> Agree with 2 units prbc  reverse coagulopathy--> f/u Heme and GI  c/w albumin for now  HD per renal  - I/o's, daily weights, daily BMP  -avoid NSAIDs/ACEi/ARB/nephrotoxics/iv contrast  low Na in diet, fluid restriction 1L/day   dose all meds for eGFR<15ml/min  - nephrology following    Coagulopathy with active bleeding  -discontinued eliquis and ASA in setting of active bleeding and elevated INR  -given vitamin K on 1/12/21; given additional 10 mg on 1/13/21  -given KCentra 2000 units on 1/12 and on 1/13; INR still elevated but improved, recommend giving an additional 1500 units now  -mixing study today shows correction, suggesting that apixaban might be getting cleared sufficiently  -d-dimer elevated and fibrinogen low, suggestive of component of DIC vs liver disease; checking factors V, VII, VIII, and X with AM labs to better characterize  -monitor fibrinogen; though >150, would give cryoprecipitate given active bleeding ongoing today; discussed with blood bank today  -haptoglobin is normal, making hemolysis less likely  -patient has worsening hyperkalemia and uremia; uremia might be partially 2/2 GI bleed / blood reabsorption, but would have low threshold to dialyze given suspicion that uremic platelets are contributing to coagulopathy  -transfuse as needed; transfuse for Hgb <7.0 or if symptomatically anemic; transfuse for plts <25k  -Daily CBC, trend hgb. INR/PT/PTT, fibrinogen, D-dimer q12 hours for now  -given renal failure, please check EPO level  - hematology following    GI bleed  - c/w PPI  - was seen by GI  - no a candidate for endoscopy at this time    elevated liver enzymes worsening  - likely 2/2 to CHF    anemia  - transfuse 2 units of prbc  - likely 2/2 ckd and iron def  - s/p  iv iron    diabetes  controlled  - fs qid  - hgb a1c  - insulin sliding scale  - Lantus 10 units qhs    unstageable pressure ulcer present on admission  - optimize nutrition  - off loading  - T&P q 2 hours  - wound care consult    afib  - c/w amio  - d/c  eliquis    constipation  - senna and miralax    prognosis is poor  - palliative care consult

## 2022-01-17 NOTE — PROGRESS NOTE ADULT - ASSESSMENT
89 y/o M PMH of CAD s/p PCI (ROBERTO to D1 6/2/14), chronic AF (on AC w/ eliquis), tachy/perla syndrome s/p biV PPM placement, CVA x 2, HFpEF, hospitalized in October 2021 for CHF exacerbation/overload presents to ED BIB son from home for decreased PO intake x2 weeks associated with constipation and abdominal pain, generalized weakness worsening to point where he is no longer ambulating and now w/ b/l LE edema, SOB and orthopnea. Renal consulted for for FLOYD/CKD Mx.     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     Acute diastolic congestive heart failure  - Coreg on hold  off lasix  - cardiology eval    Hypertension. bp low, considering midodrine 10mg as needed    afib- c/w amio, off eliquis 2/2 high inr and bleeding      For any question, call:  Cell # [phone]  Pager # [phone]  Callback # [phone]

## 2022-01-17 NOTE — PROVIDER CONTACT NOTE (CRITICAL VALUE NOTIFICATION) - TEST AND RESULT REPORTED:
H/H=7.1/20.8
INR 5.66
INR 6.26
Potassium 6.2
INR 5.9
Factor 7 is 29%
Glucose, Serum 31.
INR 5.31
INR 5.49

## 2022-01-17 NOTE — PROGRESS NOTE ADULT - SUBJECTIVE AND OBJECTIVE BOX
[facility]  Luis Enrique Sotomayor PA-C  Replaced by [facility] [place] [address]  [phone]      INTERVAL HPI/OVERNIGHT EVENTS:  pt seen and examined, events noted  pt lethargic, on non rebreather   H/H stable, no melena overnight, INR improving today     MEDICATIONS  (STANDING):  dextrose 40% Gel 15 Gram(s) Oral once  dextrose 5%. 1000 milliLiter(s) (50 mL/Hr) IV Continuous <Continuous>  dextrose 5%. 1000 milliLiter(s) (100 mL/Hr) IV Continuous <Continuous>  dextrose 50% Injectable 25 Gram(s) IV Push once  dextrose 50% Injectable 12.5 Gram(s) IV Push once  dextrose 50% Injectable 25 Gram(s) IV Push once  glucagon  Injectable 1 milliGRAM(s) IntraMuscular once  influenza  Vaccine (HIGH DOSE) 0.7 milliLiter(s) IntraMuscular once  insulin glargine Injectable (LANTUS) 10 Unit(s) SubCutaneous at bedtime  insulin lispro (ADMELOG) corrective regimen sliding scale   SubCutaneous three times a day before meals  insulin lispro (ADMELOG) corrective regimen sliding scale   SubCutaneous at bedtime  ketotifen 0.025% Ophthalmic Solution 1 Drop(s) Right EYE two times a day  multivitamin/minerals 1 Tablet(s) Oral daily  pantoprazole  Injectable 40 milliGRAM(s) IV Push every 12 hours  polyethylene glycol 3350 17 Gram(s) Oral daily  senna 2 Tablet(s) Oral at bedtime  sodium chloride 0.9%. 1000 milliLiter(s) (100 mL/Hr) IV Continuous <Continuous>    MEDICATIONS  (PRN):      Allergies    No Known Allergies    Intolerances        ROS:   General:  No wt loss, fevers, chills, night sweats, fatigue,   Eyes:  Good vision, no reported pain  ENT:  No sore throat, pain, runny nose, dysphagia  CV:  No pain, palpitations, hypo/hypertension  Resp:  No dyspnea, cough, tachypnea, wheezing  GI:  No pain, No nausea, No vomiting, No diarrhea, No constipation, No weight loss, No fever, No pruritis, No rectal bleeding, + tarry stools, No dysphagia,  :  No pain, bleeding, incontinence, nocturia  Muscle:  No pain, weakness  Neuro:  No weakness, tingling, memory problems  Psych:  No fatigue, insomnia, mood problems, depression  Endocrine:  No polyuria, polydipsia, cold/heat intolerance  Heme:  No petechiae, ecchymosis, easy bruisability  Skin:  No rash, tattoos, scars, edema      PHYSICAL EXAM:   Vital Signs Last 24 Hrs  T(C): 36.3 (2022 08:15), Max: 36.4 (2022 16:28)  T(F): 97.3 (2022 08:15), Max: 97.6 (2022 04:18)  HR: 61 (2022 08:15) (60 - 65)  BP: 109/68 (2022 08:15) (88/48 - 110/67)  BP(mean): --  RR: 18 (2022 08:15) (18 - 18)  SpO2: 96% (2022 08:15) (90% - 100%)  Daily     Daily Weight in k.4 (2022 07:23)    GENERAL:  Appears stated age,   HEENT:  NC/AT,    CHEST:  Full & symmetric excursion,   HEART:  Regular rhythm,  ABDOMEN:  Soft, non-tender, non-distended,  EXTEREMITIES:  no cyanosis  SKIN:  No rash  NEURO:  Alert,       LABS:                                              8.7    8.79  )-----------( 111      ( 2022 06:17 )             25.7   01-16    138  |  100  |  58<H>  ----------------------------<  31<LL>  3.4<L>   |  25  |  2.62<H>    Ca    8.4      2022 07:37    PT/INR - ( 2022 06:17 )   PT: 33.9 sec;   INR: 2.98 ratio         PTT - ( 2022 06:17 )  PTT:37.8 sec    RADIOLOGY & ADDITIONAL TESTS:

## 2022-01-17 NOTE — PROGRESS NOTE ADULT - ASSESSMENT
Patient has had a stable HGB and gastrointestinal bleeding appears to be less. He has had partial correction of the elevated PT with mixing of normal plasma. 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  he is using a 100% rebreather for treatment of pulmonary disease either CHF or infection. ECOG performance status 4 and note do not resuscitate status.

## 2022-01-17 NOTE — PROGRESS NOTE ADULT - NUTRITIONAL ASSESSMENT
This patient has been assessed with a concern for Malnutrition and has been determined to have a diagnosis/diagnoses of Severe protein-calorie malnutrition.    This patient is being managed with:   Diet NPO-  Except Medications  Entered: Jan 16 2022  9:35AM    
This patient has been assessed with a concern for Malnutrition and has been determined to have a diagnosis/diagnoses of Severe protein-calorie malnutrition.    This patient is being managed with:   Diet Consistent Carbohydrate/No Snacks-  Soft and Bite Sized (SOFTBTSZ)  Supplement Feeding Modality:  Oral  Glucerna Shake Cans or Servings Per Day:  2       Frequency:  Daily  Entered: Ryan 10 2022  9:15AM    
This patient has been assessed with a concern for Malnutrition and has been determined to have a diagnosis/diagnoses of Severe protein-calorie malnutrition.    This patient is being managed with:   Diet Soft and Bite Sized-  Entered: Jan 7 2022 10:36PM    
This patient has been assessed with a concern for Malnutrition and has been determined to have a diagnosis/diagnoses of Severe protein-calorie malnutrition.    This patient is being managed with:   Diet NPO-  NPO for Procedure/Test     NPO Start Date: 13-Jan-2022   NPO Start Time: 10:05  Entered: Jan 13 2022 10:05AM    Diet Consistent Carbohydrate/No Snacks-  Soft and Bite Sized (SOFTBTSZ)  Supplement Feeding Modality:  Oral  Glucerna Shake Cans or Servings Per Day:  2       Frequency:  Daily  Entered: Ryan 10 2022  9:15AM    
This patient has been assessed with a concern for Malnutrition and has been determined to have a diagnosis/diagnoses of Severe protein-calorie malnutrition.    This patient is being managed with:   Diet Consistent Carbohydrate/No Snacks-  Soft and Bite Sized (SOFTBTSZ)  Supplement Feeding Modality:  Oral  Glucerna Shake Cans or Servings Per Day:  2       Frequency:  Daily  Entered: Ryan 10 2022  9:15AM    
This patient has been assessed with a concern for Malnutrition and has been determined to have a diagnosis/diagnoses of Severe protein-calorie malnutrition.    This patient is being managed with:   Diet NPO-  Except Medications  Entered: Jan 16 2022  9:35AM

## 2022-01-17 NOTE — PROVIDER CONTACT NOTE (OTHER) - ASSESSMENT
VSS, denies dizziness, SOB. Pt had a blood tinged BM
A&O x4, garbled speech.  No s/s of bleeding noted at this time.  Patient ecchymotic in UE.
A&O x4, garbled speech.  Patient drank 1 cup of apple juice, Fingerstick 30 minutes after was 88.  Patient with decreased appetite and states he does not want to eat, wants only apple juice despite repeated encouragement.
Pt A&Ox4, pt denies SOB or difficulty breathing. No signs of respiratory distress noted. Pt is sustaining O2 from 83-87% on 4LNC.
Pt stable, asymptomatic. VSS.
Pt A&Ox3, is responsive, denies lightheadedness, dizziness, nausea,

## 2022-01-17 NOTE — PROVIDER CONTACT NOTE (OTHER) - DATE AND TIME:
16-Jan-2022 06:50
10-Ryan-2022 00:53
15-Ryan-2022 01:09
14-Jan-2022 22:15
16-Jan-2022 21:20
15-Ryan-2022 14:50

## 2022-01-17 NOTE — PROVIDER CONTACT NOTE (OTHER) - REASON
Pt sustaining 83% O2 on continuous pulse ox.
Pt is hypoglycemic with bgl of 35, confirmed twice.
INR: 4.30, H/H 9.2/27.3, DD: 2007
Pt FS=64 repeat =68
Blood tinged bowel movement, INR 5.08
Bedtime FS 91, repeat FS after juice given was 88.  Due for 10 units Lantus

## 2022-01-17 NOTE — PROGRESS NOTE ADULT - PROVIDER SPECIALTY LIST ADULT
Cardiology
Electrophysiology
Gastroenterology
Internal Medicine
Nephrology
Cardiology
Electrophysiology
Gastroenterology
Gastroenterology
Heme/Onc
Internal Medicine
Nephrology
Wound Care
Cardiology
Gastroenterology
Internal Medicine
Nephrology
Pulmonology
Heme/Onc
Internal Medicine

## 2022-01-17 NOTE — PROGRESS NOTE ADULT - SUBJECTIVE AND OBJECTIVE BOX
[facility] - PROGRESS NOTE    CHIEF COMPLAINT: acute hypoxic respiratory failure; pulmonary edema; pleural effusions; ESRD;     INTERVAL HISTORY: awake and alert but unable to phonate; enormously weak and frail; no shortness of breath or hypoxemia on a 100% NRB; weak cough and unable to expectorate secretions from his chest and throat; chest congestion and wheeze; no fevers, chills or sweats; no chest pain/pressure or palpitations; minimal urine output    REVIEW OF SYSTEMS:  Constitutional: As per interval history  HEENT: Within normal limits  CV: As per interval history  Resp: As per interval history  GI: melena  : ESRD  Musculoskeletal: Within normal limits  Skin: Within normal limits  Neurological: Within normal limits  Psychiatric: Within normal limits  Endocrine: Within normal limits  Hematologic/Lymphatic: coagulopathy  Allergic/Immunologic: Within normal limits    MEDICATIONS:     Pulmonary "  albuterol/ipratropium for Nebulization 3 milliLiter(s) Nebulizer every 6 hours  sodium chloride 3%  Inhalation 4 milliLiter(s) Inhalation every 12 hours    Anti-microbials:    Cardiovascular:  midodrine. 10 milliGRAM(s) Oral <User Schedule>  midodrine. 20 milliGRAM(s) Oral three times a day    Other:  dextrose 10%. 1000 milliLiter(s) IV Continuous <Continuous>  dextrose 40% Gel 15 Gram(s) Oral once  dextrose 5%. 1000 milliLiter(s) IV Continuous <Continuous>  dextrose 5%. 1000 milliLiter(s) IV Continuous <Continuous>  dextrose 50% Injectable 25 Gram(s) IV Push once  dextrose 50% Injectable 12.5 Gram(s) IV Push once  dextrose 50% Injectable 25 Gram(s) IV Push once  glucagon  Injectable 1 milliGRAM(s) IntraMuscular once  influenza  Vaccine (HIGH DOSE) 0.7 milliLiter(s) IntraMuscular once  insulin lispro (ADMELOG) corrective regimen sliding scale   SubCutaneous every 6 hours  ketotifen 0.025% Ophthalmic Solution 1 Drop(s) Right EYE two times a day  lidocaine   4% Patch 2 Patch Transdermal daily  multivitamin/minerals 1 Tablet(s) Oral daily  pantoprazole  Injectable 40 milliGRAM(s) IV Push every 12 hours  polyethylene glycol 3350 17 Gram(s) Oral daily  senna 2 Tablet(s) Oral at bedtime    MEDICATIONS  (PRN):    OBJECTIVE:    I&O's Detail    16 Jan 2022 07:01  -  17 Jan 2022 07:00  --------------------------------------------------------  IN:    dextrose 10%: 148.5 mL    dextrose 5%: 134 mL  Total IN: 282.5 mL    OUT:    Indwelling Catheter - Urethral (mL): 100 mL    Oral Fluid: 0 mL  Total OUT: 100 mL    Total NET: 182.5 mL      17 Jan 2022 07:01  -  17 Jan 2022 18:58  --------------------------------------------------------  IN:    dextrose 10%: 360 mL  Total IN: 360 mL    OUT:    Indwelling Catheter - Urethral (mL): 100 mL    Oral Fluid: 0 mL  Total OUT: 100 mL    Total NET: 260 mL    POCT Blood Glucose.: 148 mg/dL (17 Jan 2022 17:17)  POCT Blood Glucose.: 136 mg/dL (17 Jan 2022 12:33)  POCT Blood Glucose.: 123 mg/dL (17 Jan 2022 09:03)  POCT Blood Glucose.: 113 mg/dL (17 Jan 2022 05:23)  POCT Blood Glucose.: 111 mg/dL (17 Jan 2022 00:12)  POCT Blood Glucose.: 98 mg/dL (16 Jan 2022 21:16)      PHYSICAL EXAM:       ICU Vital Signs Last 24 Hrs  T(C): 36.3 (17 Jan 2022 15:50), Max: 36.4 (17 Jan 2022 04:18)  T(F): 97.4 (17 Jan 2022 15:50), Max: 97.6 (17 Jan 2022 04:18)  HR: 64 (17 Jan 2022 15:50) (60 - 78)  BP: 103/61 (17 Jan 2022 15:50) (88/48 - 110/67)  BP(mean): --  ABP: --  ABP(mean): --  RR: 18 (17 Jan 2022 15:50) (18 - 21)  SpO2: 97% (17 Jan 2022 15:50) (90% - 100%) on 100% NRB     General: Awake. Alert. Cooperative. No distress. Appears stated age. Weak. Frail. Unable to phonate  HEENT: Atraumatic. Bitemporal wasting. Anicteric. Normal oral mucosa. PERRL. EOMI.  Neck: Supple. Trachea midline. Thyroid without enlargement/tenderness/nodules. No carotid bruit. No JVD. Loss of bilateral supraclavicular fat pads.	  Cardiovascular: Regular rate and rhythm. S1 S2 distant. No murmurs, rubs or gallops.  Respiratory: Respirations unlabored. Diffuse course breath sounds and wheeze. Decreased breath sounds at the bases with dullness to percussion. Kyphosis.  Abdomen: Soft. Non-tender. Non-distended. No organomegaly. No masses. Normal bowel sounds.  Extremities: Warm to touch. No clubbing or cyanosis. Upper and lower extremity anasarca.  Pulses: 2+ peripheral pulses all extremities.	  Skin: Normal skin color. No rashes or lesions. No ecchymoses. No cyanosis. Warm to touch.  Lymph Nodes: Cervical, supraclavicular and axillary nodes normal  Neurological: Motor and sensory examination equal and normal. A and O x 3  Psychiatry: Appropriate mood and affect.    LABS:                          8.7    8.79  )-----------( 111      ( 17 Jan 2022 06:17 )             25.7     CBC    WBC  8.79 <==, 7.55 <==, 6.58 <==, 6.65 <==, 5.03 <==, 4.73 <==, 7.82 <==    Hemoglobin  8.7 <<==, 8.9 <<==, 8.4 <<==, 8.9 <<==, 8.6 <<==, 8.7 <<==, 8.7 <<==    Hematocrit  25.7 <==, 27.1 <==, 26.0 <==, 26.3 <==, 25.0 <==, 26.2 <==, 25.6 <==    Platelets  111 <==, 131 <==, 108 <==, 136 <==, 141 <==, 124 <==, 139 <==      138  |  100  |  58<H>  ----------------------------<  31<LL>    01-16  3.4<L>   |  25  |  2.62<H>      LYTES    sodium  138 <==, 137 <==, 135 <==, 136 <==, 133 <==, 133 <==, 133 <==    potassium   3.4 <==, 3.5 <==, 4.9 <==, 4.8 <==, 5.5 <==, 6.2 <==, 5.7 <==    chloride  100 <==, 98 <==, 98 <==, 94 <==, 91 <==, 92 <==, 92 <==    carbon dioxide  25 <==, 25 <==, 19 <==, 26 <==, 25 <==, 25 <==, 27 <==    =============================================================================================  RENAL FUNCTION:    Creatinine:   2.62  <<==, 2.27  <<==, 3.12  <<==, 4.02  <<==, 4.97  <<==, 4.94  <<==, 4.73  <<==    BUN:   58 <==, 52 <==, 89 <==, 103 <==, 160 <==, 153 <==, 144 <==    ============================================================================================    calcium   8.4 <==, 8.4 <==, 8.1 <==, 8.2 <==, 8.5 <==, 8.4 <==, 8.4 <==    phos   4.9 <==    mag   3.2 <==    ============================================================================================  LFTs    AST:   278 <== , 247 <==     ALT:  164  <== , 182  <==     AP:  260  <=, 208  <=    Bili:  2.6  <=, 1.6  <=    PT/INR - ( 17 Jan 2022 12:42 )   PT: 35.2 sec;   INR: 3.10 ratio       PTT - ( 17 Jan 2022 06:17 )  PTT:37.8 sec    < from: Transthoracic Echocardiogram (01.08.22 @ 11:27) >    Patient name: DARRELL YI  YOB: 1931   Age: 89 (M)   MR#: [id number]  Study Date: 1/8/2022  [facility]  Study quality: Technically fair  Referring Physician: Jaymie Carrasquillo MD  Blood Pressure: 95/56 mmHg  Height: 163 cm  Weight: 64 kg  BSA: 1.7 m2  ------------------------------------------------------------------------  PROCEDURE: Transthoracic echocardiogram with 2-D, M-Mode  and complete spectral and color flow Doppler.  INDICATION: Heart failure, unspecified (I50.9)  ------------------------------------------------------------------------  Dimensions:    Normal Values:  LA:     3.8    2.0 - 4.0 cm  Ao:     3.2    2.0 - 3.8 cm  SEPTUM: 0.9    0.6 - 1.2 cm  PWT:    0.8    0.6 - 1.1 cm  LVIDd:  4.0    3.0 - 5.6 cm  LVIDs:  2.6    1.8 - 4.0 cm  Derived variables:  LVMI: 60 g/m2  RWT: 0.40  Fractional short: 35 %  EF (Calabrese Rule): 65 %  ------------------------------------------------------------------------  Observations:  Mitral Valve: Mitral annular calcification, otherwise  normal mitral valve. Mild mitral regurgitation.  Aortic Valve/Aorta: Calcified trileaflet aortic valve with  normal opening.  Normal aortic root size. (Ao: 3.2 cm at the sinuses of  Valsalva).  Left Atrium: Normal left atrium.  LA volume index = 23  cc/m2.  Left Ventricle: Normal left ventricular systolic function.  No segmental wall motion abnormalities. Normal left  ventricular internal dimensions and wall thicknesses.  Right Heart: Normal right atrium. Normal right ventricular  size and function.  A device wire is noted in the right  heart. Normal tricuspid valve. Moderate tricuspid  regurgitation. Pulmonic valve not well visualized, probably  normal.  Pericardium/Pleura: Normal pericardium with no pericardial  effusion.  Bilateral pleural effusions.  Hemodynamic: Estimated right atrial pressure is 8 mm Hg.  Estimated right ventricular systolic pressure equals 50 mm  Hg, assuming right atrial pressure equals 8 mm Hg,  consistent with moderate pulmonary hypertension.  ------------------------------------------------------------------------  Conclusions:  1. Mitral annular calcification, otherwise normal mitral  valve. Mild mitral regurgitation.  2. Normal left ventricular internal dimensions and wall  thicknesses.  3. Normal left ventricular systolic function. No segmental  wall motion abnormalities.  4. Normal right ventricular size and function.  A device  wire is noted in the right heart.  5. Estimated right ventricular systolic pressure equals 50  mm Hg, assuming right atrial pressure equals 8 mm Hg,  consistent with moderate pulmonary hypertension.  6. Bilateral pleural effusions.  ------------------------------------------------------------------------  Confirmed on  1/8/2022 - 13:41:16 by Cristo Parisi M.D.,  [facility], ZAHIRA  ------------------------------------------------------------------------  ---------------------------------------------------------------------------------------------------------------  MICROBIOLOGY:     COVID-19 PCR . (01.12.22 @ 08:19)   COVID-19 PCR: NotDetec:     Culture - Urine (01.11.22 @ 22:57)   Culture Results:   >100,000 CFU/ml Enterococcus faecalis   Organism Identification: Enterococcus faecalis     Culture - Blood (01.07.22 @ 21:41)   Specimen Source: .Blood Blood   Culture Results:   No Growth Final     Culture - Blood (01.07.22 @ 21:41)   Specimen Source: .Blood Blood   Culture Results:   No Growth Final    RADIOLOGY:  [x] Chest radiographs reviewed and interpreted by me    < from: Xray Chest 1 View- PORTABLE-Urgent (Xray Chest 1 View- PORTABLE-Urgent .) (01.15.22 @ 16:24) >    EXAM:  XR CHEST PORTABLE URGENT 1V                          PROCEDURE DATE:  01/15/2022      FINDINGS/  IMPRESSION:  A cardiac device overlies and obscures the left hemithorax with leads in   place.  HEART:  Enlarged  LUNGS: Large left effusion, bilateral infiltrates. Small right effusion.   Similar to prior study. Likely represents congestive heart failure.   Superimposed pneumonia cannot be excluded.  BONES: degenerative changes    FLAVIO ROJAS MD; Attending Interventional Radiologist  This document has been electronically signed. Jan 16 2022 10:52AM  ---------------------------------------------------------------------------------------------------------------    < from: Xray Chest 1 View- PORTABLE-Urgent (Xray Chest 1 View- PORTABLE-Urgent .) (01.14.22 @ 14:07) >    EXAM:  XR CHEST PORTABLE URGENT 1V                          PROCEDURE DATE:  01/14/2022      INTERPRETATION:  EXAMINATION: XR CHEST URGENT    CLINICAL INDICATION: SOB    TECHNIQUE: Single frontal, portable view of the chest was obtained.    COMPARISON: Chest CT dated 1/7/2022.    FINDINGS:  Left chest wall AICD. Right IJ dialysis catheter terminates in SVC.  The heart is enlarged.  Left-sided pleural effusion. Bilateral airspace disease which likely   represents pulmonary edema.  There is no pneumothorax.    IMPRESSION:  Moderate pulmonary edema. Moderate left pleural effusion.    EL NAVA M.D., RADIOLOGY RESIDENT  This document has been electronically signed.  CRYS SOSA MD; Attending Interventional Radiologist  This document has been electronically signed. Jan 14 2022  4:04PM  ---------------------------------------------------------------------------------------------------------------  < from: CT Chest No Cont (01.07.22 @ 17:11) >    EXAM:  CT CHEST                        EXAM:  CT ABDOMEN AND PELVIS                          PROCEDURE DATE:  01/07/2022      INTERPRETATION:  CLINICAL INFORMATION: Edema and orthopnea. Evaluate   pleural effusions. Evaluate for small bowel obstruction.      FINDINGS:  CHEST:  LUNGS AND LARGE AIRWAYS: Patent central airways. Groundglass and   reticular opacities at both upper lobes likely representing pulmonary   edema. Mild associated nodularity in the right upper lobe for which   additional etiologies are to be considered. Follow-up chest CT is   recommended to ensure resolution.  PLEURA: Moderate bilateral pleural effusions.  VESSELS: Atherosclerotic changesof the aorta and coronary arteries.  HEART: Heart size is normal. No pericardial effusion.  MEDIASTINUM AND JOSE: No lymphadenopathy.  CHEST WALL AND LOWER NECK: Left chest wall cardiac device.    ABDOMEN AND PELVIS:  LIVER: Hyperdense liver which may be related to iron deposition or   amiodarone use.  BILE DUCTS: Normal caliber.  GALLBLADDER: Cholelithiasis.  SPLEEN: Within normal limits.  PANCREAS: Within normal limits.  ADRENALS: Mild thickening of the left adrenal gland.  KIDNEYS/URETERS: Within normal limits.    BLADDER: Within normal limits.  REPRODUCTIVE ORGANS: Prostate within normal limits.    BOWEL: No bowel obstruction.  PERITONEUM: Small volume ascites.  VESSELS: Within normal limits.  RETROPERITONEUM/LYMPH NODES: No lymphadenopathy.  ABDOMINAL WALL: Within normal limits.  BONES: Degenerative changes.    IMPRESSION:  Moderate bilateral pleural effusions.    Likely pulmonary edema. Additional small nodular opacities in the right   upper lobe may be related to edema however follow-up imaging is   recommended to ensure resolution and exclude other etiologies.    No bowel obstruction or acute intra-abdominal pathology.    ALEXEI DICK MD; Attending Radiologist  This document has been electronically signed. Jan 7 2022  5:42PM    ---------------------------------------------------------------------------------------------------------------

## 2022-01-17 NOTE — PROGRESS NOTE ADULT - SUBJECTIVE AND OBJECTIVE BOX
EP ATTENDING    tele: AV sequential BiV pacing  lethargic today, on NRB mask.  DATE OF SERVICE - 01-17-22     Review of Systems:   Constitutional: [ ] fevers, [ ] chills.   Skin: [ ] dry skin. [ ] rashes.  Psychiatric: [ ] depression, [ ] anxiety.   Gastrointestinal: [ ] BRBPR, [ ] melena.   Neurological: [ ] confusion. [ ] seizures. [ ] shuffling gait.   Ears,Nose,Mouth and Throat: [ ] ear pain [ ] sore throat.   Eyes: [ ] diplopia.   Respiratory: [ ] hemoptysis. [ ] shortness of breath  Cardiovascular: See HPI above  Hematologic/Lymphatic: [ ] anemia. [ ] painful nodes. [ ] prolonged bleeding.   Genitourinary: [ ] hematuria. [ ] flank pain.   Endocrine: [ ] significant change in weight. [ ] intolerance to heat and cold.     Review of systems [ x] otherwise negative, [ ] otherwise unable to obtain    FH: no family history of sudden cardiac death in first degree relatives    SH: [ ] tobacco, [ ] alcohol, [ ] drugs    albuterol/ipratropium for Nebulization 3 milliLiter(s) Nebulizer every 6 hours  dextrose 10%. 1000 milliLiter(s) IV Continuous <Continuous>  dextrose 40% Gel 15 Gram(s) Oral once  dextrose 5%. 1000 milliLiter(s) IV Continuous <Continuous>  dextrose 5%. 1000 milliLiter(s) IV Continuous <Continuous>  dextrose 50% Injectable 25 Gram(s) IV Push once  dextrose 50% Injectable 12.5 Gram(s) IV Push once  dextrose 50% Injectable 25 Gram(s) IV Push once  glucagon  Injectable 1 milliGRAM(s) IntraMuscular once  influenza  Vaccine (HIGH DOSE) 0.7 milliLiter(s) IntraMuscular once  insulin lispro (ADMELOG) corrective regimen sliding scale   SubCutaneous every 6 hours  ketotifen 0.025% Ophthalmic Solution 1 Drop(s) Right EYE two times a day  midodrine. 20 milliGRAM(s) Oral three times a day  multivitamin/minerals 1 Tablet(s) Oral daily  pantoprazole  Injectable 40 milliGRAM(s) IV Push every 12 hours  polyethylene glycol 3350 17 Gram(s) Oral daily  senna 2 Tablet(s) Oral at bedtime  sodium chloride 3%  Inhalation 4 milliLiter(s) Inhalation every 12 hours                            8.7    8.79  )-----------( 111      ( 17 Jan 2022 06:17 )             25.7       01-16    138  |  100  |  58<H>  ----------------------------<  31<LL>  3.4<L>   |  25  |  2.62<H>    Ca    8.4      16 Jan 2022 07:37        T(C): 36.3 (01-17-22 @ 08:15), Max: 36.4 (01-16-22 @ 16:28)  HR: 61 (01-17-22 @ 08:15) (60 - 65)  BP: 109/68 (01-17-22 @ 08:15) (88/48 - 110/67)  RR: 18 (01-17-22 @ 08:15) (18 - 18)  SpO2: 96% (01-17-22 @ 08:15) (90% - 100%)  Wt(kg): --    I&O's Summary    16 Jan 2022 07:01  -  17 Jan 2022 07:00  --------------------------------------------------------  IN: 282.5 mL / OUT: 100 mL / NET: 182.5 mL    17 Jan 2022 07:01  -  17 Jan 2022 10:48  --------------------------------------------------------  IN: 0 mL / OUT: 50 mL / NET: -50 mL      General: lethargic/ fatigued today.  frail elderly man, nondiaphoretic. NRB mask.  Head: Normocephalic and atraumatic.   Neck: No JVD. No bruits. Supple. Does not appear to be enlarged.   Cardiovascular: + S1,S2 ; RRR Soft systolic murmur at the left lower sternal border. No rubs noted.    Lungs: CTA b/l. No rhonchi, rales or wheezes.   Abdomen: + BS, soft. Non tender. Non distended. No rebound. No guarding.   Extremities: No clubbing/cyanosis/edema.   Neurologic: Moves all four extremities. Full range of motion.   Skin: Warm and moist. The patient's skin has normal elasticity and good skin turgor.   Psychiatric: Appropriate mood and affect.  Musculoskeletal: Normal range of motion, normal strength      A/P) He is an extremely pleasant 89 year old male with sick sinus syndrome, paroxysmal atrial fibrillation, complete heart block, and chronic systolic CHF. In June 2014 I implanted a Biotronik biventricular pacemaker. In August 2021 he presented with sustained monomorphic ventricular tachycardia requiring emergency DCCV for rescue therapy. Given the above he was upgraded to a  Medtronic BiV-ICD, and the old RV pacing lead was capped and abandoned. Device interrogation demonstrates excellent BiV-ICD function. He denies palpitations, syncope, nor angina, but continues to report ongoing NYHA Class II symptoms of chronic systolic CHF including dyspnea and orthopnea. He is now a/w another CHF exacerbation.    -amiodarone was started to prevent ICD shocks for VT.  at this time, INR and LFT's are abnormal.  OK to stop this medication. we may re-evaluate restarting it as an outpatient.  -eliquis 2.5mg bid and antiplatelets on hold re: melanotic stool.   -medical therapy for CAD with severe LV dysfunction as per cardiology  - getting dialysis for hyperkalemia, uremia, tolerating well overall.    -hypotensive, requiring midodrine.  -Goals of care are DNR/DNI.   -f/u with Dr Villatoro after discharge for routine ICD care as scheduled  -f/u with his cardiologist Dr. Danny Vanegas at [facility] after discharge  -no further inpatient EP workup expected as long as he remains free of AF/VT    Marcial Guzmán M.D.  Cardiac Electrophysiology  [phone]

## 2022-01-17 NOTE — PROVIDER CONTACT NOTE (CRITICAL VALUE NOTIFICATION) - RECOMMENDATIONS
?
notify provider
Hematology contacted.  Vit K order. Ksentra,  plasma, Blood ordered
?
Assess patient. Review patient's orders, labs, history & plan.
notify provider

## 2022-01-17 NOTE — PROVIDER CONTACT NOTE (OTHER) - ACTION/TREATMENT ORDERED:
NP notified and aware. Pt ordered to be put on nonrebreather throughout the night. Will continue to monitor pt and maintain safety.

## 2022-01-17 NOTE — PROVIDER CONTACT NOTE (CRITICAL VALUE NOTIFICATION) - ASSESSMENT
Pt A&Ox4, scant amount of bleeding noted at IV site, lip, and ecchymosis on all four extremities. Pt denies LH, Dizziness, blurry vision or headache.

## 2022-01-17 NOTE — PROVIDER CONTACT NOTE (CRITICAL VALUE NOTIFICATION) - PERSON GIVING RESULT:
Belia Luna
: Sukumar Lim
Yazmin Castillo
LAB Maricruz Peoples
PIERRE Lim [facility]
Maricruz Panchal / Laboratory
Osito Taylor / [place]ons[place]
Vijay Lim
JOSE L Zarate

## 2022-01-17 NOTE — PROGRESS NOTE ADULT - SUBJECTIVE AND OBJECTIVE BOX
[facility] - PHIL Dawn / PHIL Kendrick / ANITA Garcia/ PHIL Huynh/ PHIL Smith/ DARLYN Veag / VIVIANE Herman / MADDISON Roberts  ---------------------------------------------------------------------------------------------------------------  seen and examined today for FLOYD needing HD  Interval : tolerating HD poorly, plan for UF today  VITALS:  T(F): 97.3 (01-17-22 @ 08:15), Max: 97.6 (01-17-22 @ 04:18)  HR: 61 (01-17-22 @ 08:15)  BP: 109/68 (01-17-22 @ 08:15)  RR: 18 (01-17-22 @ 08:15)  SpO2: 96% (01-17-22 @ 08:15)  Wt(kg): --    01-16 @ 07:01  -  01-17 @ 07:00  --------------------------------------------------------  IN: 282.5 mL / OUT: 100 mL / NET: 182.5 mL    01-17 @ 07:01  -  01-17 @ 10:44  --------------------------------------------------------  IN: 0 mL / OUT: 50 mL / NET: -50 mL      Physical Exam :-  Constitutional: NAD  Neck: Supple.  Respiratory: Bilateral equal breath sounds,  Cardiovascular: S1, S2 normal,  Gastrointestinal: Bowel Sounds present, soft, non tender.  Extremities: anasarca  Neurological: Alert and Oriented x 3, no focal deficits  Psychiatric: Normal mood, normal affect  Data:-  Allergies :   No Known Allergies    Hospital Medications:   MEDICATIONS  (STANDING):  albuterol/ipratropium for Nebulization 3 milliLiter(s) Nebulizer every 6 hours  dextrose 10%. 1000 milliLiter(s) (30 mL/Hr) IV Continuous <Continuous>  dextrose 40% Gel 15 Gram(s) Oral once  dextrose 5%. 1000 milliLiter(s) (50 mL/Hr) IV Continuous <Continuous>  dextrose 5%. 1000 milliLiter(s) (100 mL/Hr) IV Continuous <Continuous>  dextrose 50% Injectable 25 Gram(s) IV Push once  dextrose 50% Injectable 12.5 Gram(s) IV Push once  dextrose 50% Injectable 25 Gram(s) IV Push once  glucagon  Injectable 1 milliGRAM(s) IntraMuscular once  influenza  Vaccine (HIGH DOSE) 0.7 milliLiter(s) IntraMuscular once  insulin lispro (ADMELOG) corrective regimen sliding scale   SubCutaneous every 6 hours  ketotifen 0.025% Ophthalmic Solution 1 Drop(s) Right EYE two times a day  midodrine. 20 milliGRAM(s) Oral three times a day  multivitamin/minerals 1 Tablet(s) Oral daily  pantoprazole  Injectable 40 milliGRAM(s) IV Push every 12 hours  polyethylene glycol 3350 17 Gram(s) Oral daily  senna 2 Tablet(s) Oral at bedtime  sodium chloride 3%  Inhalation 4 milliLiter(s) Inhalation every 12 hours    01-16    138  |  100  |  58<H>  ----------------------------<  31<LL>  3.4<L>   |  25  |  2.62<H>    Ca    8.4      16 Jan 2022 07:37      Creatinine Trend: 2.62 <--, 2.27 <--, 3.12 <--, 4.02 <--, 4.97 <--, 4.94 <--, 4.73 <--                        8.7    8.79  )-----------( 111      ( 17 Jan 2022 06:17 )             25.7

## 2022-01-18 NOTE — DISCHARGE NOTE FOR THE EXPIRED PATIENT - REASON FOR ADMISSION
dialysis; shortness of breath ; decompensated heart failure Poor PO intake, Dehydration, Increased groin and Lower Extremity Swelling; admitted for Decompensated Heart Failure, Acute Kidney Injury on Chronic Kidney Disease requiring Dialysis.

## 2022-01-18 NOTE — CHART NOTE - NSCHARTNOTESELECT_GEN_ALL_CORE
Event Note
Melena/Event Note
Dialysis Catheter/Event Note
Event Note
Nutrition Services
heme-onc/Event Note

## 2022-01-18 NOTE — DISCHARGE NOTE FOR THE EXPIRED PATIENT - SECONDARY DIAGNOSIS.
Encounter for hemodialysis for acute renal failure Constipation Acute kidney injury superimposed on CKD

## 2022-01-18 NOTE — DISCHARGE NOTE FOR THE EXPIRED PATIENT - HOSPITAL COURSE
Patient admitted with decompensated heart failure, FLOYD on CKD, and coagulopathy. Hospital course worsened with urinary retention and melena. Received multiple units of PRBC, vitamin K, and KCentra. More recently NPO except medications and started on 100% NRB. After attempting suctioning and chest vest, patient became unresponsive and rapid response was called. Upon rapid team arrival, was noted that patient had weak, thready pulse and then absent pulse. Patient declared  at 01:34AM on 2022.  89 y/o M PMH of CAD s/p PCI (ROBERTO to D1 14), chronic AF (on AC w/ eliquis), tachy/perla syndrome s/p biV PPM placement, CVA x 2, HFpEF, hospitalized in 2021 for CHF exacerbation/overload presents to ED BIB son from home for decreased PO intake x2 weeks associated with constipation and abdominal pain, generalized weakness worsening to point where he is no longer ambulating and now w/ b/l LE edema and orthopnea. Denies f/c, cough, congestion.    Admitted 2022 secondary to Acute on Chronic Diastolic Heart Failure Exacerbation, FLOYD on CKD.     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 Received multiple units of PRBC, vitamin K, and KCentra. More recently NPO except medications and started on 100% NRB. After attempting suctioning and chest vest, patient became unresponsive and rapid response was called. Upon rapid team arrival, was noted that patient had weak, thready pulse and then absent pulse. Patient declared  at 01:34AM on 2022.  89 y/o M PMH of CAD s/p PCI (ROBERTO to D1 14), chronic AF (on AC w/ eliquis), tachy/perla syndrome s/p biV PPM placement, CVA x 2, HFpEF, hospitalized in 2021 for CHF exacerbation/overload presents to ED BIB son from home for decreased PO intake x2 weeks associated with constipation and abdominal pain, generalized weakness worsening to point where he is no longer ambulating and now w/ b/l LE edema and orthopnea. Denies f/c, cough, congestion.    Admitted 2022 secondary to Acute on Chronic Diastolic Heart Failure Exacerbation, FLOYD on CKD.     EP Consult: Continue Amiodarone 400mg qd for VT, Continue Eliquis 2.5mg BID, switch to PO Lasix as pt appears euvolemic, Device interrogated with excellent function.   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 IV switched to PO Lasix, Family to decide on HD.  1/10 Wound Care Consult: Evolving DTI of Sacrum; CAVILON ADVANCE TIW pericare BID and prn soiling.   Consent Obtained for HD; Lasix discontinued    Elquis held, INR 6.26, 2 prbc's given 2ry Hgb/Hct 6.3/18.3, K-Centra 2000 units and Vitamin K given.   Heme-Onc Consult: DIC workup in setting of supratherapeutic INR, elevated D-Dimer, and mucosal bleeding. ASA discontinued.   Pt had multiple episodes of melena overnight; repeat Hgb/Hct 7.5 from 6.3. Hyperkalemia 6.2; Lokelma given. IR placed non-tunneled catheter to Right IJ. 1st HD session performed with hypotension which improved. Amiodarone discontinued 2ry to elevated transaminases.   GI Consult: IV PPI BID, cannot perform EGD in setting of supratherapeutic INR for melena (Likely Upper GI Bleed)    2nd HD session  1/15 3rd HD session      Hospital course worsened with urinary retention and melena. Received multiple units of PRBC, vitamin K, and KCentra. More recently NPO except medications and started on 100% NRB. After attempting suctioning and chest vest, patient became unresponsive and rapid response was called. Upon rapid team arrival, was noted that patient had weak, thready pulse and then absent pulse. Patient declared  at 01:34AM on 2022.  89 y/o M PMH of CAD s/p PCI (ROBERTO to D1 14), chronic AF (on AC w/ eliquis), tachy/perla syndrome s/p biV PPM placement, CVA x 2, HFpEF, hospitalized in 2021 for CHF exacerbation/overload presents to ED BIB son from home for decreased PO intake x2 weeks associated with constipation and abdominal pain, generalized weakness worsening to point where he is no longer ambulating and now w/ b/l LE edema and orthopnea. Denies f/c, cough, congestion.    Admitted 2022 secondary to Acute on Chronic Diastolic Heart Failure Exacerbation, FLOYD on CKD.     EP Consult: Continue Amiodarone 400mg qd for VT, Continue Eliquis 2.5mg BID, switch to PO Lasix as pt appears euvolemic, Device interrogated with excellent function.   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 IV switched to PO Lasix, Family to decide on HD.  1/10 Wound Care Consult: Evolving DTI of Sacrum; CAVILON ADVANCE TIW pericare BID and prn soiling.   Consent Obtained for HD; Lasix discontinued    Elquis held, INR 6.26, 2 prbc's given 2ry Hgb/Hct 6.3/18.3, K-Centra 2000 units and Vitamin K given.   Heme-Onc Consult: DIC workup in setting of supratherapeutic INR, elevated D-Dimer, and mucosal bleeding. ASA discontinued.   Pt had multiple episodes of melena overnight; repeat Hgb/Hct 7.5 from 6.3. Hyperkalemia 6.2; Lokelma given. IR placed non-tunneled catheter to Right IJ. 1st HD session performed with hypotension which improved. Amiodarone discontinued 2ry to elevated transaminases, K-Centra 2000 units given again   GI Consult: IV PPI BID, cannot perform EGD in setting of supratherapeutic INR for melena (Likely Upper GI Bleed)   2nd HD session; no fluid removed.  1/15 3rd HD session; Low BP noted; unable to remove fluid. FFP given 2ry to hematuria, new right subconjunctival hemorrhage. Pt with increased SOB; CXR demonstrating worsening airspace opacities, fluid overload. Pt now on 4L NC; Lasix 80mg IV x1, Midodrine 10mg TID.   Pt persistently hypoglycemic (35), D50 25mg IV x1 given, pt made NPO s/p S/S eval   Pulmonary Consult: The patient's increase in symptoms appear to be secondary to a component of aspiration, fluid overload/pulmonary edema and increased bronchial hyperreactivity. Respiratory vest, nebulizers, suctioning, nebulized saline   INR 5.49, Vitamin K 5mg IVB infusion x1 given. O2 saturation 80's on NC, NRM mask applied; improvement to 90's   Midodrine increased to 20mg TID; still unable to tolerate HD. attribute his bleeding to be due to prior DOAC rather than the inhibitor as noted and not affected by factor replacement.   RRT Called 1:20am secondary to unresponsiveness after attempting suctioning and chest vest, Upon rapid team arrival, was noted that patient had weak, thready pulse and then absent pulse.  Patient declared  at 01:34AM on 2022. Family contacted by writer (Barbie Arguelles [phone], [phone]). Extensive emotional and educational support provided by writer. Pt's family at bedside to pay last respects. Attending to be contacted by writer in am 89 y/o M PMH of CAD s/p PCI (ROBERTO to D1 14), chronic AF (on AC w/ eliquis), tachy/perla syndrome s/p biV PPM placement, CVA x 2, HFpEF, hospitalized in 2021 for CHF exacerbation/overload presents to ED BIB son from home for decreased PO intake x2 weeks associated with constipation and abdominal pain, generalized weakness worsening to point where he is no longer ambulating and now w/ b/l LE edema and orthopnea. Denies f/c, cough, congestion.    Admitted 2022 secondary to Acute on Chronic Diastolic Heart Failure Exacerbation, FLOYD on CKD.     EP Consult: Continue Amiodarone 400mg qd for VT, Continue Eliquis 2.5mg BID, switch to PO Lasix as pt appears euvolemic, Device interrogated with excellent function.   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 IV switched to PO Lasix, Family to decide on HD.  1/10 Wound Care Consult: Evolving DTI of Sacrum; CAVILON ADVANCE TIW pericare BID and prn soiling.   Consent Obtained for HD; Lasix discontinued    Elquis held, INR 6.26, 2 prbc's given 2ry Hgb/Hct 6.3/18.3, K-Centra 2000 units and Vitamin K given.   Heme-Onc Consult: DIC workup in setting of supratherapeutic INR, elevated D-Dimer, and mucosal bleeding. ASA discontinued.   Pt had multiple episodes of melena overnight; repeat Hgb/Hct 7.5 from 6.3. Hyperkalemia 6.2; Lokelma given. IR placed non-tunneled catheter to Right IJ. 1st HD session performed with hypotension which improved. Amiodarone discontinued 2ry to elevated transaminases, K-Centra 2000 units given again   GI Consult: IV PPI BID, cannot perform EGD in setting of supratherapeutic INR for melena (Likely Upper GI Bleed)   2nd HD session; no fluid removed.  1/15 3rd HD session; Low BP noted; unable to remove fluid. FFP given 2ry to hematuria, new right subconjunctival hemorrhage. Pt with increased SOB; CXR demonstrating worsening airspace opacities, fluid overload. Pt now on 4L NC; Lasix 80mg IV x1, Midodrine 10mg TID.   Pt persistently hypoglycemic (35), D50 25mg IV x1 given, pt made NPO s/p S/S eval   Pulmonary Consult: The patient's increase in symptoms appear to be secondary to a component of aspiration, fluid overload/pulmonary edema and increased bronchial hyperreactivity. Respiratory vest, nebulizers, suctioning, nebulized saline   INR 5.49, Vitamin K 5mg IVB infusion x1 given. O2 saturation 80's on NC, NRM mask applied; improvement to 90's   Midodrine increased to 20mg TID; still unable to tolerate HD. attribute his bleeding to be due to prior DOAC rather than the inhibitor as noted and not affected by factor replacement.   RRT Called 1:20am secondary to unresponsiveness after attempting suctioning and chest vest, Upon rapid team arrival, was noted that patient had weak, thready pulse and then absent pulse.  Patient declared  at 01:34AM on 2022. Family contacted by writer (Barbie Arguelles [phone], [phone]). Extensive emotional and educational support provided by writer. PPM turned off by Cardio/EP fellow. Pt's family at bedside to pay last respects. Attending to be contacted by writer in am 89 y/o M PMH of CAD s/p PCI (ROBERTO to D1 14), chronic AF (on AC w/ eliquis), tachy/perla syndrome s/p biV PPM placement, CVA x 2, HFpEF, hospitalized in 2021 for CHF exacerbation/overload presents to ED BIB son from home for decreased PO intake x2 weeks associated with constipation and abdominal pain, generalized weakness worsening to point where he is no longer ambulating and now w/ b/l LE edema and orthopnea. Denies f/c, cough, congestion.    Admitted 2022 secondary to Acute on Chronic Diastolic Heart Failure Exacerbation, FLOYD on CKD.     EP Consult: Continue Amiodarone 400mg qd for VT, Continue Eliquis 2.5mg BID, switch to PO Lasix as pt appears euvolemic, Device interrogated with excellent function.   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 IV switched to PO Lasix, Family to decide on HD.  1/10 Wound Care Consult: Evolving DTI of Sacrum; CAVILON ADVANCE TIW pericare BID and prn soiling.   Consent Obtained for HD; Lasix discontinued    Elquis held, INR 6.26, 2 prbc's given 2ry Hgb/Hct 6.3/18.3, K-Centra 2000 units and Vitamin K given.   Heme-Onc Consult: DIC workup in setting of supratherapeutic INR, elevated D-Dimer, and mucosal bleeding. ASA discontinued.   Pt had multiple episodes of melena overnight; repeat Hgb/Hct 7.5 from 6.3. Hyperkalemia 6.2; Lokelma given. IR placed non-tunneled catheter to Right IJ. 1st HD session performed with hypotension which improved. Amiodarone discontinued 2ry to elevated transaminases, K-Centra 2000 units given again   GI Consult: IV PPI BID, cannot perform EGD in setting of supratherapeutic INR for melena (Likely Upper GI Bleed)   2nd HD session; no fluid removed.  1/15 3rd HD session; Low BP noted; unable to remove fluid. FFP given 2ry to hematuria, new right subconjunctival hemorrhage. Pt with increased SOB; CXR demonstrating worsening airspace opacities, fluid overload. Pt now on 4L NC; Lasix 80mg IV x1, Midodrine 10mg TID.   Pt persistently hypoglycemic (35), D50 25mg IV x1 given, pt made NPO s/p S/S eval   Pulmonary Consult: The patient's increase in symptoms appear to be secondary to a component of aspiration, fluid overload/pulmonary edema and increased bronchial hyperreactivity. Respiratory vest, nebulizers, suctioning, nebulized saline   INR 5.49, Vitamin K 5mg IVB infusion x1 given. O2 saturation 80's on NC, NRM mask applied; improvement to 90's   Midodrine increased to 20mg TID; still unable to tolerate HD. attribute his bleeding to be due to prior DOAC rather than the inhibitor as noted and not affected by factor replacement.   RRT Called 1:20am secondary to unresponsiveness after attempting suctioning and chest vest, Upon rapid team arrival, was noted that patient had weak, thready pulse and then absent pulse. Tele strip with asystole, no spontaneous breath sounds, absent pupillary reflexes, no response to noxious/tactile stimuli, No S1/S2, Patient declared  at 01:34AM on 2022. Family contacted by writer (Barbie Argeulles [phone], [phone]). Extensive emotional and educational support provided by writer. PPM turned off by Cardio/EP fellow. Pt's family at bedside to pay last respects. Attending to be contacted by writer in am

## 2022-01-18 NOTE — CHART NOTE - NSCHARTNOTEFT_GEN_A_CORE
Medicine PA Death Note    Called to bedside to pronounce pt with cardiopulmonary arrest secondary to Acute on Chronic Diastolic Congestive Heart Failure, FOLYD on CKD, new HD but cannot have UF secondary to hypotension, Coagulopathy with active bleeding.    On physical exam, patient did not respond to verbal, tactile, or noxious stimuli.  No spontaneous respirations, Absent heart and breath sounds.  Absent radial and carotid pulses.  Pupils are fixed and dilated, no corneal reflex. Tele rhythm strip demonstrating asystole. Patient pronounced dead at 1:34am 1/18/2022. Attending to be notified in am. Pt is not a candidate for medical examiner. Family at bedside declined autopsy. Extensive emotional and educational support given.    Nemesio Arevalo PA-C  Department of Medicine  [facility]

## 2022-01-18 NOTE — CHART NOTE - NSCHARTNOTEFT_GEN_A_CORE
Was called to turn off patient's Biv-ICD. Patient had Med[employer]onic Cobalt. Turned off patient's ICD therapy and changed mode to ODO Was called to turn off patient's Biv-ICD who had  overnight. Patient had Medtronic Cobalt. Turned off patient's ICD therapy and changed mode to ODO

## 2022-01-18 NOTE — RAPID RESPONSE TEAM SUMMARY - NSADDTLFINDINGSRRT_GEN_ALL_CORE
On arrival to RRT pt cold, lying unresponsive in bed, undetectable BP and SpO2, , HR 35 paced rhythm on telemetry however pt pulseless. Primary team declared pt dead and will notify family of patient's passing and will notify Attending. On arrival to RRT pt cold, lying unresponsive in bed, apneic with undetectable BP and SpO2, , HR 35 paced rhythm on telemetry however pt pulseless. Primary team declared pt dead and will notify family of patient's passing and will notify Attending.

## 2022-01-18 NOTE — RAPID RESPONSE TEAM SUMMARY - NSSITUATIONBACKGROUNDRRT_GEN_ALL_CORE
89 yo M with PMH of CAD s/p PCI (6/2014), chronic AF (on AC with Eliquis), tachy/perla syndrome s/p biV PPM placement, CVA x2, HFpEF, a/w acure on chronic diastolic congestive heart failure c/b FLOYD, coagulopathy, transaminitis, GIB. RRT called for unresponsiveness. Pt is DNR.

## 2022-07-14 NOTE — H&P ADULT - PROBLEM SELECTOR PROBLEM 4
Paroxysmal atrial fibrillation Lien PGY 2 Discussed results with patient in regards to their lab work and / or imaging.  pt made aware to keep area clean

## 2022-10-26 NOTE — PROGRESS NOTE ADULT - PROBLEM/PLAN-3
DISPLAY PLAN FREE TEXT
all other ROS negative except as per HPI

## 2023-03-15 NOTE — PHYSICAL THERAPY INITIAL EVALUATION ADULT - ADL SKILLS, REHAB EVAL
REVIEW OF SYSTEMS:  CONSTITUTIONAL: No fever, weight loss, or fatigue  EYES: No eye pain, visual disturbances, or discharge  ENMT:  No difficulty hearing, tinnitus, vertigo; No sinus or throat pain  NECK: No pain or stiffness  BREASTS: No pain, masses, or nipple discharge  RESPIRATORY: No cough, wheezing, chills or hemoptysis; No shortness of breath  CARDIOVASCULAR: No chest pain, palpitations, dizziness, or leg swelling  GASTROINTESTINAL: No abdominal or epigastric pain. No nausea, vomiting, or hematemesis; No diarrhea or constipation. No melena or hematochezia.  GENITOURINARY: No dysuria, frequency, hematuria, or incontinence  NEUROLOGICAL: No headaches, memory loss, loss of strength, numbness, or tremors  SKIN: No itching, burning, rashes, or lesions   LYMPH NODES: No enlarged glands  ENDOCRINE: No heat or cold intolerance; No hair loss  MUSCULOSKELETAL: No joint pain or swelling; No muscle, back, or extremity pain  PSYCHIATRIC: No depression, anxiety, mood swings, or difficulty sleeping  HEME/LYMPH: No easy bruising, or bleeding gums  ALLERY AND IMMUNOLOGIC: No hives or eczema    ALL ROS REVIEWED AND NORMAL EXCEPT AS STATED ABOVE REVIEW OF SYSTEMS:  CONSTITUTIONAL: No fever, weight loss, or fatigue  EYES: No eye pain, visual disturbances, or discharge  ENMT:  No difficulty hearing, tinnitus, vertigo; No sinus or throat pain  NECK: No pain or stiffness  RESPIRATORY: No cough, wheezing, chills or hemoptysis; No shortness of breath  CARDIOVASCULAR: No chest pain, palpitations, dizziness, or leg swelling  GASTROINTESTINAL: No abdominal or epigastric pain. No nausea, vomiting, or hematemesis; No diarrhea or constipation. No melena or hematochezia.  GENITOURINARY: No dysuria, frequency, hematuria, or incontinence  NEUROLOGICAL: No headaches, memory loss, loss of strength, numbness, or tremors  SKIN: +LLE edema, erythema  LYMPH NODES: No enlarged glands  ENDOCRINE: No heat or cold intolerance; No hair loss  MUSCULOSKELETAL: No joint pain or swelling; No muscle, back, or extremity pain  PSYCHIATRIC: No depression, anxiety, mood swings, or difficulty sleeping  HEME/LYMPH: No easy bruising, or bleeding gums  ALLERGY AND IMMUNOLOGIC: No hives or eczema    ALL ROS REVIEWED AND NORMAL EXCEPT AS STATED ABOVE needs device and assist

## 2023-12-10 NOTE — DISCHARGE NOTE ADULT - NURSING SECTION COMPLETE
Goal Outcome Evaluation:                   Patient resting in bed, has been very anxious and agitated with provider aware and new orders noted and administered. VSS. Will continue to follow plan of care.      Patient/Caregiver provided printed discharge information.

## 2024-03-04 NOTE — CONSULT NOTE ADULT - ASSESSMENT
Patient informed of code.     Sydnee Miles MA    
89 yr old very pleasant gentleman w/ PMH of CAD s/p PCI (ROBERTO to D1 6/2/14), chronic AF (on AC w/ eliquis), tachy/perla syndrome s/p biV PPM placement, CVA x 2, HFpEF who was recently hospitalised in Aug 2021 for monomorphic VT s/p cardioversion presents w/ fluid overload found to have FLOYD on CKD stage 3      1) FLOYD on CKD stage 3  b/l cr around 1.5 to 1.8mg/dl  DDx includes Cardiorenal vs ATn, vs rule out obstruction  check ua  check urine na/cr/cl/osm/k  check renal US  Start lasix 40mg iv bid  keep off nephrotoxins such as ace/arb/nsaids/iv contrast  trend bmp  strict i/o's    2)Edema  start lasix 40mg iv bid  strict i/o's  daily wts  monitor    3) HYponatremia  na 133  likely hypervolemic  lasix as above  trend na      Dr Huynh  [phone]
CARDIOLOGY ATTENDING    Agree with above. Agree with IV diuresis given obvious volume overload. No further inpatient cardiac workup expected after he achieves euvolemia. F/U with his cardiologist Dr. Vanegas for cardiology after discharge, and f/u with me for routine BiV-ICD care after discharge as scheduled. Continue lifelong a/c for AF.

## 2024-04-21 NOTE — PHYSICAL THERAPY INITIAL EVALUATION ADULT - STANDING BALANCE: STATIC
Patient discharged from ED with caregiver. AVS reviewed and discussed with patient and caregiver both verbalizing understanding and denies additional questions upon discharge. VSS and charted. Ambulatory.    good minus

## 2024-05-22 NOTE — PHYSICAL THERAPY INITIAL EVALUATION ADULT - REHAB POTENTIAL, PT EVAL
good, to achieve stated therapy goals Normal rate, regular rhythm.  Heart sounds S1, S2.  No murmurs, rubs or gallops.

## 2024-11-06 NOTE — PROVIDER CONTACT NOTE (MEDICATION) - DATE AND TIME:
chest wall non-tender, breathing is unlabored without accessory muscle use, normal breath sounds 25-Aug-2021 07:00

## 2024-12-27 NOTE — PROGRESS NOTE ADULT - SUBJECTIVE AND OBJECTIVE BOX
Acute new problem  Symptoms for 3 days.  POCT rapid COVID is negative  POCT rapid influenza A/B is positive for A.  Symptomatic care reviewed.  Benzonatate 100 mg 3 times a day as needed for cough  Flonase 1 to 2 sprays each nostril daily  Tamiflu 75 mg twice a day x 10 days  Tylenol/ibuprofen for fever or discomfort.  Encourage hydration and rest.  Patient has been advised if symptoms worsen or fail to improve to call the office or go to the ED.     Patient is a 89y old  Male who presents with a chief complaint of HIGH K (21 Aug 2021 13:50)      SUBJECTIVE / OVERNIGHT EVENTS:    Events noted.  CONSTITUTIONAL: No fever,  or fatigue  RESPIRATORY: No cough, wheezing  CARDIOVASCULAR: No chest pain, palpitations  GASTROINTESTINAL: No abdominal or epigastric pain.   NEUROLOGICAL: No headaches,     MEDICATIONS  (STANDING):  aMIOdarone    Tablet   Oral   aMIOdarone    Tablet 200 milliGRAM(s) Oral daily  aspirin enteric coated 81 milliGRAM(s) Oral daily  atorvastatin 40 milliGRAM(s) Oral at bedtime  bisacodyl Suppository 10 milliGRAM(s) Rectal every 24 hours  epoetin lizbeth-epbx (RETACRIT) Injectable 97870 Unit(s) SubCutaneous once  finasteride 5 milliGRAM(s) Oral daily  glucagon  Injectable 1 milliGRAM(s) IntraMuscular once  heparin  Infusion.  Unit(s)/Hr (11 mL/Hr) IV Continuous <Continuous>  insulin lispro (ADMELOG) corrective regimen sliding scale   SubCutaneous at bedtime  insulin lispro (ADMELOG) corrective regimen sliding scale   SubCutaneous three times a day before meals  loratadine 10 milliGRAM(s) Oral daily  pantoprazole    Tablet 40 milliGRAM(s) Oral two times a day  polyethylene glycol 3350 17 Gram(s) Oral two times a day  senna 2 Tablet(s) Oral at bedtime  sodium bicarbonate 650 milliGRAM(s) Oral two times a day  tamsulosin 0.4 milliGRAM(s) Oral at bedtime    MEDICATIONS  (PRN):  heparin   Injectable 4500 Unit(s) IV Push every 6 hours PRN For aPTT less than 40  heparin   Injectable 2000 Unit(s) IV Push every 6 hours PRN For aPTT between 40 - 57        CAPILLARY BLOOD GLUCOSE      POCT Blood Glucose.: 132 mg/dL (25 Aug 2021 21:22)  POCT Blood Glucose.: 110 mg/dL (25 Aug 2021 17:38)  POCT Blood Glucose.: 131 mg/dL (25 Aug 2021 12:34)  POCT Blood Glucose.: 134 mg/dL (25 Aug 2021 09:42)    I&O's Summary    24 Aug 2021 07:01  -  25 Aug 2021 07:00  --------------------------------------------------------  IN: 1584 mL / OUT: 750 mL / NET: 834 mL        T(C): 36.8 (08-25-21 @ 21:30), Max: 36.8 (08-25-21 @ 21:30)  HR: 69 (08-25-21 @ 21:30) (68 - 74)  BP: 113/59 (08-25-21 @ 21:30) (113/59 - 129/65)  RR: 18 (08-25-21 @ 21:30) (17 - 18)  SpO2: 100% (08-25-21 @ 21:30) (99% - 100%)    PHYSICAL EXAM:  GENERAL: NAD  NECK: Supple, No JVD  CHEST/LUNG: Clear to auscultation bilaterally; No wheezing.  HEART: Regular rate and rhythm; No murmurs, rubs, or gallops  ABDOMEN: Soft, Nontender, Nondistended; Bowel sounds present  EXTREMITIES:   No edema  NEUROLOGY: AAO X 3      LABS:                        9.0    6.74  )-----------( 212      ( 25 Aug 2021 06:39 )             27.8     08-25    133<L>  |  104  |  41<H>  ----------------------------<  106<H>  4.2   |  18<L>  |  1.72<H>    Ca    8.6      25 Aug 2021 06:39  Phos  2.4     08-25  Mg     2.30     08-25      PTT - ( 25 Aug 2021 18:00 )  PTT:82.1 sec        CAPILLARY BLOOD GLUCOSE      POCT Blood Glucose.: 132 mg/dL (25 Aug 2021 21:22)  POCT Blood Glucose.: 110 mg/dL (25 Aug 2021 17:38)  POCT Blood Glucose.: 131 mg/dL (25 Aug 2021 12:34)  POCT Blood Glucose.: 134 mg/dL (25 Aug 2021 09:42)        RADIOLOGY & ADDITIONAL TESTS:    Imaging Personally Reviewed:    Consultant(s) Notes Reviewed:      Care Discussed with Consultants/Other Providers:    Dileep Amador MD, CMD, FACP    [phone] [id number]  Office Tel: [phone]  Cell: [phone]

## 2025-02-13 NOTE — PROGRESS NOTE ADULT - SUBJECTIVE AND OBJECTIVE BOX
C A R D I O L O G Y  **********************************     DATE OF SERVICE: 01-17-22    Feels less SOB on NRB mask but lethargic. Has lower abd and LE pain. Denies chest pain. Review of symptoms otherwise negative.      MEDICATIONS  (STANDING):  albuterol/ipratropium for Nebulization 3 milliLiter(s) Nebulizer every 6 hours  dextrose 10%. 1000 milliLiter(s) (30 mL/Hr) IV Continuous <Continuous>  dextrose 40% Gel 15 Gram(s) Oral once  dextrose 5%. 1000 milliLiter(s) (50 mL/Hr) IV Continuous <Continuous>  dextrose 5%. 1000 milliLiter(s) (100 mL/Hr) IV Continuous <Continuous>  dextrose 50% Injectable 25 Gram(s) IV Push once  dextrose 50% Injectable 12.5 Gram(s) IV Push once  dextrose 50% Injectable 25 Gram(s) IV Push once  glucagon  Injectable 1 milliGRAM(s) IntraMuscular once  influenza  Vaccine (HIGH DOSE) 0.7 milliLiter(s) IntraMuscular once  insulin lispro (ADMELOG) corrective regimen sliding scale   SubCutaneous every 6 hours  ketotifen 0.025% Ophthalmic Solution 1 Drop(s) Right EYE two times a day  midodrine. 20 milliGRAM(s) Oral three times a day  multivitamin/minerals 1 Tablet(s) Oral daily  pantoprazole  Injectable 40 milliGRAM(s) IV Push every 12 hours  polyethylene glycol 3350 17 Gram(s) Oral daily  senna 2 Tablet(s) Oral at bedtime  sodium chloride 3%  Inhalation 4 milliLiter(s) Inhalation every 12 hours    MEDICATIONS  (PRN):      LABS:                          8.7    8.79  )-----------( 111      ( 17 Jan 2022 06:17 )             25.7     Hemoglobin: 8.7 g/dL (01-17 @ 06:17)  Hemoglobin: 8.9 g/dL (01-17 @ 00:51)  Hemoglobin: 8.4 g/dL (01-16 @ 19:11)  Hemoglobin: 8.9 g/dL (01-16 @ 07:40)  Hemoglobin: 8.6 g/dL (01-16 @ 00:43)    01-16    138  |  100  |  58<H>  ----------------------------<  31<LL>  3.4<L>   |  25  |  2.62<H>    Ca    8.4      16 Jan 2022 07:37      Creatinine Trend: 2.62<--, 2.27<--, 3.12<--, 4.02<--, 4.97<--, 4.94<--   PT/INR - ( 17 Jan 2022 06:17 )   PT: 33.9 sec;   INR: 2.98 ratio         PTT - ( 17 Jan 2022 06:17 )  PTT:37.8 sec          01-16-22 @ 07:01  -  01-17-22 @ 07:00  --------------------------------------------------------  IN: 282.5 mL / OUT: 100 mL / NET: 182.5 mL    01-17-22 @ 07:01  -  01-17-22 @ 12:09  --------------------------------------------------------  IN: 0 mL / OUT: 50 mL / NET: -50 mL        PHYSICAL EXAM  Vital Signs Last 24 Hrs  T(C): 36.3 (17 Jan 2022 11:59), Max: 36.4 (16 Jan 2022 16:28)  T(F): 97.4 (17 Jan 2022 11:59), Max: 97.6 (17 Jan 2022 04:18)  HR: 61 (17 Jan 2022 11:59) (60 - 65)  BP: 108/68 (17 Jan 2022 11:59) (88/48 - 110/67)  BP(mean): --  RR: 18 (17 Jan 2022 11:59) (18 - 18)  SpO2: 97% (17 Jan 2022 11:59) (90% - 100%)        Gen: NAD  HEENT:  (-)icterus (-)pallor  CV: N S1 S2 1/6 ELLY (+)2 Pulses B/l  Resp:  scattered ronchi B/L, normal effort  GI: (+) BS Soft, NT, ND  Lymph:  (-)Edema, (-)obvious lymphadenopathy  Skin: Warm to touch, Normal turgor  Psych: Appropriate mood and affect      TELEMETRY:  60    ASSESSMENT/PLAN: Patient is a 89 y/o male with PMH of CAD s/p PCI (ROBERTO to D1 6/2/14), chronic AF (on AC w/ eliquis), 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 Cardiology consulted for further evaluation.    - Continue HD per renal  - Coreg on hold  - AC with Eliquis for AF/Stroke prevention on hold given acute anemia and supratherapeutic INR, s/p PRBC, Kcentra and vit K  - Recommend medical management of CAD however ASA held for anemia/bleeding and statin d/gita for transaminitis  - EP eval appreciated - d/c amio given elevated LFTs/INR  - Repeat TTE noted with normal LV function, mod pulm HTN  - Of note, family declined cath and preferred medical management of CAD prior admission  - No further inpatient cardiac w/u planned  - F/U with his cardiologist Dr. Vanegas for cardiology after discharge    Ismael Figueroa PA-C  Pager: [phone]     yes...